# Patient Record
Sex: MALE | Race: WHITE | NOT HISPANIC OR LATINO
[De-identification: names, ages, dates, MRNs, and addresses within clinical notes are randomized per-mention and may not be internally consistent; named-entity substitution may affect disease eponyms.]

---

## 2017-09-19 PROBLEM — Z00.00 ENCOUNTER FOR PREVENTIVE HEALTH EXAMINATION: Status: ACTIVE | Noted: 2017-09-19

## 2017-10-16 ENCOUNTER — APPOINTMENT (OUTPATIENT)
Dept: ENDOCRINOLOGY | Facility: CLINIC | Age: 67
End: 2017-10-16

## 2017-10-16 ENCOUNTER — OTHER (OUTPATIENT)
Age: 67
End: 2017-10-16

## 2018-10-18 ENCOUNTER — APPOINTMENT (OUTPATIENT)
Dept: PULMONOLOGY | Facility: CLINIC | Age: 68
End: 2018-10-18
Payer: MEDICARE

## 2018-10-18 VITALS
HEART RATE: 69 BPM | OXYGEN SATURATION: 99 % | WEIGHT: 232 LBS | SYSTOLIC BLOOD PRESSURE: 126 MMHG | BODY MASS INDEX: 36.41 KG/M2 | HEIGHT: 67 IN | TEMPERATURE: 97.5 F | DIASTOLIC BLOOD PRESSURE: 64 MMHG

## 2018-10-18 DIAGNOSIS — I10 ESSENTIAL (PRIMARY) HYPERTENSION: ICD-10-CM

## 2018-10-18 DIAGNOSIS — Z86.39 PERSONAL HISTORY OF OTHER ENDOCRINE, NUTRITIONAL AND METABOLIC DISEASE: ICD-10-CM

## 2018-10-18 DIAGNOSIS — R91.8 OTHER NONSPECIFIC ABNORMAL FINDING OF LUNG FIELD: ICD-10-CM

## 2018-10-18 PROCEDURE — 94729 DIFFUSING CAPACITY: CPT

## 2018-10-18 PROCEDURE — 94727 GAS DIL/WSHOT DETER LNG VOL: CPT

## 2018-10-18 PROCEDURE — 99203 OFFICE O/P NEW LOW 30 MIN: CPT | Mod: 25

## 2018-10-18 PROCEDURE — 94060 EVALUATION OF WHEEZING: CPT

## 2018-10-18 RX ORDER — VARENICLINE TARTRATE 0.5 (11)-1
0.5 MG X 11 & KIT ORAL
Qty: 53 | Refills: 0 | Status: DISCONTINUED | COMMUNITY
Start: 2018-07-16 | End: 2018-10-18

## 2018-10-18 RX ORDER — NIFEDIPINE 30 MG/1
30 TABLET, FILM COATED, EXTENDED RELEASE ORAL
Qty: 90 | Refills: 0 | Status: ACTIVE | COMMUNITY
Start: 2018-05-30

## 2018-10-18 RX ORDER — INSULIN ASPART 100 [IU]/ML
100 INJECTION, SOLUTION INTRAVENOUS; SUBCUTANEOUS
Qty: 30 | Refills: 0 | Status: ACTIVE | COMMUNITY
Start: 2017-10-20

## 2018-10-18 RX ORDER — BLOOD-GLUCOSE METER
KIT MISCELLANEOUS
Qty: 1 | Refills: 0 | Status: ACTIVE | COMMUNITY
Start: 2018-05-08

## 2018-10-18 RX ORDER — AMOXICILLIN 500 MG/1
500 CAPSULE ORAL
Qty: 30 | Refills: 0 | Status: DISCONTINUED | COMMUNITY
Start: 2018-07-25 | End: 2018-10-18

## 2018-10-18 RX ORDER — VARENICLINE TARTRATE 1 MG/1
1 TABLET, FILM COATED ORAL
Qty: 56 | Refills: 0 | Status: DISCONTINUED | COMMUNITY
Start: 2018-07-16 | End: 2018-10-18

## 2018-10-18 RX ORDER — BLOOD SUGAR DIAGNOSTIC
STRIP MISCELLANEOUS
Qty: 300 | Refills: 0 | Status: ACTIVE | COMMUNITY
Start: 2018-05-08

## 2018-10-18 RX ORDER — MYCOPHENOLATE MOFETIL 500 MG/1
500 TABLET ORAL
Qty: 360 | Refills: 0 | Status: ACTIVE | COMMUNITY
Start: 2018-03-14

## 2018-10-18 RX ORDER — NIFEDIPINE 60 MG/1
60 TABLET, EXTENDED RELEASE ORAL
Qty: 90 | Refills: 0 | Status: ACTIVE | COMMUNITY
Start: 2018-05-30

## 2018-11-01 ENCOUNTER — APPOINTMENT (OUTPATIENT)
Dept: PULMONOLOGY | Facility: CLINIC | Age: 68
End: 2018-11-01
Payer: MEDICARE

## 2018-11-01 VITALS
HEART RATE: 70 BPM | OXYGEN SATURATION: 97 % | WEIGHT: 235 LBS | SYSTOLIC BLOOD PRESSURE: 126 MMHG | BODY MASS INDEX: 36.81 KG/M2 | DIASTOLIC BLOOD PRESSURE: 70 MMHG

## 2018-11-01 PROCEDURE — 99407 BEHAV CHNG SMOKING > 10 MIN: CPT

## 2018-11-01 PROCEDURE — 99213 OFFICE O/P EST LOW 20 MIN: CPT | Mod: 25

## 2018-11-01 RX ORDER — MONTELUKAST 10 MG/1
10 TABLET, FILM COATED ORAL
Qty: 90 | Refills: 0 | Status: DISCONTINUED | COMMUNITY
Start: 2018-10-16 | End: 2018-11-01

## 2019-02-05 ENCOUNTER — APPOINTMENT (OUTPATIENT)
Dept: PULMONOLOGY | Facility: CLINIC | Age: 69
End: 2019-02-05
Payer: MEDICARE

## 2019-02-05 VITALS
BODY MASS INDEX: 36.34 KG/M2 | WEIGHT: 232 LBS | OXYGEN SATURATION: 94 % | DIASTOLIC BLOOD PRESSURE: 70 MMHG | SYSTOLIC BLOOD PRESSURE: 148 MMHG | HEART RATE: 77 BPM

## 2019-02-05 PROCEDURE — 99213 OFFICE O/P EST LOW 20 MIN: CPT

## 2019-02-16 NOTE — HISTORY OF PRESENT ILLNESS
[FreeTextEntry1] : 69 yo male with hx of OAD and ILD on chest CT, presents for follow up. The patient complains of PRN productive cough without chest pain or hemoptysis. He uses breo PRN with rare albuterol use. He continues to smoke.

## 2019-02-16 NOTE — PHYSICAL EXAM
[General Appearance - Well Developed] : well developed [Normal Appearance] : normal appearance [Well Groomed] : well groomed [General Appearance - Well Nourished] : well nourished [No Deformities] : no deformities [General Appearance - In No Acute Distress] : no acute distress [Normal Conjunctiva] : the conjunctiva exhibited no abnormalities [Eyelids - No Xanthelasma] : the eyelids demonstrated no xanthelasmas [Normal Oropharynx] : normal oropharynx [Neck Appearance] : the appearance of the neck was normal [Neck Cervical Mass (___cm)] : no neck mass was observed [Jugular Venous Distention Increased] : there was no jugular-venous distention [Thyroid Diffuse Enlargement] : the thyroid was not enlarged [Thyroid Nodule] : there were no palpable thyroid nodules [Heart Rate And Rhythm] : heart rate and rhythm were normal [Heart Sounds] : normal S1 and S2 [Edema] : no peripheral edema present [Systolic grade ___/6] : A grade [unfilled]/6 systolic murmur was heard. [Respiration, Rhythm And Depth] : normal respiratory rhythm and effort [Exaggerated Use Of Accessory Muscles For Inspiration] : no accessory muscle use [Bibasilar Rales/Crackles] : bibasilar rales [Abdomen Soft] : soft [Abdomen Tenderness] : non-tender [Abdomen Mass (___ Cm)] : no abdominal mass palpated [Nail Clubbing] : no clubbing of the fingernails [Cyanosis, Localized] : no localized cyanosis [Petechial Hemorrhages (___cm)] : no petechial hemorrhages [Skin Color & Pigmentation] : normal skin color and pigmentation [] : no rash [No Venous Stasis] : no venous stasis [Skin Lesions] : no skin lesions [No Skin Ulcers] : no skin ulcer [No Xanthoma] : no  xanthoma was observed [No Focal Deficits] : no focal deficits [Oriented To Time, Place, And Person] : oriented to person, place, and time [Impaired Insight] : insight and judgment were intact [Affect] : the affect was normal

## 2019-02-16 NOTE — DISCUSSION/SUMMARY
[FreeTextEntry1] : 67 yo male with stable pulmonary exam. He is to continue use of breo and albuterol MDI as before for his OAD. PFT and chest CT will be repeated in the future to follow up his ILD. Further intervention and evaluation will depend on these results. He is to follow up with his PMD as before.

## 2019-02-16 NOTE — REVIEW OF SYSTEMS
[As Noted in HPI] : as noted in HPI [Cough] : cough [Sputum] : sputum  [Wheezing] : no wheezing [Hypertension] : ~T hypertension [Negative] : Sleep Disorder

## 2019-05-02 ENCOUNTER — APPOINTMENT (OUTPATIENT)
Dept: PULMONOLOGY | Facility: CLINIC | Age: 69
End: 2019-05-02
Payer: MEDICARE

## 2019-05-02 VITALS
SYSTOLIC BLOOD PRESSURE: 130 MMHG | OXYGEN SATURATION: 98 % | DIASTOLIC BLOOD PRESSURE: 62 MMHG | HEART RATE: 87 BPM | BODY MASS INDEX: 36.65 KG/M2 | WEIGHT: 234 LBS

## 2019-05-02 DIAGNOSIS — Z71.6 TOBACCO ABUSE COUNSELING: ICD-10-CM

## 2019-05-02 PROCEDURE — 94060 EVALUATION OF WHEEZING: CPT

## 2019-05-02 PROCEDURE — 99213 OFFICE O/P EST LOW 20 MIN: CPT | Mod: 25

## 2019-05-02 PROCEDURE — 36415 COLL VENOUS BLD VENIPUNCTURE: CPT

## 2019-05-02 PROCEDURE — 94727 GAS DIL/WSHOT DETER LNG VOL: CPT

## 2019-05-02 PROCEDURE — 94729 DIFFUSING CAPACITY: CPT

## 2019-05-03 LAB
CRP SERPL-MCNC: 1.47 MG/DL
ERYTHROCYTE [SEDIMENTATION RATE] IN BLOOD BY WESTERGREN METHOD: 37 MM/HR
RHEUMATOID FACT SER QL: 18 IU/ML

## 2019-05-05 LAB
ANA PAT FLD IF-IMP: ABNORMAL
ANA SER IF-ACNC: ABNORMAL

## 2019-05-06 LAB
ACE BLD-CCNC: 15 U/L
ALTERN TENCAPG(M6): 6.5 MCG/ML
ASPER FUMCAPG(M3): 13.9 MCG/ML
AUREOBASCAPG(M12): 5.8 MCG/ML
MICROPOLYCAPG(M22): 4.4 MCG/ML
PENIC CHRYCAPG(M1): 8.2 MCG/ML
PHOMA BETAE IGG: 7.6 MCG/ML
THERMOCAPG(M23): 4.3 MCG/ML
TRICHODERMA VIRIDE IGG: 5.9 MCG/ML

## 2019-05-11 PROBLEM — Z71.6 TOBACCO ABUSE COUNSELING: Status: ACTIVE | Noted: 2018-11-11

## 2019-05-11 NOTE — HISTORY OF PRESENT ILLNESS
[FreeTextEntry1] : 69 yo male with hx of mild OAD with ILD, presents for follow up of recent chest CT results. The patient complains of PRN productive cough without SOB, chest pain or hemoptysis. He continues to smoke, down to 5 cigarettes daily. He uses breo daily with PRN albuterol MDI. The patient had renal transplant in 2013 at [facility], being followed every 3 months.

## 2019-05-11 NOTE — DISCUSSION/SUMMARY
[FreeTextEntry1] : 67 yo male with restriction on PFT and interstitial changes on chest CT. I discussed the findings with the patient. Various rheum related labs were drawn, including hypersensitivity panel and ACE level. A chest CT will be repeated in 6 months. Smoking cessation was stressed. He is to use breo and albuterol MDI as before. PFT will be repeated in the future. He is to follow up with his PMD as before.

## 2019-05-11 NOTE — REVIEW OF SYSTEMS
[As Noted in HPI] : as noted in HPI [Cough] : cough [Sputum] : sputum  [Dyspnea] : no dyspnea [Wheezing] : no wheezing [Hypertension] : ~T hypertension [Negative] : Sleep Disorder

## 2019-05-11 NOTE — CONSULT LETTER
[Dear  ___] : Dear  [unfilled], [Please see my note below.] : Please see my note below. [Courtesy Letter:] : I had the pleasure of seeing your patient, [unfilled], in my office today. [Consult Closing:] : Thank you very much for allowing me to participate in the care of this patient.  If you have any questions, please do not hesitate to contact me. [Sincerely,] : Sincerely,

## 2019-05-14 ENCOUNTER — CLINICAL ADVICE (OUTPATIENT)
Age: 69
End: 2019-05-14

## 2019-06-04 ENCOUNTER — APPOINTMENT (OUTPATIENT)
Dept: PULMONOLOGY | Facility: CLINIC | Age: 69
End: 2019-06-04

## 2019-06-20 ENCOUNTER — APPOINTMENT (OUTPATIENT)
Dept: PULMONOLOGY | Facility: CLINIC | Age: 69
End: 2019-06-20
Payer: MEDICARE

## 2019-06-20 VITALS
HEIGHT: 67 IN | WEIGHT: 236 LBS | BODY MASS INDEX: 37.04 KG/M2 | HEART RATE: 77 BPM | OXYGEN SATURATION: 91 % | DIASTOLIC BLOOD PRESSURE: 66 MMHG | SYSTOLIC BLOOD PRESSURE: 132 MMHG

## 2019-06-20 PROCEDURE — 99213 OFFICE O/P EST LOW 20 MIN: CPT

## 2019-06-20 NOTE — REVIEW OF SYSTEMS
[As Noted in HPI] : as noted in HPI [Dyspnea] : dyspnea [Hypertension] : ~T hypertension [Negative] : Sleep Disorder [Cough] : no cough [Sputum] : not coughing up ~M sputum [Wheezing] : no wheezing

## 2019-06-20 NOTE — HISTORY OF PRESENT ILLNESS
[FreeTextEntry1] : 67 yo male with hx of ILD and OAD presents for follow up. The patient complains of PRN LOYA without cough, chest pain or hemoptysis. He uses breo 100 daily without albuterol. Since his last visit, he feels "better". He was seen by rheumatology as referred by me given abnormal BLAYNE, RF,CRP and sedimentation rate..

## 2019-06-20 NOTE — DISCUSSION/SUMMARY
[FreeTextEntry1] : 69 yo male with ILD with mild restriction on PFT. These abnormalities may be related to connective tissue disease. I will follow up results of rheumatologic evaluation (Dr Connelly). PFT and chest CT will be repeated in the future. He is to continue breo daily with PRN albuterol MDI.Complete smoking cessation was stressed. Follow up with his PMD as before, to include evaluation of significant AS murmur. He is to follow up with his transplant surgeon.

## 2019-09-26 ENCOUNTER — APPOINTMENT (OUTPATIENT)
Dept: PULMONOLOGY | Facility: CLINIC | Age: 69
End: 2019-09-26

## 2019-10-04 ENCOUNTER — APPOINTMENT (OUTPATIENT)
Dept: PULMONOLOGY | Facility: CLINIC | Age: 69
End: 2019-10-04
Payer: MEDICARE

## 2019-10-04 VITALS
OXYGEN SATURATION: 95 % | SYSTOLIC BLOOD PRESSURE: 120 MMHG | HEART RATE: 75 BPM | WEIGHT: 238 LBS | BODY MASS INDEX: 37.28 KG/M2 | DIASTOLIC BLOOD PRESSURE: 66 MMHG

## 2019-10-04 DIAGNOSIS — Z23 ENCOUNTER FOR IMMUNIZATION: ICD-10-CM

## 2019-10-04 PROCEDURE — G0008: CPT

## 2019-10-04 PROCEDURE — 90662 IIV NO PRSV INCREASED AG IM: CPT

## 2019-10-04 PROCEDURE — 99213 OFFICE O/P EST LOW 20 MIN: CPT | Mod: 25

## 2019-10-04 RX ORDER — ALBUTEROL SULFATE 90 UG/1
108 (90 BASE) AEROSOL, METERED RESPIRATORY (INHALATION)
Qty: 1 | Refills: 1 | Status: ACTIVE | COMMUNITY
Start: 2019-10-04 | End: 1900-01-01

## 2019-10-19 PROBLEM — Z23 INFLUENZA VACCINATION GIVEN: Status: ACTIVE | Noted: 2019-10-19

## 2019-10-19 NOTE — HISTORY OF PRESENT ILLNESS
[FreeTextEntry1] : 69 yo male with hx of ILD and OAD presents for follow up. The patient feels "good" with decrease in productive cough. He stopped smoking one month ago. He uses albuterol MDI PRN alone, having run out of breo last month. He was seen by rheum and is scheduled to follow up next week. He denies joint pain or rash.

## 2019-10-19 NOTE — DISCUSSION/SUMMARY
[FreeTextEntry1] : 69 yo male with stable OAD. He is to restart breo 100 and continue PRN albuterol MDI. Continued smoking cessation was stressed. PFT will be repeated in the future. Follow up with rheumatology recommended. The influenza vaccine was administered. He is to follow up with his PMD as before.

## 2019-10-19 NOTE — REVIEW OF SYSTEMS
[As Noted in HPI] : as noted in HPI [Cough] : cough [Sputum] : sputum  [Hemoptysis] : no hemoptysis [Dyspnea] : dyspnea [Wheezing] : no wheezing [Hypertension] : ~T hypertension [Negative] : Sleep Disorder

## 2020-01-24 ENCOUNTER — APPOINTMENT (OUTPATIENT)
Dept: PULMONOLOGY | Facility: CLINIC | Age: 70
End: 2020-01-24
Payer: MEDICARE

## 2020-01-24 VITALS
HEART RATE: 76 BPM | WEIGHT: 235 LBS | BODY MASS INDEX: 36.81 KG/M2 | OXYGEN SATURATION: 96 % | SYSTOLIC BLOOD PRESSURE: 150 MMHG | DIASTOLIC BLOOD PRESSURE: 74 MMHG

## 2020-01-24 PROCEDURE — 99213 OFFICE O/P EST LOW 20 MIN: CPT | Mod: 25

## 2020-01-24 PROCEDURE — 94060 EVALUATION OF WHEEZING: CPT

## 2020-01-24 PROCEDURE — 94729 DIFFUSING CAPACITY: CPT

## 2020-01-24 PROCEDURE — 94727 GAS DIL/WSHOT DETER LNG VOL: CPT

## 2020-01-30 NOTE — REVIEW OF SYSTEMS
[As Noted in HPI] : as noted in HPI [Sputum] : not coughing up ~M sputum [Cough] : no cough [Hemoptysis] : no hemoptysis [Wheezing] : no wheezing [Dyspnea] : no dyspnea [Hypertension] : ~T hypertension [Negative] : Sleep Disorder

## 2020-01-30 NOTE — DISCUSSION/SUMMARY
[FreeTextEntry1] : 68 yo male with improved pulmonary status and PFT. He is to continue breo and albuterol MDI as before. A repeat chest CT will be performed in three months to follow up the interstitial changes. Follow up rheumatology recommended (Dr Connelly). He is to follow up with his PMD as before.

## 2020-01-30 NOTE — PHYSICAL EXAM
[General Appearance - Well Developed] : well developed [Normal Appearance] : normal appearance [Well Groomed] : well groomed [General Appearance - Well Nourished] : well nourished [No Deformities] : no deformities [General Appearance - In No Acute Distress] : no acute distress [Normal Conjunctiva] : the conjunctiva exhibited no abnormalities [Normal Oropharynx] : normal oropharynx [Eyelids - No Xanthelasma] : the eyelids demonstrated no xanthelasmas [Neck Appearance] : the appearance of the neck was normal [Neck Cervical Mass (___cm)] : no neck mass was observed [Thyroid Diffuse Enlargement] : the thyroid was not enlarged [Jugular Venous Distention Increased] : there was no jugular-venous distention [Thyroid Nodule] : there were no palpable thyroid nodules [Heart Rate And Rhythm] : heart rate and rhythm were normal [Heart Sounds] : normal S1 and S2 [Edema] : no peripheral edema present [Systolic grade ___/6] : A grade [unfilled]/6 systolic murmur was heard. [Respiration, Rhythm And Depth] : normal respiratory rhythm and effort [Bibasilar Rales/Crackles] : bibasilar rales [Exaggerated Use Of Accessory Muscles For Inspiration] : no accessory muscle use [Abdomen Tenderness] : non-tender [Abdomen Soft] : soft [Abdomen Mass (___ Cm)] : no abdominal mass palpated [Nail Clubbing] : no clubbing of the fingernails [Cyanosis, Localized] : no localized cyanosis [Petechial Hemorrhages (___cm)] : no petechial hemorrhages [Skin Color & Pigmentation] : normal skin color and pigmentation [] : no rash [No Venous Stasis] : no venous stasis [Skin Lesions] : no skin lesions [No Skin Ulcers] : no skin ulcer [No Focal Deficits] : no focal deficits [No Xanthoma] : no  xanthoma was observed [Oriented To Time, Place, And Person] : oriented to person, place, and time [Affect] : the affect was normal [Impaired Insight] : insight and judgment were intact

## 2020-01-30 NOTE — CONSULT LETTER
[Please see my note below.] : Please see my note below. [Courtesy Letter:] : I had the pleasure of seeing your patient, [unfilled], in my office today. [Dear  ___] : Dear  [unfilled], [Consult Closing:] : Thank you very much for allowing me to participate in the care of this patient.  If you have any questions, please do not hesitate to contact me. [Sincerely,] : Sincerely,

## 2020-01-30 NOTE — HISTORY OF PRESENT ILLNESS
[FreeTextEntry1] : 70 yo male with mixed disease on PFT and dyspnea presents for follow up. The patient feels "good" on PRN breo 100, and ventolin MDI and nebulized albuterol. He denies fever, cough, chest pain or hemoptysis.

## 2020-05-22 ENCOUNTER — APPOINTMENT (OUTPATIENT)
Dept: PULMONOLOGY | Facility: CLINIC | Age: 70
End: 2020-05-22
Payer: MEDICARE

## 2020-05-22 VITALS
OXYGEN SATURATION: 94 % | BODY MASS INDEX: 35.71 KG/M2 | HEART RATE: 76 BPM | TEMPERATURE: 98.1 F | WEIGHT: 228 LBS | SYSTOLIC BLOOD PRESSURE: 150 MMHG | DIASTOLIC BLOOD PRESSURE: 80 MMHG | RESPIRATION RATE: 16 BRPM

## 2020-05-22 DIAGNOSIS — J98.4 OTHER DISORDERS OF LUNG: ICD-10-CM

## 2020-05-22 PROCEDURE — 99213 OFFICE O/P EST LOW 20 MIN: CPT

## 2020-05-23 PROBLEM — J98.4 RESTRICTIVE AIRWAY DISEASE: Status: ACTIVE | Noted: 2019-05-11

## 2020-05-23 NOTE — HISTORY OF PRESENT ILLNESS
[TextBox_4] : 70 yo male with hx of LOYA associated with mixed abnormality on PFT and abnormal chest CT presents for follow up. The patient claims to be feeling "great" , complaining of PRN productive cough without fever, chest pain or hemoptysis.He uses breo and albuterol PRN. He did not follow up with rheumatology as recommended.

## 2020-05-23 NOTE — DISCUSSION/SUMMARY
[FreeTextEntry1] : 70 yo male with mixed abnormality on PFT. She is to use breo daily with PRN albuterol MDI. Treatment adjustment will depend on symptomatic needs.PFT and chest CT will be performed in the future. Follow up with rheumatology as recommended in the past (Dr Connelly will be contacted).He is to follow up with his PMD as before.

## 2020-08-20 ENCOUNTER — APPOINTMENT (OUTPATIENT)
Dept: PULMONOLOGY | Facility: CLINIC | Age: 70
End: 2020-08-20
Payer: MEDICARE

## 2020-08-20 VITALS
TEMPERATURE: 98.1 F | HEART RATE: 79 BPM | SYSTOLIC BLOOD PRESSURE: 124 MMHG | WEIGHT: 248 LBS | OXYGEN SATURATION: 95 % | BODY MASS INDEX: 38.84 KG/M2 | DIASTOLIC BLOOD PRESSURE: 66 MMHG

## 2020-08-20 DIAGNOSIS — F17.200 NICOTINE DEPENDENCE, UNSPECIFIED, UNCOMPLICATED: ICD-10-CM

## 2020-08-20 PROCEDURE — 99213 OFFICE O/P EST LOW 20 MIN: CPT

## 2020-08-23 PROBLEM — F17.200 CURRENT SMOKER: Noted: 2018-10-18

## 2020-08-23 NOTE — HISTORY OF PRESENT ILLNESS
[Former] : former [>= 30 pack years] : >= 30 pack years [TextBox_4] : 70 yo male with mixed abnormality on PFT presents for follow up.The patient continues to complain of PRN LOYA with dry cough. He denies fever, chest pain or hemoptysis.He uses both breo and albuterol MDI PRN. He claims to have stopped smoking.A repeat chest CT was performed since last visit.The patient has a history of FRANCOIS  diagnosed 15 years ago, without treatment.He complains of daytime somnolence with fatigue. He snores and has witnessed apneas. [YearQuit] : 2020

## 2020-08-23 NOTE — DISCUSSION/SUMMARY
[FreeTextEntry1] : 68 yo male with ILD with LOYA. He is to use breo daily with PRN albuterol MDI for obstructive airway  component seen on PFT. Continued smoking cessation stressed. PFT will be repeated in the future. Rheumatology re evaluation stressed and will again be referred. I reviewed the recent chest CT images on line and discussed the findings with the patient. A repeat study will be performed in six months. Treatment for FRANCOIS was discussed. A split sleep study will be performed. Diet and weight loss also discussed. He is to follow up with his PMD as before.

## 2020-08-23 NOTE — REVIEW OF SYSTEMS
[Fatigue] : fatigue [Cough] : cough [Sputum] : no sputum [SOB on Exertion] : sob on exertion [Diabetes] : diabetes [Negative] : Psychiatric [TextBox_3] : Silvana

## 2020-09-13 ENCOUNTER — TRANSCRIPTION ENCOUNTER (OUTPATIENT)
Age: 70
End: 2020-09-13

## 2020-09-24 ENCOUNTER — APPOINTMENT (OUTPATIENT)
Dept: RHEUMATOLOGY | Facility: CLINIC | Age: 70
End: 2020-09-24
Payer: MEDICARE

## 2020-09-24 VITALS
WEIGHT: 245 LBS | TEMPERATURE: 97.9 F | SYSTOLIC BLOOD PRESSURE: 128 MMHG | OXYGEN SATURATION: 97 % | RESPIRATION RATE: 16 BRPM | BODY MASS INDEX: 38.45 KG/M2 | HEIGHT: 67 IN | HEART RATE: 80 BPM | DIASTOLIC BLOOD PRESSURE: 78 MMHG

## 2020-09-24 DIAGNOSIS — M16.10 UNILATERAL PRIMARY OSTEOARTHRITIS, UNSPECIFIED HIP: ICD-10-CM

## 2020-09-24 DIAGNOSIS — M25.50 PAIN IN UNSPECIFIED JOINT: ICD-10-CM

## 2020-09-24 DIAGNOSIS — M35.3 POLYMYALGIA RHEUMATICA: ICD-10-CM

## 2020-09-24 PROCEDURE — 99204 OFFICE O/P NEW MOD 45 MIN: CPT

## 2020-10-01 ENCOUNTER — APPOINTMENT (OUTPATIENT)
Dept: PULMONOLOGY | Facility: CLINIC | Age: 70
End: 2020-10-01
Payer: MEDICARE

## 2020-10-01 VITALS
WEIGHT: 238 LBS | DIASTOLIC BLOOD PRESSURE: 80 MMHG | HEART RATE: 76 BPM | BODY MASS INDEX: 37.28 KG/M2 | SYSTOLIC BLOOD PRESSURE: 122 MMHG | OXYGEN SATURATION: 98 % | TEMPERATURE: 97.2 F | RESPIRATION RATE: 17 BRPM

## 2020-10-01 DIAGNOSIS — J45.909 UNSPECIFIED ASTHMA, UNCOMPLICATED: ICD-10-CM

## 2020-10-01 DIAGNOSIS — R05 COUGH: ICD-10-CM

## 2020-10-01 PROCEDURE — 94729 DIFFUSING CAPACITY: CPT

## 2020-10-01 PROCEDURE — 99213 OFFICE O/P EST LOW 20 MIN: CPT | Mod: 25

## 2020-10-01 PROCEDURE — 94727 GAS DIL/WSHOT DETER LNG VOL: CPT

## 2020-10-01 PROCEDURE — 94060 EVALUATION OF WHEEZING: CPT

## 2020-10-02 LAB
CCP AB SER IA-ACNC: <8 UNITS
CRP SERPL-MCNC: 0.55 MG/DL
ERYTHROCYTE [SEDIMENTATION RATE] IN BLOOD BY WESTERGREN METHOD: 25 MM/HR
RF+CCP IGG SER-IMP: NEGATIVE
RHEUMATOID FACT SER QL: 16 IU/ML
URATE SERPL-MCNC: 8.2 MG/DL

## 2020-10-11 PROBLEM — J45.909 AIRWAY HYPERREACTIVITY: Status: ACTIVE | Noted: 2018-10-18

## 2020-10-11 PROBLEM — R05 COUGH: Status: ACTIVE | Noted: 2018-10-18

## 2020-10-11 NOTE — HISTORY OF PRESENT ILLNESS
[Former] : former [>= 30 pack years] : >= 30 pack years [TextBox_4] : 70 yo male with hx of ILD and mixed dz on PFT  and FRANCOIS ,presents for follow up. The patient continues to complain of OLYA with PRN productive cough without  chest pain or hemoptysis.  He quit smoking 3 months ago.He uses breo 100 daily with PRN albuterol MDI.He is to start on PAP treatment for FRANCOIS.Recently he is being evaluated by another rheumatologist for joint pain. He received the influenza vaccine recently and the pneumococcal vaccine four years ago. [YearQuit] : 2020 [Awakes Unrefreshed] : awakes unrefreshed [Fatigue] : fatigue [Hypersomnolence] : hypersomnolence [Snoring] : snoring

## 2020-10-11 NOTE — DISCUSSION/SUMMARY
[FreeTextEntry1] : 70 yo with normal PFT today despite chest CT abnormalities and smoking history. He is to continue breo and albuterol MDI as before. Follow up with rheumatology as planned. PFT and chest CT will be repeated in the future.CPAP will be ordered for FRANCOIS and compliance with use was stressed.. He is to follow up with his PMD as before.

## 2020-10-16 DIAGNOSIS — I73.9 PERIPHERAL VASCULAR DISEASE, UNSPECIFIED: ICD-10-CM

## 2020-10-20 ENCOUNTER — RX RENEWAL (OUTPATIENT)
Age: 70
End: 2020-10-20

## 2020-10-23 ENCOUNTER — APPOINTMENT (OUTPATIENT)
Dept: VASCULAR SURGERY | Facility: CLINIC | Age: 70
End: 2020-10-23
Payer: MEDICARE

## 2020-10-23 VITALS
SYSTOLIC BLOOD PRESSURE: 140 MMHG | BODY MASS INDEX: 37.67 KG/M2 | HEIGHT: 67 IN | TEMPERATURE: 98.2 F | WEIGHT: 240 LBS | DIASTOLIC BLOOD PRESSURE: 60 MMHG | HEART RATE: 69 BPM

## 2020-10-23 VITALS — HEART RATE: 67 BPM | SYSTOLIC BLOOD PRESSURE: 132 MMHG | DIASTOLIC BLOOD PRESSURE: 66 MMHG

## 2020-10-23 PROCEDURE — 99203 OFFICE O/P NEW LOW 30 MIN: CPT

## 2020-10-23 PROCEDURE — 99072 ADDL SUPL MATRL&STAF TM PHE: CPT

## 2020-10-23 RX ORDER — MYCOPHENOLATE MOFETIL 500 MG/1
TABLET, FILM COATED ORAL
Refills: 0 | Status: ACTIVE | COMMUNITY

## 2020-10-23 RX ORDER — ALFUZOSIN HYDROCHLORIDE 10 MG/1
10 TABLET, EXTENDED RELEASE ORAL
Refills: 0 | Status: ACTIVE | COMMUNITY

## 2020-10-23 RX ORDER — MONTELUKAST 10 MG/1
TABLET, FILM COATED ORAL
Refills: 0 | Status: ACTIVE | COMMUNITY

## 2020-11-02 ENCOUNTER — APPOINTMENT (OUTPATIENT)
Dept: VASCULAR SURGERY | Facility: CLINIC | Age: 70
End: 2020-11-02

## 2020-11-02 ENCOUNTER — APPOINTMENT (OUTPATIENT)
Dept: VASCULAR SURGERY | Facility: CLINIC | Age: 70
End: 2020-11-02
Payer: MEDICARE

## 2020-11-06 ENCOUNTER — APPOINTMENT (OUTPATIENT)
Dept: VASCULAR SURGERY | Facility: CLINIC | Age: 70
End: 2020-11-06
Payer: MEDICARE

## 2020-11-06 PROCEDURE — 99072 ADDL SUPL MATRL&STAF TM PHE: CPT

## 2020-11-06 PROCEDURE — 93970 EXTREMITY STUDY: CPT

## 2020-11-10 ENCOUNTER — APPOINTMENT (OUTPATIENT)
Dept: VASCULAR SURGERY | Facility: CLINIC | Age: 70
End: 2020-11-10
Payer: MEDICARE

## 2020-11-10 PROCEDURE — 99212 OFFICE O/P EST SF 10 MIN: CPT | Mod: 95

## 2020-11-12 ENCOUNTER — APPOINTMENT (OUTPATIENT)
Dept: RHEUMATOLOGY | Facility: CLINIC | Age: 70
End: 2020-11-12

## 2020-11-29 ENCOUNTER — RX RENEWAL (OUTPATIENT)
Age: 70
End: 2020-11-29

## 2021-01-28 ENCOUNTER — APPOINTMENT (OUTPATIENT)
Dept: PULMONOLOGY | Facility: CLINIC | Age: 71
End: 2021-01-28
Payer: MEDICARE

## 2021-01-28 VITALS
BODY MASS INDEX: 36.41 KG/M2 | DIASTOLIC BLOOD PRESSURE: 90 MMHG | HEART RATE: 79 BPM | RESPIRATION RATE: 17 BRPM | WEIGHT: 232 LBS | SYSTOLIC BLOOD PRESSURE: 170 MMHG | HEIGHT: 67 IN | OXYGEN SATURATION: 98 % | TEMPERATURE: 97 F

## 2021-01-28 PROCEDURE — 99072 ADDL SUPL MATRL&STAF TM PHE: CPT

## 2021-01-28 PROCEDURE — 99213 OFFICE O/P EST LOW 20 MIN: CPT

## 2021-02-02 NOTE — DISCUSSION/SUMMARY
[FreeTextEntry1] : 71 yo male with mixed abnormality on PFT with stable pulmonary exam on ICS/LABA and albuterol MDI. Treatment adjustment will depend on symptomatic needs. A repeat chest CT will be performed as recommended last visit. PFT will also be repeated in the future. He is to follow up with rheumatology and his PMD for follow up of valvular disease.

## 2021-02-02 NOTE — HISTORY OF PRESENT ILLNESS
[>= 30 pack years] : >= 30 pack years [TextBox_4] : 69 yo male with hx of FRANCOIS and mixed abnormality on PFT presents for follow up. The patient feels "better" with PRN LOYA. He denies cough, chest pain or hemoptysis. He uses breo 100 daily with PRN albuterol MDI. He is compliant with CPAP use and continues  to abstain from cigarette use.  [YearQuit] : 2020 [TextBox_29] : Denies snoring, daytime somnolence, apneic episodes, AM headaches

## 2021-02-02 NOTE — REVIEW OF SYSTEMS
[Fatigue] : no fatigue [Cough] : no cough [Sputum] : no sputum [SOB on Exertion] : sob on exertion [Diabetes] : diabetes [Negative] : Psychiatric [TextBox_3] : Silvana

## 2021-05-14 ENCOUNTER — APPOINTMENT (OUTPATIENT)
Dept: PULMONOLOGY | Facility: CLINIC | Age: 71
End: 2021-05-14
Payer: MEDICARE

## 2021-05-14 VITALS
OXYGEN SATURATION: 95 % | SYSTOLIC BLOOD PRESSURE: 134 MMHG | WEIGHT: 223 LBS | TEMPERATURE: 97.8 F | BODY MASS INDEX: 34.93 KG/M2 | DIASTOLIC BLOOD PRESSURE: 66 MMHG | HEART RATE: 78 BPM

## 2021-05-14 PROCEDURE — 99072 ADDL SUPL MATRL&STAF TM PHE: CPT

## 2021-05-14 PROCEDURE — 99213 OFFICE O/P EST LOW 20 MIN: CPT

## 2021-05-14 NOTE — DISCUSSION/SUMMARY
[FreeTextEntry1] : 69 yo male with mixed abnormality on PFT with baseline pulmonary exam. He is to continue albuterol MDI PRN with daily breo as before. PFT will be performed in the future. A repeat chest CT will be performed as recommended. Continued compliance with CPAP use stressed. Continued treatment post renal transplant as before. He is to follow up with rheum and his PMD as before.

## 2021-05-14 NOTE — HISTORY OF PRESENT ILLNESS
[Former] : former [>= 30 pack years] : >= 30 pack years [Current] : current [TextBox_4] : 71 yo male with mixed abnormality on PFT and FRANCOIS presents for follow up. The patient continues to complain of LOYA without cough, chest pain or hemoptysis. He uses breo daily with PRN albuterol MDI. He continues not to smoke cigarettes but does vape. He did not go for follow up chest CT as recommended. [YearQuit] : 2020 [TextBox_29] : Denies snoring, daytime somnolence, apneic episodes, AM headaches

## 2021-05-14 NOTE — REVIEW OF SYSTEMS
[SOB on Exertion] : sob on exertion [Diabetes] : diabetes [Negative] : Psychiatric [Fatigue] : no fatigue [Cough] : no cough [Sputum] : no sputum [TextBox_3] : Silvana

## 2021-08-19 ENCOUNTER — APPOINTMENT (OUTPATIENT)
Dept: PULMONOLOGY | Facility: CLINIC | Age: 71
End: 2021-08-19
Payer: MEDICARE

## 2021-08-19 VITALS
WEIGHT: 213 LBS | SYSTOLIC BLOOD PRESSURE: 148 MMHG | RESPIRATION RATE: 16 BRPM | TEMPERATURE: 97 F | DIASTOLIC BLOOD PRESSURE: 80 MMHG | OXYGEN SATURATION: 93 % | HEIGHT: 67 IN | HEART RATE: 83 BPM | BODY MASS INDEX: 33.43 KG/M2

## 2021-08-19 PROCEDURE — 99213 OFFICE O/P EST LOW 20 MIN: CPT

## 2021-09-06 NOTE — PHYSICAL EXAM
[No Acute Distress] : no acute distress [Normal Oropharynx] : normal oropharynx [Normal Appearance] : normal appearance [No Neck Mass] : no neck mass [Normal Rate/Rhythm] : normal rate/rhythm [Murmur ___ / 6] : murmur [unfilled] / 6 [Normal S1, S2] : normal s1, s2 [No Resp Distress] : no resp distress [Rales] : rales [No Abnormalities] : no abnormalities [Benign] : benign [Normal Gait] : normal gait [No Clubbing] : no clubbing [No Cyanosis] : no cyanosis [FROM] : FROM [No Edema] : no edema [Normal Color/ Pigmentation] : normal color/ pigmentation [No Focal Deficits] : no focal deficits [Oriented x3] : oriented x3 [Normal Affect] : normal affect

## 2021-09-06 NOTE — HISTORY OF PRESENT ILLNESS
[>= 30 pack years] : >= 30 pack years [Former] : former [Current] : current [TextBox_4] : 69 yo male with mixed abnormality on PFT, and FRANCOIS on CPAP, presents for follow up. The patient continues to complain of LOYA with mucus production without cough. He denies fever, chest pain or hemoptysis. He uses breo 100 daily alone. He continues to vape with PRN cigarette use. He is compliant with CPAP use and denies sleep related complaints. [YearQuit] : 2020 [TextBox_29] : Denies snoring, daytime somnolence, apneic episodes, AM headaches

## 2021-09-06 NOTE — DISCUSSION/SUMMARY
[FreeTextEntry1] : 71 yo male with mixed abnormality on PFT at pulmonary baseline clinically. I reviewed the images of his latest chest CT and discussed the findings with the patient. The need to stop all types of smoking was stressed. He is to use breo as before with PRN albuterol MDI. PFT and chest CT will be repeated in the future. Rheumatology follow up stressed. He is to follow up with his PMD for cardiac evaluation also.

## 2021-09-06 NOTE — REVIEW OF SYSTEMS
[Fatigue] : no fatigue [Cough] : no cough [Sputum] : sputum [SOB on Exertion] : sob on exertion [Diabetes] : diabetes [Negative] : Psychiatric [TextBox_3] : Silvana

## 2021-09-24 ENCOUNTER — APPOINTMENT (OUTPATIENT)
Dept: CV DIAGNOSITCS | Facility: HOSPITAL | Age: 71
End: 2021-09-24

## 2021-09-24 ENCOUNTER — OUTPATIENT (OUTPATIENT)
Dept: OUTPATIENT SERVICES | Facility: HOSPITAL | Age: 71
LOS: 1 days | End: 2021-09-24
Payer: COMMERCIAL

## 2021-09-24 DIAGNOSIS — I35.0 NONRHEUMATIC AORTIC (VALVE) STENOSIS: ICD-10-CM

## 2021-09-24 PROCEDURE — 93306 TTE W/DOPPLER COMPLETE: CPT | Mod: 26

## 2021-09-24 PROCEDURE — 93306 TTE W/DOPPLER COMPLETE: CPT

## 2021-10-11 PROBLEM — M54.50 CHRONIC LUMBAR PAIN: Status: ACTIVE | Noted: 2020-09-24

## 2021-10-11 PROBLEM — R53.83 FATIGUE: Status: ACTIVE | Noted: 2020-08-20

## 2021-10-12 ENCOUNTER — LABORATORY RESULT (OUTPATIENT)
Age: 71
End: 2021-10-12

## 2021-10-12 ENCOUNTER — NON-APPOINTMENT (OUTPATIENT)
Age: 71
End: 2021-10-12

## 2021-10-12 ENCOUNTER — APPOINTMENT (OUTPATIENT)
Dept: CARDIOTHORACIC SURGERY | Facility: CLINIC | Age: 71
End: 2021-10-12
Payer: MEDICARE

## 2021-10-12 VITALS
TEMPERATURE: 97.6 F | WEIGHT: 213 LBS | HEIGHT: 67 IN | SYSTOLIC BLOOD PRESSURE: 118 MMHG | DIASTOLIC BLOOD PRESSURE: 70 MMHG | BODY MASS INDEX: 33.43 KG/M2 | HEART RATE: 67 BPM | RESPIRATION RATE: 13 BRPM | OXYGEN SATURATION: 98 %

## 2021-10-12 DIAGNOSIS — G89.29 LOW BACK PAIN, UNSPECIFIED: ICD-10-CM

## 2021-10-12 DIAGNOSIS — I50.9 HEART FAILURE, UNSPECIFIED: ICD-10-CM

## 2021-10-12 DIAGNOSIS — Z82.49 FAMILY HISTORY OF ISCHEMIC HEART DISEASE AND OTHER DISEASES OF THE CIRCULATORY SYSTEM: ICD-10-CM

## 2021-10-12 DIAGNOSIS — F17.290 NICOTINE DEPENDENCE, OTHER TOBACCO PRODUCT, UNCOMPLICATED: ICD-10-CM

## 2021-10-12 DIAGNOSIS — M54.50 LOW BACK PAIN, UNSPECIFIED: ICD-10-CM

## 2021-10-12 DIAGNOSIS — F17.200 NICOTINE DEPENDENCE, UNSPECIFIED, UNCOMPLICATED: ICD-10-CM

## 2021-10-12 DIAGNOSIS — R53.83 OTHER FATIGUE: ICD-10-CM

## 2021-10-12 PROCEDURE — 99205 OFFICE O/P NEW HI 60 MIN: CPT

## 2021-10-12 PROCEDURE — 93000 ELECTROCARDIOGRAM COMPLETE: CPT

## 2021-10-12 PROCEDURE — 99204 OFFICE O/P NEW MOD 45 MIN: CPT

## 2021-10-12 RX ORDER — FEBUXOSTAT 40 MG/1
40 TABLET ORAL
Qty: 30 | Refills: 0 | Status: COMPLETED | COMMUNITY
Start: 2018-03-14 | End: 2021-10-12

## 2021-10-12 RX ORDER — TACROLIMUS 0.5 MG/1
0.5 CAPSULE ORAL
Qty: 90 | Refills: 0 | Status: COMPLETED | COMMUNITY
Start: 2018-03-14 | End: 2021-10-12

## 2021-10-12 RX ORDER — CHLORTHALIDONE 25 MG/1
25 TABLET ORAL
Qty: 90 | Refills: 0 | Status: COMPLETED | COMMUNITY
Start: 2018-05-30 | End: 2021-10-12

## 2021-10-12 RX ORDER — TORSEMIDE 10 MG/1
10 TABLET ORAL EVERY OTHER DAY
Refills: 0 | Status: ACTIVE | COMMUNITY
Start: 2021-10-12

## 2021-10-12 RX ORDER — FLUTICASONE PROPIONATE 50 UG/1
50 SPRAY, METERED NASAL
Qty: 48 | Refills: 0 | Status: COMPLETED | COMMUNITY
Start: 2018-05-05 | End: 2021-10-12

## 2021-10-12 RX ORDER — ALBUTEROL SULFATE 90 UG/1
108 (90 BASE) AEROSOL, METERED RESPIRATORY (INHALATION)
Qty: 1 | Refills: 1 | Status: COMPLETED | COMMUNITY
Start: 2019-06-20 | End: 2021-10-12

## 2021-10-12 RX ORDER — LANCETS 28 GAUGE
EACH MISCELLANEOUS
Qty: 300 | Refills: 0 | Status: COMPLETED | COMMUNITY
Start: 2018-05-08 | End: 2021-10-12

## 2021-10-12 RX ORDER — BLOOD-GLUCOSE CONTROL, NORMAL
EACH MISCELLANEOUS
Qty: 1 | Refills: 0 | Status: COMPLETED | COMMUNITY
Start: 2018-05-08 | End: 2021-10-12

## 2021-10-12 RX ORDER — GABAPENTIN 100 MG/1
100 CAPSULE ORAL
Qty: 30 | Refills: 0 | Status: COMPLETED | COMMUNITY
Start: 2020-09-24 | End: 2021-10-12

## 2021-10-12 RX ORDER — TACROLIMUS 1 MG/1
1 CAPSULE ORAL
Qty: 180 | Refills: 0 | Status: COMPLETED | COMMUNITY
Start: 2018-03-14 | End: 2021-10-12

## 2021-10-12 NOTE — CARDIOLOGY SUMMARY
[de-identified] : NSR 64\par LVH [de-identified] : 9/24/21\par EF 70%, NATHALY 1sqcm, mGr 47 mmHg,Aov 4.2 m/s, DVI 0.26

## 2021-10-12 NOTE — PHYSICAL EXAM
[General Appearance - Alert] : alert [General Appearance - In No Acute Distress] : in no acute distress [Sclera] : the sclera and conjunctiva were normal [PERRL With Normal Accommodation] : pupils were equal in size, round, and reactive to light [Extraocular Movements] : extraocular movements were intact [Jugular Venous Distention Increased] : there was no jugular-venous distention [] : no respiratory distress [Exaggerated Use Of Accessory Muscles For Inspiration] : no accessory muscle use [Examination Of The Chest] : the chest was normal in appearance [Bowel Sounds] : normal bowel sounds [Abdomen Soft] : soft [Cervical Lymph Nodes Enlarged Posterior Bilaterally] : posterior cervical [Cervical Lymph Nodes Enlarged Anterior Bilaterally] : anterior cervical [No CVA Tenderness] : no ~M costovertebral angle tenderness [No Focal Deficits] : no focal deficits [Oriented To Time, Place, And Person] : oriented to person, place, and time [FreeTextEntry1] : Non functioning LUE AV fistula

## 2021-10-12 NOTE — REVIEW OF SYSTEMS
[Dyspnea on exertion] : not dyspnea during exertion [Chest Discomfort] : no chest discomfort [Lower Ext Edema] : no extremity edema [Abdominal Pain] : no abdominal pain [Nausea] : no nausea [Change in Appetite] : no change in appetite [Dizziness] : no dizziness

## 2021-10-12 NOTE — HISTORY OF PRESENT ILLNESS
[FreeTextEntry1] : Mr. Baker is a 70 year old male referred by Dr. Garrido for evaluation of aortic stenosis. He has an extensive past medical history including a renal transplant, T2DM (on insulin pump), ILD, restrictive/obstructive pulmonary disease, FRANCOIS, HTN, and HLD. He is feeling well without dyspnea, angina, PND, orthopnea, dizziness, or LE edema. \par \par Repeat echocardiogram today demonstrates severe aortic stenosis with preserved LVEF, NATHALY 0.9 sqcm, mGr 47 mmHg, pGr 71 mmHg, AoV 4.2 m/s, and a DVI 0.23.\par \par He states "I feel great" and uses a stationary bike at home and walks regularly without issue. He reports "sometimes I have SOB when I do a lot of effort" such as with climbing a FOS. He has no angina or syncopal symptoms. He describes his pulmonary disease as stable and was told "last time I had an MRI, it was good". \par \par Renal at [facility]: Dr Vargas

## 2021-10-12 NOTE — END OF VISIT
[FreeTextEntry3] : I personally performed the services described in the documentation, reviewed the documentation recorded by the scribe in my presence and it accurately and completely records my words and actions.\par \par I, Ally Guadalupe NP, am scribing for Dr. Zhao Gill, the following sections HISTORY OF PRESENT ILLNESS, PAST MEDICAL/FAMILY/SOCIAL HISTORY; REVIEW OF SYSTEMS; VITAL SIGNS; PHYSICAL EXAM; DISPOSITION.\par

## 2021-10-12 NOTE — DISCUSSION/SUMMARY
[Severe Aortic Stenosis] : severe aortic stenosis [Multidetector Cardiac CT] : multidetector cardiac CT [Coronary Angiography] : coronary angiography [None] : There are no changes in medication management [Aortic Valve Replacement] : aortic valve replacement [Patient] : the patient [FreeTextEntry1] : Mr Baker has severe AS and recently progressive exertional dyspnea (NYHA II) that is likely multifactorial in etiology (AS and pulmonary disease). Dr Gill and I are in agreement that he is an appropriate candidate for MIMA. As such, he will be scheduled for an angiogram and cardiac structural CT. Prior to any contrast-based exams, and in context of his prior renal transplant, I recommended he be seen by a local Nephrologist, who would also be able to follow him postoperatively after his MIMA--he is amenable to doing so. I also recommended he notify his transplant Nephrologist at [facility] about our plans--I will call him as well. I discussed the potential risks and benefits of MIMA with him in detail and answered all of his questions--including the potential for acute kidney injury--and he does wish to proceed as outlined.

## 2021-10-12 NOTE — CONSULT LETTER
[Dear  ___] : Dear ~MATTHEW, [Courtesy Letter:] : I had the pleasure of seeing your patient, [unfilled], in my office today. [Please see my note below.] : Please see my note below. [Consult Closing:] : Thank you very much for allowing me to participate in the care of this patient.  If you have any questions, please do not hesitate to contact me. [Sincerely,] : Sincerely, [FreeTextEntry2] : Homero Garrido MD\par [address], \[place] [address] [FreeTextEntry3] : Zhao Gill MD\par \par Cardiovascular & Thoracic Surgery\par Professor\par [facility]\par \par

## 2021-10-12 NOTE — PHYSICAL EXAM
[Rt] : no varicose veins of the right leg [Lt] : no varicose veins of the left leg [Left Carotid Bruit] : no bruit heard over the left carotid [Right Carotid Bruit] : no bruit heard over the right carotid

## 2021-10-12 NOTE — ASSESSMENT
[FreeTextEntry1] : Mr. Baker is a 70 year old male with past medical history that includes renal transplant (2013 at [facility]), T2DM (on insulin pump), ILD, restrictive/obstructive pulmonary disease, FRANCOIS, HTN, and HLD.  Echocardiogram on 9/24/21 EF 70%, NATHALY 1 sqcm, mGr 47 mmHg,Aov 4.2 m/s, DVI 0.26   referred by Dr. Garrido for evaluation of aortic stenosis.  He reports he feels well today, he denies angina, palpitations, SOB, LOYA or syncope. He is ambulating with a cane. \par \par Echo was reviewed and deemed to show severe aortic valve stenosis.  Risks of the TAVR [procedure discussed with the patietn including the possibility of permanent pacemaker requirement.  Plan for CTA and cardiac cath then scheduling for an elective TAVR

## 2021-10-12 NOTE — REVIEW OF SYSTEMS
[Feeling Tired] : feeling tired [SOB on Exertion] : shortness of breath during exertion [Joint Swelling] : joint swelling [Negative] : Genitourinary [Chest Pain] : no chest pain [Palpitations] : no palpitations [Dizziness] : no dizziness [Suicidal] : not suicidal [FreeTextEntry9] : ambulates with cane

## 2021-10-12 NOTE — HISTORY OF PRESENT ILLNESS
[FreeTextEntry1] : Mr. Baker is a 70 year old male with past medical history that includes renal transplant (2013 at [facility], + LUE AV fistula), T2DM (on insulin pump), ILD, restrictive/obstructive pulmonary disease, FRANCOIS, HTN, and HLD.  Echocardiogram on 9/24/21 EF 70%, NATHALY 1 sqcm, mGr 47 mmHg,Aov 4.2 m/s, DVI 0.26   referred by Dr. Garrido for evaluation of aortic stenosis.  He reports he feels well today, he denies angina, palpitations, SOB, LOYA or syncope. He is ambulating with a cane. \par  \par \par

## 2021-10-22 ENCOUNTER — APPOINTMENT (OUTPATIENT)
Dept: NEPHROLOGY | Facility: CLINIC | Age: 71
End: 2021-10-22

## 2021-11-04 ENCOUNTER — RESULT REVIEW (OUTPATIENT)
Age: 71
End: 2021-11-04

## 2021-11-04 ENCOUNTER — OUTPATIENT (OUTPATIENT)
Dept: OUTPATIENT SERVICES | Facility: HOSPITAL | Age: 71
LOS: 1 days | End: 2021-11-04
Payer: COMMERCIAL

## 2021-11-04 ENCOUNTER — APPOINTMENT (OUTPATIENT)
Dept: CARDIOLOGY | Facility: CLINIC | Age: 71
End: 2021-11-04

## 2021-11-04 DIAGNOSIS — I50.9 HEART FAILURE, UNSPECIFIED: ICD-10-CM

## 2021-11-04 DIAGNOSIS — Z00.00 ENCOUNTER FOR GENERAL ADULT MEDICAL EXAMINATION WITHOUT ABNORMAL FINDINGS: ICD-10-CM

## 2021-11-04 PROCEDURE — 75572 CT HRT W/3D IMAGE: CPT | Mod: 26

## 2021-11-04 PROCEDURE — 75572 CT HRT W/3D IMAGE: CPT

## 2021-11-19 ENCOUNTER — LABORATORY RESULT (OUTPATIENT)
Age: 71
End: 2021-11-19

## 2021-11-19 ENCOUNTER — APPOINTMENT (OUTPATIENT)
Dept: DISASTER EMERGENCY | Facility: CLINIC | Age: 71
End: 2021-11-19

## 2021-11-22 ENCOUNTER — INPATIENT (INPATIENT)
Facility: HOSPITAL | Age: 71
LOS: 0 days | Discharge: HOME | DRG: 247 | End: 2021-11-23
Attending: INTERNAL MEDICINE | Admitting: INTERNAL MEDICINE
Payer: COMMERCIAL

## 2021-11-22 ENCOUNTER — TRANSCRIPTION ENCOUNTER (OUTPATIENT)
Age: 71
End: 2021-11-22

## 2021-11-22 VITALS
HEART RATE: 69 BPM | RESPIRATION RATE: 16 BRPM | OXYGEN SATURATION: 96 % | DIASTOLIC BLOOD PRESSURE: 65 MMHG | SYSTOLIC BLOOD PRESSURE: 140 MMHG | HEIGHT: 67 IN | WEIGHT: 209 LBS | TEMPERATURE: 98 F

## 2021-11-22 DIAGNOSIS — I35.9 NONRHEUMATIC AORTIC VALVE DISORDER, UNSPECIFIED: ICD-10-CM

## 2021-11-22 DIAGNOSIS — Z94.0 KIDNEY TRANSPLANT STATUS: Chronic | ICD-10-CM

## 2021-11-22 LAB
ANION GAP SERPL CALC-SCNC: 14 MMOL/L — SIGNIFICANT CHANGE UP (ref 5–17)
BUN SERPL-MCNC: 12 MG/DL — SIGNIFICANT CHANGE UP (ref 7–23)
CALCIUM SERPL-MCNC: 9.8 MG/DL — SIGNIFICANT CHANGE UP (ref 8.4–10.5)
CHLORIDE SERPL-SCNC: 106 MMOL/L — SIGNIFICANT CHANGE UP (ref 96–108)
CO2 SERPL-SCNC: 20 MMOL/L — LOW (ref 22–31)
CREAT SERPL-MCNC: 0.47 MG/DL — LOW (ref 0.5–1.3)
GLUCOSE BLDC GLUCOMTR-MCNC: 104 MG/DL — HIGH (ref 70–99)
GLUCOSE BLDC GLUCOMTR-MCNC: 78 MG/DL — SIGNIFICANT CHANGE UP (ref 70–99)
GLUCOSE BLDC GLUCOMTR-MCNC: 90 MG/DL — SIGNIFICANT CHANGE UP (ref 70–99)
GLUCOSE BLDC GLUCOMTR-MCNC: 93 MG/DL — SIGNIFICANT CHANGE UP (ref 70–99)
GLUCOSE BLDC GLUCOMTR-MCNC: 95 MG/DL — SIGNIFICANT CHANGE UP (ref 70–99)
GLUCOSE SERPL-MCNC: 89 MG/DL — SIGNIFICANT CHANGE UP (ref 70–99)
HCT VFR BLD CALC: 42.9 % — SIGNIFICANT CHANGE UP (ref 39–50)
HGB BLD-MCNC: 13.9 G/DL — SIGNIFICANT CHANGE UP (ref 13–17)
MAGNESIUM SERPL-MCNC: 1.7 MG/DL — SIGNIFICANT CHANGE UP (ref 1.6–2.6)
MCHC RBC-ENTMCNC: 30.8 PG — SIGNIFICANT CHANGE UP (ref 27–34)
MCHC RBC-ENTMCNC: 32.4 GM/DL — SIGNIFICANT CHANGE UP (ref 32–36)
MCV RBC AUTO: 94.9 FL — SIGNIFICANT CHANGE UP (ref 80–100)
NRBC # BLD: 0 /100 WBCS — SIGNIFICANT CHANGE UP (ref 0–0)
PLATELET # BLD AUTO: 232 K/UL — SIGNIFICANT CHANGE UP (ref 150–400)
POTASSIUM SERPL-MCNC: 4.9 MMOL/L — SIGNIFICANT CHANGE UP (ref 3.5–5.3)
POTASSIUM SERPL-SCNC: 4.9 MMOL/L — SIGNIFICANT CHANGE UP (ref 3.5–5.3)
RBC # BLD: 4.52 M/UL — SIGNIFICANT CHANGE UP (ref 4.2–5.8)
RBC # FLD: 14.4 % — SIGNIFICANT CHANGE UP (ref 10.3–14.5)
SODIUM SERPL-SCNC: 140 MMOL/L — SIGNIFICANT CHANGE UP (ref 135–145)
WBC # BLD: 9.26 K/UL — SIGNIFICANT CHANGE UP (ref 3.8–10.5)
WBC # FLD AUTO: 9.26 K/UL — SIGNIFICANT CHANGE UP (ref 3.8–10.5)

## 2021-11-22 PROCEDURE — 93010 ELECTROCARDIOGRAM REPORT: CPT | Mod: 76

## 2021-11-22 PROCEDURE — 92928 PRQ TCAT PLMT NTRAC ST 1 LES: CPT | Mod: LC

## 2021-11-22 PROCEDURE — 93454 CORONARY ARTERY ANGIO S&I: CPT | Mod: 26,59

## 2021-11-22 PROCEDURE — 99152 MOD SED SAME PHYS/QHP 5/>YRS: CPT

## 2021-11-22 RX ORDER — LABETALOL HCL 100 MG
200 TABLET ORAL THREE TIMES A DAY
Refills: 0 | Status: DISCONTINUED | OUTPATIENT
Start: 2021-11-22 | End: 2021-11-23

## 2021-11-22 RX ORDER — NIFEDIPINE 30 MG
60 TABLET, EXTENDED RELEASE 24 HR ORAL DAILY
Refills: 0 | Status: DISCONTINUED | OUTPATIENT
Start: 2021-11-22 | End: 2021-11-23

## 2021-11-22 RX ORDER — DEXTROSE 50 % IN WATER 50 %
15 SYRINGE (ML) INTRAVENOUS ONCE
Refills: 0 | Status: DISCONTINUED | OUTPATIENT
Start: 2021-11-22 | End: 2021-11-23

## 2021-11-22 RX ORDER — NIFEDIPINE 30 MG
30 TABLET, EXTENDED RELEASE 24 HR ORAL AT BEDTIME
Refills: 0 | Status: DISCONTINUED | OUTPATIENT
Start: 2021-11-22 | End: 2021-11-23

## 2021-11-22 RX ORDER — DEXTROSE 50 % IN WATER 50 %
12.5 SYRINGE (ML) INTRAVENOUS ONCE
Refills: 0 | Status: DISCONTINUED | OUTPATIENT
Start: 2021-11-22 | End: 2021-11-23

## 2021-11-22 RX ORDER — MYCOPHENOLATE MOFETIL 250 MG/1
1000 CAPSULE ORAL
Refills: 0 | Status: DISCONTINUED | OUTPATIENT
Start: 2021-11-22 | End: 2021-11-23

## 2021-11-22 RX ORDER — ASPIRIN/CALCIUM CARB/MAGNESIUM 324 MG
81 TABLET ORAL DAILY
Refills: 0 | Status: DISCONTINUED | OUTPATIENT
Start: 2021-11-22 | End: 2021-11-23

## 2021-11-22 RX ORDER — CLOPIDOGREL BISULFATE 75 MG/1
1 TABLET, FILM COATED ORAL
Qty: 90 | Refills: 0
Start: 2021-11-22 | End: 2022-02-19

## 2021-11-22 RX ORDER — DEXTROSE 50 % IN WATER 50 %
25 SYRINGE (ML) INTRAVENOUS ONCE
Refills: 0 | Status: DISCONTINUED | OUTPATIENT
Start: 2021-11-22 | End: 2021-11-23

## 2021-11-22 RX ORDER — CLOPIDOGREL BISULFATE 75 MG/1
75 TABLET, FILM COATED ORAL DAILY
Refills: 0 | Status: DISCONTINUED | OUTPATIENT
Start: 2021-11-23 | End: 2021-11-23

## 2021-11-22 RX ORDER — SODIUM CHLORIDE 9 MG/ML
1000 INJECTION, SOLUTION INTRAVENOUS
Refills: 0 | Status: DISCONTINUED | OUTPATIENT
Start: 2021-11-22 | End: 2021-11-23

## 2021-11-22 RX ORDER — GLUCAGON INJECTION, SOLUTION 0.5 MG/.1ML
1 INJECTION, SOLUTION SUBCUTANEOUS ONCE
Refills: 0 | Status: DISCONTINUED | OUTPATIENT
Start: 2021-11-22 | End: 2021-11-23

## 2021-11-22 RX ORDER — TACROLIMUS 5 MG/1
1 CAPSULE ORAL EVERY 12 HOURS
Refills: 0 | Status: DISCONTINUED | OUTPATIENT
Start: 2021-11-22 | End: 2021-11-23

## 2021-11-22 RX ORDER — FINASTERIDE 5 MG/1
5 TABLET, FILM COATED ORAL DAILY
Refills: 0 | Status: DISCONTINUED | OUTPATIENT
Start: 2021-11-22 | End: 2021-11-23

## 2021-11-22 RX ORDER — ATORVASTATIN CALCIUM 80 MG/1
20 TABLET, FILM COATED ORAL AT BEDTIME
Refills: 0 | Status: DISCONTINUED | OUTPATIENT
Start: 2021-11-22 | End: 2021-11-23

## 2021-11-22 RX ORDER — ALLOPURINOL 300 MG
100 TABLET ORAL DAILY
Refills: 0 | Status: DISCONTINUED | OUTPATIENT
Start: 2021-11-22 | End: 2021-11-23

## 2021-11-22 RX ADMIN — MYCOPHENOLATE MOFETIL 1000 MILLIGRAM(S): 250 CAPSULE ORAL at 18:43

## 2021-11-22 RX ADMIN — Medication 30 MILLIGRAM(S): at 21:02

## 2021-11-22 RX ADMIN — TACROLIMUS 1 MILLIGRAM(S): 5 CAPSULE ORAL at 18:43

## 2021-11-22 RX ADMIN — Medication 20 MILLIGRAM(S): at 18:44

## 2021-11-22 RX ADMIN — ATORVASTATIN CALCIUM 20 MILLIGRAM(S): 80 TABLET, FILM COATED ORAL at 21:01

## 2021-11-22 RX ADMIN — Medication 200 MILLIGRAM(S): at 21:02

## 2021-11-22 NOTE — CHART NOTE - NSCHARTNOTEFT_GEN_A_CORE
s/p [facility]- prelim report   mLAD 40%  CX 95%- CHARLES x1  OM1   pRCA  small vessel  known severe AS - being evaluated for TAVR     Tolerated procedure well- no CP or SOB  RRA access - no hematoma or bleeding +cap refill  + radial pulse intact    ASA/plavix on discharge   Continue TAVR work up

## 2021-11-22 NOTE — DISCHARGE NOTE PROVIDER - NSDCPNSUBOBJ_GEN_ALL_CORE
Patient is a 70y old  Male who presents with a chief complaint of TAVR evaluation (2021 23:22)          Allergies    No Known Allergies    Intolerances        Medications:  allopurinol 100 milliGRAM(s) Oral daily  aspirin enteric coated 81 milliGRAM(s) Oral daily  atorvastatin 20 milliGRAM(s) Oral at bedtime  clopidogrel Tablet 75 milliGRAM(s) Oral daily  dextrose 40% Gel 15 Gram(s) Oral once  dextrose 5%. 1000 milliLiter(s) IV Continuous <Continuous>  dextrose 5%. 1000 milliLiter(s) IV Continuous <Continuous>  dextrose 50% Injectable 25 Gram(s) IV Push once  dextrose 50% Injectable 12.5 Gram(s) IV Push once  dextrose 50% Injectable 25 Gram(s) IV Push once  enalapril 10 milliGRAM(s) Oral daily  finasteride 5 milliGRAM(s) Oral daily  glucagon  Injectable 1 milliGRAM(s) IntraMuscular once  labetalol 200 milliGRAM(s) Oral three times a day  mycophenolate mofetil 1000 milliGRAM(s) Oral two times a day  NIFEdipine XL 30 milliGRAM(s) Oral at bedtime  NIFEdipine XL 60 milliGRAM(s) Oral daily  tacrolimus 1 milliGRAM(s) Oral every 12 hours  torsemide 20 milliGRAM(s) Oral daily      Vitals:  T(C): 36.3 (21 @ 16:05), Max: 36.7 (21 @ 10:50)  HR: 74 (21 @ 21:05) (65 - 93)  BP: 136/76 (21 @ 21:05) (136/76 - 166/64)  BP(mean): 119 (21 @ 20:05) (86 - 119)  RR: 17 (21 @ 21:05) (16 - 17)  SpO2: 96% (21 @ 21:05) (94% - 100%)  Wt(kg): --  Daily Height in cm: 170.18 (2021 10:50)    Daily Weight in k.3 (2021 11:22)  I&O's Summary    2021 07:01  -  2021 02:43  --------------------------------------------------------  IN: 240 mL / OUT: 0 mL / NET: 240 mL          Physical Exam:  Appearance: Normal  Eyes: PERRL, EOMI  HENT: Normal oral muscosa, NC/AT  Cardiovascular: S1S2, RRR, No M/R/G, no JVD, No Lower extremity edema  Procedural Access Site: No hematoma, Non-tender to palpation, 2+ pulse, No bruit, No Ecchymosis  Respiratory: Clear to auscultation bilaterally  Gastrointestinal: Soft, Non tender, Normal Bowel Sounds  Musculoskeletal: No clubbing, No joint deformity   Neurologic: Non-focal  Lymphatic: No lymphadenopathy  Psychiatry: AAOx3, Mood & affect appropriate  Skin: No rashes, No ecchymoses, No cyanosis        141  |  104  |  19  ----------------------------<  119<H>  3.3<L>   |  23  |  0.60    Ca    9.7      2021 01:24  Mg     1.7                   Lipid panel   Hgb A1c                         13.9   9.26  )-----------( 232      ( 2021 11:04 )             42.9         ECG: SR 69 bpm    Cath: Monitor radial site for swelling, bleeding. Continue ASA, Plavix     HTN: Continue antihypertensive medications with hold parameters     HLD: continue statin, confirm lipid panel results     Imaging:    Interpretation of Telemetry:

## 2021-11-22 NOTE — DISCHARGE NOTE PROVIDER - NSDCFUSCHEDAPPT_GEN_ALL_CORE_FT
EDIN VILLA ; 11/30/2021 ; [facility] PST-Presurgtest  EDIN VILLA M ; 11/30/2021 ; [facility] Ul[facility] [address]  EDIN [facility]ILL[facility]2021 [facility]  EDIN VILLA ; 12/02/2021 ; [facility] PreAdmits  EDIN VILLA ; 12/09/2021 ; [facility] PulmMed [id number]

## 2021-11-22 NOTE — DISCHARGE NOTE PROVIDER - CARE PROVIDER_API CALL
Homero Garrido)  Cardiovascular Disease; Internal Medicine  [address] [place] [address]  Phone: [phone]  Fax: [phone]  Follow Up Time: 2 weeks

## 2021-11-22 NOTE — DISCHARGE NOTE PROVIDER - HOSPITAL COURSE
HPI:  70 years old male with PMHx of HTN, HLD, DMT2 (on Novolog insulin pump), s/p Rt kidney transplanted 10/2013 (on Prograft and Cellcept, managed by Dr Sam BOWDEN/[employer]), IDL restrictive/obstructive lung disease, FRANCOIS on CPAP, severe AS with preserved LVEF 70% (NATHALY 0.9sqcm, mGr 47mmHg, pGr 71mmHg, AoV 4.2m/s and DVI 0.23) now undergoing TAVR evaluation with Dr Garrett.  Patient reports dyspnea on exertion but denies any other symptoms and referred for LHC with Dr Alcaraz. Denies dizziness, diaphoresis, palpitations, nausea, vomiting, peripheral edema, recent weight gain, or syncope.  No implantable cardiac monitoring device.     Card Dr Garrido  CTS Dr Garrett   (22 Nov 2021 11:22)   HPI:  70 years old male with PMHx of HTN, HLD, DMT2 (on Novolog insulin pump), s/p Rt kidney transplanted 10/2013 (on Prograft and Cellcept, managed by Dr Sam BOWDEN/[employer]), IDL restrictive/obstructive lung disease, FRANCOIS on CPAP, severe AS with preserved LVEF 70% (NATHALY 0.9sqcm, mGr 47mmHg, pGr 71mmHg, AoV 4.2m/s and DVI 0.23) now undergoing TAVR evaluation with Dr Garrett.  Patient reports dyspnea on exertion but denies any other symptoms and referred for LHC with Dr Alcaraz. Denies dizziness, diaphoresis, palpitations, nausea, vomiting, peripheral edema, recent weight gain, or syncope.  No implantable cardiac monitoring device.     Card Dr Garrido  CTS Dr Garrett   (22 Nov 2021 11:22)  s/p  distal Lcx x 1 stent, OM1 is  and pRCA  & small vessel. DAPT for 3 months.

## 2021-11-22 NOTE — DISCHARGE NOTE PROVIDER - NSDCCPTREATMENT_GEN_ALL_CORE_FT
PRINCIPAL PROCEDURE  Procedure: Placement of coronary artery stent  Findings and Treatment: one distal left circ stent

## 2021-11-22 NOTE — H&P CARDIOLOGY - HISTORY OF PRESENT ILLNESS
70 years old male with PMHx of HTN, HLD, DMT2 (on Novolog insulin pump), s/p Rt kidney transplanted 102013 (on prograft and cellcept, managed by Dr Sma BOWDEN/[employer]), IDL restrictive/obstructive lung disease, FRANCOIS on CPAP, severe AS with preserved LVEF 70% (NATHALY 0.9sqcm, mGr 47mmHg, pGr 71mmHg, AoV 4.2m/s and DVI 0.23) now undergoing TAVR evaluation with Dr Garrett.  Patient reports dyspnea on exertion but denies any other symptoms and referred for LHC with Dr Alcaraz. Denies dizziness, diaphoresis, palpitations, nausea, vomiting, peripheral edema, recent weight gain, or syncope.  No implantable cardiac monitoring device.     Card Dr Garrido  CTS Dr Garrett

## 2021-11-22 NOTE — CHART NOTE - NSCHARTNOTEFT_GEN_A_CORE
Patient underwent a PCI procedure and is being admitted as they are at increased risk for major adverse cardiac and vascular events if discharged due to the following high risk characteristics:  Circ 95%, severe AS

## 2021-11-22 NOTE — CHART NOTE - NSCHARTNOTEFT_GEN_A_CORE
70 years old male with PMHx of HTN, HLD, DMT2 (on Novolog insulin pump), s/p Rt kidney transplanted 102013 (on prograft and cellcept, managed by Dr Sam BOWDEN/[employer]), IDL restrictive/obstructive lung disease, FRANCOIS on CPAP, severe AS with preserved LVEF 70% (NATHALY 0.9sqcm, mGr 47mmHg, pGr 71mmHg, AoV 4.2m/s and DVI 0.23) now undergoing TAVR evaluation with Dr Garrett, transferred for [facility].     Patient currently uses a Medtronic insulin pump. He currently has pump on and has been bolusing with the pump. His FS are well controlled. He is unaware of his pump settings. The patient will likely be discharged tomorrow. He placed the pump on today after his C.     If patient has any hypoglycemic episodes while on the pump, would recommend removing the pump, giving 8 units of Lantus and continue low dose correctional scale qAC and qHS.    Official consult to follow tomorrow AM if needed. If FS are still stable on current settings, patient can likely be discharged with follow up with his PCP Dr. Byrd. 70 years old male with PMHx of HTN, HLD, DMT2 (on Novolog insulin pump), s/p Rt kidney transplanted 102013 (on prograft and cellcept, managed by Dr Sam BOWDEN/[employer]), IDL restrictive/obstructive lung disease, FRANCOIS on CPAP, severe AS with preserved LVEF 70% (NATHALY 0.9sqcm, mGr 47mmHg, pGr 71mmHg, AoV 4.2m/s and DVI 0.23) now undergoing TAVR evaluation with Dr Garrett, transferred for [facility].     Patient currently uses a Medtronic insulin pump. He currently has the pump on and has been bolusing with the pump. His FS are well controlled. He is unaware of his pump settings. The patient will likely be discharged tomorrow. He placed the pump on today after his [facility].     If patient has any hypoglycemic episodes while on the pump, would recommend removing the pump, giving 8 units of Lantus and continuing low dose correctional scale qAC and qHS.    Official consult to follow tomorrow AM if needed. If FS are still stable on current settings, patient can likely be discharged with follow up with his PCP Dr. Byrd.    Discussed with primary team    Yenifer Gomez MD  Endocrine Fellow  Pager: [phone]/YOLANDA [phone]  Consults 9am-5pm: [phone]  After 5pm and weekends: [phone]

## 2021-11-22 NOTE — DISCHARGE NOTE PROVIDER - NSDCCPCAREPLAN_GEN_ALL_CORE_FT
PRINCIPAL DISCHARGE DIAGNOSIS  Diagnosis: CAD (coronary artery disease)  Assessment and Plan of Treatment: Do not stop your aspirin or Plavix unless instructed to do so by your cardiologist, they help keep your stented arteries open.   No heavy lifting or pushing/pulling with procedure arm for 2 weeks. No driving for 2 days. You may shower 24 hours following the procedure but avoid baths/swimming for 1 week. Check your wrist site for bleeding and/or swelling daily following procedure and call your doctor immediately if it occurs or if you experience increased pain at the site. Follow up with your cardiologist in 1-2 weeks. You may call [facility] if you have any questions/concerns regarding your procedure [phone].      SECONDARY DISCHARGE DIAGNOSES  Diagnosis: HTN (hypertension)  Assessment and Plan of Treatment: Continue with your blood pressure medications; eat a heart healthy diet with low salt diet; exercise regularly (consult with your physician or cardiologist first); maintain a heart healthy weight; if you smoke - quit (A resource to help you stop smoking is the [facility] Access Control – phone number [phone].); include healthy ways to manage stress. Continue to follow with your primary care physician or cardiologist.    Diagnosis: HLD (hyperlipidemia)  Assessment and Plan of Treatment: Continue with your cholesterol medications. Eat a heart healthy diet that is low in saturated fats and salt, and includes whole grains, fruits, vegetables and lean protein; exercise regularly (consult with your physician or cardiologist first); maintain a heart healthy weight; if you smoke - quit (A resource to help you stop smoking is the [facility] for freshbag Control – phone number [phone].). Continue to follow with your primary physician or cardiologist.    Diagnosis: DM type 2, goal HbA1c < 7%  Assessment and Plan of Treatment: Your Hemoglobin A1c level is   Continue to follow with your primary care MD or your endocrinologist.  Follow a heart healthy diabetic diet. If you check your fingerstick glucose at home, call your MD if it is greater than 250mg/dL on 2 occasions or less than 100mg/dL on 2 occasions. Know signs of low blood sugar, such as: dizziness, shakiness, sweating, confusion, hunger, nervousness-drink 4 ounces apple juice if occurs and call your doctor. Know early signs of high blood sugar, such as: frequent urination, increased thirst, blurry vision, fatigue, headache - call your doctor if this occurs. Follow with other practitioners to care for your diabetes, such as ophthalmologist and podiatrist.

## 2021-11-22 NOTE — DISCHARGE NOTE PROVIDER - NSDCMRMEDTOKEN_GEN_ALL_CORE_FT
alfuzosin 10 mg oral tablet, extended release: 1 tab(s) orally once a day  allopurinol 100 mg oral tablet: 1 tab(s) orally once a day at pm  Aspirin Enteric Coated 81 mg oral delayed release tablet: 1 tab(s) orally once a day  atorvastatin 20 mg oral tablet: 1 tab(s) orally once a day  CellCept 500 mg oral tablet: 2 tab(s) orally 2 times a day  clopidogrel 75 mg oral tablet: 1 tab(s) orally once a day for 90 days  enalapril 10 mg oral tablet: 1 tab(s) orally once a day  finasteride 5 mg oral tablet: 1 tab(s) orally once a day  labetalol 200 mg oral tablet: 1 tab(s) orally 3 times a day  Myrbetriq 50 mg oral tablet, extended release: 1 tab(s) orally once a day  Procardia XL 30 mg oral tablet, extended release: 1 tab(s) orally once a day (at bedtime)  Procardia XL 60 mg oral tablet, extended release: 1 tab(s) orally once a day in am  Prograf 1 mg oral capsule: 1 cap(s) orally every 12 hours  torsemide 20 mg oral tablet: 1 tab(s) orally once a day

## 2021-11-22 NOTE — H&P CARDIOLOGY - NSICDXPASTMEDICALHX_GEN_ALL_CORE_FT
PAST MEDICAL HISTORY:  Controlled Type I Diabetes Mellitus on Insulin pump    HTN (Hypertension), Benign     Hyperlipidemia     Hyperuricemia     Morbid Obesity     Smoking

## 2021-11-23 ENCOUNTER — TRANSCRIPTION ENCOUNTER (OUTPATIENT)
Age: 71
End: 2021-11-23

## 2021-11-23 VITALS
SYSTOLIC BLOOD PRESSURE: 119 MMHG | OXYGEN SATURATION: 95 % | TEMPERATURE: 98 F | HEART RATE: 68 BPM | DIASTOLIC BLOOD PRESSURE: 57 MMHG | RESPIRATION RATE: 17 BRPM

## 2021-11-23 LAB
A1C WITH ESTIMATED AVERAGE GLUCOSE RESULT: 4.9 % — SIGNIFICANT CHANGE UP (ref 4–5.6)
ANION GAP SERPL CALC-SCNC: 14 MMOL/L — SIGNIFICANT CHANGE UP (ref 5–17)
BUN SERPL-MCNC: 19 MG/DL — SIGNIFICANT CHANGE UP (ref 7–23)
CALCIUM SERPL-MCNC: 9.7 MG/DL — SIGNIFICANT CHANGE UP (ref 8.4–10.5)
CHLORIDE SERPL-SCNC: 104 MMOL/L — SIGNIFICANT CHANGE UP (ref 96–108)
CO2 SERPL-SCNC: 23 MMOL/L — SIGNIFICANT CHANGE UP (ref 22–31)
COVID-19 NUCLEOCAPSID GAM AB INTERP: NEGATIVE — SIGNIFICANT CHANGE UP
COVID-19 NUCLEOCAPSID TOTAL GAM ANTIBODY RESULT: 0.08 INDEX — SIGNIFICANT CHANGE UP
COVID-19 SPIKE DOMAIN AB INTERP: POSITIVE
COVID-19 SPIKE DOMAIN ANTIBODY RESULT: >250 U/ML — HIGH
CREAT SERPL-MCNC: 0.6 MG/DL — SIGNIFICANT CHANGE UP (ref 0.5–1.3)
ESTIMATED AVERAGE GLUCOSE: 94 MG/DL — SIGNIFICANT CHANGE UP (ref 68–114)
GLUCOSE BLDC GLUCOMTR-MCNC: 105 MG/DL — HIGH (ref 70–99)
GLUCOSE BLDC GLUCOMTR-MCNC: 91 MG/DL — SIGNIFICANT CHANGE UP (ref 70–99)
GLUCOSE SERPL-MCNC: 119 MG/DL — HIGH (ref 70–99)
POTASSIUM SERPL-MCNC: 3.3 MMOL/L — LOW (ref 3.5–5.3)
POTASSIUM SERPL-SCNC: 3.3 MMOL/L — LOW (ref 3.5–5.3)
SARS-COV-2 IGG+IGM SERPL QL IA: 0.08 INDEX — SIGNIFICANT CHANGE UP
SARS-COV-2 IGG+IGM SERPL QL IA: >250 U/ML — HIGH
SARS-COV-2 IGG+IGM SERPL QL IA: NEGATIVE — SIGNIFICANT CHANGE UP
SARS-COV-2 IGG+IGM SERPL QL IA: POSITIVE
SODIUM SERPL-SCNC: 141 MMOL/L — SIGNIFICANT CHANGE UP (ref 135–145)

## 2021-11-23 PROCEDURE — 36415 COLL VENOUS BLD VENIPUNCTURE: CPT

## 2021-11-23 PROCEDURE — 93454 CORONARY ARTERY ANGIO S&I: CPT | Mod: 59

## 2021-11-23 PROCEDURE — C1725: CPT

## 2021-11-23 PROCEDURE — C1887: CPT

## 2021-11-23 PROCEDURE — C9600: CPT | Mod: LC

## 2021-11-23 PROCEDURE — C1769: CPT

## 2021-11-23 PROCEDURE — 82962 GLUCOSE BLOOD TEST: CPT

## 2021-11-23 PROCEDURE — 85027 COMPLETE CBC AUTOMATED: CPT

## 2021-11-23 PROCEDURE — 83735 ASSAY OF MAGNESIUM: CPT

## 2021-11-23 PROCEDURE — C1894: CPT

## 2021-11-23 PROCEDURE — 99238 HOSP IP/OBS DSCHRG MGMT 30/<: CPT

## 2021-11-23 PROCEDURE — 86769 SARS-COV-2 COVID-19 ANTIBODY: CPT

## 2021-11-23 PROCEDURE — 80048 BASIC METABOLIC PNL TOTAL CA: CPT

## 2021-11-23 PROCEDURE — 93005 ELECTROCARDIOGRAM TRACING: CPT

## 2021-11-23 PROCEDURE — 99152 MOD SED SAME PHYS/QHP 5/>YRS: CPT

## 2021-11-23 PROCEDURE — C1874: CPT

## 2021-11-23 PROCEDURE — 83036 HEMOGLOBIN GLYCOSYLATED A1C: CPT

## 2021-11-23 RX ORDER — POTASSIUM BICARBONATE 978 MG/1
25 TABLET, EFFERVESCENT ORAL ONCE
Refills: 0 | Status: COMPLETED | OUTPATIENT
Start: 2021-11-23 | End: 2021-11-23

## 2021-11-23 RX ADMIN — Medication 60 MILLIGRAM(S): at 05:44

## 2021-11-23 RX ADMIN — Medication 10 MILLIGRAM(S): at 05:44

## 2021-11-23 RX ADMIN — CLOPIDOGREL BISULFATE 75 MILLIGRAM(S): 75 TABLET, FILM COATED ORAL at 05:44

## 2021-11-23 RX ADMIN — Medication 81 MILLIGRAM(S): at 05:44

## 2021-11-23 RX ADMIN — Medication 200 MILLIGRAM(S): at 05:44

## 2021-11-23 RX ADMIN — POTASSIUM BICARBONATE 25 MILLIEQUIVALENT(S): 978 TABLET, EFFERVESCENT ORAL at 05:46

## 2021-11-23 RX ADMIN — TACROLIMUS 1 MILLIGRAM(S): 5 CAPSULE ORAL at 05:49

## 2021-11-23 RX ADMIN — MYCOPHENOLATE MOFETIL 1000 MILLIGRAM(S): 250 CAPSULE ORAL at 05:44

## 2021-11-23 NOTE — DISCHARGE NOTE NURSING/CASE MANAGEMENT/SOCIAL WORK - PATIENT PORTAL LINK FT
You can access the FollowMyHealth Patient Portal offered by [facility] by registering at the following website: [email or web address]. By joining [facility]’s FollowMyHealth portal, you will also be able to view your health information using other applications (apps) compatible with our system.

## 2021-11-30 ENCOUNTER — OUTPATIENT (OUTPATIENT)
Dept: OUTPATIENT SERVICES | Facility: HOSPITAL | Age: 71
LOS: 1 days | End: 2021-11-30
Payer: COMMERCIAL

## 2021-11-30 ENCOUNTER — APPOINTMENT (OUTPATIENT)
Dept: ULTRASOUND IMAGING | Facility: HOSPITAL | Age: 71
End: 2021-11-30

## 2021-11-30 VITALS
SYSTOLIC BLOOD PRESSURE: 144 MMHG | RESPIRATION RATE: 18 BRPM | OXYGEN SATURATION: 99 % | WEIGHT: 197.98 LBS | HEIGHT: 67 IN | TEMPERATURE: 98 F | HEART RATE: 74 BPM | DIASTOLIC BLOOD PRESSURE: 77 MMHG

## 2021-11-30 DIAGNOSIS — I35.0 NONRHEUMATIC AORTIC (VALVE) STENOSIS: ICD-10-CM

## 2021-11-30 DIAGNOSIS — Z29.9 ENCOUNTER FOR PROPHYLACTIC MEASURES, UNSPECIFIED: ICD-10-CM

## 2021-11-30 DIAGNOSIS — Z94.0 KIDNEY TRANSPLANT STATUS: Chronic | ICD-10-CM

## 2021-11-30 DIAGNOSIS — E11.9 TYPE 2 DIABETES MELLITUS WITHOUT COMPLICATIONS: ICD-10-CM

## 2021-11-30 DIAGNOSIS — Z01.818 ENCOUNTER FOR OTHER PREPROCEDURAL EXAMINATION: ICD-10-CM

## 2021-11-30 DIAGNOSIS — I77.0 ARTERIOVENOUS FISTULA, ACQUIRED: Chronic | ICD-10-CM

## 2021-11-30 DIAGNOSIS — G47.33 OBSTRUCTIVE SLEEP APNEA (ADULT) (PEDIATRIC): ICD-10-CM

## 2021-11-30 LAB
A1C WITH ESTIMATED AVERAGE GLUCOSE RESULT: 5 % — SIGNIFICANT CHANGE UP (ref 4–5.6)
ANION GAP SERPL CALC-SCNC: 14 MMOL/L — SIGNIFICANT CHANGE UP (ref 5–17)
BLD GP AB SCN SERPL QL: NEGATIVE — SIGNIFICANT CHANGE UP
BUN SERPL-MCNC: 15 MG/DL — SIGNIFICANT CHANGE UP (ref 7–23)
CALCIUM SERPL-MCNC: 10 MG/DL — SIGNIFICANT CHANGE UP (ref 8.4–10.5)
CHLORIDE SERPL-SCNC: 105 MMOL/L — SIGNIFICANT CHANGE UP (ref 96–108)
CO2 SERPL-SCNC: 20 MMOL/L — LOW (ref 22–31)
CREAT SERPL-MCNC: 0.49 MG/DL — LOW (ref 0.5–1.3)
ESTIMATED AVERAGE GLUCOSE: 97 MG/DL — SIGNIFICANT CHANGE UP (ref 68–114)
GLUCOSE SERPL-MCNC: 78 MG/DL — SIGNIFICANT CHANGE UP (ref 70–99)
HCT VFR BLD CALC: 44.2 % — SIGNIFICANT CHANGE UP (ref 39–50)
HGB BLD-MCNC: 14.4 G/DL — SIGNIFICANT CHANGE UP (ref 13–17)
MCHC RBC-ENTMCNC: 31.2 PG — SIGNIFICANT CHANGE UP (ref 27–34)
MCHC RBC-ENTMCNC: 32.6 GM/DL — SIGNIFICANT CHANGE UP (ref 32–36)
MCV RBC AUTO: 95.9 FL — SIGNIFICANT CHANGE UP (ref 80–100)
MRSA PCR RESULT.: SIGNIFICANT CHANGE UP
NRBC # BLD: 0 /100 WBCS — SIGNIFICANT CHANGE UP (ref 0–0)
PLATELET # BLD AUTO: 219 K/UL — SIGNIFICANT CHANGE UP (ref 150–400)
POTASSIUM SERPL-MCNC: 4.1 MMOL/L — SIGNIFICANT CHANGE UP (ref 3.5–5.3)
POTASSIUM SERPL-SCNC: 4.1 MMOL/L — SIGNIFICANT CHANGE UP (ref 3.5–5.3)
RBC # BLD: 4.61 M/UL — SIGNIFICANT CHANGE UP (ref 4.2–5.8)
RBC # FLD: 14.1 % — SIGNIFICANT CHANGE UP (ref 10.3–14.5)
RH IG SCN BLD-IMP: POSITIVE — SIGNIFICANT CHANGE UP
S AUREUS DNA NOSE QL NAA+PROBE: SIGNIFICANT CHANGE UP
SARS-COV-2 RNA SPEC QL NAA+PROBE: SIGNIFICANT CHANGE UP
SODIUM SERPL-SCNC: 139 MMOL/L — SIGNIFICANT CHANGE UP (ref 135–145)
WBC # BLD: 9.16 K/UL — SIGNIFICANT CHANGE UP (ref 3.8–10.5)
WBC # FLD AUTO: 9.16 K/UL — SIGNIFICANT CHANGE UP (ref 3.8–10.5)

## 2021-11-30 NOTE — H&P PST ADULT - NSICDXPASTMEDICALHX_GEN_ALL_CORE_FT
PAST MEDICAL HISTORY:  CAD (coronary artery disease) 1 stent    DM (diabetes mellitus), type 2     H/O kidney transplant 2013    HTN (Hypertension), Benign     Hyperlipidemia     Hyperuricemia     Morbid Obesity     FRANCOIS on CPAP     Smoking

## 2021-11-30 NOTE — H&P PST ADULT - HISTORY OF PRESENT ILLNESS
70 years old male with PMHx of HTN, HLD, DMT2 (on Novolog insulin pump), ESRD was on dialysis s/p Rt kidney transplanted 2013 (on prograf and Cellcept managed by Dr Sam BOWDEN/[employer]), IDL restrictive/obstructive lung disease, FRANCOIS on CPAP, severe AS with preserved LVEF 70% (NATHALY 0.9sqcm, mGr 47mmHg, pGr 71mmHg, AoV 4.2m/s and DVI 0.23)   Patient reports dyspnea on exertion but denies any other symptoms . Pt was evaluated by Ct surgery s/p cardiac cath  1 Robert on 11/22/21 .Pt  Denies dizziness, diaphoresis, palpitations, nausea, vomiting, peripheral edema, recent weight gain, or syncope.      Covid test 11/30/21   70 years old male with PMHx of HTN, HLD, DMT2 (on Novolog insulin pump), ESRD was on dialysis s/p Rt kidney transplanted 2013 (on prograf and Cellcept managed by Dr Sam BOWDEN/[employer]), IDL restrictive/obstructive lung disease, FRANCOIS on CPAP, severe AS with preserved LVEF 70% (NATHALY 0.9sqcm, mGr 47mmHg, pGr 71mmHg, AoV 4.2m/s and DVI 0.23)   Patient reports dyspnea on exertion but denies any other symptoms . Pt was evaluated by Ct surgery s/p cardiac cath  1 Robert on 11/22/21 .Pt  Denies dizziness, diaphoresis, palpitations, nausea, vomiting, peripheral edema, recent weight gain, or syncope.  Presents to [facility] for scheduled TAVR on 12/2/21     Covid test 11/30/21  emailed endocrinology- María Patel to follow up

## 2021-11-30 NOTE — H&P PST ADULT - ASSESSMENT
CAPRINI SCORE [CLOT updated 18]    AGE RELATED RISK FACTORS                                                       MOBILITY RELATED FACTORS  [ ] Age 41-60 years                                            (1 Point)                    [ ] Bed rest                                                        (1 Point)  [x ] Age: 61-74 years                                           (2 Points)                  [ ] Plaster cast                                                   (2 Points)  [ ] Age= 75 years                                              (3 Points)                    [ ] Bed bound for more than 72 hours                 (2 Points)    DISEASE RELATED RISK FACTORS                                               GENDER SPECIFIC FACTORS  [ ] Edema in the lower extremities                       (1 Point)              [ ] Pregnancy                                                     (1 Point)  [ ] Varicose veins                                               (1 Point)                     [ ] Post-partum < 6 weeks                                   (1 Point)             [x ] BMI > 25 Kg/m2                                            (1 Point)                     [ ] Hormonal therapy  or oral contraception          (1 Point)                 [ ] Sepsis (in the previous month)                        (1 Point)               [ ] History of pregnancy complications                 (1 point)  [ ] Pneumonia or serious lung disease                                               [ ] Unexplained or recurrent                     (1 Point)           (in the previous month)                               (1 Point)  [ ] Abnormal pulmonary function test                     (1 Point)                 SURGERY RELATED RISK FACTORS  [ ] Acute myocardial infarction                              (1 Point)               [ ]  Section                                             (1 Point)  [ ] Congestive heart failure (in the previous month)  (1 Point)      [ ] Minor surgery                                                  (1 Point)   [ ] Inflammatory bowel disease                             (1 Point)               [ ] Arthroscopic surgery                                        (2 Points)  [ ] Central venous access                                      (2 Points)                [x ] General surgery lasting more than 45 minutes (2 points)  [ ] Present or previous malignancy                     (2 Points)                [ ] Elective arthroplasty                                         (5 points)    [ ] Stroke (in the previous month)                          (5 Points)                                                                                                                                                           HEMATOLOGY RELATED FACTORS                                                 TRAUMA RELATED RISK FACTORS  [ ] Prior episodes of VTE                                     (3 Points)                [ ] Fracture of the hip, pelvis, or leg                       (5 Points)  [ ] Positive family history for VTE                         (3 Points)             [ ] Acute spinal cord injury (in the previous month)  (5 Points)  [ ] Prothrombin 70714 A                                     (3 Points)               [ ] Paralysis  (less than 1 month)                             (5 Points)  [ ] Factor V Leiden                                             (3 Points)                  [ ] Multiple Trauma within 1 month                        (5 Points)  [ ] Lupus anticoagulants                                     (3 Points)                                                           [ ] Anticardiolipin antibodies                               (3 Points)                                                       [ ] High homocysteine in the blood                      (3 Points)                                             [ ] Other congenital or acquired thrombophilia      (3 Points)                                                [ ] Heparin induced thrombocytopenia                  (3 Points)                                     Total Score [  5       ]

## 2021-11-30 NOTE — H&P PST ADULT - PROBLEM SELECTOR PLAN 1
TAVR on 12/2/21  pre-op instructions discussed  labs sent, covid test done  pt will continue ASa and plavix due to recent stent

## 2021-12-01 PROCEDURE — 93880 EXTRACRANIAL BILAT STUDY: CPT | Mod: 26

## 2021-12-01 PROCEDURE — 71046 X-RAY EXAM CHEST 2 VIEWS: CPT | Mod: 26

## 2021-12-02 ENCOUNTER — APPOINTMENT (OUTPATIENT)
Dept: CARDIOTHORACIC SURGERY | Facility: HOSPITAL | Age: 71
End: 2021-12-02

## 2021-12-02 ENCOUNTER — INPATIENT (INPATIENT)
Facility: HOSPITAL | Age: 71
LOS: 1 days | Discharge: HOME HEALTH | DRG: 266 | End: 2021-12-04
Attending: THORACIC SURGERY (CARDIOTHORACIC VASCULAR SURGERY) | Admitting: THORACIC SURGERY (CARDIOTHORACIC VASCULAR SURGERY)
Payer: COMMERCIAL

## 2021-12-02 VITALS
TEMPERATURE: 98 F | HEART RATE: 70 BPM | DIASTOLIC BLOOD PRESSURE: 73 MMHG | RESPIRATION RATE: 18 BRPM | HEIGHT: 67 IN | WEIGHT: 199.3 LBS | OXYGEN SATURATION: 96 % | SYSTOLIC BLOOD PRESSURE: 158 MMHG

## 2021-12-02 DIAGNOSIS — I35.0 NONRHEUMATIC AORTIC (VALVE) STENOSIS: ICD-10-CM

## 2021-12-02 DIAGNOSIS — Z95.2 PRESENCE OF PROSTHETIC HEART VALVE: ICD-10-CM

## 2021-12-02 DIAGNOSIS — I77.0 ARTERIOVENOUS FISTULA, ACQUIRED: Chronic | ICD-10-CM

## 2021-12-02 DIAGNOSIS — Z94.0 KIDNEY TRANSPLANT STATUS: ICD-10-CM

## 2021-12-02 DIAGNOSIS — Z94.0 KIDNEY TRANSPLANT STATUS: Chronic | ICD-10-CM

## 2021-12-02 PROBLEM — G47.33 OBSTRUCTIVE SLEEP APNEA (ADULT) (PEDIATRIC): Chronic | Status: ACTIVE | Noted: 2021-11-30

## 2021-12-02 PROBLEM — I25.10 ATHEROSCLEROTIC HEART DISEASE OF NATIVE CORONARY ARTERY WITHOUT ANGINA PECTORIS: Chronic | Status: ACTIVE | Noted: 2021-11-30

## 2021-12-02 PROBLEM — E11.9 TYPE 2 DIABETES MELLITUS WITHOUT COMPLICATIONS: Chronic | Status: ACTIVE | Noted: 2021-11-30

## 2021-12-02 LAB
ALBUMIN SERPL ELPH-MCNC: 4.1 G/DL — SIGNIFICANT CHANGE UP (ref 3.3–5)
ALP SERPL-CCNC: 83 U/L — SIGNIFICANT CHANGE UP (ref 40–120)
ALT FLD-CCNC: 9 U/L — LOW (ref 10–45)
ANION GAP SERPL CALC-SCNC: 13 MMOL/L — SIGNIFICANT CHANGE UP (ref 5–17)
APTT BLD: 33.5 SEC — SIGNIFICANT CHANGE UP (ref 27.5–35.5)
AST SERPL-CCNC: 14 U/L — SIGNIFICANT CHANGE UP (ref 10–40)
BASOPHILS # BLD AUTO: 0.04 K/UL — SIGNIFICANT CHANGE UP (ref 0–0.2)
BASOPHILS NFR BLD AUTO: 0.6 % — SIGNIFICANT CHANGE UP (ref 0–2)
BILIRUB SERPL-MCNC: 0.9 MG/DL — SIGNIFICANT CHANGE UP (ref 0.2–1.2)
BUN SERPL-MCNC: 12 MG/DL — SIGNIFICANT CHANGE UP (ref 7–23)
CALCIUM SERPL-MCNC: 9.2 MG/DL — SIGNIFICANT CHANGE UP (ref 8.4–10.5)
CHLORIDE SERPL-SCNC: 107 MMOL/L — SIGNIFICANT CHANGE UP (ref 96–108)
CO2 SERPL-SCNC: 20 MMOL/L — LOW (ref 22–31)
CREAT SERPL-MCNC: 0.47 MG/DL — LOW (ref 0.5–1.3)
EOSINOPHIL # BLD AUTO: 0.22 K/UL — SIGNIFICANT CHANGE UP (ref 0–0.5)
EOSINOPHIL NFR BLD AUTO: 3.3 % — SIGNIFICANT CHANGE UP (ref 0–6)
GLUCOSE BLDC GLUCOMTR-MCNC: 111 MG/DL — HIGH (ref 70–99)
GLUCOSE BLDC GLUCOMTR-MCNC: 124 MG/DL — HIGH (ref 70–99)
GLUCOSE BLDC GLUCOMTR-MCNC: 98 MG/DL — SIGNIFICANT CHANGE UP (ref 70–99)
GLUCOSE SERPL-MCNC: 86 MG/DL — SIGNIFICANT CHANGE UP (ref 70–99)
HCT VFR BLD CALC: 41.6 % — SIGNIFICANT CHANGE UP (ref 39–50)
HGB BLD-MCNC: 13.4 G/DL — SIGNIFICANT CHANGE UP (ref 13–17)
IMM GRANULOCYTES NFR BLD AUTO: 0.5 % — SIGNIFICANT CHANGE UP (ref 0–1.5)
LYMPHOCYTES # BLD AUTO: 1.51 K/UL — SIGNIFICANT CHANGE UP (ref 1–3.3)
LYMPHOCYTES # BLD AUTO: 22.8 % — SIGNIFICANT CHANGE UP (ref 13–44)
MCHC RBC-ENTMCNC: 31.2 PG — SIGNIFICANT CHANGE UP (ref 27–34)
MCHC RBC-ENTMCNC: 32.2 GM/DL — SIGNIFICANT CHANGE UP (ref 32–36)
MCV RBC AUTO: 97 FL — SIGNIFICANT CHANGE UP (ref 80–100)
MONOCYTES # BLD AUTO: 0.71 K/UL — SIGNIFICANT CHANGE UP (ref 0–0.9)
MONOCYTES NFR BLD AUTO: 10.7 % — SIGNIFICANT CHANGE UP (ref 2–14)
NEUTROPHILS # BLD AUTO: 4.11 K/UL — SIGNIFICANT CHANGE UP (ref 1.8–7.4)
NEUTROPHILS NFR BLD AUTO: 62.1 % — SIGNIFICANT CHANGE UP (ref 43–77)
NRBC # BLD: 0 /100 WBCS — SIGNIFICANT CHANGE UP (ref 0–0)
PLATELET # BLD AUTO: 177 K/UL — SIGNIFICANT CHANGE UP (ref 150–400)
POTASSIUM SERPL-MCNC: 3.9 MMOL/L — SIGNIFICANT CHANGE UP (ref 3.5–5.3)
POTASSIUM SERPL-SCNC: 3.9 MMOL/L — SIGNIFICANT CHANGE UP (ref 3.5–5.3)
PROT SERPL-MCNC: 6.9 G/DL — SIGNIFICANT CHANGE UP (ref 6–8.3)
RBC # BLD: 4.29 M/UL — SIGNIFICANT CHANGE UP (ref 4.2–5.8)
RBC # FLD: 14.2 % — SIGNIFICANT CHANGE UP (ref 10.3–14.5)
SODIUM SERPL-SCNC: 140 MMOL/L — SIGNIFICANT CHANGE UP (ref 135–145)
WBC # BLD: 6.62 K/UL — SIGNIFICANT CHANGE UP (ref 3.8–10.5)
WBC # FLD AUTO: 6.62 K/UL — SIGNIFICANT CHANGE UP (ref 3.8–10.5)

## 2021-12-02 PROCEDURE — 33361 REPLACE AORTIC VALVE PERQ: CPT | Mod: 62,Q0

## 2021-12-02 PROCEDURE — 93306 TTE W/DOPPLER COMPLETE: CPT | Mod: 26

## 2021-12-02 PROCEDURE — 93308 TTE F-UP OR LMTD: CPT

## 2021-12-02 PROCEDURE — 93010 ELECTROCARDIOGRAM REPORT: CPT

## 2021-12-02 RX ORDER — TACROLIMUS 5 MG/1
1 CAPSULE ORAL
Refills: 0 | Status: DISCONTINUED | OUTPATIENT
Start: 2021-12-02 | End: 2021-12-03

## 2021-12-02 RX ORDER — TAMSULOSIN HYDROCHLORIDE 0.4 MG/1
0.8 CAPSULE ORAL AT BEDTIME
Refills: 0 | Status: DISCONTINUED | OUTPATIENT
Start: 2021-12-02 | End: 2021-12-04

## 2021-12-02 RX ORDER — LIDOCAINE HCL 20 MG/ML
0.2 VIAL (ML) INJECTION ONCE
Refills: 0 | Status: DISCONTINUED | OUTPATIENT
Start: 2021-12-02 | End: 2021-12-02

## 2021-12-02 RX ORDER — FINASTERIDE 5 MG/1
5 TABLET, FILM COATED ORAL DAILY
Refills: 0 | Status: DISCONTINUED | OUTPATIENT
Start: 2021-12-02 | End: 2021-12-04

## 2021-12-02 RX ORDER — ALLOPURINOL 300 MG
100 TABLET ORAL DAILY
Refills: 0 | Status: DISCONTINUED | OUTPATIENT
Start: 2021-12-02 | End: 2021-12-04

## 2021-12-02 RX ORDER — CLOPIDOGREL BISULFATE 75 MG/1
75 TABLET, FILM COATED ORAL DAILY
Refills: 0 | Status: DISCONTINUED | OUTPATIENT
Start: 2021-12-02 | End: 2021-12-04

## 2021-12-02 RX ORDER — ASPIRIN/CALCIUM CARB/MAGNESIUM 324 MG
81 TABLET ORAL DAILY
Refills: 0 | Status: DISCONTINUED | OUTPATIENT
Start: 2021-12-02 | End: 2021-12-04

## 2021-12-02 RX ORDER — INFLUENZA VIRUS VACCINE 15; 15; 15; 15 UG/.5ML; UG/.5ML; UG/.5ML; UG/.5ML
0.7 SUSPENSION INTRAMUSCULAR ONCE
Refills: 0 | Status: DISCONTINUED | OUTPATIENT
Start: 2021-12-02 | End: 2021-12-04

## 2021-12-02 RX ORDER — LABETALOL HCL 100 MG
200 TABLET ORAL EVERY 8 HOURS
Refills: 0 | Status: DISCONTINUED | OUTPATIENT
Start: 2021-12-02 | End: 2021-12-04

## 2021-12-02 RX ORDER — CHLORHEXIDINE GLUCONATE 213 G/1000ML
1 SOLUTION TOPICAL ONCE
Refills: 0 | Status: DISCONTINUED | OUTPATIENT
Start: 2021-12-02 | End: 2021-12-02

## 2021-12-02 RX ORDER — ATORVASTATIN CALCIUM 80 MG/1
20 TABLET, FILM COATED ORAL AT BEDTIME
Refills: 0 | Status: DISCONTINUED | OUTPATIENT
Start: 2021-12-02 | End: 2021-12-04

## 2021-12-02 RX ORDER — MIRABEGRON 50 MG/1
50 TABLET, EXTENDED RELEASE ORAL DAILY
Refills: 0 | Status: DISCONTINUED | OUTPATIENT
Start: 2021-12-02 | End: 2021-12-04

## 2021-12-02 RX ORDER — CEFUROXIME AXETIL 250 MG
1500 TABLET ORAL ONCE
Refills: 0 | Status: COMPLETED | OUTPATIENT
Start: 2021-12-02 | End: 2021-12-02

## 2021-12-02 RX ORDER — CEFUROXIME AXETIL 250 MG
1500 TABLET ORAL EVERY 8 HOURS
Refills: 0 | Status: COMPLETED | OUTPATIENT
Start: 2021-12-02 | End: 2021-12-03

## 2021-12-02 RX ORDER — LABETALOL HCL 100 MG
10 TABLET ORAL ONCE
Refills: 0 | Status: COMPLETED | OUTPATIENT
Start: 2021-12-02 | End: 2021-12-02

## 2021-12-02 RX ORDER — SODIUM CHLORIDE 9 MG/ML
3 INJECTION INTRAMUSCULAR; INTRAVENOUS; SUBCUTANEOUS EVERY 8 HOURS
Refills: 0 | Status: DISCONTINUED | OUTPATIENT
Start: 2021-12-02 | End: 2021-12-02

## 2021-12-02 RX ORDER — MYCOPHENOLATE MOFETIL 250 MG/1
1000 CAPSULE ORAL
Refills: 0 | Status: DISCONTINUED | OUTPATIENT
Start: 2021-12-02 | End: 2021-12-04

## 2021-12-02 RX ADMIN — TAMSULOSIN HYDROCHLORIDE 0.8 MILLIGRAM(S): 0.4 CAPSULE ORAL at 21:08

## 2021-12-02 RX ADMIN — ATORVASTATIN CALCIUM 20 MILLIGRAM(S): 80 TABLET, FILM COATED ORAL at 21:07

## 2021-12-02 RX ADMIN — MYCOPHENOLATE MOFETIL 1000 MILLIGRAM(S): 250 CAPSULE ORAL at 20:04

## 2021-12-02 RX ADMIN — Medication 200 MILLIGRAM(S): at 21:08

## 2021-12-02 RX ADMIN — Medication 10 MILLIGRAM(S): at 18:41

## 2021-12-02 RX ADMIN — CLOPIDOGREL BISULFATE 75 MILLIGRAM(S): 75 TABLET, FILM COATED ORAL at 22:09

## 2021-12-02 RX ADMIN — Medication 10 MILLIGRAM(S): at 19:00

## 2021-12-02 RX ADMIN — Medication 100 MILLIGRAM(S): at 22:08

## 2021-12-02 RX ADMIN — TACROLIMUS 1 MILLIGRAM(S): 5 CAPSULE ORAL at 20:04

## 2021-12-02 NOTE — PROGRESS NOTE ADULT - ASSESSMENT
This is a 69 y/o male with PMHx of HTN, HLD, DMT2 (on Novolog insulin pump), ESRD was on dialysis s/p Rt kidney transplanted 2013 (on prograf and Cellcept managed by Dr Sam BOWDEN/[employer]), IDL restrictive/obstructive lung disease, FRANCOIS on CPAP, severe AS with preserved LVEF 70% (NATHALY 0.9sqcm, mGr 47mmHg, pGr 71mmHg, AoV 4.2m/s and DVI 0.23)   Patient reports dyspnea on exertion but denies any other symptoms . Pt was evaluated by Ct surgery s/p cardiac cath  1 Robert on 11/22/21 .Pt  Denies dizziness, diaphoresis, palpitations, nausea, vomiting, peripheral edema, recent weight gain, or syncope.  Presents to [facility] for scheduled TAVR on 12/2/21      This is a 69 y/o male with PMHx of HTN, HLD, DMT2 (on Novolog insulin pump), ESRD was on dialysis s/p Rt kidney transplanted 2013 (on prograf and Cellcept managed by Dr Sam BOWDEN/[employer]), IDL restrictive/obstructive lung disease, FRANCOIS on CPAP, severe AS with preserved LVEF 70% (NATHALY 0.9sqcm, mGr 47mmHg, pGr 71mmHg, AoV 4.2m/s and DVI 0.23)   Patient reports dyspnea on exertion but denies any other symptoms . Pt was evaluated by Ct surgery s/p cardiac cath  1 Robert on 11/22/21 .Pt  Denies dizziness, diaphoresis, palpitations, nausea, vomiting, peripheral edema, recent weight gain, or syncope.  Presents to [facility] for scheduled TAVR on 12/2/21 12/2 TAVR #29 Dewey Avery  via LFA 14 Fr with 2 perclose and RFA with 6Fr and 8Fr sheath removed with manual pressure in lab.  VSS, pt transferred to floor, currently SR 60s, B/L groins stable. /73 s/p labetalol 10mg IV x1 and resumed back on home labetalol.

## 2021-12-02 NOTE — CHART NOTE - NSCHARTNOTEFT_GEN_A_CORE
HPI:  70 years old male with PMHx of HTN, HLD, DMT2 (on Novolog insulin pump), ESRD was on dialysis s/p Rt kidney transplanted 2013 (on prograf and Cellcept managed by Dr Sam BOWDEN/[employer]), IDL restrictive/obstructive lung disease, FRANCOIS on CPAP, severe AS with preserved LVEF 70% (NATHALY 0.9sqcm, mGr 47mmHg, pGr 71mmHg, AoV 4.2m/s and DVI 0.23)   Patient reports dyspnea on exertion but denies any other symptoms . Pt was evaluated by Ct surgery s/p cardiac cath  1 Robert on 11/22/21 .Pt  Denies dizziness, diaphoresis, palpitations, nausea, vomiting, peripheral edema, recent weight gain, or syncope.  Presents to [facility] for scheduled TAVR on 12/2/21     Covid test 11/30/21  emailed endocrinology- María Patel to follow up        (30 Nov 2021 10:09)    s/p 29mm nydia valve via LFA 14 Fr with 2 perclose and RFA with 6Fr and 8Fr sheath removed with manual pressure in lab. Site clean dry and intact with no s/s of hematoma or bleeding.     resume aspirin 81mg and plavix 75 mg tonight at 2230  Zinacef 1500mg x2 more doses, next dose at 2245  Resume cellcept and prograf tonight  renal- Dr. Michele Cole   Dispo- 2 [place] HPI:  70 years old male with PMHx of HTN, HLD, DMT2 (on Novolog insulin pump), ESRD was on dialysis s/p Rt kidney transplanted 2013 (on prograf and Cellcept managed by Dr Sam BOWDEN/[employer]), IDL restrictive/obstructive lung disease, FRANCOIS on CPAP, severe AS with preserved LVEF 70% (NATHALY 0.9sqcm, mGr 47mmHg, pGr 71mmHg, AoV 4.2m/s and DVI 0.23)   Patient reports dyspnea on exertion but denies any other symptoms . Pt was evaluated by Ct surgery s/p cardiac cath  1 Robert on 11/22/21 .Pt  Denies dizziness, diaphoresis, palpitations, nausea, vomiting, peripheral edema, recent weight gain, or syncope.  Presents to [facility] for scheduled TAVR on 12/2/21     Covid test 11/30/21  emailed endocrinology- María Patel to follow up        (30 Nov 2021 10:09)    s/p 29mm nydia valve via LFA 14 Fr with 2 perclose and RFA with 6Fr and 8Fr sheath removed with manual pressure in lab. Site clean dry and intact with no s/s of hematoma or bleeding.     resume aspirin 81mg and plavix 75 mg tonight at 2230  Zinacef 1500mg x2 more doses, next dose at 2245  Resume cellcept and prograf tonight  renal- Dr. Michele Cole   post ECG NSR @76 bpm Left Ventricular hypertrophy , left axis deviation   Dispo- 2 Galindo

## 2021-12-02 NOTE — PRE-ANESTHESIA EVALUATION ADULT - NSANTHPMHFT_GEN_ALL_CORE
Medical record reviewed in full and discussed with Gerry: CAD stent x 1 ASA Plavix Sev AS PG 71 MG 41 NATHALY .9 EF 70% DM on Pump ESRD sp Renal transplant Cr .49 FRANCOIS on CPAP w ILD+smoker

## 2021-12-02 NOTE — PATIENT PROFILE ADULT - FALL HARM RISK - UNIVERSAL INTERVENTIONS
Bed in lowest position, wheels locked, appropriate side rails in place/Call bell, personal items and telephone in reach/Instruct patient to call for assistance before getting out of bed or chair/Non-slip footwear when patient is out of bed/[place] to call system/Physically safe environment - no spills, clutter or unnecessary equipment/Purposeful Proactive Rounding/Room/bathroom lighting operational, light cord in reach

## 2021-12-02 NOTE — PROGRESS NOTE ADULT - SUBJECTIVE AND OBJECTIVE BOX
Subjective: Pt states "Hello" denies any CP or SOB.     Telemetry:  SR 68  Vital Signs Last 24 Hrs  T(C): 36.7 (12-02-21 @ 18:02), Max: 37.1 (12-02-21 @ 16:20)  T(F): 98.1 (12-02-21 @ 18:02), Max: 98.7 (12-02-21 @ 16:20)  HR: 69 (12-02-21 @ 18:55) (68 - 78)  BP: 175/76 (12-02-21 @ 18:55) (158/73 - 195/79)  RR: 18 (12-02-21 @ 18:55) (18 - 20)  SpO2: 94% (12-02-21 @ 18:02) (90% - 96%)                                     13.4   6.62  )-----------( 177      ( 02 Dec 2021 16:45 )             41.6     140  |  107  |  12  ----------------------------<  86  3.9   |  20<L>  |  0.47<L>    AST  14  /  ALT  9<L>  /  AlkPhos  83  12-02          POCT Blood Glucose.: 111 mg/dL (02 Dec 2021 11:58)          PHYSICAL EXAM  Neurology: A&Ox3, NAD  CV : RRR+S1S2  Lungs: Respirations non-labored, B/L BS  Abdomen: Soft, NT/ND, +BSx4Q  : Voiding without difficulty  Extremities: B/L LE edema, negative calf tenderness, +PP       MEDICATIONS  allopurinol 100 milliGRAM(s) Oral daily  aspirin enteric coated 81 milliGRAM(s) Oral daily  atorvastatin 20 milliGRAM(s) Oral at bedtime  cefuroxime  IVPB 1500 milliGRAM(s) IV Intermittent every 8 hours  clopidogrel Tablet 75 milliGRAM(s) Oral daily  enalapril 10 milliGRAM(s) Oral daily  finasteride 5 milliGRAM(s) Oral daily  influenza  Vaccine (HIGH DOSE) 0.7 milliLiter(s) IntraMuscular once  labetalol 200 milliGRAM(s) Oral every 8 hours  mirabegron ER 50 milliGRAM(s) Oral daily  mycophenolate mofetil 1000 milliGRAM(s) Oral two times a day  tacrolimus 1 milliGRAM(s) Oral <User Schedule>  tamsulosin 0.8 milliGRAM(s) Oral at bedtime      Discussed with Cardiothoracic Team at AM rounds. Subjective: Pt states "Hello" denies any CP or SOB.     Telemetry:  SR 68  Vital Signs Last 24 Hrs  T(C): 36.7 (12-02-21 @ 18:02), Max: 37.1 (12-02-21 @ 16:20)  T(F): 98.1 (12-02-21 @ 18:02), Max: 98.7 (12-02-21 @ 16:20)  HR: 69 (12-02-21 @ 18:55) (68 - 78)  BP: 175/76 (12-02-21 @ 18:55) (158/73 - 195/79)  RR: 18 (12-02-21 @ 18:55) (18 - 20)  SpO2: 94% (12-02-21 @ 18:02) (90% - 96%)                                     13.4   6.62  )-----------( 177      ( 02 Dec 2021 16:45 )             41.6     140  |  107  |  12  ----------------------------<  86  3.9   |  20<L>  |  0.47<L>    AST  14  /  ALT  9<L>  /  AlkPhos  83  12-02          POCT Blood Glucose.: 111 mg/dL (02 Dec 2021 11:58)          PHYSICAL EXAM  Neurology: A&Ox3, NAD  CV : RRR+S1S2  Lungs: Respirations non-labored, B/L BS CTA  Abdomen: Soft, NT/ND, +BSx4Q  : Voiding without difficulty  Extremities: B/L LE no edema, negative calf tenderness, +PP                     B/L groins soft with DSD CDI, no bleeding, no hematoma      MEDICATIONS  allopurinol 100 milliGRAM(s) Oral daily  aspirin enteric coated 81 milliGRAM(s) Oral daily  atorvastatin 20 milliGRAM(s) Oral at bedtime  cefuroxime  IVPB 1500 milliGRAM(s) IV Intermittent every 8 hours  clopidogrel Tablet 75 milliGRAM(s) Oral daily  enalapril 10 milliGRAM(s) Oral daily  finasteride 5 milliGRAM(s) Oral daily  influenza  Vaccine (HIGH DOSE) 0.7 milliLiter(s) IntraMuscular once  labetalol 200 milliGRAM(s) Oral every 8 hours  mirabegron ER 50 milliGRAM(s) Oral daily  mycophenolate mofetil 1000 milliGRAM(s) Oral two times a day  tacrolimus 1 milliGRAM(s) Oral <User Schedule>  tamsulosin 0.8 milliGRAM(s) Oral at bedtime      Discussed with Cardiothoracic Team at AM rounds.

## 2021-12-02 NOTE — PRE-ANESTHESIA EVALUATION ADULT - NSANTHRISKSRD_GEN_ALL_CORE
We could do Diclofenac gel, however, it may be difficult for her to reach area to apply Diclofenac.  She could consider appointment with Dr. Moreno for adjustment or massage.  We could also consider physical therapy.  Yaima Yusuf MD    ASA of 4, 4E, 5 or 5E

## 2021-12-02 NOTE — CONSULT NOTE ADULT - ASSESSMENT
70 years old male with PMHx of HTN, HLD, DMT2 (on Novolog insulin pump), ESRD was on dialysis s/p Rt kidney transplanted 2013 (on prograf and Cellcept managed by Dr Sam BOWDEN/[employer] [facility]), IDL restrictive/obstructive lung disease, FRANCOIS on CPAP, severe AS with preserved LVEF 70% (NATHALY 0.9sqcm, mGr 47mmHg, pGr 71mmHg, AoV 4.2m/s and DVI 0.23)   Patient reports dyspnea on exertion but denies any other symptoms . Pt was evaluated by Ct surgery s/p cardiac cath  1 Robert on 11/22/21 .Pt  underwent TAVR on 12/2/21.      1- renal transplant   2- HTN   3- DM   4- aortic stenosis s/p TAVR    cont prograf 1 mg bid   cellcept 1000 mg bid   vasotec 10 mg daily to cont   check prograf level in am

## 2021-12-03 ENCOUNTER — TRANSCRIPTION ENCOUNTER (OUTPATIENT)
Age: 71
End: 2021-12-03

## 2021-12-03 DIAGNOSIS — Z96.41 PRESENCE OF INSULIN PUMP (EXTERNAL) (INTERNAL): ICD-10-CM

## 2021-12-03 DIAGNOSIS — I10 ESSENTIAL (PRIMARY) HYPERTENSION: ICD-10-CM

## 2021-12-03 DIAGNOSIS — E78.5 HYPERLIPIDEMIA, UNSPECIFIED: ICD-10-CM

## 2021-12-03 LAB
ANION GAP SERPL CALC-SCNC: 11 MMOL/L — SIGNIFICANT CHANGE UP (ref 5–17)
BUN SERPL-MCNC: 14 MG/DL — SIGNIFICANT CHANGE UP (ref 7–23)
CALCIUM SERPL-MCNC: 9.3 MG/DL — SIGNIFICANT CHANGE UP (ref 8.4–10.5)
CHLORIDE SERPL-SCNC: 106 MMOL/L — SIGNIFICANT CHANGE UP (ref 96–108)
CO2 SERPL-SCNC: 23 MMOL/L — SIGNIFICANT CHANGE UP (ref 22–31)
CREAT SERPL-MCNC: 0.58 MG/DL — SIGNIFICANT CHANGE UP (ref 0.5–1.3)
GLUCOSE BLDC GLUCOMTR-MCNC: 105 MG/DL — HIGH (ref 70–99)
GLUCOSE BLDC GLUCOMTR-MCNC: 123 MG/DL — HIGH (ref 70–99)
GLUCOSE BLDC GLUCOMTR-MCNC: 123 MG/DL — HIGH (ref 70–99)
GLUCOSE BLDC GLUCOMTR-MCNC: 142 MG/DL — HIGH (ref 70–99)
GLUCOSE SERPL-MCNC: 114 MG/DL — HIGH (ref 70–99)
HCT VFR BLD CALC: 39.9 % — SIGNIFICANT CHANGE UP (ref 39–50)
HGB BLD-MCNC: 12.8 G/DL — LOW (ref 13–17)
MCHC RBC-ENTMCNC: 31.2 PG — SIGNIFICANT CHANGE UP (ref 27–34)
MCHC RBC-ENTMCNC: 32.1 GM/DL — SIGNIFICANT CHANGE UP (ref 32–36)
MCV RBC AUTO: 97.3 FL — SIGNIFICANT CHANGE UP (ref 80–100)
NRBC # BLD: 0 /100 WBCS — SIGNIFICANT CHANGE UP (ref 0–0)
PLATELET # BLD AUTO: 181 K/UL — SIGNIFICANT CHANGE UP (ref 150–400)
POTASSIUM SERPL-MCNC: 3.9 MMOL/L — SIGNIFICANT CHANGE UP (ref 3.5–5.3)
POTASSIUM SERPL-SCNC: 3.9 MMOL/L — SIGNIFICANT CHANGE UP (ref 3.5–5.3)
RBC # BLD: 4.1 M/UL — LOW (ref 4.2–5.8)
RBC # FLD: 14 % — SIGNIFICANT CHANGE UP (ref 10.3–14.5)
SODIUM SERPL-SCNC: 140 MMOL/L — SIGNIFICANT CHANGE UP (ref 135–145)
TACROLIMUS SERPL-MCNC: 3.4 NG/ML — SIGNIFICANT CHANGE UP
WBC # BLD: 8.58 K/UL — SIGNIFICANT CHANGE UP (ref 3.8–10.5)
WBC # FLD AUTO: 8.58 K/UL — SIGNIFICANT CHANGE UP (ref 3.8–10.5)

## 2021-12-03 PROCEDURE — 93306 TTE W/DOPPLER COMPLETE: CPT | Mod: 26

## 2021-12-03 PROCEDURE — 93880 EXTRACRANIAL BILAT STUDY: CPT

## 2021-12-03 PROCEDURE — U0003: CPT

## 2021-12-03 PROCEDURE — 93010 ELECTROCARDIOGRAM REPORT: CPT

## 2021-12-03 PROCEDURE — U0005: CPT

## 2021-12-03 PROCEDURE — 99231 SBSQ HOSP IP/OBS SF/LOW 25: CPT

## 2021-12-03 PROCEDURE — 87640 STAPH A DNA AMP PROBE: CPT

## 2021-12-03 PROCEDURE — 99232 SBSQ HOSP IP/OBS MODERATE 35: CPT

## 2021-12-03 PROCEDURE — 80048 BASIC METABOLIC PNL TOTAL CA: CPT

## 2021-12-03 PROCEDURE — 99223 1ST HOSP IP/OBS HIGH 75: CPT | Mod: GC

## 2021-12-03 PROCEDURE — 86923 COMPATIBILITY TEST ELECTRIC: CPT

## 2021-12-03 PROCEDURE — 86901 BLOOD TYPING SEROLOGIC RH(D): CPT

## 2021-12-03 PROCEDURE — 87641 MR-STAPH DNA AMP PROBE: CPT

## 2021-12-03 PROCEDURE — 83036 HEMOGLOBIN GLYCOSYLATED A1C: CPT

## 2021-12-03 PROCEDURE — G0463: CPT

## 2021-12-03 PROCEDURE — 86850 RBC ANTIBODY SCREEN: CPT

## 2021-12-03 PROCEDURE — 85027 COMPLETE CBC AUTOMATED: CPT

## 2021-12-03 PROCEDURE — 71046 X-RAY EXAM CHEST 2 VIEWS: CPT

## 2021-12-03 PROCEDURE — 86900 BLOOD TYPING SEROLOGIC ABO: CPT

## 2021-12-03 RX ORDER — POLYETHYLENE GLYCOL 3350 17 G/17G
17 POWDER, FOR SOLUTION ORAL DAILY
Refills: 0 | Status: DISCONTINUED | OUTPATIENT
Start: 2021-12-03 | End: 2021-12-04

## 2021-12-03 RX ORDER — NIFEDIPINE 30 MG
60 TABLET, EXTENDED RELEASE 24 HR ORAL DAILY
Refills: 0 | Status: DISCONTINUED | OUTPATIENT
Start: 2021-12-03 | End: 2021-12-04

## 2021-12-03 RX ORDER — GLUCAGON INJECTION, SOLUTION 0.5 MG/.1ML
1 INJECTION, SOLUTION SUBCUTANEOUS ONCE
Refills: 0 | Status: DISCONTINUED | OUTPATIENT
Start: 2021-12-03 | End: 2021-12-14

## 2021-12-03 RX ORDER — DEXTROSE 50 % IN WATER 50 %
12.5 SYRINGE (ML) INTRAVENOUS ONCE
Refills: 0 | Status: DISCONTINUED | OUTPATIENT
Start: 2021-12-03 | End: 2021-12-14

## 2021-12-03 RX ORDER — INSULIN ASPART 100 [IU]/ML
1 INJECTION, SOLUTION SUBCUTANEOUS
Refills: 0 | Status: DISCONTINUED | OUTPATIENT
Start: 2021-12-03 | End: 2021-12-14

## 2021-12-03 RX ORDER — DEXTROSE 50 % IN WATER 50 %
25 SYRINGE (ML) INTRAVENOUS ONCE
Refills: 0 | Status: DISCONTINUED | OUTPATIENT
Start: 2021-12-03 | End: 2021-12-14

## 2021-12-03 RX ORDER — DEXTROSE 50 % IN WATER 50 %
15 SYRINGE (ML) INTRAVENOUS ONCE
Refills: 0 | Status: DISCONTINUED | OUTPATIENT
Start: 2021-12-03 | End: 2021-12-14

## 2021-12-03 RX ORDER — NIFEDIPINE 30 MG
30 TABLET, EXTENDED RELEASE 24 HR ORAL AT BEDTIME
Refills: 0 | Status: DISCONTINUED | OUTPATIENT
Start: 2021-12-03 | End: 2021-12-04

## 2021-12-03 RX ORDER — TACROLIMUS 5 MG/1
1.5 CAPSULE ORAL
Refills: 0 | Status: DISCONTINUED | OUTPATIENT
Start: 2021-12-03 | End: 2021-12-04

## 2021-12-03 RX ADMIN — TACROLIMUS 1 MILLIGRAM(S): 5 CAPSULE ORAL at 20:09

## 2021-12-03 RX ADMIN — Medication 100 MILLIGRAM(S): at 05:49

## 2021-12-03 RX ADMIN — ATORVASTATIN CALCIUM 20 MILLIGRAM(S): 80 TABLET, FILM COATED ORAL at 21:36

## 2021-12-03 RX ADMIN — Medication 81 MILLIGRAM(S): at 11:51

## 2021-12-03 RX ADMIN — Medication 30 MILLIGRAM(S): at 21:36

## 2021-12-03 RX ADMIN — Medication 81 MILLIGRAM(S): at 08:39

## 2021-12-03 RX ADMIN — Medication 10 MILLIGRAM(S): at 05:23

## 2021-12-03 RX ADMIN — MYCOPHENOLATE MOFETIL 1000 MILLIGRAM(S): 250 CAPSULE ORAL at 20:09

## 2021-12-03 RX ADMIN — TACROLIMUS 1 MILLIGRAM(S): 5 CAPSULE ORAL at 08:39

## 2021-12-03 RX ADMIN — FINASTERIDE 5 MILLIGRAM(S): 5 TABLET, FILM COATED ORAL at 11:50

## 2021-12-03 RX ADMIN — MYCOPHENOLATE MOFETIL 1000 MILLIGRAM(S): 250 CAPSULE ORAL at 08:39

## 2021-12-03 RX ADMIN — Medication 200 MILLIGRAM(S): at 05:23

## 2021-12-03 RX ADMIN — MIRABEGRON 50 MILLIGRAM(S): 50 TABLET, EXTENDED RELEASE ORAL at 11:50

## 2021-12-03 RX ADMIN — TAMSULOSIN HYDROCHLORIDE 0.8 MILLIGRAM(S): 0.4 CAPSULE ORAL at 21:36

## 2021-12-03 RX ADMIN — Medication 200 MILLIGRAM(S): at 11:51

## 2021-12-03 RX ADMIN — CLOPIDOGREL BISULFATE 75 MILLIGRAM(S): 75 TABLET, FILM COATED ORAL at 11:50

## 2021-12-03 RX ADMIN — Medication 60 MILLIGRAM(S): at 11:43

## 2021-12-03 RX ADMIN — Medication 100 MILLIGRAM(S): at 11:50

## 2021-12-03 RX ADMIN — Medication 200 MILLIGRAM(S): at 21:37

## 2021-12-03 NOTE — PROGRESS NOTE ADULT - ASSESSMENT
70 years old male with PMHx of HTN, HLD, DMT2 (on Novolog insulin pump), ESRD was on dialysis s/p Rt kidney transplanted 2013 (on prograf and Cellcept managed by Dr Sam BOWDEN/[employer]), IDL restrictive/obstructive lung disease, FRANCOIS on CPAP, severe AS with preserved LVEF 70% (NATHALY 0.9sqcm, mGr 47mmHg, pGr 71mmHg, AoV 4.2m/s and DVI 0.23)   Patient reports dyspnea on exertion but denies any other symptoms . Pt was evaluated by Ct surgery s/p cardiac cath  1 Robert on 11/22/21 .Pt  underwent TAVR on 12/2/21.      1- renal transplant   2- HTN   3- DM   4- aortic stenosis s/p TAVR      cellcept 1000 mg bid   vasotec 10 mg daily to cont   procardia xl 90 mg daily with labetalol as above   prograf level remains low. last level 3.2 now 3.4  increase prograf to 1.5 mg bid   cr steady

## 2021-12-03 NOTE — PROGRESS NOTE ADULT - PROBLEM SELECTOR PLAN 1
Continue asa 81 and Plavix 75 daily  Zinacef 1500mg x2 more doses, next dose at 2245  Daily EKG  Check TTE in AM 12/3 POD 1  OOB to chair, ambulate  Resume home medications as tolerated  Disposition: Home Sat with EMANUEL
Structural team following   Continue asa 81 and Plavix 75 daily  Daily EKG  Check TTE today   OOB to chair, ambulate  Resume home medications as tolerated  Disposition: Home Sat with EMANUEL

## 2021-12-03 NOTE — CONSULT NOTE ADULT - ASSESSMENT
70 years old male with PMHx of HTN, HLD, DMT2 (on Novolog insulin pump), ESRD was on dialysis s/p Rt kidney transplanted 2013 (on prograf and Cellcept managed by Dr Sam BOWDEN/[employer]), IDL restrictive/obstructive lung disease, FRANCOIS on CPAP, severe AS with preserved LVEF 70% (NATHALY 0.9sqcm, mGr 47mmHg, pGr 71mmHg, AoV 4.2m/s and DVI 0.23), presenting for TAVR.     Endocrine consulted for assistance with insulin pump.     #Controlled T2DM with insulin pump in place  A1c 5%, very tightly controlled. Goal is closer to 7-8% given age and comorbidities  Inpatient FS at goal   Patient can replace pump this evening. Ordered for Novolog pump and pharmacy will provide Novolog for patient as he does not have it with him. He has pump supplies for placement  Overnight basal settings lowered between 12am-5am from 0.8 u/hr to 0.7 u/hr due to AM FS of 75-80 and tightly controlled A1c. Remainder of settings unchanged  **If pump is not placed tonight, please start Lantus 8 units qHS and low dose correctional scale qAC and qHS given well controlled FS.     New settings:   Pump settings  Basal - 18 units  12am - 0.7 u/hr  5am - 0.8 u/hr  IC   12am - 1:10  11pm - 1:15  ISF: 1:50  BG Target   Active insulin time: 4 hrs    For discharge, patient can go home with pump at the above settings and follow up with Dr. Jyoti Byrd as an outpatient    #HTN  BP is above goal  BP management per primary team     #HLD  Continue Atorvastatin per primary team    Discussed with Dr. Diaz and primary team    Yenifer Gomez MD  Endocrine Fellow  Pager: [phone]/YOLANDA [phone]  Consults 9am-5pm: [phone]  After 5pm and weekends: [phone]   70 years old male with PMHx of HTN, HLD, DMT2 (on Novolog insulin pump), ESRD was on dialysis s/p Rt kidney transplanted 2013 (on prograf and Cellcept managed by Dr Sam BOWDEN/[employer]), IDL restrictive/obstructive lung disease, FRANCOIS on CPAP, severe AS with preserved LVEF 70% (NATHALY 0.9sqcm, mGr 47mmHg, pGr 71mmHg, AoV 4.2m/s and DVI 0.23), presenting for TAVR.     Endocrine consulted for assistance with insulin pump.     #Controlled T2DM with insulin pump in place  A1c 5%, very tightly controlled. Goal is closer to 7-8% given age and comorbidities  Inpatient FS at goal   Patient can replace pump this evening. Ordered for Novolog pump and pharmacy will provide Novolog for patient as he does not have it with him. He has pump supplies for placement  Overnight basal settings lowered between 12am-5am from 0.8 u/hr to 0.7 u/hr due to AM FS of 75-80 and tightly controlled A1c. Remainder of settings unchanged  **If pump is not placed tonight, please start Lantus 8 units qHS and low dose correctional scale qAC and qHS given well controlled FS.     New settings:   Pump settings  Basal - 18 units  12am - 0.7 u/hr  5am - 0.8 u/hr  IC   12am - 1:10  11pm - 1:15  ISF: 1:50  BG Target   Active insulin time: 4 hrs    For discharge, patient can go home with pump at the above settings and Ozempic 0.5 mg subq weekly (home med), and follow up with Dr. Jyoti Byrd as an outpatient    #HTN  BP is above goal  BP management per primary team     #HLD  Continue Atorvastatin per primary team    Discussed with Dr. Diaz and primary team    Yenifer Gomez MD  Endocrine Fellow  Pager: [phone]/YOLANDA [phone]  Consults 9am-5pm: [phone]  After 5pm and weekends: [phone]

## 2021-12-03 NOTE — PROGRESS NOTE ADULT - SUBJECTIVE AND OBJECTIVE BOX
[facility]   [phone]  RENAL FOLLOW UP NOTE  --------------------------------------------------------------------------------  Chief Complaint:    24 hour events/subjective:    seen earlier   stated feels well no sob no dysuria     PAST HISTORY  --------------------------------------------------------------------------------  No significant changes to PMH, PSH, FHx, SHx, unless otherwise noted    ALLERGIES & MEDICATIONS  --------------------------------------------------------------------------------  Allergies    No Known Allergies    Intolerances      Standing Inpatient Medications  allopurinol 100 milliGRAM(s) Oral daily  aspirin enteric coated 81 milliGRAM(s) Oral daily  atorvastatin 20 milliGRAM(s) Oral at bedtime  clopidogrel Tablet 75 milliGRAM(s) Oral daily  dextrose 40% Gel 15 Gram(s) Oral once  dextrose 50% Injectable 25 Gram(s) IV Push once  dextrose 50% Injectable 12.5 Gram(s) IV Push once  dextrose 50% Injectable 25 Gram(s) IV Push once  enalapril 10 milliGRAM(s) Oral daily  finasteride 5 milliGRAM(s) Oral daily  glucagon  Injectable 1 milliGRAM(s) IntraMuscular once  influenza  Vaccine (HIGH DOSE) 0.7 milliLiter(s) IntraMuscular once  insulin aspart (NovoLOG) Pump 1 Each SubCutaneous Continuous Pump  labetalol 200 milliGRAM(s) Oral every 8 hours  mirabegron ER 50 milliGRAM(s) Oral daily  mycophenolate mofetil 1000 milliGRAM(s) Oral two times a day  NIFEdipine XL 30 milliGRAM(s) Oral at bedtime  NIFEdipine XL 60 milliGRAM(s) Oral daily  polyethylene glycol 3350 17 Gram(s) Oral daily  tacrolimus 1.5 milliGRAM(s) Oral <User Schedule>  tamsulosin 0.8 milliGRAM(s) Oral at bedtime    PRN Inpatient Medications      REVIEW OF SYSTEMS  --------------------------------------------------------------------------------    Gen: denies fevers/chills,  CVS: denies chest pain/palpitations  Resp: denies SOB/Cough  GI: Denies N/V/Abd pain  : Denies dysuria        VITALS/PHYSICAL EXAM  --------------------------------------------------------------------------------  T(C): 36.9 (12-03-21 @ 20:08), Max: 36.9 (12-03-21 @ 12:55)  HR: 71 (12-03-21 @ 20:08) (64 - 71)  BP: 155/55 (12-03-21 @ 20:08) (155/55 - 176/84)  RR: 18 (12-03-21 @ 20:08) (18 - 18)  SpO2: 97% (12-03-21 @ 20:08) (96% - 97%)  Wt(kg): --  Height (cm): 170.2 (12-02-21 @ 14:08)  Weight (kg): 90.4 (12-02-21 @ 14:08)  BMI (kg/m2): 31.2 (12-02-21 @ 14:08)  BSA (m2): 2.02 (12-02-21 @ 14:08)      12-02-21 @ 07:01  -  12-03-21 @ 07:00  --------------------------------------------------------  IN: 320 mL / OUT: 150 mL / NET: 170 mL    12-03-21 @ 07:01  -  12-03-21 @ 20:59  --------------------------------------------------------  IN: 720 mL / OUT: 0 mL / NET: 720 mL      Physical Exam:  	    Gen: Non toxic comfortable appearing   	no jvd  	Pulm: decrease bs  no rales or ronchi or wheezing  	CV: RRR, S1S2; no rub  	Abd: +BS, soft, nontender/nondistended  	: No suprapubic tenderness RLQ graft site non tender   	UE: Warm, no cyanosis  no clubbing,  no edema  	LE: Warm, no cyanosis  no clubbing, no edema  	Neuro: alert and oriented. speech coherent       LABS/STUDIES  --------------------------------------------------------------------------------              12.8   8.58  >-----------<  181      [12-03-21 @ 06:25]              39.9     140  |  106  |  14  ----------------------------<  114      [12-03-21 @ 06:24]  3.9   |  23  |  0.58        Ca     9.3     [12-03-21 @ 06:24]    TPro  6.9  /  Alb  4.1  /  TBili  0.9  /  DBili  x   /  AST  14  /  ALT  9   /  AlkPhos  83  [12-02-21 @ 16:45]      PTT: 33.5       [12-02-21 @ 16:45]      Creatinine Trend:  SCr 0.58 [12-03 @ 06:24]  SCr 0.47 [12-02 @ 16:45]  SCr 0.49 [11-30 @ 12:08]  SCr 0.60 [11-23 @ 01:24]  SCr 0.47 [11-22 @ 11:04]    Tacrolimus (), Serum: 3.4 ng/mL (12-03 @ 07:07)

## 2021-12-03 NOTE — PROGRESS NOTE ADULT - ASSESSMENT
71 y/o male with PMHx of HTN, HLD, DMT2 (on Novolog insulin pump), ESRD was on dialysis s/p Rt kidney transplanted 2013 (on prograf and Cellcept managed by Dr. Sam BOWDEN/[employer] [facility]), IDL restrictive/obstructive lung disease, FRANCOIS on CPAP, severe AS with preserved LVEF 70% (NATHALY 0.9sqcm, mGr 47mmHg, pGr 71mmHg, AoV 4.2m/s and DVI 0.23)   Patient reports dyspnea on exertion but denies any other symptoms . Pt was evaluated by Ct surgery s/p cardiac cath  1 Robert on 11/22/21 .Pt  Denies dizziness, diaphoresis, palpitations, nausea, vomiting, peripheral edema, recent weight gain, or syncope.  Presents to [facility] for scheduled TAVR on 12/2/21 12/2 s/p TAVR #29 Dewey Avery  via LFA 14 Fr with 2 perclose and RFA with 6Fr and 8Fr sheath removed with manual pressure in lab.  Post op Course:   VSS, pt transferred to floor, currently SR 60s, B/L groins stable. /73 s/p labetalol 10mg IV x1 and resumed back on home labetalol.  12/3 Patient Hypertensive with 's --> Home dose Procardia resumed 60 mg in AM and 30 mg PO HS.  Post op TTE ordered. Daily EKG.  Earl Laura called to resume patient's insulin pump. Plan to discharge home in AM with [facility].

## 2021-12-03 NOTE — DISCHARGE NOTE NURSING/CASE MANAGEMENT/SOCIAL WORK - NSDCPEFALRISK_GEN_ALL_CORE
For information on Fall & Injury Prevention, visit: [email or web address] OR  [email or web address] OR  https://www.cdc.gov/steadi/patient.html

## 2021-12-03 NOTE — CONSULT NOTE ADULT - SUBJECTIVE AND OBJECTIVE BOX
[facility]  [phone]  NEPHROLOGY      INITIAL CONSULT NOTE  --------------------------------------------------------------------------------  HPI:      70 years old male with PMHx of HTN, HLD, DMT2 (on Novolog insulin pump), ESRD was on dialysis s/p Rt kidney transplanted 2013 (on prograf and Cellcept managed by Dr Sam BOWDEN/[facility] [facility]), IDL restrictive/obstructive lung disease, FRANCOIS on CPAP, severe AS with preserved LVEF 70% (NATHALY 0.9sqcm, mGr 47mmHg, pGr 71mmHg, AoV 4.2m/s and DVI 0.23)   Patient reports dyspnea on exertion but denies any other symptoms . Pt was evaluated by Ct surgery s/p cardiac cath  1 Robert on 11/22/21 .Pt  underwent TAVR on 12/2/21. renal consult called       PAST HISTORY  --------------------------------------------------------------------------------  PAST MEDICAL & SURGICAL HISTORY:  Morbid Obesity    HTN (Hypertension), Benign    Hyperuricemia    Hyperlipidemia    Smoking    DM (diabetes mellitus), type 2    FRANCOIS on CPAP    H/O kidney transplant  2013    CAD (coronary artery disease)  1 stent    Kidney transplant recipient  Rt kidney- 2013    AV fistula      FAMILY HISTORY:    PAST SOCIAL HISTORY:    denies tobacco use       ALLERGIES & MEDICATIONS  --------------------------------------------------------------------------------  Allergies    No Known Allergies    Intolerances      Standing Inpatient Medications  allopurinol 100 milliGRAM(s) Oral daily  aspirin enteric coated 81 milliGRAM(s) Oral daily  atorvastatin 20 milliGRAM(s) Oral at bedtime  cefuroxime  IVPB 1500 milliGRAM(s) IV Intermittent every 8 hours  clopidogrel Tablet 75 milliGRAM(s) Oral daily  enalapril 10 milliGRAM(s) Oral daily  finasteride 5 milliGRAM(s) Oral daily  influenza  Vaccine (HIGH DOSE) 0.7 milliLiter(s) IntraMuscular once  labetalol 200 milliGRAM(s) Oral every 8 hours  mirabegron ER 50 milliGRAM(s) Oral daily  mycophenolate mofetil 1000 milliGRAM(s) Oral two times a day  tacrolimus 1 milliGRAM(s) Oral <User Schedule>  tamsulosin 0.8 milliGRAM(s) Oral at bedtime    PRN Inpatient Medications      REVIEW OF SYSTEMS  --------------------------------------------------------------------------------  Gen: No  fevers/chills   Skin: No itching   Head/Eyes/Ears/Mouth: No headache; Normal hearing;  No sinus pain/discomfort, sore throat  Respiratory: No dyspnea, cough, wheezing  CV: No chest pain, orthopnea  GI: No abdominal pain, diarrhea, nausea, vomiting  : No dysuria, decrease urination or hesitancy urinating  hematuria, nocturia  MSK: No joint pain/swelling; no back pain  Neuro: No dizziness/lightheadedness  also with no edema       VITALS/PHYSICAL EXAM  --------------------------------------------------------------------------------  T(C): 36.6 (12-02-21 @ 19:45), Max: 37.1 (12-02-21 @ 16:20)  HR: 71 (12-02-21 @ 19:45) (68 - 78)  BP: 182/80 (12-02-21 @ 19:45) (158/73 - 195/79)  RR: 18 (12-02-21 @ 19:45) (18 - 20)  SpO2: 94% (12-02-21 @ 19:45) (90% - 96%)  Wt(kg): --  Height (cm): 170.2 (12-02-21 @ 14:08)  Weight (kg): 90.4 (12-02-21 @ 14:08)  BMI (kg/m2): 31.2 (12-02-21 @ 14:08)  BSA (m2): 2.02 (12-02-21 @ 14:08)      Physical Exam:  	Gen: Non toxic comfortable appearing   	no jvd  	Pulm: decrease bs  no rales or ronchi or wheezing  	CV: RRR, S1S2; no rub  	Back: No CVA tenderness  	Abd: +BS, soft, nontender/nondistended  	: No suprapubic tenderness RLQ graft site non tender   	UE: Warm, no cyanosis  no clubbing,  no edema  	LE: Warm, no cyanosis  no clubbing, no edema  	Neuro: alert and oriented. speech coherent   	Psych: Normal affect and mood  	Skin: Warm, no decrease skin turgor   	  LABS/STUDIES  --------------------------------------------------------------------------------              13.4   6.62  >-----------<  177      [12-02-21 @ 16:45]              41.6     140  |  107  |  12  ----------------------------<  86      [12-02-21 @ 16:45]  3.9   |  20  |  0.47        Ca     9.2     [12-02-21 @ 16:45]    TPro  6.9  /  Alb  4.1  /  TBili  0.9  /  DBili  x   /  AST  14  /  ALT  9   /  AlkPhos  83  [12-02-21 @ 16:45]      PTT: 33.5       [12-02-21 @ 16:45]      Creatinine Trend:  SCr 0.47 [12-02 @ 16:45]  SCr 0.49 [11-30 @ 12:08]  SCr 0.60 [11-23 @ 01:24]  SCr 0.47 [11-22 @ 11:04]            
  HPI:  70 years old male with PMHx of HTN, HLD, DMT2 (on Novolog insulin pump), ESRD was on dialysis s/p Rt kidney transplanted 2013 (on prograf and Cellcept managed by Dr Sam BOWDEN/[facility]), IDL restrictive/obstructive lung disease, FRANCOIS on CPAP, severe AS with preserved LVEF 70% (NATHALY 0.9sqcm, mGr 47mmHg, pGr 71mmHg, AoV 4.2m/s and DVI 0.23), presenting for TAVR.     Endocrine consulted for assistance with insulin pump   He removed his pump before the procedure yesterday and has not yet replaced it. His FS are currently well controlled.   A1c 5%  Patient has had T2DM for >10 years. He follows with Endocrinologist Dr. Jyoti Byrd with [place]. Has been using a Medtronic 630G insulin pump for 5 years, with Novolog insulin. Settings noted below. He changes his site every three days. He boluses via carb counting and inputting glucose values into the pump. He also uses Ozempic 0.50 mg subq weekly. He uses a glucometer to take FS, and AM FS are usually 75-90 and post prandial FS are 120-140. He sees optho regularly, no retinopathy. No neuropathy. Has a hx of ESRD on dialysis, now s/p renal transplant. No CAD or CVA.    Pump settings  Basal - 19.2  12am - 0.8 u/hr  IC   12am - 1:10  11pm - 1:15  ISF: 1:50  BG Target   Active insulin time: 4 hrs      PAST MEDICAL & SURGICAL HISTORY:  Morbid Obesity  HTN (Hypertension), Benign  Hyperuricemia  Hyperlipidemia  Smoking  DM (diabetes mellitus), type 2  FRANCOIS on CPAP  H/O kidney transplant  2013  CAD (coronary artery disease)  1 stent  Kidney transplant recipient  Rt kidney- 2013  AV fistula      FAMILY HISTORY:  Hx of T2DM    Social History: Prior tobacco use    Outpatient Medications:  · 	clopidogrel 75 mg oral tablet: Last Dose Taken:  , 1 tab(s) orally once a day for 90 days  · 	alfuzosin 10 mg oral tablet, extended release: Last Dose Taken:  , 1 tab(s) orally once a day  · 	allopurinol 100 mg oral tablet: Last Dose Taken:  , 1 tab(s) orally once a day at pm  · 	Aspirin Enteric Coated 81 mg oral delayed release tablet: Last Dose Taken:  , 1 tab(s) orally once a day  · 	atorvastatin 20 mg oral tablet: Last Dose Taken:  , 1 tab(s) orally once a day  · 	CellCept 500 mg oral tablet: Last Dose Taken:  , 2 tab(s) orally 2 times a day  · 	enalapril 10 mg oral tablet: Last Dose Taken:  , 1 tab(s) orally once a day  · 	finasteride 5 mg oral tablet: Last Dose Taken:  , 1 tab(s) orally once a day  · 	labetalol 200 mg oral tablet: Last Dose Taken:  , 1 tab(s) orally 3 times a day  · 	Myrbetriq 50 mg oral tablet, extended release: Last Dose Taken:  , 1 tab(s) orally once a day  · 	Procardia XL 30 mg oral tablet, extended release: Last Dose Taken:  , 1 tab(s) orally once a day (at bedtime)  · 	Procardia XL 60 mg oral tablet, extended release: Last Dose Taken:  , 1 tab(s) orally once a day in am  · 	Prograf 1 mg oral capsule: Last Dose Taken:  , 1 cap(s) orally every 12 hours  MEDICATIONS  (STANDING):  allopurinol 100 milliGRAM(s) Oral daily  aspirin enteric coated 81 milliGRAM(s) Oral daily  atorvastatin 20 milliGRAM(s) Oral at bedtime  clopidogrel Tablet 75 milliGRAM(s) Oral daily  dextrose 40% Gel 15 Gram(s) Oral once  dextrose 50% Injectable 25 Gram(s) IV Push once  dextrose 50% Injectable 12.5 Gram(s) IV Push once  dextrose 50% Injectable 25 Gram(s) IV Push once  enalapril 10 milliGRAM(s) Oral daily  finasteride 5 milliGRAM(s) Oral daily  glucagon  Injectable 1 milliGRAM(s) IntraMuscular once  influenza  Vaccine (HIGH DOSE) 0.7 milliLiter(s) IntraMuscular once  insulin aspart (NovoLOG) Pump 1 Each SubCutaneous Continuous Pump  labetalol 200 milliGRAM(s) Oral every 8 hours  mirabegron ER 50 milliGRAM(s) Oral daily  mycophenolate mofetil 1000 milliGRAM(s) Oral two times a day  NIFEdipine XL 30 milliGRAM(s) Oral at bedtime  NIFEdipine XL 60 milliGRAM(s) Oral daily  tacrolimus 1 milliGRAM(s) Oral <User Schedule>  tamsulosin 0.8 milliGRAM(s) Oral at bedtime    MEDICATIONS  (PRN):      Allergies    No Known Allergies    Intolerances      Review of Systems:  Constitutional: No fever  Eyes: No blurry vision  Neuro: No tremors  HEENT: No pain  Cardiovascular: No chest pain, palpitations  Respiratory: No SOB, no cough  GI: No nausea, vomiting, abdominal pain  : No dysuria  Skin: no rash  Psych: no depression  Endocrine: no polyuria, polydipsia  Hem/lymph: no swelling  Osteoporosis: no fractures    PHYSICAL EXAM:  VITALS: T(C): 36.9 (12-03-21 @ 12:55)  T(F): 98.5 (12-03-21 @ 12:55), Max: 98.5 (12-03-21 @ 12:55)  HR: 64 (12-03-21 @ 12:55) (64 - 78)  BP: 176/84 (12-03-21 @ 12:55) (170/62 - 184/73)  RR:  (18 - 20)  SpO2:  (90% - 97%)  Wt(kg): --  GENERAL: NAD  THYROID: Normal size, no palpable nodules  RESPIRATORY: Clear to auscultation bilaterally  CARDIOVASCULAR: Regular rate and rhythm  GI: Soft, nontender, non distended,  PSYCH: Alert and oriented x 3, reactive mood    POCT Blood Glucose.: 123 mg/dL (12-03-21 @ 11:50)  POCT Blood Glucose.: 105 mg/dL (12-03-21 @ 07:31)  POCT Blood Glucose.: 124 mg/dL (12-02-21 @ 22:02)  POCT Blood Glucose.: 98 mg/dL (12-02-21 @ 19:32)  POCT Blood Glucose.: 111 mg/dL (12-02-21 @ 11:58)                            12.8   8.58  )-----------( 181      ( 03 Dec 2021 06:25 )             39.9       12-03    140  |  106  |  14  ----------------------------<  114<H>  3.9   |  23  |  0.58    EGFR if : 119  EGFR if non : 103    Ca    9.3      12-03    TPro  6.9  /  Alb  4.1  /  TBili  0.9  /  DBili  x   /  AST  14  /  ALT  9<L>  /  AlkPhos  83  12-02      Thyroid Function Tests:      A1C with Estimated Average Glucose Result: 5.0 % (11-30-21 @ 16:06)  A1C with Estimated Average Glucose Result: 4.9 % (11-23-21 @ 09:08)          Radiology:

## 2021-12-03 NOTE — PROGRESS NOTE ADULT - SUBJECTIVE AND OBJECTIVE BOX
*****Structural Heart Team*****    Subjective:    Patient resting comfortably in bed, offering no complaints. There were no events overnight.    PAST MEDICAL & SURGICAL HISTORY:  Morbid Obesity    HTN (Hypertension), Benign    Hyperuricemia    Hyperlipidemia    Smoking    DM (diabetes mellitus), type 2    FRANCOIS on CPAP    H/O kidney transplant  2013    CAD (coronary artery disease)  1 stent    Kidney transplant recipient  Rt kidney- 2013    AV fistula          T(C): 36.4 (12-03-21 @ 04:17), Max: 37.1 (12-02-21 @ 16:20)  HR: 66 (12-03-21 @ 08:35) (66 - 78)  BP: 173/67 (12-03-21 @ 08:35) (158/73 - 195/79)  RR: 18 (12-03-21 @ 08:35) (18 - 20)  SpO2: 97% (12-03-21 @ 08:35) (90% - 97%)  Wt(kg): --  12-02 @ 07:01  -  12-03 @ 07:00  --------------------------------------------------------  IN: 320 mL / OUT: 150 mL / NET: 170 mL    12-03 @ 07:01  -  12-03 @ 12:41  --------------------------------------------------------  IN: 360 mL / OUT: 0 mL / NET: 360 mL      MEDICATIONS  (STANDING):  allopurinol 100 milliGRAM(s) Oral daily  aspirin enteric coated 81 milliGRAM(s) Oral daily  atorvastatin 20 milliGRAM(s) Oral at bedtime  clopidogrel Tablet 75 milliGRAM(s) Oral daily  enalapril 10 milliGRAM(s) Oral daily  finasteride 5 milliGRAM(s) Oral daily  influenza  Vaccine (HIGH DOSE) 0.7 milliLiter(s) IntraMuscular once  labetalol 200 milliGRAM(s) Oral every 8 hours  mirabegron ER 50 milliGRAM(s) Oral daily  mycophenolate mofetil 1000 milliGRAM(s) Oral two times a day  NIFEdipine XL 30 milliGRAM(s) Oral at bedtime  NIFEdipine XL 60 milliGRAM(s) Oral daily  tacrolimus 1 milliGRAM(s) Oral <User Schedule>  tamsulosin 0.8 milliGRAM(s) Oral at bedtime    MEDICATIONS  (PRN):      Review of Symptoms:  Constitutional: Awake, Alert, Follows commands  Respiratory: Currently denies  Cardiac: Denies CP, Denies Palpitations  Gastrointestinal: Denies Pain, Denies N/V, tolerating po intake  Vascular: Negative  Extremities: No Edema, No joint pain or swelling  Neurological: Negative  Endocrine: No heat or cold intolerance, No excessive thirst  Heme/Onc: Negative    Exam:  General: A?ox3, JORDAN, NAD  HEENT: Supple, No JVD, Trachea midline, no masses  Pulmonary: CTAB, = Chest Excursion, no accessory muscle use  Cor: S1S2, RRR, No murmur noted  ECG: SR  Groin/Wound: B/L soft, Right side mild tenderness, no hematoma  Gastrointestinal: Soft, NT/ND, + Bowel Sounds  Neuro: = motor and sensory B/L, No focal deficits  Vascular: 1+ Pulses B/L, No Edema  Extremities: No swelling or pain  Skin: Warm/Dry/Normal color, Normal turgor, no rashes                          12.8   8.58  )-----------( 181      ( 03 Dec 2021 06:25 )             39.9   12-03    140  |  106  |  14  ----------------------------<  114<H>  3.9   |  23  |  0.58    Ca    9.3      03 Dec 2021 06:24    TPro  6.9  /  Alb  4.1  /  TBili  0.9  /  DBili  x   /  AST  14  /  ALT  9<L>  /  AlkPhos  83  12-02  PTT - ( 02 Dec 2021 16:45 )  PTT:33.5 sec    Imaging Reviewed:    Post Op TTE: Pending        Assesment/Plan:  70 y/o male, s/p TAVR for Severe Aortic Stenosis, Renal Transplant, Chronic Diastolic Heart Failure, HTN  1.) S/P TAVR: ASA 81 mg po daily, Plavix 75 mg po daily, Continue to Monitor Groins. Ambulate as tolerated. Post op TTE pending.  2.)Patient will be discharged on an MCOT for a period of 30days to monitor for rhythm changes post TAVR, which was discussed at length with the patient, and any questions were answered.  3.) Chronic Diastolic Heart Failure: Monitor Daily weight, no indication for diuresis. Continue labetalol  4.) Renal Transplant: Patient is restarted on his anti-rejection drugs. Dr. oCle following and to make adjustments as indicated  5.) HTN: Restarted on amlodipine and cardizem   5.) Discharge Plan: Patient is to follow up with Dr. Gill in 1 week post discharge. HE should then follow up with the [facility]  in 30 days, with a repeat Transthoracic Echo to be done at that visit.    MERLINE Coe  [id number]

## 2021-12-03 NOTE — DISCHARGE NOTE NURSING/CASE MANAGEMENT/SOCIAL WORK - PATIENT PORTAL LINK FT
You can access the FollowMyHealth Patient Portal offered by [facility] by registering at the following website: [email or web address]. By joining [facility]’s FollowMyHealth portal, you will also be able to view your health information using other applications (apps) compatible with our system.

## 2021-12-03 NOTE — PROGRESS NOTE ADULT - PROBLEM SELECTOR PLAN 2
Continue home tacrolimus 1mg BID and Cellcept 1g BID  renal- Dr. Michele Cole
Continue home tacrolimus 1mg BID and Cellcept 1g BID  renal- Dr. Michele Cole

## 2021-12-03 NOTE — PROGRESS NOTE ADULT - PROBLEM SELECTOR PLAN 4
Continue on Labetalol 200 mg PO TID   Continue on Enalapril 10 mg PO daily   Home dose Procardia resumed 60 mg in AM and 30 mg PO HS.

## 2021-12-03 NOTE — CONSULT NOTE ADULT - ATTENDING COMMENTS
Agree with assessment and plan as above by Dr. Gomez. Reviewed all pertinent labs, glucose values, and imaging studies. Modifications made as indicated above. A1c below goal given pt. is on insulin. Suspect hypoglycemia at home. Given tight control, empirically decrease basal overnight as mentioned above. Will adjust further as needed. Basal bolus doses indicated if unable to use pump or if it malfunctions.     Mil Diaz D.O  [phone]

## 2021-12-04 ENCOUNTER — TRANSCRIPTION ENCOUNTER (OUTPATIENT)
Age: 71
End: 2021-12-04

## 2021-12-04 VITALS
DIASTOLIC BLOOD PRESSURE: 61 MMHG | TEMPERATURE: 98 F | HEART RATE: 69 BPM | RESPIRATION RATE: 18 BRPM | SYSTOLIC BLOOD PRESSURE: 120 MMHG | OXYGEN SATURATION: 98 %

## 2021-12-04 LAB
ANION GAP SERPL CALC-SCNC: 11 MMOL/L — SIGNIFICANT CHANGE UP (ref 5–17)
BUN SERPL-MCNC: 12 MG/DL — SIGNIFICANT CHANGE UP (ref 7–23)
CALCIUM SERPL-MCNC: 10.2 MG/DL — SIGNIFICANT CHANGE UP (ref 8.4–10.5)
CHLORIDE SERPL-SCNC: 105 MMOL/L — SIGNIFICANT CHANGE UP (ref 96–108)
CO2 SERPL-SCNC: 24 MMOL/L — SIGNIFICANT CHANGE UP (ref 22–31)
CREAT SERPL-MCNC: 0.49 MG/DL — LOW (ref 0.5–1.3)
GLUCOSE BLDC GLUCOMTR-MCNC: 120 MG/DL — HIGH (ref 70–99)
GLUCOSE BLDC GLUCOMTR-MCNC: 129 MG/DL — HIGH (ref 70–99)
GLUCOSE SERPL-MCNC: 128 MG/DL — HIGH (ref 70–99)
HCT VFR BLD CALC: 40.7 % — SIGNIFICANT CHANGE UP (ref 39–50)
HGB BLD-MCNC: 13.2 G/DL — SIGNIFICANT CHANGE UP (ref 13–17)
MCHC RBC-ENTMCNC: 30.9 PG — SIGNIFICANT CHANGE UP (ref 27–34)
MCHC RBC-ENTMCNC: 32.4 GM/DL — SIGNIFICANT CHANGE UP (ref 32–36)
MCV RBC AUTO: 95.3 FL — SIGNIFICANT CHANGE UP (ref 80–100)
NRBC # BLD: 0 /100 WBCS — SIGNIFICANT CHANGE UP (ref 0–0)
PLATELET # BLD AUTO: 163 K/UL — SIGNIFICANT CHANGE UP (ref 150–400)
POTASSIUM SERPL-MCNC: 4.2 MMOL/L — SIGNIFICANT CHANGE UP (ref 3.5–5.3)
POTASSIUM SERPL-SCNC: 4.2 MMOL/L — SIGNIFICANT CHANGE UP (ref 3.5–5.3)
RBC # BLD: 4.27 M/UL — SIGNIFICANT CHANGE UP (ref 4.2–5.8)
RBC # FLD: 14 % — SIGNIFICANT CHANGE UP (ref 10.3–14.5)
SODIUM SERPL-SCNC: 140 MMOL/L — SIGNIFICANT CHANGE UP (ref 135–145)
TACROLIMUS SERPL-MCNC: 3.4 NG/ML — SIGNIFICANT CHANGE UP
WBC # BLD: 8.45 K/UL — SIGNIFICANT CHANGE UP (ref 3.8–10.5)
WBC # FLD AUTO: 8.45 K/UL — SIGNIFICANT CHANGE UP (ref 3.8–10.5)

## 2021-12-04 PROCEDURE — 93010 ELECTROCARDIOGRAM REPORT: CPT

## 2021-12-04 PROCEDURE — 99238 HOSP IP/OBS DSCHRG MGMT 30/<: CPT

## 2021-12-04 RX ORDER — TACROLIMUS 5 MG/1
1 CAPSULE ORAL
Qty: 0 | Refills: 0 | DISCHARGE

## 2021-12-04 RX ORDER — TACROLIMUS 5 MG/1
3 CAPSULE ORAL
Qty: 180 | Refills: 0
Start: 2021-12-04 | End: 2022-01-02

## 2021-12-04 RX ORDER — POLYETHYLENE GLYCOL 3350 17 G/17G
17 POWDER, FOR SOLUTION ORAL
Qty: 238 | Refills: 0
Start: 2021-12-04 | End: 2021-12-17

## 2021-12-04 RX ORDER — ISOPROPYL ALCOHOL, BENZOCAINE .7; .06 ML/ML; ML/ML
1 SWAB TOPICAL
Qty: 4 | Refills: 1
Start: 2021-12-04 | End: 2022-01-22

## 2021-12-04 RX ORDER — ATORVASTATIN CALCIUM 80 MG/1
1 TABLET, FILM COATED ORAL
Qty: 0 | Refills: 0 | DISCHARGE

## 2021-12-04 RX ORDER — SEMAGLUTIDE 0.68 MG/ML
0.5 INJECTION, SOLUTION SUBCUTANEOUS
Qty: 10 | Refills: 0
Start: 2021-12-04 | End: 2022-01-02

## 2021-12-04 RX ORDER — ATORVASTATIN CALCIUM 80 MG/1
1 TABLET, FILM COATED ORAL
Qty: 0 | Refills: 0 | DISCHARGE
Start: 2021-12-04

## 2021-12-04 RX ADMIN — MIRABEGRON 50 MILLIGRAM(S): 50 TABLET, EXTENDED RELEASE ORAL at 11:28

## 2021-12-04 RX ADMIN — Medication 10 MILLIGRAM(S): at 06:39

## 2021-12-04 RX ADMIN — Medication 81 MILLIGRAM(S): at 11:27

## 2021-12-04 RX ADMIN — MYCOPHENOLATE MOFETIL 1000 MILLIGRAM(S): 250 CAPSULE ORAL at 07:47

## 2021-12-04 RX ADMIN — CLOPIDOGREL BISULFATE 75 MILLIGRAM(S): 75 TABLET, FILM COATED ORAL at 11:27

## 2021-12-04 RX ADMIN — Medication 100 MILLIGRAM(S): at 11:28

## 2021-12-04 RX ADMIN — Medication 60 MILLIGRAM(S): at 06:39

## 2021-12-04 RX ADMIN — Medication 200 MILLIGRAM(S): at 06:39

## 2021-12-04 RX ADMIN — TACROLIMUS 1.5 MILLIGRAM(S): 5 CAPSULE ORAL at 07:47

## 2021-12-04 NOTE — DISCHARGE NOTE PROVIDER - CARE PROVIDERS DIRECT ADDRESSES
[email or web address]

## 2021-12-04 NOTE — DISCHARGE NOTE PROVIDER - NSDCCPCAREPLAN_GEN_ALL_CORE_FT
PRINCIPAL DISCHARGE DIAGNOSIS  Diagnosis: S/p TAVR (transcatheter aortic valve replacement), bioprosthetic  Assessment and Plan of Treatment:   1. Daily Shower   2. Weight yourself daily and notify any weight gain greater than 2-3 pounds in 24 hours.  3. Regular Diabetic / DASH diet - low fat, low cholesterol, no added salt.  4. Notify MD and Dentist the need of antibiotic prophylaxis before any dental procedure to prevent infection of your new valve.   5. Follow Post op TAVR Do's and Don'ts discharge instructions.   6. No heavy lifting or straining x 2 day.   7. Call / Notify MD any fever greater than 101.0  8. Increase Activity as tolerated.   9. Check your groin site for bleeding and/or swelling daily following procedure and call your doctor immediately if it occurs or if you experience increased pain at the site.  10. Resume home medication as prescribed and instructed in discharge paperwork.      SECONDARY DISCHARGE DIAGNOSES  Diagnosis: Diabetes mellitus  Assessment and Plan of Treatment:   Resume Insulin pump:   New settings:   Pump settings  Basal - 18 units  12am - 0.7 u/hr  5am - 0.8 u/hr  IC   12am - 1:10  11pm - 1:15  ISF: 1:50  BG Target   Active insulin time: 4 hrs  For discharge, patient can go home with pump at the above settings and Ozempic 0.5 mg subq weekly (home med), and follow up with Dr. Jyoti Byrd as an outpatient.

## 2021-12-04 NOTE — DISCHARGE NOTE PROVIDER - HOSPITAL COURSE
71 y/o male with PMHx of HTN, HLD, DMT2 (on Novolog insulin pump), ESRD was on dialysis s/p Rt kidney transplanted 2013 (on prograf and Cellcept managed by Dr. Sam BOWDEN/[facility] [facility]), IDL restrictive/obstructive lung disease, FRANCOIS on CPAP, severe AS with preserved LVEF 70% (NATHALY 0.9sqcm, mGr 47mmHg, pGr 71mmHg, AoV 4.2m/s and DVI 0.23)   Patient reports dyspnea on exertion but denies any other symptoms . Pt was evaluated by Ct surgery s/p cardiac cath  1 Robert on 11/22/21 .Pt  Denies dizziness, diaphoresis, palpitations, nausea, vomiting, peripheral edema, recent weight gain, or syncope.  Presents to [facility] for scheduled TAVR on 12/2/21 12/2 s/p TAVR #29 Dewey Avery  via LFA 14 Fr with 2 perclose and RFA with 6Fr and 8Fr sheath removed with manual pressure in lab.  Post op Course:   VSS, pt transferred to floor, currently SR 60s, B/L groins stable. /73 s/p labetalol 10mg IV x1 and resumed back on home labetalol.  12/3 Patient Hypertensive with 's --> Home dose Procardia resumed 60 mg in AM and 30 mg PO HS.  Post op TTE revealed: EF 75%; Minimal paravalvular regurgitation. Trace pericardial effusion. Daily EKG.  [facility] Endo called to resume patient's insulin pump. Plan to discharge home in AM with MCOT.  12/4 VVS; Continue with current medication regimen. Medically cleared for discharge home today per Dr. Gill.  MCOT placed by Tele Tech instructions provided using the teach back method.

## 2021-12-04 NOTE — DISCHARGE NOTE PROVIDER - NSDCFUSCHEDAPPT_GEN_ALL_CORE_FT
EDIN VILLA ; 12/07/2021 ; NPP CT Surg [address] EDIN Marx ; 12/09/2021 ; NPP PulmMed [address]

## 2021-12-04 NOTE — DISCHARGE NOTE PROVIDER - NSDCPNSUBOBJ_GEN_ALL_CORE
VITAL SIGNS    Subjective: Denies CP, palpitation, SOB, LOYA, HA, dizziness, N/V/D, fever or chills.  No acute event noted overnight.     Telemetry:  NSR 60-80     Vital Signs Last 24 Hrs  T(C): 36.6 (12-04-21 @ 05:04), Max: 36.9 (12-03-21 @ 12:55)  T(F): 97.8 (12-04-21 @ 05:04), Max: 98.5 (12-03-21 @ 12:55)  HR: 69 (12-04-21 @ 05:04) (64 - 71)  BP: 120/61 (12-04-21 @ 05:04) (120/61 - 176/84)  RR: 18 (12-04-21 @ 05:04) (18 - 18)  SpO2: 98% (12-04-21 @ 05:04) (96% - 98%)           12-03 @ 07:01  -  12-04 @ 07:00  --------------------------------------------------------  IN: 1120 mL / OUT: 0 mL / NET: 1120 mL    CAPILLARY BLOOD GLUCOSE    POCT Blood Glucose.: 120 mg/dL (04 Dec 2021 07:30)  POCT Blood Glucose.: 123 mg/dL (03 Dec 2021 21:27)  POCT Blood Glucose.: 142 mg/dL (03 Dec 2021 16:49)  POCT Blood Glucose.: 123 mg/dL (03 Dec 2021 11:50)        PHYSICAL EXAM    Neurology: alert and oriented x 3, nonfocal, no gross deficits    CV: (+) S1 and S2, No murmurs, rubs, gallops or clicks     Sternal Wound: MSI -->CDI , sternum stable    Lungs: CTA B/L     Abdomen: soft, nontender, nondistended, positive bowel sounds, (+) Flatus; (+) BM     :  Voiding               Extremities:  B/L LE (+) edema; negative calf tenderness; (+) 2 DP palpable        allopurinol 100 milliGRAM(s) Oral daily  aspirin enteric coated 81 milliGRAM(s) Oral daily  atorvastatin 20 milliGRAM(s) Oral at bedtime  clopidogrel Tablet 75 milliGRAM(s) Oral daily  dextrose 40% Gel 15 Gram(s) Oral once  dextrose 50% Injectable 25 Gram(s) IV Push once  dextrose 50% Injectable 12.5 Gram(s) IV Push once  dextrose 50% Injectable 25 Gram(s) IV Push once  enalapril 10 milliGRAM(s) Oral daily  finasteride 5 milliGRAM(s) Oral daily  glucagon  Injectable 1 milliGRAM(s) IntraMuscular once  influenza  Vaccine (HIGH DOSE) 0.7 milliLiter(s) IntraMuscular once  insulin aspart (NovoLOG) Pump 1 Each SubCutaneous Continuous Pump  labetalol 200 milliGRAM(s) Oral every 8 hours  mirabegron ER 50 milliGRAM(s) Oral daily  mycophenolate mofetil 1000 milliGRAM(s) Oral two times a day  NIFEdipine XL 30 milliGRAM(s) Oral at bedtime  NIFEdipine XL 60 milliGRAM(s) Oral daily  polyethylene glycol 3350 17 Gram(s) Oral daily  tacrolimus 1.5 milliGRAM(s) Oral <User Schedule>  tamsulosin 0.8 milliGRAM(s) Oral at bedtime    Physical Therapy Rec:   Home  [  ]   Home w/ PT  [ X ]  Rehab  [  ]    Discussed with Cardiothoracic Team at AM rounds.    Spent 35 min face to face encounter with patient and discharge note.

## 2021-12-04 NOTE — DISCHARGE NOTE PROVIDER - NSDCFUADDINST_GEN_ALL_CORE_FT
**** Notify MD and Dentist the need of antibiotic prophylaxis before any dental procedure to prevent infection of your new valve. *****

## 2021-12-04 NOTE — DISCHARGE NOTE PROVIDER - NSDCFUADDAPPT_GEN_ALL_CORE_FT
1. Follow up with Dr. Gill on Tuesday 12/7/21 at 9:00 am, please call the [facility] office to confirm your appointment at [phone].   2. Please schedule an appointment with your Cardiologist in 1-2 weeks, please call the office to schedule an appointment.   3. Please schedule an appointment with your PCP in 1 week, call the office to schedule an appointment.   4. Follow up with your Endocrinologist Dr. Jyoti Byrd as an outpatient in 1-2 weeks. Please call the office to schedule an appointment.

## 2021-12-04 NOTE — DISCHARGE NOTE PROVIDER - CARE PROVIDER_API CALL
Zhao Gill (MD)  Thoracic and Cardiac Surgery  [address] [place] [address]  Phone: [phone]  Fax: [phone]  Scheduled Appointment: 12/07/2021 09:00 AM    Homero Garrido)  Cardiovascular Disease; Internal Medicine  [address] [place] [address]  Phone: [phone]  Fax: [phone]  Established Patient  Follow Up Time: 2 weeks    EMILI FLOWERS  Internal Medicine  [address] [place] [address]  Phone: ()-  Fax: ()-  Established Patient  Follow Up Time: 1 week    Jesse Garrett)  Interventional Cardiology  [address] [place] [address]  Phone: [phone]  Fax: [phone]  Follow Up Time: Routine

## 2021-12-04 NOTE — DISCHARGE NOTE PROVIDER - PROVIDER TOKENS
PROVIDER:[TOKEN:[[id number]],SCHEDULEDAPPT:[12/07/2021],SCHEDULEDAPPTTIME:[09:00 AM]],PROVIDER:[TOKEN:[[id number]],FOLLOWUP:[2 weeks],ESTABLISHEDPATIENT:[T]],PROVIDER:[TOKEN:[[id number]],FOLLOWUP:[1 week],ESTABLISHEDPATIENT:[T]],PROVIDER:[TOKEN:[[id number]],FOLLOWUP:[Routine]]

## 2021-12-04 NOTE — DISCHARGE NOTE PROVIDER - NSDCMRMEDTOKEN_GEN_ALL_CORE_FT
alcohol swabs : Apply topically to affected area once a week   alfuzosin 10 mg oral tablet, extended release: 1 tab(s) orally once a day  allopurinol 100 mg oral tablet: 1 tab(s) orally once a day at pm  Aspirin Enteric Coated 81 mg oral delayed release tablet: 1 tab(s) orally once a day  atorvastatin 20 mg oral tablet: 1 tab(s) orally once a day (at bedtime)  CellCept 500 mg oral tablet: 2 tab(s) orally 2 times a day  clopidogrel 75 mg oral tablet: 1 tab(s) orally once a day for 90 days  enalapril 10 mg oral tablet: 1 tab(s) orally once a day  finasteride 5 mg oral tablet: 1 tab(s) orally once a day  insulin aspart: Pump settings  Basal - 18 units  12am - 0.7 u/hr  5am - 0.8 u/hr  IC   12am - 1:10  11pm - 1:15  ISF: 1:50  BG Target   Active insulin time: 4 hrs        Insulin Pen Needles, 4mm: 1 application subcutaneously once a week . ** Use with insulin pen **   labetalol 200 mg oral tablet: 1 tab(s) orally 3 times a day  Myrbetriq 50 mg oral tablet, extended release: 1 tab(s) orally once a day  Ozempic (1 mg dose) 4 mg/3 mL subcutaneous solution: 0.5 milligram(s) subcutaneously once a week   polyethylene glycol 3350 oral powder for reconstitution: 17 gram(s) orally once a day  Procardia XL 30 mg oral tablet, extended release: 1 tab(s) orally once a day (at bedtime)  Procardia XL 60 mg oral tablet, extended release: 1 tab(s) orally once a day in am  tacrolimus 0.5 mg oral capsule: 3 cap(s) orally 2 times a day

## 2021-12-05 ENCOUNTER — TRANSCRIPTION ENCOUNTER (OUTPATIENT)
Age: 71
End: 2021-12-05

## 2021-12-06 ENCOUNTER — NON-APPOINTMENT (OUTPATIENT)
Age: 71
End: 2021-12-06

## 2021-12-06 PROBLEM — Z95.3 S/P TAVR (TRANSCATHETER AORTIC VALVE REPLACEMENT), BIOPROSTHETIC: Status: ACTIVE | Noted: 2021-12-06

## 2021-12-06 RX ORDER — ALLOPURINOL 100 MG/1
100 TABLET ORAL DAILY
Refills: 0 | Status: ACTIVE | COMMUNITY
Start: 2021-10-12

## 2021-12-06 RX ORDER — SEMAGLUTIDE 1.34 MG/ML
2 INJECTION, SOLUTION SUBCUTANEOUS
Qty: 1 | Refills: 0 | Status: ACTIVE | COMMUNITY
Start: 2021-10-25

## 2021-12-06 RX ORDER — KRILL/OM-3/DHA/EPA/PHOSPHO/AST 1000-230MG
81 CAPSULE ORAL
Refills: 0 | Status: ACTIVE | COMMUNITY
Start: 2021-10-12

## 2021-12-06 RX ORDER — NIFEDIPINE 30 MG/1
30 TABLET, EXTENDED RELEASE ORAL
Qty: 90 | Refills: 0 | Status: ACTIVE | COMMUNITY
Start: 2020-11-12

## 2021-12-06 RX ORDER — AZELASTINE HYDROCHLORIDE 137 UG/1
0.1 SPRAY, METERED NASAL
Qty: 30 | Refills: 0 | Status: ACTIVE | COMMUNITY
Start: 2021-11-29

## 2021-12-06 RX ORDER — LABETALOL HYDROCHLORIDE 200 MG/1
200 TABLET, FILM COATED ORAL 3 TIMES DAILY
Qty: 90 | Refills: 0 | Status: ACTIVE | COMMUNITY
Start: 2018-03-14

## 2021-12-06 RX ORDER — TACROLIMUS 1 MG/1
1 CAPSULE, GELATIN COATED ORAL
Refills: 0 | Status: ACTIVE | COMMUNITY

## 2021-12-06 RX ORDER — MIRABEGRON 25 MG/1
25 TABLET, FILM COATED, EXTENDED RELEASE ORAL
Qty: 90 | Refills: 0 | Status: DISCONTINUED | COMMUNITY
Start: 2018-03-12 | End: 2021-12-06

## 2021-12-06 RX ORDER — MIRABEGRON 50 MG/1
50 TABLET, FILM COATED, EXTENDED RELEASE ORAL
Qty: 90 | Refills: 0 | Status: ACTIVE | COMMUNITY
Start: 2018-06-11

## 2021-12-06 RX ORDER — NIFEDIPINE 60 MG/1
60 TABLET, FILM COATED, EXTENDED RELEASE ORAL DAILY
Refills: 1 | Status: ACTIVE | COMMUNITY
Start: 2021-10-12

## 2021-12-06 RX ORDER — NIFEDIPINE 30 MG/1
30 TABLET, FILM COATED, EXTENDED RELEASE ORAL AT BEDTIME
Refills: 0 | Status: ACTIVE | COMMUNITY
Start: 2021-10-12

## 2021-12-06 RX ORDER — ALFUZOSIN HYDROCHLORIDE 10 MG/1
10 TABLET, EXTENDED RELEASE ORAL
Qty: 90 | Refills: 0 | Status: ACTIVE | COMMUNITY
Start: 2018-02-22

## 2021-12-06 RX ORDER — CLOPIDOGREL BISULFATE 75 MG/1
75 TABLET, FILM COATED ORAL
Qty: 90 | Refills: 0 | Status: ACTIVE | COMMUNITY
Start: 2021-11-22

## 2021-12-06 RX ORDER — FINASTERIDE 5 MG/1
5 TABLET, FILM COATED ORAL
Qty: 90 | Refills: 0 | Status: ACTIVE | COMMUNITY
Start: 2018-05-30

## 2021-12-06 RX ORDER — ENALAPRIL MALEATE 10 MG/1
10 TABLET ORAL
Qty: 180 | Refills: 0 | Status: ACTIVE | COMMUNITY
Start: 2018-05-30

## 2021-12-06 RX ORDER — ATORVASTATIN CALCIUM 20 MG/1
20 TABLET, FILM COATED ORAL
Qty: 90 | Refills: 0 | Status: ACTIVE | COMMUNITY
Start: 2018-03-14

## 2021-12-07 ENCOUNTER — NON-APPOINTMENT (OUTPATIENT)
Age: 71
End: 2021-12-07

## 2021-12-07 ENCOUNTER — APPOINTMENT (OUTPATIENT)
Dept: CARDIOTHORACIC SURGERY | Facility: CLINIC | Age: 71
End: 2021-12-07
Payer: MEDICARE

## 2021-12-07 VITALS
WEIGHT: 212 LBS | HEART RATE: 62 BPM | OXYGEN SATURATION: 98 % | TEMPERATURE: 97.8 F | BODY MASS INDEX: 33.27 KG/M2 | DIASTOLIC BLOOD PRESSURE: 60 MMHG | RESPIRATION RATE: 14 BRPM | SYSTOLIC BLOOD PRESSURE: 154 MMHG | HEIGHT: 67 IN

## 2021-12-07 DIAGNOSIS — Z95.3 PRESENCE OF XENOGENIC HEART VALVE: ICD-10-CM

## 2021-12-07 PROCEDURE — 99214 OFFICE O/P EST MOD 30 MIN: CPT

## 2021-12-09 ENCOUNTER — APPOINTMENT (OUTPATIENT)
Dept: PULMONOLOGY | Facility: CLINIC | Age: 71
End: 2021-12-09
Payer: MEDICARE

## 2021-12-09 VITALS
BODY MASS INDEX: 31.39 KG/M2 | OXYGEN SATURATION: 99 % | HEART RATE: 63 BPM | RESPIRATION RATE: 14 BRPM | SYSTOLIC BLOOD PRESSURE: 144 MMHG | WEIGHT: 200 LBS | HEIGHT: 67 IN | TEMPERATURE: 96.2 F | DIASTOLIC BLOOD PRESSURE: 64 MMHG

## 2021-12-09 VITALS
TEMPERATURE: 96.2 F | WEIGHT: 200 LBS | HEART RATE: 63 BPM | BODY MASS INDEX: 31.32 KG/M2 | DIASTOLIC BLOOD PRESSURE: 64 MMHG | SYSTOLIC BLOOD PRESSURE: 144 MMHG | OXYGEN SATURATION: 99 %

## 2021-12-09 DIAGNOSIS — I35.9 NONRHEUMATIC AORTIC VALVE DISORDER, UNSPECIFIED: ICD-10-CM

## 2021-12-09 PROCEDURE — 99213 OFFICE O/P EST LOW 20 MIN: CPT

## 2021-12-09 RX ORDER — ALBUTEROL SULFATE 2.5 MG/3ML
(2.5 MG/3ML) SOLUTION RESPIRATORY (INHALATION)
Qty: 2 | Refills: 3 | Status: ACTIVE | COMMUNITY
Start: 2018-10-18 | End: 1900-01-01

## 2021-12-09 RX ORDER — FLUTICASONE FUROATE AND VILANTEROL TRIFENATATE 100; 25 UG/1; UG/1
100-25 POWDER RESPIRATORY (INHALATION)
Qty: 1 | Refills: 5 | Status: ACTIVE | COMMUNITY
Start: 2018-11-01 | End: 1900-01-01

## 2021-12-09 NOTE — HISTORY OF PRESENT ILLNESS
[Former] : former [>= 30 pack years] : >= 30 pack years [TextBox_4] : 70 yo male with hx of FRANCOIS on CPAP and mixed abnormality on PFT presents for follow up.Since last visit,he patient is post TAVR one week ago. He claims to feel "much better" with decrease in LOYA. He denies cough or chest pain. He continues to use breo daily without albuterol. He is compliant with CPAP use and denies sleep related complaints.  [TextBox_11] : 2 [YearQuit] : 2020 [TextBox_29] : Denies snoring, daytime somnolence, apneic episodes, AM headaches

## 2021-12-09 NOTE — DISCUSSION/SUMMARY
[FreeTextEntry1] : 70 yo male with with mixed disease on PFT with improvement of LOYA post TAVR. Continued breo use recommended and albuterol PRN . Treatment adjustment will depend on symptomatic needs. Cardiology follow up as before (presently has loop recorder). Compliance with CPAP use stressed. I reviewed the images of the CT angio on line, and discussed the findings with the patient. The noted nodules appear to be similar in size when compared with prior chest CT in May 2021. A repeat study will be performed in six months for follow up. Continued diet and exercise discussed. He is to follow up with his PMD as before.

## 2021-12-10 ENCOUNTER — APPOINTMENT (OUTPATIENT)
Dept: CARE COORDINATION | Facility: HOME HEALTH | Age: 71
End: 2021-12-10

## 2021-12-17 ENCOUNTER — FORM ENCOUNTER (OUTPATIENT)
Age: 71
End: 2021-12-17

## 2021-12-22 PROCEDURE — L8699: CPT

## 2021-12-22 PROCEDURE — 93306 TTE W/DOPPLER COMPLETE: CPT

## 2021-12-22 PROCEDURE — 85730 THROMBOPLASTIN TIME PARTIAL: CPT

## 2021-12-22 PROCEDURE — 80053 COMPREHEN METABOLIC PANEL: CPT

## 2021-12-22 PROCEDURE — 82962 GLUCOSE BLOOD TEST: CPT

## 2021-12-22 PROCEDURE — 85027 COMPLETE CBC AUTOMATED: CPT

## 2021-12-22 PROCEDURE — 93005 ELECTROCARDIOGRAM TRACING: CPT

## 2021-12-22 PROCEDURE — C1760: CPT

## 2021-12-22 PROCEDURE — 76000 FLUOROSCOPY <1 HR PHYS/QHP: CPT

## 2021-12-22 PROCEDURE — C1887: CPT

## 2021-12-22 PROCEDURE — 80197 ASSAY OF TACROLIMUS: CPT

## 2021-12-22 PROCEDURE — C1894: CPT

## 2021-12-22 PROCEDURE — 36415 COLL VENOUS BLD VENIPUNCTURE: CPT

## 2021-12-22 PROCEDURE — C1889: CPT

## 2021-12-22 PROCEDURE — 80048 BASIC METABOLIC PNL TOTAL CA: CPT

## 2021-12-22 PROCEDURE — C1769: CPT

## 2021-12-22 PROCEDURE — 85025 COMPLETE CBC W/AUTO DIFF WBC: CPT

## 2022-01-03 ENCOUNTER — TRANSCRIPTION ENCOUNTER (OUTPATIENT)
Age: 72
End: 2022-01-03

## 2022-04-14 ENCOUNTER — APPOINTMENT (OUTPATIENT)
Dept: PULMONOLOGY | Facility: CLINIC | Age: 72
End: 2022-04-14
Payer: MEDICARE

## 2022-04-14 VITALS
BODY MASS INDEX: 31.32 KG/M2 | SYSTOLIC BLOOD PRESSURE: 170 MMHG | TEMPERATURE: 98 F | WEIGHT: 200 LBS | OXYGEN SATURATION: 96 % | DIASTOLIC BLOOD PRESSURE: 76 MMHG | HEART RATE: 66 BPM

## 2022-04-14 DIAGNOSIS — J84.9 INTERSTITIAL PULMONARY DISEASE, UNSPECIFIED: ICD-10-CM

## 2022-04-14 DIAGNOSIS — R06.00 DYSPNEA, UNSPECIFIED: ICD-10-CM

## 2022-04-14 PROCEDURE — 99214 OFFICE O/P EST MOD 30 MIN: CPT

## 2022-04-14 RX ORDER — MONTELUKAST 10 MG/1
10 TABLET, FILM COATED ORAL
Qty: 90 | Refills: 3 | Status: ACTIVE | COMMUNITY
Start: 2022-04-14 | End: 1900-01-01

## 2022-05-15 PROBLEM — J84.9 ILD (INTERSTITIAL LUNG DISEASE): Status: ACTIVE | Noted: 2020-08-23

## 2022-05-15 PROBLEM — R06.00 DOE (DYSPNEA ON EXERTION): Status: ACTIVE | Noted: 2020-08-23

## 2022-05-15 NOTE — REVIEW OF SYSTEMS
[Fatigue] : no fatigue [Cough] : no cough [Sputum] : no sputum [SOB on Exertion] : no sob on exertion [Diabetes] : diabetes [Negative] : Psychiatric [TextBox_3] : Silvana

## 2022-05-15 NOTE — HISTORY OF PRESENT ILLNESS
[Former] : former [>= 20 pack years] : >= 20 pack years [TextBox_4] : 72 yo male with hx of OAD and FRANCOIS presents for follow up. The patient feels "better" on daily breo and montelukast with PRN albuterol MDI. He denies cough or SOB. He is compliant with CPAP use, denying sleep related complaints. [YearQuit] : 2020 [TextBox_29] : Denies snoring, daytime somnolence, apneic episodes, AM headaches

## 2022-05-15 NOTE — DISCUSSION/SUMMARY
[FreeTextEntry1] : 70 yo male with stable OAD . He is to continue breo, montelukast and albuterol as before.Treatment adjustment will depend on symptomatic needs.A repeat chest CT will be performed in the future as discussed in the past. He is to follow up with his PMD as before.

## 2022-08-18 ENCOUNTER — APPOINTMENT (OUTPATIENT)
Dept: PULMONOLOGY | Facility: CLINIC | Age: 72
End: 2022-08-18

## 2022-08-18 VITALS
SYSTOLIC BLOOD PRESSURE: 130 MMHG | DIASTOLIC BLOOD PRESSURE: 64 MMHG | OXYGEN SATURATION: 97 % | HEART RATE: 74 BPM | TEMPERATURE: 98.2 F | WEIGHT: 202 LBS | BODY MASS INDEX: 31.64 KG/M2

## 2022-08-18 DIAGNOSIS — J44.9 CHRONIC OBSTRUCTIVE PULMONARY DISEASE, UNSPECIFIED: ICD-10-CM

## 2022-08-18 PROCEDURE — 99214 OFFICE O/P EST MOD 30 MIN: CPT

## 2022-08-22 ENCOUNTER — INPATIENT (INPATIENT)
Facility: HOSPITAL | Age: 72
LOS: 8 days | Discharge: HOME HEALTH | DRG: 871 | End: 2022-08-31
Attending: INTERNAL MEDICINE | Admitting: STUDENT IN AN ORGANIZED HEALTH CARE EDUCATION/TRAINING PROGRAM
Payer: COMMERCIAL

## 2022-08-22 VITALS
RESPIRATION RATE: 19 BRPM | HEIGHT: 67 IN | DIASTOLIC BLOOD PRESSURE: 75 MMHG | OXYGEN SATURATION: 97 % | TEMPERATURE: 100 F | HEART RATE: 89 BPM | SYSTOLIC BLOOD PRESSURE: 172 MMHG

## 2022-08-22 DIAGNOSIS — Z94.0 KIDNEY TRANSPLANT STATUS: Chronic | ICD-10-CM

## 2022-08-22 DIAGNOSIS — I77.0 ARTERIOVENOUS FISTULA, ACQUIRED: Chronic | ICD-10-CM

## 2022-08-22 PROCEDURE — 73502 X-RAY EXAM HIP UNI 2-3 VIEWS: CPT | Mod: 26,LT

## 2022-08-22 PROCEDURE — 93010 ELECTROCARDIOGRAM REPORT: CPT

## 2022-08-22 PROCEDURE — 71045 X-RAY EXAM CHEST 1 VIEW: CPT | Mod: 26

## 2022-08-22 PROCEDURE — 99285 EMERGENCY DEPT VISIT HI MDM: CPT

## 2022-08-22 RX ORDER — ACETAMINOPHEN 500 MG
650 TABLET ORAL ONCE
Refills: 0 | Status: DISCONTINUED | OUTPATIENT
Start: 2022-08-22 | End: 2022-08-22

## 2022-08-22 RX ORDER — CEFEPIME 1 G/1
2000 INJECTION, POWDER, FOR SOLUTION INTRAMUSCULAR; INTRAVENOUS ONCE
Refills: 0 | Status: COMPLETED | OUTPATIENT
Start: 2022-08-22 | End: 2022-08-22

## 2022-08-22 RX ORDER — ACETAMINOPHEN 500 MG
1000 TABLET ORAL ONCE
Refills: 0 | Status: DISCONTINUED | OUTPATIENT
Start: 2022-08-22 | End: 2022-08-31

## 2022-08-22 RX ORDER — SODIUM CHLORIDE 9 MG/ML
500 INJECTION INTRAMUSCULAR; INTRAVENOUS; SUBCUTANEOUS ONCE
Refills: 0 | Status: COMPLETED | OUTPATIENT
Start: 2022-08-22 | End: 2022-08-22

## 2022-08-22 RX ORDER — AZITHROMYCIN 500 MG/1
500 TABLET, FILM COATED ORAL ONCE
Refills: 0 | Status: COMPLETED | OUTPATIENT
Start: 2022-08-22 | End: 2022-08-23

## 2022-08-22 RX ORDER — VANCOMYCIN HCL 1 G
1000 VIAL (EA) INTRAVENOUS ONCE
Refills: 0 | Status: COMPLETED | OUTPATIENT
Start: 2022-08-22 | End: 2022-08-23

## 2022-08-22 RX ADMIN — CEFEPIME 100 MILLIGRAM(S): 1 INJECTION, POWDER, FOR SOLUTION INTRAMUSCULAR; INTRAVENOUS at 23:17

## 2022-08-22 RX ADMIN — SODIUM CHLORIDE 1000 MILLILITER(S): 9 INJECTION INTRAMUSCULAR; INTRAVENOUS; SUBCUTANEOUS at 23:17

## 2022-08-22 NOTE — ED ADULT NURSE NOTE - OBJECTIVE STATEMENT
pt is a 72yo male PMH DM, HLD, HTN, Kidney transplant, aortic valve replacement brought in by EMS for syncope. per ems, pt syncopized twice in the bathroom at home, pt appeared cool, pale diaphoretic. upon arrival, pt states his legs began to feel weak when he stood up from the toilet and they "gave out", causing him to fall. pt denies LOC and denies hitting his head. pt states he fell on his left hip, currently denying any pain to the left hip. pt A&Ox3 gross neuro intact, no difficulty speaking in complete sentences, s1s2 heart sounds heard, pulses x 4, monroy x4, abdomen soft nontender nondistended, skin intact. pt denies chest pain, sob, ha, n/v/d, abdominal pain, f/c, urinary symptoms, hematuria. pt placed on cardiac monitor, NS to the 90s. pt rectal temperature, 102.3F. pt describes taking aspirin daily.

## 2022-08-23 DIAGNOSIS — J18.9 PNEUMONIA, UNSPECIFIED ORGANISM: ICD-10-CM

## 2022-08-23 LAB
-  STREPTOCOCCUS SP. (NOT GRP A, B OR S PNEUMONIAE): SIGNIFICANT CHANGE UP
ALBUMIN SERPL ELPH-MCNC: 4.5 G/DL — SIGNIFICANT CHANGE UP (ref 3.3–5)
ALP SERPL-CCNC: 74 U/L — SIGNIFICANT CHANGE UP (ref 40–120)
ALT FLD-CCNC: 25 U/L — SIGNIFICANT CHANGE UP (ref 10–45)
ANION GAP SERPL CALC-SCNC: 11 MMOL/L — SIGNIFICANT CHANGE UP (ref 5–17)
ANION GAP SERPL CALC-SCNC: 12 MMOL/L — SIGNIFICANT CHANGE UP (ref 5–17)
APPEARANCE UR: CLEAR — SIGNIFICANT CHANGE UP
APTT BLD: 29.2 SEC — SIGNIFICANT CHANGE UP (ref 27.5–35.5)
AST SERPL-CCNC: 82 U/L — HIGH (ref 10–40)
BACTERIA # UR AUTO: NEGATIVE — SIGNIFICANT CHANGE UP
BASOPHILS # BLD AUTO: 0 K/UL — SIGNIFICANT CHANGE UP (ref 0–0.2)
BASOPHILS NFR BLD AUTO: 0 % — SIGNIFICANT CHANGE UP (ref 0–2)
BILIRUB SERPL-MCNC: 1 MG/DL — SIGNIFICANT CHANGE UP (ref 0.2–1.2)
BILIRUB UR-MCNC: NEGATIVE — SIGNIFICANT CHANGE UP
BUN SERPL-MCNC: 14 MG/DL — SIGNIFICANT CHANGE UP (ref 7–23)
BUN SERPL-MCNC: 15 MG/DL — SIGNIFICANT CHANGE UP (ref 7–23)
CALCIUM SERPL-MCNC: 9.1 MG/DL — SIGNIFICANT CHANGE UP (ref 8.4–10.5)
CALCIUM SERPL-MCNC: 9.9 MG/DL — SIGNIFICANT CHANGE UP (ref 8.4–10.5)
CHLORIDE SERPL-SCNC: 102 MMOL/L — SIGNIFICANT CHANGE UP (ref 96–108)
CHLORIDE SERPL-SCNC: 106 MMOL/L — SIGNIFICANT CHANGE UP (ref 96–108)
CO2 SERPL-SCNC: 20 MMOL/L — LOW (ref 22–31)
CO2 SERPL-SCNC: 23 MMOL/L — SIGNIFICANT CHANGE UP (ref 22–31)
COLOR SPEC: YELLOW — SIGNIFICANT CHANGE UP
CREAT SERPL-MCNC: 0.58 MG/DL — SIGNIFICANT CHANGE UP (ref 0.5–1.3)
CREAT SERPL-MCNC: 0.62 MG/DL — SIGNIFICANT CHANGE UP (ref 0.5–1.3)
DIFF PNL FLD: NEGATIVE — SIGNIFICANT CHANGE UP
EGFR: 102 ML/MIN/1.73M2 — SIGNIFICANT CHANGE UP
EGFR: 104 ML/MIN/1.73M2 — SIGNIFICANT CHANGE UP
EOSINOPHIL # BLD AUTO: 0 K/UL — SIGNIFICANT CHANGE UP (ref 0–0.5)
EOSINOPHIL NFR BLD AUTO: 0 % — SIGNIFICANT CHANGE UP (ref 0–6)
EPI CELLS # UR: 1 /HPF — SIGNIFICANT CHANGE UP
GLUCOSE BLDC GLUCOMTR-MCNC: 115 MG/DL — HIGH (ref 70–99)
GLUCOSE SERPL-MCNC: 100 MG/DL — HIGH (ref 70–99)
GLUCOSE SERPL-MCNC: 99 MG/DL — SIGNIFICANT CHANGE UP (ref 70–99)
GLUCOSE UR QL: NEGATIVE — SIGNIFICANT CHANGE UP
GRAM STN FLD: SIGNIFICANT CHANGE UP
GRAM STN FLD: SIGNIFICANT CHANGE UP
HCT VFR BLD CALC: 43.1 % — SIGNIFICANT CHANGE UP (ref 39–50)
HGB BLD-MCNC: 14.4 G/DL — SIGNIFICANT CHANGE UP (ref 13–17)
HYALINE CASTS # UR AUTO: 1 /LPF — SIGNIFICANT CHANGE UP (ref 0–2)
INR BLD: 1.35 RATIO — HIGH (ref 0.88–1.16)
KETONES UR-MCNC: NEGATIVE — SIGNIFICANT CHANGE UP
LEUKOCYTE ESTERASE UR-ACNC: NEGATIVE — SIGNIFICANT CHANGE UP
LYMPHOCYTES # BLD AUTO: 0.19 K/UL — LOW (ref 1–3.3)
LYMPHOCYTES # BLD AUTO: 1.8 % — LOW (ref 13–44)
MANUAL SMEAR VERIFICATION: SIGNIFICANT CHANGE UP
MCHC RBC-ENTMCNC: 31.4 PG — SIGNIFICANT CHANGE UP (ref 27–34)
MCHC RBC-ENTMCNC: 33.4 GM/DL — SIGNIFICANT CHANGE UP (ref 32–36)
MCV RBC AUTO: 93.9 FL — SIGNIFICANT CHANGE UP (ref 80–100)
METHOD TYPE: SIGNIFICANT CHANGE UP
MONOCYTES # BLD AUTO: 0.66 K/UL — SIGNIFICANT CHANGE UP (ref 0–0.9)
MONOCYTES NFR BLD AUTO: 6.2 % — SIGNIFICANT CHANGE UP (ref 2–14)
NEUTROPHILS # BLD AUTO: 9.83 K/UL — HIGH (ref 1.8–7.4)
NEUTROPHILS NFR BLD AUTO: 92 % — HIGH (ref 43–77)
NITRITE UR-MCNC: NEGATIVE — SIGNIFICANT CHANGE UP
PH UR: 6 — SIGNIFICANT CHANGE UP (ref 5–8)
PLAT MORPH BLD: NORMAL — SIGNIFICANT CHANGE UP
PLATELET # BLD AUTO: 164 K/UL — SIGNIFICANT CHANGE UP (ref 150–400)
POTASSIUM SERPL-MCNC: 3.5 MMOL/L — SIGNIFICANT CHANGE UP (ref 3.5–5.3)
POTASSIUM SERPL-MCNC: 6.5 MMOL/L — CRITICAL HIGH (ref 3.5–5.3)
POTASSIUM SERPL-SCNC: 3.5 MMOL/L — SIGNIFICANT CHANGE UP (ref 3.5–5.3)
POTASSIUM SERPL-SCNC: 6.5 MMOL/L — CRITICAL HIGH (ref 3.5–5.3)
PROT SERPL-MCNC: 7.9 G/DL — SIGNIFICANT CHANGE UP (ref 6–8.3)
PROT UR-MCNC: ABNORMAL
PROTHROM AB SERPL-ACNC: 15.6 SEC — HIGH (ref 10.5–13.4)
RAPID RVP RESULT: SIGNIFICANT CHANGE UP
RBC # BLD: 4.59 M/UL — SIGNIFICANT CHANGE UP (ref 4.2–5.8)
RBC # FLD: 13.4 % — SIGNIFICANT CHANGE UP (ref 10.3–14.5)
RBC BLD AUTO: NORMAL — SIGNIFICANT CHANGE UP
RBC CASTS # UR COMP ASSIST: 5 /HPF — HIGH (ref 0–4)
SARS-COV-2 RNA SPEC QL NAA+PROBE: SIGNIFICANT CHANGE UP
SODIUM SERPL-SCNC: 137 MMOL/L — SIGNIFICANT CHANGE UP (ref 135–145)
SODIUM SERPL-SCNC: 137 MMOL/L — SIGNIFICANT CHANGE UP (ref 135–145)
SP GR SPEC: 1.03 — HIGH (ref 1.01–1.02)
SPECIMEN SOURCE: SIGNIFICANT CHANGE UP
SPECIMEN SOURCE: SIGNIFICANT CHANGE UP
TACROLIMUS SERPL-MCNC: 12.6 NG/ML — SIGNIFICANT CHANGE UP
UROBILINOGEN FLD QL: NEGATIVE — SIGNIFICANT CHANGE UP
WBC # BLD: 10.69 K/UL — HIGH (ref 3.8–10.5)
WBC # FLD AUTO: 10.69 K/UL — HIGH (ref 3.8–10.5)
WBC UR QL: 1 /HPF — SIGNIFICANT CHANGE UP (ref 0–5)

## 2022-08-23 PROCEDURE — 99223 1ST HOSP IP/OBS HIGH 75: CPT

## 2022-08-23 PROCEDURE — 93306 TTE W/DOPPLER COMPLETE: CPT | Mod: 26

## 2022-08-23 PROCEDURE — 74176 CT ABD & PELVIS W/O CONTRAST: CPT | Mod: 26

## 2022-08-23 PROCEDURE — 71250 CT THORAX DX C-: CPT | Mod: 26

## 2022-08-23 RX ORDER — DEXTROSE 50 % IN WATER 50 %
25 SYRINGE (ML) INTRAVENOUS ONCE
Refills: 0 | Status: DISCONTINUED | OUTPATIENT
Start: 2022-08-23 | End: 2022-08-31

## 2022-08-23 RX ORDER — ALLOPURINOL 300 MG
100 TABLET ORAL DAILY
Refills: 0 | Status: DISCONTINUED | OUTPATIENT
Start: 2022-08-23 | End: 2022-08-31

## 2022-08-23 RX ORDER — NIFEDIPINE 30 MG
30 TABLET, EXTENDED RELEASE 24 HR ORAL AT BEDTIME
Refills: 0 | Status: DISCONTINUED | OUTPATIENT
Start: 2022-08-23 | End: 2022-08-31

## 2022-08-23 RX ORDER — CLOPIDOGREL BISULFATE 75 MG/1
75 TABLET, FILM COATED ORAL DAILY
Refills: 0 | Status: DISCONTINUED | OUTPATIENT
Start: 2022-08-23 | End: 2022-08-24

## 2022-08-23 RX ORDER — SODIUM CHLORIDE 9 MG/ML
1000 INJECTION, SOLUTION INTRAVENOUS
Refills: 0 | Status: DISCONTINUED | OUTPATIENT
Start: 2022-08-23 | End: 2022-08-31

## 2022-08-23 RX ORDER — MYCOPHENOLATE MOFETIL 250 MG/1
1000 CAPSULE ORAL
Refills: 0 | Status: DISCONTINUED | OUTPATIENT
Start: 2022-08-23 | End: 2022-08-24

## 2022-08-23 RX ORDER — CEFTRIAXONE 500 MG/1
INJECTION, POWDER, FOR SOLUTION INTRAMUSCULAR; INTRAVENOUS
Refills: 0 | Status: COMPLETED | OUTPATIENT
Start: 2022-08-23 | End: 2022-09-01

## 2022-08-23 RX ORDER — NIFEDIPINE 30 MG
60 TABLET, EXTENDED RELEASE 24 HR ORAL DAILY
Refills: 0 | Status: DISCONTINUED | OUTPATIENT
Start: 2022-08-23 | End: 2022-08-31

## 2022-08-23 RX ORDER — CEFTRIAXONE 500 MG/1
2000 INJECTION, POWDER, FOR SOLUTION INTRAMUSCULAR; INTRAVENOUS ONCE
Refills: 0 | Status: COMPLETED | OUTPATIENT
Start: 2022-08-23 | End: 2022-08-23

## 2022-08-23 RX ORDER — FUROSEMIDE 40 MG
20 TABLET ORAL DAILY
Refills: 0 | Status: DISCONTINUED | OUTPATIENT
Start: 2022-08-23 | End: 2022-08-24

## 2022-08-23 RX ORDER — ATORVASTATIN CALCIUM 80 MG/1
20 TABLET, FILM COATED ORAL AT BEDTIME
Refills: 0 | Status: DISCONTINUED | OUTPATIENT
Start: 2022-08-23 | End: 2022-08-31

## 2022-08-23 RX ORDER — LABETALOL HCL 100 MG
200 TABLET ORAL THREE TIMES A DAY
Refills: 0 | Status: DISCONTINUED | OUTPATIENT
Start: 2022-08-23 | End: 2022-08-31

## 2022-08-23 RX ORDER — TACROLIMUS 5 MG/1
1.5 CAPSULE ORAL
Refills: 0 | Status: DISCONTINUED | OUTPATIENT
Start: 2022-08-23 | End: 2022-08-24

## 2022-08-23 RX ORDER — CEFEPIME 1 G/1
2000 INJECTION, POWDER, FOR SOLUTION INTRAMUSCULAR; INTRAVENOUS EVERY 12 HOURS
Refills: 0 | Status: DISCONTINUED | OUTPATIENT
Start: 2022-08-23 | End: 2022-08-23

## 2022-08-23 RX ORDER — HEPARIN SODIUM 5000 [USP'U]/ML
5000 INJECTION INTRAVENOUS; SUBCUTANEOUS EVERY 12 HOURS
Refills: 0 | Status: DISCONTINUED | OUTPATIENT
Start: 2022-08-23 | End: 2022-08-31

## 2022-08-23 RX ORDER — DEXTROSE 50 % IN WATER 50 %
15 SYRINGE (ML) INTRAVENOUS ONCE
Refills: 0 | Status: DISCONTINUED | OUTPATIENT
Start: 2022-08-23 | End: 2022-08-31

## 2022-08-23 RX ORDER — DEXTROSE 50 % IN WATER 50 %
12.5 SYRINGE (ML) INTRAVENOUS ONCE
Refills: 0 | Status: DISCONTINUED | OUTPATIENT
Start: 2022-08-23 | End: 2022-08-31

## 2022-08-23 RX ORDER — FINASTERIDE 5 MG/1
5 TABLET, FILM COATED ORAL DAILY
Refills: 0 | Status: DISCONTINUED | OUTPATIENT
Start: 2022-08-23 | End: 2022-08-31

## 2022-08-23 RX ORDER — GLUCAGON INJECTION, SOLUTION 0.5 MG/.1ML
1 INJECTION, SOLUTION SUBCUTANEOUS ONCE
Refills: 0 | Status: DISCONTINUED | OUTPATIENT
Start: 2022-08-23 | End: 2022-08-31

## 2022-08-23 RX ORDER — ASPIRIN/CALCIUM CARB/MAGNESIUM 324 MG
81 TABLET ORAL DAILY
Refills: 0 | Status: DISCONTINUED | OUTPATIENT
Start: 2022-08-23 | End: 2022-08-31

## 2022-08-23 RX ORDER — CEFTRIAXONE 500 MG/1
2000 INJECTION, POWDER, FOR SOLUTION INTRAMUSCULAR; INTRAVENOUS EVERY 24 HOURS
Refills: 0 | Status: COMPLETED | OUTPATIENT
Start: 2022-08-24 | End: 2022-08-31

## 2022-08-23 RX ADMIN — ATORVASTATIN CALCIUM 20 MILLIGRAM(S): 80 TABLET, FILM COATED ORAL at 21:26

## 2022-08-23 RX ADMIN — Medication 10 MILLIGRAM(S): at 09:19

## 2022-08-23 RX ADMIN — Medication 100 MILLIGRAM(S): at 12:38

## 2022-08-23 RX ADMIN — Medication 30 MILLIGRAM(S): at 21:26

## 2022-08-23 RX ADMIN — Medication 200 MILLIGRAM(S): at 13:47

## 2022-08-23 RX ADMIN — Medication 60 MILLIGRAM(S): at 09:17

## 2022-08-23 RX ADMIN — Medication 250 MILLIGRAM(S): at 04:59

## 2022-08-23 RX ADMIN — Medication 81 MILLIGRAM(S): at 12:38

## 2022-08-23 RX ADMIN — MYCOPHENOLATE MOFETIL 1000 MILLIGRAM(S): 250 CAPSULE ORAL at 21:25

## 2022-08-23 RX ADMIN — AZITHROMYCIN 255 MILLIGRAM(S): 500 TABLET, FILM COATED ORAL at 00:46

## 2022-08-23 RX ADMIN — TACROLIMUS 1.5 MILLIGRAM(S): 5 CAPSULE ORAL at 09:17

## 2022-08-23 RX ADMIN — FINASTERIDE 5 MILLIGRAM(S): 5 TABLET, FILM COATED ORAL at 12:38

## 2022-08-23 RX ADMIN — MYCOPHENOLATE MOFETIL 1000 MILLIGRAM(S): 250 CAPSULE ORAL at 09:19

## 2022-08-23 RX ADMIN — TACROLIMUS 1.5 MILLIGRAM(S): 5 CAPSULE ORAL at 21:25

## 2022-08-23 RX ADMIN — HEPARIN SODIUM 5000 UNIT(S): 5000 INJECTION INTRAVENOUS; SUBCUTANEOUS at 18:30

## 2022-08-23 RX ADMIN — CEFTRIAXONE 100 MILLIGRAM(S): 500 INJECTION, POWDER, FOR SOLUTION INTRAMUSCULAR; INTRAVENOUS at 18:30

## 2022-08-23 RX ADMIN — Medication 200 MILLIGRAM(S): at 21:25

## 2022-08-23 RX ADMIN — Medication 20 MILLIGRAM(S): at 09:19

## 2022-08-23 NOTE — ED PROVIDER NOTE - ATTENDING CONTRIBUTION TO CARE
Attending note (Devin): 72 y/o M with h/o ESRD (s/p renal transplant 2013), IDDM, CAD (s/p stents), FRANCOIS (on cpap qHS), HLD, HTN, presenting with 2 episodes of felt lightheaded and nearly passed out twice while in the bathroom, to urinate, states legs gave out; felt generalized weakness; found to be febrile; portable CXR with likely multifocal pneumonia and febrile, treating as possible sepsis though not meeting other sepsis criteria (not tachycardic, no significant leukocytosis); starting empiric antibiotic coverage, screening labs reviewed, to be admitted for ongoing evaluation/management.

## 2022-08-23 NOTE — H&P ADULT - NSHPLABSRESULTS_GEN_ALL_CORE
LABS:                        14.4   10.69 )-----------( 164      ( 22 Aug 2022 23:41 )             43.1         137  |  106  |  14  ----------------------------<  100<H>  3.5   |  20<L>  |  0.62    Ca    9.1      23 Aug 2022 01:09    TPro  7.9  /  Alb  4.5  /  TBili  1.0  /  DBili  x   /  AST  82<H>  /  ALT  25  /  AlkPhos  74  08    PT/INR - ( 22 Aug 2022 23:41 )   PT: 15.6 sec;   INR: 1.35 ratio         PTT - ( 22 Aug 2022 23:41 )  PTT:29.2 sec      Urinalysis Basic - ( 23 Aug 2022 04:41 )    Color: Yellow / Appearance: Clear / S.031 / pH: x  Gluc: x / Ketone: Negative  / Bili: Negative / Urobili: Negative   Blood: x / Protein: 30 mg/dL / Nitrite: Negative   Leuk Esterase: Negative / RBC: 5 /hpf / WBC 1 /HPF   Sq Epi: x / Non Sq Epi: 1 /hpf / Bacteria: Negative           @ 23:40  4.6  49

## 2022-08-23 NOTE — CONSULT NOTE ADULT - SUBJECTIVE AND OBJECTIVE BOX
Patient is a 71y old  Male who presents with a chief complaint of near  syncope (23 Aug 2022 09:49)    HPI:  72 y/o M with h/o ESRD,  (s/p renal transplant  , DM, CAD s/p stents , FRANCOIS, HLD, HTN, presenting with 2 episodes of felt lightheaded and nearly passed out twice while in the bathroom, states legs gave out; felt generaliz weakness, denies passing out/LOC, denies head trauma. No cp/sob //palpitations. No fever.      REVIEW OF SYSTEMS  [  ] ROS unobtainable because:    [  ] All other systems negative except as noted below    Constitutional:  [ ] fever [ ] chills  [ ] weight loss  [ ]night sweat  [ ]poor appetite/PO intake [ ]fatigue   Skin:  [ ] rash [ ] phlebitis	  Eyes: [ ] icterus [ ] pain  [ ] discharge	  ENMT: [ ] sore throat  [ ] thrush [ ] ulcers [ ] exudates [ ]anosmia  Respiratory: [ ] dyspnea [ ] hemoptysis [ ] cough [ ] sputum	  Cardiovascular:  [ ] chest pain [ ] palpitations [ ] edema	  Gastrointestinal:  [ ] nausea [ ] vomiting [ ] diarrhea [ ] constipation [ ] pain	  Genitourinary:  [ ] dysuria [ ] frequency [ ] hematuria [ ] discharge [ ] flank pain  [ ] incontinence  Musculoskeletal:  [ ] myalgias [ ] arthralgias [ ] arthritis  [ ] back pain  Neurological:  [ ] headache [ ] weakness [ ] seizures  [ ] confusion/altered mental status    prior hospital charts reviewed [V]  primary team notes reviewed [V]  other consultant notes reviewed [V]    PAST MEDICAL & SURGICAL HISTORY:  Morbid Obesity    HTN (Hypertension), Benign    Hyperuricemia    Hyperlipidemia    Smoking    DM (diabetes mellitus), type 2    FRANCOIS on CPAP    H/O kidney transplant      CAD (coronary artery disease)  1 stent    Kidney transplant recipient  Rt kidney-     AV fistula    SOCIAL HISTORY:  - Denied smoking/vaping/alcohol/recreational drug use    FAMILY HISTORY:    Allergies  No Known Allergies    ANTIMICROBIALS:  cefepime   IVPB 2000 every 12 hours    ANTIMICROBIALS (past 90 days):  MEDICATIONS  (STANDING):  azithromycin  IVPB   255 mL/Hr IV Intermittent (22 @ 00:46)    cefepime   IVPB   100 mL/Hr IV Intermittent (22 @ 23:17)    vancomycin  IVPB.   250 mL/Hr IV Intermittent (22 @ 04:59)    OTHER MEDS:   MEDICATIONS  (STANDING):  acetaminophen   IVPB .. 1000 once  allopurinol 100 daily  aspirin enteric coated 81 daily  atorvastatin 20 at bedtime  clopidogrel Tablet 75 daily  dextrose 50% Injectable 25 once  dextrose 50% Injectable 12.5 once  dextrose 50% Injectable 25 once  dextrose Oral Gel 15 once PRN  enalapril 10 daily  finasteride 5 daily  furosemide   Injectable 20 daily  glucagon  Injectable 1 once  heparin   Injectable 5000 every 12 hours  labetalol 200 three times a day  mycophenolate mofetil 1000 <User Schedule>  NIFEdipine XL 30 at bedtime  NIFEdipine XL 60 daily  tacrolimus 1.5 <User Schedule>    VITALS:  Vital Signs Last 24 Hrs  T(F): 97.5 (22 @ 13:35), Max: 102.3 (22 @ 23:00)    Vital Signs Last 24 Hrs  HR: 70 (22 @ 13:35) (70 - 92)  BP: 147/64 (22 @ 13:35) (112/67 - 172/75)  RR: 17 (22 @ 13:35)  SpO2: 98% (22 @ 13:35) (94% - 98%)  Wt(kg): --    EXAM:  Physical Exam:  Constitutional:  well preserved, comfortable  Head/Eyes: no icterus, PERRL, EOMI  ENT:  supple; no thrush  LUNGS:  CTA  CVS:  normal S1, S2, no murmur  Abd:  soft, non-tender; non-distended  Ext:  no edema  Vascular:  IV site no erythema tenderness or discharge  MSK:  joints without swelling  Neuro: AAO X 3, non- focal    Labs:                        14.4   10.69 )-----------( 164      ( 22 Aug 2022 23:41 )             43.1         137  |  106  |  14  ----------------------------<  100<H>  3.5   |  20<L>  |  0.62    Ca    9.1      23 Aug 2022 01:09    TPro  7.9  /  Alb  4.5  /  TBili  1.0  /  DBili  x   /  AST  82<H>  /  ALT  25  /  AlkPhos  74      WBC Trend:  WBC Count: 10.69 (22 @ 23:41)    Auto Neutrophil #: 9.83 K/uL (22 @ 23:41)  Auto Neutrophil #: 4.11 K/uL (21 @ 16:45)    Creatine Trend:  Creatinine, Serum: 0.62 ()  Creatinine, Serum: 0.58 ()    Liver Biochemical Testing Trend:  Alanine Aminotransferase (ALT/SGPT): 25 ()  Alanine Aminotransferase (ALT/SGPT): 9 *L* ()  Aspartate Aminotransferase (AST/SGOT): 82 (22 @ 23:41)  Aspartate Aminotransferase (AST/SGOT): 14 (21 @ 16:45)  Bilirubin Total, Serum: 1.0 ()  Bilirubin Total, Serum: 0.9 ()    Trend LDH    Auto Eosinophil %: 0.0 % (22 @ 23:41)    Urinalysis Basic - ( 23 Aug 2022 04:41 )    Color: Yellow / Appearance: Clear / S.031 / pH: x  Gluc: x / Ketone: Negative  / Bili: Negative / Urobili: Negative   Blood: x / Protein: 30 mg/dL / Nitrite: Negative   Leuk Esterase: Negative / RBC: 5 /hpf / WBC 1 /HPF   Sq Epi: x / Non Sq Epi: 1 /hpf / Bacteria: Negative    MICROBIOLOGY:    MRSA PCR Result.: Joelle (21 @ 14:51)    Culture - Blood (collected 22 Aug 2022 22:50)  Source: .Blood Blood-Peripheral  Preliminary Report:    Growth in aerobic and anaerobic bottles: Gram Positive Cocci in Pairs and    Chains    Culture - Blood (collected 22 Aug 2022 22:35)  Source: .Blood Blood-Peripheral  Preliminary Report:    Growth in aerobic and anaerobic bottles: Gram Positive Cocci in Pairs and    Chains    ***Blood Panel PCR results on this specimen are available    approximately 3 hours after the Gram stain result.***    Gram stain, PCR, and/or culture results may not always    correspond due to difference in methodologies.    ************************************************************    This PCR assay was performed by multiplex PCR. This    Assay tests for 66 bacterial and resistance gene targets.    Please refer to the [facility] Labs test directory    at [email or web address] for details.  Organism: Blood Culture PCR  Organism: Blood Culture PCR    Sensitivities:      -  Streptococcus sp. (Not Grp A, B or S pneumoniae): Detec      Method Type: PCR    Rapid RVP Result: NotDetec ( @ 01:09)    COVID-19 Oscar Domain AB Interp: Positive (21 @ 18:04)  COVID-19 Nucleocapsid ZEINAB AB Interp: Negative (21 @ 18:04)    Blood Gas Venous - Lactate: 1.7 ( @ 23:40)    RADIOLOGY:  imaging below personally reviewed    < from: CT Chest No Cont (22 @ 08:34) >  IMPRESSION:  1.  Bilateral lower lobe predominant reticular and groundglass opacities   that are similar to 2021 comparison. These findings likely secondary   to patient's known interstitial lung disease.    --- End of Report ---      < end of copied text >   Patient is a 71y old  Male who presents with a chief complaint of near  syncope (23 Aug 2022 09:49)    HPI:  72 y/o M with h/o ESRD, s/p renal transplant  , DM, CAD s/p stents , FRANCOIS, HLD, HTN, presented to the ER with chief complaint of fever, generalized weakness and left knee pain after a fall yesterday. Patient is not sure if he hit his knee after the fall but states worsening of his left knee pain right after fall. He reports chronic left knee pain s/p ?steroid injection last month.     REVIEW OF SYSTEMS  [  ] ROS unobtainable because:    [X] All other systems negative except as noted below    Constitutional:  [ ] fever [ ] chills  [ ] weight loss  [ ]night sweat  [ ]poor appetite/PO intake [ ]fatigue   Skin:  [ ] rash [ ] phlebitis	  Eyes: [ ] icterus [ ] pain  [ ] discharge	  ENMT: [ ] sore throat  [ ] thrush [ ] ulcers [ ] exudates [ ]anosmia  Respiratory: [ ] dyspnea [ ] hemoptysis [ ] cough [ ] sputum	  Cardiovascular:  [ ] chest pain [ ] palpitations [ ] edema	  Gastrointestinal:  [ ] nausea [ ] vomiting [ ] diarrhea [ ] constipation [ ] pain	  Genitourinary:  [ ] dysuria [ ] frequency [ ] hematuria [ ] discharge [ ] flank pain  [ ] incontinence  Musculoskeletal:  [ ] myalgias [ ] arthralgias [ ] arthritis  [ ] back pain [x] left knee pain   Neurological:  [ ] headache [ ] weakness [ ] seizures  [ ] confusion/altered mental status    prior hospital charts reviewed [V]  primary team notes reviewed [V]  other consultant notes reviewed [V]    PAST MEDICAL & SURGICAL HISTORY:  Morbid Obesity    HTN (Hypertension), Benign    Hyperuricemia    Hyperlipidemia    Smoking    DM (diabetes mellitus), type 2    FRANCOIS on CPAP    H/O kidney transplant      CAD (coronary artery disease)  1 stent    Kidney transplant recipient  Rt kidney-     AV fistula    SOCIAL HISTORY:  - Denied smoking/vaping/alcohol/recreational drug use    FAMILY HISTORY:    Allergies  No Known Allergies    ANTIMICROBIALS:  cefepime   IVPB 2000 every 12 hours    ANTIMICROBIALS (past 90 days):  MEDICATIONS  (STANDING):  azithromycin  IVPB   255 mL/Hr IV Intermittent (22 @ 00:46)    cefepime   IVPB   100 mL/Hr IV Intermittent (22 @ 23:17)    vancomycin  IVPB.   250 mL/Hr IV Intermittent (22 @ 04:59)    OTHER MEDS:   MEDICATIONS  (STANDING):  acetaminophen   IVPB .. 1000 once  allopurinol 100 daily  aspirin enteric coated 81 daily  atorvastatin 20 at bedtime  clopidogrel Tablet 75 daily  dextrose 50% Injectable 25 once  dextrose 50% Injectable 12.5 once  dextrose 50% Injectable 25 once  dextrose Oral Gel 15 once PRN  enalapril 10 daily  finasteride 5 daily  furosemide   Injectable 20 daily  glucagon  Injectable 1 once  heparin   Injectable 5000 every 12 hours  labetalol 200 three times a day  mycophenolate mofetil 1000 <User Schedule>  NIFEdipine XL 30 at bedtime  NIFEdipine XL 60 daily  tacrolimus 1.5 <User Schedule>    VITALS:  Vital Signs Last 24 Hrs  T(F): 97.5 (22 @ 13:35), Max: 102.3 (22 @ 23:00)    Vital Signs Last 24 Hrs  HR: 70 (22 @ 13:35) (70 - 92)  BP: 147/64 (22 @ 13:35) (112/67 - 172/75)  RR: 17 (22 @ 13:35)  SpO2: 98% (22 @ 13:35) (94% - 98%)  Wt(kg): --    EXAM:  Physical Exam:  Constitutional:  well preserved, comfortable  Head/Eyes: no icterus, PERRL, EOMI  ENT:  supple; no thrush  LUNGS:  CTA  CVS:  normal S1, S2, no murmur  Abd:  soft, non-tender; non-distended  Ext:  no edema  Vascular:  IV site no erythema tenderness or discharge  MSK:  joints without swelling  Neuro: AAO X 3, non- focal    Labs:                        14.4   10.69 )-----------( 164      ( 22 Aug 2022 23:41 )             43.1         137  |  106  |  14  ----------------------------<  100<H>  3.5   |  20<L>  |  0.62    Ca    9.1      23 Aug 2022 01:09    TPro  7.9  /  Alb  4.5  /  TBili  1.0  /  DBili  x   /  AST  82<H>  /  ALT  25  /  AlkPhos  74      WBC Trend:  WBC Count: 10.69 (22 @ 23:41)    Auto Neutrophil #: 9.83 K/uL (22 @ 23:41)  Auto Neutrophil #: 4.11 K/uL (21 @ 16:45)    Creatine Trend:  Creatinine, Serum: 0.62 ()  Creatinine, Serum: 0.58 ()    Liver Biochemical Testing Trend:  Alanine Aminotransferase (ALT/SGPT): 25 ()  Alanine Aminotransferase (ALT/SGPT): 9 *L* ()  Aspartate Aminotransferase (AST/SGOT): 82 (22 @ 23:41)  Aspartate Aminotransferase (AST/SGOT): 14 (21 @ 16:45)  Bilirubin Total, Serum: 1.0 ()  Bilirubin Total, Serum: 0.9 ()    Trend LDH    Auto Eosinophil %: 0.0 % (22 @ 23:41)    Urinalysis Basic - ( 23 Aug 2022 04:41 )    Color: Yellow / Appearance: Clear / S.031 / pH: x  Gluc: x / Ketone: Negative  / Bili: Negative / Urobili: Negative   Blood: x / Protein: 30 mg/dL / Nitrite: Negative   Leuk Esterase: Negative / RBC: 5 /hpf / WBC 1 /HPF   Sq Epi: x / Non Sq Epi: 1 /hpf / Bacteria: Negative    MICROBIOLOGY:    MRSA PCR Result.: [facility] (21 @ 14:51)    Culture - Blood (collected 22 Aug 2022 22:50)  Source: .Blood Blood-Peripheral  Preliminary Report:    Growth in aerobic and anaerobic bottles: Gram Positive Cocci in Pairs and    Chains    Culture - Blood (collected 22 Aug 2022 22:35)  Source: .Blood Blood-Peripheral  Preliminary Report:    Growth in aerobic and anaerobic bottles: Gram Positive Cocci in Pairs and    Chains    ***Blood Panel PCR results on this specimen are available    approximately 3 hours after the Gram stain result.***    Gram stain, PCR, and/or culture results may not always    correspond due to difference in methodologies.    ************************************************************    This PCR assay was performed by multiplex PCR. This    Assay tests for 66 bacterial and resistance gene targets.    Please refer to the [facility] Labs test directory    at [email or web address] for details.  Organism: Blood Culture PCR  Organism: Blood Culture PCR    Sensitivities:      -  Streptococcus sp. (Not Grp A, B or S pneumoniae): Detec      Method Type: PCR    Rapid RVP Result: NotDetec ( @ 01:09)    COVID-19 Oscar Domain AB Interp: Positive (21 @ 18:04)  COVID-19 Nucleocapsid ZEINAB AB Interp: Negative (21 @ 18:04)    Blood Gas Venous - Lactate: 1.7 ( @ 23:40)    RADIOLOGY:  imaging below personally reviewed    < from: CT Chest No Cont (22 @ 08:34) >  IMPRESSION:  1.  Bilateral lower lobe predominant reticular and groundglass opacities   that are similar to 2021 comparison. These findings likely secondary   to patient's known interstitial lung disease.    --- End of Report ---      < end of copied text >   Patient is a 71y old  Male who presents with a chief complaint of near  syncope (23 Aug 2022 09:49)    HPI:  70 y/o M with h/o ESRD, s/p renal transplant  , DM, CAD s/p stents , FRANCOIS, HLD, HTN, presented to the ER with chief complaint of fever, generalized weakness and left knee pain after a fall yesterday. Patient is not sure if he hit his knee after the fall but states worsening of his left knee pain right after fall. He reports chronic left knee pain s/p ?steroid injection last month.   As per patient, she regularly goes to dentist for dental work last visit was 1 week ago. Denies any chest pain, SOV, cough , abdominal pain, N, V, Diarrhea or urinary symptoms.     REVIEW OF SYSTEMS  [  ] ROS unobtainable because:    [X] All other systems negative except as noted below    Constitutional:  [X] fever [x] chills  [ ] weight loss  [ ]night sweat  [ ]poor appetite/PO intake [ ]fatigue   Skin:  [ ] rash [ ] phlebitis	  Eyes: [ ] icterus [ ] pain  [ ] discharge	  ENMT: [ ] sore throat  [ ] thrush [ ] ulcers [ ] exudates [ ]anosmia  Respiratory: [ ] dyspnea [ ] hemoptysis [ ] cough [ ] sputum	  Cardiovascular:  [ ] chest pain [ ] palpitations [ ] edema	  Gastrointestinal:  [ ] nausea [ ] vomiting [ ] diarrhea [ ] constipation [ ] pain	  Genitourinary:  [ ] dysuria [ ] frequency [ ] hematuria [ ] discharge [ ] flank pain  [ ] incontinence  Musculoskeletal:  [ ] myalgias [ ] arthralgias [ ] arthritis  [ ] back pain [x] left knee pain   Neurological:  [ ] headache [ ] weakness [ ] seizures  [ ] confusion/altered mental status    prior hospital charts reviewed [V]  primary team notes reviewed [V]  other consultant notes reviewed [V]    PAST MEDICAL & SURGICAL HISTORY:  Morbid Obesity    HTN (Hypertension), Benign    Hyperuricemia    Hyperlipidemia    Smoking    DM (diabetes mellitus), type 2    FRANCOIS on CPAP    H/O kidney transplant      CAD (coronary artery disease)  1 stent    Kidney transplant recipient  Rt kidney-     AV fistula    SOCIAL HISTORY:  - Denied smoking/vaping/alcohol/recreational drug use    FAMILY HISTORY:    Allergies  No Known Allergies    ANTIMICROBIALS:  cefepime   IVPB 2000 every 12 hours    ANTIMICROBIALS (past 90 days):  MEDICATIONS  (STANDING):  azithromycin  IVPB   255 mL/Hr IV Intermittent (22 @ 00:46)    cefepime   IVPB   100 mL/Hr IV Intermittent (22 @ 23:17)    vancomycin  IVPB.   250 mL/Hr IV Intermittent (22 @ 04:59)    OTHER MEDS:   MEDICATIONS  (STANDING):  acetaminophen   IVPB .. 1000 once  allopurinol 100 daily  aspirin enteric coated 81 daily  atorvastatin 20 at bedtime  clopidogrel Tablet 75 daily  dextrose 50% Injectable 25 once  dextrose 50% Injectable 12.5 once  dextrose 50% Injectable 25 once  dextrose Oral Gel 15 once PRN  enalapril 10 daily  finasteride 5 daily  furosemide   Injectable 20 daily  glucagon  Injectable 1 once  heparin   Injectable 5000 every 12 hours  labetalol 200 three times a day  mycophenolate mofetil 1000 <User Schedule>  NIFEdipine XL 30 at bedtime  NIFEdipine XL 60 daily  tacrolimus 1.5 <User Schedule>    VITALS:  Vital Signs Last 24 Hrs  T(F): 97.5 (22 @ 13:35), Max: 102.3 (22 @ 23:00)    Vital Signs Last 24 Hrs  HR: 70 (22 @ 13:35) (70 - 92)  BP: 147/64 (22 @ 13:35) (112/67 - 172/75)  RR: 17 (22 @ 13:35)  SpO2: 98% (22 @ 13:35) (94% - 98%)  Wt(kg): --    EXAM:  Physical Exam:  Constitutional:  well preserved, comfortable  Head/Eyes: no icterus, PERRL, EOMI  ENT:  supple; no thrush  LUNGS:  CTA  CVS:  normal S1, S2, no murmur  Abd:  soft, non-tender; non-distended  Ext:  no edema  Vascular:  IV site no erythema tenderness or discharge, lUE AV fistula   MSK:  joints without swelling, left knee mild tenderness, no erythema, warmth or swelling  Neuro: AAO X 3, non- focal    Labs:                        14.4   10.69 )-----------( 164      ( 22 Aug 2022 23:41 )             43.1         137  |  106  |  14  ----------------------------<  100<H>  3.5   |  20<L>  |  0.62    Ca    9.1      23 Aug 2022 01:09    TPro  7.9  /  Alb  4.5  /  TBili  1.0  /  DBili  x   /  AST  82<H>  /  ALT  25  /  AlkPhos  74      WBC Trend:  WBC Count: 10.69 (22 @ 23:41)    Auto Neutrophil #: 9.83 K/uL (22 @ 23:41)  Auto Neutrophil #: 4.11 K/uL (21 @ 16:45)    Creatine Trend:  Creatinine, Serum: 0.62 ()  Creatinine, Serum: 0.58 ()    Liver Biochemical Testing Trend:  Alanine Aminotransferase (ALT/SGPT): 25 ()  Alanine Aminotransferase (ALT/SGPT): 9 *L* ()  Aspartate Aminotransferase (AST/SGOT): 82 (22 @ 23:41)  Aspartate Aminotransferase (AST/SGOT): 14 (21 @ 16:45)  Bilirubin Total, Serum: 1.0 ()  Bilirubin Total, Serum: 0.9 ()    Trend LDH    Auto Eosinophil %: 0.0 % (22 @ 23:41)    Urinalysis Basic - ( 23 Aug 2022 04:41 )    Color: Yellow / Appearance: Clear / S.031 / pH: x  Gluc: x / Ketone: Negative  / Bili: Negative / Urobili: Negative   Blood: x / Protein: 30 mg/dL / Nitrite: Negative   Leuk Esterase: Negative / RBC: 5 /hpf / WBC 1 /HPF   Sq Epi: x / Non Sq Epi: 1 /hpf / Bacteria: Negative    MICROBIOLOGY:    MRSA PCR Result.: Joelle (21 @ 14:51)    Culture - Blood (collected 22 Aug 2022 22:50)  Source: .Blood Blood-Peripheral  Preliminary Report:    Growth in aerobic and anaerobic bottles: Gram Positive Cocci in Pairs and    Chains    Culture - Blood (collected 22 Aug 2022 22:35)  Source: .Blood Blood-Peripheral  Preliminary Report:    Growth in aerobic and anaerobic bottles: Gram Positive Cocci in Pairs and    Chains    ***Blood Panel PCR results on this specimen are available    approximately 3 hours after the Gram stain result.***    Gram stain, PCR, and/or culture results may not always    correspond due to difference in methodologies.    ************************************************************    This PCR assay was performed by multiplex PCR. This    Assay tests for 66 bacterial and resistance gene targets.    Please refer to the [facility] Labs test directory    at [email or web address] for details.  Organism: Blood Culture PCR  Organism: Blood Culture PCR    Sensitivities:      -  Streptococcus sp. (Not Grp A, B or S pneumoniae): Detec      Method Type: PCR    Rapid RVP Result: NotDetec ( @ 01:09)    COVID-19 Oscar Domain AB Interp: Positive (21 @ 18:04)  COVID-19 Nucleocapsid ZEINAB AB Interp: Negative (21 @ 18:04)    Blood Gas Venous - Lactate: 1.7 ( @ 23:40)    RADIOLOGY:  imaging below personally reviewed    < from: CT Chest No Cont (22 @ 08:34) >  IMPRESSION:  1.  Bilateral lower lobe predominant reticular and groundglass opacities   that are similar to 2021 comparison. These findings likely secondary   to patient's known interstitial lung disease.    --- End of Report ---      < end of copied text >   Patient is a 71y old  Male who presents with a chief complaint of near  syncope (23 Aug 2022 09:49)    HPI:    70 y/o M with h/o ESRD, s/p renal transplant  , DM, CAD s/p stents , FRANCOIS, HLD, HTN, presented to the ER with chief complaint of fever, generalized weakness and left knee pain after a fall yesterday. Patient is not sure if he hit his knee after the fall but states worsening of his left knee pain right after fall. He reports chronic left knee pain s/p ?steroid injection last month.   As per patient, she regularly goes to dentist for dental work last visit was 1 week ago. Denies any chest pain, SOV, cough , abdominal pain, N, V, Diarrhea or urinary symptoms.     REVIEW OF SYSTEMS  [  ] ROS unobtainable because:    [X] All other systems negative except as noted below    Constitutional:  [X] fever [x] chills  [ ] weight loss  [ ]night sweat  [ ]poor appetite/PO intake [ ]fatigue   Skin:  [ ] rash [ ] phlebitis	  Eyes: [ ] icterus [ ] pain  [ ] discharge	  ENMT: [ ] sore throat  [ ] thrush [ ] ulcers [ ] exudates [ ]anosmia  Respiratory: [ ] dyspnea [ ] hemoptysis [ ] cough [ ] sputum	  Cardiovascular:  [ ] chest pain [ ] palpitations [ ] edema	  Gastrointestinal:  [ ] nausea [ ] vomiting [ ] diarrhea [ ] constipation [ ] pain	  Genitourinary:  [ ] dysuria [ ] frequency [ ] hematuria [ ] discharge [ ] flank pain  [ ] incontinence  Musculoskeletal:  [ ] myalgias [ ] arthralgias [ ] arthritis  [ ] back pain [x] left knee pain   Neurological:  [ ] headache [ ] weakness [ ] seizures  [ ] confusion/altered mental status    prior hospital charts reviewed [V]  primary team notes reviewed [V]  other consultant notes reviewed [V]    PAST MEDICAL & SURGICAL HISTORY:  Morbid Obesity    HTN (Hypertension), Benign    Hyperuricemia    Hyperlipidemia    Smoking    DM (diabetes mellitus), type 2    FRANCOIS on CPAP    H/O kidney transplant      CAD (coronary artery disease)  1 stent    Kidney transplant recipient  Rt kidney-     AV fistula    SOCIAL HISTORY:  - Denied smoking/vaping/alcohol/recreational drug use    FAMILY HISTORY:    Allergies  No Known Allergies    ANTIMICROBIALS:  cefepime   IVPB 2000 every 12 hours    ANTIMICROBIALS (past 90 days):  MEDICATIONS  (STANDING):  azithromycin  IVPB   255 mL/Hr IV Intermittent (22 @ 00:46)    cefepime   IVPB   100 mL/Hr IV Intermittent (22 @ 23:17)    vancomycin  IVPB.   250 mL/Hr IV Intermittent (22 @ 04:59)    OTHER MEDS:   MEDICATIONS  (STANDING):  acetaminophen   IVPB .. 1000 once  allopurinol 100 daily  aspirin enteric coated 81 daily  atorvastatin 20 at bedtime  clopidogrel Tablet 75 daily  dextrose 50% Injectable 25 once  dextrose 50% Injectable 12.5 once  dextrose 50% Injectable 25 once  dextrose Oral Gel 15 once PRN  enalapril 10 daily  finasteride 5 daily  furosemide   Injectable 20 daily  glucagon  Injectable 1 once  heparin   Injectable 5000 every 12 hours  labetalol 200 three times a day  mycophenolate mofetil 1000 <User Schedule>  NIFEdipine XL 30 at bedtime  NIFEdipine XL 60 daily  tacrolimus 1.5 <User Schedule>    VITALS:  Vital Signs Last 24 Hrs  T(F): 97.5 (22 @ 13:35), Max: 102.3 (22 @ 23:00)    Vital Signs Last 24 Hrs  HR: 70 (22 @ 13:35) (70 - 92)  BP: 147/64 (22 @ 13:35) (112/67 - 172/75)  RR: 17 (22 @ 13:35)  SpO2: 98% (22 @ 13:35) (94% - 98%)  Wt(kg): --    EXAM:  Physical Exam:  Constitutional:  well preserved, comfortable  Head/Eyes: no icterus, PERRL, EOMI  ENT:  supple; no thrush  LUNGS:  CTA  CVS:  normal S1, S2, no murmur  Abd:  soft, non-tender; non-distended  Ext:  no edema  Vascular:  IV site no erythema tenderness or discharge, lUE AV fistula   MSK:  joints without swelling, left knee mild tenderness, no erythema, warmth or swelling  Neuro: AAO X 3, non- focal    Labs:                        14.4   10.69 )-----------( 164      ( 22 Aug 2022 23:41 )             43.1         137  |  106  |  14  ----------------------------<  100<H>  3.5   |  20<L>  |  0.62    Ca    9.1      23 Aug 2022 01:09    TPro  7.9  /  Alb  4.5  /  TBili  1.0  /  DBili  x   /  AST  82<H>  /  ALT  25  /  AlkPhos  74      WBC Trend:  WBC Count: 10.69 (22 @ 23:41)    Auto Neutrophil #: 9.83 K/uL (22 @ 23:41)  Auto Neutrophil #: 4.11 K/uL (21 @ 16:45)    Creatine Trend:  Creatinine, Serum: 0.62 ()  Creatinine, Serum: 0.58 ()    Liver Biochemical Testing Trend:  Alanine Aminotransferase (ALT/SGPT): 25 ()  Alanine Aminotransferase (ALT/SGPT): 9 *L* ()  Aspartate Aminotransferase (AST/SGOT): 82 (22 @ 23:41)  Aspartate Aminotransferase (AST/SGOT): 14 (21 @ 16:45)  Bilirubin Total, Serum: 1.0 ()  Bilirubin Total, Serum: 0.9 ()    Trend LDH    Auto Eosinophil %: 0.0 % (22 @ 23:41)    Urinalysis Basic - ( 23 Aug 2022 04:41 )    Color: Yellow / Appearance: Clear / S.031 / pH: x  Gluc: x / Ketone: Negative  / Bili: Negative / Urobili: Negative   Blood: x / Protein: 30 mg/dL / Nitrite: Negative   Leuk Esterase: Negative / RBC: 5 /hpf / WBC 1 /HPF   Sq Epi: x / Non Sq Epi: 1 /hpf / Bacteria: Negative    MICROBIOLOGY:    MRSA PCR Result.: Joelle (21 @ 14:51)    Culture - Blood (collected 22 Aug 2022 22:50)  Source: .Blood Blood-Peripheral  Preliminary Report:    Growth in aerobic and anaerobic bottles: Gram Positive Cocci in Pairs and    Chains    Culture - Blood (collected 22 Aug 2022 22:35)  Source: .Blood Blood-Peripheral  Preliminary Report:    Growth in aerobic and anaerobic bottles: Gram Positive Cocci in Pairs and    Chains    ***Blood Panel PCR results on this specimen are available    approximately 3 hours after the Gram stain result.***    Gram stain, PCR, and/or culture results may not always    correspond due to difference in methodologies.    ************************************************************    This PCR assay was performed by multiplex PCR. This    Assay tests for 66 bacterial and resistance gene targets.    Please refer to the [facility] test directory    at [email or web address] for details.  Organism: Blood Culture PCR  Organism: Blood Culture PCR    Sensitivities:      -  Streptococcus sp. (Not Grp A, B or S pneumoniae): Detec      Method Type: PCR    Rapid RVP Result: NotDetec ( @ 01:09)    COVID-19 Oscar Domain AB Interp: Positive (21 @ 18:04)  COVID-19 Nucleocapsid ZEINAB AB Interp: Negative (21 @ 18:04)    Blood Gas Venous - Lactate: 1.7 ( @ 23:40)    RADIOLOGY:  imaging below personally reviewed    < from: CT Chest No Cont (22 @ 08:34) >  IMPRESSION:  1.  Bilateral lower lobe predominant reticular and groundglass opacities   that are similar to 2021 comparison. These findings likely secondary   to patient's known interstitial lung disease.    --- End of Report ---      < end of copied text >

## 2022-08-23 NOTE — H&P ADULT - HISTORY OF PRESENT ILLNESS
70 y/o M       with h/o ESRD,  (s/p renal transplant 2013 ,  DM,       CAD   s/p stents , FRANCOIS (on cpap qHS), HLD, HTN,         presenting with 2 episodes of felt lightheaded and nearly passed out twice while in the bathroom,   states legs gave out; felt generalized weakness;    denies passing out/LOC, denies head trauma.    No   cp/sob //palpitations. No fever.

## 2022-08-23 NOTE — CHART NOTE - NSCHARTNOTEFT_GEN_A_CORE
Notified by RN that pt. with positive blood cultures   Reordered cefepime after discussion with attending   repeat blood cultures sent   ID consult called     House endocrine was consulted as pt. has an insulin pump   Will await further reccs.   Kristen Thayer NP

## 2022-08-23 NOTE — ED ADULT NURSE REASSESSMENT NOTE - NS ED NURSE REASSESS COMMENT FT1
Report received from Andre RN. Introduced self to pt and family member. VS updated- see flowsheet. Pt has no complaints of pain at this time. Stretcher in lowest position and locked, appropriate side rails in place, room cleared of clutter and safety hazards, call bell in reach- pt oriented to use, blankets given for comfort

## 2022-08-23 NOTE — CONSULT NOTE ADULT - ASSESSMENT
72 y/o M with h/o ESRD (s/p renal transplant 2013), IDDM, CAD (s/p stents), FRANCOIS (on cpap qHS), HLD, HTN, presenting with 2 episodes of felt lightheaded and nearly passed out twice while in the bathroom, to urinate, states legs gave out; felt generalized weakness; denies passing out/LOC, denies head trauma.  No CP/SOB/palpitations.   pt with hx of ashd, s/p stents, AS , s/p TAVR in 2021 with near syncope.  chest x ray noted ?chf vs pneumonia  ESRD on HD may need HD today for fluid cver load  syncope r/ o conduction disease  tele  check orthostatic  repeat echo  ID eval with s/p renal transplant  renal eval  cardiac enzymes  dvt prophylaxis

## 2022-08-23 NOTE — ED PROVIDER NOTE - NS ED ATTENDING STATEMENT MOD
Quality 110: Preventive Care And Screening: Influenza Immunization: Influenza Immunization not Administered for Documented Reasons. Detail Level: Detailed Quality 226: Preventive Care And Screening: Tobacco Use: Screening And Cessation Intervention: Patient screened for tobacco use and is an ex/non-smoker Quality 130: Documentation Of Current Medications In The Medical Record: Current Medications Documented Quality 431: Preventive Care And Screening: Unhealthy Alcohol Use - Screening: Patient not identified as an unhealthy alcohol user when screened for unhealthy alcohol use using a systematic screening method Quality 47: Advance Care Plan: Advance care planning not documented, reason not otherwise specified. Attending with

## 2022-08-23 NOTE — CONSULT NOTE ADULT - ASSESSMENT
71 year old M with h/o ESRD s/p renal transplant 2013 , DM, CAD s/p stents , FRANCOIS, HLD, HTN, presenting with 2 episodes of felt lightheaded and nearly passed out twice while in the bathroom, states legs gave out; felt generalized weakness.    Febrile to Tmax 102.3  Leukocytosis of 10.69  UA neg  Covid PCR neg   Rapid RVP neg   CT chest: bilateral lower lobe reticular and groundglass opacities similar to previous imaging  Blood Cx on 8/22 X 2: Strep non A, B or S pneumonia  Received 1 dose of vancomycin cefepime and Azithromycin    #fever, leukocytosis   #Strep bacteremia     Recommendations:   On Cefepime (8/22-->)  Send MRSA PCR   Get TTE   F/up final blood Cx X2     PT TO BE SEEN. PRELIM NOTE  PENDING RECS. PLEASE WAIT FOR FINAL RECS AFTER DISCUSSION WITH ATTENDING#           72 y/o M with h/o ESRD, s/p renal transplant 2013 , DM, CAD s/p stents , FRANCOIS, HLD, HTN, presented to the ER with chief complaint of fever, generalized weakness and left knee pain after a fall yesterday. Patient is not sure if he hit his knee after the fall but states worsening of his left knee pain right after fall. He reports chronic left knee pain s/p ?steroid injection last month.     Febrile to Tmax 102.3  Leukocytosis of 10.69  UA neg  Covid PCR neg   Rapid RVP neg   CT chest: bilateral lower lobe reticular and groundglass opacities similar to previous imaging  Blood Cx on 8/22 X 2: Strep non A, B or S pneumonia  Received 1 dose of vancomycin cefepime and Azithromycin    #fever, leukocytosis   #Strep bacteremia     Recommendations:   On Cefepime (8/22-->)  Send MRSA PCR   Get TTE   F/up final blood Cx X2     PT TO BE SEEN. PRELIM NOTE  PENDING RECS. PLEASE WAIT FOR FINAL RECS AFTER DISCUSSION WITH ATTENDING#           70 y/o M with h/o ESRD, s/p renal transplant 2013 , DM, CAD s/p stents , FRANCOIS, HLD, HTN, presented to the ER with chief complaint of fever, generalized weakness and left knee pain after a fall yesterday. Patient is not sure if he hit his knee after the fall but states worsening of his left knee pain right after fall. He reports chronic left knee pain s/p ?steroid injection last month.     Febrile to Tmax 102.3  Leukocytosis of 10.69  UA neg  Covid PCR neg   Rapid RVP neg   CT chest: bilateral lower lobe reticular and groundglass opacities similar to previous imaging  Blood Cx on 8/22 X 2: Strep non A, B or S pneumonia  Received 1 dose of vancomycin cefepime and Azithromycin    #fever, leukocytosis   #Strep bacteremia     Recommendations:   Discontinue Cefepime   Start Ceftriaxone 2 g IV q 12hrs   Get Vanco Random level in am   Send MRSA PCR   Get TTE   F/up final blood Cx X2         PT TO BE SEEN. PRELIM NOTE  PENDING RECS. PLEASE WAIT FOR FINAL RECS AFTER DISCUSSION WITH ATTENDING#           72 y/o M with h/o ESRD, s/p renal transplant 2013 , DM, CAD s/p stents , FRANCOIS, HLD, HTN, presented to the ER with chief complaint of fever, generalized weakness and left knee pain after a fall yesterday. Patient is not sure if he hit his knee after the fall but states worsening of his left knee pain right after fall. He reports chronic left knee pain s/p ?steroid injection last month.     Febrile to Tmax 102.3  Leukocytosis of 10.69  UA neg  Covid PCR neg   Rapid RVP neg   CT chest: bilateral lower lobe reticular and groundglass opacities similar to previous imaging  Blood Cx on 8/22 X 2: Strep non A, B or S pneumonia  Received 1 dose of vancomycin cefepime and Azithromycin    #Strep bacteremia 2/2 to gingival manipulation (reportedly patient had visited his dentist last week for dental work)  #fever, leukocytosis       Recommendations:   Discontinue Cefepime   Start Ceftriaxone 2 g IV q 12hrs   Get Vanco Random level in am   Send MRSA PCR   Get TTE   F/up final blood Cx X2     Seen and Discussed with Dr Yoni Panchal MD, PGY4  ID fellow  [employer] Teams Preferred  After 5pm/weekends call [phone]                 72 y/o M with h/o ESRD, s/p renal transplant 2013 , DM, CAD s/p stents , FRANCOIS, HLD, HTN, presented to the ER with chief complaint of fever, generalized weakness and left knee pain after a fall yesterday. Patient is not sure if he hit his knee after the fall but states worsening of his left knee pain right after fall. He reports chronic left knee pain s/p ?steroid injection last month.     Febrile to Tmax 102.3  Leukocytosis of 10.69  UA neg  Covid PCR neg   Rapid RVP neg   CT chest: bilateral lower lobe reticular and groundglass opacities similar to previous imaging  Blood Cx on 8/22 X 2: Strep non A, B or S pneumonia  Received 1 dose of vancomycin cefepime and Azithromycin    #Strep bacteremia 2/2 to ?gingival manipulation (reportedly patient had visited his dentist last week for dental work) R/O intraabdominal source   #fever, leukocytosis       Recommendations:   Discontinue Cefepime   Start Ceftriaxone 2 g IV q 12hrs   Get TTE  Get CT AP to R/O intraabdominal source of infection   Get Vanco Random level in am   Send MRSA PCR   F/up final blood Cx X2     Seen and Discussed with Dr Yoni Panchal MD, PGY4  ID fellow  [employer] Teams Preferred  After 5pm/weekends call [phone]                 70 y/o M with h/o ESRD, s/p renal transplant 2013 , DM, CAD s/p stents , FRANCOIS, HLD, HTN, presented to the ER with chief complaint of fever, generalized weakness and left knee pain after a fall yesterday. Patient is not sure if he hit his knee after the fall but states worsening of his left knee pain right after fall. He reports chronic left knee pain s/p ?steroid injection last month.     Febrile to Tmax 102.3  Leukocytosis of 10.69  UA neg  Covid PCR neg   Rapid RVP neg   CT chest: bilateral lower lobe reticular and groundglass opacities similar to previous imaging  Blood Cx on 8/22 X 2: Strep non A, B or S pneumonia  Received 1 dose of vancomycin cefepime and Azithromycin    #Strep bacteremia 2/2 to ?gingival manipulation (reportedly patient had visited his dentist last week for dental work) R/O intraabdominal source   #fever, leukocytosis       Recommendations:   Discontinue Cefepime   Start Ceftriaxone 2 g IV q 24hrs   Get TTE  Get CT AP to R/O intraabdominal source of infection   Will repeat 2x blood cultures in AM  Get Vanco Random level in am   Send MRSA PCR   F/up final blood Cx X2     Seen and Discussed with Dr Yoni Panchal MD, PGY4  ID fellow  [employer] Teams Preferred  After 5pm/weekends call [phone]

## 2022-08-23 NOTE — CHART NOTE - NSCHARTNOTEFT_GEN_A_CORE
THis is a 72 yo M w/ a PMH of DM2 on insulin pump, kidney transplant who presents with syncope and found to have bacteremia. Patient currently alert and oriented able to manage the pump and give insulin. He does not have a CGM and checks FSG.     Patient seen by endocrine back in 12/21, concern patient was getting too much insulin at home. Unclear if any settings have been changed since his last admission. Will need to re-evaluate tomorrow in person    Recommendations:  Continue with insulin pump tonight  FSG before meals and bedtime  Please fill out the insulin pump attestation form and have it signed  If patient cant manage his pump for any reason (AMS or runs out of supplies, for example) please give lantus 8 units and low correctional scale with meals and bedtime  No need for any correctional mealtime or bedtime insulin while he is on the pump, since the pump will calculate the correction based on his FSG  carb consistent diet    Full consult in AM    Mikel Medley MD  Endocrinology Fellow THis is a 70 yo M w/ a PMH of DM2 on insulin pump, kidney transplant who presents with syncope and found to have bacteremia. Patient currently alert and oriented able to manage the pump and give insulin. He does not have a CGM and checks FSG. Has supplies here to change pump. Due to change pump tomorrow    Patient seen by endocrine back in 12/21, concern patient was getting too much insulin at home. Unclear if any settings have been changed since his last admission. Will need to re-evaluate tomorrow in person    Recommendations:  Continue with insulin pump tonight  FSG before meals and bedtime  Please fill out the insulin pump attestation form and have it signed  If patient cant manage his pump for any reason (AMS or runs out of supplies, for example) please give lantus 8 units and low correctional scale with meals and bedtime  No need for any correctional mealtime or bedtime insulin while he is on the pump, since the pump will calculate the correction based on his FSG  carb consistent diet  change pump tomorrow, and then every 3 days    Full consult in AM    Mikel Medley MD  Endocrinology Fellow

## 2022-08-23 NOTE — ED PROVIDER NOTE - PHYSICAL EXAMINATION
On Physical Exam:  General: pale, diaphoretic, awake/alert, speaking in full sentences, cooperative with exam; febrile  HEENT: PERRL, MMM  Neck: no neck tenderness, no nuchal rigidity  Cardiac: normal s1, s2; RRR; no MGR  Lungs: diffuse coarse breath sounds  Abdomen: soft nontender/nondistended; RLQ transplant scar, nontender  : no bladder tenderness or distension  Skin: intact, no rash  Extremities: no peripheral edema, no gross deformities  Neuro: no gross neurologic deficits

## 2022-08-23 NOTE — ED PROVIDER NOTE - CLINICAL SUMMARY MEDICAL DECISION MAKING FREE TEXT BOX
Attending note (Devin): febrile; portable CXR with likely multifocal pneumonia and febrile, treating as possible sepsis though not meeting other sepsis criteria (not tachycardic, no significant leukocytosis); starting empiric antibiotic coverage, screening labs reviewed, to be admitted for ongoing evaluation/management.

## 2022-08-23 NOTE — ED PROVIDER NOTE - NS ED ROS FT
Review of Systems:  -General: no fever +chills  -ENT: no congestion, no difficulty swallowing  -Pulmonary: no cough, no shortness of breath  -Cardiac: no chest pain, no palpitations  -Gastrointestinal: no abdominal pain, no nausea, no vomiting, and no diarrhea.  -Genitourinary: no blood or pain with urination  -Musculoskeletal: no back or neck pain  -Skin: no rashes  -Endocrine: +h/o diabetes   -Neurologic: generalized weakness    All else negative unless otherwise specified elsewhere in this note.

## 2022-08-23 NOTE — ED PROVIDER NOTE - OBJECTIVE STATEMENT
Attending note (Devin): 72 y/o M with h/o ESRD (s/p renal transplant 2013), IDDM, CAD (s/p stents), FRANCOIS (on cpap qHS), HLD, HTN, presenting with 2 episodes of felt lightheaded and nearly passed out twice while in the bathroom, to urinate, states legs gave out; felt generalized weakness; denies passing out/LOC, denies head trauma.  No CP/SOB/palpitations. No fever.

## 2022-08-23 NOTE — H&P ADULT - ASSESSMENT
72 y/o M       with h/o ESRD,  (s/p renal transplant 2013 ,  DM,       CAD   s/p stents , FRANCOIS (on cpap qHS), HLD, HTN,         presenting with 2 episodes of felt lightheaded and nearly passed out twice while in the bathroom,   states legs gave out; felt generalized weakness;    denies passing out/LOC, denies head trauma.    No   cp/sob //palpitations.  fever.     admitted  with  dizziness/ near syncope   denies  cp/sob   cxr. with  pulm edema   tele   card  eval'  echo   HTN/HLD   on  procardia,  labetolol   CAD, on asa/ plavix/ ,lipitor   s/p  TAVR   DM, insulin per  bahijeet pickering   CKD   s/p  renal  transplant,, in cellcept/ tacrolimus   on  dvt  ppx      rad   72 y/o M       with h/o ESRD, , s/p renal transplant 2013 ,    DM,  CAD   s/p stents , FRANCOIS  on cpap qHS), HLD, HTN,         presenting with 2 episodes of felt lightheaded and nearly passed out twice while in the bathroom,   states legs gave out; felt generalized weakness;    denies passing out/LOC, denies head trauma.    No   cp/sob //palpitations.  fever.     admitted  with  dizziness/ near syncope   denies  cp/sob   cxr. with  pulm edema   tele   card  eval'  echo   HTN/HLD   on  procardia,  labetolol   CAD, 11/2021  on asa/ plavix/ ,lipitor   s/p  TAVR, 12/2021    DM, insulin per  abhijeet pickering   CKD    s/p Rt kidney transplant  2013,   Dr Vargas  [facility]     IDL restrictive/obstructive lung disease,     FRANCOIS / cpap   on  dvt ppx   cT  chest  ordered               s/p  renal  transplant,, in cellcept/ tacrolimus   on  dvt  ppx      rad

## 2022-08-23 NOTE — ED PROVIDER NOTE - PROGRESS NOTE DETAILS
Attending Elida:  s/o  70 y/o m hx of esrdd, transplant 2013, here for syncope x 2, cough, x ray shows multifocal pna, given vanc/cef/azithro, awaiting cmp for admit . Funmilayo Bose- carolyne Leach for admission Funmilayo Bose- attempted to call Dr. Stallings, could not connect. paged Dr. stallings for admission. Funmilayo Bose- spoke to hospitalist for admission

## 2022-08-23 NOTE — CONSULT NOTE ADULT - SUBJECTIVE AND OBJECTIVE BOX
CHIEF COMPLAINT:Patient is a 71y old  Male who presents with a chief complaint of near  syncope (23 Aug 2022 07:05)      HPI:  72 y/o M with h/o ESRD (s/p renal transplant 2013), IDDM, CAD (s/p stents), FRANCOIS (on cpap qHS), HLD, HTN, presenting with 2 episodes of felt lightheaded and nearly passed out twice while in the bathroom, to urinate, states legs gave out; felt generalized weakness; denies passing out/LOC, denies head trauma.  No CP/SOB/palpitations. No feve      PAST MEDICAL & SURGICAL HISTORY:  Morbid Obesity      HTN (Hypertension), Benign      Hyperuricemia      Hyperlipidemia      Smoking      DM (diabetes mellitus), type 2      FRANCOIS on CPAP      H/O kidney transplant  2013      CAD (coronary artery disease)  1 stent      Kidney transplant recipient  Rt kidney- 2013      AV fistula          MEDICATIONS  (STANDING):  acetaminophen   IVPB .. 1000 milliGRAM(s) IV Intermittent once  allopurinol 100 milliGRAM(s) Oral daily  aspirin enteric coated 81 milliGRAM(s) Oral daily  atorvastatin 20 milliGRAM(s) Oral at bedtime  clopidogrel Tablet 75 milliGRAM(s) Oral daily  dextrose 5%. 1000 milliLiter(s) (50 mL/Hr) IV Continuous <Continuous>  dextrose 5%. 1000 milliLiter(s) (100 mL/Hr) IV Continuous <Continuous>  dextrose 50% Injectable 25 Gram(s) IV Push once  dextrose 50% Injectable 12.5 Gram(s) IV Push once  dextrose 50% Injectable 25 Gram(s) IV Push once  enalapril 10 milliGRAM(s) Oral daily  finasteride 5 milliGRAM(s) Oral daily  furosemide   Injectable 20 milliGRAM(s) IV Push daily  glucagon  Injectable 1 milliGRAM(s) IntraMuscular once  heparin   Injectable 5000 Unit(s) SubCutaneous every 12 hours  labetalol 200 milliGRAM(s) Oral three times a day  mycophenolate mofetil 1000 milliGRAM(s) Oral <User Schedule>  NIFEdipine XL 30 milliGRAM(s) Oral at bedtime  NIFEdipine XL 60 milliGRAM(s) Oral daily  tacrolimus 1.5 milliGRAM(s) Oral <User Schedule>    MEDICATIONS  (PRN):  dextrose Oral Gel 15 Gram(s) Oral once PRN Blood Glucose LESS THAN 70 milliGRAM(s)/deciliter      FAMILY HISTORY:      SOCIAL HISTORY:    [ ] Non-smoker  [ ] Smoker  [ ] Alcohol    Allergies    No Known Allergies    Intolerances    	    REVIEW OF SYSTEMS:  CONSTITUTIONAL: No fever, weight loss, or fatigue  EYES: No eye pain, visual disturbances, or discharge  ENT:  No difficulty hearing, tinnitus, vertigo; No sinus or throat pain  NECK: No pain or stiffness  RESPIRATORY: No cough, wheezing, chills or hemoptysis; No Shortness of Breath  CARDIOVASCULAR: No chest pain, palpitations, passing out, dizziness, or leg swelling  GASTROINTESTINAL: No abdominal or epigastric pain. No nausea, vomiting, or hematemesis; No diarrhea or constipation. No melena or hematochezia.  GENITOURINARY: No dysuria, frequency, hematuria, or incontinence  NEUROLOGICAL: No headaches, memory loss, loss of strength, numbness, or tremors  SKIN: No itching, burning, rashes, or lesions   LYMPH Nodes: No enlarged glands  ENDOCRINE: No heat or cold intolerance; No hair loss  MUSCULOSKELETAL: No joint pain or swelling; No muscle, back, or extremity pain  PSYCHIATRIC: No depression, anxiety, mood swings, or difficulty sleeping  HEME/LYMPH: No easy bruising, or bleeding gums  ALLERGY AND IMMUNOLOGIC: No hives or eczema	    [ ] All others negative	  [ ] Unable to obtain    PHYSICAL EXAM:  T(C): 36.9 (08-23-22 @ 07:00), Max: 39.1 (08-22-22 @ 23:00)  HR: 80 (08-23-22 @ 07:00) (80 - 92)  BP: 123/87 (08-23-22 @ 07:00) (112/67 - 172/75)  RR: 20 (08-23-22 @ 07:00) (19 - 23)  SpO2: 97% (08-23-22 @ 07:00) (94% - 98%)  Wt(kg): --  I&O's Summary      Appearance: Normal	  HEENT:   Normal oral mucosa, PERRL, EOMI	  Lymphatic: No lymphadenopathy  Cardiovascular: Normal S1 S2, No JVD, No murmurs, No edema  Respiratory: Lungs clear to auscultation	  Psychiatry: A & O x 3, Mood & affect appropriate  Gastrointestinal:  Soft, Non-tender, + BS	  Skin: No rashes, No ecchymoses, No cyanosis	  Neurologic: Non-focal  Extremities: Normal range of motion, No clubbing, cyanosis or edema  Vascular: Peripheral pulses palpable 2+ bilaterally    TELEMETRY: 	    ECG:  	  RADIOLOGY:  OTHER: 	  	  LABS:	 	    CARDIAC MARKERS:                              14.4   10.69 )-----------( 164      ( 22 Aug 2022 23:41 )             43.1     08-23    137  |  106  |  14  ----------------------------<  100<H>  3.5   |  20<L>  |  0.62    Ca    9.1      23 Aug 2022 01:09    TPro  7.9  /  Alb  4.5  /  TBili  1.0  /  DBili  x   /  AST  82<H>  /  ALT  25  /  AlkPhos  74  08-22    proBNP:   Lipid Profile:   HgA1c:   TSH:   PT/INR - ( 22 Aug 2022 23:41 )   PT: 15.6 sec;   INR: 1.35 ratio         PTT - ( 22 Aug 2022 23:41 )  PTT:29.2 sec    PREVIOUS DIAGNOSTIC TESTING:    < from: 12 Lead ECG (12.04.21 @ 11:40) >  Diagnosis Line NORMAL SINUS RHYTHM  POSSIBLE LEFT ATRIAL ENLARGEMENT  LEFT VENTRICULAR HYPERTROPHY WITH REPOLARIZATION ABNORMALITY  ABNORMAL ECG  WHEN COMPARED WITH ECG OF 03-DEC-2021 03:42,  NO SIGNIFICANT CHANGE WAS FOUND    < from: Transthoracic Echocardiogram (12.03.21 @ 15:48) >  Mitral Valve: Mitral annular and leaflet calcification.  Minimal mitral regurgitation.  Aortic Valve/Aorta: Transcatheter aortic valve with normal  gradient:  ---LVOTd 2.2 cm, VTI 29.1 cm.  ---Aortic valve mean gradient 13.8 mmHg, VTI 51.5 cm. DI  =0.57.  (note that the mean gradient is overestimated because the  high VTI in the LVOT is not accounted for in the  calculation).  ---Aortic valve area by continutiy 2.1 cm2.  Minimal paravalvular regurgiation.  Normal aortic root size.  Left Atrium: Moderately dilated left atrium.  Left Ventricle: Normal left ventricular internal  dimensions. Mild-moderate concentric left ventricular  hypertrophy.  Normal left ventricular systolic function. No segmental  wall motion abnormalities.  Right Heart: Normal right atrium. Normal right ventricular  size and function.  Normal tricuspid valve. Minimal tricuspid regurgitation.  Normal pulmonic valve.  Pericardium/Pleura: Trace pericardial effusion.  Hemodynamic: Estimated right atrial pressure is mildly  elevated.  No evidence of pulmonary hypertension.  No PFO seen with color Doppler.    < from: Xray Chest 1 View- PORTABLE-Urgent (08.22.22 @ 22:22) >  Right costophrenic angle is collimated out of field of view, limiting   evaluation. Bilateral patchy opacities and prominent interstitial   markings. No large pleural effusion. No pneumothorax. Cardiac silhouette   size cannot be accurately assessed on this projection. No acute osseous   findings.    IMPRESSION:  Pulmonary edema versus multifocal infection.

## 2022-08-23 NOTE — H&P ADULT - NSHPPHYSICALEXAM_GEN_ALL_CORE
PHYSICAL EXAMINATION:  Vital Signs Last 24 Hrs  T(C): 37.6 (23 Aug 2022 05:53), Max: 39.1 (22 Aug 2022 23:00)  T(F): 99.6 (23 Aug 2022 05:53), Max: 102.3 (22 Aug 2022 23:00)  HR: 81 (23 Aug 2022 05:53) (81 - 92)  BP: 146/69 (23 Aug 2022 05:53) (112/67 - 172/75)  BP(mean): 82 (23 Aug 2022 00:11) (82 - 82)  RR: 20 (23 Aug 2022 05:53) (19 - 23)  SpO2: 97% (23 Aug 2022 05:53) (94% - 98%)    Parameters below as of 23 Aug 2022 05:53  Patient On (Oxygen Delivery Method): room air      CAPILLARY BLOOD GLUCOSE            GENERAL: NAD, well-groomed,  HEAD:  atraumatic, normocephalic  EYES: sclera anicteric  ENMT: mucous membranes moist  NECK: supple, No JVD  CHEST/LUNG: clear to auscultation bilaterally;    no      rales   ,   no rhonchi,   HEART: normal S1, S2  ABDOMEN: BS+, soft, ND, NT   EXTREMITIES:    no    edema    b/l LEs  NEURO: awake, ,     moves all extremities  SKIN: no     rash

## 2022-08-24 DIAGNOSIS — E11.9 TYPE 2 DIABETES MELLITUS WITHOUT COMPLICATIONS: ICD-10-CM

## 2022-08-24 DIAGNOSIS — I10 ESSENTIAL (PRIMARY) HYPERTENSION: ICD-10-CM

## 2022-08-24 DIAGNOSIS — Z94.0 KIDNEY TRANSPLANT STATUS: ICD-10-CM

## 2022-08-24 DIAGNOSIS — E78.5 HYPERLIPIDEMIA, UNSPECIFIED: ICD-10-CM

## 2022-08-24 DIAGNOSIS — D84.9 IMMUNODEFICIENCY, UNSPECIFIED: ICD-10-CM

## 2022-08-24 LAB
A1C WITH ESTIMATED AVERAGE GLUCOSE RESULT: 5.4 % — SIGNIFICANT CHANGE UP (ref 4–5.6)
ANION GAP SERPL CALC-SCNC: 12 MMOL/L — SIGNIFICANT CHANGE UP (ref 5–17)
BUN SERPL-MCNC: 18 MG/DL — SIGNIFICANT CHANGE UP (ref 7–23)
CALCIUM SERPL-MCNC: 9.4 MG/DL — SIGNIFICANT CHANGE UP (ref 8.4–10.5)
CHLORIDE SERPL-SCNC: 107 MMOL/L — SIGNIFICANT CHANGE UP (ref 96–108)
CO2 SERPL-SCNC: 22 MMOL/L — SIGNIFICANT CHANGE UP (ref 22–31)
CREAT SERPL-MCNC: 0.57 MG/DL — SIGNIFICANT CHANGE UP (ref 0.5–1.3)
CULTURE RESULTS: SIGNIFICANT CHANGE UP
CULTURE RESULTS: SIGNIFICANT CHANGE UP
EGFR: 105 ML/MIN/1.73M2 — SIGNIFICANT CHANGE UP
ESTIMATED AVERAGE GLUCOSE: 108 MG/DL — SIGNIFICANT CHANGE UP (ref 68–114)
GLUCOSE BLDC GLUCOMTR-MCNC: 122 MG/DL — HIGH (ref 70–99)
GLUCOSE BLDC GLUCOMTR-MCNC: 126 MG/DL — HIGH (ref 70–99)
GLUCOSE BLDC GLUCOMTR-MCNC: 127 MG/DL — HIGH (ref 70–99)
GLUCOSE BLDC GLUCOMTR-MCNC: 141 MG/DL — HIGH (ref 70–99)
GLUCOSE SERPL-MCNC: 107 MG/DL — HIGH (ref 70–99)
HCT VFR BLD CALC: 42.2 % — SIGNIFICANT CHANGE UP (ref 39–50)
HCV AB S/CO SERPL IA: 0.21 S/CO — SIGNIFICANT CHANGE UP (ref 0–0.99)
HCV AB SERPL-IMP: SIGNIFICANT CHANGE UP
HGB BLD-MCNC: 14.1 G/DL — SIGNIFICANT CHANGE UP (ref 13–17)
MCHC RBC-ENTMCNC: 31.7 PG — SIGNIFICANT CHANGE UP (ref 27–34)
MCHC RBC-ENTMCNC: 33.4 GM/DL — SIGNIFICANT CHANGE UP (ref 32–36)
MCV RBC AUTO: 94.8 FL — SIGNIFICANT CHANGE UP (ref 80–100)
MRSA PCR RESULT.: SIGNIFICANT CHANGE UP
NRBC # BLD: 0 /100 WBCS — SIGNIFICANT CHANGE UP (ref 0–0)
PLATELET # BLD AUTO: 135 K/UL — LOW (ref 150–400)
POTASSIUM SERPL-MCNC: 4.1 MMOL/L — SIGNIFICANT CHANGE UP (ref 3.5–5.3)
POTASSIUM SERPL-SCNC: 4.1 MMOL/L — SIGNIFICANT CHANGE UP (ref 3.5–5.3)
RBC # BLD: 4.45 M/UL — SIGNIFICANT CHANGE UP (ref 4.2–5.8)
RBC # FLD: 13.3 % — SIGNIFICANT CHANGE UP (ref 10.3–14.5)
S AUREUS DNA NOSE QL NAA+PROBE: SIGNIFICANT CHANGE UP
SODIUM SERPL-SCNC: 141 MMOL/L — SIGNIFICANT CHANGE UP (ref 135–145)
SPECIMEN SOURCE: SIGNIFICANT CHANGE UP
SPECIMEN SOURCE: SIGNIFICANT CHANGE UP
TACROLIMUS SERPL-MCNC: 6.7 NG/ML — SIGNIFICANT CHANGE UP
VANCOMYCIN FLD-MCNC: <4 UG/ML — SIGNIFICANT CHANGE UP
WBC # BLD: 5.5 K/UL — SIGNIFICANT CHANGE UP (ref 3.8–10.5)
WBC # FLD AUTO: 5.5 K/UL — SIGNIFICANT CHANGE UP (ref 3.8–10.5)

## 2022-08-24 PROCEDURE — 99222 1ST HOSP IP/OBS MODERATE 55: CPT | Mod: GC

## 2022-08-24 PROCEDURE — 99233 SBSQ HOSP IP/OBS HIGH 50: CPT

## 2022-08-24 PROCEDURE — 99222 1ST HOSP IP/OBS MODERATE 55: CPT

## 2022-08-24 RX ORDER — TACROLIMUS 5 MG/1
1.5 CAPSULE ORAL
Refills: 0 | Status: DISCONTINUED | OUTPATIENT
Start: 2022-08-24 | End: 2022-08-31

## 2022-08-24 RX ORDER — TACROLIMUS 5 MG/1
1 CAPSULE ORAL
Refills: 0 | Status: DISCONTINUED | OUTPATIENT
Start: 2022-08-24 | End: 2022-08-31

## 2022-08-24 RX ORDER — MYCOPHENOLATE MOFETIL 250 MG/1
500 CAPSULE ORAL
Refills: 0 | Status: DISCONTINUED | OUTPATIENT
Start: 2022-08-24 | End: 2022-08-24

## 2022-08-24 RX ORDER — MYCOPHENOLATE MOFETIL 250 MG/1
1000 CAPSULE ORAL
Refills: 0 | Status: DISCONTINUED | OUTPATIENT
Start: 2022-08-24 | End: 2022-08-31

## 2022-08-24 RX ORDER — CHLORHEXIDINE GLUCONATE 213 G/1000ML
1 SOLUTION TOPICAL DAILY
Refills: 0 | Status: DISCONTINUED | OUTPATIENT
Start: 2022-08-24 | End: 2022-08-31

## 2022-08-24 RX ORDER — INSULIN ASPART 100 [IU]/ML
1 INJECTION, SOLUTION SUBCUTANEOUS
Refills: 0 | Status: DISCONTINUED | OUTPATIENT
Start: 2022-08-24 | End: 2022-08-31

## 2022-08-24 RX ORDER — VANCOMYCIN HCL 1 G
1250 VIAL (EA) INTRAVENOUS ONCE
Refills: 0 | Status: COMPLETED | OUTPATIENT
Start: 2022-08-24 | End: 2022-08-24

## 2022-08-24 RX ADMIN — Medication 166.67 MILLIGRAM(S): at 18:36

## 2022-08-24 RX ADMIN — Medication 30 MILLIGRAM(S): at 21:43

## 2022-08-24 RX ADMIN — MYCOPHENOLATE MOFETIL 1000 MILLIGRAM(S): 250 CAPSULE ORAL at 07:54

## 2022-08-24 RX ADMIN — Medication 10 MILLIGRAM(S): at 05:20

## 2022-08-24 RX ADMIN — TACROLIMUS 1.5 MILLIGRAM(S): 5 CAPSULE ORAL at 07:53

## 2022-08-24 RX ADMIN — ATORVASTATIN CALCIUM 20 MILLIGRAM(S): 80 TABLET, FILM COATED ORAL at 21:41

## 2022-08-24 RX ADMIN — Medication 60 MILLIGRAM(S): at 05:20

## 2022-08-24 RX ADMIN — Medication 200 MILLIGRAM(S): at 05:20

## 2022-08-24 RX ADMIN — Medication 200 MILLIGRAM(S): at 13:18

## 2022-08-24 RX ADMIN — Medication 20 MILLIGRAM(S): at 05:20

## 2022-08-24 RX ADMIN — Medication 100 MILLIGRAM(S): at 11:48

## 2022-08-24 RX ADMIN — CLOPIDOGREL BISULFATE 75 MILLIGRAM(S): 75 TABLET, FILM COATED ORAL at 11:49

## 2022-08-24 RX ADMIN — HEPARIN SODIUM 5000 UNIT(S): 5000 INJECTION INTRAVENOUS; SUBCUTANEOUS at 17:57

## 2022-08-24 RX ADMIN — CHLORHEXIDINE GLUCONATE 1 APPLICATION(S): 213 SOLUTION TOPICAL at 11:48

## 2022-08-24 RX ADMIN — CEFTRIAXONE 100 MILLIGRAM(S): 500 INJECTION, POWDER, FOR SOLUTION INTRAMUSCULAR; INTRAVENOUS at 17:56

## 2022-08-24 RX ADMIN — Medication 200 MILLIGRAM(S): at 21:42

## 2022-08-24 RX ADMIN — FINASTERIDE 5 MILLIGRAM(S): 5 TABLET, FILM COATED ORAL at 11:48

## 2022-08-24 RX ADMIN — Medication 81 MILLIGRAM(S): at 11:49

## 2022-08-24 RX ADMIN — HEPARIN SODIUM 5000 UNIT(S): 5000 INJECTION INTRAVENOUS; SUBCUTANEOUS at 05:21

## 2022-08-24 RX ADMIN — TACROLIMUS 1.5 MILLIGRAM(S): 5 CAPSULE ORAL at 20:22

## 2022-08-24 RX ADMIN — MYCOPHENOLATE MOFETIL 1000 MILLIGRAM(S): 250 CAPSULE ORAL at 17:57

## 2022-08-24 NOTE — CONSULT NOTE ADULT - TIME BILLING
Kidney transplant recipient with functioning allograft, Normal creatinine  Noted to have bacteremia  Clinical, lab, imaging and any available microbiology data reviewed  reviewed immunosuppression and antimicrobial regimen as well as management regimen for comorbidities  Suggestions:  Follow repeat blood culture   Continue out patient immunosuppression  Will follow  I was present during and reviewed clinical and lab data as well as assessment and plan as documented by the house staff as noted. Please contact if any additional questions with any change in clinical condition or on availability of any additional information or reports.

## 2022-08-24 NOTE — PATIENT PROFILE ADULT - PRO INTERPRETER NEED 2
Billing Type: Third-Party Bill English Expected Date Of Service: 11/01/2019 Bill For Surgical Tray: no

## 2022-08-24 NOTE — PATIENT PROFILE ADULT - NSPROPTRIGHTCAREGIVER_GEN_A_NUR
[de-identified] : General: patient is well developed, well nourished, in no acute \par distress, alert and oriented x 3. \par \par Mood and affect: normal\par \par Respiratory: no respiratory distress noted\par \par Skin: no scars over spine, skin intact, no erythema, increased warmth\par \par Alignment:The spine is well compensated in the coronal and sagittal plane.  There is no asymmetry on the leblanc forward bend test\par \par Gait: The patient is able to toe walk and heel walk without difficulty. The patient is able to tandem gait without difficulty.\par \par Palpation: no tenderness to palpation spine or paraspinal region\par \par Range of motion: Lumbar spine ROM is full\par \par Neurologic Exam:\par Motor: Manual Muscle testing in the lower extremities is 5 out of 5 in all muscle groups. There is no evidence of muscular atrophy in the lower extremities. Sensory: Sensation to light touch is grossly intact in the lower extremities\par \par Reflexes: DTR are present and symmetric throughout, negative stevens bilaterally, negative inverted radial reflex bilaterally, no clonus, plantar responses are flexor\par \par Hip Exam: No pain with internal or external rotation of hips bilaterally\par \par Special tests: Straight leg raise test negative.  Cross straight leg test negative.  HERBIE test negative\par \par Vascular: Examination of the peripheral vascular system demonstrates no evidence of congestion or edema. no evidence of lymphedema bilateral lower extremities, pulses are present and symmetric in both lower extremities.\par \par  [de-identified] : XR 4/26/21: mild L1 compression deformity; mild L4/L5 and L5/S1 disc degeneration, thoracolumbar dextroscoliosis, no spondylolisthesis declines

## 2022-08-24 NOTE — CONSULT NOTE ADULT - ASSESSMENT
71-year-old Male with significant history LRRT in 2013, DM, CAD s/p stents, FRANCOIS (on CPAP), HLD, HTN who was admitted at [facility] on 8/23/22 for streptococcus bacteremia.

## 2022-08-24 NOTE — PATIENT PROFILE ADULT - FALL HARM RISK - UNIVERSAL INTERVENTIONS
Bed in lowest position, wheels locked, appropriate side rails in place/Call bell, personal items and telephone in reach/Instruct patient to call for assistance before getting out of bed or chair/Non-slip footwear when patient is out of bed/[place] to call system/Physically safe environment - no spills, clutter or unnecessary equipment/Purposeful Proactive Rounding/Room/bathroom lighting operational, light cord in reach

## 2022-08-24 NOTE — PHARMACOTHERAPY INTERVENTION NOTE - COMMENTS
Performed medication reconciliation and home medication list updated in outpatient medication review. Medications verified with patient and wife at bedside.     Home Medications:  alfuzosin 10 mg oral tablet, extended release: 1 tab(s) orally once a day   allopurinol 100 mg oral tablet: 1 tab(s) orally once a day at pm   Aspirin Enteric Coated 81 mg oral delayed release tablet: 1 tab(s) orally once a day   atorvastatin 20 mg oral tablet: 1 tab(s) orally once a day (at bedtime)   CellCept 500 mg oral tablet: 2 tab(s) orally 2 times a day  enalapril 10 mg oral tablet: 1 tab(s) orally 2 times a day  finasteride 5 mg oral tablet: 1 tab(s) orally once a day  insulin aspart: Pump  labetalol 200 mg oral tablet: 1 tab(s) orally 3 times a day   Myrbetriq 50 mg oral tablet, extended release: 1 tab(s) orally once a day   potassium chloride 20 mEq oral tablet, extended release: 1 tab(s) orally once a day  Procardia XL 60 mg oral tablet in morning and XL 30mg in evening  tacrolimus (Prograf) 1mg in morning and 1.5mg in evening  Ozempic 1mg once weekly on Mondays    Please refer to specifics in home medication list (outpatient medication review).    Recommendations:  1) Wife brought in Novolog insulin. Patient is T2DM, on insulin pump and Ozempic. Endo to come and see patient. Novolog stored in fridge in medication room and available for when patient is ready to replace/ refill the pump. Per wife, not ready to change pump yet because pt still has half of his insulin left but will change it tonight.   2) Per pt and wife, pt taking aspirin and plavix after mitral surgery with Dr. Gill and placed on DAPT for 3 months. Pt stopped Plavix end of Feb 2022. Recommend to d/c inpatient order for Plavix.  3) Pt is on tacrolimus 1mg in AM and 1.5mg in PM. Per wife, pt has been on this dose for a while. Currently, ordered for 1.5mg @ 8AM and 8PM. Recommend resuming home dose of tacrolimus and getting level tomorrow morning 30mins prior to 8AM dose to assess.     Breanne Grider, PharmD, BCPS  Clinical Pharmacy Specialist  [phone] or Teams  Performed medication reconciliation and home medication list updated in outpatient medication review. Medications verified with patient and wife at bedside.     Home Medications:  alfuzosin 10 mg oral tablet, extended release: 1 tab(s) orally once a day   allopurinol 100 mg oral tablet: 1 tab(s) orally once a day at pm   Aspirin Enteric Coated 81 mg oral delayed release tablet: 1 tab(s) orally once a day   atorvastatin 20 mg oral tablet: 1 tab(s) orally once a day (at bedtime)   CellCept 500 mg oral tablet: 2 tab(s) orally 2 times a day  enalapril 10 mg oral tablet: 1 tab(s) orally 2 times a day  finasteride 5 mg oral tablet: 1 tab(s) orally once a day  insulin aspart: Pump  labetalol 200 mg oral tablet: 1 tab(s) orally 3 times a day   Myrbetriq 50 mg oral tablet, extended release: 1 tab(s) orally once a day   potassium chloride 20 mEq oral tablet, extended release: 1 tab(s) orally once a day  Procardia XL 60 mg oral tablet in morning and XL 30mg in evening  tacrolimus (Prograf) 1mg in morning and 1.5mg in evening  Ozempic 0.5mg once weekly on Mondays  torsemide 10mg every other day    Please refer to specifics in home medication list (outpatient medication review).    Recommendations:  1) Wife brought in Novolog insulin. Patient is T2DM, on insulin pump and Ozempic. Endo to come and see patient. Novolog stored in fridge in medication room and available for when patient is ready to replace/ refill the pump. Per wife, not ready to change pump yet because pt still has half of his insulin left but will change it tonight.   2) Per pt and wife, pt taking aspirin and plavix after mitral surgery with Dr. Gill and placed on DAPT for 3 months. Pt stopped Plavix end of Feb 2022. Recommend to d/c inpatient order for Plavix.  3) Pt is on tacrolimus 1mg in AM and 1.5mg in PM. Per wife, pt has been on this dose for a while. Currently, ordered for 1.5mg @ 8AM and 8PM. Recommend resuming home dose of tacrolimus and getting level tomorrow morning 30mins prior to 8AM dose to assess.     Breanne Grider, PharmD, [facility]  Clinical Pharmacy Specialist  [phone] or Teams

## 2022-08-24 NOTE — CONSULT NOTE ADULT - SUBJECTIVE AND OBJECTIVE BOX
HPI:  72 y/o M       with h/o ESRD,  (s/p renal transplant 2013 ,  DM,       CAD   s/p stents , FRANCOIS (on cpap qHS), HLD, HTN,         presenting with 2 episodes of felt lightheaded and nearly passed out twice while in the bathroom,   states legs gave out; felt generalized weakness;    denies passing out/LOC, denies head trauma.    No   cp/sob //palpitations. No fever. 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 Has been on an insulin pump for 6 years. Denies retinopathy, nephropathy, neuropathy.    Patient checks FSG with glucometer. Doesnt have A CGM    [employer] pump w/ novolog insulin settings  Glucose target   TDD is 16.5 units   12AM-5AM 0.6 units  5AM-12AM 0.7 units    ICR:  12AM-11PM 1:15  11PM-12PM 1:20    ISF 1:50 all day    Endocrinologist is Dr. Jyoti Byrd    PAST MEDICAL & SURGICAL HISTORY:  Morbid Obesity      HTN (Hypertension), Benign      Hyperuricemia      Hyperlipidemia      Smoking      DM (diabetes mellitus), type 2      FRANCOIS on CPAP      H/O kidney transplant  2013      CAD (coronary artery disease)  1 stent      Kidney transplant recipient  Rt kidney- 2013      AV fistula    FAMILY HISTORY: no history of DM2      Social History: denies all toxic habits    Home Medications:  alfuzosin 10 mg oral tablet, extended release: 1 tab(s) orally once a day (24 Aug 2022 11:34)  allopurinol 100 mg oral tablet: 1 tab(s) orally once a day at pm (24 Aug 2022 11:34)  Aspirin Enteric Coated 81 mg oral delayed release tablet: 1 tab(s) orally once a day (24 Aug 2022 11:34)  atorvastatin 20 mg oral tablet: 1 tab(s) orally once a day (at bedtime) (24 Aug 2022 11:34)  CellCept 500 mg oral tablet: 2 tab(s) orally 2 times a day (24 Aug 2022 11:34)  enalapril 10 mg oral tablet: 1 tab(s) orally 2 times a day (24 Aug 2022 11:34)  finasteride 5 mg oral tablet: 1 tab(s) orally once a day (24 Aug 2022 11:34)  insulin aspart: Pump settings above       (24 Aug 2022 11:34)  labetalol 200 mg oral tablet: 1 tab(s) orally 3 times a day (24 Aug 2022 11:34)  Myrbetriq 50 mg oral tablet, extended release: 1 tab(s) orally once a day (24 Aug 2022 11:34)  NovoLOG 100 units/mL injectable solution: 10 unit(s) injectable 3 times a day (after meals) (24 Aug 2022 11:34)  potassium chloride 20 mEq oral tablet, extended release: 1 tab(s) orally once a day (24 Aug 2022 11:34)  Procardia XL 30 mg oral tablet, extended release: 1 tab(s) orally once a day (at bedtime) (24 Aug 2022 11:34)  Procardia XL 60 mg oral tablet, extended release: 1 tab(s) orally once a day in am (24 Aug 2022 11:34)  tacrolimus 1 mg oral tablet, extended release: Take orally twice a day 1 tab(s) (in the morning).     **total day dosage of 2.5 mg  (24 Aug 2022 11:34)  tacrolimus 1 mg oral tablet, extended release: 1.5 tab(s) orally once a day (in the evening)    ***total daily dose of 2.5 mg (24 Aug 2022 11:34)  torsemide 10 mg oral tablet: 1 tab(s) orally every other day (24 Aug 2022 11:34)      MEDICATIONS  (STANDING):  acetaminophen   IVPB .. 1000 milliGRAM(s) IV Intermittent once  allopurinol 100 milliGRAM(s) Oral daily  aspirin enteric coated 81 milliGRAM(s) Oral daily  atorvastatin 20 milliGRAM(s) Oral at bedtime  cefTRIAXone   IVPB      cefTRIAXone   IVPB 2000 milliGRAM(s) IV Intermittent every 24 hours  chlorhexidine 2% Cloths 1 Application(s) Topical daily  dextrose 5%. 1000 milliLiter(s) (50 mL/Hr) IV Continuous <Continuous>  dextrose 5%. 1000 milliLiter(s) (100 mL/Hr) IV Continuous <Continuous>  dextrose 50% Injectable 25 Gram(s) IV Push once  dextrose 50% Injectable 12.5 Gram(s) IV Push once  dextrose 50% Injectable 25 Gram(s) IV Push once  enalapril 10 milliGRAM(s) Oral daily  finasteride 5 milliGRAM(s) Oral daily  glucagon  Injectable 1 milliGRAM(s) IntraMuscular once  heparin   Injectable 5000 Unit(s) SubCutaneous every 12 hours  insulin aspart (NovoLOG) Pump 1 Each SubCutaneous Continuous Pump  labetalol 200 milliGRAM(s) Oral three times a day  mycophenolate mofetil 500 milliGRAM(s) Oral <User Schedule>  NIFEdipine XL 30 milliGRAM(s) Oral at bedtime  NIFEdipine XL 60 milliGRAM(s) Oral daily  tacrolimus 1.5 milliGRAM(s) Oral <User Schedule>  tacrolimus 1 milliGRAM(s) Oral <User Schedule>    MEDICATIONS  (PRN):  dextrose Oral Gel 15 Gram(s) Oral once PRN Blood Glucose LESS THAN 70 milliGRAM(s)/deciliter      Allergies    No Known Allergies    Intolerances      Review of Systems:  Constitutional: No fever  Eyes: No blurry vision  Neuro: No tremors  HEENT: No pain  Cardiovascular: No chest pain, palpitations  Respiratory: No SOB, no cough  GI: No nausea, vomiting, abdominal pain  : No dysuria  Skin: no rash  Psych: no depression  Endocrine: no polyuria, polydipsia  Hem/lymph: no swelling  Osteoporosis: no fractures    PHYSICAL EXAM:  VITALS: T(C): 36.8 (08-24-22 @ 11:07)  T(F): 98.2 (08-24-22 @ 11:07), Max: 99.2 (08-24-22 @ 04:51)  HR: 67 (08-24-22 @ 13:15) (67 - 80)  BP: 130/63 (08-24-22 @ 13:15) (121/67 - 156/53)  RR:  (18 - 18)  SpO2:  (94% - 98%)  Wt(kg): --  GENERAL: NAD  EYES: No proptosis, no lid lag, anicteric  THYROID: Normal size, no palpable nodules  RESPIRATORY: Clear to auscultation bilaterally  CARDIOVASCULAR: Regular rate and rhythm  GI: Soft, nontender, non distended  EXT: b/l feet without wounds; 2+ pulses  PSYCH: Alert and oriented x 3, reactive mood    POCT Blood Glucose.: 141 mg/dL (08-24-22 @ 12:00)  POCT Blood Glucose.: 127 mg/dL (08-24-22 @ 08:01)  POCT Blood Glucose.: 115 mg/dL (08-23-22 @ 09:23)                            14.1   5.50  )-----------( 135      ( 24 Aug 2022 07:05 )             42.2       08-24    141  |  107  |  18  ----------------------------<  107<H>  4.1   |  22  |  0.57    eGFR: 105    Ca    9.4      08-24    TPro  7.9  /  Alb  4.5  /  TBili  1.0  /  DBili  x   /  AST  82<H>  /  ALT  25  /  AlkPhos  74  08-22      Thyroid Function Tests:      A1C with Estimated Average Glucose Result: 5.4 % (08-24-22 @ 07:05)  A1C with Estimated Average Glucose Result: 5.0 % (11-30-21 @ 16:06)  A1C with Estimated Average Glucose Result: 4.9 % (11-23-21 @ 09:08)          Radiology:

## 2022-08-24 NOTE — PROGRESS NOTE ADULT - SUBJECTIVE AND OBJECTIVE BOX
Follow Up:      Interval History:    REVIEW OF SYSTEMS  [  ] ROS unobtainable because:    [  ] All other systems negative except as noted below    Constitutional:  [ ] fever [ ] chills  [ ] weight loss  [ ] weakness  Skin:  [ ] rash [ ] phlebitis	  Eyes: [ ] icterus [ ] pain  [ ] discharge	  ENMT: [ ] sore throat  [ ] thrush [ ] ulcers [ ] exudates  Respiratory: [ ] dyspnea [ ] hemoptysis [ ] cough [ ] sputum	  Cardiovascular:  [ ] chest pain [ ] palpitations [ ] edema	  Gastrointestinal:  [ ] nausea [ ] vomiting [ ] diarrhea [ ] constipation [ ] pain	  Genitourinary:  [ ] dysuria [ ] frequency [ ] hematuria [ ] discharge [ ] flank pain  [ ] incontinence  Musculoskeletal:  [ ] myalgias [ ] arthralgias [ ] arthritis  [ ] back pain  Neurological:  [ ] headache [ ] seizures  [ ] confusion/altered mental status    Allergies  No Known Allergies        ANTIMICROBIALS:  cefTRIAXone   IVPB    cefTRIAXone   IVPB 2000 every 24 hours      OTHER MEDS:  MEDICATIONS  (STANDING):  acetaminophen   IVPB .. 1000 once  allopurinol 100 daily  aspirin enteric coated 81 daily  atorvastatin 20 at bedtime  dextrose 50% Injectable 25 once  dextrose 50% Injectable 12.5 once  dextrose 50% Injectable 25 once  dextrose Oral Gel 15 once PRN  enalapril 10 daily  finasteride 5 daily  glucagon  Injectable 1 once  heparin   Injectable 5000 every 12 hours  insulin aspart (NovoLOG) Pump 1 Continuous Pump  labetalol 200 three times a day  mycophenolate mofetil 1000 two times a day  NIFEdipine XL 30 at bedtime  NIFEdipine XL 60 daily  tacrolimus 1.5 <User Schedule>  tacrolimus 1 <User Schedule>      Vital Signs Last 24 Hrs  T(C): 36.8 (24 Aug 2022 11:07), Max: 37.3 (24 Aug 2022 04:51)  T(F): 98.2 (24 Aug 2022 11:07), Max: 99.2 (24 Aug 2022 04:51)  HR: 67 (24 Aug 2022 13:15) (67 - 80)  BP: 130/63 (24 Aug 2022 13:15) (121/67 - 156/53)  BP(mean): --  RR: 18 (24 Aug 2022 11:07) (18 - 18)  SpO2: 95% (24 Aug 2022 11:07) (94% - 98%)    Parameters below as of 24 Aug 2022 11:07  Patient On (Oxygen Delivery Method): room air        PHYSICAL EXAMINATION:  General: Alert and Awake, NAD  HEENT: PERRL, EOMI  Neck: Supple  Cardiac: RRR, No M/R/G  Resp: CTAB, No Wh/Rh/Ra  Abdomen: NBS, NT/ND, No HSM, No rigidity or guarding  MSK: No LE edema. No Calf tenderness  : No thurston  Skin: No rashes or lesions. Skin is warm and dry to the touch.   Neuro: Alert and Awake. CN 2-12 Grossly intact. Moves all four extremities spontaneously.  Psych: Calm, Pleasant, Cooperative                          14.1   5.50  )-----------( 135      ( 24 Aug 2022 07:05 )             42.2           141  |  107  |  18  ----------------------------<  107<H>  4.1   |  22  |  0.57    Ca    9.4      24 Aug 2022 07:07    TPro  7.9  /  Alb  4.5  /  TBili  1.0  /  DBili  x   /  AST  82<H>  /  ALT  25  /  AlkPhos  74        Urinalysis Basic - ( 23 Aug 2022 04:41 )    Color: Yellow / Appearance: Clear / S.031 / pH: x  Gluc: x / Ketone: Negative  / Bili: Negative / Urobili: Negative   Blood: x / Protein: 30 mg/dL / Nitrite: Negative   Leuk Esterase: Negative / RBC: 5 /hpf / WBC 1 /HPF   Sq Epi: x / Non Sq Epi: 1 /hpf / Bacteria: Negative        MICROBIOLOGY:  Vancomycin Level, Random: <4.0 ug/mL (22 @ 07:05)  v  Clean Catch Clean Catch (Midstream)  22   <10,000 CFU/mL Normal Urogenital Heather  --  --      .Blood Blood-Peripheral  22   Growth in aerobic and anaerobic bottles: Streptococcus gallolyticus  See previous culture [id number]  --    Growth in aerobic and anaerobic bottles: Gram Positive Cocci in Pairs and  Chains      .Blood Blood-Peripheral  22   Growth in aerobic and anaerobic bottles: Streptococcus gallolyticus  Susceptibility to follow.  ***Blood Panel PCR results on this specimen are available  approximately 3 hours after the Gram stain result.***  Gram stain, PCR, and/or culture results may not always  correspond due to difference in methodologies.  ************************************************************  This PCR assay was performed by multiplex PCR. This  Assay tests for 66 bacterial and resistance gene targets.  Please refer to the [facility] test directory  at [email or web address] for details.  --  Blood Culture PCR          Rapid RVP Result: NotDetec ( @ 01:09)        RADIOLOGY:    <The imaging below has been reviewed and visualized by me independently. Findings as detailed in report below> Follow Up:  Bacteremia    Interval History: afebrile overnight. still noting left knee pain but preserved ROM    REVIEW OF SYSTEMS  [  ] ROS unobtainable because:    [ x ] All other systems negative except as noted below    Constitutional:  [ ] fever [ ] chills  [ ] weight loss  [ ] weakness  Skin:  [ ] rash [ ] phlebitis	  Eyes: [ ] icterus [ ] pain  [ ] discharge	  ENMT: [ ] sore throat  [ ] thrush [ ] ulcers [ ] exudates  Respiratory: [ ] dyspnea [ ] hemoptysis [ ] cough [ ] sputum	  Cardiovascular:  [ ] chest pain [ ] palpitations [ ] edema	  Gastrointestinal:  [ ] nausea [ ] vomiting [ ] diarrhea [ ] constipation [ ] pain	  Genitourinary:  [ ] dysuria [ ] frequency [ ] hematuria [ ] discharge [ ] flank pain  [ ] incontinence  Musculoskeletal:  [ ] myalgias [ ] arthralgias [ ] arthritis  [ ] back pain +left knee pain  Neurological:  [ ] headache [ ] seizures  [ ] confusion/altered mental status    Allergies  No Known Allergies        ANTIMICROBIALS:  cefTRIAXone   IVPB    cefTRIAXone   IVPB 2000 every 24 hours      OTHER MEDS:  MEDICATIONS  (STANDING):  acetaminophen   IVPB .. 1000 once  allopurinol 100 daily  aspirin enteric coated 81 daily  atorvastatin 20 at bedtime  dextrose 50% Injectable 25 once  dextrose 50% Injectable 12.5 once  dextrose 50% Injectable 25 once  dextrose Oral Gel 15 once PRN  enalapril 10 daily  finasteride 5 daily  glucagon  Injectable 1 once  heparin   Injectable 5000 every 12 hours  insulin aspart (NovoLOG) Pump 1 Continuous Pump  labetalol 200 three times a day  mycophenolate mofetil 1000 two times a day  NIFEdipine XL 30 at bedtime  NIFEdipine XL 60 daily  tacrolimus 1.5 <User Schedule>  tacrolimus 1 <User Schedule>      Vital Signs Last 24 Hrs  T(C): 36.8 (24 Aug 2022 11:07), Max: 37.3 (24 Aug 2022 04:51)  T(F): 98.2 (24 Aug 2022 11:07), Max: 99.2 (24 Aug 2022 04:51)  HR: 67 (24 Aug 2022 13:15) (67 - 80)  BP: 130/63 (24 Aug 2022 13:15) (121/67 - 156/53)  BP(mean): --  RR: 18 (24 Aug 2022 11:07) (18 - 18)  SpO2: 95% (24 Aug 2022 11:07) (94% - 98%)    Parameters below as of 24 Aug 2022 11:07  Patient On (Oxygen Delivery Method): room air    PHYSICAL EXAMINATION:  General: Alert and Awake, NAD  HEENT: PERRL, EOMI  Cardiac: RRR, No M/R/G  Resp: CTAB, No Wh/Rh/Ra  Abdomen: NBS, NT/ND, No HSM, No rigidity or guarding  MSK: L Knee with some mild tenderness to palpation, small effusion, minimal warmth to touch and ROM preserved No Calf tenderness  : No thurston  Skin: No rashes or lesions. Skin is warm and dry to the touch.   Neuro: Alert and Awake. CN 2-12 Grossly intact. Moves all four extremities spontaneously.  Psych: Calm, Pleasant, Cooperative                          14.1   5.50  )-----------( 135      ( 24 Aug 2022 07:05 )             42.2           141  |  107  |  18  ----------------------------<  107<H>  4.1   |  22  |  0.57    Ca    9.4      24 Aug 2022 07:07    TPro  7.9  /  Alb  4.5  /  TBili  1.0  /  DBili  x   /  AST  82<H>  /  ALT  25  /  AlkPhos  74        Urinalysis Basic - ( 23 Aug 2022 04:41 )    Color: Yellow / Appearance: Clear / S.031 / pH: x  Gluc: x / Ketone: Negative  / Bili: Negative / Urobili: Negative   Blood: x / Protein: 30 mg/dL / Nitrite: Negative   Leuk Esterase: Negative / RBC: 5 /hpf / WBC 1 /HPF   Sq Epi: x / Non Sq Epi: 1 /hpf / Bacteria: Negative        MICROBIOLOGY:  Vancomycin Level, Random: <4.0 ug/mL (22 @ 07:05)  v  Clean Catch Clean Catch (Midstream)  22   <10,000 CFU/mL Normal Urogenital Heather  --  --      .Blood Blood-Peripheral  22   Growth in aerobic and anaerobic bottles: Streptococcus gallolyticus  See previous culture [id number]  --    Growth in aerobic and anaerobic bottles: Gram Positive Cocci in Pairs and  Chains      .Blood Blood-Peripheral  08-22-22   Growth in aerobic and anaerobic bottles: Streptococcus gallolyticus  Susceptibility to follow.  ***Blood Panel PCR results on this specimen are available  approximately 3 hours after the Gram stain result.***  Gram stain, PCR, and/or culture results may not always  correspond due to difference in methodologies.  ************************************************************  This PCR assay was performed by multiplex PCR. This  Assay tests for 66 bacterial and resistance gene targets.  Please refer to the [facility] test directory  at [email or web address] for details.  --  Blood Culture PCR          Rapid RVP Result: NotDetec ( @ 01:09)        RADIOLOGY:    <The imaging below has been reviewed and visualized by me independently. Findings as detailed in report below>    < from: CT Abdomen and Pelvis No Cont (22 @ 20:10) >  IMPRESSION:  No source of infection identified in this noncontrast study.    Indeterminant 3.1 x 2.5 cm mass within the right native kidney, recommend   US or MRI for further characterization.    < end of copied text >

## 2022-08-24 NOTE — CONSULT NOTE ADULT - ASSESSMENT
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 Patient currently mentating well and has supplies and new insulin vial. Patient can maintain using the insulin pump while inpatient for now. If patient develops AMS or pump malfunctions, will need to switch to basal-bolus insulin. Overall, HbA1c is quite low for patient on insulin. While no reported hypoglycemia at home, patient does not have CGM to capture this. Likely has hypoglycemia unawareness at baseline.    #DM2 on insulin pump  A1c: 5.4%   Home meds: ozempic 0.5mg daily. Insulin pump with novolog  GFR/Cr: 0.67  - Blood glujcose target while in the hospital 100-180  -pump will need to be removed if patient becomes altered or cant manage pump independently  -pump attestation form already filled out  -can continue with insulin pump for now on the following settings:   Medtronic pump w/ novolog insulin settings  Glucose target   TDD is 16.5 units   12AM-5AM 0.6 units  5AM-12AM 0.7 units    ICR:  12AM-11PM 1:15  11PM-12PM 1:20    ISF 1:50 all day    -FSG with meals and at bedtime  -carb consistent diet  -outpatient nephropathy, retinopathy screening, and podiatry   DISCHARGE:   -resume insulin pump  -follow up with outpatient Endocrinologist Dr. Ruelas to discuss further pump setting changes. Can likely loosen glucose target to 110-120    # Dyslipidemia: c/w atorvastatin 20mg daily. LDL goal <70    # Hypertension: BP goal < 130/80, will defer to primary team   -c/w enalapril 10mg daily    Case discussed with Dr. Ras Medley MD  Endocrine Fellow  Can be reached via teams. 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 Patient currently mentating well and has supplies and new insulin vial. Patient can maintain using the insulin pump while inpatient for now. If patient develops AMS or pump malfunctions, will need to switch to basal-bolus insulin. Overall, HbA1c is quite low for patient on insulin. While no reported hypoglycemia at home, patient does not have CGM to capture this. Likely has hypoglycemia unawareness at baseline.    #DM2 on insulin pump  A1c: 5.4%   Home meds: ozempic 0.5mg daily. Insulin pump with novolog  GFR/Cr: 0.67  - Blood glujcose target while in the hospital 100-180    -pump attestation form already filled out  -can continue with insulin pump for now on the following settings:   Medtronic pump w/ novolog insulin settings  Glucose target   TDD is 16.5 units   12AM-5AM 0.6 units  5AM-12AM 0.7 units    ICR:  12AM-11PM 1:15  11PM-12PM 1:20    ISF 1:50 all day  -pump will need to be removed if patient becomes altered or cant manage pump independently  -if pump fails, give lantus 10 units and admelog 3 units with meals and a low correction scale with meals and bedtime  -FSG with meals and at bedtime  -carb consistent diet  -outpatient nephropathy, retinopathy screening, and podiatry   DISCHARGE:   -resume insulin pump  -follow up with outpatient Endocrinologist Dr. Ruelas to discuss further pump setting changes. Can likely loosen glucose target to 110-120    # Dyslipidemia: c/w atorvastatin 20mg daily. LDL goal <70    # Hypertension: BP goal < 130/80, will defer to primary team   -c/w enalapril 10mg daily    Case discussed with Dr. Ras Medley MD  Endocrine Fellow  Can be reached via teams. For follow up questions, discharge recommendations, or new consults, please call answering service at [phone] (weekdays); [phone] (nights/weekends)

## 2022-08-24 NOTE — CONSULT NOTE ADULT - PROBLEM SELECTOR RECOMMENDATION 2
Pt. currently is on prograf 1 mg in AM and 1.5 mg PO in evening and MMF 1 gm PO BID. Continue home IS meds. Check serum tacrolimus trough level 30 mins prior to AM dose. Goal level (4-7).     If you have any questions, please feel free to contact me  Alek Loredo  Nephrology Fellow  [phone]/ [employer] Teams(Preferred)  (After 5pm or on weekends please page the on-call fellow).

## 2022-08-24 NOTE — PATIENT PROFILE ADULT - PHONE #
· Bypass surgery performed in 2007 and 2016 with nutritional deficiency at [facility]  -patient will benefit from being transferred to [facility] for continuation of treatment [phone]

## 2022-08-24 NOTE — PATIENT PROFILE ADULT - BRAND OF COVID-19 VACCINATION
Please see ultrasound report under imaging tab for details on ultrasound performed today.    Mallory Rabago  Maternal-Fetal Medicine Specialist  Academic Office [phone]  Pager [phone]     [employer] dose 1, 2, and 3

## 2022-08-24 NOTE — PROGRESS NOTE ADULT - ASSESSMENT
70 y/o M with h/o ESRD, s/p renal transplant 2013 , DM, CAD s/p stents , FRANCOIS, HLD, HTN, presented to the ER with chief complaint of fever, generalized weakness and left knee pain after a fall yesterday.     Renal transplant recipient presenting with fevers  Noted steroid injection into left knee and trauma following fall but exam not consistent with septic arthritis   Blood cultures (8/22) with 4/4 Streptococcus gallolyticus    CT Chest (8/23) with chronic findings of interstitial lung disease  CT A/P (8/23) with no acute findings but with Indeterminant 3.1 x 2.5 cm mass within the right native kidney  TTE (8/23) without evidence of vegetations    Streptococcus gallolyticus (previously bovis) bacteremia often occurs in the context of underlying colonic lesion    #Streptococcus gallolyticus Bacteremia, Fever, Leukocytosis, Renal Transplant Recipient  --Recommend SHABNAM  --Given continue complaints of left knee pain would check left knee MRI (but low physical examination concern for septic arthritis)  --Will require colonoscopy (outpatient vs. inpatient) to exclude underlying colonic lesion  --Will give dose of Vancomycin 1.25g IV x1 pending Streptococcus gallolyticus susceptibilities  --Continue Ceftriaxone 2g IV Q24H  --Continue to follow CBC with diff  --Continue to follow temperature curve  --Follow up on preliminary blood cultures    I will continue to follow. Please feel free to contact me with any further questions.    Elvin Vallejo M.D.  [facility] Division of Infectious Disease  8AM-5PM Monday - Friday: Available on [employer] Teams  After Hours and Holidays (or if no response on [employer] Teams): Please contact the Infectious Diseases Office at [phone]    The above assessment and plan were discussed with medicine NP     72 y/o M with h/o ESRD, s/p renal transplant 2013 , DM, CAD s/p stents , FRANCOIS, HLD, HTN, presented to the ER with chief complaint of fever, generalized weakness and left knee pain after a fall yesterday.     Renal transplant recipient presenting with fevers  Noted steroid injection into left knee and trauma following fall but exam not consistent with septic arthritis   Blood cultures (8/22) with 4/4 Streptococcus gallolyticus    CT Chest (8/23) with chronic findings of interstitial lung disease  CT A/P (8/23) with no acute findings but with Indeterminant 3.1 x 2.5 cm mass within the right native kidney  TTE (8/23) without evidence of vegetations    Streptococcus gallolyticus (previously bovis) bacteremia often occurs in the context of underlying colonic lesion    #Streptococcus gallolyticus Bacteremia, Fever, Leukocytosis, Renal Transplant Recipient  --Recommend SHABNAM  --Given continued complaints of left knee pain would check left knee MRI (but low physical examination concern for septic arthritis)  --Will require colonoscopy (outpatient vs. inpatient) to exclude underlying colonic lesion  --Will give dose of Vancomycin 1.25g IV x1 pending Streptococcus gallolyticus susceptibilities  --Continue Ceftriaxone 2g IV Q24H  --Continue to follow CBC with diff  --Continue to follow temperature curve  --Follow up on preliminary blood cultures    I will continue to follow. Please feel free to contact me with any further questions.    Elvin Vallejo M.D.  [facility] Division of Infectious Disease  8AM-5PM Monday - Friday: Available on [employer] Teams  After Hours and Holidays (or if no response on [employer] Teams): Please contact the Infectious Diseases Office at [phone]    The above assessment and plan were discussed with medicine NP

## 2022-08-24 NOTE — PROGRESS NOTE ADULT - ASSESSMENT
70 y/o M       with h/o ESRD, , s/p renal transplant 2013 ,    DM,  CAD   s/p stents , FRANCOIS  on cpap qHS), HLD, HTN,         presenting with 2 episodes of felt lightheaded and nearly passed out twice while in the bathroom,   states legs gave out; felt generalized weakness;    denies passing out/LOC, denies head trauma.    No   cp/sob //palpitations.  fever.       * admitted  with  dizziness/ near syncope,  denies  cp/sob   cxr. with  pulm edema    *  HTN/HLD   on  procardia,  labetolol     *  CAD, 11/2021  on asa/ plavix/ ,lipitor     *  s/p  TAVR, 12/2021      *  DM,   has  insulin   pump, per   glenn e  sunny      * CKD    s/p Rt kidney transplant  2013,   Dr Vargas  [facility]    on  tcro/  mycophenolate     *  IDL restrictive/obstructive lung disease,     FRANCOIS / cpap   on  dvt ppx    CT  C/A/P,  ILD. no  abd  pathology/ prelim  report   Strep  bacteremia on iv rocephin/  f/p by  ID/  labs pending               s/p  renal  transplant,, in cellcept/ tacrolimus   on  dvt  ppx      rad

## 2022-08-24 NOTE — CONSULT NOTE ADULT - SUBJECTIVE AND OBJECTIVE BOX
[facility] DIVISION OF KIDNEY DISEASES AND HYPERTENSION -- INITIAL CONSULT NOTE  --------------------------------------------------------------------------------  HPI:  Patient is a 71-year-old Male with significant history LRRT in 2013, DM, CAD s/p stents, FRANCOIS (on CPAP), HLD, HTN who was admitted at [facility] on 8/23/22 for fever, generalized weakness and left knee pain after a fall 1 day prior to admission. Of note, Pt. says he recently had dental procedures ~ 3wks ago. Blood Cultures were done which grew Streptococcus galolyticus. Transplant ID on board. Currently receiving IV vancomycin and ceftriaxone. Transplant nephrology consulted for kidney transplant recipient management.     Transplant kidney history: Pt. says he was ESRD on HD (~ 5 months) and underwent LRRT (from son) in 2013 at [facility]. Pt. follows with transplant nephrologist Dr. Montero (at [facility]) and Dr. Michele Townsend (nephrologist). Pt. currently is on prograf 1 mg in AM and 1.5 mg PO in evening and MMF 1 gm Po BID.    Pt. seen and examined at bedside. Family (wife) present at bedside. Pt. reports feeling better. Denies history of any transplant kidney Biopsy. Denies SOB, CP, HA, N/V, abdominal pain , urinary symptoms or dizziness.     PAST HISTORY  --------------------------------------------------------------------------------  PAST MEDICAL & SURGICAL HISTORY:  Morbid Obesity      HTN (Hypertension), Benign      Hyperuricemia      Hyperlipidemia      Smoking      DM (diabetes mellitus), type 2      FRANCOIS on CPAP      H/O kidney transplant  2013      CAD (coronary artery disease)  1 stent      Kidney transplant recipient  Rt kidney- 2013      AV fistula        FAMILY HISTORY:    PAST SOCIAL HISTORY:    ALLERGIES & MEDICATIONS  --------------------------------------------------------------------------------  Allergies    No Known Allergies    Intolerances    Standing Inpatient Medications  acetaminophen   IVPB .. 1000 milliGRAM(s) IV Intermittent once  allopurinol 100 milliGRAM(s) Oral daily  aspirin enteric coated 81 milliGRAM(s) Oral daily  atorvastatin 20 milliGRAM(s) Oral at bedtime  cefTRIAXone   IVPB      cefTRIAXone   IVPB 2000 milliGRAM(s) IV Intermittent every 24 hours  chlorhexidine 2% Cloths 1 Application(s) Topical daily  dextrose 5%. 1000 milliLiter(s) IV Continuous <Continuous>  dextrose 5%. 1000 milliLiter(s) IV Continuous <Continuous>  dextrose 50% Injectable 25 Gram(s) IV Push once  dextrose 50% Injectable 12.5 Gram(s) IV Push once  dextrose 50% Injectable 25 Gram(s) IV Push once  enalapril 10 milliGRAM(s) Oral daily  finasteride 5 milliGRAM(s) Oral daily  glucagon  Injectable 1 milliGRAM(s) IntraMuscular once  heparin   Injectable 5000 Unit(s) SubCutaneous every 12 hours  insulin aspart (NovoLOG) Pump 1 Each SubCutaneous Continuous Pump  labetalol 200 milliGRAM(s) Oral three times a day  mycophenolate mofetil 500 milliGRAM(s) Oral <User Schedule>  NIFEdipine XL 30 milliGRAM(s) Oral at bedtime  NIFEdipine XL 60 milliGRAM(s) Oral daily  tacrolimus 1.5 milliGRAM(s) Oral <User Schedule>  tacrolimus 1 milliGRAM(s) Oral <User Schedule>    PRN Inpatient Medications  dextrose Oral Gel 15 Gram(s) Oral once PRN    REVIEW OF SYSTEMS  --------------------------------------------------------------------------------  Gen: No fever/chills   Skin: No rashes  Head/Eyes/Ears/Mouth: No headache, sore throat  Respiratory: No dyspnea  CV: No chest pain, PND, orthopnea  GI: No abdominal pain, diarrhea  : No increased frequency, dysuria, hematuria, nocturia  MSK: Noedema  Neuro: No dizziness/lightheadedness    All other systems were reviewed and are negative, except as noted.    VITALS/PHYSICAL EXAM  --------------------------------------------------------------------------------  T(C): 36.8 (08-24-22 @ 11:07), Max: 37.3 (08-24-22 @ 04:51)  HR: 67 (08-24-22 @ 13:15) (67 - 80)  BP: 130/63 (08-24-22 @ 13:15) (121/67 - 156/53)  RR: 18 (08-24-22 @ 11:07) (18 - 18)  SpO2: 95% (08-24-22 @ 11:07) (94% - 98%)  Wt(kg): --  Height (cm): 170.2 (08-22-22 @ 20:59)  Weight (kg): 91.6 (08-24-22 @ 13:15)  BMI (kg/m2): 31.6 (08-24-22 @ 13:15)  BSA (m2): 2.03 (08-24-22 @ 13:15)    08-24-22 @ 07:01  -  08-24-22 @ 15:51  --------------------------------------------------------  IN: 960 mL / OUT: 0 mL / NET: 960 mL    Physical Exam:  	Gen: NAD, well-appearing  	HEENT: MMM  	Pulm: CTA B/L, no crackles   	CV: RRR, S1S2+  	Abd: +BS, soft, nontender/nondistended              Transplant: non tender,   	: No suprapubic tenderness  	MSK: no edema   	Psych: Normal affect and mood  	Skin: Warm  	Vascular access: IV peripheral canula    LABS/STUDIES  --------------------------------------------------------------------------------              14.1   5.50  >-----------<  135      [08-24-22 @ 07:05]              42.2     141  |  107  |  18  ----------------------------<  107      [08-24-22 @ 07:07]  4.1   |  22  |  0.57        Ca     9.4     [08-24-22 @ 07:07]    TPro  7.9  /  Alb  4.5  /  TBili  1.0  /  DBili  x   /  AST  82  /  ALT  25  /  AlkPhos  74  [08-22-22 @ 23:41]    PT/INR: PT 15.6 , INR 1.35       [08-22-22 @ 23:41]  PTT: 29.2       [08-22-22 @ 23:41]    Creatinine Trend:  SCr 0.57 [08-24 @ 07:07]  SCr 0.62 [08-23 @ 01:09]  SCr 0.58 [08-22 @ 23:41]    Urinalysis - [08-23-22 @ 04:41]      Color Yellow / Appearance Clear / SG 1.031 / pH 6.0      Gluc Negative / Ketone Negative  / Bili Negative / Urobili Negative       Blood Negative / Protein 30 mg/dL / Leuk Est Negative / Nitrite Negative      RBC 5 / WBC 1 / Hyaline 1 / Gran  / Sq Epi  / Non Sq Epi 1 / Bacteria Negative    TacrolimusTacrolimus (), Serum: 6.7 ng/mL (08-24 @ 07:56)  Tacrolimus (), Serum: 12.6 ng/mL (08-22 @ 23:41)    Cyclosporine  Sirolimus  Mycophenolate  BK PCR  CMV PCR  Parvo PCR  EBV PCR [facility] DIVISION OF KIDNEY DISEASES AND HYPERTENSION -- INITIAL CONSULT NOTE  --------------------------------------------------------------------------------  HPI:  Patient is a 71-year-old Male with significant history LRRT in 2013, DM, CAD s/p stents, FRANCOIS (on CPAP), HLD, HTN who was admitted at [facility] on 8/23/22 for fever, generalized weakness and left knee pain after a fall 1 day prior to admission. Of note, Pt. says he recently had dental procedures ~ 3wks ago. Blood Cultures were done which grew Streptococcus galolyticus. Transplant ID on board. Currently receiving IV vancomycin and ceftriaxone. Transplant nephrology consulted for kidney transplant recipient management.     Transplant kidney history: Pt. says he was ESRD on HD (~ 5 months) and underwent LRRT (from son) in 2013 at [facility]. Pt. follows with transplant nephrologist Dr. Montero (at [facility]) and Dr. Michele Townsend (nephrologist). Pt. currently is on prograf 1 mg in AM and 1.5 mg PO in evening and MMF 1 gm Po BID.    Pt. seen and examined at bedside. Family (wife) present at bedside. Pt. reports feeling better. Denies history of any transplant kidney Biopsy. Denies SOB, CP, HA, N/V, abdominal pain , urinary symptoms or dizziness.     PAST HISTORY  --------------------------------------------------------------------------------  PAST MEDICAL & SURGICAL HISTORY:  Morbid Obesity      HTN (Hypertension), Benign      Hyperuricemia      Hyperlipidemia      Smoking      DM (diabetes mellitus), type 2      FRANCOIS on CPAP      H/O kidney transplant  2013      CAD (coronary artery disease)  1 stent      Kidney transplant recipient  Rt kidney- 2013      AV fistula        FAMILY HISTORY:    PAST SOCIAL HISTORY:    ALLERGIES & MEDICATIONS  --------------------------------------------------------------------------------  Allergies    No Known Allergies    Intolerances    Standing Inpatient Medications  acetaminophen   IVPB .. 1000 milliGRAM(s) IV Intermittent once  allopurinol 100 milliGRAM(s) Oral daily  aspirin enteric coated 81 milliGRAM(s) Oral daily  atorvastatin 20 milliGRAM(s) Oral at bedtime  cefTRIAXone   IVPB      cefTRIAXone   IVPB 2000 milliGRAM(s) IV Intermittent every 24 hours  chlorhexidine 2% Cloths 1 Application(s) Topical daily  dextrose 5%. 1000 milliLiter(s) IV Continuous <Continuous>  dextrose 5%. 1000 milliLiter(s) IV Continuous <Continuous>  dextrose 50% Injectable 25 Gram(s) IV Push once  dextrose 50% Injectable 12.5 Gram(s) IV Push once  dextrose 50% Injectable 25 Gram(s) IV Push once  enalapril 10 milliGRAM(s) Oral daily  finasteride 5 milliGRAM(s) Oral daily  glucagon  Injectable 1 milliGRAM(s) IntraMuscular once  heparin   Injectable 5000 Unit(s) SubCutaneous every 12 hours  insulin aspart (NovoLOG) Pump 1 Each SubCutaneous Continuous Pump  labetalol 200 milliGRAM(s) Oral three times a day  mycophenolate mofetil 500 milliGRAM(s) Oral <User Schedule>  NIFEdipine XL 30 milliGRAM(s) Oral at bedtime  NIFEdipine XL 60 milliGRAM(s) Oral daily  tacrolimus 1.5 milliGRAM(s) Oral <User Schedule>  tacrolimus 1 milliGRAM(s) Oral <User Schedule>    PRN Inpatient Medications  dextrose Oral Gel 15 Gram(s) Oral once PRN    REVIEW OF SYSTEMS  --------------------------------------------------------------------------------  Gen: No fever/chills   Skin: No rashes  Head/Eyes/Ears/Mouth: No headache, sore throat  Respiratory: No dyspnea  CV: No chest pain, PND, orthopnea  GI: No abdominal pain, diarrhea  : No increased frequency, dysuria, hematuria, nocturia  MSK: Noedema  Neuro: No dizziness/lightheadedness    All other systems were reviewed and are negative, except as noted.    VITALS/PHYSICAL EXAM  --------------------------------------------------------------------------------  T(C): 36.8 (08-24-22 @ 11:07), Max: 37.3 (08-24-22 @ 04:51)  HR: 67 (08-24-22 @ 13:15) (67 - 80)  BP: 130/63 (08-24-22 @ 13:15) (121/67 - 156/53)  RR: 18 (08-24-22 @ 11:07) (18 - 18)  SpO2: 95% (08-24-22 @ 11:07) (94% - 98%)  Wt(kg): --  Height (cm): 170.2 (08-22-22 @ 20:59)  Weight (kg): 91.6 (08-24-22 @ 13:15)  BMI (kg/m2): 31.6 (08-24-22 @ 13:15)  BSA (m2): 2.03 (08-24-22 @ 13:15)    08-24-22 @ 07:01  -  08-24-22 @ 15:51  --------------------------------------------------------  IN: 960 mL / OUT: 0 mL / NET: 960 mL    Physical Exam:  	Gen: NAD, well-appearing  	HEENT: MMM  	Pulm: CTA B/L, no crackles   	CV: RRR, S1S2+  	Abd: +BS, soft, nontender/nondistended              Transplant: non tender,   	: No suprapubic tenderness  	MSK: no edema   	Psych: Normal affect and mood  	Skin: Warm  	Vascular access: IV peripheral canula    LABS/STUDIES  --------------------------------------------------------------------------------              14.1   5.50  >-----------<  135      [08-24-22 @ 07:05]              42.2     141  |  107  |  18  ----------------------------<  107      [08-24-22 @ 07:07]  4.1   |  22  |  0.57        Ca     9.4     [08-24-22 @ 07:07]    TPro  7.9  /  Alb  4.5  /  TBili  1.0  /  DBili  x   /  AST  82  /  ALT  25  /  AlkPhos  74  [08-22-22 @ 23:41]    PT/INR: PT 15.6 , INR 1.35       [08-22-22 @ 23:41]  PTT: 29.2       [08-22-22 @ 23:41]    Creatinine Trend:  SCr 0.57 [08-24 @ 07:07]  SCr 0.62 [08-23 @ 01:09]  SCr 0.58 [08-22 @ 23:41]    Urinalysis - [08-23-22 @ 04:41]      Color Yellow / Appearance Clear / SG 1.031 / pH 6.0      Gluc Negative / Ketone Negative  / Bili Negative / Urobili Negative       Blood Negative / Protein 30 mg/dL / Leuk Est Negative / Nitrite Negative      RBC 5 / WBC 1 / Hyaline 1 / Gran  / Sq Epi  / Non Sq Epi 1 / Bacteria Negative    TacrolimusTacrolimus (), Serum: 6.7 ng/mL (08-24 @ 07:56)  Tacrolimus (), Serum: 12.6 ng/mL (08-22 @ 23:41)

## 2022-08-24 NOTE — PROGRESS NOTE ADULT - SUBJECTIVE AND OBJECTIVE BOX
CARDIOLOGY     PROGRESS  NOTE   ________________________________________________    CHIEF COMPLAINT:Patient is a 71y old  Male who presents with a chief complaint of near  syncope (24 Aug 2022 06:22)  doing well.  	  REVIEW OF SYSTEMS:  CONSTITUTIONAL: No fever, weight loss, or fatigue  EYES: No eye pain, visual disturbances, or discharge  ENT:  No difficulty hearing, tinnitus, vertigo; No sinus or throat pain  NECK: No pain or stiffness  RESPIRATORY: No cough, wheezing, chills or hemoptysis; No Shortness of Breath  CARDIOVASCULAR: No chest pain, palpitations, passing out, dizziness, or leg swelling  GASTROINTESTINAL: No abdominal or epigastric pain. No nausea, vomiting, or hematemesis; No diarrhea or constipation. No melena or hematochezia.  GENITOURINARY: No dysuria, frequency, hematuria, or incontinence  NEUROLOGICAL: No headaches, memory loss, loss of strength, numbness, or tremors  SKIN: No itching, burning, rashes, or lesions   LYMPH Nodes: No enlarged glands  ENDOCRINE: No heat or cold intolerance; No hair loss  MUSCULOSKELETAL: No joint pain or swelling; No muscle, back, or extremity pain  PSYCHIATRIC: No depression, anxiety, mood swings, or difficulty sleeping  HEME/LYMPH: No easy bruising, or bleeding gums  ALLERGY AND IMMUNOLOGIC: No hives or eczema	    [ ] All others negative	  [ ] Unable to obtain    PHYSICAL EXAM:  T(C): 37.3 (08-24-22 @ 04:51), Max: 37.3 (08-24-22 @ 04:51)  HR: 72 (08-24-22 @ 04:51) (70 - 80)  BP: 156/53 (08-24-22 @ 04:51) (127/60 - 156/53)  RR: 18 (08-24-22 @ 04:51) (17 - 20)  SpO2: 98% (08-24-22 @ 04:51) (94% - 98%)  Wt(kg): --  I&O's Summary      Appearance: Normal	  HEENT:   Normal oral mucosa, PERRL, EOMI	  Lymphatic: No lymphadenopathy  Cardiovascular: Normal S1 S2, No JVD, +murmurs, No edema  Respiratory: rhonchi  Psychiatry: A & O x 3, Mood & affect appropriate  Gastrointestinal:  Soft, Non-tender, + BS	  Skin: No rashes, No ecchymoses, No cyanosis	  Neurologic: Non-focal  Extremities: Normal range of motion, No clubbing, cyanosis or edema  Vascular: Peripheral pulses palpable 2+ bilaterally    MEDICATIONS  (STANDING):  acetaminophen   IVPB .. 1000 milliGRAM(s) IV Intermittent once  allopurinol 100 milliGRAM(s) Oral daily  aspirin enteric coated 81 milliGRAM(s) Oral daily  atorvastatin 20 milliGRAM(s) Oral at bedtime  cefTRIAXone   IVPB      cefTRIAXone   IVPB 2000 milliGRAM(s) IV Intermittent every 24 hours  chlorhexidine 2% Cloths 1 Application(s) Topical daily  clopidogrel Tablet 75 milliGRAM(s) Oral daily  dextrose 5%. 1000 milliLiter(s) (50 mL/Hr) IV Continuous <Continuous>  dextrose 5%. 1000 milliLiter(s) (100 mL/Hr) IV Continuous <Continuous>  dextrose 50% Injectable 25 Gram(s) IV Push once  dextrose 50% Injectable 12.5 Gram(s) IV Push once  dextrose 50% Injectable 25 Gram(s) IV Push once  enalapril 10 milliGRAM(s) Oral daily  finasteride 5 milliGRAM(s) Oral daily  furosemide   Injectable 20 milliGRAM(s) IV Push daily  glucagon  Injectable 1 milliGRAM(s) IntraMuscular once  heparin   Injectable 5000 Unit(s) SubCutaneous every 12 hours  labetalol 200 milliGRAM(s) Oral three times a day  mycophenolate mofetil 1000 milliGRAM(s) Oral <User Schedule>  NIFEdipine XL 30 milliGRAM(s) Oral at bedtime  NIFEdipine XL 60 milliGRAM(s) Oral daily  tacrolimus 1.5 milliGRAM(s) Oral <User Schedule>      TELEMETRY: 	    ECG:  	  RADIOLOGY:  OTHER: 	  	  LABS:	 	    CARDIAC MARKERS:                                14.4   10.69 )-----------( 164      ( 22 Aug 2022 23:41 )             43.1     08-23    137  |  106  |  14  ----------------------------<  100<H>  3.5   |  20<L>  |  0.62    Ca    9.1      23 Aug 2022 01:09    TPro  7.9  /  Alb  4.5  /  TBili  1.0  /  DBili  x   /  AST  82<H>  /  ALT  25  /  AlkPhos  74  08-22    proBNP:   Lipid Profile:   HgA1c:   TSH:   PT/INR - ( 22 Aug 2022 23:41 )   PT: 15.6 sec;   INR: 1.35 ratio         PTT - ( 22 Aug 2022 23:41 )  PTT:29.2 sec  < from: Transthoracic Echocardiogram (08.23.22 @ 09:57) >  1. Mitral annular calcification, otherwise normal mitral  valve.  2. Transcatheter aortic valve replacement. Aortic valve not  well visualized. Peak transaortic valve gradient equals 23  mm Hg, mean transaortic valve gradient equals 11 mm Hg,  which is probably normal in the presence of a transcatheter  aortic valve replacement.  3. Severely dilated left atrium. LA volume index = 63  cc/m2.  4. Moderate concentric left ventricular hypertrophy.  5. Endocardium not well visualized; grossly normal left  ventricular systolic function.  6. Normal right ventricular size and function.  7. Normal tricuspid valve. Minimal tricuspid regurgitation.  < from: CT Chest No Cont (08.23.22 @ 08:34) >  1.  Bilateral lower lobe predominant reticular and groundglass opacities   that are similar to 11/4/2021 comparison. These findings likely secondary   to patient's known interstitial lung disease.        Assessment and plan  ---------------------------  70 y/o M with h/o ESRD (s/p renal transplant 2013), IDDM, CAD (s/p stents), FRANCOIS (on cpap qHS), HLD, HTN, presenting with 2 episodes of felt lightheaded and nearly passed out twice while in the bathroom, to urinate, states legs gave out; felt generalized weakness; denies passing out/LOC, denies head trauma.  No CP/SOB/palpitations.   pt with hx of ashd, s/p stents, AS , s/p TAVR in 2021 with near syncope.  chest x ray noted ?chf vs pneumonia  syncope r/ o conduction disease  tele  check orthostatic  repeat echo  ID eval with s/p renal transplant  renal eval  cardiac enzymes  dvt prophylaxis  ct chest noted, no evidence of chf, ILD  continue transplant meds  awaiting blood tests/check orthostatics

## 2022-08-24 NOTE — CONSULT NOTE ADULT - PROBLEM SELECTOR RECOMMENDATION 9
Pt. with LRRT (from son) in 2013 at [facility]. Pt. follows with transplant nephrologist Dr. Montero (at [facility]) and Dr. Michele Townsend (nephrologist). Allograft function at baseline on admission. Transplant ID notes reviewed. Monitor labs and urine output. Avoid any potential nephrotoxins. Dose medications as per eGFR.

## 2022-08-24 NOTE — PROGRESS NOTE ADULT - SUBJECTIVE AND OBJECTIVE BOX
afebrile    REVIEW OF SYSTEMS:  GEN: no fever,    no chills  RESP: no SOB,   no cough  CVS: no chest pain,   no palpitations  GI: no abdominal pain,   no nausea,   no vomiting,   no constipation,   no diarrhea  : no dysuria,   no frequency  NEURO: no headache,   no dizziness  PSYCH: no depression,   not anxious  Derm : no rash    MEDICATIONS  (STANDING):  acetaminophen   IVPB .. 1000 milliGRAM(s) IV Intermittent once  allopurinol 100 milliGRAM(s) Oral daily  aspirin enteric coated 81 milliGRAM(s) Oral daily  atorvastatin 20 milliGRAM(s) Oral at bedtime  cefTRIAXone   IVPB      cefTRIAXone   IVPB 2000 milliGRAM(s) IV Intermittent every 24 hours  chlorhexidine 2% Cloths 1 Application(s) Topical daily  clopidogrel Tablet 75 milliGRAM(s) Oral daily  dextrose 5%. 1000 milliLiter(s) (50 mL/Hr) IV Continuous <Continuous>  dextrose 5%. 1000 milliLiter(s) (100 mL/Hr) IV Continuous <Continuous>  dextrose 50% Injectable 25 Gram(s) IV Push once  dextrose 50% Injectable 12.5 Gram(s) IV Push once  dextrose 50% Injectable 25 Gram(s) IV Push once  enalapril 10 milliGRAM(s) Oral daily  finasteride 5 milliGRAM(s) Oral daily  furosemide   Injectable 20 milliGRAM(s) IV Push daily  glucagon  Injectable 1 milliGRAM(s) IntraMuscular once  heparin   Injectable 5000 Unit(s) SubCutaneous every 12 hours  labetalol 200 milliGRAM(s) Oral three times a day  mycophenolate mofetil 1000 milliGRAM(s) Oral <User Schedule>  NIFEdipine XL 30 milliGRAM(s) Oral at bedtime  NIFEdipine XL 60 milliGRAM(s) Oral daily  tacrolimus 1.5 milliGRAM(s) Oral <User Schedule>    MEDICATIONS  (PRN):  dextrose Oral Gel 15 Gram(s) Oral once PRN Blood Glucose LESS THAN 70 milliGRAM(s)/deciliter      Vital Signs Last 24 Hrs  T(C): 37.3 (24 Aug 2022 04:51), Max: 37.3 (24 Aug 2022 04:51)  T(F): 99.2 (24 Aug 2022 04:51), Max: 99.2 (24 Aug 2022 04:51)  HR: 72 (24 Aug 2022 04:51) (70 - 80)  BP: 156/53 (24 Aug 2022 04:51) (123/87 - 156/53)  BP(mean): --  RR: 18 (24 Aug 2022 04:51) (17 - 20)  SpO2: 98% (24 Aug 2022 04:51) (94% - 98%)    Parameters below as of 24 Aug 2022 04:51  Patient On (Oxygen Delivery Method): room air      CAPILLARY BLOOD GLUCOSE      POCT Blood Glucose.: 115 mg/dL (23 Aug 2022 09:23)    I&O's Summary      PHYSICAL EXAM:  HEAD:  Atraumatic, Normocephalic  NECK: Supple, No   JVD  CHEST/LUNG:   no     rales,     no,    rhonchi  HEART: Regular rate and rhythm;         murmur  ABDOMEN: Soft, Nontender, ;   EXTREMITIES:   no     edema  NEUROLOGY:  alert    LABS:                        14.4   10.69 )-----------( 164      ( 22 Aug 2022 23:41 )             43.1     08    137  |  106  |  14  ----------------------------<  100<H>  3.5   |  20<L>  |  0.62    Ca    9.1      23 Aug 2022 01:09    TPro  7.9  /  Alb  4.5  /  TBili  1.0  /  DBili  x   /  AST  82<H>  /  ALT  25  /  AlkPhos  74  08-22    PT/INR - ( 22 Aug 2022 23:41 )   PT: 15.6 sec;   INR: 1.35 ratio         PTT - ( 22 Aug 2022 23:41 )  PTT:29.2 sec      Urinalysis Basic - ( 23 Aug 2022 04:41 )    Color: Yellow / Appearance: Clear / S.031 / pH: x  Gluc: x / Ketone: Negative  / Bili: Negative / Urobili: Negative   Blood: x / Protein: 30 mg/dL / Nitrite: Negative   Leuk Esterase: Negative / RBC: 5 /hpf / WBC 1 /HPF   Sq Epi: x / Non Sq Epi: 1 /hpf / Bacteria: Negative           @ 23:40  4.6  49              Consultant(s) Notes Reviewed:      Care Discussed with Consultants/Other Providers:

## 2022-08-25 LAB
-  CEFTRIAXONE: SIGNIFICANT CHANGE UP
-  CLINDAMYCIN: SIGNIFICANT CHANGE UP
-  ERYTHROMYCIN: SIGNIFICANT CHANGE UP
-  LEVOFLOXACIN: SIGNIFICANT CHANGE UP
-  PENICILLIN: SIGNIFICANT CHANGE UP
-  VANCOMYCIN: SIGNIFICANT CHANGE UP
ANION GAP SERPL CALC-SCNC: 14 MMOL/L — SIGNIFICANT CHANGE UP (ref 5–17)
BUN SERPL-MCNC: 13 MG/DL — SIGNIFICANT CHANGE UP (ref 7–23)
CALCIUM SERPL-MCNC: 9.3 MG/DL — SIGNIFICANT CHANGE UP (ref 8.4–10.5)
CHLORIDE SERPL-SCNC: 105 MMOL/L — SIGNIFICANT CHANGE UP (ref 96–108)
CO2 SERPL-SCNC: 21 MMOL/L — LOW (ref 22–31)
CREAT SERPL-MCNC: 0.49 MG/DL — LOW (ref 0.5–1.3)
CULTURE RESULTS: SIGNIFICANT CHANGE UP
EGFR: 110 ML/MIN/1.73M2 — SIGNIFICANT CHANGE UP
GLUCOSE BLDC GLUCOMTR-MCNC: 113 MG/DL — HIGH (ref 70–99)
GLUCOSE BLDC GLUCOMTR-MCNC: 122 MG/DL — HIGH (ref 70–99)
GLUCOSE BLDC GLUCOMTR-MCNC: 161 MG/DL — HIGH (ref 70–99)
GLUCOSE BLDC GLUCOMTR-MCNC: 161 MG/DL — HIGH (ref 70–99)
GLUCOSE SERPL-MCNC: 119 MG/DL — HIGH (ref 70–99)
HCT VFR BLD CALC: 41.9 % — SIGNIFICANT CHANGE UP (ref 39–50)
HGB BLD-MCNC: 13.9 G/DL — SIGNIFICANT CHANGE UP (ref 13–17)
MCHC RBC-ENTMCNC: 31.6 PG — SIGNIFICANT CHANGE UP (ref 27–34)
MCHC RBC-ENTMCNC: 33.2 GM/DL — SIGNIFICANT CHANGE UP (ref 32–36)
MCV RBC AUTO: 95.2 FL — SIGNIFICANT CHANGE UP (ref 80–100)
METHOD TYPE: SIGNIFICANT CHANGE UP
NRBC # BLD: 0 /100 WBCS — SIGNIFICANT CHANGE UP (ref 0–0)
ORGANISM # SPEC MICROSCOPIC CNT: SIGNIFICANT CHANGE UP
PLATELET # BLD AUTO: 156 K/UL — SIGNIFICANT CHANGE UP (ref 150–400)
POTASSIUM SERPL-MCNC: 3.3 MMOL/L — LOW (ref 3.5–5.3)
POTASSIUM SERPL-SCNC: 3.3 MMOL/L — LOW (ref 3.5–5.3)
RBC # BLD: 4.4 M/UL — SIGNIFICANT CHANGE UP (ref 4.2–5.8)
RBC # FLD: 13.3 % — SIGNIFICANT CHANGE UP (ref 10.3–14.5)
SODIUM SERPL-SCNC: 140 MMOL/L — SIGNIFICANT CHANGE UP (ref 135–145)
SPECIMEN SOURCE: SIGNIFICANT CHANGE UP
TACROLIMUS SERPL-MCNC: 6.3 NG/ML — SIGNIFICANT CHANGE UP
VANCOMYCIN FLD-MCNC: 4.9 UG/ML — SIGNIFICANT CHANGE UP
WBC # BLD: 6.84 K/UL — SIGNIFICANT CHANGE UP (ref 3.8–10.5)
WBC # FLD AUTO: 6.84 K/UL — SIGNIFICANT CHANGE UP (ref 3.8–10.5)

## 2022-08-25 PROCEDURE — 73721 MRI JNT OF LWR EXTRE W/O DYE: CPT | Mod: 26,LT

## 2022-08-25 PROCEDURE — 99232 SBSQ HOSP IP/OBS MODERATE 35: CPT

## 2022-08-25 RX ORDER — POTASSIUM CHLORIDE 20 MEQ
40 PACKET (EA) ORAL ONCE
Refills: 0 | Status: COMPLETED | OUTPATIENT
Start: 2022-08-25 | End: 2022-08-25

## 2022-08-25 RX ADMIN — FINASTERIDE 5 MILLIGRAM(S): 5 TABLET, FILM COATED ORAL at 11:35

## 2022-08-25 RX ADMIN — CEFTRIAXONE 100 MILLIGRAM(S): 500 INJECTION, POWDER, FOR SOLUTION INTRAMUSCULAR; INTRAVENOUS at 17:56

## 2022-08-25 RX ADMIN — CHLORHEXIDINE GLUCONATE 1 APPLICATION(S): 213 SOLUTION TOPICAL at 11:34

## 2022-08-25 RX ADMIN — MYCOPHENOLATE MOFETIL 1000 MILLIGRAM(S): 250 CAPSULE ORAL at 06:31

## 2022-08-25 RX ADMIN — Medication 200 MILLIGRAM(S): at 06:31

## 2022-08-25 RX ADMIN — TACROLIMUS 1.5 MILLIGRAM(S): 5 CAPSULE ORAL at 19:58

## 2022-08-25 RX ADMIN — HEPARIN SODIUM 5000 UNIT(S): 5000 INJECTION INTRAVENOUS; SUBCUTANEOUS at 06:37

## 2022-08-25 RX ADMIN — TACROLIMUS 1 MILLIGRAM(S): 5 CAPSULE ORAL at 08:34

## 2022-08-25 RX ADMIN — Medication 30 MILLIGRAM(S): at 22:05

## 2022-08-25 RX ADMIN — Medication 60 MILLIGRAM(S): at 06:32

## 2022-08-25 RX ADMIN — Medication 40 MILLIEQUIVALENT(S): at 09:25

## 2022-08-25 RX ADMIN — Medication 100 MILLIGRAM(S): at 11:35

## 2022-08-25 RX ADMIN — MYCOPHENOLATE MOFETIL 1000 MILLIGRAM(S): 250 CAPSULE ORAL at 17:53

## 2022-08-25 RX ADMIN — Medication 10 MILLIGRAM(S): at 06:32

## 2022-08-25 RX ADMIN — HEPARIN SODIUM 5000 UNIT(S): 5000 INJECTION INTRAVENOUS; SUBCUTANEOUS at 17:54

## 2022-08-25 RX ADMIN — ATORVASTATIN CALCIUM 20 MILLIGRAM(S): 80 TABLET, FILM COATED ORAL at 22:05

## 2022-08-25 RX ADMIN — Medication 81 MILLIGRAM(S): at 11:35

## 2022-08-25 RX ADMIN — Medication 200 MILLIGRAM(S): at 22:04

## 2022-08-25 RX ADMIN — Medication 200 MILLIGRAM(S): at 13:32

## 2022-08-25 NOTE — PROGRESS NOTE ADULT - ASSESSMENT
70 y/o M       with h/o ESRD, , s/p L renal transplant 2013 ,    DM,  CAD   s/p stents , FRANCOIS  on cpap qHS), HLD, HTN,         presenting with 2 episodes of felt lightheaded and nearly passed out twice while in the bathroom,   states legs gave out; felt generalized weakness;    denies passing out/LOC, denies head trauma.    No   cp/sob //palpitations.  fever.       * admitted  with  dizziness/ near syncope,  denies  cp/sob   cxr. with  pulm edema    *  HTN/HLD   on  procardia,  labetolol     *  CAD, 11/2021  on asa/ plavix/ ,lipitor     *  s/p  TAVR, 12/2021      *  DM,   has  insulin   pump, per   glenn e  sunny      * CKD    s/p  L  kidney transplant  2013,   Dr Vargas  [facility]    on  tcro/  mycophenolate     *  IDL restrictive/obstructive lung disease,     FRANCOIS / cpap   on  dvt ppx    *CT  C/,  ILD     *  Strep  bacteremia on iv rocephin/  f/p by  ID   abd  u/s.  r  renal mass/ mri abdomen  no contrast    awiat  transplant  f/p               s/p  renal  transplant,, in cellcept/ tacrolimus   on  dvt  ppx      rad   70 y/o M       with h/o ESRD, , s/p  R renal transplant 2013 ,    DM,  CAD   s/p stents , FRANCOIS  on cpap qHS), HLD, HTN,         presenting with 2 episodes of felt lightheaded and nearly passed out twice while in the bathroom,   states legs gave out; felt generalized weakness;    denies passing out/LOC, denies head trauma.    No   cp/sob //palpitations.  fever.       * admitted  with  dizziness/ near syncope,  denies  cp/sob   cxr. with  pulm edema    *  HTN/HLD   on  procardia,  labetolol     *  CAD, 11/2021  on asa/ plavix/ ,lipitor     *  s/p  TAVR, 12/2021      *  DM,   has  insulin   pump, per   glenn e  sunny      * CKD    s/p   R kidney transplant  2013,   Dr Vargas  [facility]    on  tcro/  mycophenolate     *  IDL restrictive/obstructive lung disease,     FRANCOIS / cpap   on  dvt ppx    *CT  C/,  ILD     *  Strep  bacteremia on iv rocephin/  f/p by  ID   imaging.  R   renal mass,  in native  kidney   tranaplanted  kidney noted  in rlq,     awiat  transplant  f/p   s/p  renal  transplant,, in cellcept/ tacrolimus   on  dvt  ppx   rpt  bcx,  pending  per  iD,  needs  meir and mri   knee/  s/p  fall on otside      70 y/o M       with h/o ESRD, , s/p  R renal transplant 2013 ,    DM,  CAD   s/p stents , FRANCOIS  on cpap qHS), HLD, HTN,         presenting with 2 episodes of felt lightheaded and nearly passed out twice while in the bathroom,   states legs gave out; felt generalized weakness;    denies passing out/LOC, denies head trauma.    No   cp/sob //palpitations.  fever.       * admitted  with  dizziness/ near syncope,  denies  cp/sob   cxr. with  pulm edema    *  HTN/HLD   on  procardia,  labetolol     *  CAD, 11/2021  on asa/ plavix/ ,lipitor     *  s/p  TAVR, 12/2021      *  DM,   has  insulin   pump, per   glenn e  endo      * CKD    s/p   R kidney transplant  2013,   Dr Vargas  [facility]      *  IDL restrictive/obstructive lung disease,       FRANCOIS / cpap   on  dvt ppx     *CT chest, ,  ILD     *  Strep  bacteremia on iv rocephin/  f/p by  ID    * imaging.  R   renal mass,  in native  kidney   transplanted   kidney noted  in rlq,     awiat  transplant  f/p/ pe r d r lev.  the transplant surgeon   will review  imaging    *  s/p  renal  transplant,,  on  cellcept/ tacrolimus   on  dvt  ppx   rpt  bcx,  pending    * per  iD,  needs  SHABNAM and mri   knee/ miild  patellar  effusion .  s/p  fall on outside

## 2022-08-25 NOTE — CHART NOTE - NSCHARTNOTEFT_GEN_A_CORE
Contacted Endocrinology on-Call regarding Pt's NPO status for SHABNAM 8/26/2022 while remaining on insulin pump  Dr. Medley [phone]  Paged back and discussed with pt at bedside to maintain present insulin infusion rates, instructed pt to self shut off insulin pump at 6am 8/26/2022  Pt verbalized understanding, teach-back and in agreement with plan of care  Family at bedside witnessed conversation   Endorsed to RN  Will endorse to AM team

## 2022-08-25 NOTE — PROGRESS NOTE ADULT - SUBJECTIVE AND OBJECTIVE BOX
Follow Up:      Interval History:    REVIEW OF SYSTEMS  [  ] ROS unobtainable because:    [  ] All other systems negative except as noted below    Constitutional:  [ ] fever [ ] chills  [ ] weight loss  [ ] weakness  Skin:  [ ] rash [ ] phlebitis	  Eyes: [ ] icterus [ ] pain  [ ] discharge	  ENMT: [ ] sore throat  [ ] thrush [ ] ulcers [ ] exudates  Respiratory: [ ] dyspnea [ ] hemoptysis [ ] cough [ ] sputum	  Cardiovascular:  [ ] chest pain [ ] palpitations [ ] edema	  Gastrointestinal:  [ ] nausea [ ] vomiting [ ] diarrhea [ ] constipation [ ] pain	  Genitourinary:  [ ] dysuria [ ] frequency [ ] hematuria [ ] discharge [ ] flank pain  [ ] incontinence  Musculoskeletal:  [ ] myalgias [ ] arthralgias [ ] arthritis  [ ] back pain  Neurological:  [ ] headache [ ] seizures  [ ] confusion/altered mental status    Allergies  No Known Allergies        ANTIMICROBIALS:  cefTRIAXone   IVPB    cefTRIAXone   IVPB 2000 every 24 hours      OTHER MEDS:  MEDICATIONS  (STANDING):  acetaminophen   IVPB .. 1000 once  allopurinol 100 daily  aspirin enteric coated 81 daily  atorvastatin 20 at bedtime  dextrose 50% Injectable 25 once  dextrose 50% Injectable 12.5 once  dextrose 50% Injectable 25 once  dextrose Oral Gel 15 once PRN  enalapril 10 daily  finasteride 5 daily  glucagon  Injectable 1 once  heparin   Injectable 5000 every 12 hours  insulin aspart (NovoLOG) Pump 1 Continuous Pump  labetalol 200 three times a day  mycophenolate mofetil 1000 two times a day  NIFEdipine XL 30 at bedtime  NIFEdipine XL 60 daily  tacrolimus 1.5 <User Schedule>  tacrolimus 1 <User Schedule>      Vital Signs Last 24 Hrs  T(C): 36.8 (25 Aug 2022 11:29), Max: 36.8 (25 Aug 2022 11:29)  T(F): 98.3 (25 Aug 2022 11:29), Max: 98.3 (25 Aug 2022 11:29)  HR: 71 (25 Aug 2022 13:30) (61 - 71)  BP: 152/67 (25 Aug 2022 13:30) (138/61 - 158/74)  BP(mean): --  RR: 18 (25 Aug 2022 11:29) (18 - 18)  SpO2: 96% (25 Aug 2022 11:29) (92% - 100%)    Parameters below as of 25 Aug 2022 11:29  Patient On (Oxygen Delivery Method): room air        PHYSICAL EXAMINATION:  General: Alert and Awake, NAD  HEENT: PERRL, EOMI  Neck: Supple  Cardiac: RRR, No M/R/G  Resp: CTAB, No Wh/Rh/Ra  Abdomen: NBS, NT/ND, No HSM, No rigidity or guarding  MSK: No LE edema. No Calf tenderness  : No thurston  Skin: No rashes or lesions. Skin is warm and dry to the touch.   Neuro: Alert and Awake. CN 2-12 Grossly intact. Moves all four extremities spontaneously.  Psych: Calm, Pleasant, Cooperative                          13.9   6.84  )-----------( 156      ( 25 Aug 2022 07:14 )             41.9       08-25    140  |  105  |  13  ----------------------------<  119<H>  3.3<L>   |  21<L>  |  0.49<L>    Ca    9.3      25 Aug 2022 06:51            MICROBIOLOGY:  Vancomycin Level, Random: 4.9 ug/mL (08-25-22 @ 09:21)  v  .Blood Blood-Peripheral  08-24-22   No growth to date.  --  --      .Blood Blood-Peripheral  08-24-22   No growth to date.  --  --      Clean Catch Clean Catch (Midstream)  08-23-22   <10,000 CFU/mL Normal Urogenital Heather  --  --      .Blood Blood-Peripheral  08-22-22   Growth in aerobic and anaerobic bottles: Streptococcus gallolyticus  See previous culture [id number]  --    Growth in aerobic and anaerobic bottles: Gram Positive Cocci in Pairs and  Chains      .Blood Blood-Peripheral  08-22-22   Growth in aerobic and anaerobic bottles: Streptococcus gallolyticus  ***Blood Panel PCR results on this specimen are available  approximately 3 hours after the Gram stain result.***  Gram stain, PCR, and/or culture results may not always  correspond due to difference in methodologies.  ************************************************************  This PCR assay was performed by multiplex PCR. This  Assay tests for 66 bacterial and resistance gene targets.  Please refer to the [facility] test directory  at [email or web address] for details.  --  Blood Culture PCR  Streptococcus gallolyticus          Rapid RVP Result: Joelle (08-23 @ 01:09)        RADIOLOGY:    <The imaging below has been reviewed and visualized by me independently. Findings as detailed in report below> Follow Up:  bacteremia    Interval History: afebrile. no acute events.     REVIEW OF SYSTEMS  [  ] ROS unobtainable because:    [ x ] All other systems negative except as noted below    Constitutional:  [ ] fever [ ] chills  [ ] weight loss  [ ] weakness  Skin:  [ ] rash [ ] phlebitis	  Eyes: [ ] icterus [ ] pain  [ ] discharge	  ENMT: [ ] sore throat  [ ] thrush [ ] ulcers [ ] exudates  Respiratory: [ ] dyspnea [ ] hemoptysis [ ] cough [ ] sputum	  Cardiovascular:  [ ] chest pain [ ] palpitations [ ] edema	  Gastrointestinal:  [ ] nausea [ ] vomiting [ ] diarrhea [ ] constipation [ ] pain	  Genitourinary:  [ ] dysuria [ ] frequency [ ] hematuria [ ] discharge [ ] flank pain  [ ] incontinence  Musculoskeletal:  [ ] myalgias [ ] arthralgias [ ] arthritis  [ ] back pain +left knee pain  Neurological:  [ ] headache [ ] seizures  [ ] confusion/altered mental status      Allergies  No Known Allergies        ANTIMICROBIALS:  cefTRIAXone   IVPB    cefTRIAXone   IVPB 2000 every 24 hours      OTHER MEDS:  MEDICATIONS  (STANDING):  acetaminophen   IVPB .. 1000 once  allopurinol 100 daily  aspirin enteric coated 81 daily  atorvastatin 20 at bedtime  dextrose 50% Injectable 25 once  dextrose 50% Injectable 12.5 once  dextrose 50% Injectable 25 once  dextrose Oral Gel 15 once PRN  enalapril 10 daily  finasteride 5 daily  glucagon  Injectable 1 once  heparin   Injectable 5000 every 12 hours  insulin aspart (NovoLOG) Pump 1 Continuous Pump  labetalol 200 three times a day  mycophenolate mofetil 1000 two times a day  NIFEdipine XL 30 at bedtime  NIFEdipine XL 60 daily  tacrolimus 1.5 <User Schedule>  tacrolimus 1 <User Schedule>      Vital Signs Last 24 Hrs  T(C): 36.8 (25 Aug 2022 11:29), Max: 36.8 (25 Aug 2022 11:29)  T(F): 98.3 (25 Aug 2022 11:29), Max: 98.3 (25 Aug 2022 11:29)  HR: 71 (25 Aug 2022 13:30) (61 - 71)  BP: 152/67 (25 Aug 2022 13:30) (138/61 - 158/74)  BP(mean): --  RR: 18 (25 Aug 2022 11:29) (18 - 18)  SpO2: 96% (25 Aug 2022 11:29) (92% - 100%)    Parameters below as of 25 Aug 2022 11:29  Patient On (Oxygen Delivery Method): room air    PHYSICAL EXAMINATION:  General: Alert and Awake, NAD  HEENT: PERRL, EOMI  Cardiac: RRR, No M/R/G  Resp: CTAB, No Wh/Rh/Ra  Abdomen: NBS, NT/ND, No HSM, No rigidity or guarding  MSK: L Knee with some mild tenderness to palpation, small effusion, minimal warmth to touch and ROM preserved No Calf tenderness  : No thurston  Skin: No rashes or lesions. Skin is warm and dry to the touch.   Neuro: Alert and Awake. CN 2-12 Grossly intact. Moves all four extremities spontaneously.  Psych: Calm, Pleasant, Cooperative                            13.9   6.84  )-----------( 156      ( 25 Aug 2022 07:14 )             41.9       08-25    140  |  105  |  13  ----------------------------<  119<H>  3.3<L>   |  21<L>  |  0.49<L>    Ca    9.3      25 Aug 2022 06:51            MICROBIOLOGY:  Vancomycin Level, Random: 4.9 ug/mL (08-25-22 @ 09:21)  v  .Blood Blood-Peripheral  08-24-22   No growth to date.  --  --      .Blood Blood-Peripheral  08-24-22   No growth to date.  --  --      Clean Catch Clean Catch (Midstream)  08-23-22   <10,000 CFU/mL Normal Urogenital Heather  --  --      .Blood Blood-Peripheral  08-22-22   Growth in aerobic and anaerobic bottles: Streptococcus gallolyticus  See previous culture [id number]  --    Growth in aerobic and anaerobic bottles: Gram Positive Cocci in Pairs and  Chains      .Blood Blood-Peripheral  08-22-22   Growth in aerobic and anaerobic bottles: Streptococcus gallolyticus  ***Blood Panel PCR results on this specimen are available  approximately 3 hours after the Gram stain result.***  Gram stain, PCR, and/or culture results may not always  correspond due to difference in methodologies.  ************************************************************  This PCR assay was performed by multiplex PCR. This  Assay tests for 66 bacterial and resistance gene targets.  Please refer to the [facility] Labs test directory  at [email or web address] for details.  --  Blood Culture PCR  Streptococcus gallolyticus          Rapid RVP Result: NotDetec (08-23 @ 01:09)        RADIOLOGY:    <The imaging below has been reviewed and visualized by me independently. Findings as detailed in report below>    < from: MR Knee No Cont, Left (08.25.22 @ 16:09) >  IMPRESSION:  1. Osteoarthritis is present with moderate joint effusion.  2. Complex near complete oblique radial tearing involves the medial   meniscus posterior horn.  3. Consider arthrocentesis if there is continued concern for infection.    < end of copied text >

## 2022-08-25 NOTE — CHART NOTE - NSCHARTNOTEFT_GEN_A_CORE
Informed by primary team that patient is NPO past midnight for SHABNAM tomorrow and requesting recommendations for management of his insulin pump.     At baseline, patient has tightly controlled DM2 on his insulin pump. He does not know how to set a temporary basal rate.    Discussed with patient and provider about continuing insulin pump as is overnight, and to turn off at 6AM. Procedure planned for ~8AM. Can resume pump after procedure.    Mikel Medley MD

## 2022-08-25 NOTE — PROGRESS NOTE ADULT - ASSESSMENT
70 y/o M with h/o ESRD, s/p renal transplant 2013 , DM, CAD s/p stents , FRANCOIS, HLD, HTN, presented to the ER with chief complaint of fever, generalized weakness and left knee pain after a fall yesterday.     Renal transplant recipient presenting with fevers  Noted steroid injection into left knee and trauma following fall but exam not consistent with septic arthritis   Blood cultures (8/22) with 4/4 Streptococcus gallolyticus    CT Chest (8/23) with chronic findings of interstitial lung disease  CT A/P (8/23) with no acute findings but with Indeterminant 3.1 x 2.5 cm mass within the right native kidney  TTE (8/23) without evidence of vegetations    MRI L Knee (8/25) Osteoarthritis with moderate joint effusion. No additional evidence suggestive of infection    Streptococcus gallolyticus (previously bovis) bacteremia often occurs in the context of underlying colonic lesion    #Streptococcus gallolyticus Bacteremia, Fever, Leukocytosis, Renal Transplant Recipient  --Recommend SHABNAM  --Would pursue ultrasound to further characterize right native kidney lesion further (low suspicion for infectious etiology)  --Will require colonoscopy (outpatient vs. inpatient) to exclude underlying colonic lesion  --Continue Ceftriaxone 2g IV Q24H  --Continue to follow CBC with diff  --Continue to follow temperature curve  --Follow up on preliminary blood cultures    I will continue to follow. Please feel free to contact me with any further questions.    Elvin Vallejo M.D.  [facility] Division of Infectious Disease  8AM-5PM Monday - Friday: Available on [employer] Teams  After Hours and Holidays (or if no response on [employer] Teams): Please contact the Infectious Diseases Office at [phone]    The above assessment and plan were discussed with medicine NP

## 2022-08-25 NOTE — PROGRESS NOTE ADULT - SUBJECTIVE AND OBJECTIVE BOX
CARDIOLOGY     PROGRESS  NOTE   ________________________________________________    CHIEF COMPLAINT:Patient is a 71y old  Male who presents with a chief complaint of near  syncope (25 Aug 2022 05:37)  no complain.  	  REVIEW OF SYSTEMS:  CONSTITUTIONAL: No fever, weight loss, or fatigue  EYES: No eye pain, visual disturbances, or discharge  ENT:  No difficulty hearing, tinnitus, vertigo; No sinus or throat pain  NECK: No pain or stiffness  RESPIRATORY: No cough, wheezing, chills or hemoptysis; No Shortness of Breath  CARDIOVASCULAR: No chest pain, palpitations, passing out, dizziness, or leg swelling  GASTROINTESTINAL: No abdominal or epigastric pain. No nausea, vomiting, or hematemesis; No diarrhea or constipation. No melena or hematochezia.  GENITOURINARY: No dysuria, frequency, hematuria, or incontinence  NEUROLOGICAL: No headaches, memory loss, loss of strength, numbness, or tremors  SKIN: No itching, burning, rashes, or lesions   LYMPH Nodes: No enlarged glands  ENDOCRINE: No heat or cold intolerance; No hair loss  MUSCULOSKELETAL: No joint pain or swelling; No muscle, back, or extremity pain  PSYCHIATRIC: No depression, anxiety, mood swings, or difficulty sleeping  HEME/LYMPH: No easy bruising, or bleeding gums  ALLERGY AND IMMUNOLOGIC: No hives or eczema	    [ ] All others negative	  [ ] Unable to obtain    PHYSICAL EXAM:  T(C): 36.7 (08-25-22 @ 05:00), Max: 36.8 (08-24-22 @ 11:07)  HR: 66 (08-25-22 @ 05:58) (61 - 67)  BP: 158/74 (08-25-22 @ 05:00) (121/67 - 158/74)  RR: 18 (08-25-22 @ 05:00) (18 - 18)  SpO2: 92% (08-25-22 @ 05:58) (92% - 100%)  Wt(kg): --  I&O's Summary    24 Aug 2022 07:01  -  25 Aug 2022 07:00  --------------------------------------------------------  IN: 960 mL / OUT: 0 mL / NET: 960 mL        Appearance: Normal	  HEENT:   Normal oral mucosa, PERRL, EOMI	  Lymphatic: No lymphadenopathy  Cardiovascular: Normal S1 S2, No JVD, + murmurs, No edema  Respiratory: Lungs clear to auscultation	  Psychiatry: A & O x 3, Mood & affect appropriate  Gastrointestinal:  Soft, Non-tender, + BS	  Skin: No rashes, No ecchymoses, No cyanosis	  Neurologic: Non-focal  Extremities: Normal range of motion, No clubbing, cyanosis or edema  Vascular: Peripheral pulses palpable 2+ bilaterally    MEDICATIONS  (STANDING):  acetaminophen   IVPB .. 1000 milliGRAM(s) IV Intermittent once  allopurinol 100 milliGRAM(s) Oral daily  aspirin enteric coated 81 milliGRAM(s) Oral daily  atorvastatin 20 milliGRAM(s) Oral at bedtime  cefTRIAXone   IVPB      cefTRIAXone   IVPB 2000 milliGRAM(s) IV Intermittent every 24 hours  chlorhexidine 2% Cloths 1 Application(s) Topical daily  dextrose 5%. 1000 milliLiter(s) (50 mL/Hr) IV Continuous <Continuous>  dextrose 5%. 1000 milliLiter(s) (100 mL/Hr) IV Continuous <Continuous>  dextrose 50% Injectable 25 Gram(s) IV Push once  dextrose 50% Injectable 12.5 Gram(s) IV Push once  dextrose 50% Injectable 25 Gram(s) IV Push once  enalapril 10 milliGRAM(s) Oral daily  finasteride 5 milliGRAM(s) Oral daily  glucagon  Injectable 1 milliGRAM(s) IntraMuscular once  heparin   Injectable 5000 Unit(s) SubCutaneous every 12 hours  insulin aspart (NovoLOG) Pump 1 Each SubCutaneous Continuous Pump  labetalol 200 milliGRAM(s) Oral three times a day  mycophenolate mofetil 1000 milliGRAM(s) Oral two times a day  NIFEdipine XL 30 milliGRAM(s) Oral at bedtime  NIFEdipine XL 60 milliGRAM(s) Oral daily  tacrolimus 1.5 milliGRAM(s) Oral <User Schedule>  tacrolimus 1 milliGRAM(s) Oral <User Schedule>      TELEMETRY: 	    ECG:  	  RADIOLOGY:  OTHER: 	  	  LABS:	 	    CARDIAC MARKERS:                                14.1   5.50  )-----------( 135      ( 24 Aug 2022 07:05 )             42.2     08-24    141  |  107  |  18  ----------------------------<  107<H>  4.1   |  22  |  0.57    Ca    9.4      24 Aug 2022 07:07      proBNP:   Lipid Profile:   HgA1c:   TSH:   Culture - Blood (08.22.22 @ 22:50)    Gram Stain:   Growth in aerobic and anaerobic bottles: Gram Positive Cocci in Pairs and  Chains    Specimen Source: .Blood Blood-Peripheral    Culture Results:   Growth in aerobic and anaerobic bottles: Streptococcus gallolyticus  See previous culture [id number]    < from: Transthoracic Echocardiogram (08.23.22 @ 09:57) >  1. Mitral annular calcification, otherwise normal mitral  valve.  2. Transcatheter aortic valve replacement. Aortic valve not  well visualized. Peak transaortic valve gradient equals 23  mm Hg, mean transaortic valve gradient equals 11 mm Hg,  which is probably normal in the presence of a transcatheter  aortic valve replacement.  3. Severely dilated left atrium. LA volume index = 63  cc/m2.  4. Moderate concentric left ventricular hypertrophy.  5. Endocardium not well visualized; grossly normal left  ventricular systolic function.  6. Normal right ventricular size and function.  7. Normal tricuspid valve. Minimal tricuspid regurgitation.        Assessment and plan  ---------------------------  70 y/o M with h/o ESRD (s/p renal transplant 2013), IDDM, CAD (s/p stents), FRANCOIS (on cpap qHS), HLD, HTN, presenting with 2 episodes of felt lightheaded and nearly passed out twice while in the bathroom, to urinate, states legs gave out; felt generalized weakness; denies passing out/LOC, denies head trauma.  No CP/SOB/palpitations.   pt with hx of ashd, s/p stents, AS , s/p TAVR in 2021 with near syncope.  chest x ray noted ?chf vs pneumonia  syncope r/ o conduction disease  tele  check orthostatic  repeat echo  ID eval with s/p renal transplant  renal eval  cardiac enzymes  dvt prophylaxis  ct chest noted, no evidence of chf, ILD  continue transplant meds  awaiting blood tests/check orthostatics  + BC, ID eval in view oif hx of transplant on immunosuppressive meds

## 2022-08-25 NOTE — PROGRESS NOTE ADULT - SUBJECTIVE AND OBJECTIVE BOX
[facility]    REVIEW OF SYSTEMS:  GEN: no fever,    no chills  RESP: no SOB,   no cough  CVS: no chest pain,   no palpitations  GI: no abdominal pain,   no nausea,   no vomiting,   no constipation,   no diarrhea  : no dysuria,   no frequency  NEURO: no headache,   no dizziness  PSYCH: no depression,   not anxious  Derm : no rash    MEDICATIONS  (STANDING):  acetaminophen   IVPB .. 1000 milliGRAM(s) IV Intermittent once  allopurinol 100 milliGRAM(s) Oral daily  aspirin enteric coated 81 milliGRAM(s) Oral daily  atorvastatin 20 milliGRAM(s) Oral at bedtime  cefTRIAXone   IVPB      cefTRIAXone   IVPB 2000 milliGRAM(s) IV Intermittent every 24 hours  chlorhexidine 2% Cloths 1 Application(s) Topical daily  dextrose 5%. 1000 milliLiter(s) (50 mL/Hr) IV Continuous <Continuous>  dextrose 5%. 1000 milliLiter(s) (100 mL/Hr) IV Continuous <Continuous>  dextrose 50% Injectable 25 Gram(s) IV Push once  dextrose 50% Injectable 12.5 Gram(s) IV Push once  dextrose 50% Injectable 25 Gram(s) IV Push once  enalapril 10 milliGRAM(s) Oral daily  finasteride 5 milliGRAM(s) Oral daily  glucagon  Injectable 1 milliGRAM(s) IntraMuscular once  heparin   Injectable 5000 Unit(s) SubCutaneous every 12 hours  insulin aspart (NovoLOG) Pump 1 Each SubCutaneous Continuous Pump  labetalol 200 milliGRAM(s) Oral three times a day  mycophenolate mofetil 1000 milliGRAM(s) Oral two times a day  NIFEdipine XL 30 milliGRAM(s) Oral at bedtime  NIFEdipine XL 60 milliGRAM(s) Oral daily  tacrolimus 1.5 milliGRAM(s) Oral <User Schedule>  tacrolimus 1 milliGRAM(s) Oral <User Schedule>    MEDICATIONS  (PRN):  dextrose Oral Gel 15 Gram(s) Oral once PRN Blood Glucose LESS THAN 70 milliGRAM(s)/deciliter      Vital Signs Last 24 Hrs  T(C): 36.7 (25 Aug 2022 05:00), Max: 36.8 (24 Aug 2022 11:07)  T(F): 98.1 (25 Aug 2022 05:00), Max: 98.2 (24 Aug 2022 11:07)  HR: 65 (25 Aug 2022 05:00) (61 - 67)  BP: 158/74 (25 Aug 2022 05:00) (121/67 - 158/74)  BP(mean): --  RR: 18 (25 Aug 2022 05:00) (18 - 18)  SpO2: 96% (25 Aug 2022 05:00) (95% - 100%)    Parameters below as of 25 Aug 2022 05:00  Patient On (Oxygen Delivery Method): room air      CAPILLARY BLOOD GLUCOSE      POCT Blood Glucose.: 126 mg/dL (24 Aug 2022 21:45)  POCT Blood Glucose.: 122 mg/dL (24 Aug 2022 16:26)  POCT Blood Glucose.: 141 mg/dL (24 Aug 2022 12:00)  POCT Blood Glucose.: 127 mg/dL (24 Aug 2022 08:01)    I&O's Summary    24 Aug 2022 07:01  -  25 Aug 2022 05:38  --------------------------------------------------------  IN: 960 mL / OUT: 0 mL / NET: 960 mL        PHYSICAL EXAM:  HEAD:  Atraumatic, Normocephalic  NECK: Supple, No   JVD  CHEST/LUNG:   no     rales,     no,    rhonchi  HEART: Regular rate and rhythm;         murmur  ABDOMEN: Soft, Nontender, ;   EXTREMITIES:   no     edema  NEUROLOGY:  alert    LABS:                        14.1   5.50  )-----------( 135      ( 24 Aug 2022 07:05 )             42.2     08-24    141  |  107  |  18  ----------------------------<  107<H>  4.1   |  22  |  0.57    Ca    9.4      24 Aug 2022 07:07                              Consultant(s) Notes Reviewed:      Care Discussed with Consultants/Other Providers:

## 2022-08-25 NOTE — CHART NOTE - NSCHARTNOTEFT_GEN_A_CORE
MRI Results  1. Osteoarthritis is present with moderate joint effusion.  2. Complex near complete oblique radial tearing involves the medial   meniscus posterior horn.  3. Consider arthrocentesis if there is continued concern for infection.    Ortho consulted by writer; [phone]; imaging reviewed with MD.  Will see pt  Will endorse to AM team

## 2022-08-25 NOTE — CONSULT NOTE ADULT - SUBJECTIVE AND OBJECTIVE BOX
71y Male community ambulator presents c/o Left knee pain for the last 3 months that has been worse since his last fall at home which brought him to the hospital. Patient was admitted to the hospital for syncopal workup. Patient was complaining of left knee pain for which he has seen an orthopedist outside the hospital for in the past and received an injection which gave him short mild relief. Denies numbness/tingling. Denies fever/chills. Denies pain/injury elsewhere.     HEALTH ISSUES - PROBLEM Dx:  Diabetes mellitus type 2, uncomplicated, on long term insulin pump    Hypertension    Hyperlipidemia    Living-donor kidney transplant recipient    Kidney transplant recipient    Immunosuppression          PAST MEDICAL & SURGICAL HISTORY:  Morbid Obesity      HTN (Hypertension), Benign      Hyperuricemia      Hyperlipidemia      Smoking      DM (diabetes mellitus), type 2      FRANCOIS on CPAP      H/O kidney transplant  2013      CAD (coronary artery disease)  1 stent      Kidney transplant recipient  Rt kidney- 2013      AV fistula        MEDICATIONS  (STANDING):  acetaminophen   IVPB .. 1000 milliGRAM(s) IV Intermittent once  allopurinol 100 milliGRAM(s) Oral daily  aspirin enteric coated 81 milliGRAM(s) Oral daily  atorvastatin 20 milliGRAM(s) Oral at bedtime  cefTRIAXone   IVPB      cefTRIAXone   IVPB 2000 milliGRAM(s) IV Intermittent every 24 hours  chlorhexidine 2% Cloths 1 Application(s) Topical daily  dextrose 5%. 1000 milliLiter(s) IV Continuous <Continuous>  dextrose 5%. 1000 milliLiter(s) IV Continuous <Continuous>  dextrose 50% Injectable 25 Gram(s) IV Push once  dextrose 50% Injectable 12.5 Gram(s) IV Push once  dextrose 50% Injectable 25 Gram(s) IV Push once  enalapril 10 milliGRAM(s) Oral daily  finasteride 5 milliGRAM(s) Oral daily  glucagon  Injectable 1 milliGRAM(s) IntraMuscular once  heparin   Injectable 5000 Unit(s) SubCutaneous every 12 hours  insulin aspart (NovoLOG) Pump 1 Each SubCutaneous Continuous Pump  labetalol 200 milliGRAM(s) Oral three times a day  mycophenolate mofetil 1000 milliGRAM(s) Oral two times a day  NIFEdipine XL 30 milliGRAM(s) Oral at bedtime  NIFEdipine XL 60 milliGRAM(s) Oral daily  tacrolimus 1.5 milliGRAM(s) Oral <User Schedule>  tacrolimus 1 milliGRAM(s) Oral <User Schedule>    Allergies    No Known Allergies    Intolerances                            13.9   6.84  )-----------( 156      ( 25 Aug 2022 07:14 )             41.9     25 Aug 2022 06:51    140    |  105    |  13     ----------------------------<  119    3.3     |  21     |  0.49     Ca    9.3        25 Aug 2022 06:51        Vital Signs Last 24 Hrs  T(C): 36.5 (08-25-22 @ 21:24), Max: 36.8 (08-25-22 @ 11:29)  T(F): 97.7 (08-25-22 @ 21:24), Max: 98.3 (08-25-22 @ 11:29)  HR: 65 (08-25-22 @ 21:24) (65 - 71)  BP: 147/70 (08-25-22 @ 21:24) (138/61 - 158/74)  BP(mean): --  RR: 18 (08-25-22 @ 21:24) (18 - 18)  SpO2: 95% (08-25-22 @ 21:24) (92% - 100%)    Imaging: MRI L knee demonstrates a medial meniscus tear   XR pending    Physical Exam  Gen: NAD  Left LE: skin intact, No erythema. Nothing to suggest sepsis. AROM: 0-120. Swelling/Osteoarthritic Deformity noted. Negative Effusion. Able to SLR, Neg log roll, Negative Heel strike, no ttp elsewhere, +EHL/FHL/TA/GS function, DP/PT pulses palpable, compartments soft. Calf soft, nontender. Ligamentous exam benign: Varus/Valgus/Lockman Negative.      A/P: 71y Male with Left Knee Pain/Osteoarthritis/Medial meniscus tear  Analgesia  Antiinflammatories as tolerated (patient is renal transplant patient)  WBAT, rolling walker, PT  ICE/Cryotherapy  DVT PE ppx per primary team   FU with Orthopedist as outpt  Orthopedically stable for DC  Will discuss with attending and advise if plan changes

## 2022-08-26 LAB
ANION GAP SERPL CALC-SCNC: 11 MMOL/L — SIGNIFICANT CHANGE UP (ref 5–17)
BUN SERPL-MCNC: 16 MG/DL — SIGNIFICANT CHANGE UP (ref 7–23)
CALCIUM SERPL-MCNC: 9.6 MG/DL — SIGNIFICANT CHANGE UP (ref 8.4–10.5)
CHLORIDE SERPL-SCNC: 108 MMOL/L — SIGNIFICANT CHANGE UP (ref 96–108)
CO2 SERPL-SCNC: 22 MMOL/L — SIGNIFICANT CHANGE UP (ref 22–31)
CREAT SERPL-MCNC: 0.5 MG/DL — SIGNIFICANT CHANGE UP (ref 0.5–1.3)
EGFR: 109 ML/MIN/1.73M2 — SIGNIFICANT CHANGE UP
GLUCOSE BLDC GLUCOMTR-MCNC: 103 MG/DL — HIGH (ref 70–99)
GLUCOSE BLDC GLUCOMTR-MCNC: 136 MG/DL — HIGH (ref 70–99)
GLUCOSE BLDC GLUCOMTR-MCNC: 137 MG/DL — HIGH (ref 70–99)
GLUCOSE BLDC GLUCOMTR-MCNC: 138 MG/DL — HIGH (ref 70–99)
GLUCOSE SERPL-MCNC: 100 MG/DL — HIGH (ref 70–99)
POTASSIUM SERPL-MCNC: 3.7 MMOL/L — SIGNIFICANT CHANGE UP (ref 3.5–5.3)
POTASSIUM SERPL-SCNC: 3.7 MMOL/L — SIGNIFICANT CHANGE UP (ref 3.5–5.3)
SODIUM SERPL-SCNC: 141 MMOL/L — SIGNIFICANT CHANGE UP (ref 135–145)
TACROLIMUS SERPL-MCNC: 5.7 NG/ML — SIGNIFICANT CHANGE UP

## 2022-08-26 PROCEDURE — 93312 ECHO TRANSESOPHAGEAL: CPT | Mod: 26

## 2022-08-26 PROCEDURE — 99222 1ST HOSP IP/OBS MODERATE 55: CPT | Mod: GC

## 2022-08-26 PROCEDURE — 93325 DOPPLER ECHO COLOR FLOW MAPG: CPT | Mod: 26

## 2022-08-26 PROCEDURE — 93320 DOPPLER ECHO COMPLETE: CPT | Mod: 26

## 2022-08-26 PROCEDURE — 76775 US EXAM ABDO BACK WALL LIM: CPT | Mod: 26

## 2022-08-26 PROCEDURE — 99232 SBSQ HOSP IP/OBS MODERATE 35: CPT

## 2022-08-26 RX ORDER — SOD SULF/SODIUM/NAHCO3/KCL/PEG
4000 SOLUTION, RECONSTITUTED, ORAL ORAL ONCE
Refills: 0 | Status: COMPLETED | OUTPATIENT
Start: 2022-08-28 | End: 2022-08-28

## 2022-08-26 RX ORDER — SOD SULF/SODIUM/NAHCO3/KCL/PEG
1000 SOLUTION, RECONSTITUTED, ORAL ORAL ONCE
Refills: 0 | Status: COMPLETED | OUTPATIENT
Start: 2022-08-27 | End: 2022-08-27

## 2022-08-26 RX ADMIN — MYCOPHENOLATE MOFETIL 1000 MILLIGRAM(S): 250 CAPSULE ORAL at 05:11

## 2022-08-26 RX ADMIN — Medication 60 MILLIGRAM(S): at 05:12

## 2022-08-26 RX ADMIN — CEFTRIAXONE 100 MILLIGRAM(S): 500 INJECTION, POWDER, FOR SOLUTION INTRAMUSCULAR; INTRAVENOUS at 17:43

## 2022-08-26 RX ADMIN — Medication 100 MILLIGRAM(S): at 11:22

## 2022-08-26 RX ADMIN — Medication 200 MILLIGRAM(S): at 21:06

## 2022-08-26 RX ADMIN — TACROLIMUS 1 MILLIGRAM(S): 5 CAPSULE ORAL at 07:49

## 2022-08-26 RX ADMIN — ATORVASTATIN CALCIUM 20 MILLIGRAM(S): 80 TABLET, FILM COATED ORAL at 21:06

## 2022-08-26 RX ADMIN — HEPARIN SODIUM 5000 UNIT(S): 5000 INJECTION INTRAVENOUS; SUBCUTANEOUS at 05:10

## 2022-08-26 RX ADMIN — Medication 10 MILLIGRAM(S): at 05:11

## 2022-08-26 RX ADMIN — Medication 200 MILLIGRAM(S): at 05:11

## 2022-08-26 RX ADMIN — TACROLIMUS 1.5 MILLIGRAM(S): 5 CAPSULE ORAL at 19:54

## 2022-08-26 RX ADMIN — HEPARIN SODIUM 5000 UNIT(S): 5000 INJECTION INTRAVENOUS; SUBCUTANEOUS at 17:42

## 2022-08-26 RX ADMIN — Medication 30 MILLIGRAM(S): at 21:06

## 2022-08-26 RX ADMIN — Medication 200 MILLIGRAM(S): at 13:34

## 2022-08-26 RX ADMIN — CHLORHEXIDINE GLUCONATE 1 APPLICATION(S): 213 SOLUTION TOPICAL at 11:21

## 2022-08-26 RX ADMIN — Medication 81 MILLIGRAM(S): at 11:22

## 2022-08-26 RX ADMIN — FINASTERIDE 5 MILLIGRAM(S): 5 TABLET, FILM COATED ORAL at 11:22

## 2022-08-26 RX ADMIN — MYCOPHENOLATE MOFETIL 1000 MILLIGRAM(S): 250 CAPSULE ORAL at 17:41

## 2022-08-26 NOTE — CONSULT NOTE ADULT - ASSESSMENT
70 y/o M with h/o ESRD s/p renal transplant 2013 LRRT from son , DM, CAD s/p stents , FRANCOIS (on cpap qHS), HLD, HTN, active tobacco user who presented after feeling dizziness, lightheadedness and flushed feeling. dx with strep galolyticus. had recent dental procedure. has been on abx now. had echo on 8/26. in addition had ct scan revealing renal lesion.       1- renal transplant recipient  2- active tobacco user  3- DM   4- HTN   5- cad hx   6- renal lesion right native kidney   7- strep bacteremia      cont prograf 1 mg am and 1.5 mg pm. his tacro level in range. cellcept 1 g bid   renal sono to evaluate right native renal lesion   rocephin 1 g iv daily   labetalol 300 mg tid and procardia xl total 90 mg daily   cont enalapril as above   echo to evaluate for any vegetations   strict I/O  tobacco cessation d/w pt. he is not willing to attempt nicotine patch.   d/w pt wife at bedside  d/w Dr. Machado

## 2022-08-26 NOTE — PROGRESS NOTE ADULT - SUBJECTIVE AND OBJECTIVE BOX
CARDIOLOGY     PROGRESS  NOTE   ________________________________________________    CHIEF COMPLAINT:Patient is a 71y old  Male who presents with a chief complaint of near  syncope (26 Aug 2022 06:29)  no complain.  	  REVIEW OF SYSTEMS:  CONSTITUTIONAL: No fever, weight loss, or fatigue  EYES: No eye pain, visual disturbances, or discharge  ENT:  No difficulty hearing, tinnitus, vertigo; No sinus or throat pain  NECK: No pain or stiffness  RESPIRATORY: No cough, wheezing, chills or hemoptysis; No Shortness of Breath  CARDIOVASCULAR: No chest pain, palpitations, passing out, dizziness, or leg swelling  GASTROINTESTINAL: No abdominal or epigastric pain. No nausea, vomiting, or hematemesis; No diarrhea or constipation. No melena or hematochezia.  GENITOURINARY: No dysuria, frequency, hematuria, or incontinence  NEUROLOGICAL: No headaches, memory loss, loss of strength, numbness, or tremors  SKIN: No itching, burning, rashes, or lesions   LYMPH Nodes: No enlarged glands  ENDOCRINE: No heat or cold intolerance; No hair loss  MUSCULOSKELETAL: No joint pain or swelling; No muscle, back, or extremity pain  PSYCHIATRIC: No depression, anxiety, mood swings, or difficulty sleeping  HEME/LYMPH: No easy bruising, or bleeding gums  ALLERGY AND IMMUNOLOGIC: No hives or eczema	    [ ] All others negative	  [ ] Unable to obtain    PHYSICAL EXAM:  T(C): 36.6 (08-26-22 @ 04:36), Max: 36.8 (08-25-22 @ 11:29)  HR: 66 (08-26-22 @ 06:42) (65 - 71)  BP: 176/77 (08-26-22 @ 04:36) (138/61 - 176/77)  RR: 18 (08-26-22 @ 04:36) (18 - 18)  SpO2: 98% (08-26-22 @ 06:42) (93% - 100%)  Wt(kg): --  I&O's Summary    25 Aug 2022 07:01  -  26 Aug 2022 07:00  --------------------------------------------------------  IN: 650 mL / OUT: 0 mL / NET: 650 mL        Appearance: Normal	  HEENT:   Normal oral mucosa, PERRL, EOMI	  Lymphatic: No lymphadenopathy  Cardiovascular: Normal S1 S2, No JVD, + murmurs, No edema  Respiratory: rhonchi  Psychiatry: A & O x 3, Mood & affect appropriate  Gastrointestinal:  Soft, Non-tender, + BS	  Skin: No rashes, No ecchymoses, No cyanosis	  Neurologic: Non-focal  Extremities: Normal range of motion, No clubbing, cyanosis or edema  Vascular: Peripheral pulses palpable 2+ bilaterally    MEDICATIONS  (STANDING):  acetaminophen   IVPB .. 1000 milliGRAM(s) IV Intermittent once  allopurinol 100 milliGRAM(s) Oral daily  aspirin enteric coated 81 milliGRAM(s) Oral daily  atorvastatin 20 milliGRAM(s) Oral at bedtime  cefTRIAXone   IVPB      cefTRIAXone   IVPB 2000 milliGRAM(s) IV Intermittent every 24 hours  chlorhexidine 2% Cloths 1 Application(s) Topical daily  dextrose 5%. 1000 milliLiter(s) (50 mL/Hr) IV Continuous <Continuous>  dextrose 5%. 1000 milliLiter(s) (100 mL/Hr) IV Continuous <Continuous>  dextrose 50% Injectable 25 Gram(s) IV Push once  dextrose 50% Injectable 12.5 Gram(s) IV Push once  dextrose 50% Injectable 25 Gram(s) IV Push once  enalapril 10 milliGRAM(s) Oral daily  finasteride 5 milliGRAM(s) Oral daily  glucagon  Injectable 1 milliGRAM(s) IntraMuscular once  heparin   Injectable 5000 Unit(s) SubCutaneous every 12 hours  insulin aspart (NovoLOG) Pump 1 Each SubCutaneous Continuous Pump  labetalol 200 milliGRAM(s) Oral three times a day  mycophenolate mofetil 1000 milliGRAM(s) Oral two times a day  NIFEdipine XL 30 milliGRAM(s) Oral at bedtime  NIFEdipine XL 60 milliGRAM(s) Oral daily  tacrolimus 1.5 milliGRAM(s) Oral <User Schedule>  tacrolimus 1 milliGRAM(s) Oral <User Schedule>      TELEMETRY: 	    ECG:  	  RADIOLOGY:  OTHER: 	  	  LABS:	 	    CARDIAC MARKERS:                                13.9   6.84  )-----------( 156      ( 25 Aug 2022 07:14 )             41.9     08-26    141  |  108  |  16  ----------------------------<  100<H>  3.7   |  22  |  0.50    Ca    9.6      26 Aug 2022 06:04      proBNP:   Lipid Profile:   HgA1c:   TSH:         Assessment and plan  ---------------------------  72 y/o M with h/o ESRD (s/p renal transplant 2013), IDDM, CAD (s/p stents), FRANCOIS (on cpap qHS), HLD, HTN, presenting with 2 episodes of felt lightheaded and nearly passed out twice while in the bathroom, to urinate, states legs gave out; felt generalized weakness; denies passing out/LOC, denies head trauma.  No CP/SOB/palpitations.   pt with hx of ashd, s/p stents, AS , s/p TAVR in 2021 with near syncope.  chest x ray noted ?chf vs pneumonia  syncope r/ o conduction disease  tele  check orthostatic  repeat echo noted  ID eval with s/p renal transplant  renal eval  cardiac enzymes  dvt prophylaxis  ct chest noted, no evidence of chf, ILD  continue transplant meds  awaiting blood tests/check orthostatics  + BC, ID eval in view oif hx of transplant on immunosuppressive meds, continue abx  awaiting SHABNAM  ?colonoscopy  will adjust bp meds

## 2022-08-26 NOTE — CONSULT NOTE ADULT - ATTENDING COMMENTS
Agree with above. Patient with strep gallolyticus bacteremia, which can be associated with colonic neoplasia. Will plan for colonoscopy on Monday after 2 day prep, given chronic constipation. Risks/benefits of colonoscopy including risks of bleeding, infection, perforation explained. Pt is agreeable to procedure. D/w wife and son at bedside also.
Patient is a 71-year-old man with history of kidney transplant secondary to polycystic kidney disease, CAD status post cardiac stents, obstructive sleep apnea, hypertension, hyperlipidemia, type 2 diabetes on insulin pump, presented with syncope and found to have bacteremia.  Patient is on a [employer] pump with NovoLog.  Doing well with his current settings.  He is due for site change today.  He has all his supplies with him.  Does not use a CGM.  Continue with insulin pump with his current setting.  Endocrine team will be following and titrating insulin pump as needed.  Regarding hyperlipidemia, continue with atorvastatin 20 mg once daily.  Regarding hypertension, patient is currently on enalapril 10 mg once daily, nifedipine 30 mg at nighttime, 60 mg in the morning, and labetalol 200 mg 3 times daily.  Continue to monitor BP on his current regimen and management as per primary team.
Renal transplant recipient presenting with fevers  Blood cultures with streptococcus based on BCID PCR  Noted steroid injection into left knee and trauma following fall but exam not consistent with septic arthritis   Would cover with Ceftriaxone and Vancomycin by level pending streptococcus susceptibilities   Doubt pneumonia based on CT imaging   Would check CT A/P to exclude abdominal source   Check TTE   Repeat 2x BCx with AM labs    I will continue to follow. Please feel free to contact me with any further questions.    Elvin Vallejo M.D.  [facility] Division of Infectious Disease  8AM-5PM Monday - Friday: Available on [employer] Teams  After Hours and Holidays (or if no response on [employer] Teams): Please contact the Infectious Diseases Office at [phone]    The above assessment and plan were discussed with medicine NP

## 2022-08-26 NOTE — PROGRESS NOTE ADULT - SUBJECTIVE AND OBJECTIVE BOX
Follow Up:      Interval History:    REVIEW OF SYSTEMS  [  ] ROS unobtainable because:    [  ] All other systems negative except as noted below    Constitutional:  [ ] fever [ ] chills  [ ] weight loss  [ ] weakness  Skin:  [ ] rash [ ] phlebitis	  Eyes: [ ] icterus [ ] pain  [ ] discharge	  ENMT: [ ] sore throat  [ ] thrush [ ] ulcers [ ] exudates  Respiratory: [ ] dyspnea [ ] hemoptysis [ ] cough [ ] sputum	  Cardiovascular:  [ ] chest pain [ ] palpitations [ ] edema	  Gastrointestinal:  [ ] nausea [ ] vomiting [ ] diarrhea [ ] constipation [ ] pain	  Genitourinary:  [ ] dysuria [ ] frequency [ ] hematuria [ ] discharge [ ] flank pain  [ ] incontinence  Musculoskeletal:  [ ] myalgias [ ] arthralgias [ ] arthritis  [ ] back pain  Neurological:  [ ] headache [ ] seizures  [ ] confusion/altered mental status    Allergies  No Known Allergies        ANTIMICROBIALS:  cefTRIAXone   IVPB    cefTRIAXone   IVPB 2000 every 24 hours      OTHER MEDS:  MEDICATIONS  (STANDING):  acetaminophen   IVPB .. 1000 once  allopurinol 100 daily  aspirin enteric coated 81 daily  atorvastatin 20 at bedtime  dextrose 50% Injectable 25 once  dextrose 50% Injectable 12.5 once  dextrose 50% Injectable 25 once  dextrose Oral Gel 15 once PRN  enalapril 10 daily  finasteride 5 daily  glucagon  Injectable 1 once  heparin   Injectable 5000 every 12 hours  insulin aspart (NovoLOG) Pump 1 Continuous Pump  labetalol 200 three times a day  mycophenolate mofetil 1000 two times a day  NIFEdipine XL 30 at bedtime  NIFEdipine XL 60 daily  tacrolimus 1.5 <User Schedule>  tacrolimus 1 <User Schedule>      Vital Signs Last 24 Hrs  T(C): 36.6 (26 Aug 2022 10:59), Max: 36.6 (26 Aug 2022 04:36)  T(F): 97.9 (26 Aug 2022 10:59), Max: 97.9 (26 Aug 2022 09:15)  HR: 66 (26 Aug 2022 13:30) (60 - 98)  BP: 115/69 (26 Aug 2022 13:30) (115/69 - 176/77)  BP(mean): --  RR: 19 (26 Aug 2022 10:59) (16 - 20)  SpO2: 96% (26 Aug 2022 10:59) (94% - 100%)    Parameters below as of 26 Aug 2022 10:59  Patient On (Oxygen Delivery Method): room air        PHYSICAL EXAMINATION:  General: Alert and Awake, NAD  HEENT: PERRL, EOMI  Neck: Supple  Cardiac: RRR, No M/R/G  Resp: CTAB, No Wh/Rh/Ra  Abdomen: NBS, NT/ND, No HSM, No rigidity or guarding  MSK: No LE edema. No Calf tenderness  : No thurston  Skin: No rashes or lesions. Skin is warm and dry to the touch.   Neuro: Alert and Awake. CN 2-12 Grossly intact. Moves all four extremities spontaneously.  Psych: Calm, Pleasant, Cooperative                          13.9   6.84  )-----------( 156      ( 25 Aug 2022 07:14 )             41.9       08-26    141  |  108  |  16  ----------------------------<  100<H>  3.7   |  22  |  0.50    Ca    9.6      26 Aug 2022 06:04            MICROBIOLOGY:  v  .Blood Blood-Peripheral  08-24-22   No growth to date.  --  --      .Blood Blood-Peripheral  08-24-22   No growth to date.  --  --      Clean Catch Clean Catch (Midstream)  08-23-22   <10,000 CFU/mL Normal Urogenital Heather  --  --      .Blood Blood-Peripheral  08-22-22   Growth in aerobic and anaerobic bottles: Streptococcus gallolyticus  See previous culture [id number]  --    Growth in aerobic and anaerobic bottles: Gram Positive Cocci in Pairs and  Chains      .Blood Blood-Peripheral  08-22-22   Growth in aerobic and anaerobic bottles: Streptococcus gallolyticus  ***Blood Panel PCR results on this specimen are available  approximately 3 hours after the Gram stain result.***  Gram stain, PCR, and/or culture results may not always  correspond due to difference in methodologies.  ************************************************************  This PCR assay was performed by multiplex PCR. This  Assay tests for 66 bacterial and resistance gene targets.  Please refer to the [facility] test directory  at [email or web address] for details.  --  Blood Culture PCR  Streptococcus gallolyticus          Rapid RVP Result: Joelle (08-23 @ 01:09)        RADIOLOGY:    <The imaging below has been reviewed and visualized by me independently. Findings as detailed in report below> Follow Up:  bacteremia    Interval History: afebrile. no acute events.     REVIEW OF SYSTEMS  [  ] ROS unobtainable because:    [ x ] All other systems negative except as noted below    Constitutional:  [ ] fever [ ] chills  [ ] weight loss  [ ] weakness  Skin:  [ ] rash [ ] phlebitis	  Eyes: [ ] icterus [ ] pain  [ ] discharge	  ENMT: [ ] sore throat  [ ] thrush [ ] ulcers [ ] exudates  Respiratory: [ ] dyspnea [ ] hemoptysis [ ] cough [ ] sputum	  Cardiovascular:  [ ] chest pain [ ] palpitations [ ] edema	  Gastrointestinal:  [ ] nausea [ ] vomiting [ ] diarrhea [ ] constipation [ ] pain	  Genitourinary:  [ ] dysuria [ ] frequency [ ] hematuria [ ] discharge [ ] flank pain  [ ] incontinence  Musculoskeletal:  [ ] myalgias [ ] arthralgias [ ] arthritis  [ ] back pain +left knee pain  Neurological:  [ ] headache [ ] seizures  [ ] confusion/altered mental status    Allergies  No Known Allergies        ANTIMICROBIALS:  cefTRIAXone   IVPB    cefTRIAXone   IVPB 2000 every 24 hours      OTHER MEDS:  MEDICATIONS  (STANDING):  acetaminophen   IVPB .. 1000 once  allopurinol 100 daily  aspirin enteric coated 81 daily  atorvastatin 20 at bedtime  dextrose 50% Injectable 25 once  dextrose 50% Injectable 12.5 once  dextrose 50% Injectable 25 once  dextrose Oral Gel 15 once PRN  enalapril 10 daily  finasteride 5 daily  glucagon  Injectable 1 once  heparin   Injectable 5000 every 12 hours  insulin aspart (NovoLOG) Pump 1 Continuous Pump  labetalol 200 three times a day  mycophenolate mofetil 1000 two times a day  NIFEdipine XL 30 at bedtime  NIFEdipine XL 60 daily  tacrolimus 1.5 <User Schedule>  tacrolimus 1 <User Schedule>      Vital Signs Last 24 Hrs  T(C): 36.6 (26 Aug 2022 10:59), Max: 36.6 (26 Aug 2022 04:36)  T(F): 97.9 (26 Aug 2022 10:59), Max: 97.9 (26 Aug 2022 09:15)  HR: 66 (26 Aug 2022 13:30) (60 - 98)  BP: 115/69 (26 Aug 2022 13:30) (115/69 - 176/77)  BP(mean): --  RR: 19 (26 Aug 2022 10:59) (16 - 20)  SpO2: 96% (26 Aug 2022 10:59) (94% - 100%)    Parameters below as of 26 Aug 2022 10:59  Patient On (Oxygen Delivery Method): room air    PHYSICAL EXAMINATION:  General: Alert and Awake, NAD  HEENT: PERRL, EOMI  Cardiac: RRR, No M/R/G  Resp: CTAB, No Wh/Rh/Ra  Abdomen: NBS, NT/ND, No HSM, No rigidity or guarding  MSK: L Knee with some mild tenderness to palpation, small effusion, minimal warmth to touch and ROM preserved No Calf tenderness  : No thurston  Skin: No rashes or lesions. Skin is warm and dry to the touch.   Neuro: Alert and Awake. CN 2-12 Grossly intact. Moves all four extremities spontaneously.  Psych: Calm, Pleasant, Cooperative                          13.9   6.84  )-----------( 156      ( 25 Aug 2022 07:14 )             41.9       08-26    141  |  108  |  16  ----------------------------<  100<H>  3.7   |  22  |  0.50    Ca    9.6      26 Aug 2022 06:04            MICROBIOLOGY:  v  .Blood Blood-Peripheral  08-24-22   No growth to date.  --  --      .Blood Blood-Peripheral  08-24-22   No growth to date.  --  --      Clean Catch Clean Catch (Midstream)  08-23-22   <10,000 CFU/mL Normal Urogenital Heather  --  --      .Blood Blood-Peripheral  08-22-22   Growth in aerobic and anaerobic bottles: Streptococcus gallolyticus  See previous culture [id number]  --    Growth in aerobic and anaerobic bottles: Gram Positive Cocci in Pairs and  Chains      .Blood Blood-Peripheral  08-22-22   Growth in aerobic and anaerobic bottles: Streptococcus gallolyticus  ***Blood Panel PCR results on this specimen are available  approximately 3 hours after the Gram stain result.***  Gram stain, PCR, and/or culture results may not always  correspond due to difference in methodologies.  ************************************************************  This PCR assay was performed by multiplex PCR. This  Assay tests for 66 bacterial and resistance gene targets.  Please refer to the [facility] test directory  at [email or web address] for details.  --  Blood Culture PCR  Streptococcus gallolyticus          Rapid RVP Result: Annietec (08-23 @ 01:09)        RADIOLOGY:    <The imaging below has been reviewed and visualized by me independently. Findings as detailed in report below>    < from: US Renal (08.26.22 @ 16:17) >  IMPRESSION:  Multiple cysts in the bilateral native kidneys, which are stable   appearing and correlate to previous ultrasound imaging examination on   1/3/2011. A 2.6 cm right renal cyst likely represents the abnormality   noted on prior CT scan.    < end of copied text >

## 2022-08-26 NOTE — CONSULT NOTE ADULT - PROVIDER SPECIALTY LIST ADULT
Orthopedics
Cardiology
Transplant ID
Gastroenterology
Nephrology
Transplant Nephrology
Endocrinology

## 2022-08-26 NOTE — PROGRESS NOTE ADULT - ASSESSMENT
70 y/o M       with h/o ESRD, , s/p  R renal transplant 2013 ,    DM,  CAD   s/p stents , FRANCOIS  on cpap qHS), HLD, HTN,         presenting with 2 episodes of felt lightheaded and nearly passed out twice while in the bathroom,   states legs gave out; felt generalized weakness;    denies passing out/LOC, denies head trauma.    No   cp/sob //palpitations.  fever./  s/p  fall  at  home       * admitted  with  dizziness/ near syncope,  denies  cp/sob   cxr. with  pulm edema    *  HTN/HLD         on  procardia,  labetolol     *   CAD, 11/2021  on asa/ plavix/ ,lipitor     *   s/p  TAVR, 12/2021      *   DM,         has  insulin   pump, per   house endo      *    CKD      s/p   R kidney transplant  2013,   Dr Vargas  [facility]      *  IDL restrictive/obstructive lung disease,  and   FRANCOIS / cpap        CT chest, ,  ILD     *  Strep   galolyticus,   bacteremia on iv rocephin/  f/p by  ID     * imaging.  R   renal mass,  in native  kidney   transplanted   kidney noted  in rlq,    *  s/p  renal  transplant,,  on  cellcept/ tacrolimus   on  dvt  ppx   rpt  bcx,   neagtive    await   SHABNAM  and urology  eval   MRI,   knee/ miild  patellar  effusion.  meniscus  tear,  per  ortho,  no  intervention      72 y/o M       with h/o ESRD, , s/p  R renal transplant 2013 ,    DM,  CAD   s/p stents , FRANCOIS  on cpap qHS), HLD, HTN,         presenting with 2 episodes of felt lightheaded and nearly passed out twice while in the bathroom,   states legs gave out; felt generalized weakness;    denies passing out/LOC, denies head trauma.    No   cp/sob //palpitations.  fever./  s/p  fall  at  home       * admitted  with  dizziness/ near syncope,  denies  cp/sob   cxr. with  pulm edema    *  HTN/HLD         on  procardia,  labetolol     *   CAD, 11/2021  on asa/ plavix/ ,lipitor     *   s/p  TAVR, 12/2021      *   DM,         has  insulin   pump, per   house endo      *    CKD      s/p   R kidney transplant  2013,   Dr Vargas  [facility]      *  IDL restrictive/obstructive lung disease,  and   FRANCOIS / cpap        CT chest, ,  ILD     *  Strep   galolyticus,   bacteremia on iv rocephin/  f/p by  ID     * imaging.  R   renal mass,  in native  kidney   transplanted   kidney noted  in rlq,    *  s/p  renal  transplant,,  on  cellcept/ tacrolimus   on  dvt  ppx   rpt  bcx,   neagtive    await   SHABNAM  and urology  eval   MRI,   knee/ miild  patellar  effusion.  meniscus  tear,  per  ortho,  no  intervention    wife    at  bedside/  house gi  for c/scopy, per  ID

## 2022-08-26 NOTE — CONSULT NOTE ADULT - SUBJECTIVE AND OBJECTIVE BOX
[facility]  [phone]  NEPHROLOGY      INITIAL CONSULT NOTE  --------------------------------------------------------------------------------  HPI:      70 y/o M with h/o ESRD s/p renal transplant 2013 LRRT from son , DM, CAD s/p stents , FRANCOIS (on cpap qHS), HLD, HTN, active tobacco user who presented after feeling dizziness, lightheadedness and flushed feeling. dx with strep galolyticus. had recent dental procedure. has been on abx now. had echo on 8/26. in addition had ct scan revealing renal lesion.         PAST HISTORY  --------------------------------------------------------------------------------  PAST MEDICAL & SURGICAL HISTORY:  Morbid Obesity      HTN (Hypertension), Benign      Hyperuricemia      Hyperlipidemia      Smoking      DM (diabetes mellitus), type 2      FRANCOIS on CPAP      H/O kidney transplant  2013      CAD (coronary artery disease)  1 stent      Kidney transplant recipient  Rt kidney- 2013      AV fistula        FAMILY HISTORY:    PAST SOCIAL HISTORY:    ALLERGIES & MEDICATIONS  --------------------------------------------------------------------------------  Allergies    No Known Allergies    Intolerances      Standing Inpatient Medications  acetaminophen   IVPB .. 1000 milliGRAM(s) IV Intermittent once  allopurinol 100 milliGRAM(s) Oral daily  aspirin enteric coated 81 milliGRAM(s) Oral daily  atorvastatin 20 milliGRAM(s) Oral at bedtime  cefTRIAXone   IVPB      cefTRIAXone   IVPB 2000 milliGRAM(s) IV Intermittent every 24 hours  chlorhexidine 2% Cloths 1 Application(s) Topical daily  dextrose 5%. 1000 milliLiter(s) IV Continuous <Continuous>  dextrose 5%. 1000 milliLiter(s) IV Continuous <Continuous>  dextrose 50% Injectable 25 Gram(s) IV Push once  dextrose 50% Injectable 12.5 Gram(s) IV Push once  dextrose 50% Injectable 25 Gram(s) IV Push once  enalapril 10 milliGRAM(s) Oral daily  finasteride 5 milliGRAM(s) Oral daily  glucagon  Injectable 1 milliGRAM(s) IntraMuscular once  heparin   Injectable 5000 Unit(s) SubCutaneous every 12 hours  insulin aspart (NovoLOG) Pump 1 Each SubCutaneous Continuous Pump  labetalol 200 milliGRAM(s) Oral three times a day  mycophenolate mofetil 1000 milliGRAM(s) Oral two times a day  NIFEdipine XL 30 milliGRAM(s) Oral at bedtime  NIFEdipine XL 60 milliGRAM(s) Oral daily  tacrolimus 1.5 milliGRAM(s) Oral <User Schedule>  tacrolimus 1 milliGRAM(s) Oral <User Schedule>    PRN Inpatient Medications  dextrose Oral Gel 15 Gram(s) Oral once PRN      REVIEW OF SYSTEMS  --------------------------------------------------------------------------------  Gen: No  fevers/chills   Skin: No rashes  Head/Eyes/Ears/Mouth: No headache; Normal hearing;  No sinus pain/discomfort, sore throat  Respiratory: No dyspnea, cough, wheezing, hemoptysis  CV: No chest pain, orthopnea  GI: No abdominal pain, diarrhea, nausea, vomiting, melena, hematochezia  : No dysuria, decrease urination or hesitancy urinating  hematuria, nocturia  MSK: No joint pain/swelling; no back pain  Neuro: No dizziness/lightheadedness  also with no edema       VITALS/PHYSICAL EXAM  --------------------------------------------------------------------------------  T(C): 36.6 (08-26-22 @ 10:59), Max: 36.6 (08-26-22 @ 04:36)  HR: 65 (08-26-22 @ 10:59) (60 - 98)  BP: 149/66 (08-26-22 @ 10:59) (126/60 - 176/77)  RR: 19 (08-26-22 @ 10:59) (16 - 20)  SpO2: 96% (08-26-22 @ 10:59) (94% - 100%)  Wt(kg): --  Height (cm): 170.2 (08-26-22 @ 08:36)  Weight (kg): 91.6 (08-26-22 @ 08:36)  BMI (kg/m2): 31.6 (08-26-22 @ 08:36)  BSA (m2): 2.03 (08-26-22 @ 08:36)      08-25-22 @ 07:01  -  08-26-22 @ 07:00  --------------------------------------------------------  IN: 650 mL / OUT: 0 mL / NET: 650 mL      Physical Exam:  	Gen: Non toxic comfortable appearing   	no jvd   	Pulm: decrease bs  no rales or ronchi or wheezing  	CV: RRR, S1S2; no rub  	Back: No CVA tenderness  	Abd: +BS, soft, nontender/nondistended + RLQ graft site non tender   	: No suprapubic tenderness  	UE: Warm, no cyanosis  no clubbing,  no edema  	LE: Warm, no cyanosis  no clubbing, no edema  	Neuro: alert and oriented. speech coherent   	Psych: Normal affect and mood  	Skin: Warm, no decrease skin turgor   	Vascular access: KAYLIN bhagat     LABS/STUDIES  --------------------------------------------------------------------------------              13.9   6.84  >-----------<  156      [08-25-22 @ 07:14]              41.9     141  |  108  |  16  ----------------------------<  100      [08-26-22 @ 06:04]  3.7   |  22  |  0.50        Ca     9.6     [08-26-22 @ 06:04]    tacro 5.6         Creatinine Trend:  SCr 0.50 [08-26 @ 06:04]  SCr 0.49 [08-25 @ 06:51]  SCr 0.57 [08-24 @ 07:07]  SCr 0.62 [08-23 @ 01:09]  SCr 0.58 [08-22 @ 23:41]    Urinalysis - [08-23-22 @ 04:41]      Color Yellow / Appearance Clear / SG 1.031 / pH 6.0      Gluc Negative / Ketone Negative  / Bili Negative / Urobili Negative       Blood Negative / Protein 30 mg/dL / Leuk Est Negative / Nitrite Negative      RBC 5 / WBC 1 / Hyaline 1 / Gran  / Sq Epi  / Non Sq Epi 1 / Bacteria Negative        HCV 0.21, Nonreact      [08-24-22 @ 07:07]        < from: CT Abdomen and Pelvis No Cont (08.23.22 @ 20:10) >    ACC: [id number] EXAM:  CT ABDOMEN AND PELVIS                          PROCEDURE DATE:  08/23/2022          INTERPRETATION:  CLINICAL INFORMATION: Fever, leukocytosis, streptococcal   bacteremia, evaluate for source.    COMPARISON: None.    CONTRAST/COMPLICATIONS:  IV Contrast: NONE  Oral Contrast: NONE  Complications: None reported at time of study completion    PROCEDURE:  CT of the Abdomen and Pelvis was performed.  Sagittal and coronal reformats were performed.    FINDINGS:  Limited evaluationof intra-abdominal organs due to lack of IV contrast.    LOWER CHEST: Cardiomegaly. Aortic valve replacement. Coronary artery   calcifications. Bilateral lower lobe predominant reticular and   groundglass opacities, right greater than left, better evaluated on   same-day CT chest.    LIVER: Within normal limits.  BILE DUCTS: Normal caliber.  GALLBLADDER: Within normal limits.  SPLEEN: Within normal limits.  PANCREAS: Within normal limits.  ADRENALS: Nonspecific bilateral adrenal gland thickening.  KIDNEYS/URETERS: Bilateral atrophic and cystic native kidneys. There is   an indeterminate 3.1 x 2.5 cm mass within the right native kidney.   Additional bilateral subcentimeter hypodensities, too small to   characterize. Transplanted kidney in right lower quadrant, within normal   limits.    BLADDER: Within normal limits.  REPRODUCTIVE ORGANS: Prostate is normal.    BOWEL: No bowel obstruction. Appendix is normal.  PERITONEUM: No ascites.  VESSELS: Atherosclerotic calcifications.  RETROPERITONEUM/LYMPH NODES: No lymphadenopathy.  ABDOMINAL WALL: Tiny fat-containing umbilical hernia.  BONES: Lumbar spine orthopedic hardware. Degenerative changes.    IMPRESSION:  No source of infection identified in this noncontrast study.    Indeterminant 3.1 x 2.5 cm mass within the right native kidney, recommend   US or MRI for further characterization.    --- End of Report ---           MARI CIFUENTES MD; Resident Radiologist  This document has been electronically signed.  AVEL TATUM MD; Attending Radiologist  This document has been electronically signed. Aug 24 2022 11:18AM    < end of copied text >  < from: CT Chest No Cont (08.23.22 @ 08:34) >    ACC: [id number] EXAM:  CT CHEST                          PROCEDURE DATE:  08/23/2022          INTERPRETATION:  CLINICAL INFORMATION: Shortness of breath and dizziness.   Patient with CHF.    COMPARISON: CT chest 11/4/2021, CXR 8/22/2022    CONTRAST/COMPLICATIONS:  IV Contrast: None.  Oral Contrast: None.  Complications: None reported.    PROCEDURE:  CT of the Chest was performed.  Sagittal and coronal reformats were performed.    FINDINGS:    Lymph nodes: Right paratracheal lymph node measuring 1.6 cm, unchanged.   No new lymphadenopathy.    Heart/vasculature: Cardiomegaly.  No pericardial effusion. TAVR. Coronary   artery calcifications.    Airways/lungs/pleura: Central airways are clear. Bilateral lower lobe   predominant reticular and groundglass opacities, similar to 11/4/2021.   Anterior right upper lobe subpleural cysts. No pleural effusion or   pneumothorax.    Upper abdomen: Partially imaged right renal indeterminate lesion. Left   renal cyst. Bilateral renal hypodensities too small to characterize.   Bilateral adrenal adenomas.    Bones/soft tissues: Osseous degenerative changes. No acute fracture or   spondylolisthesis. Soft tissues are within normal limits.      IMPRESSION:  1.  Bilateral lower lobe predominant reticular and groundglass opacities   that are similar to 11/4/2021 comparison. These findings likely secondary   to patient's known interstitial lung disease.    --- End of Report ---           COLLEEN DIMAS MD; Resident Radiologist  This document has been electronically signed.  JACKELIN MONTALVO MD; Attending Radiologist  This document has been electronically signed. Aug 23 2022 12:42PM    < end of copied text >

## 2022-08-26 NOTE — CONSULT NOTE ADULT - ASSESSMENT
71-year-old Male with significant history LRRT in 2013, DM, CAD s/p stents, FRANCOIS (on CPAP), HLD, HTN who was admitted at [facility] on 8/23/22 for fever, generalized weakness and left knee pain after a fall 1 day prior to admission - found to have strep gallolyticus bacteremia.    Impression:  #Strep gallolyticus bacteremia - r/o GI malignancy. Hx of multiple polyps, overdue for colonoscopy. No unintentional weight loss.    Recommendation:    71-year-old Male with significant history LRRT in 2013, DM, CAD s/p stents, FRANCOIS (on CPAP), HLD, HTN who was admitted at [facility] on 8/23/22 for fever, generalized weakness and left knee pain after a fall 1 day prior to admission - found to have strep gallolyticus bacteremia.    Impression:  #Strep gallolyticus bacteremia - r/o GI malignancy. Hx of multiple polyps, overdue for colonoscopy. No unintentional weight loss.  #s/p renal transplant      Recommendation:  - plan for colonoscopy Monday  - baseline constipation - will need 2 day prep  - 1L moviprep saturday during the day  - 4L golytely Sunday evening  - full liquid diet Saturday  - clear liquid diet Sunday  - NPO after midnight SUnday  - please send repeat covid Sunday    Note not finalized until signed by attending.    Ayesha Colby PGY-6  Gastroenterology/Hepatology Fellow  Pager #[phone] (YOLANDA) or [phone] (NS)  Available on [employer] Teams.  Please contact on-call GI fellow via answering service ([phone]) after 5pm and before 8am, and on weekends.

## 2022-08-26 NOTE — PRE-ANESTHESIA EVALUATION ADULT - NSANTHPMHFT_GEN_ALL_CORE
chat and consultant notes reviewed. hx cad sp stents 1 yr ago - stable clinical status since, ambulates without  cp. tavr at same time. recnt tte noted. pw bacteremnia, ro endocarditis. dm on insulin pump, fs noted. alethea uses cpap. sp okt

## 2022-08-26 NOTE — PROGRESS NOTE ADULT - SUBJECTIVE AND OBJECTIVE BOX
afberile    REVIEW OF SYSTEMS:  GEN: no fever,    no chills  RESP: no SOB,   no cough  CVS: no chest pain,   no palpitations  GI: no abdominal pain,   no nausea,   no vomiting,   no constipation,   no diarrhea  : no dysuria,   no frequency  NEURO: no headache,   no dizziness  PSYCH: no depression,   not anxious  Derm : no rash    MEDICATIONS  (STANDING):  acetaminophen   IVPB .. 1000 milliGRAM(s) IV Intermittent once  allopurinol 100 milliGRAM(s) Oral daily  aspirin enteric coated 81 milliGRAM(s) Oral daily  atorvastatin 20 milliGRAM(s) Oral at bedtime  cefTRIAXone   IVPB      cefTRIAXone   IVPB 2000 milliGRAM(s) IV Intermittent every 24 hours  chlorhexidine 2% Cloths 1 Application(s) Topical daily  dextrose 5%. 1000 milliLiter(s) (50 mL/Hr) IV Continuous <Continuous>  dextrose 5%. 1000 milliLiter(s) (100 mL/Hr) IV Continuous <Continuous>  dextrose 50% Injectable 25 Gram(s) IV Push once  dextrose 50% Injectable 12.5 Gram(s) IV Push once  dextrose 50% Injectable 25 Gram(s) IV Push once  enalapril 10 milliGRAM(s) Oral daily  finasteride 5 milliGRAM(s) Oral daily  glucagon  Injectable 1 milliGRAM(s) IntraMuscular once  heparin   Injectable 5000 Unit(s) SubCutaneous every 12 hours  insulin aspart (NovoLOG) Pump 1 Each SubCutaneous Continuous Pump  labetalol 200 milliGRAM(s) Oral three times a day  mycophenolate mofetil 1000 milliGRAM(s) Oral two times a day  NIFEdipine XL 30 milliGRAM(s) Oral at bedtime  NIFEdipine XL 60 milliGRAM(s) Oral daily  tacrolimus 1.5 milliGRAM(s) Oral <User Schedule>  tacrolimus 1 milliGRAM(s) Oral <User Schedule>    MEDICATIONS  (PRN):  dextrose Oral Gel 15 Gram(s) Oral once PRN Blood Glucose LESS THAN 70 milliGRAM(s)/deciliter      Vital Signs Last 24 Hrs  T(C): 36.6 (26 Aug 2022 04:36), Max: 36.8 (25 Aug 2022 11:29)  T(F): 97.8 (26 Aug 2022 04:36), Max: 98.3 (25 Aug 2022 11:29)  HR: 67 (26 Aug 2022 04:36) (65 - 71)  BP: 176/77 (26 Aug 2022 04:36) (138/61 - 176/77)  BP(mean): --  RR: 18 (26 Aug 2022 04:36) (18 - 18)  SpO2: 100% (26 Aug 2022 04:36) (93% - 100%)    Parameters below as of 26 Aug 2022 04:36  Patient On (Oxygen Delivery Method): room air      CAPILLARY BLOOD GLUCOSE      POCT Blood Glucose.: 113 mg/dL (25 Aug 2022 21:43)  POCT Blood Glucose.: 122 mg/dL (25 Aug 2022 16:29)  POCT Blood Glucose.: 161 mg/dL (25 Aug 2022 12:05)  POCT Blood Glucose.: 161 mg/dL (25 Aug 2022 08:06)    I&O's Summary    24 Aug 2022 07:01  -  25 Aug 2022 07:00  --------------------------------------------------------  IN: 960 mL / OUT: 0 mL / NET: 960 mL    25 Aug 2022 07:01  -  26 Aug 2022 06:30  --------------------------------------------------------  IN: 650 mL / OUT: 0 mL / NET: 650 mL        PHYSICAL EXAM:  HEAD:  Atraumatic, Normocephalic  NECK: Supple, No   JVD  CHEST/LUNG:   no     rales,     no,    rhonchi  HEART: Regular rate and rhythm;         murmur  ABDOMEN: Soft, Nontender, ;   EXTREMITIES:    no    edema  NEUROLOGY:  alert    LABS:                        13.9   6.84  )-----------( 156      ( 25 Aug 2022 07:14 )             41.9     08-25    140  |  105  |  13  ----------------------------<  119<H>  3.3<L>   |  21<L>  |  0.49<L>    Ca    9.3      25 Aug 2022 06:51                              Consultant(s) Notes Reviewed:      Care Discussed with Consultants/Other Providers:

## 2022-08-26 NOTE — PROGRESS NOTE ADULT - ASSESSMENT
72 y/o M with h/o ESRD, s/p renal transplant 2013 , DM, CAD s/p stents , FRANCOIS, HLD, HTN, presented to the ER with chief complaint of fever, generalized weakness and left knee pain after a fall yesterday.     Renal transplant recipient presenting with fevers  Noted steroid injection into left knee and trauma following fall but exam not consistent with septic arthritis   Blood cultures (8/22) with 4/4 Streptococcus gallolyticus    CT Chest (8/23) with chronic findings of interstitial lung disease  CT A/P (8/23) with no acute findings but with Indeterminant 3.1 x 2.5 cm mass within the right native kidney  TTE (8/23) without evidence of vegetations    MRI L Knee (8/25) Osteoarthritis with moderate joint effusion. No additional evidence suggestive of infection    Streptococcus gallolyticus (previously bovis) bacteremia often occurs in the context of underlying colonic lesion    #Streptococcus gallolyticus Bacteremia, Fever, Leukocytosis, Renal Transplant Recipient  --Continue Ceftriaxone 2g IV Q24H (anticipate 4 week total course; 8/24 -->)  --Continue to follow CBC with diff  --Continue to follow temperature curve  --Planned for colonoscopy on Monday with GI  --Follow up on preliminary blood cultures  --Follow up on SHABNAM    I will be away over this upcoming weekend. Please contact the Infectious Diseases Office with any further questions or concerns.     Elvin Vallejo M.D.  [facility] Division of Infectious Disease  8AM-5PM Monday - Friday: Available on [employer] Teams  After Hours and Holidays (or if no response on [employer] Teams): Please contact the Infectious Diseases Office at [phone]     The above assessment and plan were discussed with Dr Michele Cole

## 2022-08-26 NOTE — CONSULT NOTE ADULT - SUBJECTIVE AND OBJECTIVE BOX
Chief Complaint:  Patient is a 71y old  Male who presents with a chief complaint of near  syncope (26 Aug 2022 12:36)      HPI: 71-year-old Male with significant history LRRT in 2013, DM, CAD s/p stents, FRANCOIS (on CPAP), HLD, HTN who was admitted at [facility] on 8/23/22 for fever, generalized weakness and left knee pain after a fall 1 day prior to admission. Of note, Pt. says he recently had dental procedures ~ 3wks ago. Blood Cultures were done which grew Streptococcus gallolyticus.      Allergies:  No Known Allergies      Home Medications:    Hospital Medications:  acetaminophen   IVPB .. 1000 milliGRAM(s) IV Intermittent once  allopurinol 100 milliGRAM(s) Oral daily  aspirin enteric coated 81 milliGRAM(s) Oral daily  atorvastatin 20 milliGRAM(s) Oral at bedtime  cefTRIAXone   IVPB      cefTRIAXone   IVPB 2000 milliGRAM(s) IV Intermittent every 24 hours  chlorhexidine 2% Cloths 1 Application(s) Topical daily  dextrose 5%. 1000 milliLiter(s) IV Continuous <Continuous>  dextrose 5%. 1000 milliLiter(s) IV Continuous <Continuous>  dextrose 50% Injectable 25 Gram(s) IV Push once  dextrose 50% Injectable 12.5 Gram(s) IV Push once  dextrose 50% Injectable 25 Gram(s) IV Push once  dextrose Oral Gel 15 Gram(s) Oral once PRN  enalapril 10 milliGRAM(s) Oral daily  finasteride 5 milliGRAM(s) Oral daily  glucagon  Injectable 1 milliGRAM(s) IntraMuscular once  heparin   Injectable 5000 Unit(s) SubCutaneous every 12 hours  insulin aspart (NovoLOG) Pump 1 Each SubCutaneous Continuous Pump  labetalol 200 milliGRAM(s) Oral three times a day  mycophenolate mofetil 1000 milliGRAM(s) Oral two times a day  NIFEdipine XL 30 milliGRAM(s) Oral at bedtime  NIFEdipine XL 60 milliGRAM(s) Oral daily  tacrolimus 1.5 milliGRAM(s) Oral <User Schedule>  tacrolimus 1 milliGRAM(s) Oral <User Schedule>      PMHX/PSHX:  Morbid Obesity    HTN (Hypertension), Benign    Hyperuricemia    Hyperlipidemia    Controlled Type I Diabetes Mellitus    Smoking    DM (diabetes mellitus), type 2    FRANCOIS on CPAP    H/O kidney transplant    CAD (coronary artery disease)    Kidney transplant recipient    AV fistula        Family history:      Social History:     ROS:     General:  No weight loss, fevers, chills, night sweats, fatigue  Eyes:  No vision changes, no yellowing of eyes   ENT:  No throat pain, runny nose  CV:  No chest pain, palpitations  Resp:  No SOB, cough, wheezing  GI:  See HPI  :  No burning with urination, no hematuria   Muscle:  No muscle pain, weakness  Neuro:  No numbness/tingling, memory problems  Psych:  No fatigue, insomnia, mood problems  Heme:  No easy bruisability  Skin:  No rash, itching       PHYSICAL EXAM:     GENERAL:  Appears stated age, well-groomed, well-nourished, no distress  HEENT:  NC/AT,  conjunctivae clear and pink,  no JVD  CHEST:  Full & symmetric excursion, no increased effort, breath sounds clear  HEART:  Regular rhythm, S1, S2, no murmur/rub/S3/S4, no abdominal bruit, no edema  ABDOMEN:  Soft, non-tender, non-distended, normoactive bowel sounds,  no masses ,  EXTREMITIES:  no cyanosis,clubbing or edema  SKIN:  No rash/erythema/ecchymoses/petechiae/wounds/abscess/warm/dry  NEURO:  Alert, oriented    Vital Signs:  Vital Signs Last 24 Hrs  T(C): 36.6 (26 Aug 2022 10:59), Max: 36.6 (26 Aug 2022 04:36)  T(F): 97.9 (26 Aug 2022 10:59), Max: 97.9 (26 Aug 2022 09:15)  HR: 66 (26 Aug 2022 13:30) (60 - 98)  BP: 115/69 (26 Aug 2022 13:30) (115/69 - 176/77)  BP(mean): --  RR: 19 (26 Aug 2022 10:59) (16 - 20)  SpO2: 96% (26 Aug 2022 10:59) (94% - 100%)    Parameters below as of 26 Aug 2022 10:59  Patient On (Oxygen Delivery Method): room air      Daily Height in cm: 170.18 (26 Aug 2022 08:36)    Daily     LABS:                        13.9   6.84  )-----------( 156      ( 25 Aug 2022 07:14 )             41.9     08-26    141  |  108  |  16  ----------------------------<  100<H>  3.7   |  22  |  0.50    Ca    9.6      26 Aug 2022 06:04                Imaging:             Chief Complaint:  Patient is a 71y old  Male who presents with a chief complaint of near  syncope (26 Aug 2022 12:36)      HPI: 71-year-old Male with significant history LRRT in 2013, DM, CAD s/p stents, FRANCOIS (on CPAP), HLD, HTN who was admitted at [facility] on 8/23/22 for fever, generalized weakness and left knee pain after a fall 1 day prior to admission. Of note, Pt. says he recently had dental procedures ~ 3wks ago. Blood Cultures were done which grew Streptococcus gallolyticus. No abd pain, n/v, diarrhea, melena, hematochezia. Last colonoscopy 6 years ago with multiple polyps removed. Told to repeat in 3 years. No FH of malignancy.      Allergies:  No Known Allergies      Home Medications:    Hospital Medications:  acetaminophen   IVPB .. 1000 milliGRAM(s) IV Intermittent once  allopurinol 100 milliGRAM(s) Oral daily  aspirin enteric coated 81 milliGRAM(s) Oral daily  atorvastatin 20 milliGRAM(s) Oral at bedtime  cefTRIAXone   IVPB      cefTRIAXone   IVPB 2000 milliGRAM(s) IV Intermittent every 24 hours  chlorhexidine 2% Cloths 1 Application(s) Topical daily  dextrose 5%. 1000 milliLiter(s) IV Continuous <Continuous>  dextrose 5%. 1000 milliLiter(s) IV Continuous <Continuous>  dextrose 50% Injectable 25 Gram(s) IV Push once  dextrose 50% Injectable 12.5 Gram(s) IV Push once  dextrose 50% Injectable 25 Gram(s) IV Push once  dextrose Oral Gel 15 Gram(s) Oral once PRN  enalapril 10 milliGRAM(s) Oral daily  finasteride 5 milliGRAM(s) Oral daily  glucagon  Injectable 1 milliGRAM(s) IntraMuscular once  heparin   Injectable 5000 Unit(s) SubCutaneous every 12 hours  insulin aspart (NovoLOG) Pump 1 Each SubCutaneous Continuous Pump  labetalol 200 milliGRAM(s) Oral three times a day  mycophenolate mofetil 1000 milliGRAM(s) Oral two times a day  NIFEdipine XL 30 milliGRAM(s) Oral at bedtime  NIFEdipine XL 60 milliGRAM(s) Oral daily  tacrolimus 1.5 milliGRAM(s) Oral <User Schedule>  tacrolimus 1 milliGRAM(s) Oral <User Schedule>      PMHX/PSHX:  Morbid Obesity    HTN (Hypertension), Benign    Hyperuricemia    Hyperlipidemia    Controlled Type I Diabetes Mellitus    Smoking    DM (diabetes mellitus), type 2    FRANCOIS on CPAP    H/O kidney transplant    CAD (coronary artery disease)    Kidney transplant recipient    AV fistula        Family history:      Social History:     ROS:     General:  No weight loss, fevers, chills, night sweats, fatigue  Eyes:  No vision changes, no yellowing of eyes   ENT:  No throat pain, runny nose  CV:  No chest pain, palpitations  Resp:  No SOB, cough, wheezing  GI:  See HPI  :  No burning with urination, no hematuria   Muscle:  No muscle pain, weakness  Neuro:  No numbness/tingling, memory problems  Psych:  No fatigue, insomnia, mood problems  Heme:  No easy bruisability  Skin:  No rash, itching       PHYSICAL EXAM:     GENERAL:  Appears stated age, well-groomed, well-nourished, no distress  HEENT:  NC/AT,  conjunctivae clear and pink,  no JVD  CHEST:  Full & symmetric excursion, no increased effort, breath sounds clear  HEART:  Regular rhythm, S1, S2, no murmur/rub/S3/S4, no abdominal bruit, no edema  ABDOMEN:  Soft, non-tender, non-distended, normoactive bowel sounds,  no masses ,  EXTREMITIES:  no cyanosis,clubbing or edema  SKIN:  No rash/erythema/ecchymoses/petechiae/wounds/abscess/warm/dry  NEURO:  Alert, oriented    Vital Signs:  Vital Signs Last 24 Hrs  T(C): 36.6 (26 Aug 2022 10:59), Max: 36.6 (26 Aug 2022 04:36)  T(F): 97.9 (26 Aug 2022 10:59), Max: 97.9 (26 Aug 2022 09:15)  HR: 66 (26 Aug 2022 13:30) (60 - 98)  BP: 115/69 (26 Aug 2022 13:30) (115/69 - 176/77)  BP(mean): --  RR: 19 (26 Aug 2022 10:59) (16 - 20)  SpO2: 96% (26 Aug 2022 10:59) (94% - 100%)    Parameters below as of 26 Aug 2022 10:59  Patient On (Oxygen Delivery Method): room air      Daily Height in cm: 170.18 (26 Aug 2022 08:36)    Daily     LABS:                        13.9   6.84  )-----------( 156      ( 25 Aug 2022 07:14 )             41.9     08-26    141  |  108  |  16  ----------------------------<  100<H>  3.7   |  22  |  0.50    Ca    9.6      26 Aug 2022 06:04                Imaging:             Chief Complaint:  Patient is a 71y old  Male who presents with a chief complaint of near  syncope (26 Aug 2022 12:36)      HPI: 71-year-old Male with significant history LRRT in 2013, DM, CAD s/p stents, FRANCOIS (on CPAP), HLD, HTN who was admitted at [facility] on 8/23/22 for fever, generalized weakness and left knee pain after a fall 1 day prior to admission. Of note, Pt. says he recently had dental procedures ~ 3wks ago. Blood Cultures were done which grew Streptococcus gallolyticus. No abd pain, n/v, diarrhea, melena, hematochezia. Last colonoscopy 6 years ago with multiple polyps removed. Told to repeat in 3 years. No FH of malignancy.       Allergies:  No Known Allergies      Home Medications:    Hospital Medications:  acetaminophen   IVPB .. 1000 milliGRAM(s) IV Intermittent once  allopurinol 100 milliGRAM(s) Oral daily  aspirin enteric coated 81 milliGRAM(s) Oral daily  atorvastatin 20 milliGRAM(s) Oral at bedtime  cefTRIAXone   IVPB      cefTRIAXone   IVPB 2000 milliGRAM(s) IV Intermittent every 24 hours  chlorhexidine 2% Cloths 1 Application(s) Topical daily  dextrose 5%. 1000 milliLiter(s) IV Continuous <Continuous>  dextrose 5%. 1000 milliLiter(s) IV Continuous <Continuous>  dextrose 50% Injectable 25 Gram(s) IV Push once  dextrose 50% Injectable 12.5 Gram(s) IV Push once  dextrose 50% Injectable 25 Gram(s) IV Push once  dextrose Oral Gel 15 Gram(s) Oral once PRN  enalapril 10 milliGRAM(s) Oral daily  finasteride 5 milliGRAM(s) Oral daily  glucagon  Injectable 1 milliGRAM(s) IntraMuscular once  heparin   Injectable 5000 Unit(s) SubCutaneous every 12 hours  insulin aspart (NovoLOG) Pump 1 Each SubCutaneous Continuous Pump  labetalol 200 milliGRAM(s) Oral three times a day  mycophenolate mofetil 1000 milliGRAM(s) Oral two times a day  NIFEdipine XL 30 milliGRAM(s) Oral at bedtime  NIFEdipine XL 60 milliGRAM(s) Oral daily  tacrolimus 1.5 milliGRAM(s) Oral <User Schedule>  tacrolimus 1 milliGRAM(s) Oral <User Schedule>      PMHX/PSHX:  Morbid Obesity    HTN (Hypertension), Benign    Hyperuricemia    Hyperlipidemia    Controlled Type I Diabetes Mellitus    Smoking    DM (diabetes mellitus), type 2    FRANCOIS on CPAP    H/O kidney transplant    CAD (coronary artery disease)    Kidney transplant recipient    AV fistula        Family history:  No colon cancer    Social History: No illicit drugs or ETOH    ROS:     General:  No weight loss, fevers, chills, night sweats, fatigue  Eyes:  No vision changes, no yellowing of eyes   ENT:  No throat pain, runny nose  CV:  No chest pain, palpitations  Resp:  No SOB, cough, wheezing  GI:  See HPI  :  No burning with urination, no hematuria   Muscle:  No muscle pain, weakness  Neuro:  No numbness/tingling, memory problems  Psych:  No fatigue, insomnia, mood problems  Heme:  No easy bruisability  Skin:  No rash, itching       PHYSICAL EXAM:     GENERAL:  Appears stated age, well-groomed, well-nourished, no distress  HEENT:  NC/AT,  conjunctivae clear and pink,  no JVD  CHEST:  Full & symmetric excursion, no increased effort, breath sounds clear  HEART:  Regular rhythm, S1, S2   ABDOMEN:  Soft, non-tender, non-distended, normoactive bowel sounds,  no masses ,  EXTREMITIES:  no cyanosis,clubbing or edema  SKIN:  No rash/erythema/ecchymoses/petechiae/wounds/abscess/warm/dry  NEURO:  Alert, oriented x 3  PSYCH: normal affect    Vital Signs:  Vital Signs Last 24 Hrs  T(C): 36.6 (26 Aug 2022 10:59), Max: 36.6 (26 Aug 2022 04:36)  T(F): 97.9 (26 Aug 2022 10:59), Max: 97.9 (26 Aug 2022 09:15)  HR: 66 (26 Aug 2022 13:30) (60 - 98)  BP: 115/69 (26 Aug 2022 13:30) (115/69 - 176/77)  BP(mean): --  RR: 19 (26 Aug 2022 10:59) (16 - 20)  SpO2: 96% (26 Aug 2022 10:59) (94% - 100%)    Parameters below as of 26 Aug 2022 10:59  Patient On (Oxygen Delivery Method): room air      Daily Height in cm: 170.18 (26 Aug 2022 08:36)    Daily     LABS:                        13.9   6.84  )-----------( 156      ( 25 Aug 2022 07:14 )             41.9     08-26    141  |  108  |  16  ----------------------------<  100<H>  3.7   |  22  |  0.50    Ca    9.6      26 Aug 2022 06:04                Imaging:      < from: CT Abdomen and Pelvis No Cont (08.23.22 @ 20:10) >  FINDINGS:  Limited evaluationof intra-abdominal organs due to lack of IV contrast.    LOWER CHEST: Cardiomegaly. Aortic valve replacement. Coronary artery   calcifications. Bilateral lower lobe predominant reticular and   groundglass opacities, right greater than left, better evaluated on   same-day CT chest.    LIVER: Within normal limits.  BILE DUCTS: Normal caliber.  GALLBLADDER: Within normal limits.  SPLEEN: Within normal limits.  PANCREAS: Within normal limits.  ADRENALS: Nonspecific bilateral adrenal gland thickening.  KIDNEYS/URETERS: Bilateral atrophic and cystic native kidneys. There is   an indeterminate 3.1 x 2.5 cm mass within the right native kidney.   Additional bilateral subcentimeter hypodensities, too small to   characterize. Transplanted kidney in right lower quadrant, within normal   limits.    BLADDER: Within normal limits.  REPRODUCTIVE ORGANS: Prostate is normal.    BOWEL: No bowel obstruction. Appendix is normal.  PERITONEUM: No ascites.  VESSELS: Atherosclerotic calcifications.  RETROPERITONEUM/LYMPH NODES: No lymphadenopathy.  ABDOMINAL WALL: Tiny fat-containing umbilical hernia.  BONES: Lumbar spine orthopedic hardware. Degenerative changes.    IMPRESSION:  No source of infection identified in this noncontrast study.    Indeterminant 3.1 x 2.5 cm mass within the right native kidney, recommend   US or MRI for further characterization.    < end of copied text >

## 2022-08-26 NOTE — CONSULT NOTE ADULT - CONSULT REQUESTED DATE/TIME
23-Aug-2022
26-Aug-2022 12:36
26-Aug-2022 15:01
24-Aug-2022 15:50
25-Aug-2022 22:41
23-Aug-2022 16:50
24-Aug-2022 15:25

## 2022-08-27 LAB
ALBUMIN SERPL ELPH-MCNC: 4.2 G/DL — SIGNIFICANT CHANGE UP (ref 3.3–5)
ALP SERPL-CCNC: 94 U/L — SIGNIFICANT CHANGE UP (ref 40–120)
ALT FLD-CCNC: 21 U/L — SIGNIFICANT CHANGE UP (ref 10–45)
ANION GAP SERPL CALC-SCNC: 13 MMOL/L — SIGNIFICANT CHANGE UP (ref 5–17)
AST SERPL-CCNC: 27 U/L — SIGNIFICANT CHANGE UP (ref 10–40)
BILIRUB SERPL-MCNC: 0.5 MG/DL — SIGNIFICANT CHANGE UP (ref 0.2–1.2)
BUN SERPL-MCNC: 18 MG/DL — SIGNIFICANT CHANGE UP (ref 7–23)
CALCIUM SERPL-MCNC: 9.7 MG/DL — SIGNIFICANT CHANGE UP (ref 8.4–10.5)
CHLORIDE SERPL-SCNC: 105 MMOL/L — SIGNIFICANT CHANGE UP (ref 96–108)
CO2 SERPL-SCNC: 22 MMOL/L — SIGNIFICANT CHANGE UP (ref 22–31)
CREAT SERPL-MCNC: 0.6 MG/DL — SIGNIFICANT CHANGE UP (ref 0.5–1.3)
EGFR: 103 ML/MIN/1.73M2 — SIGNIFICANT CHANGE UP
GLUCOSE BLDC GLUCOMTR-MCNC: 110 MG/DL — HIGH (ref 70–99)
GLUCOSE BLDC GLUCOMTR-MCNC: 135 MG/DL — HIGH (ref 70–99)
GLUCOSE BLDC GLUCOMTR-MCNC: 89 MG/DL — SIGNIFICANT CHANGE UP (ref 70–99)
GLUCOSE SERPL-MCNC: 87 MG/DL — SIGNIFICANT CHANGE UP (ref 70–99)
HCT VFR BLD CALC: 43 % — SIGNIFICANT CHANGE UP (ref 39–50)
HGB BLD-MCNC: 14.4 G/DL — SIGNIFICANT CHANGE UP (ref 13–17)
MCHC RBC-ENTMCNC: 31.5 PG — SIGNIFICANT CHANGE UP (ref 27–34)
MCHC RBC-ENTMCNC: 33.5 GM/DL — SIGNIFICANT CHANGE UP (ref 32–36)
MCV RBC AUTO: 94.1 FL — SIGNIFICANT CHANGE UP (ref 80–100)
NRBC # BLD: 0 /100 WBCS — SIGNIFICANT CHANGE UP (ref 0–0)
PLATELET # BLD AUTO: 188 K/UL — SIGNIFICANT CHANGE UP (ref 150–400)
POTASSIUM SERPL-MCNC: 3.7 MMOL/L — SIGNIFICANT CHANGE UP (ref 3.5–5.3)
POTASSIUM SERPL-SCNC: 3.7 MMOL/L — SIGNIFICANT CHANGE UP (ref 3.5–5.3)
PROT SERPL-MCNC: 7.3 G/DL — SIGNIFICANT CHANGE UP (ref 6–8.3)
RBC # BLD: 4.57 M/UL — SIGNIFICANT CHANGE UP (ref 4.2–5.8)
RBC # FLD: 13.1 % — SIGNIFICANT CHANGE UP (ref 10.3–14.5)
SODIUM SERPL-SCNC: 140 MMOL/L — SIGNIFICANT CHANGE UP (ref 135–145)
TACROLIMUS SERPL-MCNC: 5.1 NG/ML — SIGNIFICANT CHANGE UP
WBC # BLD: 6.7 K/UL — SIGNIFICANT CHANGE UP (ref 3.8–10.5)
WBC # FLD AUTO: 6.7 K/UL — SIGNIFICANT CHANGE UP (ref 3.8–10.5)

## 2022-08-27 RX ADMIN — MYCOPHENOLATE MOFETIL 1000 MILLIGRAM(S): 250 CAPSULE ORAL at 18:41

## 2022-08-27 RX ADMIN — CEFTRIAXONE 100 MILLIGRAM(S): 500 INJECTION, POWDER, FOR SOLUTION INTRAMUSCULAR; INTRAVENOUS at 18:44

## 2022-08-27 RX ADMIN — HEPARIN SODIUM 5000 UNIT(S): 5000 INJECTION INTRAVENOUS; SUBCUTANEOUS at 18:43

## 2022-08-27 RX ADMIN — TACROLIMUS 1.5 MILLIGRAM(S): 5 CAPSULE ORAL at 21:12

## 2022-08-27 RX ADMIN — Medication 1000 MILLILITER(S): at 10:27

## 2022-08-27 RX ADMIN — HEPARIN SODIUM 5000 UNIT(S): 5000 INJECTION INTRAVENOUS; SUBCUTANEOUS at 05:36

## 2022-08-27 RX ADMIN — TACROLIMUS 1 MILLIGRAM(S): 5 CAPSULE ORAL at 08:10

## 2022-08-27 RX ADMIN — MYCOPHENOLATE MOFETIL 1000 MILLIGRAM(S): 250 CAPSULE ORAL at 05:36

## 2022-08-27 RX ADMIN — CHLORHEXIDINE GLUCONATE 1 APPLICATION(S): 213 SOLUTION TOPICAL at 12:24

## 2022-08-27 RX ADMIN — Medication 200 MILLIGRAM(S): at 14:43

## 2022-08-27 RX ADMIN — Medication 81 MILLIGRAM(S): at 11:46

## 2022-08-27 RX ADMIN — FINASTERIDE 5 MILLIGRAM(S): 5 TABLET, FILM COATED ORAL at 11:47

## 2022-08-27 RX ADMIN — ATORVASTATIN CALCIUM 20 MILLIGRAM(S): 80 TABLET, FILM COATED ORAL at 21:12

## 2022-08-27 RX ADMIN — Medication 60 MILLIGRAM(S): at 05:35

## 2022-08-27 RX ADMIN — Medication 10 MILLIGRAM(S): at 05:36

## 2022-08-27 RX ADMIN — Medication 200 MILLIGRAM(S): at 05:35

## 2022-08-27 RX ADMIN — Medication 200 MILLIGRAM(S): at 21:12

## 2022-08-27 RX ADMIN — Medication 30 MILLIGRAM(S): at 21:12

## 2022-08-27 RX ADMIN — Medication 100 MILLIGRAM(S): at 11:46

## 2022-08-27 NOTE — PROGRESS NOTE ADULT - SUBJECTIVE AND OBJECTIVE BOX
[facility]   [phone]  RENAL FOLLOW UP NOTE  --------------------------------------------------------------------------------  Chief Complaint:    24 hour events/subjective:    seen earlier   pt wife is at bedside  denies chills   states feels well     PAST HISTORY  --------------------------------------------------------------------------------  No significant changes to PMH, PSH, FHx, SHx, unless otherwise noted    ALLERGIES & MEDICATIONS  --------------------------------------------------------------------------------  Allergies    No Known Allergies    Intolerances      Standing Inpatient Medications  acetaminophen   IVPB .. 1000 milliGRAM(s) IV Intermittent once  allopurinol 100 milliGRAM(s) Oral daily  aspirin enteric coated 81 milliGRAM(s) Oral daily  atorvastatin 20 milliGRAM(s) Oral at bedtime  cefTRIAXone   IVPB      cefTRIAXone   IVPB 2000 milliGRAM(s) IV Intermittent every 24 hours  chlorhexidine 2% Cloths 1 Application(s) Topical daily  dextrose 5%. 1000 milliLiter(s) IV Continuous <Continuous>  dextrose 5%. 1000 milliLiter(s) IV Continuous <Continuous>  dextrose 50% Injectable 25 Gram(s) IV Push once  dextrose 50% Injectable 12.5 Gram(s) IV Push once  dextrose 50% Injectable 25 Gram(s) IV Push once  enalapril 10 milliGRAM(s) Oral daily  finasteride 5 milliGRAM(s) Oral daily  glucagon  Injectable 1 milliGRAM(s) IntraMuscular once  heparin   Injectable 5000 Unit(s) SubCutaneous every 12 hours  insulin aspart (NovoLOG) Pump 1 Each SubCutaneous Continuous Pump  labetalol 200 milliGRAM(s) Oral three times a day  mycophenolate mofetil 1000 milliGRAM(s) Oral two times a day  NIFEdipine XL 30 milliGRAM(s) Oral at bedtime  NIFEdipine XL 60 milliGRAM(s) Oral daily  tacrolimus 1.5 milliGRAM(s) Oral <User Schedule>  tacrolimus 1 milliGRAM(s) Oral <User Schedule>    PRN Inpatient Medications  dextrose Oral Gel 15 Gram(s) Oral once PRN      REVIEW OF SYSTEMS  --------------------------------------------------------------------------------    Gen: denies  fevers/chills,  CVS: denies chest pain/palpitations  Resp: denies SOB/Cough  GI: Denies N/V/Abd pain  : Denies dysuria/oliguria/hematuria      VITALS/PHYSICAL EXAM  --------------------------------------------------------------------------------  T(C): 37.1 (08-27-22 @ 11:29), Max: 37.1 (08-27-22 @ 05:00)  HR: 61 (08-27-22 @ 14:41) (61 - 69)  BP: 158/73 (08-27-22 @ 14:41) (125/67 - 176/77)  RR: 18 (08-27-22 @ 11:29) (18 - 19)  SpO2: 96% (08-27-22 @ 11:29) (96% - 99%)  Wt(kg): --  Height (cm): 170.2 (08-26-22 @ 08:36)  Weight (kg): 91.6 (08-26-22 @ 08:36)  BMI (kg/m2): 31.6 (08-26-22 @ 08:36)  BSA (m2): 2.03 (08-26-22 @ 08:36)      08-26-22 @ 07:01  -  08-27-22 @ 07:00  --------------------------------------------------------  IN: 240 mL / OUT: 0 mL / NET: 240 mL    08-27-22 @ 07:01  -  08-27-22 @ 20:14  --------------------------------------------------------  IN: 960 mL / OUT: 0 mL / NET: 960 mL      Physical Exam:  	    Gen: Non toxic comfortable appearing   	no jvd   	Pulm: decrease bs  no rales or ronchi or wheezing  	CV: RRR, S1S2; no rub  	Abd: +BS, soft, nontender/nondistended + RLQ graft site non tender   	: No suprapubic tenderness  	UE: Warm, no cyanosis  no clubbing,  no edema  	LE: Warm, no cyanosis  no clubbing, no edema  	Neuro: alert and oriented. speech coherent       LABS/STUDIES  --------------------------------------------------------------------------------              14.4   6.70  >-----------<  188      [08-27-22 @ 06:11]              43.0     140  |  105  |  18  ----------------------------<  87      [08-27-22 @ 06:23]  3.7   |  22  |  0.60        Ca     9.7     [08-27-22 @ 06:23]    TPro  7.3  /  Alb  4.2  /  TBili  0.5  /  DBili  x   /  AST  27  /  ALT  21  /  AlkPhos  94  [08-27-22 @ 06:23]          Creatinine Trend:  SCr 0.60 [08-27 @ 06:23]  SCr 0.50 [08-26 @ 06:04]  SCr 0.49 [08-25 @ 06:51]  SCr 0.57 [08-24 @ 07:07]  SCr 0.62 [08-23 @ 01:09]    Tacrolimus (), Serum: 5.1 ng/mL (08-27 @ 06:11)  Tacrolimus (), Serum: 5.7 ng/mL (08-26 @ 06:04)  Tacrolimus (), Serum: 6.3 ng/mL (08-25 @ 07:14)  Tacrolimus (), Serum: 6.7 ng/mL (08-24 @ 07:56)            Urinalysis - [08-23-22 @ 04:41]      Color Yellow / Appearance Clear / SG 1.031 / pH 6.0      Gluc Negative / Ketone Negative  / Bili Negative / Urobili Negative       Blood Negative / Protein 30 mg/dL / Leuk Est Negative / Nitrite Negative      RBC 5 / WBC 1 / Hyaline 1 / Gran  / Sq Epi  / Non Sq Epi 1 / Bacteria Negative        < from: US Renal (08.26.22 @ 16:17) >  ACC: [id number] EXAM:  US KIDNEY(S)                          PROCEDURE DATE:  08/26/2022          INTERPRETATION:  CLINICAL INFORMATION: 71-year-old male with prior renal   transplant found to have an indeterminate mass on CT of the right native   kidney.    COMPARISON: CT abdomen pelvis 8/23/2022, renal ultrasound 1/3/2011.    TECHNIQUE: Sonography of the kidneys and bladder.    FINDINGS:  Right kidney: 10.0 cm. The native right kidney is atrophic. A   well-circumscribed anechoic renal cyst measuring 2.6 x 2.5 cm is seen in   the mid region of the kidney. This appears to correlate with mid region   cyst on prior ultrasound imaging obtained on 1/3/2011 and stable in   appearance. This may represent the lesion identified on prior CT scan.   There are additional subcentimeter cysts noted which also correlate with   previous ultrasound imaging findings.    Left kidney: 9.8 cm. Atrophic, multiple anechoic cysts, the largest at   the upper pole measuring 2.8 x 2.0 x 2.4 cm    Urinary bladder: Within normal limits.    No evidence of transplant hydronephrosis or perinephric collection.    IMPRESSION:  Multiple cysts in the bilateral native kidneys, which are stable   appearing and correlate to previous ultrasound imaging examination on   1/3/2011. A 2.6 cm right renal cyst likely represents the abnormality   noted on prior CT scan.    --- End of Report ---           ANTONIA DEJESUS MD; Resident Radiologist  This document has been electronically signed.  JASON CABRALES MD; Attending Radiologist  This document has been electronically signed. Aug 26    < end of copied text >

## 2022-08-27 NOTE — PROGRESS NOTE ADULT - SUBJECTIVE AND OBJECTIVE BOX
CARDIOLOGY     PROGRESS  NOTE   ________________________________________________    CHIEF COMPLAINT:Patient is a 71y old  Male who presents with a chief complaint of near  syncope (27 Aug 2022 07:26)  no complain.  	  REVIEW OF SYSTEMS:  CONSTITUTIONAL: No fever, weight loss, or fatigue  EYES: No eye pain, visual disturbances, or discharge  ENT:  No difficulty hearing, tinnitus, vertigo; No sinus or throat pain  NECK: No pain or stiffness  RESPIRATORY: No cough, wheezing, chills or hemoptysis; No Shortness of Breath  CARDIOVASCULAR: No chest pain, palpitations, passing out, dizziness, or leg swelling  GASTROINTESTINAL: No abdominal or epigastric pain. No nausea, vomiting, or hematemesis; No diarrhea or constipation. No melena or hematochezia.  GENITOURINARY: No dysuria, frequency, hematuria, or incontinence  NEUROLOGICAL: No headaches, memory loss, loss of strength, numbness, or tremors  SKIN: No itching, burning, rashes, or lesions   LYMPH Nodes: No enlarged glands  ENDOCRINE: No heat or cold intolerance; No hair loss  MUSCULOSKELETAL: No joint pain or swelling; No muscle, back, or extremity pain  PSYCHIATRIC: No depression, anxiety, mood swings, or difficulty sleeping  HEME/LYMPH: No easy bruising, or bleeding gums  ALLERGY AND IMMUNOLOGIC: No hives or eczema	    [ ] All others negative	  [ ] Unable to obtain    PHYSICAL EXAM:  T(C): 37.1 (08-27-22 @ 05:00), Max: 37.1 (08-27-22 @ 05:00)  HR: 69 (08-27-22 @ 06:05) (64 - 88)  BP: 176/77 (08-27-22 @ 05:00) (115/69 - 176/77)  RR: 18 (08-27-22 @ 05:00) (16 - 19)  SpO2: 99% (08-27-22 @ 06:05) (96% - 99%)  Wt(kg): --  I&O's Summary    26 Aug 2022 07:01  -  27 Aug 2022 07:00  --------------------------------------------------------  IN: 240 mL / OUT: 0 mL / NET: 240 mL    27 Aug 2022 07:01  -  27 Aug 2022 10:18  --------------------------------------------------------  IN: 360 mL / OUT: 0 mL / NET: 360 mL        Appearance: Normal	  HEENT:   Normal oral mucosa, PERRL, EOMI	  Lymphatic: No lymphadenopathy  Cardiovascular: Normal S1 S2, No JVD, + murmurs, No edema  Respiratory: rhonchi  Psychiatry: A & O x 3, Mood & affect appropriate  Gastrointestinal:  Soft, Non-tender, + BS	  Skin: No rashes, No ecchymoses, No cyanosis	  Neurologic: Non-focal  Extremities: Normal range of motion, No clubbing, cyanosis or edema  Vascular: Peripheral pulses palpable 2+ bilaterally    MEDICATIONS  (STANDING):  acetaminophen   IVPB .. 1000 milliGRAM(s) IV Intermittent once  allopurinol 100 milliGRAM(s) Oral daily  aspirin enteric coated 81 milliGRAM(s) Oral daily  atorvastatin 20 milliGRAM(s) Oral at bedtime  cefTRIAXone   IVPB      cefTRIAXone   IVPB 2000 milliGRAM(s) IV Intermittent every 24 hours  chlorhexidine 2% Cloths 1 Application(s) Topical daily  dextrose 5%. 1000 milliLiter(s) (50 mL/Hr) IV Continuous <Continuous>  dextrose 5%. 1000 milliLiter(s) (100 mL/Hr) IV Continuous <Continuous>  dextrose 50% Injectable 25 Gram(s) IV Push once  dextrose 50% Injectable 12.5 Gram(s) IV Push once  dextrose 50% Injectable 25 Gram(s) IV Push once  enalapril 10 milliGRAM(s) Oral daily  finasteride 5 milliGRAM(s) Oral daily  glucagon  Injectable 1 milliGRAM(s) IntraMuscular once  heparin   Injectable 5000 Unit(s) SubCutaneous every 12 hours  insulin aspart (NovoLOG) Pump 1 Each SubCutaneous Continuous Pump  labetalol 200 milliGRAM(s) Oral three times a day  mycophenolate mofetil 1000 milliGRAM(s) Oral two times a day  NIFEdipine XL 30 milliGRAM(s) Oral at bedtime  NIFEdipine XL 60 milliGRAM(s) Oral daily  polyethylene glycol/electrolyte Solution 1000 milliLiter(s) Oral once  tacrolimus 1.5 milliGRAM(s) Oral <User Schedule>  tacrolimus 1 milliGRAM(s) Oral <User Schedule>      TELEMETRY: 	    ECG:  	  RADIOLOGY:  OTHER: 	  	  LABS:	 	    CARDIAC MARKERS:                                14.4   6.70  )-----------( 188      ( 27 Aug 2022 06:11 )             43.0     08-27    140  |  105  |  18  ----------------------------<  87  3.7   |  22  |  0.60    Ca    9.7      27 Aug 2022 06:23    TPro  7.3  /  Alb  4.2  /  TBili  0.5  /  DBili  x   /  AST  27  /  ALT  21  /  AlkPhos  94  08-27    proBNP:   Lipid Profile:   HgA1c:   TSH:     < from: Transthoracic Echocardiogram (08.23.22 @ 09:57) >  1. Mitral annular calcification, otherwise normal mitral  valve.  2. Transcatheter aortic valve replacement. Aortic valve not  well visualized. Peak transaortic valve gradient equals 23  mm Hg, mean transaortic valve gradient equals 11 mm Hg,  which is probably normal in the presence of a transcatheter  aortic valve replacement.  3. Severely dilated left atrium. LA volume index = 63  cc/m2.  4. Moderate concentric left ventricular hypertrophy.  5. Endocardium not well visualized; grossly normal left  ventricular systolic function.  6. Normal right ventricular size and function.  7. Normal tricuspid valve. Minimal tricuspid regurgitation.        Assessment and plan  ---------------------------  72 y/o M with h/o ESRD (s/p renal transplant 2013), IDDM, CAD (s/p stents), FRANCOIS (on cpap qHS), HLD, HTN, presenting with 2 episodes of felt lightheaded and nearly passed out twice while in the bathroom, to urinate, states legs gave out; felt generalized weakness; denies passing out/LOC, denies head trauma.  No CP/SOB/palpitations.   pt with hx of ashd, s/p stents, AS , s/p TAVR in 2021 with near syncope.  chest x ray noted ?chf vs pneumonia  syncope r/ o conduction disease  tele  check orthostatic  repeat echo noted  ID eval with s/p renal transplant  renal eval  cardiac enzymes  dvt prophylaxis  ct chest noted, no evidence of chf, ILD  continue transplant meds  awaiting blood tests/check orthostatics  + BC, ID eval in view oif hx of transplant on immunosuppressive meds, continue abx  awaiting SHABNAM  ?colonoscopy  will adjust bp meds  ID eval

## 2022-08-27 NOTE — PROGRESS NOTE ADULT - ASSESSMENT
72 y/o M with h/o ESRD s/p renal transplant 2013 LRRT from son , DM, CAD s/p stents , FRANCOIS (on cpap qHS), HLD, HTN, active tobacco user who presented after feeling dizziness, lightheadedness and flushed feeling. dx with strep galolyticus. had recent dental procedure. has been on abx now. had echo on 8/26. in addition had ct scan revealing renal lesion.       1- renal transplant recipient  2- active tobacco user  3- DM   4- HTN   5- cad hx   6- renal lesion right native kidney   7- strep bacteremia      cont prograf 1 mg am and 1.5 mg pm. his tacro level in range. cellcept 1 g bid   renal sono  right native renal lesion  a cyst   rocephin 1 g iv daily   labetalol 200 mg tid and procardia xl total 90 mg daily   cont enalapril 10 mg daily    strict I/O  d/w pt wife at bedside

## 2022-08-27 NOTE — PROGRESS NOTE ADULT - ASSESSMENT
72 y/o M       with h/o ESRD, , s/p  R renal transplant 2013 ,    DM,  CAD   s/p stents , FRANCOIS  on cpap qHS), HLD, HTN,         presenting with 2 episodes of felt lightheaded and nearly passed out twice while in the bathroom,   states legs gave out; felt generalized weakness;    denies passing out/LOC, denies head trauma.    No   cp/sob //palpitations.  fever./  s/p  fall  at  home       * admitted  with  dizziness/ near syncope,  denies  cp/sob       cxr. with  pulm edema.  acute  diasolic  chf    *  HTN/HLD         on  procardia,  labetolol     *   CAD, 11/2021  on asa/ plavix/ ,lipitor     *   s/p  TAVR, 12/2021      *   DM,         has  insulin   pump, per   house endo      *    CKD         s/p   R kidney transplant  2013,   Dr Vargas  [facility]      *   IDL restrictive/obstructive lung disease,  and   FRANCOIS / cpap        CT chest, ,  ILD     *  Strep   galolyticus,   bacteremia on iv rocephin/  f/p by  ID     * imaging.  R   renal mass,  in native  kidney       transplanted   kidney noted  in rlq,    *  s/p  renal  transplant,,  on  cellcept/ tacrolimus     rpt  bcx,   neagtive / SHABNAM prelim,  is  normal    Urology  eval     *   MRI,   knee/ miild  patellar  effusion.  meniscus  tear,  per  ortho,  no  intervention     house gi  for c/scop on monday

## 2022-08-28 LAB
GLUCOSE BLDC GLUCOMTR-MCNC: 101 MG/DL — HIGH (ref 70–99)
GLUCOSE BLDC GLUCOMTR-MCNC: 110 MG/DL — HIGH (ref 70–99)
GLUCOSE BLDC GLUCOMTR-MCNC: 115 MG/DL — HIGH (ref 70–99)
GLUCOSE BLDC GLUCOMTR-MCNC: 83 MG/DL — SIGNIFICANT CHANGE UP (ref 70–99)
GLUCOSE BLDC GLUCOMTR-MCNC: 96 MG/DL — SIGNIFICANT CHANGE UP (ref 70–99)
TACROLIMUS SERPL-MCNC: 5.6 NG/ML — SIGNIFICANT CHANGE UP

## 2022-08-28 RX ADMIN — ATORVASTATIN CALCIUM 20 MILLIGRAM(S): 80 TABLET, FILM COATED ORAL at 21:31

## 2022-08-28 RX ADMIN — Medication 200 MILLIGRAM(S): at 13:42

## 2022-08-28 RX ADMIN — Medication 10 MILLIGRAM(S): at 05:31

## 2022-08-28 RX ADMIN — Medication 200 MILLIGRAM(S): at 21:31

## 2022-08-28 RX ADMIN — TACROLIMUS 1 MILLIGRAM(S): 5 CAPSULE ORAL at 09:24

## 2022-08-28 RX ADMIN — CHLORHEXIDINE GLUCONATE 1 APPLICATION(S): 213 SOLUTION TOPICAL at 11:42

## 2022-08-28 RX ADMIN — Medication 200 MILLIGRAM(S): at 05:31

## 2022-08-28 RX ADMIN — TACROLIMUS 1.5 MILLIGRAM(S): 5 CAPSULE ORAL at 21:31

## 2022-08-28 RX ADMIN — HEPARIN SODIUM 5000 UNIT(S): 5000 INJECTION INTRAVENOUS; SUBCUTANEOUS at 18:12

## 2022-08-28 RX ADMIN — CEFTRIAXONE 100 MILLIGRAM(S): 500 INJECTION, POWDER, FOR SOLUTION INTRAMUSCULAR; INTRAVENOUS at 18:15

## 2022-08-28 RX ADMIN — FINASTERIDE 5 MILLIGRAM(S): 5 TABLET, FILM COATED ORAL at 11:41

## 2022-08-28 RX ADMIN — Medication 81 MILLIGRAM(S): at 11:41

## 2022-08-28 RX ADMIN — Medication 60 MILLIGRAM(S): at 05:31

## 2022-08-28 RX ADMIN — Medication 4000 MILLILITER(S): at 18:15

## 2022-08-28 RX ADMIN — Medication 100 MILLIGRAM(S): at 11:41

## 2022-08-28 RX ADMIN — MYCOPHENOLATE MOFETIL 1000 MILLIGRAM(S): 250 CAPSULE ORAL at 18:12

## 2022-08-28 RX ADMIN — Medication 30 MILLIGRAM(S): at 21:31

## 2022-08-28 RX ADMIN — HEPARIN SODIUM 5000 UNIT(S): 5000 INJECTION INTRAVENOUS; SUBCUTANEOUS at 06:13

## 2022-08-28 RX ADMIN — MYCOPHENOLATE MOFETIL 1000 MILLIGRAM(S): 250 CAPSULE ORAL at 05:31

## 2022-08-28 NOTE — PROGRESS NOTE ADULT - ASSESSMENT
72 y/o M       with h/o ESRD, , s/p  R renal transplant 2013 ,    DM,  CAD   s/p stents , FRANCOIS  on cpap qHS), HLD, HTN,         presenting with 2 episodes of felt lightheaded and nearly passed out twice while in the bathroom,   states legs gave out; felt generalized weakness;    denies passing out/LOC, denies head trauma.    No   cp/sob //palpitations.  fever./  s/p  fall  at  home       * admitted  with  dizziness/ near syncope,  denies  cp/sob       cxr. with  pulm edema.  acute  diasolic  chf    *  HTN/HLD         on  procardia,  labetolol     *   CAD, 11/2021  on asa/ plavix/ ,lipitor     *   s/p  TAVR, 12/2021      *   DM,         has  insulin   pump, per   house endo      *    CKD         s/p   R kidney transplant  2013,   Dr Hartono Weill [place]      *   IDL restrictive/obstructive lung disease,  and   FRANCOIS / cpap        CT chest, ,  ILD     *  Strep   galolyticus,   bacteremia on iv rocephin/  f/p by  ID     *   imaging.  R   renal mass,  in native  kidney  and   transplanted   kidney noted  in rlq,    *  s/p  renal  transplant,,  on  cellcept/ tacrolimus        rpt  bcx,   neagtive / SHABNAM prelim,  is  normal     Urology  eval     *   MRI,   knee/ miild  patellar  effusion.  meniscus  tear,  per  ortho,  no  intervention     house gi  for c/scopy  on monday   remains  on iv rocephin

## 2022-08-28 NOTE — PROGRESS NOTE ADULT - SUBJECTIVE AND OBJECTIVE BOX
CARDIOLOGY     PROGRESS  NOTE   ________________________________________________    CHIEF COMPLAINT:Patient is a 71y old  Male who presents with a chief complaint of near  syncope (28 Aug 2022 06:35)  no complain.  	  REVIEW OF SYSTEMS:  CONSTITUTIONAL: No fever, weight loss, or fatigue  EYES: No eye pain, visual disturbances, or discharge  ENT:  No difficulty hearing, tinnitus, vertigo; No sinus or throat pain  NECK: No pain or stiffness  RESPIRATORY: No cough, wheezing, chills or hemoptysis; No Shortness of Breath  CARDIOVASCULAR: No chest pain, palpitations, passing out, dizziness, or leg swelling  GASTROINTESTINAL: No abdominal or epigastric pain. No nausea, vomiting, or hematemesis; No diarrhea or constipation. No melena or hematochezia.  GENITOURINARY: No dysuria, frequency, hematuria, or incontinence  NEUROLOGICAL: No headaches, memory loss, loss of strength, numbness, or tremors  SKIN: No itching, burning, rashes, or lesions   LYMPH Nodes: No enlarged glands  ENDOCRINE: No heat or cold intolerance; No hair loss  MUSCULOSKELETAL: No joint pain or swelling; No muscle, back, or extremity pain  PSYCHIATRIC: No depression, anxiety, mood swings, or difficulty sleeping  HEME/LYMPH: No easy bruising, or bleeding gums  ALLERGY AND IMMUNOLOGIC: No hives or eczema	    [ ] All others negative	  [ ] Unable to obtain    PHYSICAL EXAM:  T(C): 36.8 (08-28-22 @ 04:38), Max: 37.1 (08-27-22 @ 11:29)  HR: 64 (08-28-22 @ 04:59) (61 - 64)  BP: 144/83 (08-28-22 @ 04:38) (125/67 - 166/72)  RR: 18 (08-28-22 @ 04:38) (18 - 18)  SpO2: 97% (08-28-22 @ 04:59) (96% - 99%)  Wt(kg): --  I&O's Summary    27 Aug 2022 07:01  -  28 Aug 2022 07:00  --------------------------------------------------------  IN: 1200 mL / OUT: 240 mL / NET: 960 mL        Appearance: Normal	  HEENT:   Normal oral mucosa, PERRL, EOMI	  Lymphatic: No lymphadenopathy  Cardiovascular: Normal S1 S2, No JVD, +murmurs, No edema  Respiratory: Lungs clear to auscultation	  Psychiatry: A & O x 3, Mood & affect appropriate  Gastrointestinal:  Soft, Non-tender, + BS	  Skin: No rashes, No ecchymoses, No cyanosis	  Neurologic: Non-focal  Extremities: Normal range of motion, No clubbing, cyanosis or edema  Vascular: Peripheral pulses palpable 2+ bilaterally    MEDICATIONS  (STANDING):  acetaminophen   IVPB .. 1000 milliGRAM(s) IV Intermittent once  allopurinol 100 milliGRAM(s) Oral daily  aspirin enteric coated 81 milliGRAM(s) Oral daily  atorvastatin 20 milliGRAM(s) Oral at bedtime  cefTRIAXone   IVPB      cefTRIAXone   IVPB 2000 milliGRAM(s) IV Intermittent every 24 hours  chlorhexidine 2% Cloths 1 Application(s) Topical daily  dextrose 5%. 1000 milliLiter(s) (50 mL/Hr) IV Continuous <Continuous>  dextrose 5%. 1000 milliLiter(s) (100 mL/Hr) IV Continuous <Continuous>  dextrose 50% Injectable 25 Gram(s) IV Push once  dextrose 50% Injectable 12.5 Gram(s) IV Push once  dextrose 50% Injectable 25 Gram(s) IV Push once  enalapril 10 milliGRAM(s) Oral daily  finasteride 5 milliGRAM(s) Oral daily  glucagon  Injectable 1 milliGRAM(s) IntraMuscular once  heparin   Injectable 5000 Unit(s) SubCutaneous every 12 hours  insulin aspart (NovoLOG) Pump 1 Each SubCutaneous Continuous Pump  labetalol 200 milliGRAM(s) Oral three times a day  mycophenolate mofetil 1000 milliGRAM(s) Oral two times a day  NIFEdipine XL 30 milliGRAM(s) Oral at bedtime  NIFEdipine XL 60 milliGRAM(s) Oral daily  polyethylene glycol/electrolyte Solution. 4000 milliLiter(s) Oral once  tacrolimus 1.5 milliGRAM(s) Oral <User Schedule>  tacrolimus 1 milliGRAM(s) Oral <User Schedule>      TELEMETRY: 	    ECG:  	  RADIOLOGY:  OTHER: 	  	  LABS:	 	    CARDIAC MARKERS:                                14.4   6.70  )-----------( 188      ( 27 Aug 2022 06:11 )             43.0     08-27    140  |  105  |  18  ----------------------------<  87  3.7   |  22  |  0.60    Ca    9.7      27 Aug 2022 06:23    TPro  7.3  /  Alb  4.2  /  TBili  0.5  /  DBili  x   /  AST  27  /  ALT  21  /  AlkPhos  94  08-27    proBNP:   Lipid Profile:   HgA1c:   TSH:     #Streptococcus gallolyticus Bacteremia, Fever, Leukocytosis, Renal Transplant Recipient  --Continue Ceftriaxone 2g IV Q24H (anticipate 4 week total course; 8/24 -->)  --Continue to follow CBC with diff  --Continue to follow temperature curve  --Planned for colonoscopy on Monday with GI  --Follow up on preliminary blood cultures  --Follow up on SHABNAM    Assessment and plan  ---------------------------  70 y/o M with h/o ESRD (s/p renal transplant 2013), IDDM, CAD (s/p stents), FRANCOIS (on cpap qHS), HLD, HTN, presenting with 2 episodes of felt lightheaded and nearly passed out twice while in the bathroom, to urinate, states legs gave out; felt generalized weakness; denies passing out/LOC, denies head trauma.  No CP/SOB/palpitations.   pt with hx of ashd, s/p stents, AS , s/p TAVR in 2021 with near syncope.  chest x ray noted ?chf vs pneumonia  syncope r/ o conduction disease  tele  check orthostatic  repeat echo noted  ID eval with s/p renal transplant  renal eval  cardiac enzymes  dvt prophylaxis  ct chest noted, no evidence of chf, ILD  continue transplant meds  awaiting blood tests/check orthostatics  + BC, ID eval in view oif hx of transplant on immunosuppressive meds, continue abx  awaiting official SHABNAM report  colonoscopy in am

## 2022-08-28 NOTE — PROGRESS NOTE ADULT - SUBJECTIVE AND OBJECTIVE BOX
[facility]    REVIEW OF SYSTEMS:  GEN: no fever,    no chills  RESP: no SOB,   no cough  CVS: no chest pain,   no palpitations  GI: no abdominal pain,   no nausea,   no vomiting,   no constipation,   no diarrhea  : no dysuria,   no frequency  NEURO: no headache,   no dizziness  PSYCH: no depression,   not anxious  Derm : no rash    MEDICATIONS  (STANDING):  acetaminophen   IVPB .. 1000 milliGRAM(s) IV Intermittent once  allopurinol 100 milliGRAM(s) Oral daily  aspirin enteric coated 81 milliGRAM(s) Oral daily  atorvastatin 20 milliGRAM(s) Oral at bedtime  cefTRIAXone   IVPB      cefTRIAXone   IVPB 2000 milliGRAM(s) IV Intermittent every 24 hours  chlorhexidine 2% Cloths 1 Application(s) Topical daily  dextrose 5%. 1000 milliLiter(s) (50 mL/Hr) IV Continuous <Continuous>  dextrose 5%. 1000 milliLiter(s) (100 mL/Hr) IV Continuous <Continuous>  dextrose 50% Injectable 25 Gram(s) IV Push once  dextrose 50% Injectable 12.5 Gram(s) IV Push once  dextrose 50% Injectable 25 Gram(s) IV Push once  enalapril 10 milliGRAM(s) Oral daily  finasteride 5 milliGRAM(s) Oral daily  glucagon  Injectable 1 milliGRAM(s) IntraMuscular once  heparin   Injectable 5000 Unit(s) SubCutaneous every 12 hours  insulin aspart (NovoLOG) Pump 1 Each SubCutaneous Continuous Pump  labetalol 200 milliGRAM(s) Oral three times a day  mycophenolate mofetil 1000 milliGRAM(s) Oral two times a day  NIFEdipine XL 30 milliGRAM(s) Oral at bedtime  NIFEdipine XL 60 milliGRAM(s) Oral daily  polyethylene glycol/electrolyte Solution. 4000 milliLiter(s) Oral once  tacrolimus 1.5 milliGRAM(s) Oral <User Schedule>  tacrolimus 1 milliGRAM(s) Oral <User Schedule>    MEDICATIONS  (PRN):  dextrose Oral Gel 15 Gram(s) Oral once PRN Blood Glucose LESS THAN 70 milliGRAM(s)/deciliter      Vital Signs Last 24 Hrs  T(C): 36.8 (28 Aug 2022 04:38), Max: 37.1 (27 Aug 2022 11:29)  T(F): 98.2 (28 Aug 2022 04:38), Max: 98.8 (27 Aug 2022 20:50)  HR: 64 (28 Aug 2022 04:59) (61 - 64)  BP: 144/83 (28 Aug 2022 04:38) (125/67 - 166/72)  BP(mean): --  RR: 18 (28 Aug 2022 04:38) (18 - 18)  SpO2: 97% (28 Aug 2022 04:59) (96% - 99%)    Parameters below as of 28 Aug 2022 04:38  Patient On (Oxygen Delivery Method): room air      CAPILLARY BLOOD GLUCOSE      POCT Blood Glucose.: 89 mg/dL (27 Aug 2022 21:09)  POCT Blood Glucose.: 110 mg/dL (27 Aug 2022 16:14)  POCT Blood Glucose.: 135 mg/dL (27 Aug 2022 08:02)    I&O's Summary    26 Aug 2022 07:01  -  27 Aug 2022 07:00  --------------------------------------------------------  IN: 240 mL / OUT: 0 mL / NET: 240 mL    27 Aug 2022 07:01  -  28 Aug 2022 06:35  --------------------------------------------------------  IN: 1200 mL / OUT: 240 mL / NET: 960 mL        PHYSICAL EXAM:  HEAD:  Atraumatic, Normocephalic  NECK: Supple, No   JVD  CHEST/LUNG:   no     rales,     no,    rhonchi  HEART: Regular rate and rhythm;         murmur  ABDOMEN: Soft, Nontender, ;   EXTREMITIES:   no     edema  NEUROLOGY:  alert    LABS:                        14.4   6.70  )-----------( 188      ( 27 Aug 2022 06:11 )             43.0     08-27    140  |  105  |  18  ----------------------------<  87  3.7   |  22  |  0.60    Ca    9.7      27 Aug 2022 06:23    TPro  7.3  /  Alb  4.2  /  TBili  0.5  /  DBili  x   /  AST  27  /  ALT  21  /  AlkPhos  94  08-27                            Consultant(s) Notes Reviewed:      Care Discussed with Consultants/Other Providers:

## 2022-08-28 NOTE — PROGRESS NOTE ADULT - ASSESSMENT
72 y/o M with h/o ESRD s/p renal transplant 2013 LRRT from son , DM, CAD s/p stents , FRANCOIS (on cpap qHS), HLD, HTN, active tobacco user who presented after feeling dizziness, lightheadedness and flushed feeling. dx with strep galolyticus. had recent dental procedure. has been on abx now. had echo on 8/26. in addition had ct scan revealing renal lesion.       1- renal transplant recipient  2- active tobacco user  3- DM   4- HTN   5- cad hx   6- renal lesion right native kidney   7- strep bacteremia      cont prograf 1 mg am and 1.5 mg pm. his tacro level in range. cellcept 1 g bid   renal sono  right native renal lesion  a cyst   rocephin 1 g iv daily   labetalol 200 mg tid and procardia xl total 90 mg daily   cont enalapril 10 mg daily    had not want to undergo tobacco cessation

## 2022-08-28 NOTE — PROGRESS NOTE ADULT - SUBJECTIVE AND OBJECTIVE BOX
[facility]   [phone]  RENAL FOLLOW UP NOTE  --------------------------------------------------------------------------------  Chief Complaint:    24 hour events/subjective:    seen earlier   states has no chills and fevers well     PAST HISTORY  --------------------------------------------------------------------------------  No significant changes to PMH, PSH, FHx, SHx, unless otherwise noted    ALLERGIES & MEDICATIONS  --------------------------------------------------------------------------------  Allergies    No Known Allergies    Intolerances      Standing Inpatient Medications  acetaminophen   IVPB .. 1000 milliGRAM(s) IV Intermittent once  allopurinol 100 milliGRAM(s) Oral daily  aspirin enteric coated 81 milliGRAM(s) Oral daily  atorvastatin 20 milliGRAM(s) Oral at bedtime  cefTRIAXone   IVPB      cefTRIAXone   IVPB 2000 milliGRAM(s) IV Intermittent every 24 hours  chlorhexidine 2% Cloths 1 Application(s) Topical daily  dextrose 5%. 1000 milliLiter(s) IV Continuous <Continuous>  dextrose 5%. 1000 milliLiter(s) IV Continuous <Continuous>  dextrose 50% Injectable 25 Gram(s) IV Push once  dextrose 50% Injectable 12.5 Gram(s) IV Push once  dextrose 50% Injectable 25 Gram(s) IV Push once  enalapril 10 milliGRAM(s) Oral daily  finasteride 5 milliGRAM(s) Oral daily  glucagon  Injectable 1 milliGRAM(s) IntraMuscular once  heparin   Injectable 5000 Unit(s) SubCutaneous every 12 hours  insulin aspart (NovoLOG) Pump 1 Each SubCutaneous Continuous Pump  labetalol 200 milliGRAM(s) Oral three times a day  mycophenolate mofetil 1000 milliGRAM(s) Oral two times a day  NIFEdipine XL 30 milliGRAM(s) Oral at bedtime  NIFEdipine XL 60 milliGRAM(s) Oral daily  tacrolimus 1.5 milliGRAM(s) Oral <User Schedule>  tacrolimus 1 milliGRAM(s) Oral <User Schedule>    PRN Inpatient Medications  dextrose Oral Gel 15 Gram(s) Oral once PRN      REVIEW OF SYSTEMS  --------------------------------------------------------------------------------    Gen: denies  fevers/chills,  CVS: denies chest pain/palpitations  Resp: denies SOB/Cough  GI: Denies N/V/Abd pain  : Denies dysuria/oliguria/hematuria      VITALS/PHYSICAL EXAM  --------------------------------------------------------------------------------  T(C): 36.5 (08-28-22 @ 11:51), Max: 37.1 (08-27-22 @ 20:50)  HR: 60 (08-28-22 @ 15:40) (60 - 64)  BP: 139/74 (08-28-22 @ 11:51) (139/74 - 166/72)  RR: 20 (08-28-22 @ 15:40) (16 - 20)  SpO2: 96% (08-28-22 @ 15:40) (95% - 99%)  Wt(kg): --        08-27-22 @ 07:01  -  08-28-22 @ 07:00  --------------------------------------------------------  IN: 1200 mL / OUT: 240 mL / NET: 960 mL    08-28-22 @ 07:01  -  08-28-22 @ 20:00  --------------------------------------------------------  IN: 840 mL / OUT: 0 mL / NET: 840 mL      Physical Exam:  	    Gen: Non toxic comfortable appearing   	no jvd   	Pulm: decrease bs  no rales or ronchi or wheezing  	CV: RRR, S1S2; no rub  	Abd: +BS, soft, nontender/nondistended + RLQ graft site non tender   	: No suprapubic tenderness  	UE: Warm, no cyanosis  no clubbing,  no edema  	LE: Warm, no cyanosis  no clubbing, no edema  	Neuro: alert and oriented. speech coherent       LABS/STUDIES  --------------------------------------------------------------------------------              14.4   6.70  >-----------<  188      [08-27-22 @ 06:11]              43.0     140  |  105  |  18  ----------------------------<  87      [08-27-22 @ 06:23]  3.7   |  22  |  0.60        Ca     9.7     [08-27-22 @ 06:23]    TPro  7.3  /  Alb  4.2  /  TBili  0.5  /  DBili  x   /  AST  27  /  ALT  21  /  AlkPhos  94  [08-27-22 @ 06:23]          Creatinine Trend:  SCr 0.60 [08-27 @ 06:23]  SCr 0.50 [08-26 @ 06:04]  SCr 0.49 [08-25 @ 06:51]  SCr 0.57 [08-24 @ 07:07]  SCr 0.62 [08-23 @ 01:09]    Tacrolimus (), Serum: 5.6 ng/mL (08-28 @ 05:11)  Tacrolimus (), Serum: 5.1 ng/mL (08-27 @ 06:11)  Tacrolimus (), Serum: 5.7 ng/mL (08-26 @ 06:04)  Tacrolimus (), Serum: 6.3 ng/mL (08-25 @ 07:14)            Urinalysis - [08-23-22 @ 04:41]      Color Yellow / Appearance Clear / SG 1.031 / pH 6.0      Gluc Negative / Ketone Negative  / Bili Negative / Urobili Negative       Blood Negative / Protein 30 mg/dL / Leuk Est Negative / Nitrite Negative      RBC 5 / WBC 1 / Hyaline 1 / Gran  / Sq Epi  / Non Sq Epi 1 / Bacteria Negative

## 2022-08-29 ENCOUNTER — RESULT REVIEW (OUTPATIENT)
Age: 72
End: 2022-08-29

## 2022-08-29 ENCOUNTER — TRANSCRIPTION ENCOUNTER (OUTPATIENT)
Age: 72
End: 2022-08-29

## 2022-08-29 LAB
ANION GAP SERPL CALC-SCNC: 16 MMOL/L — SIGNIFICANT CHANGE UP (ref 5–17)
BUN SERPL-MCNC: 10 MG/DL — SIGNIFICANT CHANGE UP (ref 7–23)
CALCIUM SERPL-MCNC: 9.7 MG/DL — SIGNIFICANT CHANGE UP (ref 8.4–10.5)
CHLORIDE SERPL-SCNC: 105 MMOL/L — SIGNIFICANT CHANGE UP (ref 96–108)
CO2 SERPL-SCNC: 20 MMOL/L — LOW (ref 22–31)
CREAT SERPL-MCNC: 0.48 MG/DL — LOW (ref 0.5–1.3)
CULTURE RESULTS: SIGNIFICANT CHANGE UP
CULTURE RESULTS: SIGNIFICANT CHANGE UP
EGFR: 110 ML/MIN/1.73M2 — SIGNIFICANT CHANGE UP
GLUCOSE BLDC GLUCOMTR-MCNC: 108 MG/DL — HIGH (ref 70–99)
GLUCOSE BLDC GLUCOMTR-MCNC: 120 MG/DL — HIGH (ref 70–99)
GLUCOSE BLDC GLUCOMTR-MCNC: 83 MG/DL — SIGNIFICANT CHANGE UP (ref 70–99)
GLUCOSE BLDC GLUCOMTR-MCNC: 83 MG/DL — SIGNIFICANT CHANGE UP (ref 70–99)
GLUCOSE BLDC GLUCOMTR-MCNC: 92 MG/DL — SIGNIFICANT CHANGE UP (ref 70–99)
GLUCOSE SERPL-MCNC: 78 MG/DL — SIGNIFICANT CHANGE UP (ref 70–99)
HCT VFR BLD CALC: 42.8 % — SIGNIFICANT CHANGE UP (ref 39–50)
HGB BLD-MCNC: 14.4 G/DL — SIGNIFICANT CHANGE UP (ref 13–17)
INR BLD: 1.14 RATIO — SIGNIFICANT CHANGE UP (ref 0.88–1.16)
MCHC RBC-ENTMCNC: 31.8 PG — SIGNIFICANT CHANGE UP (ref 27–34)
MCHC RBC-ENTMCNC: 33.6 GM/DL — SIGNIFICANT CHANGE UP (ref 32–36)
MCV RBC AUTO: 94.5 FL — SIGNIFICANT CHANGE UP (ref 80–100)
NRBC # BLD: 0 /100 WBCS — SIGNIFICANT CHANGE UP (ref 0–0)
PLATELET # BLD AUTO: 200 K/UL — SIGNIFICANT CHANGE UP (ref 150–400)
POTASSIUM SERPL-MCNC: 4 MMOL/L — SIGNIFICANT CHANGE UP (ref 3.5–5.3)
POTASSIUM SERPL-SCNC: 4 MMOL/L — SIGNIFICANT CHANGE UP (ref 3.5–5.3)
PROTHROM AB SERPL-ACNC: 13.1 SEC — SIGNIFICANT CHANGE UP (ref 10.5–13.4)
RBC # BLD: 4.53 M/UL — SIGNIFICANT CHANGE UP (ref 4.2–5.8)
RBC # FLD: 13.1 % — SIGNIFICANT CHANGE UP (ref 10.3–14.5)
SARS-COV-2 RNA SPEC QL NAA+PROBE: SIGNIFICANT CHANGE UP
SARS-COV-2 RNA SPEC QL NAA+PROBE: SIGNIFICANT CHANGE UP
SODIUM SERPL-SCNC: 141 MMOL/L — SIGNIFICANT CHANGE UP (ref 135–145)
SPECIMEN SOURCE: SIGNIFICANT CHANGE UP
SPECIMEN SOURCE: SIGNIFICANT CHANGE UP
TACROLIMUS SERPL-MCNC: 5.4 NG/ML — SIGNIFICANT CHANGE UP
WBC # BLD: 8.43 K/UL — SIGNIFICANT CHANGE UP (ref 3.8–10.5)
WBC # FLD AUTO: 8.43 K/UL — SIGNIFICANT CHANGE UP (ref 3.8–10.5)

## 2022-08-29 PROCEDURE — 88305 TISSUE EXAM BY PATHOLOGIST: CPT | Mod: 26

## 2022-08-29 PROCEDURE — 99232 SBSQ HOSP IP/OBS MODERATE 35: CPT

## 2022-08-29 PROCEDURE — 99232 SBSQ HOSP IP/OBS MODERATE 35: CPT | Mod: GC

## 2022-08-29 PROCEDURE — 45385 COLONOSCOPY W/LESION REMOVAL: CPT | Mod: GC

## 2022-08-29 PROCEDURE — 99223 1ST HOSP IP/OBS HIGH 75: CPT

## 2022-08-29 PROCEDURE — 99221 1ST HOSP IP/OBS SF/LOW 40: CPT

## 2022-08-29 DEVICE — CLIP RESOLUTION 360 235CM: Type: IMPLANTABLE DEVICE | Status: FUNCTIONAL

## 2022-08-29 DEVICE — NET RETRV ROT ROTH 2.5MMX230CM: Type: IMPLANTABLE DEVICE | Status: FUNCTIONAL

## 2022-08-29 RX ORDER — SODIUM CHLORIDE 9 MG/ML
500 INJECTION INTRAMUSCULAR; INTRAVENOUS; SUBCUTANEOUS
Refills: 0 | Status: DISCONTINUED | OUTPATIENT
Start: 2022-08-29 | End: 2022-08-31

## 2022-08-29 RX ADMIN — Medication 200 MILLIGRAM(S): at 13:00

## 2022-08-29 RX ADMIN — TACROLIMUS 1 MILLIGRAM(S): 5 CAPSULE ORAL at 07:37

## 2022-08-29 RX ADMIN — ATORVASTATIN CALCIUM 20 MILLIGRAM(S): 80 TABLET, FILM COATED ORAL at 21:24

## 2022-08-29 RX ADMIN — CEFTRIAXONE 100 MILLIGRAM(S): 500 INJECTION, POWDER, FOR SOLUTION INTRAMUSCULAR; INTRAVENOUS at 17:29

## 2022-08-29 RX ADMIN — MYCOPHENOLATE MOFETIL 1000 MILLIGRAM(S): 250 CAPSULE ORAL at 05:28

## 2022-08-29 RX ADMIN — MYCOPHENOLATE MOFETIL 1000 MILLIGRAM(S): 250 CAPSULE ORAL at 17:22

## 2022-08-29 RX ADMIN — Medication 30 MILLIGRAM(S): at 21:24

## 2022-08-29 RX ADMIN — HEPARIN SODIUM 5000 UNIT(S): 5000 INJECTION INTRAVENOUS; SUBCUTANEOUS at 17:28

## 2022-08-29 RX ADMIN — Medication 60 MILLIGRAM(S): at 05:28

## 2022-08-29 RX ADMIN — Medication 200 MILLIGRAM(S): at 05:28

## 2022-08-29 RX ADMIN — Medication 200 MILLIGRAM(S): at 21:24

## 2022-08-29 RX ADMIN — Medication 10 MILLIGRAM(S): at 05:29

## 2022-08-29 RX ADMIN — Medication 81 MILLIGRAM(S): at 12:58

## 2022-08-29 RX ADMIN — TACROLIMUS 1.5 MILLIGRAM(S): 5 CAPSULE ORAL at 20:10

## 2022-08-29 RX ADMIN — Medication 100 MILLIGRAM(S): at 12:58

## 2022-08-29 RX ADMIN — FINASTERIDE 5 MILLIGRAM(S): 5 TABLET, FILM COATED ORAL at 12:59

## 2022-08-29 NOTE — DISCHARGE NOTE PROVIDER - CARE PROVIDER_API CALL
Homero Garrido)  Cardiovascular Disease; Internal Medicine  [address] [place] [address]  Phone: [phone]  Fax: [phone]  Follow Up Time:    Homero Garrido)  Cardiovascular Disease; Internal Medicine  [address] [place] [address]  Phone: [phone]  Fax: [phone]  Follow Up Time:     EMILI FLOWERS  Internal Medicine  [address] [place] [address]  Phone: ()-  Fax: ()-  Follow Up Time:    Homero Garrido (MD)  Cardiovascular Disease; Internal Medicine  [address] [place] [address]  Phone: [phone]  Fax: [phone]  Follow Up Time:     EMILI FLOWERS  Internal Medicine  [address] [place] [address]  Phone: ()-  Fax: ()-  Follow Up Time:     Elvin Vallejo)  Infectious Disease; Internal Medicine  [address] [place] [address]  Phone: [phone]  Fax: [phone]  Follow Up Time: 2 weeks

## 2022-08-29 NOTE — PROGRESS NOTE ADULT - ASSESSMENT
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 Endocrinology consulted for diabetes and pump management.     1.  DM  - T2DM on insulin pump  - Most recent Hemoglobin A1C 5.4 well controlled   - Current FS ranges from   - Current inpatient regimen: inslin pump  - Current diet:CHO  - Please monitor blood glucose values TID AC & QHS while eating regular meals and Q6H while NPO  - Blood glucose goals pre-meal less than 140 mg/dL and random blood glucose less than 180 mg/dL  - Recommendations:  - Continue with insulin pump therapy   -Patient signed hospital insulin pump contract   -Insulin and pump orders are in place  - Please call Endocrinology if new AMS, NPO status or planned procedures      Discharge planning:   - Patient will follow up with his endocrinologist Jyoti Byrd  - Continue with home pump therapy at current setting as finger stick well controlled   - Can also continue with home ozempic 0.5 mg weekly     2. HTN  - BP goal 130/80  - Manage per primary team     3. HLD  - Continue with home atorvastatin 20 mg daily   - Manage as outpatient       Thank you for the consult. Will continue to monitor. Contact via pager or [employer] Teams during business hours. On evenings and weekends, please call [phone] or page endocrine fellow on call. Patient can follow up at discharge with [facility] by calling [phone] to make an appointment.     Greater than 50% of the encounter was spent counseling and/or coordination of care.  36 minutes spent on total encounter; The necessity of the time spent during the encounter on this date of service was due to development of plan of care/coordination of care/glycemic control through review of labs, blood glucose values and vital signs with insulin pump.     Tanisha Sousa MD  Department of Endocrinology, Diabetes and metabolism   Pager [phone]

## 2022-08-29 NOTE — DISCHARGE NOTE PROVIDER - PROVIDER TOKENS
PROVIDER:[TOKEN:[[id number]]] PROVIDER:[TOKEN:[[id number]]],PROVIDER:[TOKEN:[[id number]]] PROVIDER:[TOKEN:[[id number]]],PROVIDER:[TOKEN:[[id number]]],PROVIDER:[TOKEN:[[id number]],FOLLOWUP:[2 weeks]]

## 2022-08-29 NOTE — DISCHARGE NOTE PROVIDER - CARE PROVIDERS DIRECT ADDRESSES
,DirectAddress_Unknown ,DirectAddress_Unknown,[email or web address] ,[email or web address]

## 2022-08-29 NOTE — PRE PROCEDURE NOTE - PRE PROCEDURE EVALUATION
Attending Physician:     Dr. Ambrose                       Procedure:   Colonoscopy    Indication for Procedure:   Concern for Bacteremia/Malignancy  ________________________________________________________  PAST MEDICAL & SURGICAL HISTORY:    Morbid Obesity  HTN (Hypertension), Benign  Hyperuricemia  Hyperlipidemia  Smoking  DM (diabetes mellitus), type 2  FRANCOIS on CPAP    CAD (coronary artery disease) X 1 stent  Kidney transplant recipient-Rt kidney- 2013  AV fistula    ALLERGIES:  No Known Allergies    HOME MEDICATIONS:  alfuzosin 10 mg oral tablet, extended release: 1 tab(s) orally once a day  allopurinol 100 mg oral tablet: 1 tab(s) orally once a day at pm  Aspirin Enteric Coated 81 mg oral delayed release tablet: 1 tab(s) orally once a day  atorvastatin 20 mg oral tablet: 1 tab(s) orally once a day (at bedtime)  CellCept 500 mg oral tablet: 2 tab(s) orally 2 times a day  enalapril 10 mg oral tablet: 1 tab(s) orally 2 times a day  finasteride 5 mg oral tablet: 1 tab(s) orally once a day  insulin aspart: Pump settings  Basal - 18 units  12am - 0.7 u/hr  5am - 0.8 u/hr  IC   12am - 1:10  11pm - 1:15  ISF: 1:50  BG Target   Active insulin time: 4 hrs    labetalol 200 mg oral tablet: 1 tab(s) orally 3 times a day  Myrbetriq 50 mg oral tablet, extended release: 1 tab(s) orally once a day  NovoLOG 100 units/mL injectable solution: 10 unit(s) injectable 3 times a day (after meals)  potassium chloride 20 mEq oral tablet, extended release: 1 tab(s) orally once a day  Procardia XL 30 mg oral tablet, extended release: 1 tab(s) orally once a day (at bedtime)  Procardia XL 60 mg oral tablet, extended release: 1 tab(s) orally once a day in am  tacrolimus 1 mg oral tablet, extended release: Take orally twice a day 1 tab(s) (in the morning).   tacrolimus 1 mg oral tablet, extended release: 1.5 tab(s) orally once a day (in the evening)  torsemide 10 mg oral tablet: 1 tab(s) orally every other day    AICD/PPM: [ ] yes   [X ] no    PERTINENT LAB DATA:                        14.4   8.43  )-----------( 200      ( 29 Aug 2022 06:06 )             42.8   PT/INR - ( 29 Aug 2022 06:06 )   PT: 13.1 sec;   INR: 1.14 ratio      PHYSICAL EXAMINATION:    Height (cm): 170.2  Weight (kg): 91.6  BMI (kg/m2): 31.6  BSA (m2): 2.03T(C): 36.2  HR: 59  BP: 163/70  RR: 21  SpO2: 98%    Constitutional: NAD    Neck:  No JVD  Respiratory: CTAB/L  Cardiovascular: S1 and S2  Gastrointestinal: BS+, soft, NT/ND  Extremities: No peripheral edema  Neurological: A/O x 4      COMMENTS:    The patient is a suitable candidate for the planned procedure unless box checked [ ]  No, explain:

## 2022-08-29 NOTE — PROGRESS NOTE ADULT - PROBLEM SELECTOR PLAN 2
Pt. currently is on prograf 1 mg in AM and 1.5 mg PO in evening and MMF 1 gm PO BID. Continue home IS meds. Check serum tacrolimus trough level 30 mins prior to AM dose. Goal level (4-7).     If you have any questions, please feel free to contact me  Alek Loredo  Nephrology Fellow  [phone]/ [employer] Teams(Preferred)  (After 5pm or on weekends please page the on-call fellow).

## 2022-08-29 NOTE — PROGRESS NOTE ADULT - ASSESSMENT
72 y/o M       with h/o ESRD, , s/p  R renal transplant 2013 ,    DM,  CAD   s/p stents , FRANCOIS  on cpap qHS), HLD, HTN,         presenting with 2 episodes of felt lightheaded and nearly passed out twice while in the bathroom,   states legs gave out; felt generalized weakness;    denies passing out/LOC, denies head trauma.    No   cp/sob //palpitations.  fever./  s/p  fall  at  home       * admitted  with  dizziness/ near syncope,  denies  cp/sob       cxr. with  pulm edema.  acute  diasolic  chf    *  HTN/HLD         on  procardia,  labetolol     *   CAD, 11/2021  on asa/ plavix/ ,lipitor     *   s/p  TAVR, 12/2021      *   DM,         has  insulin   pump, per   house endo      *    CKD         s/p   R kidney transplant  2013,   Dr Hartono Weill [place]      *   IDL restrictive/obstructive lung disease,  and   FRANCOIS / cpap        CT chest, ,  ILD     *  Strep   galolyticus,   bacteremia on iv rocephin/  f/p by  ID     *   imaging.  R   renal mass,  in native  kidney  and   transplanted   kidney noted  in rlq,    *  s/p  renal  transplant,,  on  cellcept/ tacrolimus        rpt  bcx,   neagtive / SHABNAM prelim,  is  normal     Urology  eval     *   MRI,   knee/ miild  patellar  effusion.  meniscus  tear,  per  ortho,  no  intervention     house gi  for c/scopy  on  8/39/     remains  on iv rocephin/  awiat  duration from ID      70 y/o M       with h/o ESRD, , s/p  R renal transplant 2013 ,    DM,  CAD   s/p stents , FRANCOIS  on cpap qHS), HLD, HTN,         presenting with 2 episodes of felt lightheaded and nearly passed out twice while in the bathroom,   states legs gave out; felt generalized weakness;    denies passing out/LOC, denies head trauma.    No   cp/sob //palpitations.  fever./  s/p  fall  at  home       * admitted  with  dizziness/ near syncope,  denies  cp/sob       cxr. with  pulm edema.  acute  diasolic  chf    *  HTN/HLD         on  procardia,  labetolol     *   CAD, 11/2021  on asa/ plavix/ ,lipitor     *   s/p  TAVR, 12/2021      *   DM,         has  insulin   pump, per   house endo      *    CKD         s/p   R kidney [facility]  2013,   [facility]      *   IDL restrictive/obstructive lung disease,  and   FRANCOIS / cpap        CT chest, ,  ILD     *  Strep   galolyticus,   bacteremia on iv rocephin/  f/p by  ID     *   imaging.  R   renal mass,  in native  kidney  and   transplanted   kidney noted  in rlq,    *  s/p  renal  transplant,,  on  cellcept/ tacrolimus        rpt  bcx,   neagtive / SHABNAM prelim,  is  normal     Urology  eval     *   MRI,   knee/ miild  patellar  effusion.  meniscus  tear,  per  ortho,  no  intervention     house gi  for c/scopy  on  8/39/     remains  on iv rocephin/   needs  4  weeks/  med  line per  ID      72 y/o M       with h/o ESRD, , s/p  R renal transplant 2013 ,    DM,  CAD   s/p stents , FRANCOIS  on cpap qHS), HLD, HTN,         presenting with 2 episodes of felt lightheaded and nearly passed out twice while in the bathroom,   states legs gave out; felt generalized weakness;    denies passing out/LOC, denies head trauma.    No   cp/sob //palpitations.  fever./  s/p  fall  at  home       * admitted  with  dizziness/ near syncope,  denies  cp/sob       cxr. with  pulm edema.  acute  diasolic  chf    *  HTN/HLD         on  procardia,  labetolol     *   CAD, 11/2021  on asa/ plavix/ ,lipitor     *   s/p  TAVR, 12/2021      *   DM,         has  insulin   pump, per   house endo      *    CKD         s/p   R kidney [facility]  [facility]      *   IDL restrictive/obstructive lung disease,  and   FRANCOIS / cpap        CT chest, ,  ILD     *  Strep   galolyticus,   bacteremia on iv rocephin/  f/p by  ID     *   imaging.  R   renal mass,  in native  kidney  and   transplanted   kidney noted  in rlq,    *  s/p  renal  transplant,,  on  cellcept/ tacrolimus        rpt  bcx,   neagtive / SHABNAM prelim,  is  normal     Urology  eval     *   MRI,   knee/ miild  patellar  effusion.  meniscus  tear,  per  ortho,  no  intervention     house gi  for c/scopy  on  8/39/     remains  on iv rocephin/   needs  4  weeks/  med  line per  ID/  till 9/21/22

## 2022-08-29 NOTE — DISCHARGE NOTE PROVIDER - NSDCCPCAREPLAN_GEN_ALL_CORE_FT
PRINCIPAL DISCHARGE DIAGNOSIS  Diagnosis: Multifocal pneumonia  Assessment and Plan of Treatment: You are to continue your IV antibiotics until Sept 20 2022/   You need weekly lab work (CBC with differential and CMP).   Please fax the results to infectious disease, Dr. Humphreys at [phone]. You are to follow up with ID in 2 weeks for a follow up appointment.  CT chest showed Bilateral lower lobe predominant reticular and groundglass opacities that are similar to 11/4/2021 comparison. These findings likely secondary to patient's known interstitial lung disease.  You had a steroid injection into left knee and trauma following fall but exam not consistent with septic arthritis   Blood cultures (8/22) with 4/4 Streptococcus gallolyticus  CT Chest (8/23) with chronic findings of interstitial lung disease  CT A/P (8/23) with no acute findings but with Indeterminant 3.1 x 2.5 cm mass within the right native kidney  TTE (8/23) without evidence of vegetations  SHABNAM (8/26) without evidence of vegetations.   MRI L Knee (8/25) Osteoarthritis with moderate joint effusion. No additional evidence suggestive of infection  Streptococcus gallolyticus (previously bovis) bacteremia often occurs in the context of underlying colonic lesion  Colonoscopy (8/29) One 15 mm, non-bleeding polyp in the ascending colon (s/p removal) and a few small, non-bleeding polyps in the sigmoid colon and in the ascending colon.  You will need a repeat colonoscopy within 6 months for removal of other polyps. It is important that you follow up with primary care to schedule this.      SECONDARY DISCHARGE DIAGNOSES  Diagnosis: Hypertension  Assessment and Plan of Treatment: Continue blood pressure medication regimen as directed. Monitor for any visual changes, headaches or dizziness.  Monitor blood pressure regularly.  Follow up with your PCP for further management for high blood pressure. Continue to eat Low sodium and fat diet    Diagnosis: Living-donor kidney transplant recipient  Assessment and Plan of Treatment: Continue your tacrolimus as scheduled and follow up with primary care.    Diagnosis: Diabetes mellitus type 2, uncomplicated, on long term insulin pump  Assessment and Plan of Treatment: Monitor finger sticks pre-meal and bedtime, low salt, fat and carbohydrate diet, minimize glucose intake.  Exercise daily for at least 30 minutes and weight loss.  Follow up with primary care physician and endocrinologist for routine Hemoglobin A1C checks and management.  Follow up with your ophthalmologist for routine yearly vision exams.  Continue to use your insulin pump as instructed.        PRINCIPAL DISCHARGE DIAGNOSIS  Diagnosis: Bacteremia  Assessment and Plan of Treatment: *Found to have Strep galolyticus - Blood cultures (8/22) with 4/4 Streptococcus gallolyticus  Treatment with IV Rocephin   CT Abdomen adn Pelvis - no acute finding   Echo (8/23) without evidence of infection in the valves  Transesophageal Echo (8/26) without evidence of infection in the valves   MRI knee done due to c/o pain to r/o source of bacteremia  mild  patellar  effusion.  meniscus  tear,  per  ortho,  no  intervention - Osteoarthritis with moderate joint effusion. No additional evidence suggestive of infection  Repeat Blood culture negative.  GI consulted due to Strep galolyticus. which can be associated with colonic neoplasia) Had colonoscopy  on  8/29/22  s/p  polyp  removal. One 15 mm, non-bleeding polyp in the ascending colon removed and a few small, non-bleeding polyps in the sigmoid colon and in the ascending colon.  Will need repeat colonoscopy in 6 months for removal of other polyps.  Pt immunocompromised due to renal  transplant on  cellcept/ tacrolimus  IV Ceftriaxone via midline 4 weeks from cleared culture date until 9/21/22.   You need weekly lab work (CBC with differential and CMP).   Please fax the results to infectious disease, Dr. Humphreys at [phone]. You are to follow up with ID in 2 weeks for a follow up appointment.   It is important that you follow up with primary care to schedule this.        SECONDARY DISCHARGE DIAGNOSES  Diagnosis: Diabetes mellitus type 2, uncomplicated, on long term insulin pump  Assessment and Plan of Treatment: Endocrine consulted    Most recent Hemoglobin A1C 5.4 well controlled.  Follow up with primary care physician and endocrinologist for routine Hemoglobin A1C checks and management.  Make sure you get your HgA1c checked every three months.  - Continue with insulin pump therapy at current setting   - Follow up with his endocrinologist Jyoti Byrd  - Continue with home pump therapy at current setting as finger stick well controlled    - Can also continue with home ozempic 0.5 mg weekly   Monitor finger sticks pre-meal and bedtime.   Exercise daily for at least 30 minutes and weight loss.      If you take insulin, check your blood glucose before meals and at bedtime.  It's important not to skip any meals.  Keep a log of your blood glucose results and always take it with you to your doctor appointments.  Keep a list of your current medications including injectables and over the counter medications and bring this medication list with you to all your doctor appointments.  If you have not seen your ophthalmologist this year call for appointment.  Check your feet daily for redness, sores, or openings. Do not self treat. If no improvement in two days call your primary care physician for an appointment.  Low blood sugar (hypoglycemia) is a blood sugar below 70mg/dl. Check your blood sugar if you feel signs/symptoms of hypoglycemia. If your blood sugar is below 70 take 15 grams of carbohydrates (ex 4 oz of apple juice, 3-4 glucose tablets, or 4-6 oz of regular soda) wait 15 minutes and repeat blood sugar to make sure it comes up above 70.  If your blood sugar is above 70 and you are due for a meal, have a meal.  If you are not due for a meal have a snack.  This snack helps keeps your blood sugar at a safe range.      Diagnosis: Hypertension  Assessment and Plan of Treatment: Continue blood pressure medication regimen as directed. Monitor for any visual changes, headaches or dizziness.  Monitor blood pressure regularly.  Follow up with your PCP for further management for high blood pressure. Continue to eat Low sodium and fat diet    Diagnosis: Kidney transplant recipient  Assessment and Plan of Treatment: s/p   R kidney transplant  2013,   Dr Vargas  [facility]  Continue Tacro and Cellcept    Diagnosis: Renal cyst  Assessment and Plan of Treatment: *  CT of Abd and  Pelvis showed.  R   renal mass - 3.1 x 2.5 cm mass  in native  kidney  and   transplanted   kidney noted  in RLQ  Urology consulted for the renal mass - correlates to renal cyst on US which is stable from 2011.  No  intervention at this time.    Can follow up as needed at [facility] [phone].    Diagnosis: Bacteremia  Assessment and Plan of Treatment: *Found to have Strep galolyticus - Blood cultures (8/22) with 4/4 Streptococcus gallolyticus  Treatment with IV Rocephin   CT Abdomen adn Pelvis - no acute finding   Echo (8/23) without evidence of infection in the valves  Transesophageal Echo (8/26) without evidence of infection in the valves   MRI knee done due to c/o pain to r/o source of bacteremia  mild  patellar  effusion.  meniscus  tear,  per  ortho,  no  intervention - Osteoarthritis with moderate joint effusion. No additional evidence suggestive of infection  Repeat Blood culture negative.  GI consulted due to Strep galolyticus. which can be associated with colonic neoplasia) Had colonoscopy  on  8/29/22  s/p  polyp  removal. One 15 mm, non-bleeding polyp in the ascending colon removed and a few small, non-bleeding polyps in the sigmoid colon and in the ascending colon.  Will need repeat colonoscopy in 6 months for removal of other polyps.  Pt immunocompromised due to renal  transplant on  cellcept/ tacrolimus  IV Ceftriaxone via midline 4 weeks from cleared culture date until 9/21/22.   You need weekly lab work (CBC with differential and CMP).   Please fax the results to infectious disease, Dr. Humphreys at [phone]. You are to follow up with ID in 2 weeks for a follow up appointment.   It is important that you follow up with primary care to schedule this.      Diagnosis: Colon polyps  Assessment and Plan of Treatment: GI consulted due to Strep galolyticus. which can be associated with colonic neoplasia)   Had colonoscopy  on  8/29/22  s/p  polyp  removal. One 15 mm, non-bleeding polyp in the ascending colon removed and a few small, non-bleeding polyps in the sigmoid colon and in the ascending colon.  Will need repeat colonoscopy in 6 months for removal of other polyps.

## 2022-08-29 NOTE — PROGRESS NOTE ADULT - SUBJECTIVE AND OBJECTIVE BOX
CARDIOLOGY     PROGRESS  NOTE   ________________________________________________    CHIEF COMPLAINT:Patient is a 71y old  Male who presents with a chief complaint of near  syncope (29 Aug 2022 05:25)  no complain.  	  REVIEW OF SYSTEMS:  CONSTITUTIONAL: No fever, weight loss, or fatigue  EYES: No eye pain, visual disturbances, or discharge  ENT:  No difficulty hearing, tinnitus, vertigo; No sinus or throat pain  NECK: No pain or stiffness  RESPIRATORY: No cough, wheezing, chills or hemoptysis; No Shortness of Breath  CARDIOVASCULAR: No chest pain, palpitations, passing out, dizziness, or leg swelling  GASTROINTESTINAL: No abdominal or epigastric pain. No nausea, vomiting, or hematemesis; No diarrhea or constipation. No melena or hematochezia.  GENITOURINARY: No dysuria, frequency, hematuria, or incontinence  NEUROLOGICAL: No headaches, memory loss, loss of strength, numbness, or tremors  SKIN: No itching, burning, rashes, or lesions   LYMPH Nodes: No enlarged glands  ENDOCRINE: No heat or cold intolerance; No hair loss  MUSCULOSKELETAL: No joint pain or swelling; No muscle, back, or extremity pain  PSYCHIATRIC: No depression, anxiety, mood swings, or difficulty sleeping  HEME/LYMPH: No easy bruising, or bleeding gums  ALLERGY AND IMMUNOLOGIC: No hives or eczema	    [ ] All others negative	  [ ] Unable to obtain    PHYSICAL EXAM:  T(C): 36.2 (08-29-22 @ 08:28), Max: 36.7 (08-29-22 @ 04:44)  HR: 59 (08-29-22 @ 08:28) (58 - 68)  BP: 163/70 (08-29-22 @ 08:28) (139/74 - 163/70)  RR: 21 (08-29-22 @ 08:28) (16 - 21)  SpO2: 98% (08-29-22 @ 08:28) (95% - 99%)  Wt(kg): --  I&O's Summary    28 Aug 2022 07:01  -  29 Aug 2022 07:00  --------------------------------------------------------  IN: 1320 mL / OUT: 0 mL / NET: 1320 mL        Appearance: Normal	  HEENT:   Normal oral mucosa, PERRL, EOMI	  Lymphatic: No lymphadenopathy  Cardiovascular: Normal S1 S2, No JVD, + murmurs, No edema  Respiratory:rhonchi  Psychiatry: A & O x 3, Mood & affect appropriate  Gastrointestinal:  Soft, Non-tender, + BS	  Skin: No rashes, No ecchymoses, No cyanosis	  Neurologic: Non-focal  Extremities: Normal range of motion, No clubbing, cyanosis or edema  Vascular: Peripheral pulses palpable 2+ bilaterally    MEDICATIONS  (STANDING):  acetaminophen   IVPB .. 1000 milliGRAM(s) IV Intermittent once  allopurinol 100 milliGRAM(s) Oral daily  aspirin enteric coated 81 milliGRAM(s) Oral daily  atorvastatin 20 milliGRAM(s) Oral at bedtime  cefTRIAXone   IVPB      cefTRIAXone   IVPB 2000 milliGRAM(s) IV Intermittent every 24 hours  chlorhexidine 2% Cloths 1 Application(s) Topical daily  dextrose 5%. 1000 milliLiter(s) (50 mL/Hr) IV Continuous <Continuous>  dextrose 5%. 1000 milliLiter(s) (100 mL/Hr) IV Continuous <Continuous>  dextrose 50% Injectable 25 Gram(s) IV Push once  dextrose 50% Injectable 12.5 Gram(s) IV Push once  dextrose 50% Injectable 25 Gram(s) IV Push once  enalapril 10 milliGRAM(s) Oral daily  finasteride 5 milliGRAM(s) Oral daily  glucagon  Injectable 1 milliGRAM(s) IntraMuscular once  heparin   Injectable 5000 Unit(s) SubCutaneous every 12 hours  insulin aspart (NovoLOG) Pump 1 Each SubCutaneous Continuous Pump  labetalol 200 milliGRAM(s) Oral three times a day  mycophenolate mofetil 1000 milliGRAM(s) Oral two times a day  NIFEdipine XL 30 milliGRAM(s) Oral at bedtime  NIFEdipine XL 60 milliGRAM(s) Oral daily  sodium chloride 0.9%. 500 milliLiter(s) (30 mL/Hr) IV Continuous <Continuous>  tacrolimus 1.5 milliGRAM(s) Oral <User Schedule>  tacrolimus 1 milliGRAM(s) Oral <User Schedule>      TELEMETRY: 	    ECG:  	  RADIOLOGY:  OTHER: 	  	  LABS:	 	    CARDIAC MARKERS:                                14.4   8.43  )-----------( 200      ( 29 Aug 2022 06:06 )             42.8           proBNP:   Lipid Profile:   HgA1c:   TSH:   PT/INR - ( 29 Aug 2022 06:06 )   PT: 13.1 sec;   INR: 1.14 ratio          Assessment and plan  ---------------------------  72 y/o M with h/o ESRD (s/p renal transplant 2013), IDDM, CAD (s/p stents), FRANCOIS (on cpap qHS), HLD, HTN, presenting with 2 episodes of felt lightheaded and nearly passed out twice while in the bathroom, to urinate, states legs gave out; felt generalized weakness; denies passing out/LOC, denies head trauma.  No CP/SOB/palpitations.   pt with hx of ashd, s/p stents, AS , s/p TAVR in 2021 with near syncope.  chest x ray noted ?chf vs pneumonia  syncope r/ o conduction disease  tele  check orthostatic  repeat echo noted  ID eval with s/p renal transplant  renal eval  cardiac enzymes  dvt prophylaxis  ct chest noted, no evidence of chf, ILD  continue transplant meds  awaiting blood tests/check orthostatics  + BC, ID eval in view oif hx of transplant on immunosuppressive meds, continue abx  awaiting official SHABNAM report  colonoscopy today  will increase enalapril dose  if increase bp

## 2022-08-29 NOTE — DISCHARGE NOTE PROVIDER - NSDCFUSCHEDAPPT_GEN_ALL_CORE_FT
[facility]  [facility] [address]  Scheduled Appointment: 11/17/2022

## 2022-08-29 NOTE — PROGRESS NOTE ADULT - PROBLEM SELECTOR PLAN 1
Pt. with LRRT (from son) in 2013 at [facility]. Pt. follows with transplant nephrologist Dr. Montero (at [facility]) and Dr. Michele Townsend (nephrologist). Allograft function at baseline on admission. Transplant ID notes from 8/26/22 reviewed. Monitor labs and urine output. Avoid any potential nephrotoxins. Dose medications as per eGFR.

## 2022-08-29 NOTE — PROGRESS NOTE ADULT - ASSESSMENT
70 y/o M with h/o ESRD, s/p renal transplant 2013 , DM, CAD s/p stents , FRANCOIS, HLD, HTN, presented to the ER with chief complaint of fever, generalized weakness and left knee pain after a fall yesterday.     Renal transplant recipient presenting with fevers  Noted steroid injection into left knee and trauma following fall but exam not consistent with septic arthritis   Blood cultures (8/22) with 4/4 Streptococcus gallolyticus    CT Chest (8/23) with chronic findings of interstitial lung disease  CT A/P (8/23) with no acute findings but with Indeterminant 3.1 x 2.5 cm mass within the right native kidney  TTE (8/23) without evidence of vegetations  SHABNAM (8/26) without evidence of vegetations.     MRI L Knee (8/25) Osteoarthritis with moderate joint effusion. No additional evidence suggestive of infection    Streptococcus gallolyticus (previously bovis) bacteremia often occurs in the context of underlying colonic lesion    Colonoscopy (8/29) One 15 mm, non-bleeding polyp in the ascending colon (s/p removal) and a few small, non-bleeding polyps in the sigmoid colon and in the ascending colon.    #Streptococcus gallolyticus Bacteremia, Fever, Leukocytosis, Renal Transplant Recipient  --Continue Ceftriaxone 2g IV Q24H (recommend 4 week total course; 8/24 -->9/20/22)  --Recommend CBC with Diff and CMP qWeekly while on antimicrobials. Please have results faxed to [phone]  --Patient should follow up with me in the Infectious Diseases Office at [address] [place] [address] in 2 weeks time.  Office contact number is [phone]    I will sign off at this time. Please feel free to contact me with any further questions or concerns.    Elvin Vallejo M.D.  [facility] Division of Infectious Disease  8AM-5PM Monday - Friday: Available on [employer] Teams  After Hours and Holidays (or if no response on [employer] Teams): Please contact the Infectious Diseases Office at [phone]     The above assessment and plan were discussed with medicine resident

## 2022-08-29 NOTE — PROGRESS NOTE ADULT - SUBJECTIVE AND OBJECTIVE BOX
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 Endocrinology consulted for diabetes and pump management.     Home diabetes medications:   - Medtronic pump w/ novolog insulin settings  Glucose target   TDD is 16.5 units   12AM-5AM 0.6 units  5AM-12AM 0.7 units    ICR:  12AM-11PM 1:15  11PM-12PM 1:20    ISF 1:50 all day    Endocrinologist is Dr. Jyoti Byrd    Inpatient diabetes medications:     Most recent HbA1C      INTERVAL HPI/OVERNIGHT EVENTS:    Currently denies polydipsia, polyuria , visual changes, numbness in feet. Tolerating diet well, no complaints.       Review of systems:   CONSTITUTIONAL:  Feels well, good appetite  CARDIOVASCULAR:  Negative for chest pain or palpitations  RESPIRATORY:  Negative for cough, or SOB   GASTROINTESTINAL:  Negative for nausea, vomiting, or abdominal pain  GENITOURINARY:  Negative frequency, urgency or dysuria     CAPILLARY BLOOD GLUCOSE      POCT Blood Glucose.: 92 mg/dL (29 Aug 2022 13:43)  POCT Blood Glucose.: 83 mg/dL (29 Aug 2022 08:33)  POCT Blood Glucose.: 83 mg/dL (29 Aug 2022 07:58)  POCT Blood Glucose.: 83 mg/dL (28 Aug 2022 21:37)  POCT Blood Glucose.: 96 mg/dL (28 Aug 2022 16:42)       MEDICATIONS  (STANDING):  acetaminophen   IVPB .. 1000 milliGRAM(s) IV Intermittent once  allopurinol 100 milliGRAM(s) Oral daily  aspirin enteric coated 81 milliGRAM(s) Oral daily  atorvastatin 20 milliGRAM(s) Oral at bedtime  cefTRIAXone   IVPB      cefTRIAXone   IVPB 2000 milliGRAM(s) IV Intermittent every 24 hours  chlorhexidine 2% Cloths 1 Application(s) Topical daily  dextrose 5%. 1000 milliLiter(s) (50 mL/Hr) IV Continuous <Continuous>  dextrose 5%. 1000 milliLiter(s) (100 mL/Hr) IV Continuous <Continuous>  dextrose 50% Injectable 25 Gram(s) IV Push once  dextrose 50% Injectable 12.5 Gram(s) IV Push once  dextrose 50% Injectable 25 Gram(s) IV Push once  enalapril 10 milliGRAM(s) Oral daily  finasteride 5 milliGRAM(s) Oral daily  glucagon  Injectable 1 milliGRAM(s) IntraMuscular once  heparin   Injectable 5000 Unit(s) SubCutaneous every 12 hours  insulin aspart (NovoLOG) Pump 1 Each SubCutaneous Continuous Pump  labetalol 200 milliGRAM(s) Oral three times a day  mycophenolate mofetil 1000 milliGRAM(s) Oral two times a day  NIFEdipine XL 30 milliGRAM(s) Oral at bedtime  NIFEdipine XL 60 milliGRAM(s) Oral daily  sodium chloride 0.9%. 500 milliLiter(s) (30 mL/Hr) IV Continuous <Continuous>  tacrolimus 1.5 milliGRAM(s) Oral <User Schedule>  tacrolimus 1 milliGRAM(s) Oral <User Schedule>    MEDICATIONS  (PRN):  dextrose Oral Gel 15 Gram(s) Oral once PRN Blood Glucose LESS THAN 70 milliGRAM(s)/deciliter      PHYSICAL EXAM  Vital Signs Last 24 Hrs  T(C): 36.2 (29 Aug 2022 10:18), Max: 36.7 (29 Aug 2022 04:44)  T(F): 97.2 (29 Aug 2022 08:28), Max: 98 (29 Aug 2022 04:44)  HR: 65 (29 Aug 2022 13:00) (58 - 68)  BP: 138/52 (29 Aug 2022 13:00) (110/45 - 174/81)  BP(mean): --  RR: 19 (29 Aug 2022 12:00) (16 - 21)  SpO2: 96% (29 Aug 2022 12:00) (93% - 99%)    Parameters below as of 29 Aug 2022 12:00  Patient On (Oxygen Delivery Method): room air        GENERAL: Male laying in bed in NAD  Abdomen: Soft, nontender, non distended  Extremities: Warm, no edema in all 4 exts   NEURO: A&O X3    LABS:                        14.4   8.43  )-----------( 200      ( 29 Aug 2022 06:06 )             42.8     08-29    141  |  105  |  10  ----------------------------<  78  4.0   |  20<L>  |  0.48<L>    Ca    9.7      29 Aug 2022 06:06      PT/INR - ( 29 Aug 2022 06:06 )   PT: 13.1 sec;   INR: 1.14 ratio

## 2022-08-29 NOTE — PROGRESS NOTE ADULT - SUBJECTIVE AND OBJECTIVE BOX
[facility] DIVISION OF KIDNEY DISEASES AND HYPERTENSION -- FOLLOW UP NOTE  --------------------------------------------------------------------------------  HPI:  Patient is a 71-year-old Male with significant history LRRT in 2013, DM, CAD s/p stents, FRANCOIS (on CPAP), HLD, HTN who was admitted at [facility] on 8/23/22 for fever, generalized weakness and left knee pain after a fall 1 day prior to admission. Of note, Pt. says he recently had dental procedures ~ 3wks ago. Blood Cultures were done which grew Streptococcus gallolyticus. Transplant ID on board. Currently receiving IV vancomycin and ceftriaxone. Currently getting work up for infective endocarditis. Transplant nephrology following for kidney transplant recipient management.     Transplant kidney history: Pt. says he was ESRD on HD (~ 5 months) and underwent LRRT (from son) in 2013 at [facility]. Pt. follows with transplant nephrologist Dr. Montero (at [facility]) and Dr. Michele Townsend (nephrologist). Pt. currently is on prograf 1 mg in AM and 1.5 mg PO in evening and MMF 1 gm Po BID.    Pt. seen and examined at bedside. Family (wife) present at bedside. Pt. reports feeling better. Pt. underwent colonoscopy today. Tolerated the procedure well. Denies SOB, CP, HA, or dizziness.     PAST HISTORY  --------------------------------------------------------------------------------  No significant changes to PMH, PSH, FHx, SHx, unless otherwise noted    ALLERGIES & MEDICATIONS  --------------------------------------------------------------------------------  Allergies    No Known Allergies    Intolerances    Standing Inpatient Medications  acetaminophen   IVPB .. 1000 milliGRAM(s) IV Intermittent once  allopurinol 100 milliGRAM(s) Oral daily  aspirin enteric coated 81 milliGRAM(s) Oral daily  atorvastatin 20 milliGRAM(s) Oral at bedtime  cefTRIAXone   IVPB      cefTRIAXone   IVPB 2000 milliGRAM(s) IV Intermittent every 24 hours  chlorhexidine 2% Cloths 1 Application(s) Topical daily  dextrose 5%. 1000 milliLiter(s) IV Continuous <Continuous>  dextrose 5%. 1000 milliLiter(s) IV Continuous <Continuous>  dextrose 50% Injectable 25 Gram(s) IV Push once  dextrose 50% Injectable 12.5 Gram(s) IV Push once  dextrose 50% Injectable 25 Gram(s) IV Push once  enalapril 10 milliGRAM(s) Oral daily  finasteride 5 milliGRAM(s) Oral daily  glucagon  Injectable 1 milliGRAM(s) IntraMuscular once  heparin   Injectable 5000 Unit(s) SubCutaneous every 12 hours  insulin aspart (NovoLOG) Pump 1 Each SubCutaneous Continuous Pump  labetalol 200 milliGRAM(s) Oral three times a day  mycophenolate mofetil 1000 milliGRAM(s) Oral two times a day  NIFEdipine XL 30 milliGRAM(s) Oral at bedtime  NIFEdipine XL 60 milliGRAM(s) Oral daily  sodium chloride 0.9%. 500 milliLiter(s) IV Continuous <Continuous>  tacrolimus 1.5 milliGRAM(s) Oral <User Schedule>  tacrolimus 1 milliGRAM(s) Oral <User Schedule>    PRN Inpatient Medications  dextrose Oral Gel 15 Gram(s) Oral once PRN    REVIEW OF SYSTEMS  --------------------------------------------------------------------------------  Gen: No fevers/chills  Respiratory: No dyspnea,   CV: No chest pain,  GI: No abdominal pain  Transplant: No pain  : No increased frequency, dysuria, hematuria   MSK: No edema  Neuro: No dizziness/lightheadedness    All other systems were reviewed and are negative, except as noted.    VITALS/PHYSICAL EXAM  --------------------------------------------------------------------------------  T(C): 36.2 (08-29-22 @ 10:18), Max: 36.7 (08-29-22 @ 04:44)  HR: 65 (08-29-22 @ 15:22) (58 - 68)  BP: 138/52 (08-29-22 @ 13:00) (110/45 - 174/81)  RR: 19 (08-29-22 @ 12:00) (16 - 21)  SpO2: 99% (08-29-22 @ 15:22) (93% - 99%)  Wt(kg): --  Height (cm): 170.2 (08-29-22 @ 10:18)  Weight (kg): 91.6 (08-29-22 @ 10:18)  BMI (kg/m2): 31.6 (08-29-22 @ 10:18)  BSA (m2): 2.03 (08-29-22 @ 10:18)    08-28-22 @ 07:01  -  08-29-22 @ 07:00  --------------------------------------------------------  IN: 1320 mL / OUT: 0 mL / NET: 1320 mL    08-29-22 @ 07:01  -  08-29-22 @ 15:40  --------------------------------------------------------  IN: 0 mL / OUT: 0 mL / NET: 0 mL    Physical Exam:  	Gen: NAD, able to speak in full sentences   	HEENT: PERRL, MMM   	Pulm: CTA B/L, no crackles   	CV: RRR, S1S2+  	Abd: +BS, soft              Transplant: No tenderness, swelling  	: No suprapubic tenderness  	MSK: no edema   	Psych: Normal affect and mood  	Skin: Warm    LABS/STUDIES  --------------------------------------------------------------------------------              14.4   8.43  >-----------<  200      [08-29-22 @ 06:06]              42.8     141  |  105  |  10  ----------------------------<  78      [08-29-22 @ 06:06]  4.0   |  20  |  0.48        Ca     9.7     [08-29-22 @ 06:06]    PT/INR: PT 13.1 , INR 1.14       [08-29-22 @ 06:06]    Creatinine Trend:  SCr 0.48 [08-29 @ 06:06]  SCr 0.60 [08-27 @ 06:23]  SCr 0.50 [08-26 @ 06:04]  SCr 0.49 [08-25 @ 06:51]  SCr 0.57 [08-24 @ 07:07]    Tacrolimus (), Serum: 5.4 ng/mL (08-29 @ 06:06)  Tacrolimus (), Serum: 5.6 ng/mL (08-28 @ 05:11)  Tacrolimus (), Serum: 5.1 ng/mL (08-27 @ 06:11)  Tacrolimus (), Serum: 5.7 ng/mL (08-26 @ 06:04)    Urinalysis - [08-23-22 @ 04:41]      Color Yellow / Appearance Clear / SG 1.031 / pH 6.0      Gluc Negative / Ketone Negative  / Bili Negative / Urobili Negative       Blood Negative / Protein 30 mg/dL / Leuk Est Negative / Nitrite Negative      RBC 5 / WBC 1 / Hyaline 1 / Gran  / Sq Epi  / Non Sq Epi 1 / Bacteria Negative    HCV 0.21, Nonreact      [08-24-22 @ 07:07]

## 2022-08-29 NOTE — DISCHARGE NOTE PROVIDER - NSDCFUADDAPPT_GEN_ALL_CORE_FT
APPTS ARE READY TO BE MADE: [ ] YES    Best Family or Patient Contact (if needed):    Additional Information about above appointments (if needed):    1: Primary care within 1-2 weeks of discharge  2: GI within 6 months to have a repeat colonoscopy  3: Endocrinology with Jyoti Byrd within 1-2 weeks   4:  Infectious Diseases Office at [address] [place] [address] in 2 weeks time.  Office contact number is [phone]    Other comments or requests:    APPTS ARE READY TO BE MADE: [x] YES    Best Family or Patient Contact (if needed):    Additional Information about above appointments (if needed):    1: Primary care within 1-2 weeks of discharge  2: GI within 6 months to have a repeat colonoscopy  3: Endocrinology with Jyoti Byrd within 1-2 weeks   4: Dr. Smith - Infectious Diseases Office at [address] [place] [address] in 2 weeks time.  Office contact number is [phone]    Other comments or requests:

## 2022-08-29 NOTE — PROGRESS NOTE ADULT - SUBJECTIVE AND OBJECTIVE BOX
Follow Up:      Interval History:    REVIEW OF SYSTEMS  [  ] ROS unobtainable because:    [  ] All other systems negative except as noted below    Constitutional:  [ ] fever [ ] chills  [ ] weight loss  [ ] weakness  Skin:  [ ] rash [ ] phlebitis	  Eyes: [ ] icterus [ ] pain  [ ] discharge	  ENMT: [ ] sore throat  [ ] thrush [ ] ulcers [ ] exudates  Respiratory: [ ] dyspnea [ ] hemoptysis [ ] cough [ ] sputum	  Cardiovascular:  [ ] chest pain [ ] palpitations [ ] edema	  Gastrointestinal:  [ ] nausea [ ] vomiting [ ] diarrhea [ ] constipation [ ] pain	  Genitourinary:  [ ] dysuria [ ] frequency [ ] hematuria [ ] discharge [ ] flank pain  [ ] incontinence  Musculoskeletal:  [ ] myalgias [ ] arthralgias [ ] arthritis  [ ] back pain  Neurological:  [ ] headache [ ] seizures  [ ] confusion/altered mental status    Allergies  No Known Allergies        ANTIMICROBIALS:  cefTRIAXone   IVPB    cefTRIAXone   IVPB 2000 every 24 hours      OTHER MEDS:  MEDICATIONS  (STANDING):  acetaminophen   IVPB .. 1000 once  allopurinol 100 daily  aspirin enteric coated 81 daily  atorvastatin 20 at bedtime  dextrose 50% Injectable 25 once  dextrose 50% Injectable 12.5 once  dextrose 50% Injectable 25 once  dextrose Oral Gel 15 once PRN  enalapril 10 daily  finasteride 5 daily  glucagon  Injectable 1 once  heparin   Injectable 5000 every 12 hours  insulin aspart (NovoLOG) Pump 1 Continuous Pump  labetalol 200 three times a day  mycophenolate mofetil 1000 two times a day  NIFEdipine XL 30 at bedtime  NIFEdipine XL 60 daily  tacrolimus 1.5 <User Schedule>  tacrolimus 1 <User Schedule>      Vital Signs Last 24 Hrs  T(C): 36.2 (29 Aug 2022 10:18), Max: 36.7 (29 Aug 2022 04:44)  T(F): 97.2 (29 Aug 2022 08:28), Max: 98 (29 Aug 2022 04:44)  HR: 65 (29 Aug 2022 15:22) (58 - 68)  BP: 138/52 (29 Aug 2022 13:00) (110/45 - 174/81)  BP(mean): --  RR: 19 (29 Aug 2022 12:00) (16 - 21)  SpO2: 99% (29 Aug 2022 15:22) (93% - 99%)    Parameters below as of 29 Aug 2022 12:00  Patient On (Oxygen Delivery Method): room air        PHYSICAL EXAMINATION:  General: Alert and Awake, NAD  HEENT: PERRL, EOMI  Neck: Supple  Cardiac: RRR, No M/R/G  Resp: CTAB, No Wh/Rh/Ra  Abdomen: NBS, NT/ND, No HSM, No rigidity or guarding  MSK: No LE edema. No Calf tenderness  : No thurston  Skin: No rashes or lesions. Skin is warm and dry to the touch.   Neuro: Alert and Awake. CN 2-12 Grossly intact. Moves all four extremities spontaneously.  Psych: Calm, Pleasant, Cooperative                          14.4   8.43  )-----------( 200      ( 29 Aug 2022 06:06 )             42.8       08-29    141  |  105  |  10  ----------------------------<  78  4.0   |  20<L>  |  0.48<L>    Ca    9.7      29 Aug 2022 06:06            MICROBIOLOGY:  v  .Blood Blood-Peripheral  08-24-22   No Growth Final  --  --      .Blood Blood-Peripheral  08-24-22   No Growth Final  --  --      Clean Catch Clean Catch (Midstream)  08-23-22   <10,000 CFU/mL Normal Urogenital Heather  --  --      .Blood Blood-Peripheral  08-22-22   Growth in aerobic and anaerobic bottles: Streptococcus gallolyticus  See previous culture [id number]  --    Growth in aerobic and anaerobic bottles: Gram Positive Cocci in Pairs and  Chains      .Blood Blood-Peripheral  08-22-22   Growth in aerobic and anaerobic bottles: Streptococcus gallolyticus  ***Blood Panel PCR results on this specimen are available  approximately 3 hours after the Gram stain result.***  Gram stain, PCR, and/or culture results may not always  correspond due to difference in methodologies.  ************************************************************  This PCR assay was performed by multiplex PCR. This  Assay tests for 66 bacterial and resistance gene targets.  Please refer to the [facility] 1bib test directory  at [email or web address] for details.  --  Blood Culture PCR  Streptococcus gallolyticus          Rapid RVP Result: Joelle (08-23 @ 01:09)        RADIOLOGY:    <The imaging below has been reviewed and visualized by me independently. Findings as detailed in report below> Follow Up:  bacteremia    Interval History: s/p colonoscopy today. afebrile.     REVIEW OF SYSTEMS  [  ] ROS unobtainable because:    [ x ] All other systems negative except as noted below    Constitutional:  [ ] fever [ ] chills  [ ] weight loss  [ ] weakness  Skin:  [ ] rash [ ] phlebitis	  Eyes: [ ] icterus [ ] pain  [ ] discharge	  ENMT: [ ] sore throat  [ ] thrush [ ] ulcers [ ] exudates  Respiratory: [ ] dyspnea [ ] hemoptysis [ ] cough [ ] sputum	  Cardiovascular:  [ ] chest pain [ ] palpitations [ ] edema	  Gastrointestinal:  [ ] nausea [ ] vomiting [ ] diarrhea [ ] constipation [ ] pain	  Genitourinary:  [ ] dysuria [ ] frequency [ ] hematuria [ ] discharge [ ] flank pain  [ ] incontinence  Musculoskeletal:  [ ] myalgias [ ] arthralgias [ ] arthritis  [ ] back pain +left knee pain  Neurological:  [ ] headache [ ] seizures  [ ] confusion/altered mental status    Allergies  No Known Allergies        ANTIMICROBIALS:  cefTRIAXone   IVPB    cefTRIAXone   IVPB 2000 every 24 hours      OTHER MEDS:  MEDICATIONS  (STANDING):  acetaminophen   IVPB .. 1000 once  allopurinol 100 daily  aspirin enteric coated 81 daily  atorvastatin 20 at bedtime  dextrose 50% Injectable 25 once  dextrose 50% Injectable 12.5 once  dextrose 50% Injectable 25 once  dextrose Oral Gel 15 once PRN  enalapril 10 daily  finasteride 5 daily  glucagon  Injectable 1 once  heparin   Injectable 5000 every 12 hours  insulin aspart (NovoLOG) Pump 1 Continuous Pump  labetalol 200 three times a day  mycophenolate mofetil 1000 two times a day  NIFEdipine XL 30 at bedtime  NIFEdipine XL 60 daily  tacrolimus 1.5 <User Schedule>  tacrolimus 1 <User Schedule>      Vital Signs Last 24 Hrs  T(C): 36.2 (29 Aug 2022 10:18), Max: 36.7 (29 Aug 2022 04:44)  T(F): 97.2 (29 Aug 2022 08:28), Max: 98 (29 Aug 2022 04:44)  HR: 65 (29 Aug 2022 15:22) (58 - 68)  BP: 138/52 (29 Aug 2022 13:00) (110/45 - 174/81)  BP(mean): --  RR: 19 (29 Aug 2022 12:00) (16 - 21)  SpO2: 99% (29 Aug 2022 15:22) (93% - 99%)    Parameters below as of 29 Aug 2022 12:00  Patient On (Oxygen Delivery Method): room air    PHYSICAL EXAMINATION:  General: Alert and Awake, NAD  HEENT: PERRL, EOMI  Cardiac: RRR, No M/R/G  Resp: CTAB, No Wh/Rh/Ra  Abdomen: NBS, NT/ND, No HSM, No rigidity or guarding  : No thurston  Skin: No rashes or lesions. Skin is warm and dry to the touch.   Neuro: Alert and Awake. CN 2-12 Grossly intact. Moves all four extremities spontaneously.  Psych: Calm, Pleasant, Cooperative                            14.4   8.43  )-----------( 200      ( 29 Aug 2022 06:06 )             42.8       08-29    141  |  105  |  10  ----------------------------<  78  4.0   |  20<L>  |  0.48<L>    Ca    9.7      29 Aug 2022 06:06            MICROBIOLOGY:  v  .Blood Blood-Peripheral  08-24-22   No Growth Final  --  --      .Blood Blood-Peripheral  08-24-22   No Growth Final  --  --      Clean Catch Clean Catch (Midstream)  08-23-22   <10,000 CFU/mL Normal Urogenital Heather  --  --      .Blood Blood-Peripheral  08-22-22   Growth in aerobic and anaerobic bottles: Streptococcus gallolyticus  See previous culture [id number]  --    Growth in aerobic and anaerobic bottles: Gram Positive Cocci in Pairs and  Chains      .Blood Blood-Peripheral  08-22-22   Growth in aerobic and anaerobic bottles: Streptococcus gallolyticus  ***Blood Panel PCR results on this specimen are available  approximately 3 hours after the Gram stain result.***  Gram stain, PCR, and/or culture results may not always  correspond due to difference in methodologies.  ************************************************************  This PCR assay was performed by multiplex PCR. This  Assay tests for 66 bacterial and resistance gene targets.  Please refer to the [email or web address] test directory  at [email or web address] for details.  --  Blood Culture PCR  Streptococcus gallolyticus          Rapid RVP Result: NotDetec (08-23 @ 01:09)        RADIOLOGY:    <The imaging below has been reviewed and visualized by me independently. Findings as detailed in report below>    < from: US Renal (08.26.22 @ 16:17) >  IMPRESSION:  Multiple cysts in the bilateral native kidneys, which are stable   appearing and correlate to previous ultrasound imaging examination on   1/3/2011. A 2.6 cm right renal cyst likely represents the abnormality   noted on prior CT scan.    < end of copied text >

## 2022-08-29 NOTE — PROGRESS NOTE ADULT - SUBJECTIVE AND OBJECTIVE BOX
afberile    REVIEW OF SYSTEMS:  GEN: no fever,    no chills  RESP: no SOB,   no cough  CVS: no chest pain,   no palpitations  GI: no abdominal pain,   no nausea,   no vomiting,   no constipation,   no diarrhea  : no dysuria,   no frequency  NEURO: no headache,   no dizziness  PSYCH: no depression,   not anxious  Derm : no rash    MEDICATIONS  (STANDING):  acetaminophen   IVPB .. 1000 milliGRAM(s) IV Intermittent once  allopurinol 100 milliGRAM(s) Oral daily  aspirin enteric coated 81 milliGRAM(s) Oral daily  atorvastatin 20 milliGRAM(s) Oral at bedtime  cefTRIAXone   IVPB      cefTRIAXone   IVPB 2000 milliGRAM(s) IV Intermittent every 24 hours  chlorhexidine 2% Cloths 1 Application(s) Topical daily  dextrose 5%. 1000 milliLiter(s) (100 mL/Hr) IV Continuous <Continuous>  dextrose 5%. 1000 milliLiter(s) (50 mL/Hr) IV Continuous <Continuous>  dextrose 50% Injectable 25 Gram(s) IV Push once  dextrose 50% Injectable 12.5 Gram(s) IV Push once  dextrose 50% Injectable 25 Gram(s) IV Push once  enalapril 10 milliGRAM(s) Oral daily  finasteride 5 milliGRAM(s) Oral daily  glucagon  Injectable 1 milliGRAM(s) IntraMuscular once  heparin   Injectable 5000 Unit(s) SubCutaneous every 12 hours  insulin aspart (NovoLOG) Pump 1 Each SubCutaneous Continuous Pump  labetalol 200 milliGRAM(s) Oral three times a day  mycophenolate mofetil 1000 milliGRAM(s) Oral two times a day  NIFEdipine XL 30 milliGRAM(s) Oral at bedtime  NIFEdipine XL 60 milliGRAM(s) Oral daily  tacrolimus 1.5 milliGRAM(s) Oral <User Schedule>  tacrolimus 1 milliGRAM(s) Oral <User Schedule>    MEDICATIONS  (PRN):  dextrose Oral Gel 15 Gram(s) Oral once PRN Blood Glucose LESS THAN 70 milliGRAM(s)/deciliter      Vital Signs Last 24 Hrs  T(C): 36.7 (29 Aug 2022 04:44), Max: 36.7 (29 Aug 2022 04:44)  T(F): 98 (29 Aug 2022 04:44), Max: 98 (29 Aug 2022 04:44)  HR: 68 (29 Aug 2022 04:44) (58 - 68)  BP: 162/70 (29 Aug 2022 04:44) (139/74 - 162/70)  BP(mean): --  RR: 18 (29 Aug 2022 04:44) (16 - 20)  SpO2: 98% (29 Aug 2022 04:44) (95% - 99%)    Parameters below as of 29 Aug 2022 04:44  Patient On (Oxygen Delivery Method): room air      CAPILLARY BLOOD GLUCOSE      POCT Blood Glucose.: 83 mg/dL (28 Aug 2022 21:37)  POCT Blood Glucose.: 96 mg/dL (28 Aug 2022 16:42)  POCT Blood Glucose.: 101 mg/dL (28 Aug 2022 13:48)  POCT Blood Glucose.: 110 mg/dL (28 Aug 2022 12:06)  POCT Blood Glucose.: 115 mg/dL (28 Aug 2022 08:17)    I&O's Summary    27 Aug 2022 07:01  -  28 Aug 2022 07:00  --------------------------------------------------------  IN: 1200 mL / OUT: 240 mL / NET: 960 mL    28 Aug 2022 07:01  -  29 Aug 2022 05:25  --------------------------------------------------------  IN: 1080 mL / OUT: 0 mL / NET: 1080 mL        PHYSICAL EXAM:  HEAD:  Atraumatic, Normocephalic  NECK: Supple, No   JVD  CHEST/LUNG:   no     rales,     no,    rhonchi  HEART: Regular rate and rhythm;         murmur  ABDOMEN: Soft, Nontender, ;   EXTREMITIES:    no    edema  NEUROLOGY:  alert    LABS:                        14.4   6.70  )-----------( 188      ( 27 Aug 2022 06:11 )             43.0     08-27    140  |  105  |  18  ----------------------------<  87  3.7   |  22  |  0.60    Ca    9.7      27 Aug 2022 06:23    TPro  7.3  /  Alb  4.2  /  TBili  0.5  /  DBili  x   /  AST  27  /  ALT  21  /  AlkPhos  94  08-27                            Consultant(s) Notes Reviewed:      Care Discussed with Consultants/Other Providers:

## 2022-08-29 NOTE — DISCHARGE NOTE PROVIDER - NSDCCPTREATMENT_GEN_ALL_CORE_FT
PRINCIPAL PROCEDURE  Procedure: Colonoscopy  Findings and Treatment: Findings:       Hemorrhoids were found on perianal exam.       A 15 mm, non-bleeding polyp was found in the ascending colon. The polyp was sessile. The        polyp was removed with a hot snare. Resection and retrieval were complete. For location        marking and to close defect, one hemostatic clip was successfully placed (MR conditional).        There was no bleeding at the end of the procedure.       A few sessile, non-bleeding polyps were found in the sigmoid colon and ascending colon. The        polyps were small in size. Polypectomy was not attempted.       A few small and medium diverticula were found in the sigmoid colon and ascending colon.                                                                                                        Impression:          - Hemorrhoids found on perianal exam.                       - One 15 mm, non-bleeding polyp in the ascending colon, removed with a hot                        snare. Resected and retrieved. Clip (MR conditional) was placed.                       - A few small, non-bleeding polyps in the sigmoid colon and in the ascending                        colon. Resection not attempted.                       - Diverticulosis in the sigmoid colon and in the ascending colon.  Recommendation:      - Return patient to hospital diop for ongoing care.                       - Await pathology results.                       - Repeat colonoscopy in 6 months for removal of other polyps, suboptimal                        prep. Will need 2-day prep.      SECONDARY PROCEDURE  Procedure: Transesophageal echocardiogram  Findings and Treatment: Observations:  Mitral Valve: Normal mitral valve. Mild mitral  regurgitation.  Aortic Valve/Aorta: Transcatheter aortic valve replacement.  Peak transaortic valve gradient equals 34 mm Hg, mean  transaortic valve gradient equals 15 mm Hg, which is  probably normal in the presence of a transcatheter aortic  valve replacement. No aortic valve regurgitation seen.  Normal aortic root, aortic arch and descending thoracic  aorta.  Left Atrium: No left atrial or left atrial appendage  thrombus.  Left Ventricle: Normal left ventricular systolic function.  No segmental wall motion abnormalities. Concentric left  ventricular hypertrophy.  Right Heart: Normal right atrium. Normal right ventricular  size and function. Normal tricuspid valve. Mild tricuspid  regurgitation. Normal pulmonic valve.  Pericardium/Pleura: Normal pericardium with no pericardial  effusion.  Hemodynamic: Estimated right atrial pressure is 8 mm Hg.  Estimated right ventricular systolic pressure equals 33 mm  Hg, assuming right atrial pressure equals 8 mm Hg,  consistent with normal pulmonary pressures. Agitated saline  injection demonstrates evidence of a patent foramen ovale.  ------------------------------------------------------------------------  Conclusions:  1. Transcatheter aortic valve replacement.  2. Concentric left ventricular hypertrophy.  3. Normal left ventricular systolic function. No segmental  wall motion abnormalities.  4. Normal right ventricular size and function.  No echocardiographic evidence of endocarditis.    Procedure: Ultrasound of kidney and bladder  Findings and Treatment: Right kidney: 10.0 cm. The native right kidney is atrophic. A   well-circumscribed anechoic renal cyst measuring 2.6 x 2.5 cm is seen in   the mid region of the kidney. This appears to correlate with mid region   cyst on prior ultrasound imaging obtained on 1/3/2011 and stable in   appearance. This may represent the lesion identified on prior CT scan.   There are additional subcentimeter cysts noted which also correlate with   previous ultrasound imaging findings.  Left kidney: 9.8 cm. Atrophic, multiple anechoic cysts, the largest at   the upper pole measuring 2.8 x 2.0 x 2.4 cm  Urinary bladder: Within normal limits.  No evidence of transplant hydronephrosis or perinephric collection.  IMPRESSION:  Multiple cysts in the bilateral native kidneys, which are stable   appearing and correlate to previous ultrasound imaging examination on   1/3/2011. A 2.6 cm right renal cyst likely represents the abnormality   noted on prior CT scan.

## 2022-08-29 NOTE — PROGRESS NOTE ADULT - ASSESSMENT
71-year-old Male with significant history LRRT in 2013, DM, CAD s/p stents, FRANCOIS (on CPAP), HLD, HTN who was admitted at [facility] on 8/23/22 for streptococcus bacteremia.

## 2022-08-29 NOTE — DISCHARGE NOTE PROVIDER - HOSPITAL COURSE
70 y/o M       with h/o ESRD, , s/p  R renal transplant 2013 ,    DM,  CAD   s/p stents , FRANCOIS  on cpap qHS), HLD, HTN,         presenting with 2 episodes of felt lightheaded and nearly passed out twice while in the bathroom,   states legs gave out; felt generalized weakness;    denies passing out/LOC, denies head trauma.    No   cp/sob //palpitations.  fever./  s/p  fall  at  home       * admitted  with  dizziness/ near syncope,  denies  cp/sob       cxr. with  pulm edema.  acute  diasolic  chf    *  HTN/HLD         on  procardia,  labetolol     *   CAD, 11/2021  on asa/ plavix/ ,lipitor     *   s/p  TAVR, 12/2021      *   DM,         has  insulin   pump, per   house endo      *    CKD         s/p   R kidney transplant  2013,   Dr Vargas  [facility] [place]      *   IDL restrictive/obstructive lung disease,  and   FRANCOIS / cpap        CT chest, ,  ILD     *  Strep   galolyticus,   bacteremia on iv rocephin/  f/p by  ID     *   imaging.  R   renal mass,  in native  kidney  and   transplanted   kidney noted  in rlq,    *  s/p  renal  transplant,,  on  cellcept/ tacrolimus        rpt  bcx,   neagtive / SHABNAM prelim,  is  normal     Urology  eval     *   MRI,   knee/ miild  patellar  effusion.  meniscus  tear,  per  ortho,  no  intervention     house gi  for c/scopy  on  8/39/     remains  on iv rocephin/   needs  4  weeks/  med  line per  ID/  till 9/21/22    f/p  d r [place]  dr britney paiz        70 y/o M       with h/o ESRD, , s/p  R renal transplant 2013 ,    DM,  CAD   s/p stents , FRANCOIS  on cpap qHS), HLD, HTN,         presenting with 2 episodes of felt lightheaded and nearly passed out twice while in the bathroom,   states legs gave out; felt generalized weakness;    denies passing out/LOC, denies head trauma.    No   cp/sob //palpitations.  fever./  s/p  fall  at  home       * admitted  with  dizziness/ near syncope,  denies  cp/sob       cxr. with  pulm edema.  acute  diasolic  chf    *  HTN/HLD         on  procardia,  labetolol     *   CAD, 11/2021  on asa/ plavix/ ,lipitor     *   s/p  TAVR, 12/2021      *   DM,         has  insulin   pump, per   house endo      *    CKD         s/p   R kidney transplant  2013,   Dr Vargas  [facility]      *   IDL restrictive/obstructive lung disease,  and   FRANCOIS / cpap        CT chest, ,  ILD     *  Strep   galolyticus,   bacteremia on iv rocephin/  f/p by  ID     *   imaging.  R   renal mass,  in native  kidney  and   transplanted   kidney noted  in rlq,    *  s/p  renal  transplant,,  on  cellcept/ tacrolimus        rpt  bcx,   neagtive / SHABNAM prelim,  is  normal     Urology  eval     *   MRI,   knee/ miild  patellar  effusion.  meniscus  tear,  per  ortho,  no  intervention     house gi  for c/scopy  on  8/39/  s/p  polyp  removed     remains  on iv rocephin/   needs  4  weeks/  med  line per  ID/  till 9/21/22   tsrat   d/c  planning/  and  may   d/c  with hpme care/ ov ab            70 y/o M       with h/o ESRD, , s/p  R renal transplant 2013 - on Cellcept and Prograf    DM,  CAD   s/p stents , FRANCOIS  on cpap qHS), HLD, HTN,         presenting with 2 episodes of felt lightheaded and nearly passed out twice while in the bathroom,   states legs gave out; felt generalized weakness;    denies passing out/LOC, denies head trauma.    No   cp/sob //palpitations.  fever./  s/p  fall  at  home       * admitted  with  dizziness/ near syncope,  denies  cp/sob       cxr. with  pulm edema.  acute  diasolic  chf    *  HTN/HLD         on  procardia,  labetolol     *   CAD, 11/2021  on asa/ plavix/ ,lipitor     *   s/p  TAVR, 12/2021      *    CKD         s/p   R kidney transplant  2013,   Dr Vargas  [facility]      *   IDL restrictive/obstructive lung disease,  and   FRANCOIS / cpap        CT Chest - Bilateral lower lobe predominant reticular and groundglass opacities likely secondary to patient's known interstitial lung disease.     *Found to have Strep galolyticus, which can be associated with colonic neoplasia.  Treatment with IV Rocephin   CT A/P - no acute finding   Echo - no vegetation  SHABNAM - normal  MRI knee done due to c/o pain to r/o source of bacteremia  mild  patellar  effusion.  meniscus  tear,  per  ortho,  no  intervention  Repeat Blood culture negative.  GI consulted due to Strep galolyticus  - Had colonoscopy  on  8/29/22  s/p  polyp  removal.  Will need repeat colonoscopy in 6 months for removal of other polyps.  Pt immunocompromised due to renal  transplant on  cellcept/ tacrolimus  IV Ceftriaxone via midline 4 weeks from cleared culture date until 9/21/22. Will need weekly labs: CBC w diff, CMP to Dr Humphreys [phone]       *  CT of Abd and  Pelvis showed.  R   renal mass - 3.1 x 2.5 cm mass  in native  kidney  and   transplanted   kidney noted  in RLQ  Urology consulted for the renal mass - correlates to renal cyst on US which is stable from 2011.  No  intervention at this time.    Can follow up as needed at [facility] [phone].     Endocrine consulted for T2DM on insulin pump   Most recent Hemoglobin A1C 5.4 well controlled   - Continue with insulin pump therapy at current setting   - Follow up with his endocrinologist Jyoti Byrd  - Continue with home pump therapy at current setting as finger stick well controlled    - Can also continue with home ozempic 0.5 mg weekly     Medically cleared by Dr. Esteves to discharge pt with home care/ IV ab

## 2022-08-30 LAB
GLUCOSE BLDC GLUCOMTR-MCNC: 111 MG/DL — HIGH (ref 70–99)
GLUCOSE BLDC GLUCOMTR-MCNC: 115 MG/DL — HIGH (ref 70–99)
GLUCOSE BLDC GLUCOMTR-MCNC: 143 MG/DL — HIGH (ref 70–99)
GLUCOSE BLDC GLUCOMTR-MCNC: 92 MG/DL — SIGNIFICANT CHANGE UP (ref 70–99)
TACROLIMUS SERPL-MCNC: 5.3 NG/ML — SIGNIFICANT CHANGE UP

## 2022-08-30 PROCEDURE — 99232 SBSQ HOSP IP/OBS MODERATE 35: CPT

## 2022-08-30 RX ORDER — CEFTRIAXONE 500 MG/1
2 INJECTION, POWDER, FOR SOLUTION INTRAMUSCULAR; INTRAVENOUS
Qty: 60 | Refills: 0
Start: 2022-08-30 | End: 2022-09-28

## 2022-08-30 RX ORDER — PNEUMOCOCCAL 23-VAL P-SAC VAC 25MCG/0.5
0.5 VIAL (ML) INJECTION ONCE
Refills: 0 | Status: COMPLETED | OUTPATIENT
Start: 2022-08-30 | End: 2022-08-31

## 2022-08-30 RX ORDER — NIFEDIPINE 30 MG
1 TABLET, EXTENDED RELEASE 24 HR ORAL
Qty: 0 | Refills: 0 | DISCHARGE
Start: 2022-08-30

## 2022-08-30 RX ORDER — NIFEDIPINE 30 MG
1 TABLET, EXTENDED RELEASE 24 HR ORAL
Qty: 0 | Refills: 0 | DISCHARGE

## 2022-08-30 RX ORDER — POTASSIUM CHLORIDE 20 MEQ
1 PACKET (EA) ORAL
Qty: 0 | Refills: 0 | DISCHARGE

## 2022-08-30 RX ORDER — MYCOPHENOLATE MOFETIL 250 MG/1
2 CAPSULE ORAL
Qty: 0 | Refills: 0 | DISCHARGE

## 2022-08-30 RX ORDER — CEFTRIAXONE 500 MG/1
2 INJECTION, POWDER, FOR SOLUTION INTRAMUSCULAR; INTRAVENOUS
Qty: 44 | Refills: 0
Start: 2022-08-30 | End: 2022-09-20

## 2022-08-30 RX ORDER — INSULIN ASPART 100 [IU]/ML
0 INJECTION, SOLUTION SUBCUTANEOUS
Qty: 0 | Refills: 0 | DISCHARGE

## 2022-08-30 RX ADMIN — Medication 200 MILLIGRAM(S): at 13:16

## 2022-08-30 RX ADMIN — Medication 10 MILLIGRAM(S): at 05:48

## 2022-08-30 RX ADMIN — Medication 200 MILLIGRAM(S): at 05:48

## 2022-08-30 RX ADMIN — HEPARIN SODIUM 5000 UNIT(S): 5000 INJECTION INTRAVENOUS; SUBCUTANEOUS at 17:19

## 2022-08-30 RX ADMIN — MYCOPHENOLATE MOFETIL 1000 MILLIGRAM(S): 250 CAPSULE ORAL at 05:48

## 2022-08-30 RX ADMIN — TACROLIMUS 1.5 MILLIGRAM(S): 5 CAPSULE ORAL at 20:24

## 2022-08-30 RX ADMIN — FINASTERIDE 5 MILLIGRAM(S): 5 TABLET, FILM COATED ORAL at 11:14

## 2022-08-30 RX ADMIN — Medication 30 MILLIGRAM(S): at 21:31

## 2022-08-30 RX ADMIN — Medication 81 MILLIGRAM(S): at 11:14

## 2022-08-30 RX ADMIN — CHLORHEXIDINE GLUCONATE 1 APPLICATION(S): 213 SOLUTION TOPICAL at 11:15

## 2022-08-30 RX ADMIN — CEFTRIAXONE 100 MILLIGRAM(S): 500 INJECTION, POWDER, FOR SOLUTION INTRAMUSCULAR; INTRAVENOUS at 17:23

## 2022-08-30 RX ADMIN — Medication 200 MILLIGRAM(S): at 21:31

## 2022-08-30 RX ADMIN — Medication 100 MILLIGRAM(S): at 11:14

## 2022-08-30 RX ADMIN — MYCOPHENOLATE MOFETIL 1000 MILLIGRAM(S): 250 CAPSULE ORAL at 17:22

## 2022-08-30 RX ADMIN — HEPARIN SODIUM 5000 UNIT(S): 5000 INJECTION INTRAVENOUS; SUBCUTANEOUS at 05:48

## 2022-08-30 RX ADMIN — TACROLIMUS 1 MILLIGRAM(S): 5 CAPSULE ORAL at 08:07

## 2022-08-30 RX ADMIN — Medication 60 MILLIGRAM(S): at 05:48

## 2022-08-30 RX ADMIN — ATORVASTATIN CALCIUM 20 MILLIGRAM(S): 80 TABLET, FILM COATED ORAL at 21:31

## 2022-08-30 NOTE — PROGRESS NOTE ADULT - ASSESSMENT
72 y/o M       with h/o ESRD, , s/p  R renal transplant 2013 ,    DM,  CAD   s/p stents , FRANCOIS  on cpap qHS), HLD, HTN,         presenting with 2 episodes of felt lightheaded and nearly passed out twice while in the bathroom,   states legs gave out; felt generalized weakness;    denies passing out/LOC, denies head trauma.    No   cp/sob //palpitations.  fever./  s/p  fall  at  home       * admitted  with  dizziness/ near syncope,  denies  cp/sob       cxr. with  pulm edema.  acute  diasolic  chf    *  HTN/HLD         on  procardia,  labetolol     *   CAD, 11/2021  on asa/ plavix/ ,lipitor     *   s/p  TAVR, 12/2021      *   DM,         has  insulin   pump, per   house endo      *    CKD         s/p   R kidney transplant  2013,   Dr Vargas  [facility]      *   IDL restrictive/obstructive lung disease,  and   FRANCOIS / cpap        CT chest, ,  ILD     *  Strep   galolyticus,   bacteremia on iv rocephin/  f/p by  ID     *   imaging.  R   renal mass,  in native  kidney  and   transplanted   kidney noted  in rlq,    *  s/p  renal  transplant,,  on  cellcept/ tacrolimus        rpt  bcx,   neagtive / SHABNAM prelim,  is  normal     Urology  eval     *   MRI,   knee/ miild  patellar  effusion.  meniscus  tear,  per  ortho,  no  intervention     house gi  for c/scopy  on  8/39/  s/p  polyp  removed     remains  on iv rocephin/   needs  4  weeks/  med  line per  ID/  till 9/21/22   tsrat   d/c  planning/  and  may   d/c  with hpme care/ ov ab

## 2022-08-30 NOTE — PROGRESS NOTE ADULT - SUBJECTIVE AND OBJECTIVE BOX
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 Endocrinology consulted for diabetes and pump management.     Home diabetes medications:   - Medtronic pump w/ novolog insulin settings  Glucose target   TDD is 16.5 units   12AM-5AM 0.6 units  5AM-12AM 0.7 units    ICR:  12AM-11PM 1:15  11PM-12PM 1:20    ISF 1:50 all day    Endocrinologist is Dr. Jyoti Byrd    Inpatient diabetes medications:   - Insulin pump       INTERVAL HPI/OVERNIGHT EVENTS:    Currently denies polydipsia, polyuria , visual changes, numbness in feet. Tolerating diet well, no complaints today. Pending PICC placement.       Review of systems:   CONSTITUTIONAL:  Feels well, good appetite  CARDIOVASCULAR:  Negative for chest pain or palpitations  RESPIRATORY:  Negative for cough, or SOB   GASTROINTESTINAL:  Negative for nausea, vomiting, or abdominal pain  GENITOURINARY:  Negative frequency, urgency or dysuria     CAPILLARY BLOOD GLUCOSE    CAPILLARY BLOOD GLUCOSE      POCT Blood Glucose.: 92 mg/dL (30 Aug 2022 08:02)  POCT Blood Glucose.: 120 mg/dL (29 Aug 2022 21:02)  POCT Blood Glucose.: 108 mg/dL (29 Aug 2022 16:56)  POCT Blood Glucose.: 92 mg/dL (29 Aug 2022 13:43)  POCT Blood Glucose.: 92 mg/dL (29 Aug 2022 13:43)  POCT Blood Glucose.: 83 mg/dL (29 Aug 2022 08:33)  POCT Blood Glucose.: 83 mg/dL (29 Aug 2022 07:58)  POCT Blood Glucose.: 83 mg/dL (28 Aug 2022 21:37)  POCT Blood Glucose.: 96 mg/dL (28 Aug 2022 16:42)       MEDICATIONS  (STANDING):  acetaminophen   IVPB .. 1000 milliGRAM(s) IV Intermittent once  allopurinol 100 milliGRAM(s) Oral daily  aspirin enteric coated 81 milliGRAM(s) Oral daily  atorvastatin 20 milliGRAM(s) Oral at bedtime  cefTRIAXone   IVPB      cefTRIAXone   IVPB 2000 milliGRAM(s) IV Intermittent every 24 hours  chlorhexidine 2% Cloths 1 Application(s) Topical daily  dextrose 5%. 1000 milliLiter(s) (50 mL/Hr) IV Continuous <Continuous>  dextrose 5%. 1000 milliLiter(s) (100 mL/Hr) IV Continuous <Continuous>  dextrose 50% Injectable 25 Gram(s) IV Push once  dextrose 50% Injectable 12.5 Gram(s) IV Push once  dextrose 50% Injectable 25 Gram(s) IV Push once  enalapril 10 milliGRAM(s) Oral daily  finasteride 5 milliGRAM(s) Oral daily  glucagon  Injectable 1 milliGRAM(s) IntraMuscular once  heparin   Injectable 5000 Unit(s) SubCutaneous every 12 hours  insulin aspart (NovoLOG) Pump 1 Each SubCutaneous Continuous Pump  labetalol 200 milliGRAM(s) Oral three times a day  mycophenolate mofetil 1000 milliGRAM(s) Oral two times a day  NIFEdipine XL 30 milliGRAM(s) Oral at bedtime  NIFEdipine XL 60 milliGRAM(s) Oral daily  sodium chloride 0.9%. 500 milliLiter(s) (30 mL/Hr) IV Continuous <Continuous>  tacrolimus 1.5 milliGRAM(s) Oral <User Schedule>  tacrolimus 1 milliGRAM(s) Oral <User Schedule>    MEDICATIONS  (PRN):  dextrose Oral Gel 15 Gram(s) Oral once PRN Blood Glucose LESS THAN 70 milliGRAM(s)/deciliter      PHYSICAL EXAM  Vital Signs Last 24 Hrs  T(C): 36.8 (30 Aug 2022 04:45), Max: 37.2 (29 Aug 2022 20:44)  T(F): 98.2 (30 Aug 2022 04:45), Max: 98.9 (29 Aug 2022 20:44)  HR: 72 (30 Aug 2022 05:29) (64 - 72)  BP: 147/84 (30 Aug 2022 04:45) (110/45 - 174/81)  BP(mean): --  RR: 18 (30 Aug 2022 04:45) (18 - 20)  SpO2: 95% (30 Aug 2022 05:29) (93% - 99%)    Parameters below as of 30 Aug 2022 04:45  Patient On (Oxygen Delivery Method): room air        GENERAL: Male laying in bed in NAD  Abdomen: Soft, nontender, non distended  Extremities: Warm, no edema in all 4 exts   NEURO: A&O X3    LABS:                        14.4   8.43  )-----------( 200      ( 29 Aug 2022 06:06 )             42.8     08-29    141  |  105  |  10  ----------------------------<  78  4.0   |  20<L>  |  0.48<L>    Ca    9.7      29 Aug 2022 06:06      PT/INR - ( 29 Aug 2022 06:06 )   PT: 13.1 sec;   INR: 1.14 ratio                .HPI:       Patient is a 71 M  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 Endocrinology consulted for diabetes and pump management.     Home diabetes medications:   - Medtronic pump w/ novolog insulin settings  Glucose target   TDD is 16.5 units   12AM-5AM 0.6 units  5AM-12AM 0.7 units    ICR:  12AM-11PM 1:15  11PM-12PM 1:20    ISF 1:50 all day    Endocrinologist is Dr. Jyoti Byrd    Inpatient diabetes medications:   - Insulin pump       INTERVAL HPI/OVERNIGHT EVENTS:    Currently denies polydipsia, polyuria , visual changes, numbness in feet. Tolerating diet well, no complaints today. PICC line placed yesterday afternoon, now awaitng home antibiotics set up before discharg.e       Review of systems:   CONSTITUTIONAL:  Feels well, good appetite  CARDIOVASCULAR:  Negative for chest pain or palpitations  RESPIRATORY:  Negative for cough, or SOB   GASTROINTESTINAL:  Negative for nausea, vomiting, or abdominal pain  GENITOURINARY:  Negative frequency, urgency or dysuria     CAPILLARY BLOOD GLUCOSE    CAPILLARY BLOOD GLUCOSE      POCT Blood Glucose.: 92 mg/dL (30 Aug 2022 08:02)  POCT Blood Glucose.: 120 mg/dL (29 Aug 2022 21:02)  POCT Blood Glucose.: 108 mg/dL (29 Aug 2022 16:56)  POCT Blood Glucose.: 92 mg/dL (29 Aug 2022 13:43)  POCT Blood Glucose.: 92 mg/dL (29 Aug 2022 13:43)  POCT Blood Glucose.: 83 mg/dL (29 Aug 2022 08:33)  POCT Blood Glucose.: 83 mg/dL (29 Aug 2022 07:58)  POCT Blood Glucose.: 83 mg/dL (28 Aug 2022 21:37)  POCT Blood Glucose.: 96 mg/dL (28 Aug 2022 16:42)       MEDICATIONS  (STANDING):  acetaminophen   IVPB .. 1000 milliGRAM(s) IV Intermittent once  allopurinol 100 milliGRAM(s) Oral daily  aspirin enteric coated 81 milliGRAM(s) Oral daily  atorvastatin 20 milliGRAM(s) Oral at bedtime  cefTRIAXone   IVPB      cefTRIAXone   IVPB 2000 milliGRAM(s) IV Intermittent every 24 hours  chlorhexidine 2% Cloths 1 Application(s) Topical daily  dextrose 5%. 1000 milliLiter(s) (50 mL/Hr) IV Continuous <Continuous>  dextrose 5%. 1000 milliLiter(s) (100 mL/Hr) IV Continuous <Continuous>  dextrose 50% Injectable 25 Gram(s) IV Push once  dextrose 50% Injectable 12.5 Gram(s) IV Push once  dextrose 50% Injectable 25 Gram(s) IV Push once  enalapril 10 milliGRAM(s) Oral daily  finasteride 5 milliGRAM(s) Oral daily  glucagon  Injectable 1 milliGRAM(s) IntraMuscular once  heparin   Injectable 5000 Unit(s) SubCutaneous every 12 hours  insulin aspart (NovoLOG) Pump 1 Each SubCutaneous Continuous Pump  labetalol 200 milliGRAM(s) Oral three times a day  mycophenolate mofetil 1000 milliGRAM(s) Oral two times a day  NIFEdipine XL 30 milliGRAM(s) Oral at bedtime  NIFEdipine XL 60 milliGRAM(s) Oral daily  sodium chloride 0.9%. 500 milliLiter(s) (30 mL/Hr) IV Continuous <Continuous>  tacrolimus 1.5 milliGRAM(s) Oral <User Schedule>  tacrolimus 1 milliGRAM(s) Oral <User Schedule>    MEDICATIONS  (PRN):  dextrose Oral Gel 15 Gram(s) Oral once PRN Blood Glucose LESS THAN 70 milliGRAM(s)/deciliter      PHYSICAL EXAM  Vital Signs Last 24 Hrs  T(C): 36.8 (30 Aug 2022 04:45), Max: 37.2 (29 Aug 2022 20:44)  T(F): 98.2 (30 Aug 2022 04:45), Max: 98.9 (29 Aug 2022 20:44)  HR: 72 (30 Aug 2022 05:29) (64 - 72)  BP: 147/84 (30 Aug 2022 04:45) (110/45 - 174/81)  BP(mean): --  RR: 18 (30 Aug 2022 04:45) (18 - 20)  SpO2: 95% (30 Aug 2022 05:29) (93% - 99%)    Parameters below as of 30 Aug 2022 04:45  Patient On (Oxygen Delivery Method): room air        GENERAL: Male laying in bed in NAD  Abdomen: Soft, nontender, non distended  Extremities: Warm, no edema in all 4 exts   NEURO: A&O X3    LABS:                        14.4   8.43  )-----------( 200      ( 29 Aug 2022 06:06 )             42.8     08-29    141  |  105  |  10  ----------------------------<  78  4.0   |  20<L>  |  0.48<L>    Ca    9.7      29 Aug 2022 06:06      PT/INR - ( 29 Aug 2022 06:06 )   PT: 13.1 sec;   INR: 1.14 ratio

## 2022-08-30 NOTE — PROGRESS NOTE ADULT - SUBJECTIVE AND OBJECTIVE BOX
CARDIOLOGY     PROGRESS  NOTE   ________________________________________________    CHIEF COMPLAINT:Patient is a 71y old  Male who presents with a chief complaint of near  syncope (30 Aug 2022 06:28)  no complain.  	  REVIEW OF SYSTEMS:  CONSTITUTIONAL: No fever, weight loss, or fatigue  EYES: No eye pain, visual disturbances, or discharge  ENT:  No difficulty hearing, tinnitus, vertigo; No sinus or throat pain  NECK: No pain or stiffness  RESPIRATORY: No cough, wheezing, chills or hemoptysis; No Shortness of Breath  CARDIOVASCULAR: No chest pain, palpitations, passing out, dizziness, or leg swelling  GASTROINTESTINAL: No abdominal or epigastric pain. No nausea, vomiting, or hematemesis; No diarrhea or constipation. No melena or hematochezia.  GENITOURINARY: No dysuria, frequency, hematuria, or incontinence  NEUROLOGICAL: No headaches, memory loss, loss of strength, numbness, or tremors  SKIN: No itching, burning, rashes, or lesions   LYMPH Nodes: No enlarged glands  ENDOCRINE: No heat or cold intolerance; No hair loss  MUSCULOSKELETAL: No joint pain or swelling; No muscle, back, or extremity pain  PSYCHIATRIC: No depression, anxiety, mood swings, or difficulty sleeping  HEME/LYMPH: No easy bruising, or bleeding gums  ALLERGY AND IMMUNOLOGIC: No hives or eczema	    [ ] All others negative	  [ ] Unable to obtain    PHYSICAL EXAM:  T(C): 36.8 (08-30-22 @ 04:45), Max: 37.2 (08-29-22 @ 20:44)  HR: 72 (08-30-22 @ 05:29) (59 - 72)  BP: 147/84 (08-30-22 @ 04:45) (110/45 - 174/81)  RR: 18 (08-30-22 @ 04:45) (18 - 21)  SpO2: 95% (08-30-22 @ 05:29) (93% - 99%)  Wt(kg): --  I&O's Summary    29 Aug 2022 07:01  -  30 Aug 2022 07:00  --------------------------------------------------------  IN: 0 mL / OUT: 0 mL / NET: 0 mL        Appearance: Normal	  HEENT:   Normal oral mucosa, PERRL, EOMI	  Lymphatic: No lymphadenopathy  Cardiovascular: Normal S1 S2, No JVD, + murmurs, No edema  Respiratory:rhonchi  Psychiatry: A & O x 3, Mood & affect appropriate  Gastrointestinal:  Soft, Non-tender, + BS	  Skin: No rashes, No ecchymoses, No cyanosis	  Neurologic: Non-focal  Extremities: Normal range of motion, No clubbing, cyanosis or edema  Vascular: Peripheral pulses palpable 2+ bilaterally    MEDICATIONS  (STANDING):  acetaminophen   IVPB .. 1000 milliGRAM(s) IV Intermittent once  allopurinol 100 milliGRAM(s) Oral daily  aspirin enteric coated 81 milliGRAM(s) Oral daily  atorvastatin 20 milliGRAM(s) Oral at bedtime  cefTRIAXone   IVPB      cefTRIAXone   IVPB 2000 milliGRAM(s) IV Intermittent every 24 hours  chlorhexidine 2% Cloths 1 Application(s) Topical daily  dextrose 5%. 1000 milliLiter(s) (50 mL/Hr) IV Continuous <Continuous>  dextrose 5%. 1000 milliLiter(s) (100 mL/Hr) IV Continuous <Continuous>  dextrose 50% Injectable 25 Gram(s) IV Push once  dextrose 50% Injectable 12.5 Gram(s) IV Push once  dextrose 50% Injectable 25 Gram(s) IV Push once  enalapril 10 milliGRAM(s) Oral daily  finasteride 5 milliGRAM(s) Oral daily  glucagon  Injectable 1 milliGRAM(s) IntraMuscular once  heparin   Injectable 5000 Unit(s) SubCutaneous every 12 hours  insulin aspart (NovoLOG) Pump 1 Each SubCutaneous Continuous Pump  labetalol 200 milliGRAM(s) Oral three times a day  mycophenolate mofetil 1000 milliGRAM(s) Oral two times a day  NIFEdipine XL 30 milliGRAM(s) Oral at bedtime  NIFEdipine XL 60 milliGRAM(s) Oral daily  sodium chloride 0.9%. 500 milliLiter(s) (30 mL/Hr) IV Continuous <Continuous>  tacrolimus 1.5 milliGRAM(s) Oral <User Schedule>  tacrolimus 1 milliGRAM(s) Oral <User Schedule>      TELEMETRY: 	    ECG:  	  RADIOLOGY:  OTHER: 	  	  LABS:	 	    CARDIAC MARKERS:                                14.4   8.43  )-----------( 200      ( 29 Aug 2022 06:06 )             42.8     08-29    141  |  105  |  10  ----------------------------<  78  4.0   |  20<L>  |  0.48<L>    Ca    9.7      29 Aug 2022 06:06      proBNP:   Lipid Profile:   HgA1c:   TSH:   PT/INR - ( 29 Aug 2022 06:06 )   PT: 13.1 sec;   INR: 1.14 ratio       < from: Colonoscopy (08.29.22 @ 09:43) >  Impression:          - Hemorrhoids found on perianal exam.                       - One 15 mm, non-bleeding polyp in the ascending colon, removed with a hot                        snare. Resected and retrieved. Clip (MR conditional) was placed.                       - A few small, non-bleeding polyps in the sigmoid colon and in the ascending                 colon. Resection not attempted.                       - Diverticulosis in the sigmoid colon and in the ascending colon.  Recommendation:      - Return patient to hospital diop for ongoing care.                       - Await pathology results.                       - Repeat colonoscopy in 6 months for removal of other polyps, suboptimal                        prep. Will need 2-day prep.  < from: Transesophageal Echocardiogram w/o TTE (08.26.22 @ 17:07) >  1. Transcatheter aortic valve replacement.  2. Concentric left ventricular hypertrophy.  3. Normal left ventricular systolic function. No segmental  wall motion abnormalities.  4. Normal right ventricular size and function.  No echocardiographic evidence of endocarditis.      Assessment and plan  ---------------------------  72 y/o M with h/o ESRD (s/p renal transplant 2013), IDDM, CAD (s/p stents), FRANCOIS (on cpap qHS), HLD, HTN, presenting with 2 episodes of felt lightheaded and nearly passed out twice while in the bathroom, to urinate, states legs gave out; felt generalized weakness; denies passing out/LOC, denies head trauma.  No CP/SOB/palpitations.   pt with hx of ashd, s/p stents, AS , s/p TAVR in 2021 with near syncope.  chest x ray noted ?chf vs pneumonia  syncope r/ o conduction disease  tele  check orthostatic  repeat echo noted  ID eval with s/p renal transplant  renal eval  cardiac enzymes  dvt prophylaxis  ct chest noted, no evidence of chf, ILD  continue transplant meds  awaiting blood tests/check orthostatics  + BC, ID eval in view oif hx of transplant on immunosuppressive meds, continue abx  awaiting official SHABNAM report, negative  colonoscopy noted  will increase enalapril dose  if increase bp

## 2022-08-30 NOTE — PROGRESS NOTE ADULT - ASSESSMENT
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 Endocrinology consulted for diabetes and pump management.     1.  DM  - T2DM on insulin pump  - Most recent Hemoglobin A1C 5.4 well controlled   - Current FS ranges from   at goal   - Current inpatient regimen: inslin pump  - Current diet:CHO  - Please monitor blood glucose values TID AC & QHS while eating regular meals and Q6H while NPO  - Blood glucose goals pre-meal less than 140 mg/dL and random blood glucose less than 180 mg/dL  - Recommendations:  - Continue with insulin pump therapy   -Patient signed hospital insulin pump contract   -Insulin and pump orders are in place  - Please call Endocrinology if new AMS, NPO status or planned procedures    Discharge planning:   - Patient will follow up with his endocrinologist Jyoti Byrd  - Continue with home pump therapy at current setting as finger stick well controlled   - Can also continue with home ozempic 0.5 mg weekly     2. HTN  - BP goal 130/80  - Manage per primary team     3. HLD  - Continue with home atorvastatin 20 mg daily   - Manage as outpatient       Thank you for the consult. Will continue to monitor. Contact via pager or [employer] Teams during business hours. On evenings and weekends, please call [phone] or page endocrine fellow on call. Patient can follow up at discharge with [facility] by calling [phone] to make an appointment.     Greater than 50% of the encounter was spent counseling and/or coordination of care.  36 minutes spent on total encounter; The necessity of the time spent during the encounter on this date of service was due to development of plan of care/coordination of care/glycemic control through review of labs, blood glucose values and vital signs with insulin pump.     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 Endocrinology consulted for diabetes and pump management.     1.  DM  - T2DM on insulin pump  - Most recent Hemoglobin A1C 5.4 well controlled   - Current FS ranges from   at goal   - Current inpatient regimen: inslin pump  - Current diet:CHO  - Please monitor blood glucose values TID AC & QHS while eating regular meals and Q6H while NPO  - Blood glucose goals pre-meal less than 140 mg/dL and random blood glucose less than 180 mg/dL  - Recommendations:  - Continue with insulin pump therapy   -Patient signed hospital insulin pump contract   -Insulin and pump orders are in place  - Please call Endocrinology if new AMS, NPO status or planned procedures    Discharge planning:   - Patient will follow up with his endocrinologist Jyoti Byrd  - Continue with home pump therapy at current setting as finger stick well controlled   - Can also continue with home ozempic 0.5 mg weekly     2. HTN  - BP goal 130/80  - Manage per primary team     3. HLD  - Continue with home atorvastatin 20 mg daily   - Manage as outpatient       Thank you for the consult. Will continue to monitor. Contact via pager or [employer] Teams during business hours. On evenings and weekends, please call [phone] or page endocrine fellow on call. Patient can follow up with his endocrinologist after discharge.     Tanisha Sousa MD  Department of Endocrinology, Diabetes and metabolism   Pager [phone]

## 2022-08-30 NOTE — PROGRESS NOTE ADULT - SUBJECTIVE AND OBJECTIVE BOX
[facility]   [phone]  RENAL FOLLOW UP NOTE  --------------------------------------------------------------------------------  Chief Complaint:    24 hour events/subjective:    seen earlier  pt wife at bedside  pt denies chills     PAST HISTORY  --------------------------------------------------------------------------------  No significant changes to PMH, PSH, FHx, SHx, unless otherwise noted    ALLERGIES & MEDICATIONS  --------------------------------------------------------------------------------  Allergies    No Known Allergies    Intolerances      Standing Inpatient Medications  acetaminophen   IVPB .. 1000 milliGRAM(s) IV Intermittent once  allopurinol 100 milliGRAM(s) Oral daily  aspirin enteric coated 81 milliGRAM(s) Oral daily  atorvastatin 20 milliGRAM(s) Oral at bedtime  cefTRIAXone   IVPB      cefTRIAXone   IVPB 2000 milliGRAM(s) IV Intermittent every 24 hours  chlorhexidine 2% Cloths 1 Application(s) Topical daily  dextrose 5%. 1000 milliLiter(s) IV Continuous <Continuous>  dextrose 5%. 1000 milliLiter(s) IV Continuous <Continuous>  dextrose 50% Injectable 25 Gram(s) IV Push once  dextrose 50% Injectable 12.5 Gram(s) IV Push once  dextrose 50% Injectable 25 Gram(s) IV Push once  enalapril 10 milliGRAM(s) Oral daily  finasteride 5 milliGRAM(s) Oral daily  glucagon  Injectable 1 milliGRAM(s) IntraMuscular once  heparin   Injectable 5000 Unit(s) SubCutaneous every 12 hours  insulin aspart (NovoLOG) Pump 1 Each SubCutaneous Continuous Pump  labetalol 200 milliGRAM(s) Oral three times a day  mycophenolate mofetil 1000 milliGRAM(s) Oral two times a day  NIFEdipine XL 30 milliGRAM(s) Oral at bedtime  NIFEdipine XL 60 milliGRAM(s) Oral daily  pneumococcal  23 Vaccine (PNEUMOVAX) 0.5 milliLiter(s) IntraMuscular once  sodium chloride 0.9%. 500 milliLiter(s) IV Continuous <Continuous>  tacrolimus 1.5 milliGRAM(s) Oral <User Schedule>  tacrolimus 1 milliGRAM(s) Oral <User Schedule>    PRN Inpatient Medications  dextrose Oral Gel 15 Gram(s) Oral once PRN      REVIEW OF SYSTEMS  --------------------------------------------------------------------------------    Gen: denies  fevers/chills,  CVS: denies chest pain/palpitations  Resp: denies SOB/Cough  GI: Denies N/V/Abd pain  : Denies dysuria/oliguria/hematuria        VITALS/PHYSICAL EXAM  --------------------------------------------------------------------------------  T(C): 37.1 (08-30-22 @ 20:32), Max: 37.1 (08-30-22 @ 20:32)  HR: 63 (08-30-22 @ 20:32) (60 - 73)  BP: 119/81 (08-30-22 @ 20:32) (119/81 - 162/87)  RR: 18 (08-30-22 @ 20:32) (18 - 18)  SpO2: 96% (08-30-22 @ 20:32) (95% - 100%)  Wt(kg): --  Height (cm): 170.2 (08-29-22 @ 10:18)  Weight (kg): 91.6 (08-29-22 @ 10:18)  BMI (kg/m2): 31.6 (08-29-22 @ 10:18)  BSA (m2): 2.03 (08-29-22 @ 10:18)      08-29-22 @ 07:01  -  08-30-22 @ 07:00  --------------------------------------------------------  IN: 0 mL / OUT: 0 mL / NET: 0 mL    08-30-22 @ 07:01  -  08-30-22 @ 21:20  --------------------------------------------------------  IN: 720 mL / OUT: 0 mL / NET: 720 mL      Physical Exam:  	      Gen: Non toxic comfortable appearing   	no jvd   	Pulm: decrease bs  no rales or ronchi or wheezing  	CV: RRR, S1S2; no rub  	Abd: +BS, soft, nontender/nondistended + RLQ graft site non tender   	: No suprapubic tenderness  	UE: Warm, no cyanosis  no clubbing,  no edema  	LE: Warm, no cyanosis  no clubbing, no edema  	Neuro: alert and oriented. speech coherent       LABS/STUDIES  --------------------------------------------------------------------------------              14.4   8.43  >-----------<  200      [08-29-22 @ 06:06]              42.8     141  |  105  |  10  ----------------------------<  78      [08-29-22 @ 06:06]  4.0   |  20  |  0.48        Ca     9.7     [08-29-22 @ 06:06]      PT/INR: PT 13.1 , INR 1.14       [08-29-22 @ 06:06]      Creatinine Trend:  SCr 0.48 [08-29 @ 06:06]  SCr 0.60 [08-27 @ 06:23]  SCr 0.50 [08-26 @ 06:04]  SCr 0.49 [08-25 @ 06:51]  SCr 0.57 [08-24 @ 07:07]    Tacrolimus (), Serum: 5.3 ng/mL (08-30 @ 06:46)  Tacrolimus (), Serum: 5.4 ng/mL (08-29 @ 06:06)  Tacrolimus (), Serum: 5.6 ng/mL (08-28 @ 05:11)  Tacrolimus (), Serum: 5.1 ng/mL (08-27 @ 06:11)            Urinalysis - [08-23-22 @ 04:41]      Color Yellow / Appearance Clear / SG 1.031 / pH 6.0      Gluc Negative / Ketone Negative  / Bili Negative / Urobili Negative       Blood Negative / Protein 30 mg/dL / Leuk Est Negative / Nitrite Negative      RBC 5 / WBC 1 / Hyaline 1 / Gran  / Sq Epi  / Non Sq Epi 1 / Bacteria Negative

## 2022-08-30 NOTE — PROGRESS NOTE ADULT - SUBJECTIVE AND OBJECTIVE BOX
afberile    REVIEW OF SYSTEMS:  GEN: no fever,    no chills  RESP: no SOB,   no cough  CVS: no chest pain,   no palpitations  GI: no abdominal pain,   no nausea,   no vomiting,   no constipation,   no diarrhea  : no dysuria,   no frequency  NEURO: no headache,   no dizziness  PSYCH: no depression,   not anxious  Derm : no rash    MEDICATIONS  (STANDING):  acetaminophen   IVPB .. 1000 milliGRAM(s) IV Intermittent once  allopurinol 100 milliGRAM(s) Oral daily  aspirin enteric coated 81 milliGRAM(s) Oral daily  atorvastatin 20 milliGRAM(s) Oral at bedtime  cefTRIAXone   IVPB      cefTRIAXone   IVPB 2000 milliGRAM(s) IV Intermittent every 24 hours  chlorhexidine 2% Cloths 1 Application(s) Topical daily  dextrose 5%. 1000 milliLiter(s) (50 mL/Hr) IV Continuous <Continuous>  dextrose 5%. 1000 milliLiter(s) (100 mL/Hr) IV Continuous <Continuous>  dextrose 50% Injectable 25 Gram(s) IV Push once  dextrose 50% Injectable 12.5 Gram(s) IV Push once  dextrose 50% Injectable 25 Gram(s) IV Push once  enalapril 10 milliGRAM(s) Oral daily  finasteride 5 milliGRAM(s) Oral daily  glucagon  Injectable 1 milliGRAM(s) IntraMuscular once  heparin   Injectable 5000 Unit(s) SubCutaneous every 12 hours  insulin aspart (NovoLOG) Pump 1 Each SubCutaneous Continuous Pump  labetalol 200 milliGRAM(s) Oral three times a day  mycophenolate mofetil 1000 milliGRAM(s) Oral two times a day  NIFEdipine XL 30 milliGRAM(s) Oral at bedtime  NIFEdipine XL 60 milliGRAM(s) Oral daily  sodium chloride 0.9%. 500 milliLiter(s) (30 mL/Hr) IV Continuous <Continuous>  tacrolimus 1.5 milliGRAM(s) Oral <User Schedule>  tacrolimus 1 milliGRAM(s) Oral <User Schedule>    MEDICATIONS  (PRN):  dextrose Oral Gel 15 Gram(s) Oral once PRN Blood Glucose LESS THAN 70 milliGRAM(s)/deciliter      Vital Signs Last 24 Hrs  T(C): 36.8 (30 Aug 2022 04:45), Max: 37.2 (29 Aug 2022 20:44)  T(F): 98.2 (30 Aug 2022 04:45), Max: 98.9 (29 Aug 2022 20:44)  HR: 72 (30 Aug 2022 05:29) (59 - 72)  BP: 147/84 (30 Aug 2022 04:45) (110/45 - 174/81)  BP(mean): --  RR: 18 (30 Aug 2022 04:45) (16 - 21)  SpO2: 95% (30 Aug 2022 05:29) (93% - 99%)    Parameters below as of 30 Aug 2022 04:45  Patient On (Oxygen Delivery Method): room air      CAPILLARY BLOOD GLUCOSE      POCT Blood Glucose.: 120 mg/dL (29 Aug 2022 21:02)  POCT Blood Glucose.: 108 mg/dL (29 Aug 2022 16:56)  POCT Blood Glucose.: 92 mg/dL (29 Aug 2022 13:43)  POCT Blood Glucose.: 83 mg/dL (29 Aug 2022 08:33)  POCT Blood Glucose.: 83 mg/dL (29 Aug 2022 07:58)    I&O's Summary    28 Aug 2022 07:01  -  29 Aug 2022 07:00  --------------------------------------------------------  IN: 1320 mL / OUT: 0 mL / NET: 1320 mL    29 Aug 2022 07:01  -  30 Aug 2022 06:29  --------------------------------------------------------  IN: 0 mL / OUT: 0 mL / NET: 0 mL        PHYSICAL EXAM:  HEAD:  Atraumatic, Normocephalic  NECK: Supple, No   JVD  CHEST/LUNG:   no     rales,     no,    rhonchi  HEART: Regular rate and rhythm;         murmur  ABDOMEN: Soft, Nontender, ;   EXTREMITIES:  no      edema  NEUROLOGY:  alert    LABS:                        14.4   8.43  )-----------( 200      ( 29 Aug 2022 06:06 )             42.8     08-29    141  |  105  |  10  ----------------------------<  78  4.0   |  20<L>  |  0.48<L>    Ca    9.7      29 Aug 2022 06:06      PT/INR - ( 29 Aug 2022 06:06 )   PT: 13.1 sec;   INR: 1.14 ratio                                 Consultant(s) Notes Reviewed:      Care Discussed with Consultants/Other Providers:

## 2022-08-30 NOTE — PROGRESS NOTE ADULT - ASSESSMENT
72 y/o M with h/o ESRD s/p renal transplant 2013 LRRT from son , DM, CAD s/p stents , FRANCOIS (on cpap qHS), HLD, HTN, active tobacco user who presented after feeling dizziness, lightheadedness and flushed feeling. dx with strep galolyticus. had recent dental procedure. has been on abx now. had echo on 8/26. in addition had ct scan revealing renal lesion.       1- renal transplant recipient  2- active tobacco user  3- DM   4- HTN   5- cad hx   6- renal lesion right native kidney   7- strep bacteremia      cont prograf 1 mg am and 1.5 mg pm. his tacro level in range. cellcept 1 g bid   renal sono  right native renal lesion  a cyst   rocephin 1 g iv daily  s/p PICC line   labetalol 200 mg tid and procardia xl total 90 mg daily   cont enalapril 10 mg daily    dc planning

## 2022-08-31 ENCOUNTER — TRANSCRIPTION ENCOUNTER (OUTPATIENT)
Age: 72
End: 2022-08-31

## 2022-08-31 VITALS — HEART RATE: 66 BPM | SYSTOLIC BLOOD PRESSURE: 154 MMHG | DIASTOLIC BLOOD PRESSURE: 81 MMHG

## 2022-08-31 LAB
GLUCOSE BLDC GLUCOMTR-MCNC: 102 MG/DL — HIGH (ref 70–99)
GLUCOSE BLDC GLUCOMTR-MCNC: 112 MG/DL — HIGH (ref 70–99)
GLUCOSE BLDC GLUCOMTR-MCNC: 112 MG/DL — HIGH (ref 70–99)
SURGICAL PATHOLOGY STUDY: SIGNIFICANT CHANGE UP
TACROLIMUS SERPL-MCNC: 6 NG/ML — SIGNIFICANT CHANGE UP

## 2022-08-31 PROCEDURE — 82435 ASSAY OF BLOOD CHLORIDE: CPT

## 2022-08-31 PROCEDURE — 88305 TISSUE EXAM BY PATHOLOGIST: CPT

## 2022-08-31 PROCEDURE — 80197 ASSAY OF TACROLIMUS: CPT

## 2022-08-31 PROCEDURE — 82962 GLUCOSE BLOOD TEST: CPT

## 2022-08-31 PROCEDURE — 85014 HEMATOCRIT: CPT

## 2022-08-31 PROCEDURE — 82803 BLOOD GASES ANY COMBINATION: CPT

## 2022-08-31 PROCEDURE — 87641 MR-STAPH DNA AMP PROBE: CPT

## 2022-08-31 PROCEDURE — 83036 HEMOGLOBIN GLYCOSYLATED A1C: CPT

## 2022-08-31 PROCEDURE — 99285 EMERGENCY DEPT VISIT HI MDM: CPT

## 2022-08-31 PROCEDURE — 86803 HEPATITIS C AB TEST: CPT

## 2022-08-31 PROCEDURE — 99232 SBSQ HOSP IP/OBS MODERATE 35: CPT

## 2022-08-31 PROCEDURE — 83605 ASSAY OF LACTIC ACID: CPT

## 2022-08-31 PROCEDURE — 73502 X-RAY EXAM HIP UNI 2-3 VIEWS: CPT

## 2022-08-31 PROCEDURE — 73721 MRI JNT OF LWR EXTRE W/O DYE: CPT

## 2022-08-31 PROCEDURE — 85025 COMPLETE CBC W/AUTO DIFF WBC: CPT

## 2022-08-31 PROCEDURE — 71045 X-RAY EXAM CHEST 1 VIEW: CPT

## 2022-08-31 PROCEDURE — 85027 COMPLETE CBC AUTOMATED: CPT

## 2022-08-31 PROCEDURE — 36415 COLL VENOUS BLD VENIPUNCTURE: CPT

## 2022-08-31 PROCEDURE — 80053 COMPREHEN METABOLIC PANEL: CPT

## 2022-08-31 PROCEDURE — 85610 PROTHROMBIN TIME: CPT

## 2022-08-31 PROCEDURE — 84295 ASSAY OF SERUM SODIUM: CPT

## 2022-08-31 PROCEDURE — 84132 ASSAY OF SERUM POTASSIUM: CPT

## 2022-08-31 PROCEDURE — 82947 ASSAY GLUCOSE BLOOD QUANT: CPT

## 2022-08-31 PROCEDURE — 71250 CT THORAX DX C-: CPT

## 2022-08-31 PROCEDURE — U0005: CPT

## 2022-08-31 PROCEDURE — 82330 ASSAY OF CALCIUM: CPT

## 2022-08-31 PROCEDURE — 0225U NFCT DS DNA&RNA 21 SARSCOV2: CPT

## 2022-08-31 PROCEDURE — 81001 URINALYSIS AUTO W/SCOPE: CPT

## 2022-08-31 PROCEDURE — 87186 SC STD MICRODIL/AGAR DIL: CPT

## 2022-08-31 PROCEDURE — 80202 ASSAY OF VANCOMYCIN: CPT

## 2022-08-31 PROCEDURE — C1751: CPT

## 2022-08-31 PROCEDURE — 87077 CULTURE AEROBIC IDENTIFY: CPT

## 2022-08-31 PROCEDURE — 94660 CPAP INITIATION&MGMT: CPT

## 2022-08-31 PROCEDURE — 96374 THER/PROPH/DIAG INJ IV PUSH: CPT

## 2022-08-31 PROCEDURE — 87040 BLOOD CULTURE FOR BACTERIA: CPT

## 2022-08-31 PROCEDURE — U0003: CPT

## 2022-08-31 PROCEDURE — 87086 URINE CULTURE/COLONY COUNT: CPT

## 2022-08-31 PROCEDURE — 96375 TX/PRO/DX INJ NEW DRUG ADDON: CPT

## 2022-08-31 PROCEDURE — 85730 THROMBOPLASTIN TIME PARTIAL: CPT

## 2022-08-31 PROCEDURE — 93312 ECHO TRANSESOPHAGEAL: CPT

## 2022-08-31 PROCEDURE — 74176 CT ABD & PELVIS W/O CONTRAST: CPT

## 2022-08-31 PROCEDURE — 93320 DOPPLER ECHO COMPLETE: CPT

## 2022-08-31 PROCEDURE — 80048 BASIC METABOLIC PNL TOTAL CA: CPT

## 2022-08-31 PROCEDURE — 87640 STAPH A DNA AMP PROBE: CPT

## 2022-08-31 PROCEDURE — 87150 DNA/RNA AMPLIFIED PROBE: CPT

## 2022-08-31 PROCEDURE — C1889: CPT

## 2022-08-31 PROCEDURE — 82565 ASSAY OF CREATININE: CPT

## 2022-08-31 PROCEDURE — 76775 US EXAM ABDO BACK WALL LIM: CPT

## 2022-08-31 PROCEDURE — 80180 DRUG SCRN QUAN MYCOPHENOLATE: CPT

## 2022-08-31 PROCEDURE — 90732 PPSV23 VACC 2 YRS+ SUBQ/IM: CPT

## 2022-08-31 PROCEDURE — 85018 HEMOGLOBIN: CPT

## 2022-08-31 PROCEDURE — 93325 DOPPLER ECHO COLOR FLOW MAPG: CPT

## 2022-08-31 PROCEDURE — 93306 TTE W/DOPPLER COMPLETE: CPT

## 2022-08-31 PROCEDURE — 36569 INSJ PICC 5 YR+ W/O IMAGING: CPT

## 2022-08-31 RX ADMIN — CHLORHEXIDINE GLUCONATE 1 APPLICATION(S): 213 SOLUTION TOPICAL at 11:32

## 2022-08-31 RX ADMIN — Medication 10 MILLIGRAM(S): at 05:37

## 2022-08-31 RX ADMIN — Medication 81 MILLIGRAM(S): at 12:13

## 2022-08-31 RX ADMIN — HEPARIN SODIUM 5000 UNIT(S): 5000 INJECTION INTRAVENOUS; SUBCUTANEOUS at 17:20

## 2022-08-31 RX ADMIN — Medication 0.5 MILLILITER(S): at 16:38

## 2022-08-31 RX ADMIN — MYCOPHENOLATE MOFETIL 1000 MILLIGRAM(S): 250 CAPSULE ORAL at 05:45

## 2022-08-31 RX ADMIN — TACROLIMUS 1 MILLIGRAM(S): 5 CAPSULE ORAL at 08:32

## 2022-08-31 RX ADMIN — CEFTRIAXONE 100 MILLIGRAM(S): 500 INJECTION, POWDER, FOR SOLUTION INTRAMUSCULAR; INTRAVENOUS at 17:12

## 2022-08-31 RX ADMIN — Medication 200 MILLIGRAM(S): at 14:51

## 2022-08-31 RX ADMIN — Medication 200 MILLIGRAM(S): at 05:45

## 2022-08-31 RX ADMIN — Medication 100 MILLIGRAM(S): at 12:13

## 2022-08-31 RX ADMIN — MYCOPHENOLATE MOFETIL 1000 MILLIGRAM(S): 250 CAPSULE ORAL at 17:17

## 2022-08-31 RX ADMIN — Medication 60 MILLIGRAM(S): at 05:45

## 2022-08-31 RX ADMIN — FINASTERIDE 5 MILLIGRAM(S): 5 TABLET, FILM COATED ORAL at 12:13

## 2022-08-31 RX ADMIN — HEPARIN SODIUM 5000 UNIT(S): 5000 INJECTION INTRAVENOUS; SUBCUTANEOUS at 05:37

## 2022-08-31 NOTE — PROGRESS NOTE ADULT - ASSESSMENT
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 Endocrinology consulted for diabetes and pump management.     1.  DM  - T2DM on insulin pump  - Most recent Hemoglobin A1C 5.4 well controlled   - Current FS ranges from  at goal  - Current inpatient regimen: inslin pump  - Current diet:CHO  - Please monitor blood glucose values TID AC & QHS while eating regular meals and Q6H while NPO  - Blood glucose goals pre-meal less than 140 mg/dL and random blood glucose less than 180 mg/dL  - Recommendations:  - Continue with insulin pump therapy at current setting   - Patient changed infusion site on 8/30, next change will 9/2  -Patient signed hospital insulin pump contract   -Insulin and pump orders are in place  - Please call Endocrinology if new AMS, NPO status or planned procedures    Discharge planning:   - Patient will follow up with his endocrinologist Jyoti Byrd  - Continue with home pump therapy at current setting as finger stick well controlled   - Has adequate insulin pump supplies at home   - Can also continue with home ozempic 0.5 mg weekly     2. HTN  - BP goal 130/80  - Manage per primary team     3. HLD  - Continue with home atorvastatin 20 mg daily   - Manage as outpatient       Thank you for the consult. Will continue to monitor. Contact via pager or [employer] Teams during business hours. On evenings and weekends, please call [phone] or page endocrine fellow on call. Patient can follow up with his endocrinologist after discharge.     Tanisha Sousa MD  Department of Endocrinology, Diabetes and metabolism   Pager [phone]

## 2022-08-31 NOTE — CHART NOTE - NSCHARTNOTEFT_GEN_A_CORE
Called regarding CT and US findings. The ? lesion on CT scan corelates to renal cyst on US which is stable from 2011.  No  intervention at this time.    Can follow up as needed at [facility] [phone].

## 2022-08-31 NOTE — DISCHARGE NOTE NURSING/CASE MANAGEMENT/SOCIAL WORK - NSDCFUADDAPPT_GEN_ALL_CORE_FT
APPTS ARE READY TO BE MADE: [ ] YES    Best Family or Patient Contact (if needed):    Additional Information about above appointments (if needed):    1: Primary care within 1-2 weeks of discharge  2: GI within 6 months to have a repeat colonoscopy  3: Endocrinology with Jyoti Byrd within 1-2 weeks   4:  Infectious Diseases Office at [address] [place] [address] in 2 weeks time.  Office contact number is [phone]    Other comments or requests:

## 2022-08-31 NOTE — PROGRESS NOTE ADULT - SUBJECTIVE AND OBJECTIVE BOX
CARDIOLOGY     PROGRESS  NOTE   ________________________________________________    CHIEF COMPLAINT:Patient is a 71y old  Male who presents with a chief complaint of near  syncope (31 Aug 2022 06:31)  no complain.  	  REVIEW OF SYSTEMS:  CONSTITUTIONAL: No fever, weight loss, or fatigue  EYES: No eye pain, visual disturbances, or discharge  ENT:  No difficulty hearing, tinnitus, vertigo; No sinus or throat pain  NECK: No pain or stiffness  RESPIRATORY: No cough, wheezing, chills or hemoptysis; No Shortness of Breath  CARDIOVASCULAR: No chest pain, palpitations, passing out, dizziness, or leg swelling  GASTROINTESTINAL: No abdominal or epigastric pain. No nausea, vomiting, or hematemesis; No diarrhea or constipation. No melena or hematochezia.  GENITOURINARY: No dysuria, frequency, hematuria, or incontinence  NEUROLOGICAL: No headaches, memory loss, loss of strength, numbness, or tremors  SKIN: No itching, burning, rashes, or lesions   LYMPH Nodes: No enlarged glands  ENDOCRINE: No heat or cold intolerance; No hair loss  MUSCULOSKELETAL: No joint pain or swelling; No muscle, back, or extremity pain  PSYCHIATRIC: No depression, anxiety, mood swings, or difficulty sleeping  HEME/LYMPH: No easy bruising, or bleeding gums  ALLERGY AND IMMUNOLOGIC: No hives or eczema	    [ ] All others negative	  [ ] Unable to obtain    PHYSICAL EXAM:  T(C): 36.7 (08-31-22 @ 05:22), Max: 37.1 (08-30-22 @ 20:32)  HR: 70 (08-31-22 @ 05:22) (60 - 73)  BP: 134/68 (08-31-22 @ 05:22) (119/81 - 162/87)  RR: 18 (08-31-22 @ 05:22) (18 - 18)  SpO2: 99% (08-31-22 @ 05:22) (95% - 100%)  Wt(kg): --  I&O's Summary    30 Aug 2022 07:01  -  31 Aug 2022 07:00  --------------------------------------------------------  IN: 720 mL / OUT: 0 mL / NET: 720 mL        Appearance: Normal	  HEENT:   Normal oral mucosa, PERRL, EOMI	  Lymphatic: No lymphadenopathy  Cardiovascular: Normal S1 S2, No JVD, + murmurs, No edema  Respiratory: rhonchi  Psychiatry: A & O x 3, Mood & affect appropriate  Gastrointestinal:  Soft, Non-tender, + BS	  Skin: No rashes, No ecchymoses, No cyanosis	  Neurologic: Non-focal  Extremities: Normal range of motion, No clubbing, cyanosis or edema  Vascular: Peripheral pulses palpable 2+ bilaterally    MEDICATIONS  (STANDING):  acetaminophen   IVPB .. 1000 milliGRAM(s) IV Intermittent once  allopurinol 100 milliGRAM(s) Oral daily  aspirin enteric coated 81 milliGRAM(s) Oral daily  atorvastatin 20 milliGRAM(s) Oral at bedtime  cefTRIAXone   IVPB      cefTRIAXone   IVPB 2000 milliGRAM(s) IV Intermittent every 24 hours  chlorhexidine 2% Cloths 1 Application(s) Topical daily  dextrose 5%. 1000 milliLiter(s) (100 mL/Hr) IV Continuous <Continuous>  dextrose 5%. 1000 milliLiter(s) (50 mL/Hr) IV Continuous <Continuous>  dextrose 50% Injectable 25 Gram(s) IV Push once  dextrose 50% Injectable 12.5 Gram(s) IV Push once  dextrose 50% Injectable 25 Gram(s) IV Push once  enalapril 10 milliGRAM(s) Oral daily  finasteride 5 milliGRAM(s) Oral daily  glucagon  Injectable 1 milliGRAM(s) IntraMuscular once  heparin   Injectable 5000 Unit(s) SubCutaneous every 12 hours  insulin aspart (NovoLOG) Pump 1 Each SubCutaneous Continuous Pump  labetalol 200 milliGRAM(s) Oral three times a day  mycophenolate mofetil 1000 milliGRAM(s) Oral two times a day  NIFEdipine XL 30 milliGRAM(s) Oral at bedtime  NIFEdipine XL 60 milliGRAM(s) Oral daily  pneumococcal  23 Vaccine (PNEUMOVAX) 0.5 milliLiter(s) IntraMuscular once  sodium chloride 0.9%. 500 milliLiter(s) (30 mL/Hr) IV Continuous <Continuous>  tacrolimus 1.5 milliGRAM(s) Oral <User Schedule>  tacrolimus 1 milliGRAM(s) Oral <User Schedule>      TELEMETRY: 	    ECG:  	  RADIOLOGY:  OTHER: 	  	  LABS:	 	    CARDIAC MARKERS:        Culture - Blood in AM (08.24.22 @ 05:22)    Specimen Source: .Blood Blood-Peripheral    Culture Results:   No Growth Final    Culture - Blood in AM (08.24.22 @ 05:19)    Specimen Source: .Blood Blood-Peripheral    Culture Results:   No Growth Final    proBNP:   Lipid Profile:   HgA1c:   TSH:         Assessment and plan  ---------------------------  72 y/o M with h/o ESRD (s/p renal transplant 2013), IDDM, CAD (s/p stents), FRANCOIS (on cpap qHS), HLD, HTN, presenting with 2 episodes of felt lightheaded and nearly passed out twice while in the bathroom, to urinate, states legs gave out; felt generalized weakness; denies passing out/LOC, denies head trauma.  No CP/SOB/palpitations.   pt with hx of ashd, s/p stents, AS , s/p TAVR in 2021 with near syncope.  chest x ray noted ?chf vs pneumonia  syncope r/ o conduction disease  tele  check orthostatic  repeat echo noted  ID eval with s/p renal transplant  renal eval  cardiac enzymes  dvt prophylaxis  ct chest noted, no evidence of chf, ILD  continue transplant meds  awaiting blood tests/check orthostatics  + BC, ID eval in view of hx of transplant on immunosuppressive meds, continue abx  awaiting official [facility] report, negative  colonoscopy noted  will increase enalapril dose  if increase bp  will discuss with DR Garrido

## 2022-08-31 NOTE — PROGRESS NOTE ADULT - SUBJECTIVE AND OBJECTIVE BOX
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 Endocrinology consulted for diabetes and pump management.     Home diabetes medications:   - Medtronic pump w/ novolog insulin settings  Glucose target   TDD is 16.5 units   12AM-5AM 0.6 units  5AM-12AM 0.7 units    ICR:  12AM-11PM 1:15  11PM-12PM 1:20    ISF 1:50 all day    Endocrinologist is Dr. Jyoti Byrd    Inpatient diabetes medications:   - Insulin pump       INTERVAL HPI/OVERNIGHT EVENTS:  Glucose at goal .   Currently denies polydipsia, polyuria , visual changes, numbness in feet. Tolerating diet well, no complaints today. PICC line in place.       Review of systems:   CONSTITUTIONAL:  Feels well, good appetite  CARDIOVASCULAR:  Negative for chest pain or palpitations  RESPIRATORY:  Negative for cough, or SOB   GASTROINTESTINAL:  Negative for nausea, vomiting, or abdominal pain  GENITOURINARY:  Negative frequency, urgency or dysuria     CAPILLARY BLOOD GLUCOSE  CAPILLARY BLOOD GLUCOSE      POCT Blood Glucose.: 112 mg/dL (31 Aug 2022 07:57)  POCT Blood Glucose.: 143 mg/dL (30 Aug 2022 21:19)  POCT Blood Glucose.: 111 mg/dL (30 Aug 2022 16:32)  POCT Blood Glucose.: 115 mg/dL (30 Aug 2022 11:39)  POCT Blood Glucose.: 92 mg/dL (30 Aug 2022 08:02)  POCT Blood Glucose.: 120 mg/dL (29 Aug 2022 21:02)  POCT Blood Glucose.: 108 mg/dL (29 Aug 2022 16:56)  POCT Blood Glucose.: 92 mg/dL (29 Aug 2022 13:43)  POCT Blood Glucose.: 92 mg/dL (29 Aug 2022 13:43)  POCT Blood Glucose.: 83 mg/dL (29 Aug 2022 08:33)  POCT Blood Glucose.: 83 mg/dL (29 Aug 2022 07:58)  POCT Blood Glucose.: 83 mg/dL (28 Aug 2022 21:37)  POCT Blood Glucose.: 96 mg/dL (28 Aug 2022 16:42)       MEDICATIONS  (STANDING):  acetaminophen   IVPB .. 1000 milliGRAM(s) IV Intermittent once  allopurinol 100 milliGRAM(s) Oral daily  aspirin enteric coated 81 milliGRAM(s) Oral daily  atorvastatin 20 milliGRAM(s) Oral at bedtime  cefTRIAXone   IVPB      cefTRIAXone   IVPB 2000 milliGRAM(s) IV Intermittent every 24 hours  chlorhexidine 2% Cloths 1 Application(s) Topical daily  dextrose 5%. 1000 milliLiter(s) (100 mL/Hr) IV Continuous <Continuous>  dextrose 5%. 1000 milliLiter(s) (50 mL/Hr) IV Continuous <Continuous>  dextrose 50% Injectable 25 Gram(s) IV Push once  dextrose 50% Injectable 12.5 Gram(s) IV Push once  dextrose 50% Injectable 25 Gram(s) IV Push once  enalapril 10 milliGRAM(s) Oral daily  finasteride 5 milliGRAM(s) Oral daily  glucagon  Injectable 1 milliGRAM(s) IntraMuscular once  heparin   Injectable 5000 Unit(s) SubCutaneous every 12 hours  insulin aspart (NovoLOG) Pump 1 Each SubCutaneous Continuous Pump  labetalol 200 milliGRAM(s) Oral three times a day  mycophenolate mofetil 1000 milliGRAM(s) Oral two times a day  NIFEdipine XL 30 milliGRAM(s) Oral at bedtime  NIFEdipine XL 60 milliGRAM(s) Oral daily  pneumococcal  23 Vaccine (PNEUMOVAX) 0.5 milliLiter(s) IntraMuscular once  tacrolimus 1.5 milliGRAM(s) Oral <User Schedule>  tacrolimus 1 milliGRAM(s) Oral <User Schedule>    MEDICATIONS  (PRN):  dextrose Oral Gel 15 Gram(s) Oral once PRN Blood Glucose LESS THAN 70 milliGRAM(s)/deciliter      Physical exam   Vital Signs Last 24 Hrs  T(C): 36.7 (31 Aug 2022 05:22), Max: 37.1 (30 Aug 2022 20:32)  T(F): 98 (31 Aug 2022 05:22), Max: 98.7 (30 Aug 2022 20:32)  HR: 70 (31 Aug 2022 05:22) (60 - 73)  BP: 134/68 (31 Aug 2022 05:22) (119/81 - 162/87)  BP(mean): --  RR: 18 (31 Aug 2022 05:22) (18 - 18)  SpO2: 99% (31 Aug 2022 05:22) (95% - 100%)    Parameters below as of 31 Aug 2022 05:22  Patient On (Oxygen Delivery Method): room air        GENERAL: Male laying in bed in NAD  Abdomen: Soft, nontender, non distended  Extremities: Warm, no edema in all 4 exts   NEURO: A&O X3    LABS:                        14.4   8.43  )-----------( 200      ( 29 Aug 2022 06:06 )             42.8     08-29    141  |  105  |  10  ----------------------------<  78  4.0   |  20<L>  |  0.48<L>    Ca    9.7      29 Aug 2022 06:06      PT/INR - ( 29 Aug 2022 06:06 )   PT: 13.1 sec;   INR: 1.14 ratio                .HPI:       Patient is a 71 M  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 Endocrinology consulted for diabetes and pump management.     Home diabetes medications:   - Medtronic pump w/ novolog insulin settings  Glucose target   TDD is 16.5 units   12AM-5AM 0.6 units  5AM-12AM 0.7 units    ICR:  12AM-11PM 1:15  11PM-12PM 1:20    ISF 1:50 all day    Endocrinologist is Dr. Jyoti Byrd    Inpatient diabetes medications:   - Insulin pump       INTERVAL HPI/OVERNIGHT EVENTS:  Glucose at goal .   Currently denies polydipsia, polyuria , visual changes, numbness in feet. Tolerating diet well, no complaints today. PICC line in place.       Review of systems:   CONSTITUTIONAL:  Feels well, good appetite  CARDIOVASCULAR:  Negative for chest pain or palpitations  RESPIRATORY:  Negative for cough, or SOB   GASTROINTESTINAL:  Negative for nausea, vomiting, or abdominal pain  GENITOURINARY:  Negative frequency, urgency or dysuria     CAPILLARY BLOOD GLUCOSE  CAPILLARY BLOOD GLUCOSE      POCT Blood Glucose.: 112 mg/dL (31 Aug 2022 07:57)  POCT Blood Glucose.: 143 mg/dL (30 Aug 2022 21:19)  POCT Blood Glucose.: 111 mg/dL (30 Aug 2022 16:32)  POCT Blood Glucose.: 115 mg/dL (30 Aug 2022 11:39)  POCT Blood Glucose.: 92 mg/dL (30 Aug 2022 08:02)  POCT Blood Glucose.: 120 mg/dL (29 Aug 2022 21:02)  POCT Blood Glucose.: 108 mg/dL (29 Aug 2022 16:56)  POCT Blood Glucose.: 92 mg/dL (29 Aug 2022 13:43)  POCT Blood Glucose.: 92 mg/dL (29 Aug 2022 13:43)  POCT Blood Glucose.: 83 mg/dL (29 Aug 2022 08:33)  POCT Blood Glucose.: 83 mg/dL (29 Aug 2022 07:58)  POCT Blood Glucose.: 83 mg/dL (28 Aug 2022 21:37)  POCT Blood Glucose.: 96 mg/dL (28 Aug 2022 16:42)       MEDICATIONS  (STANDING):  acetaminophen   IVPB .. 1000 milliGRAM(s) IV Intermittent once  allopurinol 100 milliGRAM(s) Oral daily  aspirin enteric coated 81 milliGRAM(s) Oral daily  atorvastatin 20 milliGRAM(s) Oral at bedtime  cefTRIAXone   IVPB      cefTRIAXone   IVPB 2000 milliGRAM(s) IV Intermittent every 24 hours  chlorhexidine 2% Cloths 1 Application(s) Topical daily  dextrose 5%. 1000 milliLiter(s) (100 mL/Hr) IV Continuous <Continuous>  dextrose 5%. 1000 milliLiter(s) (50 mL/Hr) IV Continuous <Continuous>  dextrose 50% Injectable 25 Gram(s) IV Push once  dextrose 50% Injectable 12.5 Gram(s) IV Push once  dextrose 50% Injectable 25 Gram(s) IV Push once  enalapril 10 milliGRAM(s) Oral daily  finasteride 5 milliGRAM(s) Oral daily  glucagon  Injectable 1 milliGRAM(s) IntraMuscular once  heparin   Injectable 5000 Unit(s) SubCutaneous every 12 hours  insulin aspart (NovoLOG) Pump 1 Each SubCutaneous Continuous Pump  labetalol 200 milliGRAM(s) Oral three times a day  mycophenolate mofetil 1000 milliGRAM(s) Oral two times a day  NIFEdipine XL 30 milliGRAM(s) Oral at bedtime  NIFEdipine XL 60 milliGRAM(s) Oral daily  pneumococcal  23 Vaccine (PNEUMOVAX) 0.5 milliLiter(s) IntraMuscular once  tacrolimus 1.5 milliGRAM(s) Oral <User Schedule>  tacrolimus 1 milliGRAM(s) Oral <User Schedule>    MEDICATIONS  (PRN):  dextrose Oral Gel 15 Gram(s) Oral once PRN Blood Glucose LESS THAN 70 milliGRAM(s)/deciliter      Physical exam   Vital Signs Last 24 Hrs  T(C): 36.7 (31 Aug 2022 05:22), Max: 37.1 (30 Aug 2022 20:32)  T(F): 98 (31 Aug 2022 05:22), Max: 98.7 (30 Aug 2022 20:32)  HR: 70 (31 Aug 2022 05:22) (60 - 73)  BP: 134/68 (31 Aug 2022 05:22) (119/81 - 162/87)  BP(mean): --  RR: 18 (31 Aug 2022 05:22) (18 - 18)  SpO2: 99% (31 Aug 2022 05:22) (95% - 100%)    Parameters below as of 31 Aug 2022 05:22  Patient On (Oxygen Delivery Method): room air          GENERAL: Male laying in bed in NAD  Abdomen: Soft, nontender, non distended  Extremities: Warm, no edema in all 4 exts   NEURO: A&O X3    LABS:                        14.4   8.43  )-----------( 200      ( 29 Aug 2022 06:06 )             42.8     08-29    141  |  105  |  10  ----------------------------<  78  4.0   |  20<L>  |  0.48<L>    Ca    9.7      29 Aug 2022 06:06      PT/INR - ( 29 Aug 2022 06:06 )   PT: 13.1 sec;   INR: 1.14 ratio

## 2022-08-31 NOTE — PROGRESS NOTE ADULT - REASON FOR ADMISSION
near  syncope

## 2022-08-31 NOTE — PROGRESS NOTE ADULT - SUBJECTIVE AND OBJECTIVE BOX
afberile    REVIEW OF SYSTEMS:  GEN: no fever,    no chills  RESP: no SOB,   no cough  CVS: no chest pain,   no palpitations  GI: no abdominal pain,   no nausea,   no vomiting,   no constipation,   no diarrhea  : no dysuria,   no frequency  NEURO: no headache,   no dizziness  PSYCH: no depression,   not anxious  Derm : no rash    MEDICATIONS  (STANDING):  acetaminophen   IVPB .. 1000 milliGRAM(s) IV Intermittent once  allopurinol 100 milliGRAM(s) Oral daily  aspirin enteric coated 81 milliGRAM(s) Oral daily  atorvastatin 20 milliGRAM(s) Oral at bedtime  cefTRIAXone   IVPB      cefTRIAXone   IVPB 2000 milliGRAM(s) IV Intermittent every 24 hours  chlorhexidine 2% Cloths 1 Application(s) Topical daily  dextrose 5%. 1000 milliLiter(s) (100 mL/Hr) IV Continuous <Continuous>  dextrose 5%. 1000 milliLiter(s) (50 mL/Hr) IV Continuous <Continuous>  dextrose 50% Injectable 25 Gram(s) IV Push once  dextrose 50% Injectable 12.5 Gram(s) IV Push once  dextrose 50% Injectable 25 Gram(s) IV Push once  enalapril 10 milliGRAM(s) Oral daily  finasteride 5 milliGRAM(s) Oral daily  glucagon  Injectable 1 milliGRAM(s) IntraMuscular once  heparin   Injectable 5000 Unit(s) SubCutaneous every 12 hours  insulin aspart (NovoLOG) Pump 1 Each SubCutaneous Continuous Pump  labetalol 200 milliGRAM(s) Oral three times a day  mycophenolate mofetil 1000 milliGRAM(s) Oral two times a day  NIFEdipine XL 30 milliGRAM(s) Oral at bedtime  NIFEdipine XL 60 milliGRAM(s) Oral daily  pneumococcal  23 Vaccine (PNEUMOVAX) 0.5 milliLiter(s) IntraMuscular once  sodium chloride 0.9%. 500 milliLiter(s) (30 mL/Hr) IV Continuous <Continuous>  tacrolimus 1 milliGRAM(s) Oral <User Schedule>  tacrolimus 1.5 milliGRAM(s) Oral <User Schedule>    MEDICATIONS  (PRN):  dextrose Oral Gel 15 Gram(s) Oral once PRN Blood Glucose LESS THAN 70 milliGRAM(s)/deciliter      Vital Signs Last 24 Hrs  T(C): 36.7 (31 Aug 2022 05:22), Max: 37.1 (30 Aug 2022 20:32)  T(F): 98 (31 Aug 2022 05:22), Max: 98.7 (30 Aug 2022 20:32)  HR: 70 (31 Aug 2022 05:22) (60 - 73)  BP: 134/68 (31 Aug 2022 05:22) (119/81 - 162/87)  BP(mean): --  RR: 18 (31 Aug 2022 05:22) (18 - 18)  SpO2: 99% (31 Aug 2022 05:22) (95% - 100%)    Parameters below as of 31 Aug 2022 05:22  Patient On (Oxygen Delivery Method): room air      CAPILLARY BLOOD GLUCOSE      POCT Blood Glucose.: 143 mg/dL (30 Aug 2022 21:19)  POCT Blood Glucose.: 111 mg/dL (30 Aug 2022 16:32)  POCT Blood Glucose.: 115 mg/dL (30 Aug 2022 11:39)  POCT Blood Glucose.: 92 mg/dL (30 Aug 2022 08:02)    I&O's Summary    29 Aug 2022 07:01  -  30 Aug 2022 07:00  --------------------------------------------------------  IN: 0 mL / OUT: 0 mL / NET: 0 mL    30 Aug 2022 07:01  -  31 Aug 2022 06:31  --------------------------------------------------------  IN: 720 mL / OUT: 0 mL / NET: 720 mL        PHYSICAL EXAM:  HEAD:  Atraumatic, Normocephalic  NECK: Supple, No   JVD  CHEST/LUNG:   no     rales,     no,    rhonchi  HEART: Regular rate and rhythm;         murmur  ABDOMEN: Soft, Nontender, ;   EXTREMITIES:     no   edema  NEUROLOGY:  alert    LABS:                                  Consultant(s) Notes Reviewed:      Care Discussed with Consultants/Other Providers:

## 2022-08-31 NOTE — PROGRESS NOTE ADULT - PROBLEM SELECTOR PROBLEM 1
Diabetes mellitus type 2, uncomplicated, on long term insulin pump
Kidney transplant recipient

## 2022-08-31 NOTE — DISCHARGE NOTE NURSING/CASE MANAGEMENT/SOCIAL WORK - PATIENT PORTAL LINK FT
You can access the FollowMyHealth Patient Portal offered by [facility] by registering at the following website: [email or web address]. By joining [facility]’s FollowMyHealth portal, you will also be able to view your health information using other applications (apps) compatible with our system.

## 2022-08-31 NOTE — DISCHARGE NOTE NURSING/CASE MANAGEMENT/SOCIAL WORK - NSDCVIVACCINE_GEN_ALL_CORE_FT
No Vaccines Administered. pneumococcal polysaccharide PPV23; 31-Aug-2022 16:38; Angela Silver (BELINDA); [employer] [id number] (Exp. Date: 07-Aug-2023); IntraMuscular; Dorsogluteal Right.; 0.5 milliLiter(s); VIS (VIS Published: 06-Oct-2009, VIS Presented: 31-Aug-2022);

## 2022-08-31 NOTE — PROGRESS NOTE ADULT - ASSESSMENT
70 y/o M       with h/o ESRD, , s/p  R renal transplant 2013 ,    DM,  CAD   s/p stents , FRANCOIS  on cpap qHS), HLD, HTN,         presenting with 2 episodes of felt lightheaded and nearly passed out twice while in the bathroom,   states legs gave out; felt generalized weakness;    denies passing out/LOC, denies head trauma.    No   cp/sob //palpitations.  fever./  s/p  fall  at  home       * admitted  with  dizziness/ near syncope,  denies  cp/sob       cxr. with  pulm edema.  acute  diasolic  chf    *  HTN/HLD         on  procardia,  labetolol     *   CAD, 11/2021  on asa/ plavix/ ,lipitor     *   s/p  TAVR, 12/2021      *   DM,         has  insulin   pump, per   house endo      *    CKD         s/p   R kidney [facility]  2013,   [facility]      *   IDL restrictive/obstructive lung disease,  and   FRANCOIS / cpap        CT chest, ,  ILD     *  Strep   galolyticus,   bacteremia on iv rocephin/  f/p by  ID     *   imaging.  R   renal mass,  in native  kidney  and   transplanted   kidney noted  in rlq,    *  s/p  renal  transplant,,  on  cellcept/ tacrolimus        rpt  bcx,   neagtive / SHABNAM prelim,  is  normal    *   MRI,   knee/ miild  patellar  effusion.  meniscus  tear,  per  ortho,  no  intervention     house gi  for c/scopy  on  8/39/  s/p  polyp  removed       on iv rocephin/   needs  4  weeks/  med  line per  ID/  till 9/21/22       d/c  with hpme care/,  for  iv   ab/  per  care cortn.  may  d/c  today

## 2022-08-31 NOTE — PROGRESS NOTE ADULT - PROVIDER SPECIALTY LIST ADULT
Cardiology
Cardiology
Internal Medicine
Internal Medicine
Nephrology
Transplant ID
Transplant ID
Cardiology
Internal Medicine
Nephrology
Transplant ID
Transplant ID
Cardiology
Cardiology
Internal Medicine
Nephrology
Internal Medicine
Transplant Nephrology
Endocrinology

## 2022-08-31 NOTE — DISCHARGE NOTE NURSING/CASE MANAGEMENT/SOCIAL WORK - NSDCPEFALRISK_GEN_ALL_CORE
For information on Fall & Injury Prevention, visit: [email or web address] OR  [email or web address] OR  https://www.cdc.gov/steadi/patient.html

## 2022-09-05 PROBLEM — J44.9 OAD (OBSTRUCTIVE AIRWAY DISEASE): Status: ACTIVE | Noted: 2022-09-05

## 2022-09-05 NOTE — DISCUSSION/SUMMARY
[FreeTextEntry1] : 72 yo male with hx of FRANCOIS on CPAP, with which he is compliant but complaining of discomfort. Referral for Inspire will be considered.I described the procedure to the patient, and he is aware that he ultimately may not be an appropriate candidate for the procedure after evaluation.He is to continue ICS/LABA as before. A chest xray will be performed. He is to follow up with his PMD as before.

## 2022-09-05 NOTE — HISTORY OF PRESENT ILLNESS
[Former] : former [>= 20 pack years] : >= 20 pack years [TextBox_4] : 70 yo male with hx of FRANCOIS and OAD presents for follow up. The patient is compliant with CPAP use but complains of discomfort. He is interested in Inspire evaluation. He denies sleep related complaints.He denies cough or SOB.He uses breo daily with rare albuterol MDI use. [YearQuit] : 2020 [TextBox_29] : Denies snoring, daytime somnolence, apneic episodes, AM headaches

## 2022-09-06 ENCOUNTER — NON-APPOINTMENT (OUTPATIENT)
Age: 72
End: 2022-09-06

## 2022-09-09 LAB
MYCOPHENOLATE SERPL-MCNC: 4 UG/ML — HIGH (ref 1–3.5)
MYCOPHENOLIC ACID GLUCURONIDE: 41 UG/ML — SIGNIFICANT CHANGE UP (ref 15–125)

## 2022-09-26 ENCOUNTER — APPOINTMENT (OUTPATIENT)
Dept: INFECTIOUS DISEASE | Facility: CLINIC | Age: 72
End: 2022-09-26

## 2022-09-26 VITALS
DIASTOLIC BLOOD PRESSURE: 69 MMHG | WEIGHT: 205 LBS | TEMPERATURE: 97.8 F | HEART RATE: 69 BPM | HEIGHT: 67 IN | BODY MASS INDEX: 32.18 KG/M2 | SYSTOLIC BLOOD PRESSURE: 131 MMHG | OXYGEN SATURATION: 98 %

## 2022-09-26 PROCEDURE — 99213 OFFICE O/P EST LOW 20 MIN: CPT

## 2022-10-02 NOTE — ASSESSMENT
[FreeTextEntry1] : 71 year old male PMH ESRD, s/p renal transplant 2013, DM, CAD s/p stents , FRANCOIS, HLD, HTN, presented to the ID office for follow up. \par \par Admission to [facility] 8/22/22 - 8/31/22 for Streptococcus gallolyticus bacteremia. \par Blood cultures (8/22) with 4/4 Streptococcus gallolyticus. \par \par CT Chest (8/23) with chronic findings of interstitial lung disease. \par CT A/P (8/23) with no acute findings but with Indeterminant 3.1 x 2.5 cm mass within the right native kidney. \par MRI L Knee (8/25) Osteoarthritis with moderate joint effusion. No additional evidence suggestive of infection.\par \par TTE (8/23) without evidence of vegetations. \par SHABNAM (8/26) without evidence of vegetations. \par \par Colonoscopy (8/29) One 15 mm, non-bleeding polyp in the ascending colon (s/p removal) and a few small, non-bleeding polyps in the sigmoid colon and in the ascending colon. \par \par Discharged on 4-week course (from cleared blood cultures) of empiric Ceftriaxone 2g IV Q24H (8/24 -->9/20/22).\par \par --Monitor off of antibiotics\par --Will check surveillance blood cultures next week and routine labwork (ordered)\par \par Followup: 1 month

## 2022-10-02 NOTE — PHYSICAL EXAM
[General Appearance - Alert] : alert [General Appearance - In No Acute Distress] : in no acute distress [Sclera] : the sclera and conjunctiva were normal [Outer Ear] : the ears and nose were normal in appearance [Oropharynx] : the oropharynx was normal with no thrush [Neck Appearance] : the appearance of the neck was normal [Auscultation Breath Sounds / Voice Sounds] : lungs were clear to auscultation bilaterally [Heart Rate And Rhythm] : heart rate was normal and rhythm regular [Heart Sounds] : normal S1 and S2 [Heart Sounds Gallop] : no gallops [Murmurs] : no murmurs [Heart Sounds Pericardial Friction Rub] : no pericardial rub [Edema] : there was no peripheral edema [Bowel Sounds] : normal bowel sounds [Abdomen Soft] : soft [Abdomen Tenderness] : non-tender [] : no hepato-splenomegaly [Abdomen Mass (___ Cm)] : no abdominal mass palpated [No Palpable Adenopathy] : no palpable adenopathy [Musculoskeletal - Swelling] : no joint swelling [Nail Clubbing] : no clubbing  or cyanosis of the fingernails [Motor Tone] : muscle strength and tone were normal [Skin Lesions] : no skin lesions [Affect] : the affect was normal

## 2022-10-02 NOTE — HISTORY OF PRESENT ILLNESS
[FreeTextEntry1] : 71 year old male PMH ESRD, s/p renal transplant 2013, DM, CAD s/p stents , FRANCOIS, HLD, HTN, presented to the ID office for follow up. Of note, recent admission to [facility] 8/22/22 - 8/31/22 for Streptococcus gallolyticus bacteremia. Blood cultures (8/22) with 4/4 Streptococcus gallolyticus. CT Chest (8/23) with chronic findings of interstitial lung disease. CT A/P (8/23) with no acute findings but with Indeterminant 3.1 x 2.5 cm mass within the right native kidney. TTE (8/23) without evidence of vegetations. SHABNAM (8/26) without evidence of vegetations. MRI L Knee (8/25) Osteoarthritis with moderate joint effusion. No additional evidence suggestive of infection. Colonoscopy (8/29) One 15 mm, non-bleeding polyp in the ascending colon (s/p removal) and a few small, non-bleeding polyps in the sigmoid colon and in the ascending colon. Discharged on 4-week course (from cleared blood cultures) of empiric Ceftriaxone 2g IV Q24H (8/24 -->9/20/22).\par \par Patient has since completed his IV antibiotics and his line has been removed. Patient denies chills or fevers. No acute complaints. \par

## 2022-10-13 LAB
ALBUMIN SERPL ELPH-MCNC: 4.8 G/DL
ALP BLD-CCNC: 83 U/L
ALT SERPL-CCNC: 9 U/L
ANION GAP SERPL CALC-SCNC: 13 MMOL/L
AST SERPL-CCNC: 15 U/L
BACTERIA BLD CULT: NORMAL
BACTERIA BLD CULT: NORMAL
BASOPHILS # BLD AUTO: 0.06 K/UL
BASOPHILS NFR BLD AUTO: 0.8 %
BILIRUB SERPL-MCNC: 0.8 MG/DL
BUN SERPL-MCNC: 10 MG/DL
CALCIUM SERPL-MCNC: 9.7 MG/DL
CHLORIDE SERPL-SCNC: 104 MMOL/L
CO2 SERPL-SCNC: 23 MMOL/L
CREAT SERPL-MCNC: 0.48 MG/DL
EGFR: 110 ML/MIN/1.73M2
EOSINOPHIL # BLD AUTO: 0.15 K/UL
EOSINOPHIL NFR BLD AUTO: 2.1 %
GLUCOSE SERPL-MCNC: 108 MG/DL
HCT VFR BLD CALC: 43.5 %
HGB BLD-MCNC: 14.5 G/DL
IMM GRANULOCYTES NFR BLD AUTO: 0.4 %
LYMPHOCYTES # BLD AUTO: 1.49 K/UL
LYMPHOCYTES NFR BLD AUTO: 20.9 %
MAN DIFF?: NORMAL
MCHC RBC-ENTMCNC: 31.9 PG
MCHC RBC-ENTMCNC: 33.3 GM/DL
MCV RBC AUTO: 95.8 FL
MONOCYTES # BLD AUTO: 0.68 K/UL
MONOCYTES NFR BLD AUTO: 9.6 %
NEUTROPHILS # BLD AUTO: 4.71 K/UL
NEUTROPHILS NFR BLD AUTO: 66.2 %
PLATELET # BLD AUTO: 198 K/UL
POTASSIUM SERPL-SCNC: 4.1 MMOL/L
PROT SERPL-MCNC: 7.6 G/DL
RBC # BLD: 4.54 M/UL
RBC # FLD: 14 %
SODIUM SERPL-SCNC: 140 MMOL/L
WBC # FLD AUTO: 7.12 K/UL

## 2022-10-19 ENCOUNTER — APPOINTMENT (OUTPATIENT)
Dept: INFECTIOUS DISEASE | Facility: CLINIC | Age: 72
End: 2022-10-19

## 2022-11-30 ENCOUNTER — APPOINTMENT (OUTPATIENT)
Dept: INFECTIOUS DISEASE | Facility: CLINIC | Age: 72
End: 2022-11-30

## 2022-11-30 VITALS
HEART RATE: 65 BPM | HEIGHT: 67 IN | TEMPERATURE: 97.5 F | OXYGEN SATURATION: 98 % | BODY MASS INDEX: 32.18 KG/M2 | SYSTOLIC BLOOD PRESSURE: 156 MMHG | DIASTOLIC BLOOD PRESSURE: 76 MMHG | WEIGHT: 205 LBS

## 2022-11-30 DIAGNOSIS — R78.81 BACTEREMIA: ICD-10-CM

## 2022-11-30 PROCEDURE — 99213 OFFICE O/P EST LOW 20 MIN: CPT

## 2022-12-03 NOTE — ASSESSMENT
[FreeTextEntry1] : 72 year old male PMH ESRD, s/p renal transplant 2013, DM, CAD s/p stents , FRANCOIS, HLD, HTN, presented to the ID office for follow up. \par \par Admission to [facility] 8/22/22 - 8/31/22 for Streptococcus gallolyticus bacteremia. \par Blood cultures (8/22) with 4/4 Streptococcus gallolyticus. \par \par CT Chest (8/23) with chronic findings of interstitial lung disease. \par CT A/P (8/23) with no acute findings but with Indeterminant 3.1 x 2.5 cm mass within the right native kidney. \par MRI L Knee (8/25) Osteoarthritis with moderate joint effusion. No additional evidence suggestive of infection.\par \par TTE (8/23) without evidence of vegetations. \par SHABNAM (8/26) without evidence of vegetations. \par \par Colonoscopy (8/29) One 15 mm, non-bleeding polyp in the ascending colon (s/p removal) and a few small, non-bleeding polyps in the sigmoid colon and in the ascending colon. \par \par Discharged on 4-week course (from cleared blood cultures) of empiric Ceftriaxone 2g IV Q24H (8/24 -->9/20/22).\par \par --Monitor off of antibiotics\par --Will check CBC, CMP and surveillance blood cultures\par \par Followup: PRN

## 2022-12-03 NOTE — HISTORY OF PRESENT ILLNESS
[FreeTextEntry1] : 72 year old male PMH ESRD, s/p renal transplant 2013, DM, CAD s/p stents , FRANCOIS, HLD, HTN, presented to the ID office for follow up. Of note, recent admission to [facility] 8/22/22 - 8/31/22 for Streptococcus gallolyticus bacteremia. Blood cultures (8/22) with 4/4 Streptococcus gallolyticus. CT Chest (8/23) with chronic findings of interstitial lung disease. CT A/P (8/23) with no acute findings but with Indeterminant 3.1 x 2.5 cm mass within the right native kidney. TTE (8/23) without evidence of vegetations. SHABNAM (8/26) without evidence of vegetations. MRI L Knee (8/25) Osteoarthritis with moderate joint effusion. No additional evidence suggestive of infection. Colonoscopy (8/29) One 15 mm, non-bleeding polyp in the ascending colon (s/p removal) and a few small, non-bleeding polyps in the sigmoid colon and in the ascending colon. Discharged on 4-week course (from cleared blood cultures) of empiric Ceftriaxone 2g IV Q24H (8/24 -->9/20/22).\par \par Notes respiratory illness ~2 weeks ago. Was prescribed Augmentin and corticosteroid course. Notes resolution of cough and dyspnea. Denies chills or fevers. \par

## 2022-12-08 ENCOUNTER — APPOINTMENT (OUTPATIENT)
Dept: PULMONOLOGY | Facility: CLINIC | Age: 72
End: 2022-12-08

## 2022-12-08 VITALS
HEART RATE: 79 BPM | TEMPERATURE: 96.9 F | SYSTOLIC BLOOD PRESSURE: 150 MMHG | BODY MASS INDEX: 31.64 KG/M2 | OXYGEN SATURATION: 97 % | DIASTOLIC BLOOD PRESSURE: 66 MMHG | WEIGHT: 202 LBS

## 2022-12-08 DIAGNOSIS — J98.4 EMPHYSEMA, UNSPECIFIED: ICD-10-CM

## 2022-12-08 DIAGNOSIS — J43.9 EMPHYSEMA, UNSPECIFIED: ICD-10-CM

## 2022-12-08 DIAGNOSIS — R93.89 ABNORMAL FINDINGS ON DIAGNOSTIC IMAGING OF OTHER SPECIFIED BODY STRUCTURES: ICD-10-CM

## 2022-12-08 DIAGNOSIS — G47.33 OBSTRUCTIVE SLEEP APNEA (ADULT) (PEDIATRIC): ICD-10-CM

## 2022-12-08 PROCEDURE — 94060 EVALUATION OF WHEEZING: CPT

## 2022-12-08 PROCEDURE — 94729 DIFFUSING CAPACITY: CPT

## 2022-12-08 PROCEDURE — 94727 GAS DIL/WSHOT DETER LNG VOL: CPT

## 2022-12-08 PROCEDURE — 99214 OFFICE O/P EST MOD 30 MIN: CPT | Mod: 25

## 2022-12-11 PROBLEM — G47.33 OSA (OBSTRUCTIVE SLEEP APNEA): Status: ACTIVE | Noted: 2020-08-23

## 2022-12-11 PROBLEM — J43.9 MIXED RESTRICTIVE AND OBSTRUCTIVE LUNG DISEASE: Status: ACTIVE | Noted: 2021-05-14

## 2022-12-11 PROBLEM — R93.89 ABNORMAL CT OF THE CHEST: Status: ACTIVE | Noted: 2018-11-11

## 2022-12-11 NOTE — HISTORY OF PRESENT ILLNESS
[Former] : former [>= 20 pack years] : >= 20 pack years [Current] : current [TextBox_4] : 73 yo male with hx of FRANCOIS and OAD presents for follow up. The patient is compliant with CPAP use but continues to complain of mask discomfort. He is interested in Inspire. He vapes PRN. He denies cough or SOB. Last month he was treated with oral steroids and antibiotics for productive cough. He now uses montelukast daily with PRN breo and albuterol. He complete one month of antibiotics for bacteremia. [YearQuit] : 2020 [TextBox_29] : Denies snoring, daytime somnolence, apneic episodes, AM headaches

## 2022-12-11 NOTE — DISCUSSION/SUMMARY
[FreeTextEntry1] : 71 yo male with hx of FRANCOIS and OAD at baseline.The PFT results were reviewed with the patient. He is to continue breo and montelukast daily with PRN albuterol MDI. Cessation of vaping was again discussed. Continued compliance with CPAP use was stressed. He again requested referral for Inspire.He will be referred but was told that he may not be a candidate after evaluation. Follow up with his PMD as before was recommended.

## 2022-12-11 NOTE — REVIEW OF SYSTEMS
[Diabetes] : diabetes [Negative] : Psychiatric [Fatigue] : no fatigue [Cough] : no cough [Sputum] : no sputum [SOB on Exertion] : no sob on exertion [TextBox_3] : Silvana

## 2022-12-19 NOTE — PATIENT PROFILE ADULT - FUNCTIONAL ASSESSMENT - DAILY ACTIVITY 3.
OFFICE VISIT  NOTE  [facility] CARDIOLOGY [place] INT GEN      Name: Naveen Lugo    Date: 2022  MRN:  [id number]  :  1955      REFERRING/PRIMARY PROVIDER:  Cielo Dodd PA-C    Chief Complaint   Patient presents with   • Coronary artery disease of native artery of native heart wi       HPI: Naveen Lugo is a 67 y.o. male who presents today for follow-up for CAD.  History of metastatic prostate cancer with metastases to the bones, new lesion in the lumbar spine started radiation treatment, history of chemotherapy as well.  History of PCI of the LAD and RCA 2018 by Dr. Winston at [facility], with known history of a  of the OM, LAD stress test 2018 showed ischemia in the inferior lateral zone consistent with the .  Doing well recently, no chest pain, Ranexa helped tremendously.  Doing well on aspirin as well.  LFTs normal 2022, prostate cancer stable, now on oral therapy followed by Dr. Rashid.  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  Tolerating atorvastatin without side effects.    Past Medical History:   Diagnosis Date   • Bone cancer (HCC)    • History of radiation therapy 2021    L4 through sacrum, left acetabulum, right pelvis   • Nephrolithiasis    • Opioid use disorder, mild, on maintenance therapy (HCC)    • Prostate cancer (HCC)        Past Surgical History:   Procedure Laterality Date   • CHOLECYSTECTOMY     • CORONARY STENT PLACEMENT   &    • HERNIA REPAIR     • THORACIC DISCECTOMY         Social History     Socioeconomic History   • Marital status:    Tobacco Use   • Smoking status: Every Day     Packs/day: 1.00     Years: 50.00     Pack years: 50.00     Types: Cigarettes   • Smokeless tobacco: Never   Vaping Use   • Vaping Use: Never used   Substance and Sexual Activity   • Alcohol use: Not Currently   • Drug use: Never   • Sexual activity: Defer       Family History    Problem Relation Age of Onset   • Ovarian cancer Sister    • Diabetes Brother    • Heart disease Brother    • Prostate cancer Brother         ROS:   Constitutional no fever,  no weight loss   Skin no rash, no subcutaneous nodules   Otolaryngeal no difficulty swallowing   Cardiovascular See HPI   Pulmonary no cough, no sputum production   Gastrointestinal no constipation, no diarrhea   Genitourinary no dysuria, no hematuria   Hematologic no easy bruisability, no abnormal bleeding   Musculoskeletal no muscle pain   Neurologic no dizziness, no falls         Allergies   Allergen Reactions   • Cymbalta [Duloxetine Hcl] Dizziness   • Gabapentin Nausea Only   • Lyrica [Pregabalin] Nausea And Vomiting and Dizziness         Current Outpatient Medications:   •  abiraterone acetate (ZYTIGA) 500 MG chemo tablet, Take 2 tablets by mouth Daily., Disp: 60 tablet, Rfl: 5  •  aspirin (aspirin) 81 MG EC tablet, Take 1 tablet by mouth Daily., Disp: , Rfl:   •  atorvastatin (LIPITOR) 20 MG tablet, Take 1 tablet by mouth Every Night., Disp: 90 tablet, Rfl: 3  •  calcium carbonate (OS-RONNY) 600 MG tablet, Take 1,200 mg by mouth Daily. Patient to take 1200 mg daily for hypocalcemia., Disp: , Rfl:   •  cloNIDine (CATAPRES) 0.1 MG tablet, Take 1 tablet by mouth 2 (Two) Times a Day As Needed (night sweats) for up to 10 days., Disp: 20 tablet, Rfl: 0  •  dexamethasone (DECADRON) 4 MG tablet, Take 2 tablets oral twice a day for 3 consecutive days beginning the day before chemotherapy and continue for 6 doses., Disp: 12 tablet, Rfl: 5  •  famotidine (PEPCID) 20 MG tablet, TAKE ONE TABLET BY MOUTH EVERY DAY AS NEEDED FOR INDIGESTION OR HEARTBURN, Disp: 30 tablet, Rfl: 3  •  HYDROmorphone (DILAUDID) 8 MG tablet, Take 1 tablet by mouth Every 4 (Four) Hours As Needed for Moderate Pain for up to 30 days., Disp: 180 tablet, Rfl: 0  •  naloxone (NARCAN) 4 MG/0.1ML nasal spray, 1 spray into the nostril(s) as directed by provider As Needed  "(unresponsiveness)., Disp: , Rfl:   •  nitroglycerin (NITROSTAT) 0.4 MG SL tablet, 1 under the tongue as needed for angina, may repeat q5mins for up three doses, Disp: 25 tablet, Rfl: 11  •  omeprazole (priLOSEC) 40 MG capsule, Take 40 mg by mouth Daily., Disp: , Rfl:   •  ondansetron (Zofran) 4 MG tablet, Take 1 tablet by mouth Every 8 (Eight) Hours As Needed for Nausea or Vomiting., Disp: 12 tablet, Rfl: 0  •  oxyCODONE ER (Xtampza ER) 9 MG capsule extended-release 12 hour , Take 1 capsule by mouth Every 12 (Twelve) Hours for 30 days., Disp: 60 each, Rfl: 0  •  potassium phosphate, monobasic, (K-Phos) 500 MG tablet, Take 1 tablet by mouth 4 times a day., Disp: 120 tablet, Rfl: 11  •  predniSONE (DELTASONE) 5 MG tablet, Take 1 tablet by mouth Daily., Disp: 30 tablet, Rfl: 11  •  promethazine-dextromethorphan (PROMETHAZINE-DM) 6.25-15 MG/5ML solution, Take 5 mL by mouth 4 (Four) Times a Day As Needed for Cough., Disp: 150 mL, Rfl: 0  •  ranolazine (Ranexa) 500 MG 12 hr tablet, Take 1 tablet by mouth 2 (Two) Times a Day., Disp: 180 tablet, Rfl: 3  •  relugolix (ORGOVYX) 120 MG tablet tablet, Take 1 tablet by mouth Daily., Disp: 30 tablet, Rfl: 5  •  Sennosides (Senna) 8.6 MG capsule, Take 8.6 mg by mouth 3 (Three) Times a Day., Disp: 84 each, Rfl: 2  •  Trelegy Ellipta 200-62.5-25 MCG/INH inhaler, Inhale 1 puff Daily., Disp: , Rfl:   No current facility-administered medications for this visit.    Facility-Administered Medications Ordered in Other Visits:   •  heparin injection 500 Units, 500 Units, Intravenous, PRN, Ishmael Rashid MD, 500 Units at 06/02/21 0900    Vitals:    12/19/22 1340   BP: 132/82   BP Location: Left arm   Patient Position: Sitting   Cuff Size: Adult   Pulse: 87   SpO2: 98%   Weight: 87.8 kg (193 lb 9.6 oz)   Height: 175.3 cm (69\")     Body mass index is 28.59 kg/m².    PHYSICAL EXAM:    General Appearance:   · well developed  · well nourished  HENT:   · oropharynx moist  · lips not " cyanotic  Neck:  · thyroid not enlarged  · supple  Respiratory:  · no respiratory distress  · normal breath sounds  · no rales  Cardiovascular:  · no jugular venous distention  · regular rhythm  · apical impulse normal  · S1 normal, S2 normal  · no S3, no S4   · no murmur  · no rub, no thrill  · carotid pulses normal; no bruit  · pedal pulses normal  · lower extremity edema: none    Gastrointestinal:   · bowel sounds normal  · non-tender  · no hepatomegaly, no splenomegaly  Musculoskeletal:  · no clubbing of fingers.   · normocephalic, head atraumatic  Skin:   · warm, dry  Psychiatric:  · judgement and insight appropriate  · normal mood and affect    RESULTS:   Procedures    Results for orders placed in visit on 08/20/21    Adult Transthoracic Echo Complete W/ Cont if Necessary Per Protocol    Interpretation Summary  · Left ventricular ejection fraction appears to be 61 - 65%. Left ventricular systolic function is normal.  · Left ventricular wall thickness is consistent with mild concentric hypertrophy.  · Estimated right ventricular systolic pressure from tricuspid regurgitation is normal (<35 mmHg).  · Left ventricular diastolic function was normal.      Labs:  Lab Results   Component Value Date    AST 13 10/13/2022    ALT 10 10/13/2022     No results found for: HGBA1C  No components found for: CREATINININE  eGFR Non  Am   Date Value Ref Range Status   01/11/2022 94 >59 mL/min/1.73 Final   12/14/2021 92 >59 mL/min/1.73 Final   11/16/2021 94 >59 mL/min/1.73 Final     eGFR Non  Amer   Date Value Ref Range Status   02/14/2022 98 >60 mL/min/1.73 Final   04/20/2021 113 >60 mL/min/1.73 Final   12/29/2020 103 >60 mL/min/1.73 Final       Most recent PCP note, imaging tests, and labs reviewed.    ASSESSMENT:  Problem List Items Addressed This Visit        Cardiac and Vasculature    Coronary artery disease of native artery of native heart with stable angina pectoris (HCC) - Primary    Overview     8/20/21  Stress test: Myocardial perfusion imaging indicates a medium-sized infarct located in the lateral wall and inferior wall with mild-to-moderate nolberto-infarct ischemia. Findings consistent with previously known  of the OM. No new areas of ischemia noted from previous stress test in 2019.  8/20/21 Echo: EF 61-65%, structurally normal  2/2018: PCI of the LAD 3.0 x 16 mm Synergy mid LAD, 3.0 x 20 mm Synergy MITZI to the distal RCA by Dr. Navarrete at [facility] left circumflex mild luminal irregularities.  Known  of OM.         Relevant Orders    Lipid Panel    Hyperlipidemia LDL goal <70    Essential hypertension       PLAN:    1.  Stable angina:  History of CAD with stents of the LAD and the RCA, nuclear stress test 4/2018 showed inferolateral ischemia, likely due to OM .    Lexiscan nuclear stress test 8/20/21 consistent with previously known  of OM, no new areas of ischemia.   Echo 8/2021 EF 61-65%, structurally normal  Continue aspirin 81 mg daily  Continue sublingual nitroglycerin as needed  Continue Ranexa for stable angina    The patient was advised to call 911 if chest pain worsens or persist despite nitroglycerin or rest.    Started atorvastatin at last visit, has not had fasting lipid panel yet but LFTs are normal  Fasting lipid panel ordered    2.  Tobacco abuse:  Discussed importance of complete tobacco cessation   Assistance offered, he declined.  He smokes 1 pack/day    3.  CAD:  Continue aspirin 81 mg daily  Continue atorvastatin 20 mg daily  Smoking cessation  Sublingual nitroglycerin      Return to clinic in 12 months, or sooner as needed.    Thank you for the opportunity to share in the care of your patient; please do not hesitate to call me with any questions.     Joni Kahn MD, FACC  Office: [phone] [address] [place] [address]    12/19/22     4 = No assist / stand by assistance

## 2023-01-04 ENCOUNTER — LABORATORY RESULT (OUTPATIENT)
Age: 73
End: 2023-01-04

## 2023-01-10 LAB
ALBUMIN SERPL ELPH-MCNC: 4.5 G/DL
ALP BLD-CCNC: 78 U/L
ALT SERPL-CCNC: 9 U/L
ANION GAP SERPL CALC-SCNC: 12 MMOL/L
APPEARANCE: CLEAR
AST SERPL-CCNC: 15 U/L
BACTERIA BLD CULT: NORMAL
BACTERIA BLD CULT: NORMAL
BASOPHILS # BLD AUTO: 0.06 K/UL
BASOPHILS NFR BLD AUTO: 0.8 %
BILIRUB SERPL-MCNC: 1 MG/DL
BILIRUBIN URINE: NEGATIVE
BLOOD URINE: NEGATIVE
BUN SERPL-MCNC: 13 MG/DL
CALCIUM SERPL-MCNC: 9.5 MG/DL
CHLORIDE SERPL-SCNC: 106 MMOL/L
CMV DNA SPEC QL NAA+PROBE: NOT DETECTED IU/ML
CMVPCR LOG: NOT DETECTED LOG10IU/ML
CO2 SERPL-SCNC: 22 MMOL/L
COLOR: YELLOW
CREAT SERPL-MCNC: 0.52 MG/DL
EGFR: 107 ML/MIN/1.73M2
EOSINOPHIL # BLD AUTO: 0.14 K/UL
EOSINOPHIL NFR BLD AUTO: 1.9 %
GLUCOSE QUALITATIVE U: NEGATIVE
GLUCOSE SERPL-MCNC: 127 MG/DL
HCT VFR BLD CALC: 43.6 %
HGB BLD-MCNC: 14.7 G/DL
IMM GRANULOCYTES NFR BLD AUTO: 0.4 %
KETONES URINE: NEGATIVE
LEUKOCYTE ESTERASE URINE: NEGATIVE
LYMPHOCYTES # BLD AUTO: 1.58 K/UL
LYMPHOCYTES NFR BLD AUTO: 21.3 %
MAN DIFF?: NORMAL
MCHC RBC-ENTMCNC: 32 PG
MCHC RBC-ENTMCNC: 33.7 GM/DL
MCV RBC AUTO: 94.8 FL
MONOCYTES # BLD AUTO: 0.66 K/UL
MONOCYTES NFR BLD AUTO: 8.9 %
NEUTROPHILS # BLD AUTO: 4.96 K/UL
NEUTROPHILS NFR BLD AUTO: 66.7 %
NITRITE URINE: NEGATIVE
PH URINE: 6.5
PLATELET # BLD AUTO: 188 K/UL
POTASSIUM SERPL-SCNC: 4.4 MMOL/L
PROT SERPL-MCNC: 7.2 G/DL
PROTEIN URINE: ABNORMAL
RBC # BLD: 4.6 M/UL
RBC # FLD: 13.7 %
SODIUM SERPL-SCNC: 140 MMOL/L
SPECIFIC GRAVITY URINE: 1.02
TACROLIMUS SERPL-MCNC: 4.8 NG/ML
UROBILINOGEN URINE: NORMAL
WBC # FLD AUTO: 7.43 K/UL

## 2023-03-02 ENCOUNTER — APPOINTMENT (OUTPATIENT)
Dept: GASTROENTEROLOGY | Facility: CLINIC | Age: 73
End: 2023-03-02
Payer: MEDICARE

## 2023-03-02 VITALS
HEART RATE: 74 BPM | SYSTOLIC BLOOD PRESSURE: 165 MMHG | WEIGHT: 202 LBS | TEMPERATURE: 96.9 F | OXYGEN SATURATION: 97 % | BODY MASS INDEX: 31.71 KG/M2 | HEIGHT: 67 IN | DIASTOLIC BLOOD PRESSURE: 70 MMHG

## 2023-03-02 DIAGNOSIS — Z86.010 PERSONAL HISTORY OF COLONIC POLYPS: ICD-10-CM

## 2023-03-02 PROCEDURE — 99214 OFFICE O/P EST MOD 30 MIN: CPT

## 2023-03-02 RX ORDER — POLYETHYLENE GLYCOL 3350 AND ELECTROLYTES WITH LEMON FLAVOR 236; 22.74; 6.74; 5.86; 2.97 G/4L; G/4L; G/4L; G/4L; G/4L
236 POWDER, FOR SOLUTION ORAL
Qty: 1 | Refills: 0 | Status: ACTIVE | COMMUNITY
Start: 2023-03-02 | End: 1900-01-01

## 2023-03-02 NOTE — ASSESSMENT
[FreeTextEntry1] : 72-year-old male\par History of colon polyps due for follow-up examination, suboptimal preparation on last exam\par Looks extremely well, feels well, significant medical history as noted\par \par Plan\par Presurgical testing\par Instructions provided regarding holding Ozempic prior to colonoscopy\par Instructions provided regarding continuing aspirin prior to colonoscopy\par Indications risk benefits and alternatives to repeat colonoscopy reviewed.  Patient agreeable to exam.

## 2023-03-02 NOTE — HISTORY OF PRESENT ILLNESS
[FreeTextEntry1] : 72-year-old male\par Hospitalization over the summer with strep bacteremia\par Suspected colonic source, rule out colon cancer\par Colonoscopy at that time revealing large polyp, suboptimal prep, several smaller polyps left\par Here for follow-up evaluation, scheduled repeat colonoscopy\par Patient reports that his bowel habit is improved considerably since his hospitalization\par Denies any constipation pain or bleeding\par Denies any reflux or difficulty swallowing\par \par Overall patient feeling extremely well\par \par History of diabetes, insulin pump, uses Ozempic\par History of valve replacement, coronary disease, stents, on aspirin no longer on clopidogrel\par History of kidney transplant on post transplant immunosuppression\par \par Social history: Retired, 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

## 2023-04-17 ENCOUNTER — OUTPATIENT (OUTPATIENT)
Dept: OUTPATIENT SERVICES | Facility: HOSPITAL | Age: 73
LOS: 1 days | End: 2023-04-17
Payer: COMMERCIAL

## 2023-04-17 VITALS
OXYGEN SATURATION: 97 % | HEART RATE: 67 BPM | WEIGHT: 210.1 LBS | RESPIRATION RATE: 16 BRPM | HEIGHT: 67 IN | SYSTOLIC BLOOD PRESSURE: 108 MMHG | DIASTOLIC BLOOD PRESSURE: 64 MMHG | TEMPERATURE: 98 F

## 2023-04-17 DIAGNOSIS — Z98.890 OTHER SPECIFIED POSTPROCEDURAL STATES: Chronic | ICD-10-CM

## 2023-04-17 DIAGNOSIS — Z86.010 PERSONAL HISTORY OF COLONIC POLYPS: ICD-10-CM

## 2023-04-17 DIAGNOSIS — G47.33 OBSTRUCTIVE SLEEP APNEA (ADULT) (PEDIATRIC): ICD-10-CM

## 2023-04-17 DIAGNOSIS — Z01.818 ENCOUNTER FOR OTHER PREPROCEDURAL EXAMINATION: ICD-10-CM

## 2023-04-17 DIAGNOSIS — Z94.0 KIDNEY TRANSPLANT STATUS: Chronic | ICD-10-CM

## 2023-04-17 DIAGNOSIS — I77.0 ARTERIOVENOUS FISTULA, ACQUIRED: Chronic | ICD-10-CM

## 2023-04-17 DIAGNOSIS — Z96.41 PRESENCE OF INSULIN PUMP (EXTERNAL) (INTERNAL): Chronic | ICD-10-CM

## 2023-04-17 DIAGNOSIS — Z95.2 PRESENCE OF PROSTHETIC HEART VALVE: Chronic | ICD-10-CM

## 2023-04-17 DIAGNOSIS — E11.9 TYPE 2 DIABETES MELLITUS WITHOUT COMPLICATIONS: ICD-10-CM

## 2023-04-17 LAB
A1C WITH ESTIMATED AVERAGE GLUCOSE RESULT: 5.5 % — SIGNIFICANT CHANGE UP (ref 4–5.6)
ANION GAP SERPL CALC-SCNC: 16 MMOL/L — SIGNIFICANT CHANGE UP (ref 5–17)
BUN SERPL-MCNC: 12 MG/DL — SIGNIFICANT CHANGE UP (ref 7–23)
CALCIUM SERPL-MCNC: 9.7 MG/DL — SIGNIFICANT CHANGE UP (ref 8.4–10.5)
CHLORIDE SERPL-SCNC: 106 MMOL/L — SIGNIFICANT CHANGE UP (ref 96–108)
CO2 SERPL-SCNC: 21 MMOL/L — LOW (ref 22–31)
CREAT SERPL-MCNC: 0.53 MG/DL — SIGNIFICANT CHANGE UP (ref 0.5–1.3)
EGFR: 106 ML/MIN/1.73M2 — SIGNIFICANT CHANGE UP
ESTIMATED AVERAGE GLUCOSE: 111 MG/DL — SIGNIFICANT CHANGE UP (ref 68–114)
GLUCOSE SERPL-MCNC: 115 MG/DL — HIGH (ref 70–99)
HCT VFR BLD CALC: 46.6 % — SIGNIFICANT CHANGE UP (ref 39–50)
HGB BLD-MCNC: 15.4 G/DL — SIGNIFICANT CHANGE UP (ref 13–17)
MCHC RBC-ENTMCNC: 31.2 PG — SIGNIFICANT CHANGE UP (ref 27–34)
MCHC RBC-ENTMCNC: 33 GM/DL — SIGNIFICANT CHANGE UP (ref 32–36)
MCV RBC AUTO: 94.5 FL — SIGNIFICANT CHANGE UP (ref 80–100)
NRBC # BLD: 0 /100 WBCS — SIGNIFICANT CHANGE UP (ref 0–0)
PLATELET # BLD AUTO: 176 K/UL — SIGNIFICANT CHANGE UP (ref 150–400)
POTASSIUM SERPL-MCNC: 3.9 MMOL/L — SIGNIFICANT CHANGE UP (ref 3.5–5.3)
POTASSIUM SERPL-SCNC: 3.9 MMOL/L — SIGNIFICANT CHANGE UP (ref 3.5–5.3)
RBC # BLD: 4.93 M/UL — SIGNIFICANT CHANGE UP (ref 4.2–5.8)
RBC # FLD: 13.3 % — SIGNIFICANT CHANGE UP (ref 10.3–14.5)
SODIUM SERPL-SCNC: 143 MMOL/L — SIGNIFICANT CHANGE UP (ref 135–145)
WBC # BLD: 8.26 K/UL — SIGNIFICANT CHANGE UP (ref 3.8–10.5)
WBC # FLD AUTO: 8.26 K/UL — SIGNIFICANT CHANGE UP (ref 3.8–10.5)

## 2023-04-17 PROCEDURE — 83036 HEMOGLOBIN GLYCOSYLATED A1C: CPT

## 2023-04-17 PROCEDURE — G0463: CPT

## 2023-04-17 PROCEDURE — 36415 COLL VENOUS BLD VENIPUNCTURE: CPT

## 2023-04-17 PROCEDURE — 85027 COMPLETE CBC AUTOMATED: CPT

## 2023-04-17 PROCEDURE — 80048 BASIC METABOLIC PNL TOTAL CA: CPT

## 2023-04-17 NOTE — H&P PST ADULT - PROBLEM SELECTOR PLAN 1
Scheduled for Colonoscopy on 04/24/2023  labs: cbc, bmp, a1c    PST instructions provided. Patient verbalized understanding.     c/w Aspirin 81mg

## 2023-04-17 NOTE — H&P PST ADULT - NSICDXPASTMEDICALHX_GEN_ALL_CORE_FT
PAST MEDICAL HISTORY:  CAD (coronary artery disease) 1 stent    DM (diabetes mellitus), type 2     H/O kidney transplant 2013    HTN (Hypertension), Benign     Hyperlipidemia     FRANCOIS on CPAP     Smoking

## 2023-04-17 NOTE — H&P PST ADULT - HISTORY OF PRESENT ILLNESS
73 y/o M with PMHx of Renal Disease (Previously on HD), s/p R kidney transplant (2013), CAD s/p 1 stent, TAVR (2021), DMT2 on insulin pump (novolog insulin), presents for pre-procedure evaluation. Patient had colonoscopy performed while he was hospitalized in this facility 08/2022 which revealed polyps. Patient is scheduled for follow up Colonoscopy with Dr. Ambrose on 04/24/2023.     73 y/o M with PMHx of Renal Disease (Previously on HD), s/p R kidney transplant (2013), CAD s/p 1 stent, TAVR (2021), DMT2 on insulin pump (novolog insulin), presents for pre-procedure evaluation. Patient had colonoscopy performed while he was hospitalized in this facility 08/2022 which revealed a large polyp and multiple smaller ones per chart review. Patient is scheduled for follow up Colonoscopy with Dr. Ambrose on 04/24/2023.

## 2023-04-17 NOTE — H&P PST ADULT - NSICDXPASTSURGICALHX_GEN_ALL_CORE_FT
PAST SURGICAL HISTORY:  AV fistula     History of transcatheter aortic valve replacement (TAVR)     Insulin pump status     Kidney transplant recipient Rt kidney- 2013    Personal history of spine surgery

## 2023-04-17 NOTE — H&P PST ADULT - PROBLEM SELECTOR PLAN 2
Patient advised, need setting for insulin pump, contact his endocrinologist.  Attestation sheet completed, in chart. Patient reports has been using new insulin pump. Patient advised, need setting for insulin pump, instructed to contact his endocrinologist for settings. Attestation sheet completed, in chart.

## 2023-04-17 NOTE — H&P PST ADULT - ASSESSMENT
DASI score: 4.64 METS  DASI activity: Can walk with his cane at his own pace, Denies any chest pain or dyspnea   Loose teeth or denture: top and bottom dentures, no loose teeth     Mallampati: Class 3

## 2023-04-24 ENCOUNTER — OUTPATIENT (OUTPATIENT)
Dept: OUTPATIENT SERVICES | Facility: HOSPITAL | Age: 73
LOS: 1 days | End: 2023-04-24
Payer: COMMERCIAL

## 2023-04-24 ENCOUNTER — TRANSCRIPTION ENCOUNTER (OUTPATIENT)
Age: 73
End: 2023-04-24

## 2023-04-24 ENCOUNTER — APPOINTMENT (OUTPATIENT)
Dept: GASTROENTEROLOGY | Facility: HOSPITAL | Age: 73
End: 2023-04-24

## 2023-04-24 VITALS
OXYGEN SATURATION: 97 % | HEIGHT: 63 IN | WEIGHT: 205.03 LBS | TEMPERATURE: 98 F | RESPIRATION RATE: 18 BRPM | SYSTOLIC BLOOD PRESSURE: 175 MMHG | DIASTOLIC BLOOD PRESSURE: 81 MMHG | HEART RATE: 72 BPM

## 2023-04-24 VITALS
OXYGEN SATURATION: 97 % | RESPIRATION RATE: 20 BRPM | HEART RATE: 80 BPM | DIASTOLIC BLOOD PRESSURE: 71 MMHG | SYSTOLIC BLOOD PRESSURE: 144 MMHG

## 2023-04-24 DIAGNOSIS — Z94.0 KIDNEY TRANSPLANT STATUS: Chronic | ICD-10-CM

## 2023-04-24 DIAGNOSIS — Z96.41 PRESENCE OF INSULIN PUMP (EXTERNAL) (INTERNAL): Chronic | ICD-10-CM

## 2023-04-24 DIAGNOSIS — Z98.890 OTHER SPECIFIED POSTPROCEDURAL STATES: Chronic | ICD-10-CM

## 2023-04-24 DIAGNOSIS — I77.0 ARTERIOVENOUS FISTULA, ACQUIRED: Chronic | ICD-10-CM

## 2023-04-24 DIAGNOSIS — Z95.2 PRESENCE OF PROSTHETIC HEART VALVE: Chronic | ICD-10-CM

## 2023-04-24 DIAGNOSIS — Z86.010 PERSONAL HISTORY OF COLONIC POLYPS: ICD-10-CM

## 2023-04-24 LAB — GLUCOSE BLDC GLUCOMTR-MCNC: 98 MG/DL — SIGNIFICANT CHANGE UP (ref 70–99)

## 2023-04-24 PROCEDURE — 45378 DIAGNOSTIC COLONOSCOPY: CPT

## 2023-04-24 PROCEDURE — 82962 GLUCOSE BLOOD TEST: CPT

## 2023-04-24 PROCEDURE — G0105: CPT

## 2023-04-24 DEVICE — NET RETRV ROT ROTH 2.5MMX230CM: Type: IMPLANTABLE DEVICE | Status: FUNCTIONAL

## 2023-04-24 RX ORDER — INSULIN ASPART 100 [IU]/ML
0 INJECTION, SOLUTION SUBCUTANEOUS
Refills: 0 | DISCHARGE

## 2023-04-24 RX ORDER — INSULIN ASPART 100 [IU]/ML
10 INJECTION, SOLUTION SUBCUTANEOUS
Qty: 0 | Refills: 0 | DISCHARGE

## 2023-04-24 RX ORDER — INSULIN ASPART 100 [IU]/ML
1 INJECTION, SOLUTION SUBCUTANEOUS
Qty: 0 | Refills: 0 | DISCHARGE

## 2023-04-24 RX ORDER — SODIUM CHLORIDE 9 MG/ML
500 INJECTION INTRAMUSCULAR; INTRAVENOUS; SUBCUTANEOUS
Refills: 0 | Status: COMPLETED | OUTPATIENT
Start: 2023-04-24 | End: 2023-04-24

## 2023-04-24 RX ADMIN — SODIUM CHLORIDE 30 MILLILITER(S): 9 INJECTION INTRAMUSCULAR; INTRAVENOUS; SUBCUTANEOUS at 12:01

## 2023-04-24 NOTE — ASU DISCHARGE PLAN (ADULT/PEDIATRIC) - NS MD DC FALL RISK RISK
For information on Fall & Injury Prevention, visit: [email or web address] OR  [email or web address] OR  https://www.cdc.gov/steadi/patient.html

## 2023-04-24 NOTE — ASU PATIENT PROFILE, ADULT - FALL HARM RISK - UNIVERSAL INTERVENTIONS
Bed in lowest position, wheels locked, appropriate side rails in place/Call bell, personal items and telephone in reach/Instruct patient to call for assistance before getting out of bed or chair/Non-slip footwear when patient is out of bed/[place] to call system/Physically safe environment - no spills, clutter or unnecessary equipment/Purposeful Proactive Rounding/Room/bathroom lighting operational, light cord in reach

## 2023-04-24 NOTE — PRE PROCEDURE NOTE - PRE PROCEDURE EVALUATION
Attending Physician:        Ashu Ambrose MD                    Procedure:    Indication for Procedure: history of polyps  ________________________________________________________  PAST MEDICAL & SURGICAL HISTORY:  HTN (Hypertension), Benign      Hyperlipidemia      Smoking      DM (diabetes mellitus), type 2      FRANCOIS on CPAP      H/O kidney transplant  2013      CAD (coronary artery disease)  1 stent      Kidney transplant recipient  Rt kidney- 2013      AV fistula      History of transcatheter aortic valve replacement (TAVR)      Personal history of spine surgery      Insulin pump status        ALLERGIES:  No Known Allergies    HOME MEDICATIONS:  alfuzosin 10 mg oral tablet, extended release: 1 tab(s) orally once a day  allopurinol 100 mg oral tablet: 1 tab(s) orally once a day at pm  Aspirin Enteric Coated 81 mg oral delayed release tablet: 1 tab(s) orally once a day  atorvastatin 20 mg oral tablet: 1 tab(s) orally once a day (at bedtime)  enalapril 10 mg oral tablet: 1 tab(s) orally once a day  finasteride 5 mg oral tablet: 1 tab(s) orally once a day  insulin aspart: Pump settings  Basal - 18 units  12am - 0.7 u/hr  5am - 0.8 u/hr  IC   12am - 1:10  11pm - 1:15  ISF: 1:50  BG Target   Active insulin time: 4 hrs        labetalol 200 mg oral tablet: 1 tab(s) orally 3 times a day  Myrbetriq 50 mg oral tablet, extended release: 1 tab(s) orally once a day  NIFEdipine 30 mg oral tablet, extended release: 1 tab(s) orally once a day (at bedtime)  NIFEdipine 60 mg oral tablet, extended release: 1 tab(s) orally once a day  NovoLOG 100 units/mL injectable solution: injectable Used with insulin pump  NovoLOG 100 units/mL injectable solution: 10 unit(s) injectable 3 times a day (after meals)  tacrolimus 1 mg oral tablet, extended release: Take orally twice a day 1 tab(s) (in the morning).     **total day dosage of 2.5 mg   tacrolimus 1 mg oral tablet, extended release: 1.5 tab(s) orally once a day (in the evening)    ***total daily dose of 2.5 mg  torsemide 10 mg oral tablet: 1 tab(s) orally every other day    AICD/PPM: [ ] yes   [x ] no    PERTINENT LAB DATA:                      PHYSICAL EXAMINATION:    T(C): --  HR: --  BP: --  RR: --  SpO2: --    Constitutional: NAD  HEENT: PERRLA, EOMI,    Neck:  No JVD  Respiratory: CTAB/L  Cardiovascular: S1 and S2  Gastrointestinal: BS+, soft, NT/ND  Extremities: No peripheral edema  Neurological: A/O x 3, no focal deficits  Psychiatric: Normal mood, normal affect  Skin: No rashes    ASA Class: I [ ]  II [x ]  III [ ]  IV [ ]    COMMENTS:    The patient is a suitable candidate for the planned procedure unless box checked [ ]  No, explain:

## 2023-05-02 RX ORDER — POLYETHYLENE GLYCOL 3350 AND ELECTROLYTES WITH LEMON FLAVOR 236; 22.74; 6.74; 5.86; 2.97 G/4L; G/4L; G/4L; G/4L; G/4L
236 POWDER, FOR SOLUTION ORAL
Qty: 1 | Refills: 0 | Status: ACTIVE | COMMUNITY
Start: 2023-05-02 | End: 1900-01-01

## 2023-05-22 ENCOUNTER — OUTPATIENT (OUTPATIENT)
Dept: OUTPATIENT SERVICES | Facility: HOSPITAL | Age: 73
LOS: 1 days | End: 2023-05-22
Payer: COMMERCIAL

## 2023-05-22 VITALS
OXYGEN SATURATION: 97 % | TEMPERATURE: 98 F | SYSTOLIC BLOOD PRESSURE: 156 MMHG | RESPIRATION RATE: 16 BRPM | WEIGHT: 210.1 LBS | DIASTOLIC BLOOD PRESSURE: 80 MMHG | HEART RATE: 68 BPM | HEIGHT: 67 IN

## 2023-05-22 DIAGNOSIS — Z98.890 OTHER SPECIFIED POSTPROCEDURAL STATES: Chronic | ICD-10-CM

## 2023-05-22 DIAGNOSIS — Z96.41 PRESENCE OF INSULIN PUMP (EXTERNAL) (INTERNAL): Chronic | ICD-10-CM

## 2023-05-22 DIAGNOSIS — Z94.0 KIDNEY TRANSPLANT STATUS: Chronic | ICD-10-CM

## 2023-05-22 DIAGNOSIS — K63.5 POLYP OF COLON: ICD-10-CM

## 2023-05-22 DIAGNOSIS — G47.33 OBSTRUCTIVE SLEEP APNEA (ADULT) (PEDIATRIC): ICD-10-CM

## 2023-05-22 DIAGNOSIS — E11.9 TYPE 2 DIABETES MELLITUS WITHOUT COMPLICATIONS: ICD-10-CM

## 2023-05-22 DIAGNOSIS — I77.0 ARTERIOVENOUS FISTULA, ACQUIRED: Chronic | ICD-10-CM

## 2023-05-22 DIAGNOSIS — Z01.818 ENCOUNTER FOR OTHER PREPROCEDURAL EXAMINATION: ICD-10-CM

## 2023-05-22 DIAGNOSIS — Z95.2 PRESENCE OF PROSTHETIC HEART VALVE: Chronic | ICD-10-CM

## 2023-05-22 DIAGNOSIS — Z86.010 PERSONAL HISTORY OF COLONIC POLYPS: ICD-10-CM

## 2023-05-22 LAB
ANION GAP SERPL CALC-SCNC: 13 MMOL/L — SIGNIFICANT CHANGE UP (ref 5–17)
BUN SERPL-MCNC: 13 MG/DL — SIGNIFICANT CHANGE UP (ref 7–23)
CALCIUM SERPL-MCNC: 9.7 MG/DL — SIGNIFICANT CHANGE UP (ref 8.4–10.5)
CHLORIDE SERPL-SCNC: 104 MMOL/L — SIGNIFICANT CHANGE UP (ref 96–108)
CO2 SERPL-SCNC: 24 MMOL/L — SIGNIFICANT CHANGE UP (ref 22–31)
CREAT SERPL-MCNC: 0.57 MG/DL — SIGNIFICANT CHANGE UP (ref 0.5–1.3)
EGFR: 104 ML/MIN/1.73M2 — SIGNIFICANT CHANGE UP
GLUCOSE SERPL-MCNC: 94 MG/DL — SIGNIFICANT CHANGE UP (ref 70–99)
HCT VFR BLD CALC: 42.2 % — SIGNIFICANT CHANGE UP (ref 39–50)
HGB BLD-MCNC: 14.4 G/DL — SIGNIFICANT CHANGE UP (ref 13–17)
MCHC RBC-ENTMCNC: 31.3 PG — SIGNIFICANT CHANGE UP (ref 27–34)
MCHC RBC-ENTMCNC: 34.1 GM/DL — SIGNIFICANT CHANGE UP (ref 32–36)
MCV RBC AUTO: 91.7 FL — SIGNIFICANT CHANGE UP (ref 80–100)
NRBC # BLD: 0 /100 WBCS — SIGNIFICANT CHANGE UP (ref 0–0)
PLATELET # BLD AUTO: 214 K/UL — SIGNIFICANT CHANGE UP (ref 150–400)
POTASSIUM SERPL-MCNC: 3.8 MMOL/L — SIGNIFICANT CHANGE UP (ref 3.5–5.3)
POTASSIUM SERPL-SCNC: 3.8 MMOL/L — SIGNIFICANT CHANGE UP (ref 3.5–5.3)
RBC # BLD: 4.6 M/UL — SIGNIFICANT CHANGE UP (ref 4.2–5.8)
RBC # FLD: 13.6 % — SIGNIFICANT CHANGE UP (ref 10.3–14.5)
SODIUM SERPL-SCNC: 141 MMOL/L — SIGNIFICANT CHANGE UP (ref 135–145)
WBC # BLD: 9.25 K/UL — SIGNIFICANT CHANGE UP (ref 3.8–10.5)
WBC # FLD AUTO: 9.25 K/UL — SIGNIFICANT CHANGE UP (ref 3.8–10.5)

## 2023-05-22 PROCEDURE — 85027 COMPLETE CBC AUTOMATED: CPT

## 2023-05-22 PROCEDURE — 80048 BASIC METABOLIC PNL TOTAL CA: CPT

## 2023-05-22 PROCEDURE — 83036 HEMOGLOBIN GLYCOSYLATED A1C: CPT

## 2023-05-22 PROCEDURE — G0463: CPT

## 2023-05-22 RX ORDER — TACROLIMUS 5 MG/1
1 CAPSULE ORAL
Qty: 0 | Refills: 0 | DISCHARGE

## 2023-05-22 RX ORDER — TACROLIMUS 5 MG/1
1.5 CAPSULE ORAL
Qty: 0 | Refills: 0 | DISCHARGE

## 2023-05-22 RX ORDER — MIRABEGRON 50 MG/1
1 TABLET, EXTENDED RELEASE ORAL
Qty: 0 | Refills: 0 | DISCHARGE

## 2023-05-22 NOTE — H&P PST ADULT - PATIENT OPTIMIZED PENDING
lab results, last card. note, endocrinology note lab results pending, request last card. note, request last endocrinology note/preop plan for Medtronic glucose pump

## 2023-05-22 NOTE — H&P PST ADULT - HISTORY OF PRESENT ILLNESS
71 y/o M with PMHx of Renal Disease (Previously on HD), s/p R kidney transplant (2013), CAD s/p 1 stent, TAVR (2021), DMT2 on insulin pump (novolog insulin), presents for pre-procedure evaluation. Patient had colonoscopy performed while he was hospitalized in this facility 08/2022 which revealed a large polyp and multiple smaller ones per chart review. Patient is scheduled for follow up Colonoscopy with Dr. Ambrose on 04/24/2023.     71 y/o M with PMHx of Renal Disease (Previously on HD), s/p R kidney transplant (2013), CAD s/p 1 stent, TAVR (2021), DMT2 on insulin pump (novolog insulin), presents for pre-procedure evaluation. Patient had colonoscopy performed while he was hospitalized in this facility 08/2022 which revealed a large polyp and multiple smaller ones per documentation.  He denies abdominal pain, nausea, vomiting, diarrhea, constipation and any change in bowel habits.  Patient is scheduled for follow up Colonoscopy with Dr. Ambrose on 04/24/2023.    He denies any recent infections, fever, chills, chest pain, shortness of breath, cough, wheezing, sore throat and change in taste/smell.   71 y/o M with PMHx of CKD (Previously on HD), s/p R kidney transplant (2013), CAD s/p 1 stent (2021), TAVR (2021), DMT2 on Medtronic insulin pump (novolog insulin) and Medtronic glucose monitor, presents for pre-procedure evaluation. Patient had colonoscopy performed while he was hospitalized in this facility 08/2022 which revealed a large polyp and multiple smaller ones per documentation.  He had a colonoscopy with Dr. Ambrose on 4/24/23, but was poorly prepped, and procedure needs to be repeated.  He denies abdominal pain, nausea, vomiting, diarrhea, constipation and any change in bowel habits.  Patient is scheduled for follow up Colonoscopy with Dr. Ambrose on 6/5/23.    He denies any recent infections, fever, chills, chest pain, shortness of breath, cough, wheezing, sore throat and change in taste/smell.

## 2023-05-22 NOTE — H&P PST ADULT - ASSESSMENT
DASI score: 5.07  DASI activity: ambulates with cane 1-2 blocks, 1 flight (slowly), light/mod housework, ADL's  Loose teeth or denture: + dentures, denies loose teeth  Mallampati III

## 2023-05-22 NOTE — H&P PST ADULT - PROBLEM SELECTOR PLAN 3
Endo booking notified re: Medtronic insulin pump and medtronic glucose monitor.  Preop plan for insulin pump as per Endocrinologist.   Please check fingerstick on admission on the day of the procedure. Endo booking notified re: Medtronic insulin pump and Medtronic glucose monitor.  Preop plan for insulin pump as per Endocrinologist in chart from 4/20/23 (was previously faxed to [facility]).  Insulin pump paperwork completed by pt and is PST chart.   Please check fingerstick on admission on the day of the procedure. Endo booking notified re: Medtronic insulin pump and Medtronic glucose monitor.  Preop plan for insulin pump as per Endocrinologist- note in chart from 4/20/23 (was previously faxed to [facility]).  Insulin pump paperwork completed by pt and is PST chart.   Please check fingerstick on admission on the day of the procedure.

## 2023-05-22 NOTE — H&P PST ADULT - REFUSED REASON
Tobacco cessation discussed, but pt refused, stating that he enjoys smoking and is not interested in quitting.

## 2023-05-22 NOTE — H&P PST ADULT - OTHER CARE PROVIDERS
Michele Cole (nephrologist) [phone]; Endocrinologist- Jyoti Cardona [phone], [facility] Ashlyn dubois

## 2023-05-22 NOTE — H&P PST ADULT - NSICDXPASTSURGICALHX_GEN_ALL_CORE_FT
PAST SURGICAL HISTORY:  AV fistula     History of transcatheter aortic valve replacement (TAVR)     Insulin pump status     Kidney transplant recipient Rt kidney- 2013    Personal history of spine surgery      PAST SURGICAL HISTORY:  AV fistula     H/O colonoscopy     History of transcatheter aortic valve replacement (TAVR)     Insulin pump status     Kidney transplant recipient Rt kidney- 2013    Personal history of spine surgery

## 2023-05-22 NOTE — H&P PST ADULT - PROBLEM SELECTOR PLAN 1
Pt. is scheduled for colonoscopy on 6/5/23.  Preop instructions reviewed, pt verbalized understanding.  Bowel prep instructions as per Dr. Ambrose.  Preop labs drawn today (CBC, BMP, A1C).  Please obtain last cardiology office evaluation note for PST chart (pt. reports he saw Dr. Garrido last week).  Pt. instructed to continue ASA as directed, unless otherwise directed by his cardiologist. Pt. is scheduled for colonoscopy on 6/5/23.  Preop instructions reviewed, pt verbalized understanding.  Bowel prep instructions as per Dr. Ambrose.  Preop labs drawn today (CBC, BMP, A1C).  Please obtain last cardiology office evaluation note for PST chart (pt. reports he saw Dr. Garrido last week).  Pt. instructed to continue ASA as directed, for coronary stent protection. Pt. to confirm plan with cardiologist.

## 2023-05-23 LAB
A1C WITH ESTIMATED AVERAGE GLUCOSE RESULT: 5.5 % — SIGNIFICANT CHANGE UP (ref 4–5.6)
ESTIMATED AVERAGE GLUCOSE: 111 MG/DL — SIGNIFICANT CHANGE UP (ref 68–114)

## 2023-06-05 ENCOUNTER — OUTPATIENT (OUTPATIENT)
Dept: OUTPATIENT SERVICES | Facility: HOSPITAL | Age: 73
LOS: 1 days | End: 2023-06-05
Payer: COMMERCIAL

## 2023-06-05 ENCOUNTER — APPOINTMENT (OUTPATIENT)
Dept: GASTROENTEROLOGY | Facility: HOSPITAL | Age: 73
End: 2023-06-05

## 2023-06-05 ENCOUNTER — RESULT REVIEW (OUTPATIENT)
Age: 73
End: 2023-06-05

## 2023-06-05 ENCOUNTER — TRANSCRIPTION ENCOUNTER (OUTPATIENT)
Age: 73
End: 2023-06-05

## 2023-06-05 VITALS
OXYGEN SATURATION: 98 % | HEART RATE: 75 BPM | DIASTOLIC BLOOD PRESSURE: 68 MMHG | RESPIRATION RATE: 18 BRPM | SYSTOLIC BLOOD PRESSURE: 150 MMHG

## 2023-06-05 VITALS
TEMPERATURE: 98 F | RESPIRATION RATE: 18 BRPM | WEIGHT: 212.08 LBS | HEIGHT: 67 IN | DIASTOLIC BLOOD PRESSURE: 89 MMHG | SYSTOLIC BLOOD PRESSURE: 139 MMHG | OXYGEN SATURATION: 99 % | HEART RATE: 80 BPM

## 2023-06-05 DIAGNOSIS — Z96.41 PRESENCE OF INSULIN PUMP (EXTERNAL) (INTERNAL): Chronic | ICD-10-CM

## 2023-06-05 DIAGNOSIS — Z98.890 OTHER SPECIFIED POSTPROCEDURAL STATES: Chronic | ICD-10-CM

## 2023-06-05 DIAGNOSIS — I77.0 ARTERIOVENOUS FISTULA, ACQUIRED: Chronic | ICD-10-CM

## 2023-06-05 DIAGNOSIS — Z94.0 KIDNEY TRANSPLANT STATUS: Chronic | ICD-10-CM

## 2023-06-05 DIAGNOSIS — Z95.2 PRESENCE OF PROSTHETIC HEART VALVE: Chronic | ICD-10-CM

## 2023-06-05 DIAGNOSIS — Z86.010 PERSONAL HISTORY OF COLONIC POLYPS: ICD-10-CM

## 2023-06-05 LAB — GLUCOSE BLDC GLUCOMTR-MCNC: 92 MG/DL — SIGNIFICANT CHANGE UP (ref 70–99)

## 2023-06-05 PROCEDURE — 45380 COLONOSCOPY AND BIOPSY: CPT | Mod: XS,PT

## 2023-06-05 PROCEDURE — 45385 COLONOSCOPY W/LESION REMOVAL: CPT | Mod: PT

## 2023-06-05 PROCEDURE — 88305 TISSUE EXAM BY PATHOLOGIST: CPT

## 2023-06-05 PROCEDURE — 45385 COLONOSCOPY W/LESION REMOVAL: CPT

## 2023-06-05 PROCEDURE — 88305 TISSUE EXAM BY PATHOLOGIST: CPT | Mod: 26

## 2023-06-05 PROCEDURE — 82962 GLUCOSE BLOOD TEST: CPT

## 2023-06-05 DEVICE — NET RETRV ROT ROTH 2.5MMX230CM: Type: IMPLANTABLE DEVICE | Status: FUNCTIONAL

## 2023-06-05 RX ORDER — MYCOPHENOLATE MOFETIL 250 MG/1
1 CAPSULE ORAL
Qty: 60 | Refills: 0
Start: 2023-06-05 | End: 2023-07-04

## 2023-06-05 NOTE — ASU PATIENT PROFILE, ADULT - NSICDXPASTSURGICALHX_GEN_ALL_CORE_FT
PAST SURGICAL HISTORY:  AV fistula     H/O colonoscopy     History of transcatheter aortic valve replacement (TAVR)     Insulin pump status     Kidney transplant recipient Rt kidney- 2013    Personal history of spine surgery

## 2023-06-05 NOTE — ASU DISCHARGE PLAN (ADULT/PEDIATRIC) - NS MD DC FALL RISK RISK
For information on Fall & Injury Prevention, visit: [email or web address] OR  [email or web address] OR  https://www.cdc.gov/steadi/patient.html

## 2023-06-05 NOTE — ASU PATIENT PROFILE, ADULT - FALL HARM RISK - UNIVERSAL INTERVENTIONS
Bed in lowest position, wheels locked, appropriate side rails in place/Call bell, personal items and telephone in reach/Instruct patient to call for assistance before getting out of bed or chair/Non-slip footwear when patient is out of bed/[place] to call system/Physically safe environment - no spills, clutter or unnecessary equipment/Purposeful Proactive Rounding/Room/bathroom lighting operational, light cord in reach

## 2023-06-05 NOTE — PRE PROCEDURE NOTE - PRE PROCEDURE EVALUATION
Attending Physician:        Ashu Ambrose MD                    Procedure:    Indication for Procedure: history of colon polyps  ________________________________________________________  PAST MEDICAL & SURGICAL HISTORY:  HTN (Hypertension), Benign      Hyperlipidemia      Smoking      DM (diabetes mellitus), type 2      FRANCOIS on CPAP      H/O kidney transplant  2013      CAD (coronary artery disease)  1 stent      Kidney transplant recipient  Rt kidney- 2013      AV fistula      History of transcatheter aortic valve replacement (TAVR)      Personal history of spine surgery      Insulin pump status      H/O colonoscopy        ALLERGIES:  No Known Allergies    HOME MEDICATIONS:  alfuzosin 10 mg oral tablet, extended release: 1 tab(s) orally once a day  allopurinol 100 mg oral tablet: 1 tab(s) orally once a day at pm  Aspirin Enteric Coated 81 mg oral delayed release tablet: 1 tab(s) orally once a day  atorvastatin 20 mg oral tablet: 1 tab(s) orally once a day (at bedtime)  enalapril 10 mg oral tablet: 1 tab(s) orally once a day  finasteride 5 mg oral tablet: 1 tab(s) orally once a day  labetalol 200 mg oral tablet: 1 tab(s) orally 3 times a day  Myrbetriq 50 mg oral tablet, extended release: 1 tab(s) orally once a day  NIFEdipine 30 mg oral tablet, extended release: 1 tab(s) orally once a day (at bedtime)  NIFEdipine 60 mg oral tablet, extended release: 1 tab(s) orally once a day  tacrolimus 1 mg oral tablet, extended release: Take orally twice a day 1 tab(s) (in the morning).     **total day dosage of 2.5 mg   tacrolimus 1 mg oral tablet, extended release: 1.5 tab(s) orally once a day (in the evening)    ***total daily dose of 2.5 mg  torsemide 10 mg oral tablet: 1 tab(s) orally every other day  torsemide 10 mg oral tablet: 1 orally every other day as needed for swelling    AICD/PPM: [ ] yes   [x ] no    PERTINENT LAB DATA:                      PHYSICAL EXAMINATION:    T(C): --  HR: --  BP: --  RR: --  SpO2: --    Constitutional: NAD  HEENT: PERRLA, EOMI,    Neck:  No JVD  Respiratory: CTAB/L  Cardiovascular: S1 and S2  Gastrointestinal: BS+, soft, NT/ND  Extremities: No peripheral edema  Neurological: A/O x 3, no focal deficits  Psychiatric: Normal mood, normal affect  Skin: No rashes    ASA Class: I [ ]  II [x ]  III [ ]  IV [ ]    COMMENTS:    The patient is a suitable candidate for the planned procedure unless box checked [ ]  No, explain:

## 2023-06-08 LAB — SURGICAL PATHOLOGY STUDY: SIGNIFICANT CHANGE UP

## 2023-06-13 ENCOUNTER — NON-APPOINTMENT (OUTPATIENT)
Age: 73
End: 2023-06-13

## 2023-06-22 ENCOUNTER — APPOINTMENT (OUTPATIENT)
Dept: PULMONOLOGY | Facility: CLINIC | Age: 73
End: 2023-06-22
Payer: MEDICARE

## 2023-06-22 VITALS
OXYGEN SATURATION: 97 % | TEMPERATURE: 97.1 F | WEIGHT: 207 LBS | SYSTOLIC BLOOD PRESSURE: 120 MMHG | HEART RATE: 73 BPM | DIASTOLIC BLOOD PRESSURE: 62 MMHG | BODY MASS INDEX: 32.42 KG/M2

## 2023-06-22 PROCEDURE — 99214 OFFICE O/P EST MOD 30 MIN: CPT

## 2023-06-22 RX ORDER — ALBUTEROL SULFATE 90 UG/1
108 (90 BASE) AEROSOL, METERED RESPIRATORY (INHALATION)
Qty: 1 | Refills: 1 | Status: ACTIVE | COMMUNITY
Start: 2023-06-22 | End: 1900-01-01

## 2023-06-22 RX ORDER — MONTELUKAST 10 MG/1
10 TABLET, FILM COATED ORAL
Qty: 90 | Refills: 1 | Status: ACTIVE | COMMUNITY
Start: 2023-06-22 | End: 1900-01-01

## 2023-06-23 NOTE — HISTORY OF PRESENT ILLNESS
[Former] : former [>= 20 pack years] : >= 20 pack years [Current] : current [TextBox_4] : 72-year-old male with history of FRANCOIS and OAD presents for follow-up.  The patient is compliant with CPAP use but complains of significant discomfort.  He is requesting evaluation for inspire.  He denies sleep-related complaints.  His breathing is okay on daily montelukast and occasional Breo and Ventolin use.  He continues to vape on occasion. [YearQuit] : 2020

## 2023-06-30 ENCOUNTER — HOSPITAL ENCOUNTER (EMERGENCY)
Facility: HOSPITAL | Age: 73
Discharge: HOME | End: 2023-06-30
Attending: EMERGENCY MEDICINE
Payer: MEDICARE

## 2023-06-30 ENCOUNTER — APPOINTMENT (EMERGENCY)
Dept: CT IMAGING | Facility: HOSPITAL | Age: 73
End: 2023-06-30
Payer: MEDICARE

## 2023-06-30 VITALS
TEMPERATURE: 97.3 F | DIASTOLIC BLOOD PRESSURE: 88 MMHG | RESPIRATION RATE: 22 BRPM | HEART RATE: 73 BPM | OXYGEN SATURATION: 97 % | SYSTOLIC BLOOD PRESSURE: 148 MMHG

## 2023-06-30 DIAGNOSIS — S09.90XA INJURY OF HEAD, INITIAL ENCOUNTER: Primary | ICD-10-CM

## 2023-06-30 DIAGNOSIS — H10.9 CONJUNCTIVITIS: ICD-10-CM

## 2023-06-30 LAB
ATRIAL RATE: 73 BPM
ATRIAL RATE: 74 BPM
GLUCOSE SERPL-MCNC: 163 MG/DL (ref 65–140)
P AXIS: 65 DEGREES
P AXIS: 66 DEGREES
PR INTERVAL: 154 MS
PR INTERVAL: 154 MS
QRS AXIS: -32 DEGREES
QRS AXIS: -34 DEGREES
QRSD INTERVAL: 92 MS
QRSD INTERVAL: 92 MS
QT INTERVAL: 386 MS
QT INTERVAL: 392 MS
QTC INTERVAL: 425 MS
QTC INTERVAL: 435 MS
T WAVE AXIS: 87 DEGREES
T WAVE AXIS: 88 DEGREES
VENTRICULAR RATE: 73 BPM
VENTRICULAR RATE: 74 BPM

## 2023-06-30 PROCEDURE — 70450 CT HEAD/BRAIN W/O DYE: CPT

## 2023-06-30 PROCEDURE — 99284 EMERGENCY DEPT VISIT MOD MDM: CPT

## 2023-06-30 PROCEDURE — 93005 ELECTROCARDIOGRAM TRACING: CPT

## 2023-06-30 PROCEDURE — 72125 CT NECK SPINE W/O DYE: CPT

## 2023-06-30 PROCEDURE — 99284 EMERGENCY DEPT VISIT MOD MDM: CPT | Performed by: EMERGENCY MEDICINE

## 2023-06-30 PROCEDURE — 82948 REAGENT STRIP/BLOOD GLUCOSE: CPT

## 2023-06-30 PROCEDURE — 93010 ELECTROCARDIOGRAM REPORT: CPT | Performed by: INTERNAL MEDICINE

## 2023-06-30 PROCEDURE — G1004 CDSM NDSC: HCPCS

## 2023-06-30 RX ORDER — ERYTHROMYCIN 5 MG/G
0.5 OINTMENT OPHTHALMIC ONCE
Status: COMPLETED | OUTPATIENT
Start: 2023-06-30 | End: 2023-06-30

## 2023-06-30 RX ORDER — ERYTHROMYCIN 5 MG/G
OINTMENT OPHTHALMIC
Qty: 3.5 G | Refills: 0 | Status: SHIPPED | OUTPATIENT
Start: 2023-06-30 | End: 2023-06-30 | Stop reason: SDUPTHER

## 2023-06-30 RX ORDER — ERYTHROMYCIN 5 MG/G
OINTMENT OPHTHALMIC
Qty: 3.5 G | Refills: 0 | Status: SHIPPED | OUTPATIENT
Start: 2023-06-30 | End: 2023-07-07

## 2023-06-30 RX ADMIN — ERYTHROMYCIN 0.5 INCH: 5 OINTMENT OPHTHALMIC at 01:40

## 2023-06-30 NOTE — ED NOTES
ER Provider at bedside to update and see pt, D/C summary expressed and provided via ER Provider       Vimal Umaña RN  06/30/23 2000

## 2023-06-30 NOTE — ED PROVIDER NOTES
History  Chief Complaint   Patient presents with   • Head Injury     Pt reports tripping and falling 1 hour ago and hit head on sidewalk  Denies loc  -n,v  GCS 15       History provided by:  Patient and relative  Head Injury w/unknown LOC  Location:  Frontal (right)  Time since incident:  1 hour  Mechanism of injury: fall    Fall:     Fall occurred:  Tripped    Height of fall:  From standing    Impact surface:  [place]    Point of impact:  Knees    Entrapped after fall: no    Pain details:     Quality:  Aching    Timing:  Constant  Chronicity:  New  Relieved by:  Nothing  Worsened by:  Nothing  Ineffective treatments:  None tried  Associated symptoms: blurred vision (But not from the fall, pt was having some R eye redness and discharge and thought it was allergies and earlier went to pharmacy for allergy eye drop but hasn't helped  Not related to the fall)    Associated symptoms: no difficulty breathing, no disorientation, no double vision, no focal weakness, no headaches, no hearing loss, no memory loss, no nausea, no neck pain, no tinnitus and no vomiting    Risk factors: aspirin and being elderly    Risk factors comment:  H/o renal transplant      None       Past Medical History:   Diagnosis Date   • Diabetes mellitus ([facility] [address] )    • Hypertension        History reviewed  No pertinent surgical history  History reviewed  No pertinent family history  I have reviewed and agree with the history as documented  E-Cigarette/Vaping     E-Cigarette/Vaping Substances     Social History     Tobacco Use   • Smoking status: Every Day     Packs/day: 0 50     Types: Cigarettes   • Smokeless tobacco: Never   Substance Use Topics   • Alcohol use: Never   • Drug use: Never       Review of Systems   HENT: Negative for hearing loss and tinnitus      Eyes: Positive for blurred vision (But not from the fall, pt was having some R eye redness and discharge and thought it was allergies and earlier went to pharmacy for allergy eye drop but hasn't helped  Not related to the fall)  Negative for double vision  Gastrointestinal: Negative for nausea and vomiting  Musculoskeletal: Negative for neck pain  Neurological: Negative for focal weakness and headaches  Psychiatric/Behavioral: Negative for memory loss  All other systems reviewed and are negative  Physical Exam  Physical Exam  Vitals and nursing note reviewed  Constitutional:       General: He is not in acute distress  Appearance: He is well-developed  He is not diaphoretic  HENT:      Head: Normocephalic and atraumatic  No laceration  Nose: Nose normal       Mouth/Throat:      Mouth: Mucous membranes are moist    Eyes:      General: Lids are normal  Vision grossly intact  No visual field deficit  Right eye: Discharge present  Extraocular Movements: Extraocular movements intact  Conjunctiva/sclera:      Right eye: Right conjunctiva is injected  Exudate present  No chemosis or hemorrhage  Pupils: Pupils are equal, round, and reactive to light  Cardiovascular:      Rate and Rhythm: Normal rate and regular rhythm  Heart sounds: Normal heart sounds  Pulmonary:      Effort: Pulmonary effort is normal  No respiratory distress  Breath sounds: Normal breath sounds  Abdominal:      General: There is no distension  Palpations: Abdomen is soft  Tenderness: There is no abdominal tenderness  Musculoskeletal:         General: Normal range of motion  Cervical back: Normal range of motion and neck supple  No spinous process tenderness or muscular tenderness  Skin:     General: Skin is warm and dry  Neurological:      General: No focal deficit present  Mental Status: He is alert and oriented to person, place, and time  Mental status is at baseline  Cranial Nerves: No cranial nerve deficit  Motor: No weakness     Psychiatric:         Mood and Affect: Mood normal          Vital Signs  ED Triage Vitals [06/30/23 0011]   Temperature Pulse Respirations Blood Pressure SpO2   (!) 97 3 °F (36 3 °C) 78 18 (!) 202/83 98 %      Temp Source Heart Rate Source Patient Position - Orthostatic VS BP Location FiO2 (%)   Tympanic Monitor Sitting Right arm --      Pain Score       --           Vitals:    06/30/23 0011 06/30/23 0015 06/30/23 0030 06/30/23 0100   BP: (!) 202/83 (!) 185/83 (!) 199/90    Pulse: 78 74 72 75   Patient Position - Orthostatic VS: Sitting Lying Lying          Visual Acuity  Visual Acuity    Flowsheet Row Most Recent Value   L Pupil Size (mm) 3   R Pupil Size (mm) 3          ED Medications  Medications   erythromycin (ILOTYCIN) 0 5 % ophthalmic ointment 0 5 inch (has no administration in time range)       Diagnostic Studies  Results Reviewed     Procedure Component Value Units Date/Time    Fingerstick Glucose (POCT) [[id number]]  (Abnormal) Collected: 06/30/23 0025    Lab Status: Final result Updated: 06/30/23 0026     POC Glucose 163 mg/dl                  CT head without contrast    (Results Pending)   CT cervical spine without contrast    (Results Pending)              Procedures  ECG 12 Lead Documentation Only    Date/Time: 6/30/2023 1:11 AM    Performed by: Silviano Holguin MD  Authorized by: Silviano Holguin MD    Indications / Diagnosis:  Fall  ECG reviewed by me, the ED Provider: yes    Patient location:  ED  Rate:     ECG rate:  73    ECG rate assessment: normal    QRS:     QRS axis:  Left  Other findings:     Other findings: LVH               ED Course                                             Medical Decision Making  65yo male is coming in with complaint of a a trip and fall while he was visiting at [facility]  He was using a wheelchair as a walker and accidentally tripped while losing his footing and fell forward  It was witnessed by his family the fall was somewhat braced and he struck his head on the concrete causing a right frontal abrasion    It was a mechanical fall and was not syncopal and he did not lose consciousness  He was able to get up and joke about it afterwards  He did not complain of headache but family brought him here to get checked out because he is on aspirin  Additionally he had for the past 1 to 2 days while visiting here may be from chlorine exposure or the air was having right eye irritation and redness and itching and now has been having eye drainage, then was using eye allergy drops but has not helped yet  Does not have actual blurred vision  Only uses glasses  Conjunctivitis: acute illness or injury  Injury of head, initial encounter: acute illness or injury  Amount and/or Complexity of Data Reviewed  Labs: ordered  Radiology: ordered  ECG/medicine tests: ordered and independent interpretation performed  Risk  Prescription drug management  Disposition  Final diagnoses:   Injury of head, initial encounter   Conjunctivitis     Time reflects when diagnosis was documented in both MDM as applicable and the Disposition within this note     Time User Action Codes Description Comment    6/30/2023  1:21 AM Julee AGUIRRE Add [S09 90XA] Injury of head, initial encounter     6/30/2023  1:22 AM Josue, [address] [H10 9] Conjunctivitis       ED Disposition     None      Follow-up Information    None         Patient's Medications    No medications on file       No discharge procedures on file      PDMP Review     None          ED Provider  Electronically Signed by           Zena Long MD  06/30/23 9083

## 2023-08-21 ENCOUNTER — EMERGENCY (EMERGENCY)
Facility: HOSPITAL | Age: 73
LOS: 1 days | Discharge: HOME | End: 2023-08-21
Attending: STUDENT IN AN ORGANIZED HEALTH CARE EDUCATION/TRAINING PROGRAM
Payer: COMMERCIAL

## 2023-08-21 VITALS
OXYGEN SATURATION: 96 % | RESPIRATION RATE: 18 BRPM | SYSTOLIC BLOOD PRESSURE: 160 MMHG | DIASTOLIC BLOOD PRESSURE: 76 MMHG | TEMPERATURE: 98 F | HEART RATE: 74 BPM

## 2023-08-21 VITALS
DIASTOLIC BLOOD PRESSURE: 70 MMHG | SYSTOLIC BLOOD PRESSURE: 129 MMHG | RESPIRATION RATE: 16 BRPM | HEIGHT: 68 IN | WEIGHT: 179.9 LBS | HEART RATE: 55 BPM | OXYGEN SATURATION: 95 % | TEMPERATURE: 98 F

## 2023-08-21 DIAGNOSIS — Z98.890 OTHER SPECIFIED POSTPROCEDURAL STATES: Chronic | ICD-10-CM

## 2023-08-21 DIAGNOSIS — Z96.41 PRESENCE OF INSULIN PUMP (EXTERNAL) (INTERNAL): Chronic | ICD-10-CM

## 2023-08-21 DIAGNOSIS — Z94.0 KIDNEY TRANSPLANT STATUS: Chronic | ICD-10-CM

## 2023-08-21 DIAGNOSIS — I77.0 ARTERIOVENOUS FISTULA, ACQUIRED: Chronic | ICD-10-CM

## 2023-08-21 DIAGNOSIS — Z95.2 PRESENCE OF PROSTHETIC HEART VALVE: Chronic | ICD-10-CM

## 2023-08-21 LAB
ALBUMIN SERPL ELPH-MCNC: 4.4 G/DL — SIGNIFICANT CHANGE UP (ref 3.3–5)
ALP SERPL-CCNC: 78 U/L — SIGNIFICANT CHANGE UP (ref 40–120)
ALT FLD-CCNC: 11 U/L — SIGNIFICANT CHANGE UP (ref 10–45)
ANION GAP SERPL CALC-SCNC: 17 MMOL/L — SIGNIFICANT CHANGE UP (ref 5–17)
APPEARANCE UR: CLEAR — SIGNIFICANT CHANGE UP
AST SERPL-CCNC: 32 U/L — SIGNIFICANT CHANGE UP (ref 10–40)
BACTERIA # UR AUTO: NEGATIVE — SIGNIFICANT CHANGE UP
BASE EXCESS BLDV CALC-SCNC: 1.1 MMOL/L — SIGNIFICANT CHANGE UP (ref -2–3)
BASOPHILS # BLD AUTO: 0.05 K/UL — SIGNIFICANT CHANGE UP (ref 0–0.2)
BASOPHILS NFR BLD AUTO: 0.4 % — SIGNIFICANT CHANGE UP (ref 0–2)
BILIRUB SERPL-MCNC: 0.6 MG/DL — SIGNIFICANT CHANGE UP (ref 0.2–1.2)
BILIRUB UR-MCNC: ABNORMAL
BUN SERPL-MCNC: 15 MG/DL — SIGNIFICANT CHANGE UP (ref 7–23)
CA-I SERPL-SCNC: 1.27 MMOL/L — SIGNIFICANT CHANGE UP (ref 1.15–1.33)
CALCIUM SERPL-MCNC: 10 MG/DL — SIGNIFICANT CHANGE UP (ref 8.4–10.5)
CHLORIDE BLDV-SCNC: 105 MMOL/L — SIGNIFICANT CHANGE UP (ref 96–108)
CHLORIDE SERPL-SCNC: 105 MMOL/L — SIGNIFICANT CHANGE UP (ref 96–108)
CO2 BLDV-SCNC: 29 MMOL/L — HIGH (ref 22–26)
CO2 SERPL-SCNC: 19 MMOL/L — LOW (ref 22–31)
COLOR SPEC: ABNORMAL
CREAT SERPL-MCNC: 0.6 MG/DL — SIGNIFICANT CHANGE UP (ref 0.5–1.3)
DIFF PNL FLD: NEGATIVE — SIGNIFICANT CHANGE UP
EGFR: 103 ML/MIN/1.73M2 — SIGNIFICANT CHANGE UP
EOSINOPHIL # BLD AUTO: 0.11 K/UL — SIGNIFICANT CHANGE UP (ref 0–0.5)
EOSINOPHIL NFR BLD AUTO: 0.9 % — SIGNIFICANT CHANGE UP (ref 0–6)
EPI CELLS # UR: 1 — SIGNIFICANT CHANGE UP
GAS PNL BLDV: 138 MMOL/L — SIGNIFICANT CHANGE UP (ref 136–145)
GAS PNL BLDV: SIGNIFICANT CHANGE UP
GAS PNL BLDV: SIGNIFICANT CHANGE UP
GLUCOSE BLDV-MCNC: 135 MG/DL — HIGH (ref 70–99)
GLUCOSE SERPL-MCNC: 132 MG/DL — HIGH (ref 70–99)
GLUCOSE UR QL: NEGATIVE — SIGNIFICANT CHANGE UP
HCO3 BLDV-SCNC: 28 MMOL/L — SIGNIFICANT CHANGE UP (ref 22–29)
HCT VFR BLD CALC: 46.5 % — SIGNIFICANT CHANGE UP (ref 39–50)
HCT VFR BLDA CALC: 45 % — SIGNIFICANT CHANGE UP (ref 39–51)
HGB BLD CALC-MCNC: 15.1 G/DL — SIGNIFICANT CHANGE UP (ref 12.6–17.4)
HGB BLD-MCNC: 15.4 G/DL — SIGNIFICANT CHANGE UP (ref 13–17)
HYALINE CASTS # UR AUTO: 0 /LPF — SIGNIFICANT CHANGE UP (ref 0–7)
IMM GRANULOCYTES NFR BLD AUTO: 0.6 % — SIGNIFICANT CHANGE UP (ref 0–0.9)
KETONES UR-MCNC: SIGNIFICANT CHANGE UP
LACTATE BLDV-MCNC: 3.2 MMOL/L — HIGH (ref 0.5–2)
LEUKOCYTE ESTERASE UR-ACNC: NEGATIVE — SIGNIFICANT CHANGE UP
LYMPHOCYTES # BLD AUTO: 0.97 K/UL — LOW (ref 1–3.3)
LYMPHOCYTES # BLD AUTO: 7.6 % — LOW (ref 13–44)
MCHC RBC-ENTMCNC: 31.2 PG — SIGNIFICANT CHANGE UP (ref 27–34)
MCHC RBC-ENTMCNC: 33.1 GM/DL — SIGNIFICANT CHANGE UP (ref 32–36)
MCV RBC AUTO: 94.3 FL — SIGNIFICANT CHANGE UP (ref 80–100)
MONOCYTES # BLD AUTO: 0.8 K/UL — SIGNIFICANT CHANGE UP (ref 0–0.9)
MONOCYTES NFR BLD AUTO: 6.2 % — SIGNIFICANT CHANGE UP (ref 2–14)
NEUTROPHILS # BLD AUTO: 10.83 K/UL — HIGH (ref 1.8–7.4)
NEUTROPHILS NFR BLD AUTO: 84.3 % — HIGH (ref 43–77)
NITRITE UR-MCNC: NEGATIVE — SIGNIFICANT CHANGE UP
NRBC # BLD: 0 /100 WBCS — SIGNIFICANT CHANGE UP (ref 0–0)
PCO2 BLDV: 50 MMHG — SIGNIFICANT CHANGE UP (ref 42–55)
PH BLDV: 7.35 — SIGNIFICANT CHANGE UP (ref 7.32–7.43)
PH UR: 5.5 — SIGNIFICANT CHANGE UP (ref 5–8)
PLATELET # BLD AUTO: 210 K/UL — SIGNIFICANT CHANGE UP (ref 150–400)
PO2 BLDV: 25 MMHG — SIGNIFICANT CHANGE UP (ref 25–45)
POTASSIUM BLDV-SCNC: 4.6 MMOL/L — SIGNIFICANT CHANGE UP (ref 3.5–5.1)
POTASSIUM SERPL-MCNC: 5.3 MMOL/L — SIGNIFICANT CHANGE UP (ref 3.5–5.3)
POTASSIUM SERPL-SCNC: 5.3 MMOL/L — SIGNIFICANT CHANGE UP (ref 3.5–5.3)
PROT SERPL-MCNC: 8.2 G/DL — SIGNIFICANT CHANGE UP (ref 6–8.3)
PROT UR-MCNC: ABNORMAL
RAPID RVP RESULT: SIGNIFICANT CHANGE UP
RBC # BLD: 4.93 M/UL — SIGNIFICANT CHANGE UP (ref 4.2–5.8)
RBC # FLD: 13.8 % — SIGNIFICANT CHANGE UP (ref 10.3–14.5)
RBC CASTS # UR COMP ASSIST: 1 /HPF — SIGNIFICANT CHANGE UP (ref 0–4)
SAO2 % BLDV: 48.1 % — LOW (ref 67–88)
SARS-COV-2 RNA SPEC QL NAA+PROBE: SIGNIFICANT CHANGE UP
SODIUM SERPL-SCNC: 141 MMOL/L — SIGNIFICANT CHANGE UP (ref 135–145)
SP GR SPEC: >=1.03
URATE CRY FLD QL MICRO: ABNORMAL
UROBILINOGEN FLD QL: SIGNIFICANT CHANGE UP
WBC # BLD: 12.84 K/UL — HIGH (ref 3.8–10.5)
WBC # FLD AUTO: 12.84 K/UL — HIGH (ref 3.8–10.5)
WBC UR QL: 1 /HPF — SIGNIFICANT CHANGE UP (ref 0–5)

## 2023-08-21 PROCEDURE — 84295 ASSAY OF SERUM SODIUM: CPT

## 2023-08-21 PROCEDURE — 36415 COLL VENOUS BLD VENIPUNCTURE: CPT

## 2023-08-21 PROCEDURE — 99284 EMERGENCY DEPT VISIT MOD MDM: CPT

## 2023-08-21 PROCEDURE — 84132 ASSAY OF SERUM POTASSIUM: CPT

## 2023-08-21 PROCEDURE — 82330 ASSAY OF CALCIUM: CPT

## 2023-08-21 PROCEDURE — 87086 URINE CULTURE/COLONY COUNT: CPT

## 2023-08-21 PROCEDURE — 0225U NFCT DS DNA&RNA 21 SARSCOV2: CPT

## 2023-08-21 PROCEDURE — 82435 ASSAY OF BLOOD CHLORIDE: CPT

## 2023-08-21 PROCEDURE — 82947 ASSAY GLUCOSE BLOOD QUANT: CPT

## 2023-08-21 PROCEDURE — 81001 URINALYSIS AUTO W/SCOPE: CPT

## 2023-08-21 PROCEDURE — 99284 EMERGENCY DEPT VISIT MOD MDM: CPT | Mod: 25

## 2023-08-21 PROCEDURE — 71045 X-RAY EXAM CHEST 1 VIEW: CPT

## 2023-08-21 PROCEDURE — 80053 COMPREHEN METABOLIC PANEL: CPT

## 2023-08-21 PROCEDURE — 83880 ASSAY OF NATRIURETIC PEPTIDE: CPT

## 2023-08-21 PROCEDURE — 82803 BLOOD GASES ANY COMBINATION: CPT

## 2023-08-21 PROCEDURE — 84145 PROCALCITONIN (PCT): CPT

## 2023-08-21 PROCEDURE — 85018 HEMOGLOBIN: CPT

## 2023-08-21 PROCEDURE — 71045 X-RAY EXAM CHEST 1 VIEW: CPT | Mod: 26

## 2023-08-21 PROCEDURE — 83605 ASSAY OF LACTIC ACID: CPT

## 2023-08-21 PROCEDURE — 82962 GLUCOSE BLOOD TEST: CPT

## 2023-08-21 PROCEDURE — 85025 COMPLETE CBC W/AUTO DIFF WBC: CPT

## 2023-08-21 PROCEDURE — 85014 HEMATOCRIT: CPT

## 2023-08-21 NOTE — ED ADULT NURSE NOTE - DISTAL EXTREMITY COLOR
She is UTD on colonoscopy, last done 7/2019 and this was normal.  Ten year repeat recommended.  Pap smear is no longer indicated by history; s/p hysterectomy.  She is UTD on mammogram, last done 6/2021 and this was normal; repeat ordered.   She is up-to-date on COVID (x2, due for shot #3 - [facility] Moderna booster).  She is UTD on Tdap (8/2021).  Flu shot is not currently indicated.  She is due for routine labs; ordered.   color consistent with ethnicity/race

## 2023-08-21 NOTE — ED ADULT NURSE NOTE - OBJECTIVE STATEMENT
73yo M aaox4 h/o DM2 (insulin pump) , kidney transplant ( 10 years ago) , gout, aorta valve replacement, x2 cardiac stents , on 81 mg ASA daily, presents to ED from home via EMS, as per pt's wife at the bedside helping with history, this morning pt sudden started feeling dizziness, confuse, severe diaphoretic and FS shows 30s, given water with sugar, piece a cake w/o any increased sugar level, called 911; as per EMS : when arrived FS: 40s given oral glucose then FS: 58 and 67,  upon arrived at ED FS: 138, at this time pt awake , alert, Pt denies CP, SOB, HA, vision changes, n/v/d, fevers chills, abdominal pain, weakness, dizziness, URI symptoms, Safety and comfort measures initiated- bed placed in lowest position and side rails raised. Pt oriented to call bell system. 71yo M aaox4 h/o DM2 (insulin pump) , kidney transplant ( 10 years ago) , gout, aorta valve replacement, x2 cardiac stents , on 81 mg ASA daily, presents to ED from home via EMS, as per pt's wife at the bedside helping with history, this morning pt sudden started feeling dizziness, confuse, severe diaphoretic and FS shows 30s, given water with sugar, piece a cake w/o any increased sugar level, called 911; as per EMS : when arrived FS: 40s given oral glucose then FS: 58 and 67,  upon arrived at ED FS: 138, at this time pt awake , alert, Pt denies CP, SOB, HA, vision changes, n/v/d, fevers chills, abdominal pain, weakness, dizziness, URI symptoms, Safety and comfort measures initiated- bed placed in lowest position and side rails raised. Pt oriented to call bell system. Pt have dialysis fistula on the L upper arm : bruit/thrill +, no s/sx infection, no swelling, no redness

## 2023-08-21 NOTE — ED PROVIDER NOTE - CLINICAL SUMMARY MEDICAL DECISION MAKING FREE TEXT BOX
72 year old male with a PMHx of DM (on insulin and ozempic), CAD (with stent placement), Kidney transplant (2013), TAVR, HTN, HLD, FRANCOIS and multiple back surgeries presents to the ED today for hypoglycemia. Initial symptoms of lightheadedness and sweating has now resolved. Will obtain cbc to check for leukocytosis, cmp to check for electrolyte imbalances. UA and CXR ordered to r/o presence of infection.

## 2023-08-21 NOTE — ED ADULT NURSE REASSESSMENT NOTE - NS ED NURSE REASSESS COMMENT FT1
Pt verbalizes understanding to f/u with PCP and return to ED for any worsening symptoms. Patient d/c home w/ written and verbal instructions. Pt verbalized understanding. IV d/c - No redness or swelling. MD Markell DANIELS made aware os BP: 160/76 "OK" with dc home

## 2023-08-21 NOTE — ED PROVIDER NOTE - PATIENT PORTAL LINK FT
You can access the FollowMyHealth Patient Portal offered by [facility] by registering at the following website: [email or web address]. By joining [employer]’s FollowMyHealth portal, you will also be able to view your health information using other applications (apps) compatible with our system.

## 2023-08-21 NOTE — ED PROVIDER NOTE - DISCHARGE DATE
Patient here for b/p check today. She started taking triamterene-hctz 37.5 mg/25 mg daily on 1/20/17. Denies concern with side effects. Brought in readings from home:  1/20/17 - 185/73 PM 1/21/17 - 174/73 AM; 189/75 PM 1/22/17 - 191/82 AM; 191/72 PM 1/23/17 - 157/70 AM; 184/77 PM 1/24/17 -152/74 AM; 188/74 PM 1/25/17 - 160/72 AM; 155/67 PM 1/26/17 - 167/77 AM; 157/74 PM  Manual b/p measured today was 148/70. Please advise. Thank you.
21-Aug-2023

## 2023-08-21 NOTE — ED PROVIDER NOTE - PHYSICAL EXAMINATION
General: Alert and oriented, NAD, appears fatigued  Skin: no obvious lesions or discoloration  HEENT: normocephalic, atraumatic   Cardiac: S1/S2 auscultated, regular rate and rhythm  Respiratory: Regular rate and rhythm  Abdomen: soft, nondistended, nontender  Neuro: no focal sensory or motor deficits, neurovascularly intact

## 2023-08-21 NOTE — ED PROVIDER NOTE - PROGRESS NOTE DETAILS
Patient reassessed, continues to appear well. Labs unremarkable, no leukocytosis or electrolyte abnormalities. No infection on UA. Rpt FSG WNL, no repeat hypoglycemic episodes. Recommended non-emergent PMD follow-up. Patient expressed agreement with and understanding of the results and plan. -Spring Tenorio MD (Attending)

## 2023-08-21 NOTE — ED PROVIDER NOTE - OBJECTIVE STATEMENT
72 year old male with a PMHx of DM (on insulin and ozempic), CAD (with stent placement), Kidney transplant (2013), TAVR, HTN, HLD, FRANCOIS and multiple back surgeries presents to the ED today for hypoglycemia. Patient has an insulin pump and currently takes ozempic injection once a week. Patient states that he felt lightheaded begin excessively sweating around 12:20p today. Wife at bedside, states she took patient's blood sugar and it was 32. She gave him a cup of juice and a slice of cake and it raised to 36 then she called EMS. In EMS patient's blood glucose was initially 40 and he was given oral glucose. Repeat blood sugar in EMS was 58 and 67 respectively. Upon arrival to ED patient's blood glucose was 138 initially and 156 once repeated. He denies having a similar episode in the past. He admits to generalized fatigue and increased thirst. He denies chest pain, SOB, fever, chills, N/V/D, abd pain, visual changes, numbness/tingling, cold/heat intolerance, urinary symptoms or change in bowel habits.

## 2023-08-21 NOTE — ED PROVIDER NOTE - NSFOLLOWUPINSTRUCTIONS_ED_ALL_ED_FT
You were seen in the emergency department today due to an episode of low blood sugar.  We examined you for different causes of the hypoglycemia such as infection and found no acute causes.  We trended your glucose levels over the course of your stay and found that you had no drop in blood sugar at this time.  Please follow-up with your primary care provider within the next 2 to 3 days to evaluate for any causes of the hypoglycemia.  You also found to have some findings on your chest x-ray indicative of chronic changes.  Please let your primary care physician know about these changes in order to provide further work-up.  Please return to the emergency department if you experience another episode of hypoglycemia) symptom such as syncope, chest pain, shortness of breath, or altered levels of consciousness.

## 2023-08-21 NOTE — ED PROVIDER NOTE - ATTENDING CONTRIBUTION TO CARE
I was the supervising attending. I have independently seen face-to-face and examined the patient. I have reviewed the history and physical and discussed the MDM with the resident, fellow, FIORELLA and/or student. I agree with the assessment and plan as presented unless otherwise documented as follows:    72M, multiple medical conditions including DM (on insulin and Ozempic), CAD/stents, kidney transplant (2013), TAVR, HTN, HLD, FRANCOIS, presenting with multiple episodes of hypoglycemia. Reports normal PO intake, no recent illness. Self-administers short- and long-acting insulin through pump, also on weekly Ozempic, no recent changes to medication regimen, no sulfonylureas. Appears well, AOx3, not in acute distress. FSG x2 here WNL. Will screen with labs/UA for infection, electrolyte abnormality, repeat FSG. -Spring Tenorio MD (Attending)

## 2023-08-22 LAB
CULTURE RESULTS: SIGNIFICANT CHANGE UP
SPECIMEN SOURCE: SIGNIFICANT CHANGE UP

## 2023-09-28 ENCOUNTER — APPOINTMENT (OUTPATIENT)
Dept: PULMONOLOGY | Facility: CLINIC | Age: 73
End: 2023-09-28
Payer: MEDICARE

## 2023-09-28 VITALS
DIASTOLIC BLOOD PRESSURE: 68 MMHG | OXYGEN SATURATION: 96 % | HEART RATE: 77 BPM | TEMPERATURE: 96.9 F | SYSTOLIC BLOOD PRESSURE: 148 MMHG | WEIGHT: 205 LBS | BODY MASS INDEX: 32.11 KG/M2

## 2023-09-28 PROCEDURE — 99214 OFFICE O/P EST MOD 30 MIN: CPT

## 2023-09-28 RX ORDER — ALBUTEROL SULFATE 90 UG/1
108 (90 BASE) AEROSOL, METERED RESPIRATORY (INHALATION)
Qty: 1 | Refills: 1 | Status: ACTIVE | COMMUNITY
Start: 2023-09-28 | End: 1900-01-01

## 2023-10-10 ENCOUNTER — INPATIENT (INPATIENT)
Facility: HOSPITAL | Age: 73
LOS: 2 days | Discharge: HOME | DRG: 190 | End: 2023-10-13
Attending: INTERNAL MEDICINE | Admitting: INTERNAL MEDICINE
Payer: COMMERCIAL

## 2023-10-10 VITALS
HEART RATE: 71 BPM | HEIGHT: 68 IN | SYSTOLIC BLOOD PRESSURE: 129 MMHG | OXYGEN SATURATION: 95 % | WEIGHT: 205.91 LBS | DIASTOLIC BLOOD PRESSURE: 66 MMHG | TEMPERATURE: 98 F | RESPIRATION RATE: 22 BRPM

## 2023-10-10 DIAGNOSIS — Z29.9 ENCOUNTER FOR PROPHYLACTIC MEASURES, UNSPECIFIED: ICD-10-CM

## 2023-10-10 DIAGNOSIS — Z94.0 KIDNEY TRANSPLANT STATUS: Chronic | ICD-10-CM

## 2023-10-10 DIAGNOSIS — J12.9 VIRAL PNEUMONIA, UNSPECIFIED: ICD-10-CM

## 2023-10-10 DIAGNOSIS — I25.10 ATHEROSCLEROTIC HEART DISEASE OF NATIVE CORONARY ARTERY WITHOUT ANGINA PECTORIS: ICD-10-CM

## 2023-10-10 DIAGNOSIS — Z96.41 PRESENCE OF INSULIN PUMP (EXTERNAL) (INTERNAL): Chronic | ICD-10-CM

## 2023-10-10 DIAGNOSIS — I10 ESSENTIAL (PRIMARY) HYPERTENSION: ICD-10-CM

## 2023-10-10 DIAGNOSIS — R91.8 OTHER NONSPECIFIC ABNORMAL FINDING OF LUNG FIELD: ICD-10-CM

## 2023-10-10 DIAGNOSIS — I77.0 ARTERIOVENOUS FISTULA, ACQUIRED: Chronic | ICD-10-CM

## 2023-10-10 DIAGNOSIS — R09.89 OTHER SPECIFIED SYMPTOMS AND SIGNS INVOLVING THE CIRCULATORY AND RESPIRATORY SYSTEMS: ICD-10-CM

## 2023-10-10 DIAGNOSIS — J44.1 CHRONIC OBSTRUCTIVE PULMONARY DISEASE WITH (ACUTE) EXACERBATION: ICD-10-CM

## 2023-10-10 DIAGNOSIS — E11.9 TYPE 2 DIABETES MELLITUS WITHOUT COMPLICATIONS: ICD-10-CM

## 2023-10-10 DIAGNOSIS — Z95.2 PRESENCE OF PROSTHETIC HEART VALVE: Chronic | ICD-10-CM

## 2023-10-10 DIAGNOSIS — Z98.890 OTHER SPECIFIED POSTPROCEDURAL STATES: Chronic | ICD-10-CM

## 2023-10-10 DIAGNOSIS — Z95.2 PRESENCE OF PROSTHETIC HEART VALVE: ICD-10-CM

## 2023-10-10 DIAGNOSIS — Z94.0 KIDNEY TRANSPLANT STATUS: ICD-10-CM

## 2023-10-10 LAB
ALBUMIN SERPL ELPH-MCNC: 3.6 G/DL — SIGNIFICANT CHANGE UP (ref 3.3–5)
ALP SERPL-CCNC: 98 U/L — SIGNIFICANT CHANGE UP (ref 40–120)
ALT FLD-CCNC: 13 U/L — SIGNIFICANT CHANGE UP (ref 10–45)
ANION GAP SERPL CALC-SCNC: 13 MMOL/L — SIGNIFICANT CHANGE UP (ref 5–17)
AST SERPL-CCNC: 18 U/L — SIGNIFICANT CHANGE UP (ref 10–40)
BASE EXCESS BLDV CALC-SCNC: 0.7 MMOL/L — SIGNIFICANT CHANGE UP (ref -2–3)
BASOPHILS # BLD AUTO: 0.04 K/UL — SIGNIFICANT CHANGE UP (ref 0–0.2)
BASOPHILS NFR BLD AUTO: 0.3 % — SIGNIFICANT CHANGE UP (ref 0–2)
BILIRUB SERPL-MCNC: 1.2 MG/DL — SIGNIFICANT CHANGE UP (ref 0.2–1.2)
BUN SERPL-MCNC: 15 MG/DL — SIGNIFICANT CHANGE UP (ref 7–23)
CA-I SERPL-SCNC: 1.25 MMOL/L — SIGNIFICANT CHANGE UP (ref 1.15–1.33)
CALCIUM SERPL-MCNC: 9.6 MG/DL — SIGNIFICANT CHANGE UP (ref 8.4–10.5)
CHLORIDE BLDV-SCNC: 106 MMOL/L — SIGNIFICANT CHANGE UP (ref 96–108)
CHLORIDE SERPL-SCNC: 105 MMOL/L — SIGNIFICANT CHANGE UP (ref 96–108)
CO2 BLDV-SCNC: 27 MMOL/L — HIGH (ref 22–26)
CO2 SERPL-SCNC: 23 MMOL/L — SIGNIFICANT CHANGE UP (ref 22–31)
CREAT SERPL-MCNC: 0.66 MG/DL — SIGNIFICANT CHANGE UP (ref 0.5–1.3)
EGFR: 100 ML/MIN/1.73M2 — SIGNIFICANT CHANGE UP
EOSINOPHIL # BLD AUTO: 0.03 K/UL — SIGNIFICANT CHANGE UP (ref 0–0.5)
EOSINOPHIL NFR BLD AUTO: 0.3 % — SIGNIFICANT CHANGE UP (ref 0–6)
GAS PNL BLDV: 137 MMOL/L — SIGNIFICANT CHANGE UP (ref 136–145)
GAS PNL BLDV: SIGNIFICANT CHANGE UP
GAS PNL BLDV: SIGNIFICANT CHANGE UP
GLUCOSE BLDV-MCNC: 120 MG/DL — HIGH (ref 70–99)
GLUCOSE SERPL-MCNC: 121 MG/DL — HIGH (ref 70–99)
HCO3 BLDV-SCNC: 25 MMOL/L — SIGNIFICANT CHANGE UP (ref 22–29)
HCT VFR BLD CALC: 39.4 % — SIGNIFICANT CHANGE UP (ref 39–50)
HCT VFR BLDA CALC: 40 % — SIGNIFICANT CHANGE UP (ref 39–51)
HGB BLD CALC-MCNC: 13.2 G/DL — SIGNIFICANT CHANGE UP (ref 12.6–17.4)
HGB BLD-MCNC: 13.2 G/DL — SIGNIFICANT CHANGE UP (ref 13–17)
IMM GRANULOCYTES NFR BLD AUTO: 0.7 % — SIGNIFICANT CHANGE UP (ref 0–0.9)
LACTATE BLDV-MCNC: 1.4 MMOL/L — SIGNIFICANT CHANGE UP (ref 0.5–2)
LYMPHOCYTES # BLD AUTO: 1.07 K/UL — SIGNIFICANT CHANGE UP (ref 1–3.3)
LYMPHOCYTES # BLD AUTO: 9 % — LOW (ref 13–44)
MCHC RBC-ENTMCNC: 31.7 PG — SIGNIFICANT CHANGE UP (ref 27–34)
MCHC RBC-ENTMCNC: 33.5 GM/DL — SIGNIFICANT CHANGE UP (ref 32–36)
MCV RBC AUTO: 94.5 FL — SIGNIFICANT CHANGE UP (ref 80–100)
MONOCYTES # BLD AUTO: 1.33 K/UL — HIGH (ref 0–0.9)
MONOCYTES NFR BLD AUTO: 11.1 % — SIGNIFICANT CHANGE UP (ref 2–14)
NEUTROPHILS # BLD AUTO: 9.38 K/UL — HIGH (ref 1.8–7.4)
NEUTROPHILS NFR BLD AUTO: 78.6 % — HIGH (ref 43–77)
NRBC # BLD: 0 /100 WBCS — SIGNIFICANT CHANGE UP (ref 0–0)
PCO2 BLDV: 40 MMHG — LOW (ref 42–55)
PH BLDV: 7.41 — SIGNIFICANT CHANGE UP (ref 7.32–7.43)
PLATELET # BLD AUTO: 173 K/UL — SIGNIFICANT CHANGE UP (ref 150–400)
PO2 BLDV: 35 MMHG — SIGNIFICANT CHANGE UP (ref 25–45)
POTASSIUM BLDV-SCNC: 4.3 MMOL/L — SIGNIFICANT CHANGE UP (ref 3.5–5.1)
POTASSIUM SERPL-MCNC: 4.3 MMOL/L — SIGNIFICANT CHANGE UP (ref 3.5–5.3)
POTASSIUM SERPL-SCNC: 4.3 MMOL/L — SIGNIFICANT CHANGE UP (ref 3.5–5.3)
PROT SERPL-MCNC: 7.5 G/DL — SIGNIFICANT CHANGE UP (ref 6–8.3)
RAPID RVP RESULT: DETECTED
RBC # BLD: 4.17 M/UL — LOW (ref 4.2–5.8)
RBC # FLD: 13.5 % — SIGNIFICANT CHANGE UP (ref 10.3–14.5)
RV+EV RNA SPEC QL NAA+PROBE: DETECTED
SAO2 % BLDV: 62.2 % — LOW (ref 67–88)
SARS-COV-2 RNA SPEC QL NAA+PROBE: SIGNIFICANT CHANGE UP
SODIUM SERPL-SCNC: 141 MMOL/L — SIGNIFICANT CHANGE UP (ref 135–145)
TROPONIN T, HIGH SENSITIVITY RESULT: 18 NG/L — SIGNIFICANT CHANGE UP (ref 0–51)
TROPONIN T, HIGH SENSITIVITY RESULT: 20 NG/L — SIGNIFICANT CHANGE UP (ref 0–51)
WBC # BLD: 11.93 K/UL — HIGH (ref 3.8–10.5)
WBC # FLD AUTO: 11.93 K/UL — HIGH (ref 3.8–10.5)

## 2023-10-10 PROCEDURE — 93308 TTE F-UP OR LMTD: CPT | Mod: 26

## 2023-10-10 PROCEDURE — 99223 1ST HOSP IP/OBS HIGH 75: CPT

## 2023-10-10 PROCEDURE — 71250 CT THORAX DX C-: CPT | Mod: 26,MA

## 2023-10-10 PROCEDURE — 99285 EMERGENCY DEPT VISIT HI MDM: CPT

## 2023-10-10 PROCEDURE — 71046 X-RAY EXAM CHEST 2 VIEWS: CPT | Mod: 26

## 2023-10-10 RX ORDER — SENNA PLUS 8.6 MG/1
2 TABLET ORAL AT BEDTIME
Refills: 0 | Status: DISCONTINUED | OUTPATIENT
Start: 2023-10-10 | End: 2023-10-13

## 2023-10-10 RX ORDER — TACROLIMUS 5 MG/1
1 CAPSULE ORAL DAILY
Refills: 0 | Status: DISCONTINUED | OUTPATIENT
Start: 2023-10-10 | End: 2023-10-13

## 2023-10-10 RX ORDER — LANOLIN ALCOHOL/MO/W.PET/CERES
3 CREAM (GRAM) TOPICAL AT BEDTIME
Refills: 0 | Status: DISCONTINUED | OUTPATIENT
Start: 2023-10-10 | End: 2023-10-13

## 2023-10-10 RX ORDER — AZITHROMYCIN 500 MG/1
500 TABLET, FILM COATED ORAL EVERY 24 HOURS
Refills: 0 | Status: DISCONTINUED | OUTPATIENT
Start: 2023-10-11 | End: 2023-10-13

## 2023-10-10 RX ORDER — ALLOPURINOL 300 MG
100 TABLET ORAL AT BEDTIME
Refills: 0 | Status: DISCONTINUED | OUTPATIENT
Start: 2023-10-10 | End: 2023-10-13

## 2023-10-10 RX ORDER — ONDANSETRON 8 MG/1
4 TABLET, FILM COATED ORAL EVERY 8 HOURS
Refills: 0 | Status: DISCONTINUED | OUTPATIENT
Start: 2023-10-10 | End: 2023-10-11

## 2023-10-10 RX ORDER — MONTELUKAST 4 MG/1
10 TABLET, CHEWABLE ORAL DAILY
Refills: 0 | Status: DISCONTINUED | OUTPATIENT
Start: 2023-10-10 | End: 2023-10-13

## 2023-10-10 RX ORDER — INSULIN ASPART 100 [IU]/ML
1 INJECTION, SOLUTION SUBCUTANEOUS
Refills: 0 | Status: DISCONTINUED | OUTPATIENT
Start: 2023-10-10 | End: 2023-10-13

## 2023-10-10 RX ORDER — MYCOPHENOLATE MOFETIL 250 MG/1
1000 CAPSULE ORAL
Refills: 0 | Status: DISCONTINUED | OUTPATIENT
Start: 2023-10-10 | End: 2023-10-10

## 2023-10-10 RX ORDER — DEXTROSE 50 % IN WATER 50 %
12.5 SYRINGE (ML) INTRAVENOUS ONCE
Refills: 0 | Status: DISCONTINUED | OUTPATIENT
Start: 2023-10-10 | End: 2023-10-13

## 2023-10-10 RX ORDER — NIFEDIPINE 30 MG
30 TABLET, EXTENDED RELEASE 24 HR ORAL AT BEDTIME
Refills: 0 | Status: DISCONTINUED | OUTPATIENT
Start: 2023-10-10 | End: 2023-10-13

## 2023-10-10 RX ORDER — LABETALOL HCL 100 MG
200 TABLET ORAL THREE TIMES A DAY
Refills: 0 | Status: DISCONTINUED | OUTPATIENT
Start: 2023-10-10 | End: 2023-10-13

## 2023-10-10 RX ORDER — SODIUM CHLORIDE 9 MG/ML
1000 INJECTION, SOLUTION INTRAVENOUS
Refills: 0 | Status: DISCONTINUED | OUTPATIENT
Start: 2023-10-10 | End: 2023-10-13

## 2023-10-10 RX ORDER — ACETAMINOPHEN 500 MG
650 TABLET ORAL EVERY 6 HOURS
Refills: 0 | Status: DISCONTINUED | OUTPATIENT
Start: 2023-10-10 | End: 2023-10-13

## 2023-10-10 RX ORDER — DEXTROSE 50 % IN WATER 50 %
25 SYRINGE (ML) INTRAVENOUS ONCE
Refills: 0 | Status: DISCONTINUED | OUTPATIENT
Start: 2023-10-10 | End: 2023-10-13

## 2023-10-10 RX ORDER — IPRATROPIUM/ALBUTEROL SULFATE 18-103MCG
3 AEROSOL WITH ADAPTER (GRAM) INHALATION EVERY 6 HOURS
Refills: 0 | Status: DISCONTINUED | OUTPATIENT
Start: 2023-10-10 | End: 2023-10-13

## 2023-10-10 RX ORDER — GABAPENTIN 400 MG/1
100 CAPSULE ORAL AT BEDTIME
Refills: 0 | Status: DISCONTINUED | OUTPATIENT
Start: 2023-10-10 | End: 2023-10-13

## 2023-10-10 RX ORDER — CEFTRIAXONE 500 MG/1
1000 INJECTION, POWDER, FOR SOLUTION INTRAMUSCULAR; INTRAVENOUS EVERY 24 HOURS
Refills: 0 | Status: DISCONTINUED | OUTPATIENT
Start: 2023-10-11 | End: 2023-10-13

## 2023-10-10 RX ORDER — NIFEDIPINE 30 MG
60 TABLET, EXTENDED RELEASE 24 HR ORAL DAILY
Refills: 0 | Status: DISCONTINUED | OUTPATIENT
Start: 2023-10-10 | End: 2023-10-13

## 2023-10-10 RX ORDER — DEXTROSE 50 % IN WATER 50 %
15 SYRINGE (ML) INTRAVENOUS ONCE
Refills: 0 | Status: DISCONTINUED | OUTPATIENT
Start: 2023-10-10 | End: 2023-10-13

## 2023-10-10 RX ORDER — TAMSULOSIN HYDROCHLORIDE 0.4 MG/1
0.4 CAPSULE ORAL AT BEDTIME
Refills: 0 | Status: DISCONTINUED | OUTPATIENT
Start: 2023-10-10 | End: 2023-10-13

## 2023-10-10 RX ORDER — ATORVASTATIN CALCIUM 80 MG/1
20 TABLET, FILM COATED ORAL DAILY
Refills: 0 | Status: DISCONTINUED | OUTPATIENT
Start: 2023-10-10 | End: 2023-10-13

## 2023-10-10 RX ORDER — GLUCAGON INJECTION, SOLUTION 0.5 MG/.1ML
1 INJECTION, SOLUTION SUBCUTANEOUS ONCE
Refills: 0 | Status: DISCONTINUED | OUTPATIENT
Start: 2023-10-10 | End: 2023-10-13

## 2023-10-10 RX ORDER — CEFEPIME 1 G/1
2000 INJECTION, POWDER, FOR SOLUTION INTRAMUSCULAR; INTRAVENOUS ONCE
Refills: 0 | Status: COMPLETED | OUTPATIENT
Start: 2023-10-10 | End: 2023-10-10

## 2023-10-10 RX ORDER — ASPIRIN/CALCIUM CARB/MAGNESIUM 324 MG
81 TABLET ORAL DAILY
Refills: 0 | Status: DISCONTINUED | OUTPATIENT
Start: 2023-10-10 | End: 2023-10-13

## 2023-10-10 RX ORDER — TACROLIMUS 5 MG/1
1.5 CAPSULE ORAL AT BEDTIME
Refills: 0 | Status: DISCONTINUED | OUTPATIENT
Start: 2023-10-10 | End: 2023-10-13

## 2023-10-10 RX ORDER — IPRATROPIUM/ALBUTEROL SULFATE 18-103MCG
3 AEROSOL WITH ADAPTER (GRAM) INHALATION
Refills: 0 | Status: COMPLETED | OUTPATIENT
Start: 2023-10-10 | End: 2023-10-10

## 2023-10-10 RX ORDER — AZITHROMYCIN 500 MG/1
500 TABLET, FILM COATED ORAL ONCE
Refills: 0 | Status: COMPLETED | OUTPATIENT
Start: 2023-10-10 | End: 2023-10-10

## 2023-10-10 RX ADMIN — Medication 30 MILLIGRAM(S): at 23:44

## 2023-10-10 RX ADMIN — Medication 40 MILLIGRAM(S): at 23:44

## 2023-10-10 RX ADMIN — CEFEPIME 100 MILLIGRAM(S): 1 INJECTION, POWDER, FOR SOLUTION INTRAMUSCULAR; INTRAVENOUS at 20:42

## 2023-10-10 RX ADMIN — Medication 3 MILLILITER(S): at 11:37

## 2023-10-10 RX ADMIN — Medication 3 MILLILITER(S): at 11:47

## 2023-10-10 RX ADMIN — GABAPENTIN 100 MILLIGRAM(S): 400 CAPSULE ORAL at 23:43

## 2023-10-10 RX ADMIN — AZITHROMYCIN 255 MILLIGRAM(S): 500 TABLET, FILM COATED ORAL at 20:42

## 2023-10-10 RX ADMIN — TACROLIMUS 1.5 MILLIGRAM(S): 5 CAPSULE ORAL at 23:43

## 2023-10-10 RX ADMIN — Medication 3 MILLILITER(S): at 12:03

## 2023-10-10 NOTE — H&P ADULT - PROBLEM SELECTOR PLAN 2
CT chest findings concerning for malignancy vs PNA. Note patient has extensive smoking hx  -Cont. IV Ceftriaxone and azithromycin for empiric PNA treatment for now  -Will send procalcitonin  -Consider pulm consult as per Dr. Esteves

## 2023-10-10 NOTE — H&P ADULT - PROBLEM SELECTOR PLAN 4
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 Tacrolimus 1mg daily and 1.5mg QHS  -Holding Cellcept 1000mg BID for now given possible active infection  -F/u tacrolimus trough  -Possible transplant nephrology vs Michele Rivas consult as per Dr. Esteves

## 2023-10-10 NOTE — H&P ADULT - ENT GEN HX ROS MEA POS PC
[FreeTextEntry1] : Returning for follow-up\par \par Recent admission for ADHF - refused home ionotrope (however Im not sure he fully understands)\par Now on Hospice\par No angina\par No falls or syncope\par No orthopnea or PND\par No LE edema\par 
nasal congestion

## 2023-10-10 NOTE — ED PROVIDER NOTE - PROGRESS NOTE DETAILS
Radha Irizarry MD, PGY2: ct shows concern for PNA vs malginancy vs ILD. abx order. pt will be admitted. Radha Irizarry MD, PGY2:  heavy smoker, kidney transplant on tacro and mycophenylate, presenting with SOB, sent in for eval of hypoxia seen in PCP office, cxr shows concern for pna, pt is hypoxic in ED to the low 90s on 4L, RVP + for enterorhino, signed out to me pending CT for characterization of PNA, if CT positive for secondary bacterial pna will give abx. pt will need to be admitted

## 2023-10-10 NOTE — ED PROVIDER NOTE - OBJECTIVE STATEMENT
72-year-old male with past medical history of FRANCOIS on CPAP, 50-pack-year smoking history, hyperlipidemia, hypertension, CAD status post stents, history of kidney transplant in 2013, presenting with shortness of breath for the last several days.  Patient reports that he was at his primary care doctor Dr. Garrido, for the symptoms, and was sent in to rule out pneumonia.  Patient reports been having increased productive sputum for the last several days and has been more dyspneic on exertion with sensation of wheezing and generalized malaise.  Patient has never been diagnosed with COPD.  Denies any fever/chills, nausea/vomiting, diarrhea, abdominal pain, chest pain, back pain 72-year-old male with past medical history of FRANCOIS on CPAP, OAD, 50-pack-year smoking history, diabetes on insulin pump, hyperlipidemia, hypertension, CAD status post stent, TAVR, history of kidney transplant in 2013, presenting with 2 days of shortness of breath. Patient reports that he was at his primary care doctor Dr. Garrido for the symptoms this morning, and was sent in to rule out pneumonia. Has not started any antibiotics. Patient reports been having increased productive sputum for the last several days and has been more dyspneic on exertion with sensation of wheezing and generalized malaise. Pt follows with pulmonologist for FRANCOIS and OAD. Denies any fever/chills, nausea/vomiting, diarrhea, abdominal pain, chest pain, back pain.

## 2023-10-10 NOTE — ED ADULT NURSE NOTE - NSFALLUNIVINTERV_ED_ALL_ED
Bed/Stretcher in lowest position, wheels locked, appropriate side rails in place/Call bell, personal items and telephone in reach/Instruct patient to call for assistance before getting out of bed/chair/stretcher/Non-slip footwear applied when patient is off stretcher/Drift to call system/Physically safe environment - no spills, clutter or unnecessary equipment/Purposeful proactive rounding/Room/bathroom lighting operational, light cord in reach

## 2023-10-10 NOTE — ED ADULT NURSE REASSESSMENT NOTE - NS ED NURSE REASSESS COMMENT FT1
Received report from BELINDA schmitt. Safety checked. No c/o pain or discomfort at this time. Comfort care provided. Pt encouraged to call for assist when needed. pending imaging

## 2023-10-10 NOTE — H&P ADULT - PROBLEM SELECTOR PLAN 5
Caution regarding volume status  -Cont. Torsemide daily for now  -Trend BMP, Mg replete PRN  -Trend I/Os

## 2023-10-10 NOTE — H&P ADULT - NSHPLABSRESULTS_GEN_ALL_CORE
I have reviewed the labs, imaging and ekg. EKG with NSR HR 73 QTc 403 TWI AVL, AVR, CXR w/ bilateral patchy opacities

## 2023-10-10 NOTE — H&P ADULT - PROBLEM SELECTOR PLAN 1
Pt with heavy smoking hx and COPD with entero/rhinovirus as likely trigger. Not responsive to QID nebs at home so will likely need to start steroids. Possible overlying post-viral CAP.  -Duonebs Q6hrs  -Will start Solumedrol 40mg IV daily for now  -Montelukast daily  -Cont. IV Ceftriaxone and azithromycin for now  -Possible pulm consult, see below

## 2023-10-10 NOTE — H&P ADULT - NSICDXPASTMEDICALHX_GEN_ALL_CORE_FT
I'm confused  Did her med ab fail? And can we make sure she has an ultrasound ASAP? ? PAST MEDICAL HISTORY:  CAD (coronary artery disease) 1 stent    DM (diabetes mellitus), type 2     H/O kidney transplant 2013    HTN (Hypertension), Benign     Hyperlipidemia     FRANCOIS on CPAP     Smoking

## 2023-10-10 NOTE — H&P ADULT - PSYCHIATRIC
Hospitalist Progress Note      PCP: Jaja Richey MD    Date of Admission: 9/22/2021    Chief Complaint: Blood sugar problem    Hospital Course: Admitted for new onset DM. On lantus and SSI. Subjective: Pt states he is doing ok. Denies any complaints. Discussed with patient regarding diet/carb control, exercise, weight loss, learning to use insulin. Pt agreeable. Discussed with RN. Medications:  Reviewed    Infusion Medications    dextrose      sodium chloride       Scheduled Medications    atenolol  25 mg Oral Daily    buPROPion  150 mg Oral Daily    escitalopram  20 mg Oral Daily    fluticasone  2 spray Each Nostril Daily    lisinopril  10 mg Oral Daily    insulin glargine  15 Units SubCUTAneous Nightly    insulin lispro  0-12 Units SubCUTAneous TID WC    insulin lispro  0-6 Units SubCUTAneous Nightly    insulin lispro  0.08 Units/kg SubCUTAneous TID WC    sodium chloride flush  10 mL IntraVENous 2 times per day    sennosides-docusate sodium  1 tablet Oral BID    famotidine  20 mg Oral BID    enoxaparin  40 mg SubCUTAneous Daily     PRN Meds: glucose, dextrose, glucagon (rDNA), dextrose, sodium chloride flush, sodium chloride, ondansetron **OR** ondansetron, magnesium hydroxide, acetaminophen **OR** acetaminophen    No intake or output data in the 24 hours ending 09/23/21 0821    Physical Exam Performed:    /74   Pulse 53   Temp 98.2 °F (36.8 °C) (Oral)   Resp 16   Ht 5' 10\" (1.778 m)   Wt 229 lb 8 oz (104.1 kg)   SpO2 99%   BMI 32.93 kg/m²     General appearance: No apparent distress, appears stated age and cooperative. Obese  HEENT: Pupils equal, round, and reactive to light. Conjunctivae/corneas clear. Neck: Supple, with full range of motion. No jugular venous distention. Trachea midline. Respiratory:  Normal respiratory effort. Clear to auscultation, bilaterally without Rales/Wheezes/Rhonchi.   Cardiovascular: Regular rate and rhythm with normal S1/S2 Status:  At his baseline    Dispo - Pending BG control, insulin adjustment    Konrad Mcgovern MD details… normal affect/alert and oriented x3/normal behavior

## 2023-10-10 NOTE — ED PROVIDER NOTE - NS ED ROS FT
GENERAL: Denies weight loss, fever and chills.  EYES: denies change in vision,   HEENT: denies trouble swallowing or speaking,   CARDIAC: denies chest pain or palpitations   PULMONARY: +cough, wheezing, SOB  GI: denies abdominal pain, no nausea, no vomiting, no diarrhea or constipation,   : Denies dysuria and urinary frequency, hematuria   SKIN: denies rashes,   NEURO: denies headache, dizziness  MSK: Denies myalgia and joint pain.    All other ROS negative unless otherwise specified in HPI.

## 2023-10-10 NOTE — H&P ADULT - TIME BILLING
Need to interview and examine patient, discuss care with family, consult endocrine, provide counseling, coordinate care, place orders, document, personally review imaging, ekg and review prior medical records

## 2023-10-10 NOTE — H&P ADULT - ASSESSMENT
72M w/ pmhx of FRANCOIS on CPAP, COPD w/ 50-pack-year smoking history, DM2 on insulin pump, hyperlipidemia, hypertension, CAD status post stent, TAVR, history of kidney transplant in 2013 on immunosuppresion, presenting with 2 days of shortness of breath found to have likely acute on chronic COPD exacerbation 2/2 to entero/rhinovirus with possible component of PNA on CT chest vs malignancy

## 2023-10-10 NOTE — H&P ADULT - HISTORY OF PRESENT ILLNESS
72M w/ pmhx of FRANCOIS on CPAP, COPD w/ 50-pack-year smoking history, DM2 on insulin pump, hyperlipidemia, hypertension, CAD status post stent, TAVR, history of kidney transplant in 2013 on immunosuppresion, presenting with 2 days of shortness of breath. Wife states pt has been having coughing w/ slight yellow sputum, wheezing and SOB for past 2-3 days which was not improved with QID nebulizers at home. Pt reluctant to see doctor. Went to see PMD this AM and was sent to ER to r/o PNA. Denies fevers or chills. Endorses slight midsternal chest pressure and notable wheezing. No obvious sick contacts. No other specific symptoms.    In ER: Given IV Cefepime, azithromycin, Duoneb

## 2023-10-10 NOTE — H&P ADULT - PROBLEM SELECTOR PLAN 3
On Medtronic Mini-med insulin pump at home and Ozempic every Monday. Follows with Dr. Byrd for endocrine outpatient. Patient endorses having enough insulin in pump for tonight and wife can bring more tomorrow.    -Endocrine consult e-mailed  Pump Settings:   Glucose target   Basal: TDD is 16.3U  12AM-5AM 0.6U/hr  5AM-12AM 0.7U/hr    ICR: 12AM-11PM 15g/u            11PM-12AM 20g/u    ISF: 50mg/dl all day    -Fingersticks On Medtronic Mini-med insulin pump at home and Ozempic every Monday. Follows with Dr. Byrd for endocrine outpatient. Patient endorses having enough insulin in pump for tonight and wife can bring more tomorrow.    -Endocrine consult e-mailed  Pump Settings:   Glucose target   Basal: TDD is 16.3U  12AM-5AM 0.6U/hr  5AM-12AM 0.7U/hr    ICR: 12AM-11PM 15g/u            11PM-12AM 20g/u    ISF: 50mg/dl all day    -Fingersticks  -Change site every 3 days  -If patient becomes altered or becomes unable to operate pump then d/c pump immediately and notify provider

## 2023-10-10 NOTE — H&P ADULT - NSHPPHYSICALEXAM_GEN_ALL_CORE
Vital Signs Last 24 Hrs  T(C): 36.8 (10-10-23 @ 19:50), Max: 37.2 (10-10-23 @ 15:20)  T(F): 98.3 (10-10-23 @ 19:50), Max: 99 (10-10-23 @ 15:20)  HR: 83 (10-10-23 @ 19:50) (70 - 83)  BP: 144/63 (10-10-23 @ 19:50) (118/53 - 144/63)  BP(mean): 86 (10-10-23 @ 19:50) (86 - 86)  RR: 18 (10-10-23 @ 19:50) (18 - 22)  SpO2: 97% (10-10-23 @ 19:50) (94% - 97%)

## 2023-10-10 NOTE — ED PROVIDER NOTE - PHYSICAL EXAMINATION
Gen: AAOx3, non-toxic  Head: NCAT  HEENT: oral mucosa moist, normal conjunctiva  Lung: inspiratory stridor, diffuse expiratory wheezing throughout lung fields. NC in place  CV: RRR  Abd: soft, NTND, no guarding, no CVA tenderness  MSK: no visible deformities  Neuro: No focal sensory or motor deficits  Extremities: warm, well-perfused, trace b/l pitting LE edema  Psych: normal affect.

## 2023-10-10 NOTE — ED PROVIDER NOTE - CLINICAL SUMMARY MEDICAL DECISION MAKING FREE TEXT BOX
72-year-old male with past medical history of FRANCOIS on CPAP, 50-pack-year smoking history, hyperlipidemia, hypertension, CAD status post stents, history of kidney transplant in 2013, presenting with shortness of breath for the last several days.      Vital signs show tachypnea to 22 breaths/min, patient satting to 95 on 2 L nasal cannula, desatting to 91 when speaking.  Aside from tachypnea, patient appears relatively comfortable.  Speaking in full sentences.  Lung exam shows diffuse inspiratory stridor and expiratory wheezing.  No crackles on auscultation.  No abdominal tenderness.  Radial pulses equal bilaterally.  Mild pitting edema in lower extremities. 72-year-old male with past medical history of FRANCOIS on CPAP, OAD, 50-pack-year smoking history, diabetes on insulin pump, hyperlipidemia, hypertension, CAD status post stent, TAVR, history of kidney transplant in 2013, presenting with 2 days of shortness of breath, wheezing, and productive cough.    Vital signs show tachypnea to 22 breaths/min, patient satting to 95 on 2 L nasal cannula, desatting to 91 when speaking.  Aside from tachypnea, patient appears relatively comfortable.  Speaking in full sentences.  Lung exam shows diffuse inspiratory stridor and expiratory wheezing.  No crackles on auscultation.  No abdominal tenderness.  Radial pulses equal bilaterally.  Mild pitting edema in lower extremities. Ddx includes URI vs. CAP vs. CHF exacerbation, has underlying OAD. Will check CBC, CMP, troponins, VBG, BNP, CXR. Will administer Duonebs, monitor on NC. Dispo: pending workup 72-year-old male with past medical history of FRANCOIS on CPAP, 50-pack-year smoking history, diabetes, hyperlipidemia, hypertension, CAD status post stent, TAVR, history of kidney transplant in 2013, presenting with 2 days of shortness of breath, wheezing, and productive cough, chills.    Vital signs show tachypnea to 22 breaths/min, patient satting to 95 on 2 L nasal cannula, desatting to 91 when speaking.  Aside from tachypnea, patient appears relatively comfortable.  Speaking in full sentences.  Lung exam rhonchi and wheezes throughout most concentrated in dependent portions of lung fields. No abdominal tenderness.  LUE AVF w thrill.  Trace bl le pitting edema. Ddx includes URI vs. CAP vs. CHF exacerbation vs vol overload. Will check basic labs incl troponin and BNP, VBG, RVP, CXR. Will administer Duonebs for sx and reassess. Dispo: TBA given on immunosuppression for RT, resp sx including inc WOB w O2 req.

## 2023-10-10 NOTE — H&P ADULT - PROBLEM SELECTOR PLAN 7
-Trend BP  -Cont. Nifedipine BID  -Cont. Enalapril daily as opposed to TID for now  -Cont. Labetalol TID

## 2023-10-10 NOTE — H&P ADULT - NSHPADDITIONALINFOADULT_GEN_ALL_CORE
I was asked to see this patient by the hospitalist in charge. Dr. Esteves to assume care for patient in AM and thereafter

## 2023-10-10 NOTE — ED PROCEDURE NOTE - US CPT CODES
27308 US Chest (PTX, Pleural Effussion/CHF vs COPD)/14324 Echocardiography Transthoracic with Image 2D (Echo/FAST)

## 2023-10-11 DIAGNOSIS — E11.9 TYPE 2 DIABETES MELLITUS WITHOUT COMPLICATIONS: ICD-10-CM

## 2023-10-11 DIAGNOSIS — I10 ESSENTIAL (PRIMARY) HYPERTENSION: ICD-10-CM

## 2023-10-11 DIAGNOSIS — E78.5 HYPERLIPIDEMIA, UNSPECIFIED: ICD-10-CM

## 2023-10-11 LAB
A1C WITH ESTIMATED AVERAGE GLUCOSE RESULT: 5.4 % — SIGNIFICANT CHANGE UP (ref 4–5.6)
ALBUMIN SERPL ELPH-MCNC: 3.8 G/DL — SIGNIFICANT CHANGE UP (ref 3.3–5)
ALP SERPL-CCNC: 120 U/L — SIGNIFICANT CHANGE UP (ref 40–120)
ALT FLD-CCNC: 12 U/L — SIGNIFICANT CHANGE UP (ref 10–45)
ANION GAP SERPL CALC-SCNC: 16 MMOL/L — SIGNIFICANT CHANGE UP (ref 5–17)
AST SERPL-CCNC: 19 U/L — SIGNIFICANT CHANGE UP (ref 10–40)
BASOPHILS # BLD AUTO: 0.01 K/UL — SIGNIFICANT CHANGE UP (ref 0–0.2)
BASOPHILS NFR BLD AUTO: 0.1 % — SIGNIFICANT CHANGE UP (ref 0–2)
BILIRUB SERPL-MCNC: 1.4 MG/DL — HIGH (ref 0.2–1.2)
BUN SERPL-MCNC: 11 MG/DL — SIGNIFICANT CHANGE UP (ref 7–23)
CALCIUM SERPL-MCNC: 9.9 MG/DL — SIGNIFICANT CHANGE UP (ref 8.4–10.5)
CHLORIDE SERPL-SCNC: 104 MMOL/L — SIGNIFICANT CHANGE UP (ref 96–108)
CO2 SERPL-SCNC: 16 MMOL/L — LOW (ref 22–31)
CREAT SERPL-MCNC: 0.5 MG/DL — SIGNIFICANT CHANGE UP (ref 0.5–1.3)
EGFR: 108 ML/MIN/1.73M2 — SIGNIFICANT CHANGE UP
EOSINOPHIL # BLD AUTO: 0 K/UL — SIGNIFICANT CHANGE UP (ref 0–0.5)
EOSINOPHIL NFR BLD AUTO: 0 % — SIGNIFICANT CHANGE UP (ref 0–6)
ESTIMATED AVERAGE GLUCOSE: 108 MG/DL — SIGNIFICANT CHANGE UP (ref 68–114)
GLUCOSE BLDC GLUCOMTR-MCNC: 118 MG/DL — HIGH (ref 70–99)
GLUCOSE BLDC GLUCOMTR-MCNC: 155 MG/DL — HIGH (ref 70–99)
GLUCOSE BLDC GLUCOMTR-MCNC: 172 MG/DL — HIGH (ref 70–99)
GLUCOSE BLDC GLUCOMTR-MCNC: 243 MG/DL — HIGH (ref 70–99)
GLUCOSE SERPL-MCNC: 136 MG/DL — HIGH (ref 70–99)
HCT VFR BLD CALC: 41.9 % — SIGNIFICANT CHANGE UP (ref 39–50)
HGB BLD-MCNC: 13.5 G/DL — SIGNIFICANT CHANGE UP (ref 13–17)
IMM GRANULOCYTES NFR BLD AUTO: 0.6 % — SIGNIFICANT CHANGE UP (ref 0–0.9)
LYMPHOCYTES # BLD AUTO: 0.64 K/UL — LOW (ref 1–3.3)
LYMPHOCYTES # BLD AUTO: 6.8 % — LOW (ref 13–44)
MCHC RBC-ENTMCNC: 31.2 PG — SIGNIFICANT CHANGE UP (ref 27–34)
MCHC RBC-ENTMCNC: 32.2 GM/DL — SIGNIFICANT CHANGE UP (ref 32–36)
MCV RBC AUTO: 96.8 FL — SIGNIFICANT CHANGE UP (ref 80–100)
MONOCYTES # BLD AUTO: 0.28 K/UL — SIGNIFICANT CHANGE UP (ref 0–0.9)
MONOCYTES NFR BLD AUTO: 3 % — SIGNIFICANT CHANGE UP (ref 2–14)
NEUTROPHILS # BLD AUTO: 8.47 K/UL — HIGH (ref 1.8–7.4)
NEUTROPHILS NFR BLD AUTO: 89.5 % — HIGH (ref 43–77)
NRBC # BLD: 0 /100 WBCS — SIGNIFICANT CHANGE UP (ref 0–0)
PLATELET # BLD AUTO: 202 K/UL — SIGNIFICANT CHANGE UP (ref 150–400)
POTASSIUM SERPL-MCNC: 4.8 MMOL/L — SIGNIFICANT CHANGE UP (ref 3.5–5.3)
POTASSIUM SERPL-SCNC: 4.8 MMOL/L — SIGNIFICANT CHANGE UP (ref 3.5–5.3)
PROT SERPL-MCNC: 7.8 G/DL — SIGNIFICANT CHANGE UP (ref 6–8.3)
RBC # BLD: 4.33 M/UL — SIGNIFICANT CHANGE UP (ref 4.2–5.8)
RBC # FLD: 13.2 % — SIGNIFICANT CHANGE UP (ref 10.3–14.5)
SODIUM SERPL-SCNC: 136 MMOL/L — SIGNIFICANT CHANGE UP (ref 135–145)
TACROLIMUS SERPL-MCNC: 11.5 NG/ML — SIGNIFICANT CHANGE UP
TACROLIMUS SERPL-MCNC: 13.4 NG/ML — SIGNIFICANT CHANGE UP
WBC # BLD: 9.46 K/UL — SIGNIFICANT CHANGE UP (ref 3.8–10.5)
WBC # FLD AUTO: 9.46 K/UL — SIGNIFICANT CHANGE UP (ref 3.8–10.5)

## 2023-10-11 PROCEDURE — 99223 1ST HOSP IP/OBS HIGH 75: CPT

## 2023-10-11 PROCEDURE — 99222 1ST HOSP IP/OBS MODERATE 55: CPT

## 2023-10-11 RX ORDER — MYCOPHENOLATE MOFETIL 250 MG/1
500 CAPSULE ORAL
Refills: 0 | Status: DISCONTINUED | OUTPATIENT
Start: 2023-10-12 | End: 2023-10-12

## 2023-10-11 RX ORDER — HEPARIN SODIUM 5000 [USP'U]/ML
5000 INJECTION INTRAVENOUS; SUBCUTANEOUS EVERY 12 HOURS
Refills: 0 | Status: DISCONTINUED | OUTPATIENT
Start: 2023-10-11 | End: 2023-10-13

## 2023-10-11 RX ADMIN — Medication 3 MILLILITER(S): at 05:12

## 2023-10-11 RX ADMIN — CEFTRIAXONE 100 MILLIGRAM(S): 500 INJECTION, POWDER, FOR SOLUTION INTRAMUSCULAR; INTRAVENOUS at 07:43

## 2023-10-11 RX ADMIN — Medication 10 MILLIGRAM(S): at 05:12

## 2023-10-11 RX ADMIN — Medication 3 MILLILITER(S): at 12:51

## 2023-10-11 RX ADMIN — GABAPENTIN 100 MILLIGRAM(S): 400 CAPSULE ORAL at 22:23

## 2023-10-11 RX ADMIN — AZITHROMYCIN 255 MILLIGRAM(S): 500 TABLET, FILM COATED ORAL at 22:24

## 2023-10-11 RX ADMIN — Medication 200 MILLIGRAM(S): at 05:12

## 2023-10-11 RX ADMIN — TAMSULOSIN HYDROCHLORIDE 0.4 MILLIGRAM(S): 0.4 CAPSULE ORAL at 22:23

## 2023-10-11 RX ADMIN — Medication 3 MILLILITER(S): at 17:45

## 2023-10-11 RX ADMIN — Medication 3 MILLILITER(S): at 17:48

## 2023-10-11 RX ADMIN — Medication 30 MILLIGRAM(S): at 22:23

## 2023-10-11 RX ADMIN — Medication 81 MILLIGRAM(S): at 12:50

## 2023-10-11 RX ADMIN — Medication 60 MILLIGRAM(S): at 05:12

## 2023-10-11 RX ADMIN — TACROLIMUS 1 MILLIGRAM(S): 5 CAPSULE ORAL at 12:50

## 2023-10-11 RX ADMIN — ATORVASTATIN CALCIUM 20 MILLIGRAM(S): 80 TABLET, FILM COATED ORAL at 12:50

## 2023-10-11 RX ADMIN — MONTELUKAST 10 MILLIGRAM(S): 4 TABLET, CHEWABLE ORAL at 12:50

## 2023-10-11 RX ADMIN — Medication 200 MILLIGRAM(S): at 22:23

## 2023-10-11 RX ADMIN — Medication 100 MILLIGRAM(S): at 22:23

## 2023-10-11 RX ADMIN — TACROLIMUS 1.5 MILLIGRAM(S): 5 CAPSULE ORAL at 22:57

## 2023-10-11 RX ADMIN — SENNA PLUS 2 TABLET(S): 8.6 TABLET ORAL at 22:23

## 2023-10-11 RX ADMIN — Medication 200 MILLIGRAM(S): at 15:11

## 2023-10-11 NOTE — PATIENT PROFILE ADULT - FALL HARM RISK - RISK INTERVENTIONS
Adequate: hears normal conversation without difficulty
Assistance OOB with selected safe patient handling equipment/Communicate Fall Risk and Risk Factors to all staff, patient, and family/Discuss with provider need for PT consult/Monitor gait and stability/Provide patient with walking aids - walker, cane, crutches/Reinforce activity limits and safety measures with patient and family/Visual Cue: Yellow wristband/Bed in lowest position, wheels locked, appropriate side rails in place/Call bell, personal items and telephone in reach/Instruct patient to call for assistance before getting out of bed or chair/Non-slip footwear when patient is out of bed/[place] to call system/Physically safe environment - no spills, clutter or unnecessary equipment/Purposeful Proactive Rounding/Room/bathroom lighting operational, light cord in reach

## 2023-10-11 NOTE — ADVANCED PRACTICE NURSE CONSULT - REASON FOR CONSULT
Pt seen for insulin pump management. Pt has insulin pump. Medtronic located on right upper abdomen. Glucose monitored by finger sticks. Pt has no pump supplies or back up pump at bedside. Pt states wife will bring supplies later today. Pump due to change 10/12.

## 2023-10-11 NOTE — PROGRESS NOTE ADULT - ASSESSMENT
73 y/o M     h/o ESRD,s/p  R renal transplant 2013 , renal  cysts    DM,  CAD   s/p stents , FRANCOIS  on cpap qHS), HLD, HTN,  strep  bactreemia,  2022.  syncope    colonoscopy 8/22,  polyp         admitted  with  sob        from   COPD  acute exacerbation.  entero rhinovirus             on rocephin/  z  max.  singular.  steroid           CT chest .  probabale  malignancy /  pna ,  mediastinal  and  R  hilat  adenopathy /  will  need  bronch           house  pulm       DM,  has  insulin pump,              Medtronic  insulin pump at home and sunny [place],  Dr. Byrd for endocrine outpatient     c/c  diastolic   chf. torsemide    HTN/HLD         on  procardia,  labetolol.  enalapril     CAD,   on asa/ plavix/ ,lipitor     s/p  TAVR, 12/2021       CKD         s/p   R kidney transplant  2013,   Dr Vargas  [facility] /  on cellcept/  tacro  1mg/  1,5  mg.  hs      h/o   ILD,  restrictive/obstructive lung disease,  and   FRANCOIS / cpap    on dvt  ppx               < from: CT Chest No Cont (10.10.23 @ 18:49) >  IMPRESSION:                      1. Newly developing nodular areas of consolidation bilaterally as well as   reticular opacities and abbreviated differential includes metastatic   disease with possible lymphangitic component. Atypical infectious and/or   inflammatory etiologies cannot be excluded. Bronchoscopic correlation and   histological sampling may be in order.  2. Mild increase in now moderate mediastinal and probable right hilar   lymphadenopathy.  3. Post TAVR with aneurysmal dilatation of the ascending aorta measuring   4.2 cm moderate dilatation of the pulmonary artery measuring up to 3.6   cm. Pulmonary hypertension cannot be excluded.  4. Additional chronic findings as above.  --- End of Report ---          71 y/o M     h/o ESRD,s/p  R renal transplant 2013 , renal  cysts    DM,  CAD   s/p stents , FRANCOIS  on cpap qHS), HLD, HTN,  strep  bactreemia,  2022.  syncope    colonoscopy 8/22,  polyp         admitted  with  sob        from   COPD  acute exacerbation.  entero rhinovirus             on rocephin/  z  max.  singular.  steroid           CT chest .  probable   malignancy /  pna ,  mediastinal  and  R  hilar  adenopathy           will  need  bronch/   house  pulm   called      DM,  has  insulin pump,    hous e  endo  follwoing           Medtronic  insulin pump at home and Ozempic ,endo,  Dr. Byrd      c/c  diastolic   chf.  torsemide/ known to  card    HTN/HLD         on  procardia,  labetolol.  enalapril     CAD,   on asa/ plavix/ ,lipitor     s/p  TAVR, 12/2021       CKD         s/p   R kidney transplant  2013,   Dr Vargas  [facility] /  on cellcept/  tacro  1mg/  1,5  mg.  hs      h/o   ILD,  restrictive/obstructive lung disease,  and   FRANCOIS / cpap    on dvt  ppx     wife  at  [facility], pt  no longer  f/p  with transplant team in  [facility]/ dr edward olmstead following / defer transplant  meds to  renal              < from: CT Chest No Cont (10.10.23 @ 18:49) >  IMPRESSION:                      1. Newly developing nodular areas of consolidation bilaterally as well as   reticular opacities and abbreviated differential includes metastatic   disease with possible lymphangitic component. Atypical infectious and/or   inflammatory etiologies cannot be excluded. Bronchoscopic correlation and   histological sampling may be in order.  2. Mild increase in now moderate mediastinal and probable right hilar   lymphadenopathy.  3. Post TAVR with aneurysmal dilatation of the ascending aorta measuring   4.2 cm moderate dilatation of the pulmonary artery measuring up to 3.6   cm. Pulmonary hypertension cannot be excluded.  4. Additional chronic findings as above.  --- End of Report ---

## 2023-10-11 NOTE — CONSULT NOTE ADULT - CONSULT REQUESTED DATE/TIME
11-Oct-2023 22:29
11-Oct-2023 10:31
11-Oct-2023
11-Oct-2023 23:21
11-Oct-2023 15:21
11-Oct-2023 12:36

## 2023-10-11 NOTE — CONSULT NOTE ADULT - ASSESSMENT
72M w/ pmhx of FRANCOIS on CPAP, COPD w/ 50-pack-year smoking history, DM2 on insulin pump, hyperlipidemia, hypertension, CAD status post stent, TAVR, history of kidney transplant in 2013 on immunosuppression presenting with 2 days of shortness of breath. Wife states pt has been having coughing w/ slight yellow sputum, wheezing and SOB for past 2-3 days which was not improved with QID nebulizers at home. Pt reluctant to see doctor. Went to see PMD this AM and was sent to ER to r/o PNA. Denies fevers or chills. Endorses slight midsternal chest pressure and notable wheezing. No obvious sick contacts. No other specific symptoms.  pt with hx of ashd, s/p stents, AS , s/p TAVR in 2021 with c/o sob and cough ?pneumoniae  continue iv abx  ID noted will adjust cardiac meds  continue ASA daily  continue current cardiac meds, doubt chf, continue Demedex  pulmonary noted  check blood cultures      
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 Endocrine consulted for insulin pump     #T2DM  Home med: Medtronic pump w/ novolog insulin + Ozempic 0.5mgweekly  - Given patient’s glucose is at target and patient is AOx4 and feels comfortable using the pump, patient can continue with insulin pump while in the hospital   - given patient now hyperglycemic on steroids, recommend temp basal increase of 10% (adjusted these settings at bedside with patient)   - Please make sure patient fills out patient attestation and patient self-assessment form to use insulin pump and place in chart (forms can be found on forms on demand)   - RN to document correction insulin bolus in flow sheet   - Hypoglcyemia protocol    - In case of pump malfunction, please administer Lantus 16 units STAT and start Admelog 4 units TID with meals  (HOLD IF NPO)  - Please check FSG before meals and QHS, or q6h while NPO   - Please keep patient on a diabetic, carb controlled diet    - on discharge patient should follow up with their endocirnologist Dr. Jyoti Byrd    Home insulin pump settings:  Basal: TDD is 16.3U   12AM-5AM 0.6U/hr   5AM-12AM 0.7U/hr       ICR: 12AM-11PM 15g/u             11PM-12AM 20g/u       ISF: 50mg/dl all day   Glucose target      #HLD  - patient on atorvastatin 20mg a home  - goal LDL in diabetes mellitus is <70    #HTN  - patient on enalapril 10mg at home  - goal BP in diabetes mellitus is <130/80    Samuel Stockton MD  Endocrine Fellow  Can be reached via [employer] teams.    For follow up questions, discharge recommendations, or new consults, please email [email or web address] ([email or web address]) or [email or web address] ([facility]) or call answering service at [phone] (weekdays); [phone] (nights/weekends).  For emergiencies please page fellow on call.
PKD s/p transplant on immunosuppressive therapy 10 yrs  smoker gr than 40 years  has s/sx pneumonia  of course underlying malignancy, bronchogenic ca given smoking history cannot be ruled out. other possibilities including posttransplant lymphoproliferative disorder cannot be excluded at this time  await pulm followup, will need f/u scans after treatment of pneumonia
72M w/ pmhx of FRANCOIS on CPAP, COPD w/ 50-pack-year smoking history, DM2 on insulin pump, hyperlipidemia, hypertension, CAD status post stent, TAVR, history of kidney transplant in  on immunosuppresion, presenting with 2 days of shortness of breath. Wife states pt has been having coughing w/ slight yellow sputum, wheezing and SOB for past 2-3 days which was not improved with QID nebulizers at home. Pt reluctant to see doctor. Went to see PMD and was sent to ER to r/o PNA. Denies fevers or chills. Endorses slight midsternal chest pressure and notable wheezing. No obvious sick contacts. No other specific symptoms. follows pulm       1-  donor renal transplant   2- copd  3- HTN   4- pneumonia    cont tacro 1.5 mg pm and 1 mg am   cellcept held on admission and started on solumedrol due to infection   resume cellcept at 500 mg bid as of am [ lower dose given on steroids and infection ]   cont labetalol 200 mg tid and procardia xl 90 mg daily   rocephin 1 g daily   pulm consult for lung lesions   nebs to cont   d/w pt wife at bedside   d/w primary team at bedside when seen earlier   
72M w/ pmhx of FRANCOIS on CPAP, COPD w/ 50-pack-year smoking history, DM2 on insulin pump,  CAD status post stent, TAVR, renal transplant in 2013 on immunosuppression now p/w PNA and copd exacerbation.    Improving rapidly w tx. The new consolidations at the bases of the CT likely represent PNA. However prior imaging does show increased interstitial markings and GGO at the bases.  Needs short interval CT to ensure that areas resolve after PNA tx as well as LAD otherwise may need interventions for dx    - cont steroids, if cont to improve can change to po steroids tomorrow  - symbicort inh bid  - cont duoneb  - cont abx for PNA, f/u ID reccs  - f/u cx results  - cpap for FRANCOIS  - short interval repeat CT chest to ensure resolving dz  - counselled smoking cessation    Pt to be followed by patients primary Pulmonologist Dr. Kirby tomorrow 
73 yo M FRANCOIS, COPD, DM, TAVR, renal transplant with SOB  No fever, has mild leukocytosis  Cough/runny nose, sputum production  RVP Entero/Rhino  CT Chest nodular consolidation/reticular opacities, LAD  UA neg  Bacterial vs viral pneumonia  Overall, Viral URI, bacterial pneumonia, leukocytosis  - Ceftriaxone 1g q 24  - Azithro 500mg q 24  - Check urine legionella  - Check sputum culture  - O2 supplementation as needed  - Monitor for improvement/worsening  - Supportive care for viral URI  - Any concerns for malignancy based on CT read per team    Lalito Cornejo MD  Contact on TEAMS messaging from 9am - 5pm  From 5pm-9am, on weekends, or if no response call [phone]

## 2023-10-11 NOTE — PROGRESS NOTE ADULT - SUBJECTIVE AND OBJECTIVE BOX
date of service: 10-11-23 @ 06:31  afberile  REVIEW OF SYSTEMS:  CONSTITUTIONAL: No fever,  no  weight loss  ENT:  No  tinnitus,   no   vertigo  NECK: No pain or stiffness  RESPIRATORY: No cough, wheezing, chills or hemoptysis;    No Shortness of Breath  CARDIOVASCULAR: No chest pain, palpitations, dizziness  GASTROINTESTINAL: No abdominal or epigastric pain. No nausea, vomiting, or hematemesis; No diarrhea  No melena or hematochezia.  GENITOURINARY: No dysuria, frequency, hematuria, or incontinence  NEUROLOGICAL: No headaches  SKIN: No itching,  no   rash  LYMPH Nodes: No enlarged glands  ENDOCRINE: No heat or cold intolerance  MUSCULOSKELETAL: No joint pain or swelling  PSYCHIATRIC: No depression, anxiety  HEME/LYMPH: No easy bruising, or bleeding gums  ALLERGY AND IMMUNOLOGIC: No hives or eczema	    MEDICATIONS  (STANDING):  albuterol/ipratropium for Nebulization 3 milliLiter(s) Nebulizer every 6 hours  allopurinol 100 milliGRAM(s) Oral at bedtime  aspirin enteric coated 81 milliGRAM(s) Oral daily  atorvastatin 20 milliGRAM(s) Oral daily  dextrose 5%. 1000 milliLiter(s) (50 mL/Hr) IV Continuous <Continuous>  dextrose 5%. 1000 milliLiter(s) (100 mL/Hr) IV Continuous <Continuous>  dextrose 50% Injectable 25 Gram(s) IV Push once  dextrose 50% Injectable 12.5 Gram(s) IV Push once  dextrose 50% Injectable 25 Gram(s) IV Push once  enalapril 10 milliGRAM(s) Oral daily  gabapentin 100 milliGRAM(s) Oral at bedtime  glucagon  Injectable 1 milliGRAM(s) IntraMuscular once  insulin aspart (NovoLOG) Pump 1 Each SubCutaneous Continuous Pump  labetalol 200 milliGRAM(s) Oral three times a day  methylPREDNISolone sodium succinate Injectable 40 milliGRAM(s) IV Push daily  montelukast 10 milliGRAM(s) Oral daily  NIFEdipine XL 60 milliGRAM(s) Oral daily  NIFEdipine XL 30 milliGRAM(s) Oral at bedtime  senna 2 Tablet(s) Oral at bedtime  tacrolimus 1 milliGRAM(s) Oral daily  tacrolimus 1.5 milliGRAM(s) Oral at bedtime  tamsulosin 0.4 milliGRAM(s) Oral at bedtime  torsemide 10 milliGRAM(s) Oral daily    MEDICATIONS  (PRN):  acetaminophen     Tablet .. 650 milliGRAM(s) Oral every 6 hours PRN Temp greater or equal to 38C (100.4F), Mild Pain (1 - 3)  dextrose Oral Gel 15 Gram(s) Oral once PRN Blood Glucose LESS THAN 70 milliGRAM(s)/deciliter  melatonin 3 milliGRAM(s) Oral at bedtime PRN Insomnia  ondansetron Injectable 4 milliGRAM(s) IV Push every 8 hours PRN Nausea and/or Vomiting      Vital Signs Last 24 Hrs  T(C): 36.8 (11 Oct 2023 05:17), Max: 37.2 (10 Oct 2023 15:20)  T(F): 98.2 (11 Oct 2023 05:17), Max: 99 (10 Oct 2023 15:20)  HR: 85 (11 Oct 2023 05:17) (70 - 92)  BP: 158/89 (11 Oct 2023 05:17) (118/53 - 163/72)  BP(mean): 86 (10 Oct 2023 19:50) (86 - 86)  RR: 20 (11 Oct 2023 05:17) (18 - 22)  SpO2: 98% (11 Oct 2023 05:17) (94% - 98%)    Parameters below as of 11 Oct 2023 05:17  Patient On (Oxygen Delivery Method): nasal cannula  O2 Flow (L/min): 4    CAPILLARY BLOOD GLUCOSE      POCT Blood Glucose.: 122 mg/dL (10 Oct 2023 16:18)    I&O's Summary        Appearance: Normal	  HEENT:   Normal oral mucosa, PERRL, EOMI	  Lymphatic: No lymphadenopathy  Cardiovascular: Normal S1 S2, No JVD  Respiratory: Lungs clear to auscultation	  Gastrointestinal:  Soft, Non-tender, + BS	  Skin: No rash, No ecchymoses	  Extremities:     LABS:                        13.2   11.93 )-----------( 173      ( 10 Oct 2023 11:37 )             39.4     10-10    141  |  105  |  15  ----------------------------<  121<H>  4.3   |  23  |  0.66    Ca    9.6      10 Oct 2023 11:37    TPro  7.5  /  Alb  3.6  /  TBili  1.2  /  DBili  x   /  AST  18  /  ALT  13  /  AlkPhos  98  10-10          Urinalysis Basic - ( 10 Oct 2023 11:37 )    Color: x / Appearance: x / SG: x / pH: x  Gluc: 121 mg/dL / Ketone: x  / Bili: x / Urobili: x   Blood: x / Protein: x / Nitrite: x   Leuk Esterase: x / RBC: x / WBC x   Sq Epi: x / Non Sq Epi: x / Bacteria: x          10-10 @ 11:18  4.3  35              Consultant(s) Notes Reviewed:      Care Discussed with Consultants/Other Providers:       date of service: 10-11-23 @ 06:31  afberile  REVIEW OF SYSTEMS:  CONSTITUTIONAL: No fever,  no  weight loss  ENT:  No  tinnitus,   no   vertigo  NECK: No pain or stiffness  RESPIRATORY: No cough, wheezing, chills or hemoptysis;    No Shortness of Breath  CARDIOVASCULAR: No chest pain, palpitations, dizziness  GASTROINTESTINAL: No abdominal or epigastric pain. No nausea, vomiting, or hematemesis; No diarrhea  No melena or hematochezia.  GENITOURINARY: No dysuria, frequency, hematuria, or incontinence  NEUROLOGICAL: No headaches  SKIN: No itching,  no   rash  LYMPH Nodes: No enlarged glands  ENDOCRINE: No heat or cold intolerance  MUSCULOSKELETAL: No joint pain or swelling  PSYCHIATRIC: No depression, anxiety  HEME/LYMPH: No easy bruising, or bleeding gums  ALLERGY AND IMMUNOLOGIC: No hives or eczema	    MEDICATIONS  (STANDING):  albuterol/ipratropium for Nebulization 3 milliLiter(s) Nebulizer every 6 hours  allopurinol 100 milliGRAM(s) Oral at bedtime  aspirin enteric coated 81 milliGRAM(s) Oral daily  atorvastatin 20 milliGRAM(s) Oral daily  dextrose 5%. 1000 milliLiter(s) (50 mL/Hr) IV Continuous <Continuous>  dextrose 5%. 1000 milliLiter(s) (100 mL/Hr) IV Continuous <Continuous>  dextrose 50% Injectable 25 Gram(s) IV Push once  dextrose 50% Injectable 12.5 Gram(s) IV Push once  dextrose 50% Injectable 25 Gram(s) IV Push once  enalapril 10 milliGRAM(s) Oral daily  gabapentin 100 milliGRAM(s) Oral at bedtime  glucagon  Injectable 1 milliGRAM(s) IntraMuscular once  insulin aspart (NovoLOG) Pump 1 Each SubCutaneous Continuous Pump  labetalol 200 milliGRAM(s) Oral three times a day  methylPREDNISolone sodium succinate Injectable 40 milliGRAM(s) IV Push daily  montelukast 10 milliGRAM(s) Oral daily  NIFEdipine XL 60 milliGRAM(s) Oral daily  NIFEdipine XL 30 milliGRAM(s) Oral at bedtime  senna 2 Tablet(s) Oral at bedtime  tacrolimus 1 milliGRAM(s) Oral daily  tacrolimus 1.5 milliGRAM(s) Oral at bedtime  tamsulosin 0.4 milliGRAM(s) Oral at bedtime  torsemide 10 milliGRAM(s) Oral daily    MEDICATIONS  (PRN):  acetaminophen     Tablet .. 650 milliGRAM(s) Oral every 6 hours PRN Temp greater or equal to 38C (100.4F), Mild Pain (1 - 3)  dextrose Oral Gel 15 Gram(s) Oral once PRN Blood Glucose LESS THAN 70 milliGRAM(s)/deciliter  melatonin 3 milliGRAM(s) Oral at bedtime PRN Insomnia  ondansetron Injectable 4 milliGRAM(s) IV Push every 8 hours PRN Nausea and/or Vomiting      Vital Signs Last 24 Hrs  T(C): 36.8 (11 Oct 2023 05:17), Max: 37.2 (10 Oct 2023 15:20)  T(F): 98.2 (11 Oct 2023 05:17), Max: 99 (10 Oct 2023 15:20)  HR: 85 (11 Oct 2023 05:17) (70 - 92)  BP: 158/89 (11 Oct 2023 05:17) (118/53 - 163/72)  BP(mean): 86 (10 Oct 2023 19:50) (86 - 86)  RR: 20 (11 Oct 2023 05:17) (18 - 22)  SpO2: 98% (11 Oct 2023 05:17) (94% - 98%)    Parameters below as of 11 Oct 2023 05:17  Patient On (Oxygen Delivery Method): nasal cannula  O2 Flow (L/min): 4    CAPILLARY BLOOD GLUCOSE      POCT Blood Glucose.: 122 mg/dL (10 Oct 2023 16:18)    I&O's Summary        Appearance: Normal	  HEENT:   Normal oral mucosa, PERRL, EOMI	  Lymphatic: No lymphadenopathy  Cardiovascular: Normal S1 S2, No JVD  Respiratory: Lungs  few  coasrs  b/sounds	  Gastrointestinal:  Soft, Non-tender, + BS	  Skin: No rash, No ecchymoses	  Extremities:     LABS:                        13.2   11.93 )-----------( 173      ( 10 Oct 2023 11:37 )             39.4     10-10    141  |  105  |  15  ----------------------------<  121<H>  4.3   |  23  |  0.66    Ca    9.6      10 Oct 2023 11:37    TPro  7.5  /  Alb  3.6  /  TBili  1.2  /  DBili  x   /  AST  18  /  ALT  13  /  AlkPhos  98  10-10          Urinalysis Basic - ( 10 Oct 2023 11:37 )    Color: x / Appearance: x / SG: x / pH: x  Gluc: 121 mg/dL / Ketone: x  / Bili: x / Urobili: x   Blood: x / Protein: x / Nitrite: x   Leuk Esterase: x / RBC: x / WBC x   Sq Epi: x / Non Sq Epi: x / Bacteria: x          10-10 @ 11:18  4.3  35              Consultant(s) Notes Reviewed:      Care Discussed with Consultants/Other Providers:

## 2023-10-11 NOTE — ADVANCED PRACTICE NURSE CONSULT - RECOMMEDATIONS
Pt is aox4 and able to manage insulin pump independently. Will continue with insulin pump. Endocrine team will follow.

## 2023-10-11 NOTE — CONSULT NOTE ADULT - SUBJECTIVE AND OBJECTIVE BOX
HPI  71 yo man with polycystic kidney disease s/p renal transplant 10 yrs ago [facility] follows with pulm for extensive smoking history, chronic lung disease.  Pt got adm for dyspnea low grade fever, cough, found numerous consolidations. pt gets yearly screening ct scans.  pmh DM PKD s/p transplant  psh renal transplant sh pos smoking history born [place] fh father  PKD no PKD in children  comp ros no wt loss, pos cough fever otherwise neg  physical  appears well  vs  t97.5 163/.72 20 96 sat  lungs clear  cor s1s2  abd soft nontender rlq scar no hsm  ext no edema  skin warm dry    data  wbc 9460 hgb 13.5 plt [id number]  cr 0.5 bili 1.4    PROCEDURE DATE:  10/10/2023          INTERPRETATION:  CLINICAL INFORMATION: 72-year-old female with dyspnea   for assessment of CHF versus pneumonia.    COMPARISON: None.    CONTRAST/COMPLICATIONS:  IV Contrast: NONE  Oral Contrast: NONE  Complications: None reported at time of study completion    PROCEDURE:  CT of the Chest was performed.  Sagittal and coronal reformats were performed.    FINDINGS:    LUNGS AND AIRWAYS: Patent central airways.  There are new confluent   nodular-like areas of consolidation within the anterior segment of the   left upper lobe (5:406, anterior medial basal segment of the left lower   lobe 5:721, as well as the subpleural regions of both lower lobes, left   5:668, and right 5:880). Also increasing interlobular septal thickening   is noted bilaterally.  PLEURA: No pleural effusion.  MEDIASTINUM AND MELYSSA: Slight interval increase in mild to moderate   mediastinal lymphadenopathy with increased number of enlarged right   paratracheal measuring 17 mm in short axis, aorticopulmonary window lymph   nodes measuring 13 mm in short axis subcarinal lymph nodes measuring 22   mm in short axis as well as azygos esophageal lymph nodes measuring 16 mm   in short axis.  VESSELS: There is mild to moderate calcified aortic mural plaque with   aneurysmal dilatation of the ascending aorta measuring up to 4.2 cm.   Patient is post TAVR. Aneurysmal dilatation of the proximal descending   thoracic aorta measuring 3.2 cm. Main pulmonary artery is mildly dilated   measuring 3.6 cm. Severe coronary artery calcification. Moderate   calcification of the mitral annulus.  HEART: Cardiomegaly. Probable physiologic pericardial fluid.  CHEST WALL AND LOWER NECK: Increased number of nonenlarged axillary lymph   nodes are again noted. No significant supraclavicular or retroperitoneal   lymphadenopathy.  VISUALIZED UPPER ABDOMEN: Gallbladder is contracted. Atrophic changes   involving the left kidney with small parapelvic cysts. Probable ptotic   right kidney which is outside the field-of-view. Adrenal gland   hyperplasia stable left lateral limb 2.2 cm nodule demonstrating low   attenuation possibly a benign lipid rich cortical adenoma.  BONES: Moderate multilevel degenerative changes of the thoracic spine. No   aggressive osseous lesions.    IMPRESSION:    1. Newly developing nodular areas of consolidation bilaterally as well as   reticular opacities and abbreviated differential includes metastatic   disease with possible lymphangitic component. Atypical infectious and/or   inflammatory etiologies cannot be excluded. Bronchoscopic correlation and   histological sampling may be in order.  2. Mild increase in now moderate mediastinal and probable right hilar   lymphadenopathy.  3. Post TAVR with aneurysmal dilatation of the ascending aorta measuring   4.2 cm moderate dilatation of the pulmonary artery measuring up to 3.6   cm. Pulmonary hypertension cannot be excluded.  4. Additional chronic findings as above.        --- End of Report ---            MEE MATTHEWS MD; Attending Radiologist  
"HPI:  72M w/ pmhx of FRANCOIS on CPAP, COPD w/ 50-pack-year smoking history, DM2 on insulin pump, hyperlipidemia, hypertension, CAD status post stent, TAVR, history of kidney transplant in 2013 on immunosuppresion, presenting with 2 days of shortness of breath. Wife states pt has been having coughing w/ slight yellow sputum, wheezing and SOB for past 2-3 days which was not improved with QID nebulizers at home. Pt reluctant to see doctor. Went to see PMD this AM and was sent to ER to r/o PNA. Denies fevers or chills. Endorses slight midsternal chest pressure and notable wheezing. No obvious sick contacts. No other specific symptoms.    In ER: Given IV Cefepime, azithromycin, Duoneb (10 Oct 2023 21:09)"    Above reviewed. 73 yo M FRANCOIS, COPD, DM, TAVR, renal transplant with SOB  Patient without any recent sick contacts  Had runny nose, then developed into cough, SOB  No chest pain  No N/V/D  No dysuria, no pyuria  No other complaints  ID called for further eval    PAST MEDICAL & SURGICAL HISTORY:  HTN (Hypertension), Benign      Hyperlipidemia      Smoking      DM (diabetes mellitus), type 2      FRANCOIS on CPAP      H/O kidney transplant  2013      CAD (coronary artery disease)  1 stent      Kidney transplant recipient  Rt kidney- 2013      AV fistula      History of transcatheter aortic valve replacement (TAVR)      Personal history of spine surgery      Insulin pump status      H/O colonoscopy    Allergies    No Known Allergies    Intolerances    ANTIMICROBIALS:  azithromycin  IVPB 500 every 24 hours  cefTRIAXone   IVPB 1000 every 24 hours    OTHER MEDS:  acetaminophen     Tablet .. 650 milliGRAM(s) Oral every 6 hours PRN  albuterol/ipratropium for Nebulization 3 milliLiter(s) Nebulizer every 6 hours  allopurinol 100 milliGRAM(s) Oral at bedtime  aspirin enteric coated 81 milliGRAM(s) Oral daily  atorvastatin 20 milliGRAM(s) Oral daily  dextrose 5%. 1000 milliLiter(s) IV Continuous <Continuous>  dextrose 5%. 1000 milliLiter(s) IV Continuous <Continuous>  dextrose 50% Injectable 25 Gram(s) IV Push once  dextrose 50% Injectable 12.5 Gram(s) IV Push once  dextrose 50% Injectable 25 Gram(s) IV Push once  dextrose Oral Gel 15 Gram(s) Oral once PRN  enalapril 10 milliGRAM(s) Oral daily  gabapentin 100 milliGRAM(s) Oral at bedtime  glucagon  Injectable 1 milliGRAM(s) IntraMuscular once  insulin aspart (NovoLOG) Pump 1 Each SubCutaneous Continuous Pump  labetalol 200 milliGRAM(s) Oral three times a day  melatonin 3 milliGRAM(s) Oral at bedtime PRN  methylPREDNISolone sodium succinate Injectable 40 milliGRAM(s) IV Push daily  montelukast 10 milliGRAM(s) Oral daily  NIFEdipine XL 60 milliGRAM(s) Oral daily  NIFEdipine XL 30 milliGRAM(s) Oral at bedtime  ondansetron Injectable 4 milliGRAM(s) IV Push every 8 hours PRN  senna 2 Tablet(s) Oral at bedtime  tacrolimus 1 milliGRAM(s) Oral daily  tacrolimus 1.5 milliGRAM(s) Oral at bedtime  tamsulosin 0.4 milliGRAM(s) Oral at bedtime  torsemide 10 milliGRAM(s) Oral daily    SOCIAL HISTORY: No tobacco, no alcohol, no illicit drugs    FAMILY HISTORY: No pertinent family history relating to chief complaint      Drug Dosing Weight  Height (cm): 172.7 (10 Oct 2023 10:27)  Weight (kg): 93.4 (10 Oct 2023 10:27)  BMI (kg/m2): 31.3 (10 Oct 2023 10:27)  BSA (m2): 2.07 (10 Oct 2023 10:27)    PE:    Vital Signs Last 24 Hrs  T(C): 36.4 (11 Oct 2023 08:55), Max: 37.2 (10 Oct 2023 15:20)  T(F): 97.5 (11 Oct 2023 08:55), Max: 99 (10 Oct 2023 15:20)  HR: 76 (11 Oct 2023 08:55) (72 - 92)  BP: 163/72 (11 Oct 2023 08:55) (120/60 - 163/72)  BP(mean): 86 (10 Oct 2023 19:50) (86 - 86)  RR: 20 (11 Oct 2023 08:55) (18 - 20)  SpO2: 96% (11 Oct 2023 08:55) (94% - 98%)    Gen: AOx3, NAD, non-toxic, pleasant  CV: S1+S2 normal, nontachycardic  Resp: Clear bilat, no resp distress, no crackles/wheezes, NC  Abd: Soft, nontender, +BS  Ext: No LE edema, no wounds  : No Pruitt  IV/Skin: No thrombophlebitis  Msk: No low back pain, no arthralgias, no joint swelling  Neuro: No sensory deficits, no motor deficits    LABS:                        13.5   9.46  )-----------( 202      ( 11 Oct 2023 07:14 )             41.9     10-11    136  |  104  |  11  ----------------------------<  136<H>  4.8   |  16<L>  |  0.50    Ca    9.9      11 Oct 2023 07:14    TPro  7.8  /  Alb  3.8  /  TBili  1.4<H>  /  DBili  x   /  AST  19  /  ALT  12  /  AlkPhos  120  10-11    Urinalysis Basic - ( 11 Oct 2023 07:14 )    Color: x / Appearance: x / SG: x / pH: x  Gluc: 136 mg/dL / Ketone: x  / Bili: x / Urobili: x   Blood: x / Protein: x / Nitrite: x   Leuk Esterase: x / RBC: x / WBC x   Sq Epi: x / Non Sq Epi: x / Bacteria: x    MICROBIOLOGY:    Rapid RVP Result: Detected (10-10 @ 12:39) Entero/Rhino    RADIOLOGY:    10/10 CT      IMPRESSION:    1. Newly developing nodular areas of consolidation bilaterally as well as   reticular opacities and abbreviated differential includes metastatic   disease with possible lymphangitic component. Atypical infectious and/or   inflammatory etiologies cannot be excluded. Bronchoscopic correlation and   histological sampling may be in order.  2. Mild increase in now moderate mediastinal and probable right hilar   lymphadenopathy.  3. Post TAVR with aneurysmal dilatation of the ascending aorta measuring   4.2 cm moderate dilatation of the pulmonary artery measuring up to 3.6   cm. Pulmonary hypertension cannot be excluded.  4. Additional chronic findings as above.
[facility]  [phone]  NEPHROLOGY      INITIAL CONSULT NOTE  --------------------------------------------------------------------------------  HPI:      72M w/ pmhx of FRANCOIS on CPAP, COPD w/ 50-pack-year smoking history, DM2 on insulin pump, hyperlipidemia, hypertension, CAD status post stent, TAVR, history of kidney transplant in 2013 on immunosuppresion, presenting with 2 days of shortness of breath. Wife states pt has been having coughing w/ slight yellow sputum, wheezing and SOB for past 2-3 days which was not improved with QID nebulizers at home. Pt reluctant to see doctor. Went to see PMD and was sent to ER to r/o PNA. Denies fevers or chills. Endorses slight midsternal chest pressure and notable wheezing. No obvious sick contacts. No other specific symptoms. follows pulm   In ER: Given IV Cefepime, azithromycin, Duoneb   renal consult called given hx of renal transplant       PAST HISTORY  --------------------------------------------------------------------------------  PAST MEDICAL & SURGICAL HISTORY:  HTN (Hypertension), Benign      Hyperlipidemia      Smoking      DM (diabetes mellitus), type 2      FRANCOIS on CPAP      H/O kidney transplant  2013      CAD (coronary artery disease)  1 stent      Kidney transplant recipient  Rt kidney- 2013      AV fistula      History of transcatheter aortic valve replacement (TAVR)      Personal history of spine surgery      Insulin pump status      H/O colonoscopy        FAMILY HISTORY:    PAST SOCIAL HISTORY:  active tobacco user   no alcohol     ALLERGIES & MEDICATIONS  --------------------------------------------------------------------------------  Allergies    No Known Allergies    Intolerances      Standing Inpatient Medications  albuterol/ipratropium for Nebulization 3 milliLiter(s) Nebulizer every 6 hours  allopurinol 100 milliGRAM(s) Oral at bedtime  aspirin enteric coated 81 milliGRAM(s) Oral daily  atorvastatin 20 milliGRAM(s) Oral daily  azithromycin  IVPB 500 milliGRAM(s) IV Intermittent every 24 hours  cefTRIAXone   IVPB 1000 milliGRAM(s) IV Intermittent every 24 hours  dextrose 5%. 1000 milliLiter(s) IV Continuous <Continuous>  dextrose 5%. 1000 milliLiter(s) IV Continuous <Continuous>  dextrose 50% Injectable 25 Gram(s) IV Push once  dextrose 50% Injectable 25 Gram(s) IV Push once  dextrose 50% Injectable 12.5 Gram(s) IV Push once  enalapril 10 milliGRAM(s) Oral daily  gabapentin 100 milliGRAM(s) Oral at bedtime  glucagon  Injectable 1 milliGRAM(s) IntraMuscular once  heparin   Injectable 5000 Unit(s) SubCutaneous every 12 hours  insulin aspart (NovoLOG) Pump 1 Each SubCutaneous Continuous Pump  labetalol 200 milliGRAM(s) Oral three times a day  montelukast 10 milliGRAM(s) Oral daily  NIFEdipine XL 60 milliGRAM(s) Oral daily  NIFEdipine XL 30 milliGRAM(s) Oral at bedtime  senna 2 Tablet(s) Oral at bedtime  tacrolimus 1 milliGRAM(s) Oral daily  tacrolimus 1.5 milliGRAM(s) Oral at bedtime  tamsulosin 0.4 milliGRAM(s) Oral at bedtime  torsemide 10 milliGRAM(s) Oral daily    PRN Inpatient Medications  acetaminophen     Tablet .. 650 milliGRAM(s) Oral every 6 hours PRN  dextrose Oral Gel 15 Gram(s) Oral once PRN  melatonin 3 milliGRAM(s) Oral at bedtime PRN      REVIEW OF SYSTEMS  --------------------------------------------------------------------------------  Gen: No  fevers/chills   Skin: No rashes  Head/Eyes/Ears/Mouth: No headache; Normal hearing;  No sinus pain/discomfort, sore throat  Respiratory: + sob + yellow phlegm + cough,  - wheezing, hemoptysis -   CV: No chest pain, orthopnea  GI: No abdominal pain, diarrhea, nausea, vomiting, melena, hematochezia  : No dysuria, decrease urination or hesitancy urinating  hematuria, nocturia  MSK: No joint pain/swelling; no back pain  Neuro: No dizziness/lightheadedness  also with no edema         VITALS/PHYSICAL EXAM  --------------------------------------------------------------------------------  T(C): 36.8 (10-11-23 @ 16:55), Max: 37 (10-11-23 @ 03:10)  HR: 78 (10-11-23 @ 16:55) (76 - 92)  BP: 147/65 (10-11-23 @ 16:55) (147/65 - 163/72)  RR: 20 (10-11-23 @ 16:17) (18 - 20)  SpO2: 95% (10-11-23 @ 16:55) (95% - 99%)  Wt(kg): --  Height (cm): 170.2 (10-11-23 @ 16:55)  Weight (kg): 93.44 (10-11-23 @ 16:55)  BMI (kg/m2): 32.3 (10-11-23 @ 16:55)  BSA (m2): 2.05 (10-11-23 @ 16:55)      10-11-23 @ 07:01  -  10-11-23 @ 22:29  --------------------------------------------------------  IN: 0 mL / OUT: 0 mL / NET: 0 mL      Physical Exam:  	Gen: Non toxic comfortable appearing   	no jvd   	Pulm: decrease bs  + coarses bs intermittently mild insp wheeze   	CV: RRR, S1S2; no rub  	Back: No CVA tenderness  	Abd: +BS, soft, nontender/nondistended graft site non tender and no bruit   	: No suprapubic tenderness  	UE: Warm, no cyanosis  no clubbing,  no edema  	LE: Warm, no cyanosis  no clubbing, no edema  	Neuro: alert and oriented. speech coherent   	Psych: Normal affect and mood  	Skin: Warm, no decrease skin turgor     LABS/STUDIES  --------------------------------------------------------------------------------              13.5   9.46  >-----------<  202      [10-11-23 @ 07:14]              41.9     136  |  104  |  11  ----------------------------<  136      [10-11-23 @ 07:14]  4.8   |  16  |  0.50        Ca     9.9     [10-11-23 @ 07:14]    TPro  7.8  /  Alb  3.8  /  TBili  1.4  /  DBili  x   /  AST  19  /  ALT  12  /  AlkPhos  120  [10-11-23 @ 07:14]          Creatinine Trend:  SCr 0.50 [10-11 @ 07:14]  SCr 0.66 [10-10 @ 11:37]      inalysis + Microscopic Examination (08.21.23 @ 15:59)   pH Urine: 5.5  Urine Appearance: Clear  Color: Rhina  Specific Gravity: >=1.030  Protein, Urine: 300 mg/dL  Glucose Qualitative, Urine: Negative  Ketone - Urine: Trace  Blood, Urine: Negative  Bilirubin: Small  Urobilinogen: <2 mg/dL  Leukocyte Esterase Concentration: Negative  Nitrite: Negative  White Blood Cell - Urine: 1 /HPF  Red Blood Cell - Urine: 1 /hpf  Bacteria: Negative  Hyaline Casts: 0 /LPF  Squamous Epithelial Cells: 1  Uric Acid Crystals: Moderate      < from: CT Chest No Cont (10.10.23 @ 18:49) >  ACC: [id number] EXAM:  CT CHEST   ORDERED BY: HOSSEIN FIELD ARDIANA     PROCEDURE DATE:  10/10/2023          INTERPRETATION:  CLINICAL INFORMATION: 72-year-old female with dyspnea   for assessment of CHF versus pneumonia.    COMPARISON: None.    CONTRAST/COMPLICATIONS:  IV Contrast: NONE  Oral Contrast: NONE  Complications: None reported at time of study completion    PROCEDURE:  CT of the Chest was performed.  Sagittal and coronal reformats were performed.    FINDINGS:    LUNGS AND AIRWAYS: Patent central airways.  There are new confluent   nodular-like areas of consolidation within the anterior segment of the   left upper lobe (5:406, anterior medial basal segment of the left lower   lobe 5:721, as well as the subpleural regions of both lower lobes, left   5:668, and right 5:880). Also increasing interlobular septal thickening   is noted bilaterally.  PLEURA: No pleural effusion.  MEDIASTINUM AND MELYSSA: Slight interval increase in mild to moderate   mediastinal lymphadenopathy with increasednumber of enlarged right   paratracheal measuring 17 mm in short axis, aorticopulmonary window lymph   nodes measuring 13 mm in short axis subcarinal lymph nodes measuring 22   mm in short axis as well as azygos esophageal lymph nodes measuring 16 mm  in short axis.  VESSELS: There is mild to moderate calcified aortic mural plaque with   aneurysmal dilatation of the ascending aorta measuring up to 4.2 cm.   Patient is post TAVR. Aneurysmal dilatation of the proximal descending   thoracic aorta measuring 3.2 cm. Main pulmonary artery is mildly dilated   measuring 3.6 cm. Severe coronary artery calcification. Moderate   calcification of the mitral annulus.  HEART: Cardiomegaly. Probable physiologic pericardial fluid.  CHEST WALL AND LOWER NECK: Increased number of nonenlarged axillary lymph   nodes are again noted. No significant supraclavicular or retroperitoneal   lymphadenopathy.  VISUALIZED UPPER ABDOMEN: Gallbladder is contracted. Atrophic changes   involving the left kidney with small parapelvic cysts. Probable ptotic   right kidney which is outside the field-of-view. Adrenal gland   hyperplasia stable left lateral limb 2.2 cm nodule demonstrating low   attenuation possibly a benign lipid rich cortical adenoma.  BONES: Moderate multilevel degenerative changes of the thoracic spine. No   aggressive osseous lesions.    IMPRESSION:    1. Newly developing nodular areas of consolidation bilaterally as well as   reticular opacities and abbreviated differential includes metastatic   disease with possible lymphangitic component. Atypical infectious and/or   inflammatory etiologies cannot be excluded. Bronchoscopic correlation and   histological sampling may be in order.  2. Mild increase in now moderate mediastinal and probable right hilar   lymphadenopathy.  3. Post TAVR with aneurysmal dilatation of the ascending aorta measuring   4.2 cm moderate dilatation of the pulmonary artery measuring up to 3.6   cm. Pulmonary hypertension cannot be excluded.  4. Additional chronic findings as above.    < end of copied text >        HCV 0.21, Nonreact      [08-24-22 @ 07:07]    
Date of Service, 10-11-23 @ 20:56  CHIEF COMPLAINT:Patient is a 72y old  Male who presents with a chief complaint of SOB (11 Oct 2023 15:21)      HPI:  72M w/ pmhx of FRANCOIS on CPAP, COPD w/ 50-pack-year smoking history, DM2 on insulin pump, hyperlipidemia, hypertension, CAD status post stent, TAVR, history of kidney transplant in 2013 on immunosuppresion, presenting with 2 days of shortness of breath. Wife states pt has been having coughing w/ slight yellow sputum, wheezing and SOB for past 2-3 days which was not improved with QID nebulizers at home. Pt reluctant to see doctor. Went to see PMD this AM and was sent to ER to r/o PNA. Denies fevers or chills. Endorses slight midsternal chest pressure and notable wheezing. No obvious sick contacts. No other specific symptoms.        PAST MEDICAL & SURGICAL HISTORY:  HTN (Hypertension), Benign  Hyperlipidemia  Smoking  DM (diabetes mellitus), type 2  FRANCOIS on CPAP  H/O kidney transplant  2013  CAD (coronary artery disease)  1 stent  Kidney transplant recipient  Rt kidney- 2013  AV fistula  History of transcatheter aortic valve replacement (TAVR)  Personal history of spine surgery  Insulin pump status  H/O colonoscopy    MEDICATIONS  (STANDING):  albuterol/ipratropium for Nebulization 3 milliLiter(s) Nebulizer every 6 hours  allopurinol 100 milliGRAM(s) Oral at bedtime  aspirin enteric coated 81 milliGRAM(s) Oral daily  atorvastatin 20 milliGRAM(s) Oral daily  azithromycin  IVPB 500 milliGRAM(s) IV Intermittent every 24 hours  cefTRIAXone   IVPB 1000 milliGRAM(s) IV Intermittent every 24 hours  dextrose 5%. 1000 milliLiter(s) (50 mL/Hr) IV Continuous <Continuous>  dextrose 5%. 1000 milliLiter(s) (100 mL/Hr) IV Continuous <Continuous>  dextrose 50% Injectable 25 Gram(s) IV Push once  dextrose 50% Injectable 12.5 Gram(s) IV Push once  dextrose 50% Injectable 25 Gram(s) IV Push once  enalapril 10 milliGRAM(s) Oral daily  gabapentin 100 milliGRAM(s) Oral at bedtime  glucagon  Injectable 1 milliGRAM(s) IntraMuscular once  insulin aspart (NovoLOG) Pump 1 Each SubCutaneous Continuous Pump  labetalol 200 milliGRAM(s) Oral three times a day  methylPREDNISolone sodium succinate Injectable 40 milliGRAM(s) IV Push daily  montelukast 10 milliGRAM(s) Oral daily  NIFEdipine XL 60 milliGRAM(s) Oral daily  NIFEdipine XL 30 milliGRAM(s) Oral at bedtime  senna 2 Tablet(s) Oral at bedtime  tacrolimus 1 milliGRAM(s) Oral daily  tacrolimus 1.5 milliGRAM(s) Oral at bedtime  tamsulosin 0.4 milliGRAM(s) Oral at bedtime  torsemide 10 milliGRAM(s) Oral daily    MEDICATIONS  (PRN):  acetaminophen     Tablet .. 650 milliGRAM(s) Oral every 6 hours PRN Temp greater or equal to 38C (100.4F), Mild Pain (1 - 3)  dextrose Oral Gel 15 Gram(s) Oral once PRN Blood Glucose LESS THAN 70 milliGRAM(s)/deciliter  melatonin 3 milliGRAM(s) Oral at bedtime PRN Insomnia  ondansetron Injectable 4 milliGRAM(s) IV Push every 8 hours PRN Nausea and/or Vomiting      FAMILY HISTORY:      SOCIAL HISTORY:    [x ] Non-smoker  [ ] Smoker  [ ] Alcohol    Allergies    No Known Allergies    Intolerances    	    REVIEW OF SYSTEMS:  CONSTITUTIONAL: No fever, weight loss, or fatigue  EYES: No eye pain, visual disturbances, or discharge  ENT:  No difficulty hearing, tinnitus, vertigo; No sinus or throat pain  NECK: No pain or stiffness  RESPIRATORY: + cough, no wheezing, chills or hemoptysis; + Shortness of Breath  CARDIOVASCULAR: No chest pain, palpitations, passing out, dizziness, or leg swelling  GASTROINTESTINAL: No abdominal or epigastric pain. No nausea, vomiting, or hematemesis; No diarrhea or constipation. No melena or hematochezia.  GENITOURINARY: No dysuria, frequency, hematuria, or incontinence  NEUROLOGICAL: No headaches, memory loss, loss of strength, numbness, or tremors  SKIN: No itching, burning, rashes, or lesions   LYMPH Nodes: No enlarged glands  ENDOCRINE: No heat or cold intolerance; No hair loss  MUSCULOSKELETAL: No joint pain or swelling; No muscle, back, or extremity pain  PSYCHIATRIC: No depression, anxiety, mood swings, or difficulty sleeping  HEME/LYMPH: No easy bruising, or bleeding gums  ALLERGY AND IMMUNOLOGIC: No hives or eczema	    [x ] All others negative	  [ ] Unable to obtain    PHYSICAL EXAM:  T(C): 36.8 (10-11-23 @ 16:55), Max: 37 (10-11-23 @ 03:10)  HR: 78 (10-11-23 @ 16:55) (76 - 92)  BP: 147/65 (10-11-23 @ 16:55) (147/65 - 163/72)  RR: 20 (10-11-23 @ 16:17) (18 - 20)  SpO2: 95% (10-11-23 @ 16:55) (95% - 99%)  Wt(kg): --  I&O's Summary      Appearance: Normal	  HEENT:   Normal oral mucosa, PERRL, EOMI	  Lymphatic: No lymphadenopathy  Cardiovascular: Normal S1 S2, No JVD, + murmurs, No edema  Respiratory:rhonchi  Gastrointestinal:  Soft, Non-tender, + BS	  Skin: No rashes, No ecchymoses, No cyanosis	  Neurologic: Non-focal  Extremities: Normal range of motion, No clubbing, cyanosis or edema  Vascular: Peripheral pulses palpable 2+ bilaterally    TELEMETRY: 	    ECG:  	  RADIOLOGY:  OTHER: 	  	  LABS:	 	    CARDIAC MARKERS:                        13.5   9.46  )-----------( 202      ( 11 Oct 2023 07:14 )             41.9     10-11    136  |  104  |  11  ----------------------------<  136<H>  4.8   |  16<L>  |  0.50    Ca    9.9      11 Oct 2023 07:14    TPro  7.8  /  Alb  3.8  /  TBili  1.4<H>  /  DBili  x   /  AST  19  /  ALT  12  /  AlkPhos  120  10-11    proBNP:   Lipid Profile:   HgA1c:   TSH:       PREVIOUS DIAGNOSTIC TESTING:    < from: 12 Lead ECG (08.22.22 @ 21:02) >  Diagnosis Line NORMAL SINUS RHYTHM  LEFT VENTRICULAR HYPERTROPHY WITH REPOLARIZATION ABNORMALITY ( R in aVL )  ABNORMAL ECG  WHEN COMPARED WITH ECG OF 04-DEC-2021 11:40,  NO SIGNIFICANT CHANGE WAS FOUND      < from: CT Chest No Cont (10.10.23 @ 18:49) >  1. Newly developing nodular areas of consolidation bilaterally as well as   reticular opacities and abbreviated differential includes metastatic   disease with possible lymphangitic component. Atypical infectious and/or   inflammatory etiologies cannot be excluded. Bronchoscopic correlation and   histological sampling may be in order.  2. Mild increase in now moderate mediastinal and probable right hilar   lymphadenopathy.  3. Post TAVR with aneurysmal dilatation of the ascending aorta measuring   4.2 cm moderate dilatation of the pulmonary artery measuring up to 3.6   cm. Pulmonary hypertension cannot be excluded.  4. Additional chronic findings as above.    < from: POCUS ED TTE 2D F/U, Limited w/o Cont. (10.10.23 @ 15:04) >  No clinically significant pericardial effusion.    Right basilar subpleural consolidation, air bronchograms and B-lines,   consider infectious etiology, differential also includes compressive   atelectasis.      < from: Transthoracic Echocardiogram (08.23.22 @ 09:57) >  1. Mitral annular calcification, otherwise normal mitral  valve.  2. Transcatheter aortic valve replacement. Aortic valve not  well visualized. Peak transaortic valve gradient equals 23  mm Hg, mean transaortic valve gradient equals 11 mm Hg,  which is probably normal in the presence of a transcatheter  aortic valve replacement.  3. Severely dilated left atrium. LA volume index = 63  cc/m2.  4. Moderate concentric left ventricular hypertrophy.  5. Endocardium not well visualized; grossly normal left  ventricular systolic function.  6. Normal right ventricular size and function.  7. Normal tricuspid valve. Minimal tricuspid regurgitation.      73 yo M FRANCOIS, COPD, DM, TAVR, renal transplant with SOB  No fever, has mild leukocytosis  Cough/runny nose, sputum production  RVP Entero/Rhino  CT Chest nodular consolidation/reticular opacities, LAD  UA neg  Bacterial vs viral pneumonia  Overall, Viral URI, bacterial pneumonia, leukocytosis  - Ceftriaxone 1g q 24  - Azithro 500mg q 24  - Check urine legionella  - Check sputum culture  - O2 supplementation as needed  - Monitor for improvement/worsening  - Supportive care for viral URI  - Any concerns for malignancy based on CT read per team    - cont steroids, if cont to improve can change to po steroids tomorrow  - symbicort inh bid  - cont duoneb  - cont abx for PNA, f/u ID reccs  - f/u cx results  - cpap for FRANCIOS  - short interval repeat CT chest to ensure resolving dz  - counselled smoking cessation    
HPI:  72M w/ pmhx of FRANCOIS on CPAP, COPD w/ 50-pack-year smoking history, DM2 on insulin pump, hyperlipidemia, hypertension, CAD status post stent, TAVR, history of kidney transplant in 2013 on immunosuppresion, presenting with 2 days of shortness of breath. Wife states pt has been having coughing w/ slight yellow sputum, wheezing and SOB for past 2-3 days which was not improved with QID nebulizers at home. Pt reluctant to see doctor. Went to see PMD this AM and was sent to ER to r/o PNA. Denies fevers or chills. Endorses slight midsternal chest pressure and notable wheezing. No obvious sick contacts. No other specific symptoms.    In ER: Given IV Cefepime, azithromycin, Duoneb (10 Oct 2023 21:09)     Pt reports 5 days of coughing sputum yellowish in color  no hemoptysis  Had URI sx for a week prior to that  Had very loud wheezing and dyspnea worse than baseline for the past 3 days as well that poorly responded to tx    Denies LE edema  Denies CP      Currently feels much improved       ROS: no CP, denies HA, LH, abd pain, N/V      Social  50py smoker  still smoking  has vaped as well    ## Labs:  CBC:                        13.5   9.46  )-----------( 202      ( 11 Oct 2023 07:14 )             41.9     Chem:  10-11    136  |  104  |  11  ----------------------------<  136<H>  4.8   |  16<L>  |  0.50    Ca    9.9      11 Oct 2023 07:14    TPro  7.8  /  Alb  3.8  /  TBili  1.4<H>  /  DBili  x   /  AST  19  /  ALT  12  /  AlkPhos  120  10-11            ## Medications:  azithromycin  IVPB 500 milliGRAM(s) IV Intermittent every 24 hours  cefTRIAXone   IVPB 1000 milliGRAM(s) IV Intermittent every 24 hours    enalapril 10 milliGRAM(s) Oral daily  labetalol 200 milliGRAM(s) Oral three times a day  NIFEdipine XL 60 milliGRAM(s) Oral daily  NIFEdipine XL 30 milliGRAM(s) Oral at bedtime  torsemide 10 milliGRAM(s) Oral daily    albuterol/ipratropium for Nebulization 3 milliLiter(s) Nebulizer every 6 hours  montelukast 10 milliGRAM(s) Oral daily    allopurinol 100 milliGRAM(s) Oral at bedtime  atorvastatin 20 milliGRAM(s) Oral daily  dextrose 50% Injectable 25 Gram(s) IV Push once  dextrose 50% Injectable 25 Gram(s) IV Push once  dextrose 50% Injectable 12.5 Gram(s) IV Push once  dextrose Oral Gel 15 Gram(s) Oral once PRN  glucagon  Injectable 1 milliGRAM(s) IntraMuscular once  insulin aspart (NovoLOG) Pump 1 Each SubCutaneous Continuous Pump  methylPREDNISolone sodium succinate Injectable 40 milliGRAM(s) IV Push daily    aspirin enteric coated 81 milliGRAM(s) Oral daily    senna 2 Tablet(s) Oral at bedtime    acetaminophen     Tablet .. 650 milliGRAM(s) Oral every 6 hours PRN  gabapentin 100 milliGRAM(s) Oral at bedtime  melatonin 3 milliGRAM(s) Oral at bedtime PRN  ondansetron Injectable 4 milliGRAM(s) IV Push every 8 hours PRN      ## Vitals:  T(C): 36.4 (10-11-23 @ 08:55), Max: 37 (10-11-23 @ 03:10)  HR: 87 (10-11-23 @ 15:00) (76 - 92)  BP: 159/72 (10-11-23 @ 15:00) (144/63 - 163/72)  BP(mean): 86 (10-10-23 @ 19:50) (86 - 86)  RR: 20 (10-11-23 @ 15:00) (18 - 20)  SpO2: 97% (10-11-23 @ 15:00) (95% - 98%)  Wt(kg): --  Vent:   ABG:           ## P/E:  Gen: lying comfortably in bed in no apparent distress  Mouth: mmm  Neck: no accessory muscle use  Lungs: b/l ae, mild exp rhonchi  Heart: s1s2 reg no murmur  Abd: +BS soft nontender  Ext: no edema  Neuro: aox3, monroy       < from: CT Chest No Cont (10.10.23 @ 18:49) >    ACC: [id number] EXAM:  CT CHEST   ORDERED BY: HOSSEIN WRIGHT     PROCEDURE DATE:  10/10/2023          INTERPRETATION:  CLINICAL INFORMATION: 72-year-old female with dyspnea   for assessment of CHF versus pneumonia.    COMPARISON: None.    CONTRAST/COMPLICATIONS:  IV Contrast: NONE  Oral Contrast: NONE  Complications: None reported at time of study completion    PROCEDURE:  CT of the Chest was performed.  Sagittal and coronal reformats were performed.    FINDINGS:    LUNGS AND AIRWAYS: Patent central airways.  There are new confluent   nodular-like areas of consolidation within the anterior segment of the   left upper lobe (5:406, anterior medial basal segment of the left lower   lobe 5:721, as well as the subpleural regions of both lower lobes, left   5:668, and right 5:880). Also increasing interlobular septal thickening   is noted bilaterally.  PLEURA: No pleural effusion.  MEDIASTINUM AND MELYSSA: Slight interval increase in mild to moderate   mediastinal lymphadenopathy with increasednumber of enlarged right   paratracheal measuring 17 mm in short axis, aorticopulmonary window lymph   nodes measuring 13 mm in short axis subcarinal lymph nodes measuring 22   mm in short axis as well as azygos esophageal lymph nodes measuring 16 mm  in short axis.  VESSELS: There is mild to moderate calcified aortic mural plaque with   aneurysmal dilatation of the ascending aorta measuring up to 4.2 cm.   Patient is post TAVR. Aneurysmal dilatation of the proximal descending   thoracic aorta measuring 3.2 cm. Main pulmonary artery is mildly dilated   measuring 3.6 cm. Severe coronary artery calcification. Moderate   calcification of the mitral annulus.  HEART: Cardiomegaly. Probable physiologic pericardial fluid.  CHEST WALL AND LOWER NECK: Increased number of nonenlarged axillary lymph   nodes are again noted. No significant supraclavicular or retroperitoneal   lymphadenopathy.  VISUALIZED UPPER ABDOMEN: Gallbladder is contracted. Atrophic changes   involving the left kidney with small parapelvic cysts. Probable ptotic   right kidney which is outside the field-of-view. Adrenal gland   hyperplasia stable left lateral limb 2.2 cm nodule demonstrating low   attenuation possibly a benign lipid rich cortical adenoma.  BONES: Moderate multilevel degenerative changes of the thoracic spine. No   aggressive osseous lesions.    IMPRESSION:    1. Newly developing nodular areas of consolidation bilaterally as well as   reticular opacities and abbreviated differential includes metastatic   disease with possible lymphangitic component. Atypical infectious and/or   inflammatory etiologies cannot be excluded. Bronchoscopic correlation and   histological sampling may be in order.  2. Mild increase in now moderate mediastinal and probable right hilar   lymphadenopathy.  3. Post TAVR with aneurysmal dilatation of the ascending aorta measuring   4.2 cm moderate dilatation of the pulmonary artery measuring up to 3.6   cm. Pulmonary hypertension cannot be excluded.  4. Additional chronic findings as above.        --- End of Report ---            MEE MATTHEWS MD; Attending Radiologist  This document has been electronically signed. Oct 10 2023  8:06PM    < end of copied text >      
Samuel Stockton MD   |   PGY-4  Endocrinology Fellow  Available on [employer] Teams    HPI:  72M w/ pmhx of FRANCOIS on CPAP, COPD w/ 50-pack-year smoking history, DM2 on insulin pump, hyperlipidemia, hypertension, CAD status post stent, TAVR, history of kidney transplant in 2013 on immunosuppresion, presenting with 2 days of shortness of breath. Wife states pt has been having coughing w/ slight yellow sputum, wheezing and SOB for past 2-3 days which was not improved with QID nebulizers at home. Pt reluctant to see doctor. Went to see PMD this AM and was sent to ER to r/o PNA. Denies fevers or chills. Endorses slight midsternal chest pressure and notable wheezing. No obvious sick contacts. No other specific symptoms.    In ER: Given IV Cefepime, azithromycin, Duoneb (10 Oct 2023 21:09)      Endocrine History:  --Type of DM: T2DM   --Diagnosed at age: >20 years, on insulin pump for 7 years   --Endocrinologist: Dr Jyoti Byrd   --Recent A1c: 5.4% here   --Type of pump: Medtronic pump w/ novolog + Ozempic 0.5mgweekly   --Compliant with pump: yes   --Who manages pump: himself   --Lives with: wife    --CGM: Dexcom G6   --Glucose levels: 90s-110s   --hypoglycemia: no   --Is pt comfortable on pump while in the hospital? Yes   --Last time pump was changed: 10/9/23   --How often does patient change pump: every 3 days   --Does patient have enough supplies: Yes   --Does patient carb count/bolus: Yes   --Last bolus:Lunch       Basal: TDD is 16.3U   12AM-5AM 0.6U/hr   5AM-12AM 0.7U/hr       ICR: 12AM-11PM 15g/u             11PM-12AM 20g/u       ISF: 50mg/dl all day   Glucose target       --appetite? Good   --Microvascular complications: +nephropathy,  - neuropathy, - retinopathy   --Macrovascular complications: +CAD   --Statin:  Atorvastatin 20mg at home   --ACE/ARB: Enalapril 10mg at home   --GFR: 108   --inpatient diet:CC   --On steroids/IV dextrose? solumed 40mg IV daily (10/10 - )     PAST MEDICAL & SURGICAL HISTORY:  HTN (Hypertension), Benign      Hyperlipidemia      Smoking      DM (diabetes mellitus), type 2      FRANCOIS on CPAP      H/O kidney transplant  2013      CAD (coronary artery disease)  1 stent      Kidney transplant recipient  Rt kidney- 2013      AV fistula      History of transcatheter aortic valve replacement (TAVR)      Personal history of spine surgery      Insulin pump status      H/O colonoscopy          FAMILY HISTORY:      Home Medications:  Albuterol (Eqv-Proventil HFA) 90 mcg/inh inhalation aerosol: 2 puff(s) inhaled 4 times a day (10 Oct 2023 22:31)  alfuzosin 10 mg oral tablet, extended release: 1 tab(s) orally once a day (10 Oct 2023 22:32)  allopurinol 100 mg oral tablet: 1 tab(s) orally once a day at pm (10 Oct 2023 22:32)  Aspirin Enteric Coated 81 mg oral delayed release tablet: 1 tab(s) orally once a day (10 Oct 2023 22:32)  atorvastatin 20 mg oral tablet: 1 tab(s) orally once a day (10 Oct 2023 22:25)  CellCept 500 mg oral tablet: 2 tab(s) orally 2 times a day (10 Oct 2023 22:28)  enalapril 10 mg oral tablet: 1 tab(s) orally 3 times a day (10 Oct 2023 22:26)  finasteride 5 mg oral tablet: 1 tab(s) orally once a day (10 Oct 2023 22:32)  gabapentin 100 mg oral tablet: orally once a day (at bedtime) (10 Oct 2023 22:29)  labetalol 200 mg oral tablet: 1 tab(s) orally 3 times a day (10 Oct 2023 22:32)  montelukast 10 mg oral tablet: 1 tab(s) orally once a day (10 Oct 2023 22:32)  Myrbetriq 50 mg oral tablet, extended release: 1 tab(s) orally once a day (at bedtime) (10 Oct 2023 22:27)  NIFEdipine 30 mg oral tablet, extended release: 1 tab(s) orally once a day (at bedtime) (10 Oct 2023 22:32)  NIFEdipine 60 mg oral tablet, extended release: 1 tab(s) orally once a day (10 Oct 2023 22:32)  potassium chloride 20 mEq oral tablet, extended release: 1 tab(s) orally once a day (10 Oct 2023 22:32)  senna (sennosides) 12 mg oral tablet: 1 tab(s) orally once a day (at bedtime) (10 Oct 2023 22:30)  tacrolimus 1 mg oral capsule: 1 cap(s) orally once a day (10 Oct 2023 22:28)  tacrolimus 1 mg oral capsule: 1.5 cap(s) orally once a day (at bedtime) (10 Oct 2023 22:29)  torsemide 10 mg oral tablet: 1 tab(s) orally once a day (10 Oct 2023 22:29)      MEDICATIONS  (STANDING):  albuterol/ipratropium for Nebulization 3 milliLiter(s) Nebulizer every 6 hours  allopurinol 100 milliGRAM(s) Oral at bedtime  aspirin enteric coated 81 milliGRAM(s) Oral daily  atorvastatin 20 milliGRAM(s) Oral daily  azithromycin  IVPB 500 milliGRAM(s) IV Intermittent every 24 hours  cefTRIAXone   IVPB 1000 milliGRAM(s) IV Intermittent every 24 hours  dextrose 5%. 1000 milliLiter(s) (50 mL/Hr) IV Continuous <Continuous>  dextrose 5%. 1000 milliLiter(s) (100 mL/Hr) IV Continuous <Continuous>  dextrose 50% Injectable 25 Gram(s) IV Push once  dextrose 50% Injectable 25 Gram(s) IV Push once  dextrose 50% Injectable 12.5 Gram(s) IV Push once  enalapril 10 milliGRAM(s) Oral daily  gabapentin 100 milliGRAM(s) Oral at bedtime  glucagon  Injectable 1 milliGRAM(s) IntraMuscular once  heparin   Injectable 5000 Unit(s) SubCutaneous every 12 hours  insulin aspart (NovoLOG) Pump 1 Each SubCutaneous Continuous Pump  labetalol 200 milliGRAM(s) Oral three times a day  montelukast 10 milliGRAM(s) Oral daily  NIFEdipine XL 30 milliGRAM(s) Oral at bedtime  NIFEdipine XL 60 milliGRAM(s) Oral daily  senna 2 Tablet(s) Oral at bedtime  tacrolimus 1 milliGRAM(s) Oral daily  tacrolimus 1.5 milliGRAM(s) Oral at bedtime  tamsulosin 0.4 milliGRAM(s) Oral at bedtime  torsemide 10 milliGRAM(s) Oral daily    MEDICATIONS  (PRN):  acetaminophen     Tablet .. 650 milliGRAM(s) Oral every 6 hours PRN Temp greater or equal to 38C (100.4F), Mild Pain (1 - 3)  dextrose Oral Gel 15 Gram(s) Oral once PRN Blood Glucose LESS THAN 70 milliGRAM(s)/deciliter  melatonin 3 milliGRAM(s) Oral at bedtime PRN Insomnia      Allergies    No Known Allergies    Intolerances      Review of Systems:  Constitutional: No fever  Eyes: No blurry vision  Neuro: No tremors  HEENT: No pain  Cardiovascular: No chest pain, palpitations  Respiratory: No SOB, no cough  GI: No nausea, vomiting, abdominal pain  : No dysuria  Skin: no rash  Psych: no depression  Endocrine: no polyuria, polydipsia  Hem/lymph: no swelling  Osteoporosis: no fractures    ===================PHYSICAL EXAM=======================  VITALS: T(C): 36.8 (10-11-23 @ 16:55)  T(F): 98.2 (10-11-23 @ 16:55), Max: 98.6 (10-11-23 @ 03:10)  HR: 78 (10-11-23 @ 16:55) (76 - 92)  BP: 147/65 (10-11-23 @ 16:55) (147/65 - 163/72)  RR:  (18 - 20)  SpO2:  (95% - 99%)  Wt(kg): --  GENERAL: NAD  EYES: No proptosis, no lid lag, anicteric  THYROID: Normal size, no palpable nodules  RESPIRATORY: Clear to auscultation bilaterally  CARDIOVASCULAR: Regular rate and rhythm  GI: Soft, nontender, non distended  EXT: b/l feet without wounds; 2+ pulses  PSYCH: Alert and oriented x 3, reactive mood  ======================================================  POCT Blood Glucose.: 118 mg/dL (10-11-23 @ 21:46)  POCT Blood Glucose.: 172 mg/dL (10-11-23 @ 16:50)  POCT Blood Glucose.: 243 mg/dL (10-11-23 @ 11:10)  POCT Blood Glucose.: 155 mg/dL (10-11-23 @ 07:53)  POCT Blood Glucose.: 122 mg/dL (10-10-23 @ 16:18)                            13.5   9.46  )-----------( 202      ( 11 Oct 2023 07:14 )             41.9       10-11    136  |  104  |  11  ----------------------------<  136<H>  4.8   |  16<L>  |  0.50    eGFR: 108    Ca    9.9      10-11    TPro  7.8  /  Alb  3.8  /  TBili  1.4<H>  /  DBili  x   /  AST  19  /  ALT  12  /  AlkPhos  120  10-11      Thyroid Function Tests:      A1C with Estimated Average Glucose Result: 5.4 % (10-11-23 @ 07:14)  A1C with Estimated Average Glucose Result: 5.5 % (05-22-23 @ 18:45)  A1C with Estimated Average Glucose Result: 5.5 % (04-17-23 @ 12:46)          Radiology:

## 2023-10-11 NOTE — ADVANCED PRACTICE NURSE CONSULT - ASSESSMENT
72 yrs old male S/P Viral Pneumonia. Patient also has history Type 2 diabetes who is on insulin pump for glucose management Pt is on Novolog insulin. Dr. endocrinologist  is Dr. Brendan Montes at [facility].  Endocrine team will follow for diabetes management.  Basal Rate                                              ICR                                             ISR  12am-5am                                      12am-11pm                                  12am- 11pm        .6                                                      15                                                50  5am-12am                                       11pm-12am                                   11pm-12am          .7                                                     20                                                  50                                                    TDD: 16.30  Target Glucose:   Insulin Duration: 4hrs

## 2023-10-12 ENCOUNTER — TRANSCRIPTION ENCOUNTER (OUTPATIENT)
Age: 73
End: 2023-10-12

## 2023-10-12 DIAGNOSIS — Z96.41 PRESENCE OF INSULIN PUMP (EXTERNAL) (INTERNAL): ICD-10-CM

## 2023-10-12 LAB
ANION GAP SERPL CALC-SCNC: 10 MMOL/L — SIGNIFICANT CHANGE UP (ref 5–17)
BUN SERPL-MCNC: 21 MG/DL — SIGNIFICANT CHANGE UP (ref 7–23)
CALCIUM SERPL-MCNC: 9.9 MG/DL — SIGNIFICANT CHANGE UP (ref 8.4–10.5)
CHLORIDE SERPL-SCNC: 103 MMOL/L — SIGNIFICANT CHANGE UP (ref 96–108)
CO2 SERPL-SCNC: 25 MMOL/L — SIGNIFICANT CHANGE UP (ref 22–31)
CREAT SERPL-MCNC: 0.62 MG/DL — SIGNIFICANT CHANGE UP (ref 0.5–1.3)
EGFR: 102 ML/MIN/1.73M2 — SIGNIFICANT CHANGE UP
GLUCOSE BLDC GLUCOMTR-MCNC: 133 MG/DL — HIGH (ref 70–99)
GLUCOSE BLDC GLUCOMTR-MCNC: 141 MG/DL — HIGH (ref 70–99)
GLUCOSE BLDC GLUCOMTR-MCNC: 153 MG/DL — HIGH (ref 70–99)
GLUCOSE BLDC GLUCOMTR-MCNC: 209 MG/DL — HIGH (ref 70–99)
GLUCOSE SERPL-MCNC: 119 MG/DL — HIGH (ref 70–99)
HCT VFR BLD CALC: 41.6 % — SIGNIFICANT CHANGE UP (ref 39–50)
HGB BLD-MCNC: 13.8 G/DL — SIGNIFICANT CHANGE UP (ref 13–17)
MCHC RBC-ENTMCNC: 31.3 PG — SIGNIFICANT CHANGE UP (ref 27–34)
MCHC RBC-ENTMCNC: 33.2 GM/DL — SIGNIFICANT CHANGE UP (ref 32–36)
MCV RBC AUTO: 94.3 FL — SIGNIFICANT CHANGE UP (ref 80–100)
NRBC # BLD: 0 /100 WBCS — SIGNIFICANT CHANGE UP (ref 0–0)
PLATELET # BLD AUTO: 221 K/UL — SIGNIFICANT CHANGE UP (ref 150–400)
POTASSIUM SERPL-MCNC: 4.1 MMOL/L — SIGNIFICANT CHANGE UP (ref 3.5–5.3)
POTASSIUM SERPL-SCNC: 4.1 MMOL/L — SIGNIFICANT CHANGE UP (ref 3.5–5.3)
RBC # BLD: 4.41 M/UL — SIGNIFICANT CHANGE UP (ref 4.2–5.8)
RBC # FLD: 13.2 % — SIGNIFICANT CHANGE UP (ref 10.3–14.5)
SODIUM SERPL-SCNC: 138 MMOL/L — SIGNIFICANT CHANGE UP (ref 135–145)
TACROLIMUS SERPL-MCNC: 8.9 NG/ML — SIGNIFICANT CHANGE UP
WBC # BLD: 12.66 K/UL — HIGH (ref 3.8–10.5)
WBC # FLD AUTO: 12.66 K/UL — HIGH (ref 3.8–10.5)

## 2023-10-12 PROCEDURE — 99232 SBSQ HOSP IP/OBS MODERATE 35: CPT

## 2023-10-12 RX ORDER — MYCOPHENOLATE MOFETIL 250 MG/1
1000 CAPSULE ORAL
Refills: 0 | Status: DISCONTINUED | OUTPATIENT
Start: 2023-10-12 | End: 2023-10-13

## 2023-10-12 RX ADMIN — Medication 3 MILLIGRAM(S): at 22:39

## 2023-10-12 RX ADMIN — TACROLIMUS 1.5 MILLIGRAM(S): 5 CAPSULE ORAL at 21:13

## 2023-10-12 RX ADMIN — Medication 200 MILLIGRAM(S): at 21:13

## 2023-10-12 RX ADMIN — Medication 200 MILLIGRAM(S): at 12:38

## 2023-10-12 RX ADMIN — MONTELUKAST 10 MILLIGRAM(S): 4 TABLET, CHEWABLE ORAL at 12:38

## 2023-10-12 RX ADMIN — Medication 10 MILLIGRAM(S): at 06:29

## 2023-10-12 RX ADMIN — SENNA PLUS 2 TABLET(S): 8.6 TABLET ORAL at 21:12

## 2023-10-12 RX ADMIN — MYCOPHENOLATE MOFETIL 500 MILLIGRAM(S): 250 CAPSULE ORAL at 06:30

## 2023-10-12 RX ADMIN — Medication 100 MILLIGRAM(S): at 21:13

## 2023-10-12 RX ADMIN — Medication 81 MILLIGRAM(S): at 12:38

## 2023-10-12 RX ADMIN — Medication 10 MILLIGRAM(S): at 06:30

## 2023-10-12 RX ADMIN — TACROLIMUS 1 MILLIGRAM(S): 5 CAPSULE ORAL at 12:38

## 2023-10-12 RX ADMIN — Medication 30 MILLIGRAM(S): at 21:13

## 2023-10-12 RX ADMIN — Medication 60 MILLIGRAM(S): at 06:30

## 2023-10-12 RX ADMIN — ATORVASTATIN CALCIUM 20 MILLIGRAM(S): 80 TABLET, FILM COATED ORAL at 12:39

## 2023-10-12 RX ADMIN — Medication 200 MILLIGRAM(S): at 06:30

## 2023-10-12 RX ADMIN — TAMSULOSIN HYDROCHLORIDE 0.4 MILLIGRAM(S): 0.4 CAPSULE ORAL at 21:11

## 2023-10-12 RX ADMIN — Medication 3 MILLILITER(S): at 06:30

## 2023-10-12 RX ADMIN — AZITHROMYCIN 255 MILLIGRAM(S): 500 TABLET, FILM COATED ORAL at 21:10

## 2023-10-12 RX ADMIN — Medication 3 MILLILITER(S): at 12:38

## 2023-10-12 RX ADMIN — HEPARIN SODIUM 5000 UNIT(S): 5000 INJECTION INTRAVENOUS; SUBCUTANEOUS at 06:31

## 2023-10-12 RX ADMIN — CEFTRIAXONE 100 MILLIGRAM(S): 500 INJECTION, POWDER, FOR SOLUTION INTRAMUSCULAR; INTRAVENOUS at 08:32

## 2023-10-12 RX ADMIN — MYCOPHENOLATE MOFETIL 500 MILLIGRAM(S): 250 CAPSULE ORAL at 16:51

## 2023-10-12 RX ADMIN — HEPARIN SODIUM 5000 UNIT(S): 5000 INJECTION INTRAVENOUS; SUBCUTANEOUS at 16:51

## 2023-10-12 RX ADMIN — GABAPENTIN 100 MILLIGRAM(S): 400 CAPSULE ORAL at 21:11

## 2023-10-12 RX ADMIN — Medication 3 MILLILITER(S): at 16:52

## 2023-10-12 RX ADMIN — Medication 20 MILLIGRAM(S): at 06:29

## 2023-10-12 NOTE — PROGRESS NOTE ADULT - ASSESSMENT
72M w/ pmhx of FRANCOIS on CPAP, COPD w/ 50-pack-year smoking history, DM2 on insulin pump, hyperlipidemia, hypertension, CAD status post stent, TAVR, history of kidney transplant in  on immunosuppresion, presenting with 2 days of shortness of breath. Wife states pt has been having coughing w/ slight yellow sputum, wheezing and SOB for past 2-3 days which was not improved with QID nebulizers at home. Pt reluctant to see doctor. Went to see PMD and was sent to ER to r/o PNA. Denies fevers or chills. Endorses slight midsternal chest pressure and notable wheezing. No obvious sick contacts. No other specific symptoms. follows pulm       1-  donor renal transplant   2- copd  3- HTN   4- pneumonia      cont tacro 1.5 mg pm and 1 mg am   trend tacro level daily, must be drawn within 1 hour prior to receiving dose, do not hold medication.   cellcept at 500 mg bid as of am [ lower dose given on steroids and infection ]   prednisone 60 mg daily  labetalol 200 mg tid and   procardia xl 60 mg am, 30 mg pm   rocephin 1 g daily/azithromycin 500 mg daily  pulm consult for lung lesions   torsemide 10 mg daily  flomax 0.4 mg daily  nebs to cont

## 2023-10-12 NOTE — DISCHARGE NOTE PROVIDER - CARE PROVIDERS DIRECT ADDRESSES
,DirectAddress_Unknown ,DirectAddress_Unknown,DirectAddress_Unknown,[email or web address]

## 2023-10-12 NOTE — DISCHARGE NOTE PROVIDER - NSDCCPCAREPLAN_GEN_ALL_CORE_FT
PRINCIPAL DISCHARGE DIAGNOSIS  Diagnosis: Pneumonia due to virus  Assessment and Plan of Treatment: vs Upper respiratory infection  You will complete your course of antibiotics at home, Ceftin 500 mg twice a day  Follow up with Dr Goldberg and Dr Garrido in 2 weeks.  You will need a repeat CT Chest in 4 weeks to ensure resolution.      SECONDARY DISCHARGE DIAGNOSES  Diagnosis: DM2 (diabetes mellitus, type 2)  Assessment and Plan of Treatment: HgA1C this admission.  Follow up with Dr Jackman as previously scheduled  Insulin pump as directed.  Make sure you get your HgA1c checked every three months.  If you take oral diabetes medications, check your blood glucose two times a day.  If you take insulin, check your blood glucose before meals and at bedtime.  It's important not to skip any meals.  Keep a log of your blood glucose results and always take it with you to your doctor appointments.  Keep a list of your current medications including injectables and over the counter medications and bring this medication list with you to all your doctor appointments.      Diagnosis: Renal transplant recipient  Assessment and Plan of Treatment: Continue cellcept and tacrolimus  Follow up with Dr Vargas.

## 2023-10-12 NOTE — PROGRESS NOTE ADULT - NS ATTEND AMEND GEN_ALL_CORE FT
formulated plan with and  agree with above as scribed by NP Herman [Sabi]     states feels better   lungs mild insp wheeze right   abd soft non tender graft site non tender  ext no edema      renal transplant   pneumonia  lung lesions    cr steady   increase cellcept 1000 mg bid   cont prograf as above   zithromax and rocephin to cont   pulm follow up noted.   cont labetalol for htn and procardia xl 60 mg daily

## 2023-10-12 NOTE — DISCHARGE NOTE PROVIDER - PROVIDER TOKENS
PROVIDER:[TOKEN:[[id number]]] PROVIDER:[TOKEN:[[id number]]],FREE:[LAST:[Juang],FIRST:[Dr],PHONE:[(   )    -],FAX:[(   )    -],ADDRESS:[endocrinology]],PROVIDER:[TOKEN:[[id number]]]

## 2023-10-12 NOTE — DISCHARGE NOTE PROVIDER - CARE PROVIDER_API CALL
Homero Garrido J  Cardiovascular Disease  [address] [place] [address]  Phone: [phone]  Fax: [phone]  Follow Up Time:    Homero Garrido J  Cardiovascular Disease  [address] [place] [address]  Phone: [phone]  Fax: [phone]  Follow Up Time:     Dr Gasper  endocrinology  Phone: (   )    -  Fax: (   )    -  Follow Up Time:     Arjun Goldberg  Pulmonary Disease  [address] [place] [address]  Phone: [phone]  Fax: [phone]  Follow Up Time:

## 2023-10-12 NOTE — DISCHARGE NOTE PROVIDER - HOSPITAL COURSE
71 y/o M     h/o ESRD,s/p  R renal transplant 2013 , renal  cysts    DM,  CAD   s/p stents , FRANCOIS  on cpap qHS), HLD, HTN,  strep  bactreemia,  2022.  syncope    colonoscopy 8/22,  polyp         admitted  with  sob        from   COPD  acute exacerbation.  entero rhinovirus          on rocephin/  z  max.  singular.  steroid       CT chest .  probable   malignancy /  pna ,  mediastinal  and  R  hilar  adenopathy        will  need   ? bronch/   house  pulm   called      DM,  has  insulin pump,    hous e  endo  follwoing           Medtronic  insulin pump at home and Ozempic ,endo,  Dr. Byrd      c/c  diastolic   chf.  torsemide/ known to  card    HTN/HLD         on  procardia,  labetolol.  enalapril     CAD,   on asa/ plavix/ ,lipitor     s/p  TAVR, 12/2021       CKD         s/p   R kidney transplant  2013,   Dr Vargas  [facility] /  on cellcept/  tacro  1mg/  1,5  mg.  hs      h/o   ILD,  restrictive/obstructive lung disease,  and   FRANCOIS / cpap    on dvt  ppx      pt  no longer  f/p  with transplant team in  [facility]/ dr edward olmstead following / defer transplant  meds to  renal    dc   on ceftin/  needs  out pt  ct  in  4  weeks/  f/p  d r bellovin     Discharge Summary     Admit Date: 10-10-23  Discharge Date: 10-10-23    Admission diagnoses:   Viral pneumonia      Discharge diagnoses:   Pneumonia vs URI  DM  s/p renal transplant      Hospital Course:   72  year old male PMH ESRD,s/p R renal transplant 2013 on immunosuppresion, renal  cysts, DM on insulin pump, CAD s/p stents, FRANCOIS on cpap qHS, HLD, HTN, strep  bactreemia,  2022, syncope. colonoscopy 8/22 and found a polyp.    Patient was admitted  with shortness of breath likely from acute COPD exacerbation, RVP was positive entero/rhinovirus.  CT Chest found to have pneumonia,mediastinal and right hilar adenopathy, concerning for malignancy. Pulmonary consulted.  Patient was treated with a course of rocephin. Will need a repeat CT chest outpatient to ensure resolution.  Follow up with Dr Goldberg. Infectious disease consulted, patient to be discharged on Ceftin 500 mg BID to complete 7 day course of antibiotics.      Patient has a history of diabetes and an insulin pump in place, house endocrine were consulted for assistance with management. Follow up with outpatient endocrinologist, Dr Byrd.    Has a history of renal transplant, normally followed by a physician in the [facility].  Nephrology consulted, continue tacrolimus and cellcept, follow up outpatient.    On day of discharge, patient is clinically stable with no new exam findings or acute symptoms compared to prior. The patient was seen by the attending physician on the date of discharge and deemed stable and acceptable for discharge. The patient's chronic medical conditions were treated accordingly per the patient's home medication regimen. The patient's medication reconciliation (with changes made to chronic medications), follow up appointments, discharge orders, instructions, and significant lab and diagnostic studies are as noted.     Discharge follow up action items:     1. Follow up with PCP in 1-2 weeks, Dr Garrido, Dr Goldberg.  2. Will need repeat CT Chest in 4 weeks.  3. Ceftim 500 mg po BID to complete 7 day course.  4. On hold medications ***    Patient's ordered code status: FULL CODE    Patient disposition: HOME         · Assessment	  73 y/o M     h/o ESRD,s/p  R renal transplant 2013 , renal  cysts    DM,  CAD   s/p stents , FRANCOIS  on cpap qHS), HLD, HTN,  strep  bactreemia,  2022.  syncope    colonoscopy 8/22,  polyp         admitted  with  sob        from   COPD  acute exacerbation.  entero rhinovirus          on rocephin/  z  max.  singular.  steroid       CT chest .  probable   malignancy /  pna ,  mediastinal  and  R  hilar  adenopathy        will  need   ? bronch/   house  pulm   called/  no  intervention now      DM,  has  insulin pump,    hous e  endo  follwoing           Medtr[employer]  insulin pump at home and Ozempic ,endo,  Dr. Byrd      c/c  diastolic   chf.  torsemide/ known to  card    HTN/HLD         on  procardia,  labetolol.  enalapril     CAD,   on asa/ plavix/ ,lipitor     s/p  TAVR, 12/2021       CKD         s/p   R kidney transplant  2013,   Dr Vargas  [facility] /  on cellcept/  tacro  1mg/  1,5  mg.  hs      h/o   ILD,  restrictive/obstructive lung disease,  and   FRANCOIS / cpap    on dvt  ppx      pt  no longer  f/p  with transplant team in  [facility]/ dr edward olmstead following / defer transplant  meds to  renal    pt  doing well.  on po prednisone./  ambulating  hallways.    rx  plan per  ID. on ceftin  for  7  fdays/  f/p  pmd  d r bellovin/  f/p  ct  chest  in  4  weeks              · Assessment	  73 y/o M     h/o ESRD,s/p  R renal transplant 2013 , renal  cysts    DM,  CAD   s/p stents , FRANCOIS  on cpap qHS), HLD, HTN,  strep  bactreemia,  2022.  syncope    colonoscopy 8/22,  polyp         admitted  with  sob        from   COPD  acute exacerbation.  entero rhinovirus          on rocephin/  z  max.  singular.  steroid       CT chest .  probable   malignancy /  pna ,  mediastinal  and  R  hilar  adenopathy        will  need   ? bronch/   house  pulm   called/  no  intervention now      DM,  has  insulin pump,    hous e  endo  follwoing           Medtronic  insulin pump at home and Ozempic ,endo,  Dr. Byrd      c/c  diastolic   chf.  torsemide/ known to  card    HTN/HLD         on  procardia,  labetolol.  enalapril     CAD,   on asa/ plavix/ ,lipitor     s/p  TAVR, 12/2021       CKD         s/p   R kidney transplant  2013,   Dr Vargas  [facility] /  on cellcept/  tacro  1mg/  1,5  mg.  hs      h/o   ILD,  restrictive/obstructive lung disease,  and   FRANCOIS / cpap    on dvt  ppx      pt  no longer  f/p  with transplant team in  [facility]/ dr edward olmstead following / defer transplant  meds to  renal    pt  doing well.  on po prednisone./  ambulating  hallways.    rx  plan per  ID. on ceftin  for  7  fdays/  f/p  pmd  d r bellovin/  f/p  ct  chest  in  4  weeks  dcp with med rec discussed with Dr Esteves

## 2023-10-12 NOTE — PROGRESS NOTE ADULT - ASSESSMENT
73 y/o M     h/o ESRD,s/p  R renal transplant 2013 , renal  cysts    DM,  CAD   s/p stents , FRANCOIS  on cpap qHS), HLD, HTN,  strep  bactreemia,  2022.  syncope    colonoscopy 8/22,  polyp         admitted  with  sob        from   COPD  acute exacerbation.  entero rhinovirus          on rocephin/  z  max.  singular.  steroid       CT chest .  probable   malignancy /  pna ,  mediastinal  and  R  hilar  adenopathy        will  need   ? bronch/   house  pulm   called      DM,  has  insulin pump,    hous e  endo  follwoing           [employer]tr[employer]  insulin pump at home and Ozempic ,endo,  Dr. Byrd      c/c  diastolic   chf.  torsemide/ known to  card    HTN/HLD         on  procardia,  labetolol.  enalapril     CAD,   on asa/ plavix/ ,lipitor     s/p  TAVR, 12/2021       CKD         s/p   R kidney transplant  2013,   Dr Vargas  [facility] /  on cellcept/  tacro  1mg/  1,5  mg.  hs      h/o   ILD,  restrictive/obstructive lung disease,  and   FRANCOIS / cpap    on dvt  ppx      pt  no longer  f/p  with transplant team in  [facility]/ dr edward olmstead following / defer transplant  meds to  renal    continue  iv ab/  pt  doing well.  on po prednisone.  spoke  to wife              < from: CT Chest No Cont (10.10.23 @ 18:49) >  IMPRESSION:                      1. Newly developing nodular areas of consolidation bilaterally as well as   reticular opacities and abbreviated differential includes metastatic   disease with possible lymphangitic component. Atypical infectious and/or   inflammatory etiologies cannot be excluded. Bronchoscopic correlation and   histological sampling may be in order.  2. Mild increase in now moderate mediastinal and probable right hilar   lymphadenopathy.  3. Post TAVR with aneurysmal dilatation of the ascending aorta measuring   4.2 cm moderate dilatation of the pulmonary artery measuring up to 3.6   cm. Pulmonary hypertension cannot be excluded.  4. Additional chronic findings as above.  --- End of Report ---

## 2023-10-12 NOTE — DISCHARGE NOTE PROVIDER - NSDCMRMEDTOKEN_GEN_ALL_CORE_FT
Albuterol (Eqv-Proventil HFA) 90 mcg/inh inhalation aerosol: 2 puff(s) inhaled 4 times a day  alfuzosin 10 mg oral tablet, extended release: 1 tab(s) orally once a day  allopurinol 100 mg oral tablet: 1 tab(s) orally once a day at pm  Aspirin Enteric Coated 81 mg oral delayed release tablet: 1 tab(s) orally once a day  atorvastatin 20 mg oral tablet: 1 tab(s) orally once a day  CellCept 500 mg oral tablet: 2 tab(s) orally 2 times a day  enalapril 10 mg oral tablet: 1 tab(s) orally 3 times a day  finasteride 5 mg oral tablet: 1 tab(s) orally once a day  gabapentin 100 mg oral tablet: orally once a day (at bedtime)  labetalol 200 mg oral tablet: 1 tab(s) orally 3 times a day  montelukast 10 mg oral tablet: 1 tab(s) orally once a day  Myrbetriq 50 mg oral tablet, extended release: 1 tab(s) orally once a day (at bedtime)  NIFEdipine 30 mg oral tablet, extended release: 1 tab(s) orally once a day (at bedtime)  NIFEdipine 60 mg oral tablet, extended release: 1 tab(s) orally once a day  Ozempic (1 mg dose) 4 mg/3 mL subcutaneous solution: 0.5 milligram(s) subcutaneously once a week   potassium chloride 20 mEq oral tablet, extended release: 1 tab(s) orally once a day  senna (sennosides) 12 mg oral tablet: 1 tab(s) orally once a day (at bedtime)  tacrolimus 1 mg oral capsule: 1 cap(s) orally once a day  tacrolimus 1 mg oral capsule: 1.5 cap(s) orally once a day (at bedtime)  torsemide 10 mg oral tablet: 1 tab(s) orally once a day   Albuterol (Eqv-Proventil HFA) 90 mcg/inh inhalation aerosol: 2 puff(s) inhaled 4 times a day  alfuzosin 10 mg oral tablet, extended release: 1 tab(s) orally once a day  allopurinol 100 mg oral tablet: 1 tab(s) orally once a day at pm  Aspirin Enteric Coated 81 mg oral delayed release tablet: 1 tab(s) orally once a day  atorvastatin 20 mg oral tablet: 1 tab(s) orally once a day  cefuroxime 500 mg oral tablet: 1 tab(s) orally every 12 hours  CellCept 500 mg oral tablet: 2 tab(s) orally 2 times a day  enalapril 10 mg oral tablet: 1 tab(s) orally 3 times a day  gabapentin 100 mg oral tablet: orally once a day (at bedtime)  labetalol 200 mg oral tablet: 1 tab(s) orally 3 times a day  montelukast 10 mg oral tablet: 1 tab(s) orally once a day  mycophenolate mofetil 500 mg oral tablet: 2 tab(s) orally 2 times a day  Myrbetriq 50 mg oral tablet, extended release: 1 tab(s) orally once a day (at bedtime)  NIFEdipine 30 mg oral tablet, extended release: 1 tab(s) orally once a day (at bedtime)  NIFEdipine 60 mg oral tablet, extended release: 1 tab(s) orally once a day  Ozempic (1 mg dose) 4 mg/3 mL subcutaneous solution: 0.5 milligram(s) subcutaneously once a week   potassium chloride 20 mEq oral tablet, extended release: 1 tab(s) orally once a day  predniSONE 10 mg oral tablet: 1 tab(s) orally once a day  senna (sennosides) 12 mg oral tablet: 1 tab(s) orally once a day (at bedtime)  tacrolimus 1 mg oral capsule: 1 cap(s) orally once a day  tacrolimus 1 mg oral capsule: 1.5 cap(s) orally once a day (at bedtime)  torsemide 10 mg oral tablet: 1 tab(s) orally once a day

## 2023-10-12 NOTE — PROGRESS NOTE ADULT - SUBJECTIVE AND OBJECTIVE BOX
date of service: 10-12-23 @ 08:55  afebriel  REVIEW OF SYSTEMS:  CONSTITUTIONAL: No fever,  no  weight loss  ENT:  No  tinnitus,   no   vertigo  NECK: No pain or stiffness  RESPIRATORY: No cough, wheezing, chills or hemoptysis;    No Shortness of Breath  CARDIOVASCULAR: No chest pain, palpitations, dizziness  GASTROINTESTINAL: No abdominal or epigastric pain. No nausea, vomiting, or hematemesis; No diarrhea  No melena or hematochezia.  GENITOURINARY: No dysuria, frequency, hematuria, or incontinence  NEUROLOGICAL: No headaches  SKIN: No itching,  no   rash  LYMPH Nodes: No enlarged glands  ENDOCRINE: No heat or cold intolerance  MUSCULOSKELETAL: No joint pain or swelling  PSYCHIATRIC: No depression, anxiety  HEME/LYMPH: No easy bruising, or bleeding gums  ALLERGY AND IMMUNOLOGIC: No hives or eczema	    MEDICATIONS  (STANDING):  albuterol/ipratropium for Nebulization 3 milliLiter(s) Nebulizer every 6 hours  allopurinol 100 milliGRAM(s) Oral at bedtime  aspirin enteric coated 81 milliGRAM(s) Oral daily  atorvastatin 20 milliGRAM(s) Oral daily  azithromycin  IVPB 500 milliGRAM(s) IV Intermittent every 24 hours  cefTRIAXone   IVPB 1000 milliGRAM(s) IV Intermittent every 24 hours  dextrose 5%. 1000 milliLiter(s) (50 mL/Hr) IV Continuous <Continuous>  dextrose 5%. 1000 milliLiter(s) (100 mL/Hr) IV Continuous <Continuous>  dextrose 50% Injectable 25 Gram(s) IV Push once  dextrose 50% Injectable 25 Gram(s) IV Push once  dextrose 50% Injectable 12.5 Gram(s) IV Push once  enalapril 10 milliGRAM(s) Oral daily  gabapentin 100 milliGRAM(s) Oral at bedtime  glucagon  Injectable 1 milliGRAM(s) IntraMuscular once  heparin   Injectable 5000 Unit(s) SubCutaneous every 12 hours  insulin aspart (NovoLOG) Pump 1 Each SubCutaneous Continuous Pump  labetalol 200 milliGRAM(s) Oral three times a day  montelukast 10 milliGRAM(s) Oral daily  mycophenolate mofetil 500 milliGRAM(s) Oral two times a day  NIFEdipine XL 60 milliGRAM(s) Oral daily  NIFEdipine XL 30 milliGRAM(s) Oral at bedtime  predniSONE   Tablet 20 milliGRAM(s) Oral daily  senna 2 Tablet(s) Oral at bedtime  tacrolimus 1 milliGRAM(s) Oral daily  tacrolimus 1.5 milliGRAM(s) Oral at bedtime  tamsulosin 0.4 milliGRAM(s) Oral at bedtime  torsemide 10 milliGRAM(s) Oral daily    MEDICATIONS  (PRN):  acetaminophen     Tablet .. 650 milliGRAM(s) Oral every 6 hours PRN Temp greater or equal to 38C (100.4F), Mild Pain (1 - 3)  dextrose Oral Gel 15 Gram(s) Oral once PRN Blood Glucose LESS THAN 70 milliGRAM(s)/deciliter  melatonin 3 milliGRAM(s) Oral at bedtime PRN Insomnia      Vital Signs Last 24 Hrs  T(C): 36.4 (12 Oct 2023 00:12), Max: 36.8 (11 Oct 2023 16:17)  T(F): 97.6 (12 Oct 2023 00:12), Max: 98.3 (11 Oct 2023 16:17)  HR: 89 (12 Oct 2023 03:08) (74 - 89)  BP: 148/77 (12 Oct 2023 06:30) (140/68 - 159/72)  BP(mean): --  RR: 18 (12 Oct 2023 00:12) (18 - 20)  SpO2: 97% (12 Oct 2023 03:08) (94% - 99%)    Parameters below as of 12 Oct 2023 00:12  Patient On (Oxygen Delivery Method): nasal cannula  O2 Flow (L/min): 4    CAPILLARY BLOOD GLUCOSE      POCT Blood Glucose.: 133 mg/dL (12 Oct 2023 07:56)  POCT Blood Glucose.: 118 mg/dL (11 Oct 2023 21:46)  POCT Blood Glucose.: 172 mg/dL (11 Oct 2023 16:50)  POCT Blood Glucose.: 243 mg/dL (11 Oct 2023 11:10)    I&O's Summary    11 Oct 2023 07:01  -  12 Oct 2023 07:00  --------------------------------------------------------  IN: 0 mL / OUT: 0 mL / NET: 0 mL          Appearance: Normal	  HEENT:   Normal oral mucosa, PERRL, EOMI	  Lymphatic: No lymphadenopathy  Cardiovascular: Normal S1 S2, No JVD  Respiratory: Lungs clear to auscultation	  Gastrointestinal:  Soft, Non-tender, + BS	  Skin: No rash, No ecchymoses	  Extremities:     LABS:                        13.8   12.66 )-----------( 221      ( 12 Oct 2023 07:35 )             41.6     10-12    138  |  103  |  21  ----------------------------<  119<H>  4.1   |  25  |  0.62    Ca    9.9      12 Oct 2023 07:36    TPro  7.8  /  Alb  3.8  /  TBili  1.4<H>  /  DBili  x   /  AST  19  /  ALT  12  /  AlkPhos  120  10-11          Urinalysis Basic - ( 12 Oct 2023 07:36 )    Color: x / Appearance: x / SG: x / pH: x  Gluc: 119 mg/dL / Ketone: x  / Bili: x / Urobili: x   Blood: x / Protein: x / Nitrite: x   Leuk Esterase: x / RBC: x / WBC x   Sq Epi: x / Non Sq Epi: x / Bacteria: x          10-10 @ 11:18  4.3  35              Consultant(s) Notes Reviewed:      Care Discussed with Consultants/Other Providers:

## 2023-10-12 NOTE — PROGRESS NOTE ADULT - SUBJECTIVE AND OBJECTIVE BOX
[facility]   [phone]  RENAL FOLLOW UP NOTE  --------------------------------------------------------------------------------  Chief Complaint:    24 hour events/subjective:    patient seen and examined.   c/o productive cough with yellow sputum    PAST HISTORY  --------------------------------------------------------------------------------  No significant changes to PMH, PSH, FHx, SHx, unless otherwise noted    ALLERGIES & MEDICATIONS  --------------------------------------------------------------------------------  Allergies    No Known Allergies    Intolerances      Standing Inpatient Medications  albuterol/ipratropium for Nebulization 3 milliLiter(s) Nebulizer every 6 hours  allopurinol 100 milliGRAM(s) Oral at bedtime  aspirin enteric coated 81 milliGRAM(s) Oral daily  atorvastatin 20 milliGRAM(s) Oral daily  azithromycin  IVPB 500 milliGRAM(s) IV Intermittent every 24 hours  cefTRIAXone   IVPB 1000 milliGRAM(s) IV Intermittent every 24 hours  dextrose 5%. 1000 milliLiter(s) IV Continuous <Continuous>  dextrose 5%. 1000 milliLiter(s) IV Continuous <Continuous>  dextrose 50% Injectable 25 Gram(s) IV Push once  dextrose 50% Injectable 12.5 Gram(s) IV Push once  dextrose 50% Injectable 25 Gram(s) IV Push once  enalapril 10 milliGRAM(s) Oral daily  gabapentin 100 milliGRAM(s) Oral at bedtime  glucagon  Injectable 1 milliGRAM(s) IntraMuscular once  heparin   Injectable 5000 Unit(s) SubCutaneous every 12 hours  insulin aspart (NovoLOG) Pump 1 Each SubCutaneous Continuous Pump  labetalol 200 milliGRAM(s) Oral three times a day  montelukast 10 milliGRAM(s) Oral daily  mycophenolate mofetil 500 milliGRAM(s) Oral two times a day  NIFEdipine XL 30 milliGRAM(s) Oral at bedtime  NIFEdipine XL 60 milliGRAM(s) Oral daily  predniSONE   Tablet 20 milliGRAM(s) Oral daily  senna 2 Tablet(s) Oral at bedtime  tacrolimus 1 milliGRAM(s) Oral daily  tacrolimus 1.5 milliGRAM(s) Oral at bedtime  tamsulosin 0.4 milliGRAM(s) Oral at bedtime  torsemide 10 milliGRAM(s) Oral daily    PRN Inpatient Medications  acetaminophen     Tablet .. 650 milliGRAM(s) Oral every 6 hours PRN  dextrose Oral Gel 15 Gram(s) Oral once PRN  melatonin 3 milliGRAM(s) Oral at bedtime PRN      REVIEW OF SYSTEMS  --------------------------------------------------------------------------------    Gen: denies fevers/chills,  CVS: denies chest pain/palpitations  Resp: denies SOB/Cough+  GI: Denies N/V/Abd pain  : Denies dysuria/oliguria/hematuria    VITALS/PHYSICAL EXAM  --------------------------------------------------------------------------------  T(C): 36.4 (10-12-23 @ 00:12), Max: 36.8 (10-11-23 @ 16:17)  HR: 90 (10-12-23 @ 09:55) (74 - 90)  BP: 148/77 (10-12-23 @ 06:30) (140/68 - 159/72)  RR: 18 (10-12-23 @ 00:12) (18 - 20)  SpO2: 98% (10-12-23 @ 09:55) (94% - 99%)  Wt(kg): --  Height (cm): 170.2 (10-11-23 @ 16:55)  Weight (kg): 93.44 (10-11-23 @ 16:55)  BMI (kg/m2): 32.3 (10-11-23 @ 16:55)  BSA (m2): 2.05 (10-11-23 @ 16:55)      10-11-23 @ 07:01  -  10-12-23 @ 07:00  --------------------------------------------------------  IN: 0 mL / OUT: 0 mL / NET: 0 mL      Physical Exam:  	  	Gen: Non toxic comfortable appearing   	Pulm: decrease bs  + coarse bs, no wheeze  	CV: No JVD. RRR, S1S2; no rub  	Back: No CVA tenderness  	Abd: +BS, soft, nontender/nondistended graft site non tender and no bruit   	: No suprapubic tenderness  	UE: Warm, no cyanosis  no clubbing,  no edema  	LE: Warm, no cyanosis  no clubbing, no edema    LABS/STUDIES  --------------------------------------------------------------------------------              13.8   12.66 >-----------<  221      [10-12-23 @ 07:35]              41.6     138  |  103  |  21  ----------------------------<  119      [10-12-23 @ 07:36]  4.1   |  25  |  0.62        Ca     9.9     [10-12-23 @ 07:36]    TPro  7.8  /  Alb  3.8  /  TBili  1.4  /  DBili  x   /  AST  19  /  ALT  12  /  AlkPhos  120  [10-11-23 @ 07:14]          Creatinine Trend:  SCr 0.62 [10-12 @ 07:36]  SCr 0.50 [10-11 @ 07:14]  SCr 0.66 [10-10 @ 11:37]    Tacrolimus (), Serum: 8.9 ng/mL (10-12 @ 07:35)  Tacrolimus (), Serum: 13.4 ng/mL (10-11 @ 07:14)  Tacrolimus (), Serum: 11.5 ng/mL (10-10 @ 13:54)

## 2023-10-12 NOTE — PROGRESS NOTE ADULT - ASSESSMENT
73 yo M FRANCOIS, COPD, DM, TAVR, renal transplant with SOB  No fever, has mild leukocytosis  Cough/runny nose, sputum production  RVP Entero/Rhino  CT Chest nodular consolidation/reticular opacities, LAD  UA neg  Bacterial vs viral pneumonia  Overall, Viral URI, bacterial pneumonia, leukocytosis  - Ceftriaxone 1g q 24  - Azithro 500mg q 24  - When DC planning, would send out on Ceftin 500mg q 12 to complete 7 days total (continue above IV regimen while inpatient)  - Check urine legionella  - O2 supplementation as needed  - Monitor for improvement/worsening  - Supportive care for viral URI  - Any concerns for malignancy based on CT read per team    Lalito Cornejo MD  Contact on TEAMS messaging from 9am - 5pm  From 5pm-9am, on weekends, or if no response call [phone]

## 2023-10-12 NOTE — PROGRESS NOTE ADULT - SUBJECTIVE AND OBJECTIVE BOX
Subjective:  Patient has good appetite  No episodes of hypoglycemia  Denies any complaints     MEDICATIONS  (STANDING):  albuterol/ipratropium for Nebulization 3 milliLiter(s) Nebulizer every 6 hours  allopurinol 100 milliGRAM(s) Oral at bedtime  aspirin enteric coated 81 milliGRAM(s) Oral daily  atorvastatin 20 milliGRAM(s) Oral daily  azithromycin  IVPB 500 milliGRAM(s) IV Intermittent every 24 hours  cefTRIAXone   IVPB 1000 milliGRAM(s) IV Intermittent every 24 hours  dextrose 5%. 1000 milliLiter(s) (50 mL/Hr) IV Continuous <Continuous>  dextrose 5%. 1000 milliLiter(s) (100 mL/Hr) IV Continuous <Continuous>  dextrose 50% Injectable 25 Gram(s) IV Push once  dextrose 50% Injectable 12.5 Gram(s) IV Push once  dextrose 50% Injectable 25 Gram(s) IV Push once  enalapril 10 milliGRAM(s) Oral daily  gabapentin 100 milliGRAM(s) Oral at bedtime  glucagon  Injectable 1 milliGRAM(s) IntraMuscular once  heparin   Injectable 5000 Unit(s) SubCutaneous every 12 hours  insulin aspart (NovoLOG) Pump 1 Each SubCutaneous Continuous Pump  labetalol 200 milliGRAM(s) Oral three times a day  montelukast 10 milliGRAM(s) Oral daily  mycophenolate mofetil 500 milliGRAM(s) Oral two times a day  NIFEdipine XL 60 milliGRAM(s) Oral daily  NIFEdipine XL 30 milliGRAM(s) Oral at bedtime  predniSONE   Tablet 20 milliGRAM(s) Oral daily  senna 2 Tablet(s) Oral at bedtime  tacrolimus 1 milliGRAM(s) Oral daily  tacrolimus 1.5 milliGRAM(s) Oral at bedtime  tamsulosin 0.4 milliGRAM(s) Oral at bedtime  torsemide 10 milliGRAM(s) Oral daily    MEDICATIONS  (PRN):  acetaminophen     Tablet .. 650 milliGRAM(s) Oral every 6 hours PRN Temp greater or equal to 38C (100.4F), Mild Pain (1 - 3)  dextrose Oral Gel 15 Gram(s) Oral once PRN Blood Glucose LESS THAN 70 milliGRAM(s)/deciliter  guaiFENesin Oral Liquid (Sugar-Free) 100 milliGRAM(s) Oral every 6 hours PRN Cough  melatonin 3 milliGRAM(s) Oral at bedtime PRN Insomnia      Allergies    No Known Allergies    Intolerances      Review of Systems:  Constitutional: No fever  Eyes: No blurry vision  Neuro: No tremors  HEENT: No pain  Cardiovascular: No chest pain, palpitations  Respiratory: No SOB, no cough  GI: No nausea, vomiting, abdominal pain  : No dysuria  Skin: no rash  Psych: no depression  Endocrine: no polyuria, polydipsia  Hem/lymph: no swelling  Osteoporosis: no fractures    ALL OTHER SYSTEMS REVIEWED AND NEGATIVE    UNABLE TO OBTAIN    PHYSICAL EXAM:  VITALS: T(C): 36.7 (10-12-23 @ 13:04)  T(F): 98.1 (10-12-23 @ 13:04), Max: 98.3 (10-11-23 @ 16:17)  HR: 74 (10-12-23 @ 13:04) (74 - 90)  BP: 137/70 (10-12-23 @ 13:04) (137/70 - 148/77)  RR:  (18 - 20)  SpO2:  (94% - 99%)  Wt(kg): --  GENERAL: NAD, well-groomed, well-developed  EYES: No proptosis, no lid lag, anicteric  HEENT:  Atraumatic, Normocephalic, moist mucous membranes  THYROID: Normal size, no palpable nodules  RESPIRATORY: Clear to auscultation bilaterally; No rales, rhonchi, wheezing, or rubs  CARDIOVASCULAR: Regular rate and rhythm; No murmurs; no peripheral edema  GI: Soft, nontender, non distended, normal bowel sounds  SKIN: Dry, intact, No rashes or lesions  MUSCULOSKELETAL: Full range of motion, normal strength  NEURO: sensation intact, extraocular movements intact, no tremor, normal reflexes  PSYCH: Alert and oriented x 3, normal affect, normal mood  CUSHING'S SIGNS: no striae    POCT Blood Glucose.: 209 mg/dL (10-12-23 @ 11:41)  POCT Blood Glucose.: 133 mg/dL (10-12-23 @ 07:56)  POCT Blood Glucose.: 118 mg/dL (10-11-23 @ 21:46)  POCT Blood Glucose.: 172 mg/dL (10-11-23 @ 16:50)  POCT Blood Glucose.: 243 mg/dL (10-11-23 @ 11:10)  POCT Blood Glucose.: 155 mg/dL (10-11-23 @ 07:53)  POCT Blood Glucose.: 122 mg/dL (10-10-23 @ 16:18)      10-12    138  |  103  |  21  ----------------------------<  119<H>  4.1   |  25  |  0.62    eGFR: 102    Ca    9.9      10-12    TPro  7.8  /  Alb  3.8  /  TBili  1.4<H>  /  DBili  x   /  AST  19  /  ALT  12  /  AlkPhos  120  10-11          Thyroid Function Tests:

## 2023-10-12 NOTE — PROGRESS NOTE ADULT - SUBJECTIVE AND OBJECTIVE BOX
73 yo male known to me from out pt care, last seen two weeks ago.    Pt with multiple med problems, including OAD on ICS/LABA, FRANCOIS on CPAP, abnormal chest CT, last performed 9/30/22 , with stable pulm nodules.    Admitted complaining of weakness and cough; viral URI on evaluation    Presently NAD on R air    Feels "better"    VS Stable Afeb    Lungs- scattered rhonchi with bibasilar crackles  Heart- Reg 2/6 RADHA  Abd- non tender  Ext- no edema    IMP: Stable pulm status; viral URI in pt with OAD  Bilateral infiltrates on chest CT are new; nodules noted seen on prior outpt study one year ago    PLAN: Antibx as ordered; outpt treatment po soon  PO steroids as ordered  Will f/u in office as before for CT evaluation    Nabeel Goldberg MD [facility]  [phone]    (Ashlyn/Esdras/Ashlee)

## 2023-10-12 NOTE — PROGRESS NOTE ADULT - SUBJECTIVE AND OBJECTIVE BOX
CC: F/U for PNA    Saw/spoke to patient. No fevers, no chills. No new complaints.    Allergies  No Known Allergies    ANTIMICROBIALS:  azithromycin  IVPB 500 every 24 hours  cefTRIAXone   IVPB 1000 every 24 hours    PE:    Vital Signs Last 24 Hrs  T(C): 36.7 (12 Oct 2023 13:04), Max: 36.8 (11 Oct 2023 16:17)  T(F): 98.1 (12 Oct 2023 13:04), Max: 98.3 (11 Oct 2023 16:17)  HR: 74 (12 Oct 2023 13:04) (74 - 90)  BP: 137/70 (12 Oct 2023 13:04) (137/70 - 159/72)  RR: 18 (12 Oct 2023 13:04) (18 - 20)  SpO2: 98% (12 Oct 2023 13:04) (94% - 99%)    Gen: AOx3, NAD, non-toxic  CV: Nontachycardic  Resp: Breathing comfortably, RA  Abd: Soft, nontender  IV/Skin: No thrombophlebitis    LABS:                        13.8   12.66 )-----------( 221      ( 12 Oct 2023 07:35 )             41.6     10-12    138  |  103  |  21  ----------------------------<  119<H>  4.1   |  25  |  0.62    Ca    9.9      12 Oct 2023 07:36    TPro  7.8  /  Alb  3.8  /  TBili  1.4<H>  /  DBili  x   /  AST  19  /  ALT  12  /  AlkPhos  120  10-11    Urinalysis Basic - ( 12 Oct 2023 07:36 )    Color: x / Appearance: x / SG: x / pH: x  Gluc: 119 mg/dL / Ketone: x  / Bili: x / Urobili: x   Blood: x / Protein: x / Nitrite: x   Leuk Esterase: x / RBC: x / WBC x   Sq Epi: x / Non Sq Epi: x / Bacteria: x    MICROBIOLOGY:    Rapid RVP Result: Detected (10-10 @ 12:39) Entero/Rhino    RADIOLOGY:    10/10    IMPRESSION:    1. Newly developing nodular areas of consolidation bilaterally as well as   reticular opacities and abbreviated differential includes metastatic   disease with possible lymphangitic component. Atypical infectious and/or   inflammatory etiologies cannot be excluded. Bronchoscopic correlation and   histological sampling may be in order.  2. Mild increase in now moderate mediastinal and probable right hilar   lymphadenopathy.  3. Post TAVR with aneurysmal dilatation of the ascending aorta measuring   4.2 cm moderate dilatation of the pulmonary artery measuring up to 3.6   cm. Pulmonary hypertension cannot be excluded.  4. Additional chronic findings as above.

## 2023-10-12 NOTE — PROGRESS NOTE ADULT - SUBJECTIVE AND OBJECTIVE BOX
Date of Service: 10-12-23 @ 10:50           CARDIOLOGY     PROGRESS  NOTE   ________________________________________________    CHIEF COMPLAINT:Patient is a 72y old  Male who presents with a chief complaint of SOB (12 Oct 2023 08:55)  no complain, still with sob  	  REVIEW OF SYSTEMS:  CONSTITUTIONAL: No fever, weight loss, or fatigue  EYES: No eye pain, visual disturbances, or discharge  ENT:  No difficulty hearing, tinnitus, vertigo; No sinus or throat pain  NECK: No pain or stiffness  RESPIRATORY: No cough, wheezing, chills or hemoptysis; + Shortness of Breath  CARDIOVASCULAR: No chest pain, palpitations, passing out, dizziness, or leg swelling  GASTROINTESTINAL: No abdominal or epigastric pain. No nausea, vomiting, or hematemesis; No diarrhea or constipation. No melena or hematochezia.  GENITOURINARY: No dysuria, frequency, hematuria, or incontinence  NEUROLOGICAL: No headaches, memory loss, loss of strength, numbness, or tremors  SKIN: No itching, burning, rashes, or lesions   LYMPH Nodes: No enlarged glands  ENDOCRINE: No heat or cold intolerance; No hair loss  MUSCULOSKELETAL: No joint pain or swelling; No muscle, back, or extremity pain  PSYCHIATRIC: No depression, anxiety, mood swings, or difficulty sleeping  HEME/LYMPH: No easy bruising, or bleeding gums  ALLERGY AND IMMUNOLOGIC: No hives or eczema	    [x ] All others negative	  [ ] Unable to obtain    PHYSICAL EXAM:  T(C): 36.4 (10-12-23 @ 00:12), Max: 36.8 (10-11-23 @ 16:17)  HR: 90 (10-12-23 @ 09:55) (74 - 90)  BP: 148/77 (10-12-23 @ 06:30) (140/68 - 159/72)  RR: 18 (10-12-23 @ 00:12) (18 - 20)  SpO2: 98% (10-12-23 @ 09:55) (94% - 99%)  Wt(kg): --  I&O's Summary    11 Oct 2023 07:01  -  12 Oct 2023 07:00  --------------------------------------------------------  IN: 0 mL / OUT: 0 mL / NET: 0 mL        Appearance: Normal	  HEENT:   Normal oral mucosa, PERRL, EOMI	  Lymphatic: No lymphadenopathy  Cardiovascular: Normal S1 S2, No JVD, + murmurs, No edema  Respiratory: rhonchi  Psychiatry: A & O x 3, Mood & affect appropriate  Gastrointestinal:  Soft, Non-tender, + BS	  Skin: No rashes, No ecchymoses, No cyanosis	  Neurologic: Non-focal  Extremities: Normal range of motion, No clubbing, cyanosis or edema  Vascular: Peripheral pulses palpable 2+ bilaterally    MEDICATIONS  (STANDING):  albuterol/ipratropium for Nebulization 3 milliLiter(s) Nebulizer every 6 hours  allopurinol 100 milliGRAM(s) Oral at bedtime  aspirin enteric coated 81 milliGRAM(s) Oral daily  atorvastatin 20 milliGRAM(s) Oral daily  azithromycin  IVPB 500 milliGRAM(s) IV Intermittent every 24 hours  cefTRIAXone   IVPB 1000 milliGRAM(s) IV Intermittent every 24 hours  dextrose 5%. 1000 milliLiter(s) (100 mL/Hr) IV Continuous <Continuous>  dextrose 5%. 1000 milliLiter(s) (50 mL/Hr) IV Continuous <Continuous>  dextrose 50% Injectable 25 Gram(s) IV Push once  dextrose 50% Injectable 25 Gram(s) IV Push once  dextrose 50% Injectable 12.5 Gram(s) IV Push once  enalapril 10 milliGRAM(s) Oral daily  gabapentin 100 milliGRAM(s) Oral at bedtime  glucagon  Injectable 1 milliGRAM(s) IntraMuscular once  heparin   Injectable 5000 Unit(s) SubCutaneous every 12 hours  insulin aspart (NovoLOG) Pump 1 Each SubCutaneous Continuous Pump  labetalol 200 milliGRAM(s) Oral three times a day  montelukast 10 milliGRAM(s) Oral daily  mycophenolate mofetil 500 milliGRAM(s) Oral two times a day  NIFEdipine XL 60 milliGRAM(s) Oral daily  NIFEdipine XL 30 milliGRAM(s) Oral at bedtime  predniSONE   Tablet 20 milliGRAM(s) Oral daily  senna 2 Tablet(s) Oral at bedtime  tacrolimus 1.5 milliGRAM(s) Oral at bedtime  tacrolimus 1 milliGRAM(s) Oral daily  tamsulosin 0.4 milliGRAM(s) Oral at bedtime  torsemide 10 milliGRAM(s) Oral daily      TELEMETRY: 	    ECG:  	  RADIOLOGY:  OTHER: 	  	  LABS:	 	    CARDIAC MARKERS:                        13.8   12.66 )-----------( 221      ( 12 Oct 2023 07:35 )             41.6     10-12    138  |  103  |  21  ----------------------------<  119<H>  4.1   |  25  |  0.62    Ca    9.9      12 Oct 2023 07:36    TPro  7.8  /  Alb  3.8  /  TBili  1.4<H>  /  DBili  x   /  AST  19  /  ALT  12  /  AlkPhos  120  10-11    proBNP:   Lipid Profile:   HgA1c:   TSH:         Assessment and plan  ---------------------------  72M w/ pmhx of FRANCOIS on CPAP, COPD w/ 50-pack-year smoking history, DM2 on insulin pump, hyperlipidemia, hypertension, CAD status post stent, TAVR, history of kidney transplant in 2013 on immunosuppression presenting with 2 days of shortness of breath. Wife states pt has been having coughing w/ slight yellow sputum, wheezing and SOB for past 2-3 days which was not improved with QID nebulizers at home. Pt reluctant to see doctor. Went to see PMD this AM and was sent to ER to r/o PNA. Denies fevers or chills. Endorses slight midsternal chest pressure and notable wheezing. No obvious sick contacts. No other specific symptoms.  pt with hx of ashd, s/p stents, AS , s/p TAVR in 2021 with c/o sob and cough ?pneumoniae  continue iv abx  ID noted will adjust cardiac meds  continue ASA daily  continue current cardiac meds, doubt chf, continue Demedex  pulmonary noted  check blood cultures  oob to chair, doing better today

## 2023-10-12 NOTE — PROGRESS NOTE ADULT - ASSESSMENT
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 Endocrine consulted for insulin pump     #T2DM  Home med: Medtronic pump w/ novolog insulin + Ozempic 0.5mgweekly  - Blood sugars continue to remain stable at this time and in a reasonable range given use of steroids- patient is currently on Prednisone 20mg daily   Plan:    - Continue using pump inpatient as he is comfortable managing pump with reasonable sugars  - Continue with temp basal with 10% of basal rate in the settings of steroids- will need to be adjusted based on patient's steroid plan     - RN to document correction insulin bolus in flow sheet   - Hypoglcyemia protocol    - In case of pump malfunction, please administer Lantus 16 units STAT and start Admelog 4 units TID with meals  (HOLD IF NPO)  - Please check FSG before meals and QHS, or q6h while NPO   - Please keep patient on a diabetic, carb controlled diet    - on discharge patient should follow up with their endocirnologist Dr. Jyoti Byrd    Home insulin pump settings:  Basal: TDD is 16.3U   12AM-5AM 0.6U/hr   5AM-12AM 0.7U/hr       ICR: 12AM-11PM 15g/u             11PM-12AM 20g/u       ISF: 50mg/dl all day   Glucose target      #HLD  - patient on atorvastatin 20mg a home  - goal LDL in diabetes mellitus is <70    #HTN  - patient on enalapril 10mg at home  - goal BP in diabetes mellitus is <130/80    #Insulin pump status  - as noted above - patient remains on pump inpatient     Mica Rosales DO   Attending Physician   Department of Endocrinology, Diabetes and Metabolism     If before 9AM or after 5PM, or on weekends/holidays, please call the Endocrine answering service for assistance ([phone]).  For nonurgent matters, please email [email or web address] for assistance.

## 2023-10-13 ENCOUNTER — TRANSCRIPTION ENCOUNTER (OUTPATIENT)
Age: 73
End: 2023-10-13

## 2023-10-13 VITALS — HEART RATE: 76 BPM | OXYGEN SATURATION: 97 %

## 2023-10-13 LAB
CMV DNA CSF QL NAA+PROBE: SIGNIFICANT CHANGE UP IU/ML
CMV DNA SPEC NAA+PROBE-LOG#: SIGNIFICANT CHANGE UP LOG10IU/ML
GLUCOSE BLDC GLUCOMTR-MCNC: 110 MG/DL — HIGH (ref 70–99)
GLUCOSE BLDC GLUCOMTR-MCNC: 193 MG/DL — HIGH (ref 70–99)
LEGIONELLA AG UR QL: NEGATIVE — SIGNIFICANT CHANGE UP
MYCOPHENOLATE SERPL-MCNC: 0.6 UG/ML — LOW (ref 1–3.5)
MYCOPHENOLIC ACID GLUCURONIDE: 34 UG/ML — SIGNIFICANT CHANGE UP (ref 15–125)
TACROLIMUS SERPL-MCNC: 7.7 NG/ML — SIGNIFICANT CHANGE UP

## 2023-10-13 PROCEDURE — 99232 SBSQ HOSP IP/OBS MODERATE 35: CPT

## 2023-10-13 RX ORDER — MYCOPHENOLATE MOFETIL 250 MG/1
2 CAPSULE ORAL
Qty: 120 | Refills: 0
Start: 2023-10-13 | End: 2023-11-11

## 2023-10-13 RX ORDER — CEFUROXIME AXETIL 250 MG
500 TABLET ORAL EVERY 12 HOURS
Refills: 0 | Status: DISCONTINUED | OUTPATIENT
Start: 2023-10-13 | End: 2023-10-13

## 2023-10-13 RX ORDER — CEFUROXIME AXETIL 250 MG
250 TABLET ORAL EVERY 12 HOURS
Refills: 0 | Status: DISCONTINUED | OUTPATIENT
Start: 2023-10-13 | End: 2023-10-13

## 2023-10-13 RX ORDER — FINASTERIDE 5 MG/1
1 TABLET, FILM COATED ORAL
Qty: 0 | Refills: 0 | DISCHARGE

## 2023-10-13 RX ORDER — CEFUROXIME AXETIL 250 MG
1 TABLET ORAL
Qty: 4 | Refills: 0
Start: 2023-10-13 | End: 2023-10-14

## 2023-10-13 RX ADMIN — Medication 20 MILLIGRAM(S): at 05:32

## 2023-10-13 RX ADMIN — HEPARIN SODIUM 5000 UNIT(S): 5000 INJECTION INTRAVENOUS; SUBCUTANEOUS at 05:31

## 2023-10-13 RX ADMIN — MYCOPHENOLATE MOFETIL 1000 MILLIGRAM(S): 250 CAPSULE ORAL at 05:31

## 2023-10-13 RX ADMIN — Medication 10 MILLIGRAM(S): at 12:17

## 2023-10-13 NOTE — PROGRESS NOTE ADULT - SUBJECTIVE AND OBJECTIVE BOX
Date of Service: 10-13-23 @ 10:52           CARDIOLOGY     PROGRESS  NOTE   ________________________________________________    CHIEF COMPLAINT:Patient is a 72y old  Male who presents with a chief complaint of SOB (13 Oct 2023 09:13)  no complain  	  REVIEW OF SYSTEMS:  CONSTITUTIONAL: No fever, weight loss, or fatigue  EYES: No eye pain, visual disturbances, or discharge  ENT:  No difficulty hearing, tinnitus, vertigo; No sinus or throat pain  NECK: No pain or stiffness  RESPIRATORY: No cough, wheezing, chills or hemoptysis; decrease  Shortness of Breath  CARDIOVASCULAR: No chest pain, palpitations, passing out, dizziness, or leg swelling  GASTROINTESTINAL: No abdominal or epigastric pain. No nausea, vomiting, or hematemesis; No diarrhea or constipation. No melena or hematochezia.  GENITOURINARY: No dysuria, frequency, hematuria, or incontinence  NEUROLOGICAL: No headaches, memory loss, loss of strength, numbness, or tremors  SKIN: No itching, burning, rashes, or lesions   LYMPH Nodes: No enlarged glands  ENDOCRINE: No heat or cold intolerance; No hair loss  MUSCULOSKELETAL: No joint pain or swelling; No muscle, back, or extremity pain  PSYCHIATRIC: No depression, anxiety, mood swings, or difficulty sleeping  HEME/LYMPH: No easy bruising, or bleeding gums  ALLERGY AND IMMUNOLOGIC: No hives or eczema	    [ x] All others negative	  [ ] Unable to obtain    PHYSICAL EXAM:  T(C): 36.5 (10-12-23 @ 23:06), Max: 36.7 (10-12-23 @ 13:04)  HR: 76 (10-13-23 @ 09:42) (71 - 93)  BP: 141/72 (10-12-23 @ 23:06) (137/69 - 141/72)  RR: 18 (10-12-23 @ 23:06) (18 - 18)  SpO2: 97% (10-13-23 @ 09:42) (94% - 99%)  Wt(kg): --  I&O's Summary    12 Oct 2023 07:01  -  13 Oct 2023 07:00  --------------------------------------------------------  IN: 540 mL / OUT: 700 mL / NET: -160 mL        Appearance: Normal	  HEENT:   Normal oral mucosa, PERRL, EOMI	  Lymphatic: No lymphadenopathy  Cardiovascular: Normal S1 S2, No JVD, + murmurs, No edema  Respiratory: rhonchi  Psychiatry: A & O x 3, Mood & affect appropriate  Gastrointestinal:  Soft, Non-tender, + BS	  Skin: No rashes, No ecchymoses, No cyanosis	  Extremities: Normal range of motion, No clubbing, cyanosis or edema  Vascular: Peripheral pulses palpable 2+ bilaterally    MEDICATIONS  (STANDING):  allopurinol 100 milliGRAM(s) Oral at bedtime  aspirin enteric coated 81 milliGRAM(s) Oral daily  atorvastatin 20 milliGRAM(s) Oral daily  cefuroxime   Tablet 500 milliGRAM(s) Oral every 12 hours  dextrose 5%. 1000 milliLiter(s) (50 mL/Hr) IV Continuous <Continuous>  dextrose 5%. 1000 milliLiter(s) (100 mL/Hr) IV Continuous <Continuous>  dextrose 50% Injectable 25 Gram(s) IV Push once  dextrose 50% Injectable 12.5 Gram(s) IV Push once  dextrose 50% Injectable 25 Gram(s) IV Push once  enalapril 10 milliGRAM(s) Oral daily  gabapentin 100 milliGRAM(s) Oral at bedtime  glucagon  Injectable 1 milliGRAM(s) IntraMuscular once  heparin   Injectable 5000 Unit(s) SubCutaneous every 12 hours  insulin aspart (NovoLOG) Pump 1 Each SubCutaneous Continuous Pump  labetalol 200 milliGRAM(s) Oral three times a day  montelukast 10 milliGRAM(s) Oral daily  mycophenolate mofetil 1000 milliGRAM(s) Oral two times a day  NIFEdipine XL 60 milliGRAM(s) Oral daily  NIFEdipine XL 30 milliGRAM(s) Oral at bedtime  predniSONE   Tablet 10 milliGRAM(s) Oral daily  senna 2 Tablet(s) Oral at bedtime  tacrolimus 1.5 milliGRAM(s) Oral at bedtime  tacrolimus 1 milliGRAM(s) Oral daily  tamsulosin 0.4 milliGRAM(s) Oral at bedtime  torsemide 10 milliGRAM(s) Oral daily      TELEMETRY: 	    ECG:  	  RADIOLOGY:  OTHER: 	  	  LABS:	 	    CARDIAC MARKERS:                          13.8   12.66 )-----------( 221      ( 12 Oct 2023 07:35 )             41.6     10-12    138  |  103  |  21  ----------------------------<  119<H>  4.1   |  25  |  0.62    Ca    9.9      12 Oct 2023 07:36      proBNP:   Lipid Profile:   HgA1c:   TSH:         Assessment and plan  ---------------------------  72M w/ pmhx of FRANCOIS on CPAP, COPD w/ 50-pack-year smoking history, DM2 on insulin pump, hyperlipidemia, hypertension, CAD status post stent, TAVR, history of kidney transplant in 2013 on immunosuppression presenting with 2 days of shortness of breath. Wife states pt has been having coughing w/ slight yellow sputum, wheezing and SOB for past 2-3 days which was not improved with QID nebulizers at home. Pt reluctant to see doctor. Went to see PMD this AM and was sent to ER to r/o PNA. Denies fevers or chills. Endorses slight midsternal chest pressure and notable wheezing. No obvious sick contacts. No other specific symptoms.  pt with hx of ashd, s/p stents, AS , s/p TAVR in 2021 with c/o sob and cough ?pneumoniae  continue iv abx  ID noted will adjust cardiac meds  continue ASA daily  continue current cardiac meds, doubt chf, continue Demadex  pulmonary noted  check blood cultures  oob to chair, doing better today  increase ambulation  continue all cardiac meds will fu as out pt

## 2023-10-13 NOTE — PROGRESS NOTE ADULT - ASSESSMENT
72M w/ pmhx of FRANCOIS on CPAP, COPD w/ 50-pack-year smoking history, DM2 on insulin pump, hyperlipidemia, hypertension, CAD status post stent, TAVR, history of kidney transplant in  on immunosuppresion, presenting with 2 days of shortness of breath. Wife states pt has been having coughing w/ slight yellow sputum, wheezing and SOB for past 2-3 days which was not improved with QID nebulizers at home. Pt reluctant to see doctor. Went to see PMD and was sent to ER to r/o PNA. Denies fevers or chills. Endorses slight midsternal chest pressure and notable wheezing. No obvious sick contacts. No other specific symptoms. follows pulm       1-  donor renal transplant   2- copd  3- HTN   4- pneumonia      cont tacro 1.5 mg pm and 1 mg am   trend tacro level daily, must be drawn within 1 hour prior to receiving dose, do not hold medication.   cellcept at 500 mg bid as of am [ lower dose given on steroids and infection ]   prednisone 60 mg daily  labetalol 200 mg tid and vasotec 10 mg daily   procardia xl 60 mg am, 30 mg pm   rocephin 1 g daily/azithromycin 500 mg daily  pulm consult for lung lesions   torsemide 10 mg daily  flomax 0.4 mg daily  nebs to cont   smoking cessation d/w pt

## 2023-10-13 NOTE — PROGRESS NOTE ADULT - ASSESSMENT
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 On Prednisone 20 mg today, plan for taper ( 10 mg X3 days and 5 mg X3 days) Endocrine consulted for insulin pump management. Prelunch BGs above goal due to steroid effect. Anticipate BGs improve as going down with steroid dose. Recommended Temp basal 10 % increase for 12 hours on discharge because he had Prednisone total 30 mg today ( 20 mg earlier in am and 10 mg later), how ever he was not comfortable with any changes in pump setting on discharge. Explained pt that BGs will be  higher than usual today.    Home med: Medtronic pump w/ novolog insulin + Ozempic 0.5mgweekly    Home insulin pump settings:    Basal: TDD is 16.3U   12AM-5AM 0.6U/hr   5AM-12AM 0.7U/hr       ICR: 12AM-11PM 15g/u             11PM-12AM 20g/u       ISF: 50mg/dl all day     Glucose target

## 2023-10-13 NOTE — PROGRESS NOTE ADULT - ASSESSMENT
73 yo M FRANCOIS, COPD, DM, TAVR, renal transplant with SOB  No fever, has mild leukocytosis  Cough/runny nose, sputum production  RVP Entero/Rhino  CT Chest nodular consolidation/reticular opacities, LAD  UA neg  Bacterial vs viral pneumonia  Overall, Viral URI, bacterial pneumonia, leukocytosis  - Ceftriaxone 1g q 24  - Legionella neg, DC Azithro  - When DC planning, would send out on Ceftin 500mg q 12 to complete 7 days total  - O2 supplementation as needed  - Monitor for improvement/worsening  - Supportive care for viral URI  - Any concerns for malignancy based on CT read per team    Signing off. Please call with further questions or change in status.    Lalito Cornjeo MD  Contact on TEAMS messaging from 9am - 5pm  From 5pm-9am, on weekends, or if no response call [phone]

## 2023-10-13 NOTE — PROGRESS NOTE ADULT - SUBJECTIVE AND OBJECTIVE BOX
NAD OOB in chair on R air    VS Stable Afeb    Lungs- mild end exp wheezing bilat  Heart-Reg   Abd- non tender  Ext- no edema    IMP: Viral URI with bilateral infiltrates in pt with OAD  Clinically stable    PLAN: Complete antibx  Stop oral steroids in a few days  Outpt chest CT f/u   Continue CPAP tx for FRANCOIS  F/u in office as before    Nabeel Goldberg MD [facility]  [phone]    (Ashlyn/Esdras/Ashlee)

## 2023-10-13 NOTE — PROGRESS NOTE ADULT - SUBJECTIVE AND OBJECTIVE BOX
Seen earlier today     Chief Complaint: Diabetes Mellitus follow up    INTERVAL HX: Prelunch BGs above goal likely due to steroid effect. As per team, discharge today on Prednisone taper 10 mg X 3 days and 5 mg X 3days. Tolerating POs, eating full meals       Review of Systems:  General: As above  GI: No nausea, vomiting  Endocrine: no  S&Sx of hypoglycemia    Allergies    No Known Allergies    Intolerances      MEDICATIONS  (STANDING):  atorvastatin 20 milliGRAM(s) Oral daily  cefuroxime   Tablet 500 milliGRAM(s) Oral every 12 hours  dextrose 5%. 1000 milliLiter(s) (50 mL/Hr) IV Continuous <Continuous>  dextrose 5%. 1000 milliLiter(s) (100 mL/Hr) IV Continuous <Continuous>  dextrose 50% Injectable 25 Gram(s) IV Push once  dextrose 50% Injectable 12.5 Gram(s) IV Push once  dextrose 50% Injectable 25 Gram(s) IV Push once  enalapril 10 milliGRAM(s) Oral daily  glucagon  Injectable 1 milliGRAM(s) IntraMuscular once  insulin aspart (NovoLOG) Pump 1 Each SubCutaneous Continuous Pump  labetalol 200 milliGRAM(s) Oral three times a day  NIFEdipine XL 60 milliGRAM(s) Oral daily  NIFEdipine XL 30 milliGRAM(s) Oral at bedtime  predniSONE   Tablet 10 milliGRAM(s) Oral daily  tacrolimus 1 milliGRAM(s) Oral daily  tacrolimus 1.5 milliGRAM(s) Oral at bedtime  tamsulosin 0.4 milliGRAM(s) Oral at bedtime    allopurinol   100 milliGRAM(s) Oral (10-12-23 @ 21:13)    atorvastatin   20 milliGRAM(s) Oral (10-13-23 @ 12:18)    predniSONE   Tablet   10 milliGRAM(s) Oral (10-13-23 @ 12:17)    predniSONE   Tablet   20 milliGRAM(s) Oral (10-13-23 @ 05:32)        PHYSICAL EXAM:  VITALS: T(C): 36.5 (10-12-23 @ 23:06)  T(F): 97.7 (10-12-23 @ 23:06), Max: 98.1 (10-12-23 @ 15:38)  HR: 76 (10-13-23 @ 09:42) (71 - 93)  BP: 141/72 (10-12-23 @ 23:06) (137/69 - 141/72)  RR:  (18 - 18)  SpO2:  (94% - 99%)  Wt(kg): --  GENERAL: Male sitting in chair  in NAD  Respiratory: Respirations unlabored   Extremities: Warm, no edema  NEURO: Alert , appropriate     LABS:  POCT Blood Glucose.: 193 mg/dL (10-13-23 @ 12:12)  POCT Blood Glucose.: 110 mg/dL (10-13-23 @ 07:44)  POCT Blood Glucose.: 141 mg/dL (10-12-23 @ 21:24)  POCT Blood Glucose.: 153 mg/dL (10-12-23 @ 16:31)  POCT Blood Glucose.: 209 mg/dL (10-12-23 @ 11:41)  POCT Blood Glucose.: 133 mg/dL (10-12-23 @ 07:56)  POCT Blood Glucose.: 118 mg/dL (10-11-23 @ 21:46)  POCT Blood Glucose.: 172 mg/dL (10-11-23 @ 16:50)  POCT Blood Glucose.: 243 mg/dL (10-11-23 @ 11:10)  POCT Blood Glucose.: 155 mg/dL (10-11-23 @ 07:53)  POCT Blood Glucose.: 122 mg/dL (10-10-23 @ 16:18)                          13.8   12.66 )-----------( 221      ( 12 Oct 2023 07:35 )             41.6     10-12    138  |  103  |  21  ----------------------------<  119<H>  4.1   |  25  |  0.62    Ca    9.9      12 Oct 2023 07:36          Thyroid Function Tests:      A1C with Estimated Average Glucose Result: 5.4 % (10-11-23 @ 07:14)    Estimated Average Glucose: 108 mg/dL (10-11-23 @ 07:14)        Diet, Regular:   Consistent Carbohydrate Evening Snack (CSTCHOSN)  Low Sodium (10-10-23 @ 22:55) [Active]

## 2023-10-13 NOTE — PROGRESS NOTE ADULT - SUBJECTIVE AND OBJECTIVE BOX
[facility]   [phone]  RENAL FOLLOW UP NOTE  --------------------------------------------------------------------------------  Chief Complaint:    24 hour events/subjective:    seen earlier   states feels better cough and sob is better     PAST HISTORY  --------------------------------------------------------------------------------  No significant changes to PMH, PSH, FHx, SHx, unless otherwise noted    ALLERGIES & MEDICATIONS  --------------------------------------------------------------------------------  Allergies    No Known Allergies    Intolerances      Standing Inpatient Medications  allopurinol 100 milliGRAM(s) Oral at bedtime  aspirin enteric coated 81 milliGRAM(s) Oral daily  atorvastatin 20 milliGRAM(s) Oral daily  cefuroxime   Tablet 500 milliGRAM(s) Oral every 12 hours  dextrose 5%. 1000 milliLiter(s) IV Continuous <Continuous>  dextrose 5%. 1000 milliLiter(s) IV Continuous <Continuous>  dextrose 50% Injectable 25 Gram(s) IV Push once  dextrose 50% Injectable 25 Gram(s) IV Push once  dextrose 50% Injectable 12.5 Gram(s) IV Push once  enalapril 10 milliGRAM(s) Oral daily  gabapentin 100 milliGRAM(s) Oral at bedtime  glucagon  Injectable 1 milliGRAM(s) IntraMuscular once  heparin   Injectable 5000 Unit(s) SubCutaneous every 12 hours  insulin aspart (NovoLOG) Pump 1 Each SubCutaneous Continuous Pump  labetalol 200 milliGRAM(s) Oral three times a day  montelukast 10 milliGRAM(s) Oral daily  mycophenolate mofetil 1000 milliGRAM(s) Oral two times a day  NIFEdipine XL 60 milliGRAM(s) Oral daily  NIFEdipine XL 30 milliGRAM(s) Oral at bedtime  predniSONE   Tablet 10 milliGRAM(s) Oral daily  senna 2 Tablet(s) Oral at bedtime  tacrolimus 1 milliGRAM(s) Oral daily  tacrolimus 1.5 milliGRAM(s) Oral at bedtime  tamsulosin 0.4 milliGRAM(s) Oral at bedtime  torsemide 10 milliGRAM(s) Oral daily    PRN Inpatient Medications  acetaminophen     Tablet .. 650 milliGRAM(s) Oral every 6 hours PRN  dextrose Oral Gel 15 Gram(s) Oral once PRN  guaiFENesin Oral Liquid (Sugar-Free) 100 milliGRAM(s) Oral every 6 hours PRN  melatonin 3 milliGRAM(s) Oral at bedtime PRN      REVIEW OF SYSTEMS  --------------------------------------------------------------------------------    Gen: denies  fevers/chills,  CVS: denies chest pain/palpitations  Resp: denies worsening SOB/Cough  GI: Denies N/V/Abd pain  : Denies dysuria      VITALS/PHYSICAL EXAM  --------------------------------------------------------------------------------  T(C): 36.5 (10-12-23 @ 23:06), Max: 36.5 (10-12-23 @ 23:06)  HR: 76 (10-13-23 @ 09:42) (71 - 93)  BP: 141/72 (10-12-23 @ 23:06) (141/72 - 141/72)  RR: 18 (10-12-23 @ 23:06) (18 - 18)  SpO2: 97% (10-13-23 @ 09:42) (94% - 97%)  Wt(kg): --        10-12-23 @ 07:01  -  10-13-23 @ 07:00  --------------------------------------------------------  IN: 540 mL / OUT: 700 mL / NET: -160 mL      Physical Exam:  	  	  	Gen: Non toxic comfortable appearing   	Pulm: decrease bs  no rales no wheeze  	CV: No JVD. RRR, S1S2; no rub  	Back: No CVA tenderness  	Abd: +BS, soft, nontender/nondistended graft site non tender and no bruit   	: No suprapubic tenderness  	UE: Warm, no cyanosis  no clubbing,  no edema  	LE: Warm, no cyanosis  no clubbing, no edema      LABS/STUDIES  --------------------------------------------------------------------------------              13.8   12.66 >-----------<  221      [10-12-23 @ 07:35]              41.6     138  |  103  |  21  ----------------------------<  119      [10-12-23 @ 07:36]  4.1   |  25  |  0.62        Ca     9.9     [10-12-23 @ 07:36]            Creatinine Trend:  SCr 0.62 [10-12 @ 07:36]  SCr 0.50 [10-11 @ 07:14]  SCr 0.66 [10-10 @ 11:37]    Tacrolimus (), Serum: 7.7 ng/mL (10-13 @ 07:30)  Tacrolimus (), Serum: 8.9 ng/mL (10-12 @ 07:35)  Tacrolimus (), Serum: 13.4 ng/mL (10-11 @ 07:14)  Tacrolimus (), Serum: 11.5 ng/mL (10-10 @ 13:54)

## 2023-10-13 NOTE — DISCHARGE NOTE NURSING/CASE MANAGEMENT/SOCIAL WORK - NSDCVIVACCINE_GEN_ALL_CORE_FT
I approve of requested home care orders.    Gucci Wu MD     Referred by: Sae Yuen MD; Medical Diagnosis (from order):    Diagnosis Information      Diagnosis    728.87 (ICD-9-CM) - R53.1 (ICD-10-CM) - Left-sided weakness                Physical Therapy -  Daily Treatment Note    Visit:  7     SUBJECTIVE                                                                                                             Patient reports things are feeling okay. He reports he felt better after last session.    Functional Change: Less knee pain on his L LE       Pain / Symptoms:  Pain rating (out of 10): Current: 8     OBJECTIVE                                                                                                                          TREATMENT                                                                                                                  Therapeutic Exercise:  NuStep: 6 min, level 5 seat 15  Below exercises done on airex   Standing marching on  x 20 bilateral   Standing hip abduction x 20 bilateral   Standing hip extension x 20 bilateral   Standing hamstring curls x 20 bilateral   Anterior step ups on 4 in step x 10 bilateral   Lateral step up and over on 4 in step x 10 bilateral                Neuromuscular Re-Education:  *with gait belt and CGA   Modified tandem on airex 2 x 30 seconds   Narrow base of support EO on airex 2 x 30 seconds   Narrow base of support EC on airex 2 x 30 seconds   Narrow base of support EO on airex with vertical head turns x 10 bilateral   Narrow base of support EO on airex with horizontal head turns x 10 bilateral   SLS x 30 second -UE assist required     Skilled input: verbal instruction/cues, tactile instruction/cues, posture correction and as detailed above    Home Exercise Program: (*above indicates provided as part of home exercise program)  LAQ, seated ankle DF/PF      ASSESSMENT                                                                                                             Patient require cueing to perform  exercises slow and controlled. Patient unable to perform SL balance without UE assist. Patient did well with progression of airex with standing strengthening.   Patient Education:   Results of above outlined education: Verbalizes understanding and Needs reinforcement     PLAN                                                                                                                             Suggestions for next session as indicated: Progress per plan of care step ups/downs, balance, add resistance to hip abd/ext        Procedures and total treatment time documented Time Entry flowsheet.     pneumococcal polysaccharide PPV23; 31-Aug-2022 16:38; Angela Silver (BELINDA); [employer] [id number] (Exp. Date: 07-Aug-2023); IntraMuscular; Dorsogluteal Right.; 0.5 milliLiter(s); VIS (VIS Published: 06-Oct-2009, VIS Presented: 31-Aug-2022);

## 2023-10-13 NOTE — PROGRESS NOTE ADULT - NUTRITIONAL ASSESSMENT
Diet, Regular:   Consistent Carbohydrate {Evening Snack} (CSTCHOSN)  Low Sodium (10-10-23 @ 22:55) [Active]

## 2023-10-13 NOTE — PROGRESS NOTE ADULT - ASSESSMENT
73 y/o M     h/o ESRD,s/p  R renal transplant 2013 , renal  cysts    DM,  CAD   s/p stents , FRANCOIS  on cpap qHS), HLD, HTN,  strep  bactreemia,  2022.  syncope    colonoscopy 8/22,  polyp         admitted  with  sob        from   COPD  acute exacerbation.  entero rhinovirus          on rocephin/  z  max.  singular.  steroid       CT chest .  probable   malignancy /  pna ,  mediastinal  and  R  hilar  adenopathy        will  need   ? bronch/   house  pulm   called      DM,  has  insulin pump,    hous e  endo  follwoing           [employer]  insulin pump at home and Ozempic ,endo,  Dr. Byrd      c/c  diastolic   chf.  torsemide/ known to  card    HTN/HLD         on  procardia,  labetolol.  enalapril     CAD,   on asa/ plavix/ ,lipitor     s/p  TAVR, 12/2021       CKD         s/p   R kidney transplant  2013,   Dr Vargas  [facility] /  on cellcept/  tacro  1mg/  1,5  mg.  hs      h/o   ILD,  restrictive/obstructive lung disease,  and   FRANCOIS / cpap    on dvt  ppx      pt  no longer  f/p  with transplant team in  [facility]/ dr edward olmstead following / defer transplant  meds to  renal    pt  doing well.  on po prednisone.   rx  plan per  ID              < from: CT Chest No Cont (10.10.23 @ 18:49) >  IMPRESSION:                      1. Newly developing nodular areas of consolidation bilaterally as well as   reticular opacities and abbreviated differential includes metastatic   disease with possible lymphangitic component. Atypical infectious and/or   inflammatory etiologies cannot be excluded. Bronchoscopic correlation and   histological sampling may be in order.  2. Mild increase in now moderate mediastinal and probable right hilar   lymphadenopathy.  3. Post TAVR with aneurysmal dilatation of the ascending aorta measuring   4.2 cm moderate dilatation of the pulmonary artery measuring up to 3.6   cm. Pulmonary hypertension cannot be excluded.  4. Additional chronic findings as above.  --- End of Report ---

## 2023-10-13 NOTE — PROGRESS NOTE ADULT - PROVIDER SPECIALTY LIST ADULT
Infectious Disease
Internal Medicine
Internal Medicine
Pulmonology
Cardiology
Cardiology
Endocrinology
Infectious Disease
Nephrology
Pulmonology
Internal Medicine
Nephrology
Endocrinology

## 2023-10-13 NOTE — DISCHARGE NOTE NURSING/CASE MANAGEMENT/SOCIAL WORK - PATIENT PORTAL LINK FT
You can access the FollowMyHealth Patient Portal offered by [facility] by registering at the following website: [email or web address]. By joining [employer]’s FollowMyHealth portal, you will also be able to view your health information using other applications (apps) compatible with our system.

## 2023-10-13 NOTE — PROGRESS NOTE ADULT - PROBLEM SELECTOR PLAN 1
- Continue with temp basal with 10% of basal rate in the settings of steroids- will need to be adjusted based on patient's steroid plan     - RN to document correction insulin bolus in flow sheet   - Hypoglcyemia protocol    - In case of pump malfunction, please administer Lantus 16 units STAT and start Admelog 4 units TID with meals  (HOLD IF NPO)  - Please check FSG before meals and QHS, or q6h while NPO   - Please keep patient on a diabetic, carb controlled diet    - on discharge patient should follow up with their endocirnologist Dr. Jyoti Byrd - Test BGs ACTID and at HS, q 6 hours if NPO  - RN to document correction insulin bolus in flow sheet   - Please keep Hypoglcyemia protocol in place   - In case of pump malfunction, please administer Lantus 16 units STAT and start Admelog 4 units TID with meals  (HOLD IF NPO)  - Please keep patient on a diabetic, carb controlled diet    - Please have patient to fill out self assessment form and keep it in the chart.   Discharge planning   - Can be discharged on current pump setting ( recommended temp basal 10 % increase because he had extra steroid 10 mg today, however he did not want temp basal on discharge)   - patient to contact edocrinologist if BGs remain > 200.   - on discharge patient should follow up with their endocrinologist Dr. Jyoti Byrd

## 2023-10-13 NOTE — PROGRESS NOTE ADULT - SUBJECTIVE AND OBJECTIVE BOX
date of service: 10-13-23 @ 08:27  afberile  REVIEW OF SYSTEMS:  CONSTITUTIONAL: No fever,  no  weight loss  ENT:  No  tinnitus,   no   vertigo  NECK: No pain or stiffness  RESPIRATORY: No cough, wheezing, chills or hemoptysis;    No Shortness of Breath  CARDIOVASCULAR: No chest pain, palpitations, dizziness  GASTROINTESTINAL: No abdominal or epigastric pain. No nausea, vomiting, or hematemesis; No diarrhea  No melena or hematochezia.  GENITOURINARY: No dysuria, frequency, hematuria, or incontinence  NEUROLOGICAL: No headaches  SKIN: No itching,  no   rash  LYMPH Nodes: No enlarged glands  ENDOCRINE: No heat or cold intolerance  MUSCULOSKELETAL: No joint pain or swelling  PSYCHIATRIC: No depression, anxiety  HEME/LYMPH: No easy bruising, or bleeding gums  ALLERGY AND IMMUNOLOGIC: No hives or eczema	    MEDICATIONS  (STANDING):  albuterol/ipratropium for Nebulization 3 milliLiter(s) Nebulizer every 6 hours  allopurinol 100 milliGRAM(s) Oral at bedtime  aspirin enteric coated 81 milliGRAM(s) Oral daily  atorvastatin 20 milliGRAM(s) Oral daily  azithromycin  IVPB 500 milliGRAM(s) IV Intermittent every 24 hours  cefTRIAXone   IVPB 1000 milliGRAM(s) IV Intermittent every 24 hours  dextrose 5%. 1000 milliLiter(s) (50 mL/Hr) IV Continuous <Continuous>  dextrose 5%. 1000 milliLiter(s) (100 mL/Hr) IV Continuous <Continuous>  dextrose 50% Injectable 25 Gram(s) IV Push once  dextrose 50% Injectable 25 Gram(s) IV Push once  dextrose 50% Injectable 12.5 Gram(s) IV Push once  enalapril 10 milliGRAM(s) Oral daily  gabapentin 100 milliGRAM(s) Oral at bedtime  glucagon  Injectable 1 milliGRAM(s) IntraMuscular once  heparin   Injectable 5000 Unit(s) SubCutaneous every 12 hours  insulin aspart (NovoLOG) Pump 1 Each SubCutaneous Continuous Pump  labetalol 200 milliGRAM(s) Oral three times a day  montelukast 10 milliGRAM(s) Oral daily  mycophenolate mofetil 1000 milliGRAM(s) Oral two times a day  NIFEdipine XL 60 milliGRAM(s) Oral daily  NIFEdipine XL 30 milliGRAM(s) Oral at bedtime  predniSONE   Tablet 20 milliGRAM(s) Oral daily  senna 2 Tablet(s) Oral at bedtime  tacrolimus 1 milliGRAM(s) Oral daily  tacrolimus 1.5 milliGRAM(s) Oral at bedtime  tamsulosin 0.4 milliGRAM(s) Oral at bedtime  torsemide 10 milliGRAM(s) Oral daily    MEDICATIONS  (PRN):  acetaminophen     Tablet .. 650 milliGRAM(s) Oral every 6 hours PRN Temp greater or equal to 38C (100.4F), Mild Pain (1 - 3)  dextrose Oral Gel 15 Gram(s) Oral once PRN Blood Glucose LESS THAN 70 milliGRAM(s)/deciliter  guaiFENesin Oral Liquid (Sugar-Free) 100 milliGRAM(s) Oral every 6 hours PRN Cough  melatonin 3 milliGRAM(s) Oral at bedtime PRN Insomnia      Vital Signs Last 24 Hrs  T(C): 36.5 (12 Oct 2023 23:06), Max: 36.7 (12 Oct 2023 13:04)  T(F): 97.7 (12 Oct 2023 23:06), Max: 98.1 (12 Oct 2023 13:04)  HR: 93 (13 Oct 2023 03:15) (71 - 93)  BP: 141/72 (12 Oct 2023 23:06) (137/69 - 141/72)  BP(mean): --  RR: 18 (12 Oct 2023 23:06) (18 - 18)  SpO2: 96% (13 Oct 2023 03:15) (94% - 99%)    Parameters below as of 12 Oct 2023 23:06  Patient On (Oxygen Delivery Method): room air      CAPILLARY BLOOD GLUCOSE      POCT Blood Glucose.: 110 mg/dL (13 Oct 2023 07:44)  POCT Blood Glucose.: 141 mg/dL (12 Oct 2023 21:24)  POCT Blood Glucose.: 153 mg/dL (12 Oct 2023 16:31)  POCT Blood Glucose.: 209 mg/dL (12 Oct 2023 11:41)    I&O's Summary    12 Oct 2023 07:01  -  13 Oct 2023 07:00  --------------------------------------------------------  IN: 540 mL / OUT: 700 mL / NET: -160 mL          Appearance: Normal	  HEENT:   Normal oral mucosa, PERRL, EOMI	  Lymphatic: No lymphadenopathy  Cardiovascular: Normal S1 S2, No JVD  Respiratory: Lungs clear to auscultation	  Gastrointestinal:  Soft, Non-tender, + BS	  Skin: No rash, No ecchymoses	  Extremities:     LABS:                        13.8   12.66 )-----------( 221      ( 12 Oct 2023 07:35 )             41.6     10-12    138  |  103  |  21  ----------------------------<  119<H>  4.1   |  25  |  0.62    Ca    9.9      12 Oct 2023 07:36            Urinalysis Basic - ( 12 Oct 2023 07:36 )    Color: x / Appearance: x / SG: x / pH: x  Gluc: 119 mg/dL / Ketone: x  / Bili: x / Urobili: x   Blood: x / Protein: x / Nitrite: x   Leuk Esterase: x / RBC: x / WBC x   Sq Epi: x / Non Sq Epi: x / Bacteria: x                      Consultant(s) Notes Reviewed:      Care Discussed with Consultants/Other Providers:

## 2023-10-13 NOTE — PROGRESS NOTE ADULT - PROBLEM SELECTOR PLAN 3
- patient on enalapril 10mg at home  - goal BP in diabetes mellitus is <130/80    Tomasa Villalpando NP-C   Contact via [employer] Teams during business hours  To reach covering provider access AMION via [employer] tools  For Urgent matters/after-hours/weekends/holidays please page endocrine fellow on call   For nonurgent matters please email [email or web address]    Please note that this patient may be followed by different provider tomorrow.  Notify endocrine 24 hours prior to discharge for final recommendations

## 2023-10-13 NOTE — PROGRESS NOTE ADULT - SUBJECTIVE AND OBJECTIVE BOX
CC: F/U PNA    Saw/spoke to patient. No fevers, no chills. No new complaints.    Allergies  No Known Allergies    ANTIMICROBIALS:  cefuroxime   Tablet 500 every 12 hours    PE:    Vital Signs Last 24 Hrs  T(C): 36.5 (12 Oct 2023 23:06), Max: 36.7 (12 Oct 2023 15:38)  T(F): 97.7 (12 Oct 2023 23:06), Max: 98.1 (12 Oct 2023 15:38)  HR: 76 (13 Oct 2023 09:42) (71 - 93)  BP: 141/72 (12 Oct 2023 23:06) (137/69 - 141/72)  RR: 18 (12 Oct 2023 23:06) (18 - 18)  SpO2: 97% (13 Oct 2023 09:42) (94% - 99%)    Gen: AOx3, NAD, non-toxic  CV: Nontachycardic  Resp: Breathing comfortably, RA  Abd: Soft, nontender  IV/Skin: No thrombophlebitis    LABS:                        13.8   12.66 )-----------( 221      ( 12 Oct 2023 07:35 )             41.6     10-12    138  |  103  |  21  ----------------------------<  119<H>  4.1   |  25  |  0.62    Ca    9.9      12 Oct 2023 07:36    Urinalysis Basic - ( 12 Oct 2023 07:36 )    Color: x / Appearance: x / SG: x / pH: x  Gluc: 119 mg/dL / Ketone: x  / Bili: x / Urobili: x   Blood: x / Protein: x / Nitrite: x   Leuk Esterase: x / RBC: x / WBC x   Sq Epi: x / Non Sq Epi: x / Bacteria: x    MICROBIOLOGY:    CMVPCR Log: NotDetec [id number]/mL (10-12 @ 17:46)  Rapid RVP Result: Detected (10-10 @ 12:39)    RADIOLOGY:    10/10 CT:    IMPRESSION:    1. Newly developing nodular areas of consolidation bilaterally as well as   reticular opacities and abbreviated differential includes metastatic   disease with possible lymphangitic component. Atypical infectious and/or   inflammatory etiologies cannot be excluded. Bronchoscopic correlation and   histological sampling may be in order.  2. Mild increase in now moderate mediastinal and probable right hilar   lymphadenopathy.  3. Post TAVR with aneurysmal dilatation of the ascending aorta measuring   4.2 cm moderate dilatation of the pulmonary artery measuring up to 3.6   cm. Pulmonary hypertension cannot be excluded.  4. Additional chronic findings as above.

## 2023-10-14 LAB — BKV DNA SPEC QL NAA+PROBE: SIGNIFICANT CHANGE UP

## 2023-10-17 ENCOUNTER — APPOINTMENT (OUTPATIENT)
Dept: PULMONOLOGY | Facility: CLINIC | Age: 73
End: 2023-10-17
Payer: MEDICARE

## 2023-10-17 VITALS
DIASTOLIC BLOOD PRESSURE: 60 MMHG | SYSTOLIC BLOOD PRESSURE: 136 MMHG | WEIGHT: 203 LBS | OXYGEN SATURATION: 97 % | HEART RATE: 76 BPM | TEMPERATURE: 97.7 F | BODY MASS INDEX: 31.79 KG/M2

## 2023-10-17 PROCEDURE — 99214 OFFICE O/P EST MOD 30 MIN: CPT

## 2023-10-17 RX ORDER — PREDNISONE 20 MG/1
20 TABLET ORAL
Qty: 10 | Refills: 0 | Status: ACTIVE | COMMUNITY
Start: 2023-10-17 | End: 1900-01-01

## 2023-11-13 PROCEDURE — 0225U NFCT DS DNA&RNA 21 SARSCOV2: CPT

## 2023-11-13 PROCEDURE — 82330 ASSAY OF CALCIUM: CPT

## 2023-11-13 PROCEDURE — 82947 ASSAY GLUCOSE BLOOD QUANT: CPT

## 2023-11-13 PROCEDURE — 85014 HEMATOCRIT: CPT

## 2023-11-13 PROCEDURE — 83036 HEMOGLOBIN GLYCOSYLATED A1C: CPT

## 2023-11-13 PROCEDURE — 36415 COLL VENOUS BLD VENIPUNCTURE: CPT

## 2023-11-13 PROCEDURE — 94660 CPAP INITIATION&MGMT: CPT

## 2023-11-13 PROCEDURE — 84132 ASSAY OF SERUM POTASSIUM: CPT

## 2023-11-13 PROCEDURE — 83605 ASSAY OF LACTIC ACID: CPT

## 2023-11-13 PROCEDURE — 87449 NOS EACH ORGANISM AG IA: CPT

## 2023-11-13 PROCEDURE — 71250 CT THORAX DX C-: CPT | Mod: MA

## 2023-11-13 PROCEDURE — 80048 BASIC METABOLIC PNL TOTAL CA: CPT

## 2023-11-13 PROCEDURE — 80053 COMPREHEN METABOLIC PANEL: CPT

## 2023-11-13 PROCEDURE — 85027 COMPLETE CBC AUTOMATED: CPT

## 2023-11-13 PROCEDURE — 94640 AIRWAY INHALATION TREATMENT: CPT

## 2023-11-13 PROCEDURE — 93308 TTE F-UP OR LMTD: CPT

## 2023-11-13 PROCEDURE — 71046 X-RAY EXAM CHEST 2 VIEWS: CPT

## 2023-11-13 PROCEDURE — 99285 EMERGENCY DEPT VISIT HI MDM: CPT

## 2023-11-13 PROCEDURE — 84484 ASSAY OF TROPONIN QUANT: CPT

## 2023-11-13 PROCEDURE — 85025 COMPLETE CBC W/AUTO DIFF WBC: CPT

## 2023-11-13 PROCEDURE — 82962 GLUCOSE BLOOD TEST: CPT

## 2023-11-13 PROCEDURE — 80180 DRUG SCRN QUAN MYCOPHENOLATE: CPT

## 2023-11-13 PROCEDURE — 80197 ASSAY OF TACROLIMUS: CPT

## 2023-11-13 PROCEDURE — 82803 BLOOD GASES ANY COMBINATION: CPT

## 2023-11-13 PROCEDURE — 87799 DETECT AGENT NOS DNA QUANT: CPT

## 2023-11-13 PROCEDURE — 83880 ASSAY OF NATRIURETIC PEPTIDE: CPT

## 2023-11-13 PROCEDURE — 82435 ASSAY OF BLOOD CHLORIDE: CPT

## 2023-11-13 PROCEDURE — 84295 ASSAY OF SERUM SODIUM: CPT

## 2023-11-13 PROCEDURE — 85018 HEMOGLOBIN: CPT

## 2023-11-13 RX ORDER — MONTELUKAST 10 MG/1
10 TABLET, FILM COATED ORAL
Qty: 90 | Refills: 3 | Status: ACTIVE | COMMUNITY
Start: 2023-11-13 | End: 1900-01-01

## 2023-12-29 ENCOUNTER — INPATIENT (INPATIENT)
Facility: HOSPITAL | Age: 73
LOS: 1 days | Discharge: HOME | DRG: 178 | End: 2023-12-31
Attending: INTERNAL MEDICINE | Admitting: INTERNAL MEDICINE
Payer: COMMERCIAL

## 2023-12-29 VITALS
RESPIRATION RATE: 18 BRPM | HEART RATE: 71 BPM | TEMPERATURE: 98 F | OXYGEN SATURATION: 95 % | SYSTOLIC BLOOD PRESSURE: 147 MMHG | DIASTOLIC BLOOD PRESSURE: 70 MMHG | WEIGHT: 205.91 LBS

## 2023-12-29 DIAGNOSIS — Z95.2 PRESENCE OF PROSTHETIC HEART VALVE: Chronic | ICD-10-CM

## 2023-12-29 DIAGNOSIS — U07.1 COVID-19: ICD-10-CM

## 2023-12-29 DIAGNOSIS — I77.0 ARTERIOVENOUS FISTULA, ACQUIRED: Chronic | ICD-10-CM

## 2023-12-29 DIAGNOSIS — Z94.0 KIDNEY TRANSPLANT STATUS: Chronic | ICD-10-CM

## 2023-12-29 DIAGNOSIS — Z98.890 OTHER SPECIFIED POSTPROCEDURAL STATES: Chronic | ICD-10-CM

## 2023-12-29 DIAGNOSIS — Z96.41 PRESENCE OF INSULIN PUMP (EXTERNAL) (INTERNAL): Chronic | ICD-10-CM

## 2023-12-29 LAB
ALBUMIN SERPL ELPH-MCNC: 3.7 G/DL — SIGNIFICANT CHANGE UP (ref 3.3–5)
ALBUMIN SERPL ELPH-MCNC: 3.7 G/DL — SIGNIFICANT CHANGE UP (ref 3.3–5)
ALP SERPL-CCNC: 73 U/L — SIGNIFICANT CHANGE UP (ref 40–120)
ALP SERPL-CCNC: 73 U/L — SIGNIFICANT CHANGE UP (ref 40–120)
ALT FLD-CCNC: 7 U/L — LOW (ref 10–45)
ALT FLD-CCNC: 7 U/L — LOW (ref 10–45)
ANION GAP SERPL CALC-SCNC: 12 MMOL/L — SIGNIFICANT CHANGE UP (ref 5–17)
ANION GAP SERPL CALC-SCNC: 12 MMOL/L — SIGNIFICANT CHANGE UP (ref 5–17)
APPEARANCE UR: ABNORMAL
APPEARANCE UR: ABNORMAL
APTT BLD: 30.2 SEC — SIGNIFICANT CHANGE UP (ref 24.5–35.6)
APTT BLD: 30.2 SEC — SIGNIFICANT CHANGE UP (ref 24.5–35.6)
AST SERPL-CCNC: 11 U/L — SIGNIFICANT CHANGE UP (ref 10–40)
AST SERPL-CCNC: 11 U/L — SIGNIFICANT CHANGE UP (ref 10–40)
BACTERIA # UR AUTO: NEGATIVE /HPF — SIGNIFICANT CHANGE UP
BACTERIA # UR AUTO: NEGATIVE /HPF — SIGNIFICANT CHANGE UP
BASE EXCESS BLDV CALC-SCNC: -11.2 MMOL/L — LOW (ref -2–3)
BASE EXCESS BLDV CALC-SCNC: -11.2 MMOL/L — LOW (ref -2–3)
BASOPHILS # BLD AUTO: 0.03 K/UL — SIGNIFICANT CHANGE UP (ref 0–0.2)
BASOPHILS # BLD AUTO: 0.03 K/UL — SIGNIFICANT CHANGE UP (ref 0–0.2)
BASOPHILS NFR BLD AUTO: 0.4 % — SIGNIFICANT CHANGE UP (ref 0–2)
BASOPHILS NFR BLD AUTO: 0.4 % — SIGNIFICANT CHANGE UP (ref 0–2)
BILIRUB SERPL-MCNC: 0.8 MG/DL — SIGNIFICANT CHANGE UP (ref 0.2–1.2)
BILIRUB SERPL-MCNC: 0.8 MG/DL — SIGNIFICANT CHANGE UP (ref 0.2–1.2)
BILIRUB UR-MCNC: ABNORMAL
BILIRUB UR-MCNC: ABNORMAL
BUN SERPL-MCNC: 18 MG/DL — SIGNIFICANT CHANGE UP (ref 7–23)
BUN SERPL-MCNC: 18 MG/DL — SIGNIFICANT CHANGE UP (ref 7–23)
CA-I SERPL-SCNC: 0.83 MMOL/L — LOW (ref 1.15–1.33)
CA-I SERPL-SCNC: 0.83 MMOL/L — LOW (ref 1.15–1.33)
CALCIUM SERPL-MCNC: 9.3 MG/DL — SIGNIFICANT CHANGE UP (ref 8.4–10.5)
CALCIUM SERPL-MCNC: 9.3 MG/DL — SIGNIFICANT CHANGE UP (ref 8.4–10.5)
CAST: 2 /LPF — SIGNIFICANT CHANGE UP (ref 0–4)
CAST: 2 /LPF — SIGNIFICANT CHANGE UP (ref 0–4)
CHLORIDE BLDV-SCNC: 121 MMOL/L — HIGH (ref 96–108)
CHLORIDE BLDV-SCNC: 121 MMOL/L — HIGH (ref 96–108)
CHLORIDE SERPL-SCNC: 108 MMOL/L — SIGNIFICANT CHANGE UP (ref 96–108)
CHLORIDE SERPL-SCNC: 108 MMOL/L — SIGNIFICANT CHANGE UP (ref 96–108)
CO2 BLDV-SCNC: 13 MMOL/L — LOW (ref 22–26)
CO2 BLDV-SCNC: 13 MMOL/L — LOW (ref 22–26)
CO2 SERPL-SCNC: 22 MMOL/L — SIGNIFICANT CHANGE UP (ref 22–31)
CO2 SERPL-SCNC: 22 MMOL/L — SIGNIFICANT CHANGE UP (ref 22–31)
COLOR SPEC: SIGNIFICANT CHANGE UP
COLOR SPEC: SIGNIFICANT CHANGE UP
CREAT SERPL-MCNC: 0.69 MG/DL — SIGNIFICANT CHANGE UP (ref 0.5–1.3)
CREAT SERPL-MCNC: 0.69 MG/DL — SIGNIFICANT CHANGE UP (ref 0.5–1.3)
DIFF PNL FLD: ABNORMAL
DIFF PNL FLD: ABNORMAL
EGFR: 98 ML/MIN/1.73M2 — SIGNIFICANT CHANGE UP
EGFR: 98 ML/MIN/1.73M2 — SIGNIFICANT CHANGE UP
EOSINOPHIL # BLD AUTO: 0.14 K/UL — SIGNIFICANT CHANGE UP (ref 0–0.5)
EOSINOPHIL # BLD AUTO: 0.14 K/UL — SIGNIFICANT CHANGE UP (ref 0–0.5)
EOSINOPHIL NFR BLD AUTO: 1.6 % — SIGNIFICANT CHANGE UP (ref 0–6)
EOSINOPHIL NFR BLD AUTO: 1.6 % — SIGNIFICANT CHANGE UP (ref 0–6)
FLUAV AG NPH QL: SIGNIFICANT CHANGE UP
FLUAV AG NPH QL: SIGNIFICANT CHANGE UP
FLUBV AG NPH QL: SIGNIFICANT CHANGE UP
FLUBV AG NPH QL: SIGNIFICANT CHANGE UP
GAS PNL BLDV: 142 MMOL/L — SIGNIFICANT CHANGE UP (ref 136–145)
GAS PNL BLDV: 142 MMOL/L — SIGNIFICANT CHANGE UP (ref 136–145)
GAS PNL BLDV: SIGNIFICANT CHANGE UP
GAS PNL BLDV: SIGNIFICANT CHANGE UP
GLUCOSE BLDV-MCNC: 60 MG/DL — LOW (ref 70–99)
GLUCOSE BLDV-MCNC: 60 MG/DL — LOW (ref 70–99)
GLUCOSE SERPL-MCNC: 112 MG/DL — HIGH (ref 70–99)
GLUCOSE SERPL-MCNC: 112 MG/DL — HIGH (ref 70–99)
GLUCOSE UR QL: NEGATIVE MG/DL — SIGNIFICANT CHANGE UP
GLUCOSE UR QL: NEGATIVE MG/DL — SIGNIFICANT CHANGE UP
HCO3 BLDV-SCNC: 13 MMOL/L — LOW (ref 22–29)
HCO3 BLDV-SCNC: 13 MMOL/L — LOW (ref 22–29)
HCT VFR BLD CALC: 42 % — SIGNIFICANT CHANGE UP (ref 39–50)
HCT VFR BLD CALC: 42 % — SIGNIFICANT CHANGE UP (ref 39–50)
HCT VFR BLDA CALC: 26 % — LOW (ref 39–51)
HCT VFR BLDA CALC: 26 % — LOW (ref 39–51)
HGB BLD CALC-MCNC: 8.6 G/DL — LOW (ref 12.6–17.4)
HGB BLD CALC-MCNC: 8.6 G/DL — LOW (ref 12.6–17.4)
HGB BLD-MCNC: 13.7 G/DL — SIGNIFICANT CHANGE UP (ref 13–17)
HGB BLD-MCNC: 13.7 G/DL — SIGNIFICANT CHANGE UP (ref 13–17)
IMM GRANULOCYTES NFR BLD AUTO: 0.4 % — SIGNIFICANT CHANGE UP (ref 0–0.9)
IMM GRANULOCYTES NFR BLD AUTO: 0.4 % — SIGNIFICANT CHANGE UP (ref 0–0.9)
INR BLD: 1.24 RATIO — HIGH (ref 0.85–1.18)
INR BLD: 1.24 RATIO — HIGH (ref 0.85–1.18)
KETONES UR-MCNC: ABNORMAL MG/DL
KETONES UR-MCNC: ABNORMAL MG/DL
LACTATE BLDV-MCNC: 0.7 MMOL/L — SIGNIFICANT CHANGE UP (ref 0.5–2)
LACTATE BLDV-MCNC: 0.7 MMOL/L — SIGNIFICANT CHANGE UP (ref 0.5–2)
LEUKOCYTE ESTERASE UR-ACNC: ABNORMAL
LEUKOCYTE ESTERASE UR-ACNC: ABNORMAL
LYMPHOCYTES # BLD AUTO: 0.7 K/UL — LOW (ref 1–3.3)
LYMPHOCYTES # BLD AUTO: 0.7 K/UL — LOW (ref 1–3.3)
LYMPHOCYTES # BLD AUTO: 8.2 % — LOW (ref 13–44)
LYMPHOCYTES # BLD AUTO: 8.2 % — LOW (ref 13–44)
MCHC RBC-ENTMCNC: 31.1 PG — SIGNIFICANT CHANGE UP (ref 27–34)
MCHC RBC-ENTMCNC: 31.1 PG — SIGNIFICANT CHANGE UP (ref 27–34)
MCHC RBC-ENTMCNC: 32.6 GM/DL — SIGNIFICANT CHANGE UP (ref 32–36)
MCHC RBC-ENTMCNC: 32.6 GM/DL — SIGNIFICANT CHANGE UP (ref 32–36)
MCV RBC AUTO: 95.2 FL — SIGNIFICANT CHANGE UP (ref 80–100)
MCV RBC AUTO: 95.2 FL — SIGNIFICANT CHANGE UP (ref 80–100)
MONOCYTES # BLD AUTO: 1.05 K/UL — HIGH (ref 0–0.9)
MONOCYTES # BLD AUTO: 1.05 K/UL — HIGH (ref 0–0.9)
MONOCYTES NFR BLD AUTO: 12.4 % — SIGNIFICANT CHANGE UP (ref 2–14)
MONOCYTES NFR BLD AUTO: 12.4 % — SIGNIFICANT CHANGE UP (ref 2–14)
NEUTROPHILS # BLD AUTO: 6.55 K/UL — SIGNIFICANT CHANGE UP (ref 1.8–7.4)
NEUTROPHILS # BLD AUTO: 6.55 K/UL — SIGNIFICANT CHANGE UP (ref 1.8–7.4)
NEUTROPHILS NFR BLD AUTO: 77 % — SIGNIFICANT CHANGE UP (ref 43–77)
NEUTROPHILS NFR BLD AUTO: 77 % — SIGNIFICANT CHANGE UP (ref 43–77)
NITRITE UR-MCNC: NEGATIVE — SIGNIFICANT CHANGE UP
NITRITE UR-MCNC: NEGATIVE — SIGNIFICANT CHANGE UP
NRBC # BLD: 0 /100 WBCS — SIGNIFICANT CHANGE UP (ref 0–0)
NRBC # BLD: 0 /100 WBCS — SIGNIFICANT CHANGE UP (ref 0–0)
NT-PROBNP SERPL-SCNC: 435 PG/ML — HIGH (ref 0–300)
NT-PROBNP SERPL-SCNC: 435 PG/ML — HIGH (ref 0–300)
PCO2 BLDV: 22 MMHG — LOW (ref 42–55)
PCO2 BLDV: 22 MMHG — LOW (ref 42–55)
PH BLDV: 7.37 — SIGNIFICANT CHANGE UP (ref 7.32–7.43)
PH BLDV: 7.37 — SIGNIFICANT CHANGE UP (ref 7.32–7.43)
PH UR: 5.5 — SIGNIFICANT CHANGE UP (ref 5–8)
PH UR: 5.5 — SIGNIFICANT CHANGE UP (ref 5–8)
PLATELET # BLD AUTO: 153 K/UL — SIGNIFICANT CHANGE UP (ref 150–400)
PLATELET # BLD AUTO: 153 K/UL — SIGNIFICANT CHANGE UP (ref 150–400)
PO2 BLDV: 70 MMHG — HIGH (ref 25–45)
PO2 BLDV: 70 MMHG — HIGH (ref 25–45)
POTASSIUM BLDV-SCNC: 2.1 MMOL/L — CRITICAL LOW (ref 3.5–5.1)
POTASSIUM BLDV-SCNC: 2.1 MMOL/L — CRITICAL LOW (ref 3.5–5.1)
POTASSIUM SERPL-MCNC: 3.9 MMOL/L — SIGNIFICANT CHANGE UP (ref 3.5–5.3)
POTASSIUM SERPL-MCNC: 3.9 MMOL/L — SIGNIFICANT CHANGE UP (ref 3.5–5.3)
POTASSIUM SERPL-SCNC: 3.9 MMOL/L — SIGNIFICANT CHANGE UP (ref 3.5–5.3)
POTASSIUM SERPL-SCNC: 3.9 MMOL/L — SIGNIFICANT CHANGE UP (ref 3.5–5.3)
PROCALCITONIN SERPL-MCNC: 0.04 NG/ML — SIGNIFICANT CHANGE UP (ref 0.02–0.1)
PROCALCITONIN SERPL-MCNC: 0.04 NG/ML — SIGNIFICANT CHANGE UP (ref 0.02–0.1)
PROT SERPL-MCNC: 6.8 G/DL — SIGNIFICANT CHANGE UP (ref 6–8.3)
PROT SERPL-MCNC: 6.8 G/DL — SIGNIFICANT CHANGE UP (ref 6–8.3)
PROT UR-MCNC: 300 MG/DL
PROT UR-MCNC: 300 MG/DL
PROTHROM AB SERPL-ACNC: 13.6 SEC — HIGH (ref 9.5–13)
PROTHROM AB SERPL-ACNC: 13.6 SEC — HIGH (ref 9.5–13)
RBC # BLD: 4.41 M/UL — SIGNIFICANT CHANGE UP (ref 4.2–5.8)
RBC # BLD: 4.41 M/UL — SIGNIFICANT CHANGE UP (ref 4.2–5.8)
RBC # FLD: 14.2 % — SIGNIFICANT CHANGE UP (ref 10.3–14.5)
RBC # FLD: 14.2 % — SIGNIFICANT CHANGE UP (ref 10.3–14.5)
RBC CASTS # UR COMP ASSIST: 893 /HPF — HIGH (ref 0–4)
RBC CASTS # UR COMP ASSIST: 893 /HPF — HIGH (ref 0–4)
RSV RNA NPH QL NAA+NON-PROBE: SIGNIFICANT CHANGE UP
RSV RNA NPH QL NAA+NON-PROBE: SIGNIFICANT CHANGE UP
SAO2 % BLDV: 97 % — HIGH (ref 67–88)
SAO2 % BLDV: 97 % — HIGH (ref 67–88)
SARS-COV-2 RNA SPEC QL NAA+PROBE: DETECTED
SARS-COV-2 RNA SPEC QL NAA+PROBE: DETECTED
SODIUM SERPL-SCNC: 142 MMOL/L — SIGNIFICANT CHANGE UP (ref 135–145)
SODIUM SERPL-SCNC: 142 MMOL/L — SIGNIFICANT CHANGE UP (ref 135–145)
SP GR SPEC: >1.03 — HIGH (ref 1–1.03)
SP GR SPEC: >1.03 — HIGH (ref 1–1.03)
SQUAMOUS # UR AUTO: 2 /HPF — SIGNIFICANT CHANGE UP (ref 0–5)
SQUAMOUS # UR AUTO: 2 /HPF — SIGNIFICANT CHANGE UP (ref 0–5)
TROPONIN T, HIGH SENSITIVITY RESULT: 19 NG/L — SIGNIFICANT CHANGE UP (ref 0–51)
TROPONIN T, HIGH SENSITIVITY RESULT: 19 NG/L — SIGNIFICANT CHANGE UP (ref 0–51)
UROBILINOGEN FLD QL: 1 MG/DL — SIGNIFICANT CHANGE UP (ref 0.2–1)
UROBILINOGEN FLD QL: 1 MG/DL — SIGNIFICANT CHANGE UP (ref 0.2–1)
WBC # BLD: 8.5 K/UL — SIGNIFICANT CHANGE UP (ref 3.8–10.5)
WBC # BLD: 8.5 K/UL — SIGNIFICANT CHANGE UP (ref 3.8–10.5)
WBC # FLD AUTO: 8.5 K/UL — SIGNIFICANT CHANGE UP (ref 3.8–10.5)
WBC # FLD AUTO: 8.5 K/UL — SIGNIFICANT CHANGE UP (ref 3.8–10.5)
WBC UR QL: 32 /HPF — HIGH (ref 0–5)
WBC UR QL: 32 /HPF — HIGH (ref 0–5)

## 2023-12-29 PROCEDURE — 93010 ELECTROCARDIOGRAM REPORT: CPT

## 2023-12-29 PROCEDURE — 71045 X-RAY EXAM CHEST 1 VIEW: CPT | Mod: 26

## 2023-12-29 PROCEDURE — 99285 EMERGENCY DEPT VISIT HI MDM: CPT

## 2023-12-29 RX ORDER — REMDESIVIR 5 MG/ML
200 INJECTION INTRAVENOUS EVERY 24 HOURS
Refills: 0 | Status: COMPLETED | OUTPATIENT
Start: 2023-12-29 | End: 2023-12-29

## 2023-12-29 RX ORDER — PIPERACILLIN AND TAZOBACTAM 4; .5 G/20ML; G/20ML
3.38 INJECTION, POWDER, LYOPHILIZED, FOR SOLUTION INTRAVENOUS EVERY 8 HOURS
Refills: 0 | Status: DISCONTINUED | OUTPATIENT
Start: 2023-12-30 | End: 2023-12-31

## 2023-12-29 RX ORDER — REMDESIVIR 5 MG/ML
100 INJECTION INTRAVENOUS EVERY 24 HOURS
Refills: 0 | Status: COMPLETED | OUTPATIENT
Start: 2023-12-30 | End: 2023-12-31

## 2023-12-29 RX ORDER — MYCOPHENOLATE MOFETIL 250 MG/1
500 CAPSULE ORAL
Refills: 0 | Status: DISCONTINUED | OUTPATIENT
Start: 2023-12-29 | End: 2023-12-31

## 2023-12-29 RX ORDER — ASPIRIN/CALCIUM CARB/MAGNESIUM 324 MG
81 TABLET ORAL DAILY
Refills: 0 | Status: DISCONTINUED | OUTPATIENT
Start: 2023-12-29 | End: 2023-12-31

## 2023-12-29 RX ORDER — SENNA PLUS 8.6 MG/1
2 TABLET ORAL AT BEDTIME
Refills: 0 | Status: DISCONTINUED | OUTPATIENT
Start: 2023-12-29 | End: 2023-12-31

## 2023-12-29 RX ORDER — ATORVASTATIN CALCIUM 80 MG/1
20 TABLET, FILM COATED ORAL AT BEDTIME
Refills: 0 | Status: DISCONTINUED | OUTPATIENT
Start: 2023-12-29 | End: 2023-12-31

## 2023-12-29 RX ORDER — ALBUTEROL 90 UG/1
2 AEROSOL, METERED ORAL EVERY 8 HOURS
Refills: 0 | Status: DISCONTINUED | OUTPATIENT
Start: 2023-12-29 | End: 2023-12-31

## 2023-12-29 RX ORDER — REMDESIVIR 5 MG/ML
INJECTION INTRAVENOUS
Refills: 0 | Status: COMPLETED | OUTPATIENT
Start: 2023-12-29 | End: 2023-12-31

## 2023-12-29 RX ORDER — PIPERACILLIN AND TAZOBACTAM 4; .5 G/20ML; G/20ML
3.38 INJECTION, POWDER, LYOPHILIZED, FOR SOLUTION INTRAVENOUS ONCE
Refills: 0 | Status: DISCONTINUED | OUTPATIENT
Start: 2023-12-29 | End: 2023-12-31

## 2023-12-29 RX ORDER — ALLOPURINOL 300 MG
100 TABLET ORAL DAILY
Refills: 0 | Status: DISCONTINUED | OUTPATIENT
Start: 2023-12-29 | End: 2023-12-31

## 2023-12-29 RX ORDER — TACROLIMUS 5 MG/1
1 CAPSULE ORAL DAILY
Refills: 0 | Status: DISCONTINUED | OUTPATIENT
Start: 2023-12-29 | End: 2023-12-31

## 2023-12-29 RX ORDER — GABAPENTIN 400 MG/1
100 CAPSULE ORAL DAILY
Refills: 0 | Status: DISCONTINUED | OUTPATIENT
Start: 2023-12-29 | End: 2023-12-31

## 2023-12-29 RX ORDER — PIPERACILLIN AND TAZOBACTAM 4; .5 G/20ML; G/20ML
3.38 INJECTION, POWDER, LYOPHILIZED, FOR SOLUTION INTRAVENOUS ONCE
Refills: 0 | Status: COMPLETED | OUTPATIENT
Start: 2023-12-29 | End: 2023-12-29

## 2023-12-29 RX ORDER — HEPARIN SODIUM 5000 [USP'U]/ML
5000 INJECTION INTRAVENOUS; SUBCUTANEOUS EVERY 12 HOURS
Refills: 0 | Status: DISCONTINUED | OUTPATIENT
Start: 2023-12-29 | End: 2023-12-31

## 2023-12-29 RX ORDER — MONTELUKAST 4 MG/1
10 TABLET, CHEWABLE ORAL DAILY
Refills: 0 | Status: DISCONTINUED | OUTPATIENT
Start: 2023-12-29 | End: 2023-12-31

## 2023-12-29 RX ORDER — CHLORHEXIDINE GLUCONATE 213 G/1000ML
1 SOLUTION TOPICAL DAILY
Refills: 0 | Status: DISCONTINUED | OUTPATIENT
Start: 2023-12-29 | End: 2023-12-31

## 2023-12-29 RX ORDER — TACROLIMUS 5 MG/1
1.5 CAPSULE ORAL AT BEDTIME
Refills: 0 | Status: DISCONTINUED | OUTPATIENT
Start: 2023-12-29 | End: 2023-12-31

## 2023-12-29 RX ORDER — NIFEDIPINE 30 MG
90 TABLET, EXTENDED RELEASE 24 HR ORAL DAILY
Refills: 0 | Status: DISCONTINUED | OUTPATIENT
Start: 2023-12-29 | End: 2023-12-31

## 2023-12-29 RX ORDER — ACETAMINOPHEN 500 MG
650 TABLET ORAL ONCE
Refills: 0 | Status: COMPLETED | OUTPATIENT
Start: 2023-12-29 | End: 2023-12-29

## 2023-12-29 RX ORDER — LABETALOL HCL 100 MG
200 TABLET ORAL THREE TIMES A DAY
Refills: 0 | Status: DISCONTINUED | OUTPATIENT
Start: 2023-12-29 | End: 2023-12-31

## 2023-12-29 RX ADMIN — Medication 650 MILLIGRAM(S): at 20:55

## 2023-12-29 RX ADMIN — Medication 650 MILLIGRAM(S): at 21:38

## 2023-12-29 RX ADMIN — REMDESIVIR 200 MILLIGRAM(S): 5 INJECTION INTRAVENOUS at 20:46

## 2023-12-29 RX ADMIN — TACROLIMUS 1.5 MILLIGRAM(S): 5 CAPSULE ORAL at 21:52

## 2023-12-29 RX ADMIN — Medication 200 MILLIGRAM(S): at 21:32

## 2023-12-29 RX ADMIN — PIPERACILLIN AND TAZOBACTAM 25 GRAM(S): 4; .5 INJECTION, POWDER, LYOPHILIZED, FOR SOLUTION INTRAVENOUS at 22:26

## 2023-12-29 RX ADMIN — ATORVASTATIN CALCIUM 20 MILLIGRAM(S): 80 TABLET, FILM COATED ORAL at 21:32

## 2023-12-29 RX ADMIN — PIPERACILLIN AND TAZOBACTAM 200 GRAM(S): 4; .5 INJECTION, POWDER, LYOPHILIZED, FOR SOLUTION INTRAVENOUS at 19:50

## 2023-12-29 NOTE — ED PROVIDER NOTE - OBJECTIVE STATEMENT
72M w/ pmhx of FRANCOIS on CPAP, COPD w/ 50-pack-year smoking history, DM2 on insulin pump, hyperlipidemia, hypertension, CAD status post stent, TAVR, history of kidney transplant in 2013 on immunosuppresion, presenting with 2 days of cough, headache, light headedness. Pt tested positive twice at home for covid 19. Denies chest pain, nvd, sore throat.

## 2023-12-29 NOTE — CONSULT NOTE ADULT - SUBJECTIVE AND OBJECTIVE BOX
Date of Service, 12-29-23 @ 19:38  CHIEF COMPLAINT:Patient is a 73y old  Male who presents with a chief complaint of malaise/  fevers (29 Dec 2023 18:39)      HPI:  72M w/ pmhx of FRANCOIS on CPAP, COPD w/ 50-pack-year smoking history, DM2 on insulin pump, hyperlipidemia, hypertension, CAD status post stent, TAVR, history of kidney transplant in 2013 on immunosuppresion, presenting with 2 days of cough, headache, light headedness. Pt tested positive twice at home for covid 19. Denies chest pain, nvd, sore throat.      PAST MEDICAL & SURGICAL HISTORY:  HTN (Hypertension), Benign  Hyperlipidemia  Smoking  DM (diabetes mellitus), type 2  FRANCOIS on CPAP  H/O kidney transplant  CAD (coronary artery disease)  Kidney transplant recipient  Rt kidney- 2013  AV fistula  History of transcatheter aortic valve replacement (TAVR)  Personal history of spine surgery  Insulin pump status  H/O colonoscopy    MEDICATIONS  (STANDING):  albuterol    90 MICROgram(s) HFA Inhaler 2 Puff(s) Inhalation every 8 hours  allopurinol 100 milliGRAM(s) Oral daily  aspirin enteric coated 81 milliGRAM(s) Oral daily  atorvastatin 20 milliGRAM(s) Oral at bedtime  gabapentin 100 milliGRAM(s) Oral daily  heparin   Injectable 5000 Unit(s) SubCutaneous every 12 hours  labetalol 200 milliGRAM(s) Oral three times a day  montelukast 10 milliGRAM(s) Oral daily  mycophenolate mofetil 500 milliGRAM(s) Oral two times a day  NIFEdipine XL 90 milliGRAM(s) Oral daily  piperacillin/tazobactam IVPB. 3.375 Gram(s) IV Intermittent once  piperacillin/tazobactam IVPB.- 3.375 Gram(s) IV Intermittent once  piperacillin/tazobactam IVPB... 3.375 Gram(s) IV Intermittent once  remdesivir  IVPB 200 milliGRAM(s) IV Intermittent every 24 hours  remdesivir  IVPB   IV Intermittent   senna 2 Tablet(s) Oral at bedtime  tacrolimus 1 milliGRAM(s) Oral daily  tacrolimus 1.5 milliGRAM(s) Oral at bedtime  torsemide 10 milliGRAM(s) Oral daily    MEDICATIONS  (PRN):  · 	mycophenolate mofetil 500 mg oral tablet: 2 tab(s) orally 2 times a day  · 	predniSONE 10 mg oral tablet: 1 tab(s) orally once a day  · 	cefuroxime 500 mg oral tablet: 1 tab(s) orally every 12 hours  · 	NIFEdipine 60 mg oral tablet, extended release: 1 tab(s) orally once a day  · 	NIFEdipine 30 mg oral tablet, extended release: 1 tab(s) orally once a day (at bedtime)  · 	Ozempic (1 mg dose) 4 mg/3 mL subcutaneous solution: 0.5 milligram(s) subcutaneously once a week   · 	Aspirin Enteric Coated 81 mg oral delayed release tablet: 1 tab(s) orally once a day  · 	allopurinol 100 mg oral tablet: 1 tab(s) orally once a day at pm  · 	potassium chloride 20 mEq oral tablet, extended release: 1 tab(s) orally once a day  · 	labetalol 200 mg oral tablet: 1 tab(s) orally 3 times a day  · 	Albuterol (Eqv-Proventil HFA) 90 mcg/inh inhalation aerosol: 2 puff(s) inhaled 4 times a day  · 	senna (sennosides) 12 mg oral tablet: 1 tab(s) orally once a day (at bedtime)  · 	gabapentin 100 mg oral tablet: orally once a day (at bedtime)  · 	atorvastatin 20 mg oral tablet: 1 tab(s) orally once a day  · 	montelukast 10 mg oral tablet: 1 tab(s) orally once a day  · 	alfuzosin 10 mg oral tablet, extended release: 1 tab(s) orally once a day  · 	Myrbetriq 50 mg oral tablet, extended release: 1 tab(s) orally once a day (at bedtime)  · 	CellCept 500 mg oral tablet: 2 tab(s) orally 2 times a day  · 	tacrolimus 1 mg oral capsule: 1 cap(s) orally once a day  · 	tacrolimus 1 mg oral capsule: 1.5 cap(s) orally once a day (at bedtime)  · 	torsemide 10 mg oral tablet: 1 tab(s) orally once a day  · 	enalapril 10 mg oral tablet: 1 tab(s) orally 3 times a day    FAMILY HISTORY:      SOCIAL HISTORY:    [ x] Non-smoker  [ ] Smoker  [ ] Alcohol    Allergies    No Known Allergies    Intolerances    	    REVIEW OF SYSTEMS:  CONSTITUTIONAL: No fever, weight loss, or fatigue  EYES: No eye pain, visual disturbances, or discharge  ENT:  No difficulty hearing, tinnitus, vertigo; No sinus or throat pain  NECK: No pain or stiffness  RESPIRATORY: + cough,no  wheezing, chills or hemoptysis; + Shortness of Breath  CARDIOVASCULAR: No chest pain, palpitations, passing out, dizziness, or leg swelling  GASTROINTESTINAL: No abdominal or epigastric pain. No nausea, vomiting, or hematemesis; No diarrhea or constipation. No melena or hematochezia.  GENITOURINARY: No dysuria, frequency, hematuria, or incontinence  NEUROLOGICAL: No headaches, memory loss, loss of strength, numbness, or tremors  SKIN: No itching, burning, rashes, or lesions   LYMPH Nodes: No enlarged glands  ENDOCRINE: No heat or cold intolerance; No hair loss  MUSCULOSKELETAL: No joint pain or swelling; No muscle, back, or extremity pain  PSYCHIATRIC: No depression, anxiety, mood swings, or difficulty sleeping  HEME/LYMPH: No easy bruising, or bleeding gums  ALLERGY AND IMMUNOLOGIC: No hives or eczema	    [cx ] All others negative	  [ ] Unable to obtain    PHYSICAL EXAM:  T(C): 36.7 (12-29-23 @ 18:11), Max: 36.9 (12-29-23 @ 17:34)  HR: 68 (12-29-23 @ 18:11) (68 - 71)  BP: 144/67 (12-29-23 @ 18:11) (144/67 - 147/70)  RR: 18 (12-29-23 @ 18:11) (18 - 18)  SpO2: 97% (12-29-23 @ 18:11) (95% - 97%)  Wt(kg): --  I&O's Summary      Appearance: Normal	  HEENT:   Normal oral mucosa, PERRL, EOMI	  Lymphatic: No lymphadenopathy  Cardiovascular: Normal S1 S2, No JVD, N+ murmurs, No edema  Respiratory: +rhonchi  Psychiatry: A & O x 3, Mood & affect appropriate  Gastrointestinal:  Soft, Non-tender, + BS	  Skin: No rashes, No ecchymoses, No cyanosis	  Neurologic: Non-focal  Extremities: Normal range of motion, No clubbing, cyanosis or edema  Vascular: Peripheral pulses palpable 2+ bilaterally    TELEMETRY: 	    ECG:  	  RADIOLOGY:  OTHER: 	  	  LABS:	 	    CARDIAC MARKERS:                              13.7   8.50  )-----------( 153      ( 29 Dec 2023 18:29 )             42.0     12-29    142  |  108  |  18  ----------------------------<  112<H>  3.9   |  22  |  0.69    Ca    9.3      29 Dec 2023 18:29    TPro  6.8  /  Alb  3.7  /  TBili  0.8  /  DBili  x   /  AST  11  /  ALT  7<L>  /  AlkPhos  73  12-29    proBNP:   Lipid Profile:   HgA1c:   TSH:   PT/INR - ( 29 Dec 2023 18:29 )   PT: 13.6 sec;   INR: 1.24 ratio         PTT - ( 29 Dec 2023 18:29 )  PTT:30.2 sec    PREVIOUS DIAGNOSTIC TESTING:    < from: Transesophageal Echocardiogram w/o TTE (08.26.22 @ 17:07) >  1. Transcatheter aortic valve replacement.  2. Concentric left ventricular hypertrophy.  3. Normal left ventricular systolic function. No segmental  wall motion abnormalities.  4. Normal right ventricular size and function.  No echocardiographic evidence of endocarditis.      < from: Cardiac Catheterization (11.22.21 @ 15:22) >  Coronary Angiography   The coronary circulation is left dominant.      LAD   Proximal left anterior descending: There is a 40 % stenosis. Distal  left anterior descending: Angiography shows severe  atherosclerosis.      CX   Distal circumflex: There is a 95 % stenosis. Mid circumflex: There is  a 30 % stenosis. First obtuse marginal: There is a 100 %  stenosis.      Patient: EDIN VILLA               MRN: [id number]  Study Date: 11/22/2021   03:22 PM      Page 1 of 4          RCA   Proximal right coronary artery: There is a 100 % stenosis.      Ramus   Proximal ramus intermedius: There is a 50 % stenosis.        < from: Xray Chest 1 View- PORTABLE-Urgent (12.29.23 @ 19:27) >  INTERPRETATION:  Bilateral, lower lobe predominant airspace disease.    < end of copied text >         Date of Service, 12-29-23 @ 19:38  CHIEF COMPLAINT:Patient is a 73y old  Male who presents with a chief complaint of malaise/  fevers (29 Dec 2023 18:39)      HPI:  72M w/ pmhx of FRANCOIS on CPAP, COPD w/ 50-pack-year smoking history, DM2 on insulin pump, hyperlipidemia, hypertension, CAD status post stent, TAVR, history of kidney transplant in 2013 on immunosuppresion, presenting with 2 days of cough, headache, light headedness. Pt tested positive twice at home for covid 19. Denies chest pain, nvd, sore throat.      PAST MEDICAL & SURGICAL HISTORY:  HTN (Hypertension), Benign  Hyperlipidemia  Smoking  DM (diabetes mellitus), type 2  FRANCOIS on CPAP  H/O kidney transplant  CAD (coronary artery disease)  Kidney transplant recipient  Rt kidney- 2013  AV fistula  History of transcatheter aortic valve replacement (TAVR)  Personal history of spine surgery  Insulin pump status  H/O colonoscopy    MEDICATIONS  (STANDING):  albuterol    90 MICROgram(s) HFA Inhaler 2 Puff(s) Inhalation every 8 hours  allopurinol 100 milliGRAM(s) Oral daily  aspirin enteric coated 81 milliGRAM(s) Oral daily  atorvastatin 20 milliGRAM(s) Oral at bedtime  gabapentin 100 milliGRAM(s) Oral daily  heparin   Injectable 5000 Unit(s) SubCutaneous every 12 hours  labetalol 200 milliGRAM(s) Oral three times a day  montelukast 10 milliGRAM(s) Oral daily  mycophenolate mofetil 500 milliGRAM(s) Oral two times a day  NIFEdipine XL 90 milliGRAM(s) Oral daily  piperacillin/tazobactam IVPB. 3.375 Gram(s) IV Intermittent once  piperacillin/tazobactam IVPB.- 3.375 Gram(s) IV Intermittent once  piperacillin/tazobactam IVPB... 3.375 Gram(s) IV Intermittent once  remdesivir  IVPB 200 milliGRAM(s) IV Intermittent every 24 hours  remdesivir  IVPB   IV Intermittent   senna 2 Tablet(s) Oral at bedtime  tacrolimus 1 milliGRAM(s) Oral daily  tacrolimus 1.5 milliGRAM(s) Oral at bedtime  torsemide 10 milliGRAM(s) Oral daily    MEDICATIONS  (PRN):  · 	mycophenolate mofetil 500 mg oral tablet: 2 tab(s) orally 2 times a day  · 	predniSONE 10 mg oral tablet: 1 tab(s) orally once a day  · 	cefuroxime 500 mg oral tablet: 1 tab(s) orally every 12 hours  · 	NIFEdipine 60 mg oral tablet, extended release: 1 tab(s) orally once a day  · 	NIFEdipine 30 mg oral tablet, extended release: 1 tab(s) orally once a day (at bedtime)  · 	Ozempic (1 mg dose) 4 mg/3 mL subcutaneous solution: 0.5 milligram(s) subcutaneously once a week   · 	Aspirin Enteric Coated 81 mg oral delayed release tablet: 1 tab(s) orally once a day  · 	allopurinol 100 mg oral tablet: 1 tab(s) orally once a day at pm  · 	potassium chloride 20 mEq oral tablet, extended release: 1 tab(s) orally once a day  · 	labetalol 200 mg oral tablet: 1 tab(s) orally 3 times a day  · 	Albuterol (Eqv-Proventil HFA) 90 mcg/inh inhalation aerosol: 2 puff(s) inhaled 4 times a day  · 	senna (sennosides) 12 mg oral tablet: 1 tab(s) orally once a day (at bedtime)  · 	gabapentin 100 mg oral tablet: orally once a day (at bedtime)  · 	atorvastatin 20 mg oral tablet: 1 tab(s) orally once a day  · 	montelukast 10 mg oral tablet: 1 tab(s) orally once a day  · 	alfuzosin 10 mg oral tablet, extended release: 1 tab(s) orally once a day  · 	Myrbetriq 50 mg oral tablet, extended release: 1 tab(s) orally once a day (at bedtime)  · 	CellCept 500 mg oral tablet: 2 tab(s) orally 2 times a day  · 	tacrolimus 1 mg oral capsule: 1 cap(s) orally once a day  · 	tacrolimus 1 mg oral capsule: 1.5 cap(s) orally once a day (at bedtime)  · 	torsemide 10 mg oral tablet: 1 tab(s) orally once a day  · 	enalapril 10 mg oral tablet: 1 tab(s) orally 3 times a day    FAMILY HISTORY:      SOCIAL HISTORY:    [ x] Non-smoker  [ ] Smoker  [ ] Alcohol    Allergies    No Known Allergies    Intolerances    	    REVIEW OF SYSTEMS:  CONSTITUTIONAL: No fever, weight loss, or fatigue  EYES: No eye pain, visual disturbances, or discharge  ENT:  No difficulty hearing, tinnitus, vertigo; No sinus or throat pain  NECK: No pain or stiffness  RESPIRATORY: + cough,no  wheezing, chills or hemoptysis; + Shortness of Breath  CARDIOVASCULAR: No chest pain, palpitations, passing out, dizziness, or leg swelling  GASTROINTESTINAL: No abdominal or epigastric pain. No nausea, vomiting, or hematemesis; No diarrhea or constipation. No melena or hematochezia.  GENITOURINARY: No dysuria, frequency, hematuria, or incontinence  NEUROLOGICAL: No headaches, memory loss, loss of strength, numbness, or tremors  SKIN: No itching, burning, rashes, or lesions   LYMPH Nodes: No enlarged glands  ENDOCRINE: No heat or cold intolerance; No hair loss  MUSCULOSKELETAL: No joint pain or swelling; No muscle, back, or extremity pain  PSYCHIATRIC: No depression, anxiety, mood swings, or difficulty sleeping  HEME/LYMPH: No easy bruising, or bleeding gums  ALLERGY AND IMMUNOLOGIC: No hives or eczema	    [cx ] All others negative	  [ ] Unable to obtain    PHYSICAL EXAM:  T(C): 36.7 (12-29-23 @ 18:11), Max: 36.9 (12-29-23 @ 17:34)  HR: 68 (12-29-23 @ 18:11) (68 - 71)  BP: 144/67 (12-29-23 @ 18:11) (144/67 - 147/70)  RR: 18 (12-29-23 @ 18:11) (18 - 18)  SpO2: 97% (12-29-23 @ 18:11) (95% - 97%)  Wt(kg): --  I&O's Summary      Appearance: Normal	  HEENT:   Normal oral mucosa, PERRL, EOMI	  Lymphatic: No lymphadenopathy  Cardiovascular: Normal S1 S2, No JVD, N+ murmurs, No edema  Respiratory: +rhonchi  Psychiatry: A & O x 3, Mood & affect appropriate  Gastrointestinal:  Soft, Non-tender, + BS	  Skin: No rashes, No ecchymoses, No cyanosis	  Neurologic: Non-focal  Extremities: Normal range of motion, No clubbing, cyanosis or edema  Vascular: Peripheral pulses palpable 2+ bilaterally    TELEMETRY: 	    ECG:  	  RADIOLOGY:  OTHER: 	  	  LABS:	 	    CARDIAC MARKERS:                              13.7   8.50  )-----------( 153      ( 29 Dec 2023 18:29 )             42.0     12-29    142  |  108  |  18  ----------------------------<  112<H>  3.9   |  22  |  0.69    Ca    9.3      29 Dec 2023 18:29    TPro  6.8  /  Alb  3.7  /  TBili  0.8  /  DBili  x   /  AST  11  /  ALT  7<L>  /  AlkPhos  73  12-29    proBNP:   Lipid Profile:   HgA1c:   TSH:   PT/INR - ( 29 Dec 2023 18:29 )   PT: 13.6 sec;   INR: 1.24 ratio         PTT - ( 29 Dec 2023 18:29 )  PTT:30.2 sec    PREVIOUS DIAGNOSTIC TESTING:    < from: Transesophageal Echocardiogram w/o TTE (08.26.22 @ 17:07) >  1. Transcatheter aortic valve replacement.  2. Concentric left ventricular hypertrophy.  3. Normal left ventricular systolic function. No segmental  wall motion abnormalities.  4. Normal right ventricular size and function.  No echocardiographic evidence of endocarditis.      < from: Cardiac Catheterization (11.22.21 @ 15:22) >  Coronary Angiography   The coronary circulation is left dominant.      LAD   Proximal left anterior descending: There is a 40 % stenosis. Distal  left anterior descending: Angiography shows severe  atherosclerosis.      CX   Distal circumflex: There is a 95 % stenosis. Mid circumflex: There is  a 30 % stenosis. First obtuse marginal: There is a 100 %  stenosis.      Patient: EDIN VILLA               MRN: [id number]  Study Date: 11/22/2021   03:22 PM      Page 1 of 4          RCA   Proximal right coronary artery: There is a 100 % stenosis.      Ramus   Proximal ramus intermedius: There is a 50 % stenosis.        < from: Xray Chest 1 View- PORTABLE-Urgent (12.29.23 @ 19:27) >  INTERPRETATION:  Bilateral, lower lobe predominant airspace disease.    < end of copied text >

## 2023-12-29 NOTE — H&P ADULT - HISTORY OF PRESENT ILLNESS
: 72M      w/ pmhx of FRANCOIS on CPAP, COPD w/ 50-pack-year smoking history, DM2 on insulin pump, hyperlipidemia, hypertension, CAD status post stent, TAVR     history of kidney transplant in 2013 on immunosuppresion    s/p    prostate surg, 3 days  ago at an outsid e facility,  wa s in  cipro,        presenting with 2 days of cough, headache, light headedness.      Pt tested positive twice at home for covid 19     denies chest pain, nvd, sore throat./  has  been  taking  tylenol

## 2023-12-29 NOTE — ED PROVIDER NOTE - PHYSICAL EXAMINATION
PHYSICAL EXAM:  CONSTITUTIONAL: Well appearing, awake, alert, oriented to person, place, time/situation and in no apparent distress.  HEAD: Atraumatic  CARDIAC: Normal rate, regular rhythm. +S1/S2. No murmurs, rubs or gallops.  RESPIRATORY: Breathing unlabored. Breath sounds clear and equal bilaterally.  ABDOMEN:  Soft, nontender, nondistended. No rebound tenderness or guarding.  NEUROLOGICAL: Alert and oriented, grossly nonfocal.  SKIN: Skin warm and dry. No evidence of rashes or lesions.

## 2023-12-29 NOTE — CONSULT NOTE ADULT - ASSESSMENT
72M w/ pmhx of FRANCOIS on CPAP, COPD w/ 50-pack-year smoking history, DM2 on insulin pump, hyperlipidemia, hypertension, CAD status post stent, TAVR, history of kidney transplant in 2013 on immunosuppresion, presenting with 2 days of cough, headache, light headedness. Pt tested positive twice at home for covid 19. Denies chest pain, nvd, sore throat.  pt is well known to me with sig cardiac hx with c/o sob  pt s/p renal transplant on immunosuppression  pt with malignant HTN on may meds, continue all bp meds  transplant/ ID consult  s/p TAVR no need to repeat echo  dvt prophylaxos  check d dimer  chest x ray noted ?covid pneumoniae

## 2023-12-29 NOTE — CHART NOTE - NSCHARTNOTEFT_GEN_A_CORE
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  Endocrine consulted for insulin pump management     Endocrine History:   --Type of DM: T2DM    --Diagnosed at age: >20 years, on insulin pump for 7 years    --Endocrinologist: Dr Jyoti Byrd    --Recent A1c: 5.4% in 10/2023   --Type of pump: Medtronic pump w/ novolog + Ozempic 0.5mg weekly    --Compliant with pump: yes    --Who manages pump: himself    --Lives with: wife     --CGM: Dexcom G6    --Glucose levels at home: 90s-130s   --hypoglycemia: no    --Is pt comfortable on pump while in the hospital? yes   --Last time pump was changed: yesterday 12/28/23   --How often does patient change pump: every 3 days    --Does patient have enough supplies: Yes   --Does patient carb count/bolus: Yes       Basal: TDD is 16.3U    12AM-5AM 0.6U/hr    5AM-12AM 0.7U/hr         ICR: 12AM-11PM 15g/u              11PM-12AM 20g/u         ISF: 50mg/dl all day    Glucose target         --appetite? good   --Microvascular complications: +nephropathy,  - neuropathy, - retinopathy    --Macrovascular complications: +CAD    --Statin:  Atorvastatin 20mg at home    --ACE/ARB: Enalapril 10mg at home    --GFR: 98      Vitals and labs reviewed by me         Recommendations:   - Given patient’s glucose is at target and patient is AOx4 and feels comfortable using the pump, patient can continue with insulin pump while overnight   - Please make sure patient fills out patient attestation and patient self-assessment form to use insulin pump and place in chart (forms can be found on forms on demand)   - last pump change on 12/28/23, Next pump change due for 12/31/23   - RN to document correction insulin bolus in flow sheet   - Hypoglcyemia protocol    - In case of pump malfunction, please administer Lantus 16 units STAT and start Admelog 4 units TID with meals  (HOLD IF NPO)   - Please check FSG before meals and QHS, or q6h while NPO   - Please keep patient on a diabetic, carb controlled diet    - please check HbA1c   - on discharge patient should follow up with their endocrinologist Dr. Byrd      Full consult to follow in AM, I will put insulin pump order in the AM after I confirm his insulin pump settings in person     Samuel Stockton MD   Endocrinology Fellow

## 2023-12-29 NOTE — ED PROVIDER NOTE - CLINICAL SUMMARY MEDICAL DECISION MAKING FREE TEXT BOX
2M w/ pmhx of FRANCOIS on CPAP, COPD w/ 50-pack-year smoking history, DM2 on insulin pump, hyperlipidemia, hypertension, CAD status post stent, TAVR, history of kidney transplant in 2013 on immunosuppresion, presenting with 2 days of cough, headache, light headedness. Pt tested positive twice at home for covid 19. Pt well appearing, clear lungs, normal heart sounds. Will obtain lab work per sepsis bundle, due to immunosuppressed status will admit for antiviral treatment and monitoring. 2M w/ pmhx of FRANCOIS on CPAP, COPD w/ 50-pack-year smoking history, DM2 on insulin pump, hyperlipidemia, hypertension, CAD status post stent, TAVR, history of kidney transplant in 2013 on immunosuppresion, presenting with 2 days of cough, headache, light headedness. Pt tested positive twice at home for covid 19. Pt well appearing, clear lungs, normal heart sounds. Will obtain lab work per sepsis bundle, due to immunosuppressed status will admit for antiviral treatment and monitoring.    Neena Aguero MD - Attending Physician: Pt here with +COVID at home, sent in for admit/remdesivir. High risk, renal transplant on immunosupp. No hypoxia here, no tachypnea. Labs, XR, to admit

## 2023-12-29 NOTE — ED ADULT NURSE NOTE - OBJECTIVE STATEMENT
pt arrived via EMS with SOB and a positive home test for covid.  he had a kidney transplant 10 years ago and currently had a procedure that has left him with a functioning thurston to be removed next week.  pt reports no fever  history of insulin pump

## 2023-12-29 NOTE — H&P ADULT - ASSESSMENT
72M      w/ pmhx of FRANCOIS on CPAP, COPD w/ 50-pack-year smoking history, DM2 on insulin pump,         HTN/  HLD,  CAD/ stents,        status post stent, TAVR/  colonoscopy 2022, polyp       h/o kidney transplant in 2013 on immunosuppresion      s/p    prostate surg, 3 days  ago at an outsid e facility,  wa s in  cipro,        presenting with 2 days of cough, headache, light headedness.,,  was  + for  covid  today,  denies chest pain, nvd, sore throat./  has  been  taking  tylenol         weakness/  malaise  form covid         on iv remdisivir        given recent  prostate surg   for bph,  immunocompromised  state.  on iv zostn/ folwo bcx/ ucx           transplant ID informed, will f/p in  am     DM,  has  insulin pump,    house   endo .  known to pt           [employer]  insulin pump at home and sunyn Aguilar,  Dr. Byrd      c/c  diastolic   chf.  torsemide/ known to  card    HTN/HLD         on  procardia,  labetolol.  enalapril     CAD,   on asa/ plavix/ ,lipitor     s/p  TAVR, 12/2021       CKD         s/p   R kidney transplant  2013,   Dr Vargas  [facility] /  on cellcept/  tacro  1mg/  1,5  mg.  hs      h/o   ILD,  restrictive/obstructive lung disease,  and   FRANCOIS / cpap    ILD/  known to  house pulm     on dvt  ppx      pt  no longer  f/p  with transplant team in  [facility]/ dr edward olmstead following        defer transplant  meds to  renal/ ID    labs/  imaging  ct  c/a/p.  pending    wife at beside        < from: CT Chest No Cont (10.10.23 @ 18:49) >  IMPRESSION:                      1. Newly developing nodular areas of consolidation bilaterally as well as   reticular opacities and abbreviated differential includes metastatic   disease with possible lymphangitic component. Atypical infectious and/or   inflammatory etiologies cannot be excluded. Bronchoscopic correlation and   histological sampling may be in order.  2. Mild increase in now moderate medi  72M      w/ pmhx of FRANCOIS on CPAP, COPD w/ 50-pack-year smoking history, DM2 on insulin pump,         HTN/  HLD,  CAD/ stents,        status post stent, TAVR/  colonoscopy 2022, polyp       h/o kidney transplant in 2013 on immunosuppresion      s/p    prostate surg, 3 days  ago at an outsid e facility,  wa s in  cipro,        presenting with 2 days of cough, headache, light headedness.,,  was  + for  covid  today,  denies chest pain, nvd, sore throat./  has  been  taking  tylenol         weakness/  malaise  form covid         on iv remdisivir        given recent  prostate surg   for bph,  immunocompromised  state.  on iv zostn/ folwo bcx/ ucx           transplant ID informed, will f/p in  am     DM,  has  insulin pump,    house   endo .  known to pt           [employer]  insulin pump at home and sunny Aguilar,  Dr. Byrd      c/c  diastolic   chf.  torsemide/ known to  card    HTN/HLD         on  procardia,  labetolol.  enalapril     CAD,   on asa/ plavix/ ,lipitor     s/p  TAVR, 12/2021       CKD         s/p   R kidney transplant  2013,   Dr Vargas  [facility] /  on cellcept/  tacro  1mg/  1,5  mg.  hs      h/o   ILD,  restrictive/obstructive lung disease,  and   FRANCOIS / cpap    ILD/  known to  house pulm     on dvt  ppx      pt  no longer  f/p  with transplant team in  [facility]/ dr edward olmstead following        defer transplant  meds to  renal/ ID    labs/  imaging  ct  c/a/p.  pending    wife at beside        < from: CT Chest No Cont (10.10.23 @ 18:49) >  IMPRESSION:                      1. Newly developing nodular areas of consolidation bilaterally as well as   reticular opacities and abbreviated differential includes metastatic   disease with possible lymphangitic component. Atypical infectious and/or   inflammatory etiologies cannot be excluded. Bronchoscopic correlation and   histological sampling may be in order.  2. Mild increase in now moderate medi  72M      w/ pmhx of FRANCOIS on CPAP, COPD w/ 50-pack-year smoking history, DM2 on insulin pump,         HTN/  HLD,  CAD/ stents,        status post stent, TAVR/  colonoscopy 2022, polyp       h/o kidney transplant in 2013 on immunosuppresion      s/p    prostate surg, 3 days  ago at an outsid e facility,  wa s in  cipro,        presenting with 2 days of cough, headache, light headedness.,,  was  + for  covid  today,  denies chest pain, nvd, sore throat./  has  been  taking  tylenol         weakness/  malaise  form covid         on iv remdisivir        given recent  prostate surg   for bph,  immunocompromised  state.  on iv zostn/ folwo bcx/ ucx           transplant ID informed, will f/p in  am     DM,  has  insulin pump,           house   endo ,  called   known to pt           [employer]  insulin pump at home and Ozsunny golden,  Dr. Byrd      c/c  diastolic   chf.  torsemide/ known to  card    HTN /HLD         on  procardia,  labetolol.  enalapril     CAD,   on asa/ plavix/ ,lipitor     s/p  TAVR, 12/2021       CKD         s/p   R kidney transplant  2013,   Dr Vargas  [facility] /  on cellcept/  tacro  1mg/  1,5  mg.  hs      h/o   ILD,  restrictive/obstructive lung disease,  and   FRANCOIS / cpap    ILD/  known to  house pulm     on dvt  ppx      pt  no longer  f/p  with transplant team in  [facility]/ dr edward olmstead following        defer transplant  meds to  renal/ ID    labs/  imaging  ct  c/a/p.  pending    wife at beside        < from: CT Chest No Cont (10.10.23 @ 18:49) >  IMPRESSION:                      1. Newly developing nodular areas of consolidation bilaterally as well as   reticular opacities and abbreviated differential includes metastatic   disease with possible lymphangitic component. Atypical infectious and/or   inflammatory etiologies cannot be excluded. Bronchoscopic correlation and   histological sampling may be in order.  2. Mild increase in now moderate medi  72M      w/ pmhx of FRANCOIS on CPAP, COPD w/ 50-pack-year smoking history, DM2 on insulin pump,         HTN/  HLD,  CAD/ stents,        status post stent, TAVR/  colonoscopy 2022, polyp       h/o kidney transplant in 2013 on immunosuppresion      s/p    prostate surg, 3 days  ago at an outsid e facility,  wa s in  cipro,        presenting with 2 days of cough, headache, light headedness.,,  was  + for  covid  today,  denies chest pain, nvd, sore throat./  has  been  taking  tylenol         weakness/  malaise  form covid         on iv remdisivir        given recent  prostate surg   for bph,  immunocompromised  state.  on iv zostn/ folwo bcx/ ucx           transplant ID informed, will f/p in  am     DM,  has  insulin pump,           house   endo ,  called   known to pt           [employer]  insulin pump at home and Ozsunny golden,  Dr. Byrd      c/c  diastolic   chf.  torsemide/ known to  card    HTN /HLD         on  procardia,  labetolol.  enalapril     CAD,   on asa/ plavix/ ,lipitor     s/p  TAVR, 12/2021       CKD         s/p   R kidney transplant  2013,   Dr Vargas  [facility] /  on cellcept/  tacro  1mg/  1,5  mg.  hs      h/o   ILD,  restrictive/obstructive lung disease,  and   FRANCOIS / cpap    ILD/  known to  house pulm     on dvt  ppx      pt  no longer  f/p  with transplant team in  [facility]/ dr edward olmstead following        defer transplant  meds to  renal/ ID    labs/  imaging  ct  c/a/p.  pending    wife at beside        < from: CT Chest No Cont (10.10.23 @ 18:49) >  IMPRESSION:                      1. Newly developing nodular areas of consolidation bilaterally as well as   reticular opacities and abbreviated differential includes metastatic   disease with possible lymphangitic component. Atypical infectious and/or   inflammatory etiologies cannot be excluded. Bronchoscopic correlation and   histological sampling may be in order.  2. Mild increase in now moderate medi

## 2023-12-29 NOTE — ED ADULT NURSE NOTE - NSFALLRISKASMTTYPE_ED_ALL_ED
Acute Otitis Media with Infection (Child)    Your child has a middle ear infection (acute otitis media). It's caused by bacteria or viruses. The middle ear is the space behind the eardrum. The eustachian tube connects the ear to the nasal passage. The eustachian tubes help drain fluid from the ears. They also keep the air pressure equal inside and outside the ears. These tubes are shorter and more horizontal in children. This makes it more likely for the tubes to become blocked. A blockage lets fluid and pressure build up in the middle ear. Bacteria or fungi can grow in this fluid and cause an ear infection. This infection is commonly known as an earache.   The main symptom of an ear infection is ear pain. Other symptoms may include pulling at the ear, being more fussy than usual, fever, decreased appetite, and vomiting or diarrhea. Your child’s hearing may also be affected. Your child may have had a respiratory infection first.   An ear infection may clear up on its own. Or your child may need to take medicine. After the infection goes away, your child may still have fluid in the middle ear. It may take weeks or months for this fluid to go away. During that time, your child may have temporary hearing loss. But all other symptoms of the earache should be gone.   Home care  Follow these guidelines when caring for your child at home:  · The healthcare provider will likely prescribe medicines for pain. The provider may also prescribe antibiotics to treat the infection. These may be liquid medicines to give by mouth. Or they may be ear drops. Follow the provider’s instructions for giving these medicines to your child.  Don't give your child any other medicine without first asking your child's healthcare provider, especially the first time.  · Because ear infections can clear up on their own, the provider may suggest waiting for a few days before giving your child medicines for infection.  · To reduce pain, have your  child rest in an upright position. Hot or cold compresses held against the ear may help ease pain.  · Don't smoke in the house or around your child. Keep your child away from secondhand smoke.  To help prevent future infections:  · Don't smoke near your child. Secondhand smoke raises the risk for ear infections in children.  · Make sure your child gets all appropriate vaccines.  · Don't bottle-feed while your baby is lying on his or her back. (This position can cause middle ear infections because it allows milk to run into the eustachian tubes.)      · If you breastfeed, continue until your child is 6 to 12 months of age.  To apply ear drops:  1. Put the bottle in warm water if the medicine is kept in the refrigerator. Cold drops in the ear are uncomfortable.  2. Have your child lie down on a flat surface. Gently hold your child’s head to one side.  3. Remove any drainage from the ear with a clean tissue or cotton swab. Clean only the outer ear. Don’t put the cotton swab into the ear canal.  4. Straighten the ear canal by gently pulling the earlobe up and back.  5. Keep the dropper a half-inch above the ear canal. This will keep the dropper from becoming contaminated. Put the drops against the side of the ear canal.  6. Have your child stay lying down for 2 to 3 minutes. This gives time for the medicine to enter the ear canal. If your child doesn’t have pain, gently massage the outer ear near the opening.  7. Wipe any extra medicine away from the outer ear with a clean cotton ball.    Follow-up care  Follow up with your child’s healthcare provider as directed. Your child will need to have the ear rechecked to make sure the infection has gone away. Check with the healthcare provider to see when they want to see your child.   Special note to parents  If your child continues to get earaches, he or she may need ear tubes. The provider will put small tubes in your child’s eardrum to help keep fluid from building up. This  procedure is a simple and works well.   When to seek medical advice  Call your child's healthcare provider for any of the following:   · Fever (see Fever and children, below)  · New symptoms, especially swelling around the ear or weakness of face muscles  · Severe pain  · Infection seems to get worse, not better   · Neck pain  · Your child acts very sick or not himself or herself  · Fever or pain don't improve with antibiotics after 48 hours  Fever and children  Use a digital thermometer to check your child’s temperature. Don’t use a mercury thermometer. There are different kinds and uses of digital thermometers. They include:   · Rectal. For children younger than 3 years, a rectal temperature is the most accurate.  · Forehead (temporal). This works for children age 3 months and older. If a child under 3 months old has signs of illness, this can be used for a first pass. The provider may want to confirm with a rectal temperature.  · Ear (tympanic). Ear temperatures are accurate after 6 months of age, but not before.  · Armpit (axillary). This is the least reliable but may be used for a first pass to check a child of any age with signs of illness. The provider may want to confirm with a rectal temperature.  · Mouth (oral). Don’t use a thermometer in your child’s mouth until he or she is at least 4 years old.  Use the rectal thermometer with care. Follow the product maker’s directions for correct use. Insert it gently. Label it and make sure it’s not used in the mouth. It may pass on germs from the stool. If you don’t feel OK using a rectal thermometer, ask the healthcare provider what type to use instead. When you talk with any healthcare provider about your child’s fever, tell him or her which type you used.   Below are guidelines to know if your young child has a fever. Your child’s healthcare provider may give you different numbers for your child. Follow your provider’s specific instructions.   Fever readings for  a baby under 3 months old:   · First, ask your child’s healthcare provider how you should take the temperature.  · Rectal or forehead: 100.4°F (38°C) or higher  · Armpit: 99°F (37.2°C) or higher  Fever readings for a child age 3 months to 36 months (3 years):   · Rectal, forehead, or ear: 102°F (38.9°C) or higher  · Armpit: 101°F (38.3°C) or higher  Call the healthcare provider in these cases:   · Repeated temperature of 104°F (40°C) or higher in a child of any age  · Fever of 100.4° F (38° C) or higher in baby younger than 3 months  · Fever that lasts more than 24 hours in a child under age 2  · Fever that lasts for 3 days in a child age 2 or older    Sarath last reviewed this educational content on 4/1/2020 © 2000-2021 [employer]. All rights reserved. This information is not intended as a substitute for professional medical care. Always follow your healthcare professional's instructions.         Initial (On Arrival)

## 2023-12-29 NOTE — ED ADULT NURSE NOTE - NSFALLUNIVINTERV_ED_ALL_ED
Bed/Stretcher in lowest position, wheels locked, appropriate side rails in place/Call bell, personal items and telephone in reach/Instruct patient to call for assistance before getting out of bed/chair/stretcher/Non-slip footwear applied when patient is off stretcher/[place] to call system/Physically safe environment - no spills, clutter or unnecessary equipment/Purposeful proactive rounding/Room/bathroom lighting operational, light cord in reach Bed/Stretcher in lowest position, wheels locked, appropriate side rails in place/Call bell, personal items and telephone in reach/Instruct patient to call for assistance before getting out of bed/chair/stretcher/Non-slip footwear applied when patient is off stretcher/[place] to call system/Physically safe environment - no spills, clutter or unnecessary equipment/Purposeful proactive rounding/Room/bathroom lighting operational, light cord in reach

## 2023-12-29 NOTE — H&P ADULT - NSHPPHYSICALEXAM_GEN_ALL_CORE
T(C): 36.7 (12-29-23 @ 18:11), Max: 36.9 (12-29-23 @ 17:34)  HR: 68 (12-29-23 @ 18:11) (68 - 71)  BP: 144/67 (12-29-23 @ 18:11) (144/67 - 147/70)  RR: 18 (12-29-23 @ 18:11) (18 - 18)  SpO2: 97% (12-29-23 @ 18:11) (95% - 97%)  GENERAL: NAD, lying in bed comfortably  HEAD:  Atraumatic, normocephalic  EYES: EOMI, PERRLA, conjunctiva and sclera clear  NECK: Supple, trachea midline, no JVD  HEART: Regular rate and rhythm, no murmurs, rubs, or gallops  LUNGS: Unlabored respirations.  Clear to auscultation bilaterally, no crackles, wheezing, or rhonchi  ABDOMEN: Soft, nontender, nondistended, +BS  EXTREMITIES: 2+ peripheral pulses bilaterally. No clubbing, cyanosis, or edema  NERVOUS SYSTEM:  A&Ox3, moving all extremities, no focal deficits   SKIN: No rashes or lesions 20-Feb-2019

## 2023-12-30 ENCOUNTER — TRANSCRIPTION ENCOUNTER (OUTPATIENT)
Age: 73
End: 2023-12-30

## 2023-12-30 DIAGNOSIS — E78.5 HYPERLIPIDEMIA, UNSPECIFIED: ICD-10-CM

## 2023-12-30 DIAGNOSIS — I10 ESSENTIAL (PRIMARY) HYPERTENSION: ICD-10-CM

## 2023-12-30 DIAGNOSIS — E11.9 TYPE 2 DIABETES MELLITUS WITHOUT COMPLICATIONS: ICD-10-CM

## 2023-12-30 LAB
A1C WITH ESTIMATED AVERAGE GLUCOSE RESULT: 5.3 % — SIGNIFICANT CHANGE UP (ref 4–5.6)
A1C WITH ESTIMATED AVERAGE GLUCOSE RESULT: 5.3 % — SIGNIFICANT CHANGE UP (ref 4–5.6)
ALBUMIN SERPL ELPH-MCNC: 3.6 G/DL — SIGNIFICANT CHANGE UP (ref 3.3–5)
ALBUMIN SERPL ELPH-MCNC: 3.6 G/DL — SIGNIFICANT CHANGE UP (ref 3.3–5)
ALP SERPL-CCNC: 69 U/L — SIGNIFICANT CHANGE UP (ref 40–120)
ALP SERPL-CCNC: 69 U/L — SIGNIFICANT CHANGE UP (ref 40–120)
ALT FLD-CCNC: <5 U/L — LOW (ref 10–45)
ALT FLD-CCNC: <5 U/L — LOW (ref 10–45)
ANION GAP SERPL CALC-SCNC: 15 MMOL/L — SIGNIFICANT CHANGE UP (ref 5–17)
ANION GAP SERPL CALC-SCNC: 15 MMOL/L — SIGNIFICANT CHANGE UP (ref 5–17)
AST SERPL-CCNC: 13 U/L — SIGNIFICANT CHANGE UP (ref 10–40)
AST SERPL-CCNC: 13 U/L — SIGNIFICANT CHANGE UP (ref 10–40)
BILIRUB DIRECT SERPL-MCNC: 0.2 MG/DL — SIGNIFICANT CHANGE UP (ref 0–0.3)
BILIRUB DIRECT SERPL-MCNC: 0.2 MG/DL — SIGNIFICANT CHANGE UP (ref 0–0.3)
BILIRUB INDIRECT FLD-MCNC: 0.4 MG/DL — SIGNIFICANT CHANGE UP (ref 0.2–1)
BILIRUB INDIRECT FLD-MCNC: 0.4 MG/DL — SIGNIFICANT CHANGE UP (ref 0.2–1)
BILIRUB SERPL-MCNC: 0.6 MG/DL — SIGNIFICANT CHANGE UP (ref 0.2–1.2)
BILIRUB SERPL-MCNC: 0.6 MG/DL — SIGNIFICANT CHANGE UP (ref 0.2–1.2)
BUN SERPL-MCNC: 13 MG/DL — SIGNIFICANT CHANGE UP (ref 7–23)
BUN SERPL-MCNC: 13 MG/DL — SIGNIFICANT CHANGE UP (ref 7–23)
CALCIUM SERPL-MCNC: 8.7 MG/DL — SIGNIFICANT CHANGE UP (ref 8.4–10.5)
CALCIUM SERPL-MCNC: 8.7 MG/DL — SIGNIFICANT CHANGE UP (ref 8.4–10.5)
CHLORIDE SERPL-SCNC: 103 MMOL/L — SIGNIFICANT CHANGE UP (ref 96–108)
CHLORIDE SERPL-SCNC: 103 MMOL/L — SIGNIFICANT CHANGE UP (ref 96–108)
CO2 SERPL-SCNC: 22 MMOL/L — SIGNIFICANT CHANGE UP (ref 22–31)
CO2 SERPL-SCNC: 22 MMOL/L — SIGNIFICANT CHANGE UP (ref 22–31)
CREAT SERPL-MCNC: 0.66 MG/DL — SIGNIFICANT CHANGE UP (ref 0.5–1.3)
CULTURE RESULTS: SIGNIFICANT CHANGE UP
CULTURE RESULTS: SIGNIFICANT CHANGE UP
EGFR: 99 ML/MIN/1.73M2 — SIGNIFICANT CHANGE UP
ESTIMATED AVERAGE GLUCOSE: 105 MG/DL — SIGNIFICANT CHANGE UP (ref 68–114)
ESTIMATED AVERAGE GLUCOSE: 105 MG/DL — SIGNIFICANT CHANGE UP (ref 68–114)
GLUCOSE BLDC GLUCOMTR-MCNC: 102 MG/DL — HIGH (ref 70–99)
GLUCOSE BLDC GLUCOMTR-MCNC: 102 MG/DL — HIGH (ref 70–99)
GLUCOSE BLDC GLUCOMTR-MCNC: 105 MG/DL — HIGH (ref 70–99)
GLUCOSE BLDC GLUCOMTR-MCNC: 105 MG/DL — HIGH (ref 70–99)
GLUCOSE BLDC GLUCOMTR-MCNC: 118 MG/DL — HIGH (ref 70–99)
GLUCOSE BLDC GLUCOMTR-MCNC: 118 MG/DL — HIGH (ref 70–99)
GLUCOSE SERPL-MCNC: 187 MG/DL — HIGH (ref 70–99)
GLUCOSE SERPL-MCNC: 187 MG/DL — HIGH (ref 70–99)
INR BLD: 1.26 RATIO — HIGH (ref 0.85–1.18)
INR BLD: 1.26 RATIO — HIGH (ref 0.85–1.18)
MRSA PCR RESULT.: SIGNIFICANT CHANGE UP
MRSA PCR RESULT.: SIGNIFICANT CHANGE UP
POTASSIUM SERPL-MCNC: 3.5 MMOL/L — SIGNIFICANT CHANGE UP (ref 3.5–5.3)
POTASSIUM SERPL-MCNC: 3.5 MMOL/L — SIGNIFICANT CHANGE UP (ref 3.5–5.3)
POTASSIUM SERPL-SCNC: 3.5 MMOL/L — SIGNIFICANT CHANGE UP (ref 3.5–5.3)
POTASSIUM SERPL-SCNC: 3.5 MMOL/L — SIGNIFICANT CHANGE UP (ref 3.5–5.3)
PROT SERPL-MCNC: 6.6 G/DL — SIGNIFICANT CHANGE UP (ref 6–8.3)
PROT SERPL-MCNC: 6.6 G/DL — SIGNIFICANT CHANGE UP (ref 6–8.3)
PROTHROM AB SERPL-ACNC: 13.8 SEC — HIGH (ref 9.5–13)
PROTHROM AB SERPL-ACNC: 13.8 SEC — HIGH (ref 9.5–13)
S AUREUS DNA NOSE QL NAA+PROBE: SIGNIFICANT CHANGE UP
S AUREUS DNA NOSE QL NAA+PROBE: SIGNIFICANT CHANGE UP
SODIUM SERPL-SCNC: 140 MMOL/L — SIGNIFICANT CHANGE UP (ref 135–145)
SODIUM SERPL-SCNC: 140 MMOL/L — SIGNIFICANT CHANGE UP (ref 135–145)
SPECIMEN SOURCE: SIGNIFICANT CHANGE UP
SPECIMEN SOURCE: SIGNIFICANT CHANGE UP

## 2023-12-30 PROCEDURE — 74177 CT ABD & PELVIS W/CONTRAST: CPT | Mod: 26

## 2023-12-30 PROCEDURE — 99223 1ST HOSP IP/OBS HIGH 75: CPT

## 2023-12-30 PROCEDURE — 71260 CT THORAX DX C+: CPT | Mod: 26

## 2023-12-30 PROCEDURE — 99223 1ST HOSP IP/OBS HIGH 75: CPT | Mod: GC

## 2023-12-30 RX ORDER — REMDESIVIR 5 MG/ML
100 INJECTION INTRAVENOUS EVERY 24 HOURS
Refills: 0 | Status: DISCONTINUED | OUTPATIENT
Start: 2024-01-01 | End: 2023-12-31

## 2023-12-30 RX ORDER — DEXTROSE 50 % IN WATER 50 %
25 SYRINGE (ML) INTRAVENOUS ONCE
Refills: 0 | Status: DISCONTINUED | OUTPATIENT
Start: 2023-12-30 | End: 2023-12-31

## 2023-12-30 RX ORDER — INSULIN ASPART 100 [IU]/ML
1 INJECTION, SOLUTION SUBCUTANEOUS
Refills: 0 | Status: DISCONTINUED | OUTPATIENT
Start: 2023-12-30 | End: 2023-12-31

## 2023-12-30 RX ORDER — GLUCAGON INJECTION, SOLUTION 0.5 MG/.1ML
1 INJECTION, SOLUTION SUBCUTANEOUS ONCE
Refills: 0 | Status: DISCONTINUED | OUTPATIENT
Start: 2023-12-30 | End: 2023-12-31

## 2023-12-30 RX ORDER — DEXTROSE 50 % IN WATER 50 %
15 SYRINGE (ML) INTRAVENOUS ONCE
Refills: 0 | Status: DISCONTINUED | OUTPATIENT
Start: 2023-12-30 | End: 2023-12-31

## 2023-12-30 RX ORDER — DEXTROSE 50 % IN WATER 50 %
12.5 SYRINGE (ML) INTRAVENOUS ONCE
Refills: 0 | Status: DISCONTINUED | OUTPATIENT
Start: 2023-12-30 | End: 2023-12-31

## 2023-12-30 RX ADMIN — PIPERACILLIN AND TAZOBACTAM 25 GRAM(S): 4; .5 INJECTION, POWDER, LYOPHILIZED, FOR SOLUTION INTRAVENOUS at 14:19

## 2023-12-30 RX ADMIN — Medication 100 MILLIGRAM(S): at 12:40

## 2023-12-30 RX ADMIN — Medication 200 MILLIGRAM(S): at 14:19

## 2023-12-30 RX ADMIN — MYCOPHENOLATE MOFETIL 500 MILLIGRAM(S): 250 CAPSULE ORAL at 17:48

## 2023-12-30 RX ADMIN — TACROLIMUS 1.5 MILLIGRAM(S): 5 CAPSULE ORAL at 21:39

## 2023-12-30 RX ADMIN — Medication 10 MILLIGRAM(S): at 05:36

## 2023-12-30 RX ADMIN — ALBUTEROL 2 PUFF(S): 90 AEROSOL, METERED ORAL at 14:19

## 2023-12-30 RX ADMIN — ALBUTEROL 2 PUFF(S): 90 AEROSOL, METERED ORAL at 05:37

## 2023-12-30 RX ADMIN — MONTELUKAST 10 MILLIGRAM(S): 4 TABLET, CHEWABLE ORAL at 12:40

## 2023-12-30 RX ADMIN — ALBUTEROL 2 PUFF(S): 90 AEROSOL, METERED ORAL at 21:39

## 2023-12-30 RX ADMIN — HEPARIN SODIUM 5000 UNIT(S): 5000 INJECTION INTRAVENOUS; SUBCUTANEOUS at 17:48

## 2023-12-30 RX ADMIN — REMDESIVIR 200 MILLIGRAM(S): 5 INJECTION INTRAVENOUS at 18:28

## 2023-12-30 RX ADMIN — HEPARIN SODIUM 5000 UNIT(S): 5000 INJECTION INTRAVENOUS; SUBCUTANEOUS at 05:36

## 2023-12-30 RX ADMIN — ATORVASTATIN CALCIUM 20 MILLIGRAM(S): 80 TABLET, FILM COATED ORAL at 21:38

## 2023-12-30 RX ADMIN — Medication 200 MILLIGRAM(S): at 21:38

## 2023-12-30 RX ADMIN — TACROLIMUS 1 MILLIGRAM(S): 5 CAPSULE ORAL at 12:40

## 2023-12-30 RX ADMIN — Medication 81 MILLIGRAM(S): at 12:40

## 2023-12-30 RX ADMIN — Medication 5 MILLIGRAM(S): at 14:19

## 2023-12-30 RX ADMIN — MYCOPHENOLATE MOFETIL 500 MILLIGRAM(S): 250 CAPSULE ORAL at 05:36

## 2023-12-30 RX ADMIN — CHLORHEXIDINE GLUCONATE 1 APPLICATION(S): 213 SOLUTION TOPICAL at 12:41

## 2023-12-30 RX ADMIN — Medication 90 MILLIGRAM(S): at 05:35

## 2023-12-30 RX ADMIN — PIPERACILLIN AND TAZOBACTAM 25 GRAM(S): 4; .5 INJECTION, POWDER, LYOPHILIZED, FOR SOLUTION INTRAVENOUS at 05:36

## 2023-12-30 RX ADMIN — GABAPENTIN 100 MILLIGRAM(S): 400 CAPSULE ORAL at 12:40

## 2023-12-30 RX ADMIN — PIPERACILLIN AND TAZOBACTAM 25 GRAM(S): 4; .5 INJECTION, POWDER, LYOPHILIZED, FOR SOLUTION INTRAVENOUS at 21:39

## 2023-12-30 RX ADMIN — SENNA PLUS 2 TABLET(S): 8.6 TABLET ORAL at 21:39

## 2023-12-30 RX ADMIN — Medication 200 MILLIGRAM(S): at 05:35

## 2023-12-30 NOTE — DISCHARGE NOTE PROVIDER - NSDCFUSCHEDAPPT_GEN_ALL_CORE_FT
[facility]  [address]SCAN [address]  Scheduled Appointment: 01/09/2024    Nabeel Goldberg  [facility]  PULED [address]  Scheduled Appointment: 01/11/2024    Nabeel Goldberg  [facility] [address]  Scheduled Appointment: 01/25/2024     [facility]  [address]SC[address]  Scheduled Appointment: 01/09/2024    Nabeel Goldberg  [facility]  [address]UL[address]  Scheduled Appointment: 01/11/2024    Nabeel Goldberg  [facility] [address]  Scheduled Appointment: 01/25/2024

## 2023-12-30 NOTE — CONSULT NOTE ADULT - SUBJECTIVE AND OBJECTIVE BOX
70 y/o M with h/o ESRD, s/p renal transplant 2013 , DM, CAD s/p stents , FRANCOIS, HLD, HTN 70 y/o M with h/o ESRD, s/p renal transplant 2013 , DM, CAD s/p stents , ILD followed by pulmonology, FRANCOIS, HLD, HTN and BPH s/p recent TURP and thurston placement per patient on 12/27 presenting with 2-3 days of cough, lightheadedness, HA and myalgias and fever home.. on admission, afebrile, WBC stable at 8 with lymphopenia. COVID 19 +, UA with some27 WBC and 800 RBC in c/o thurston, UC and BC sent. Started on remdesivir and zosyn. He denies dysuria, N/V flank pain, bladder pain. Denies diarrhea.     Originally from [place], no recent travel, no animal exposures      ____________________________________________________  ROS  GENERAL: denies chills, , night sweats, weight loss.   PSYCH: denies depression, anxiety, suicidal ideation, hallucination, and delusions  SKIN: no rash or lesions; no color changes, no abnormal nevi,no  dryness, and nojaundice    EYES: denies visual changes, floaters, pain, inflammation, blurred vision, and discharge  ENT: denies tinnitus, vertigo, epistaxis, oral lesion, and decreased acuity  PULM: denies, hemoptysis, pleurisy  CVS: denies angina, palpitations,+ orthopnea, no syncope, or heart murmur  GI: denies constipation, diarrhea, melena, abdominal pain, nausea.   : denies dysuria, frequency, discharge, incontinence, stones or macroscopic hematuria  MS: no arthralgias, no erythema or swelling, no myalgias, noedema, or lower back pain.   CNS: denies numbness, dizziness, seizure, or tremor  ENDO: denies heat/cold intolerance, polyuria, polydipsia, malaise.    HEME: denies bruising, bleeding, lymphadenopathy, anemia, and calf pain    Allergies  No Known Allergies    __________________________________________________  MEDS:  MEDICATIONS  (STANDING):  albuterol    90 MICROgram(s) HFA Inhaler 2 every 8 hours  allopurinol 100 daily  aspirin enteric coated 81 daily  atorvastatin 20 at bedtime  dextrose 50% Injectable 25 once  dextrose 50% Injectable 25 once  dextrose 50% Injectable 12.5 once  dextrose Oral Gel 15 once  enalapril 5 daily  gabapentin 100 daily  glucagon  Injectable 1 once  heparin   Injectable 5000 every 12 hours  labetalol 200 three times a day  montelukast 10 daily  mycophenolate mofetil 500 two times a day  NIFEdipine XL 90 daily  senna 2 at bedtime  tacrolimus 1 daily  tacrolimus 1.5 at bedtime  torsemide 10 daily    _________________________________________________  ANTIMICOBIALS  mycophenolate mofetil 500 two times a day  piperacillin/tazobactam IVPB.. 3.375 every 8 hours  piperacillin/tazobactam IVPB... 3.375 once  remdesivir  IVPB    remdesivir  IVPB 100 every 24 hours  tacrolimus 1 daily  tacrolimus 1.5 at bedtime      GENERAL LABS              x                    140  | 22   | 13           x     >-----------< x       ------------------------< 187                   x                    3.5  | 103  | 0.66                                         Ca 8.7   Mg x     Ph x          Urinalysis Basic - ( 30 Dec 2023 07:17 )    Color: x / Appearance: x / SG: x / pH: x  Gluc: 187 mg/dL / Ketone: x  / Bili: x / Urobili: x   Blood: x / Protein: x / Nitrite: x   Leuk Esterase: x / RBC: x / WBC x   Sq Epi: x / Non Sq Epi: x / Bacteria: x        _________________________________________________  MICROBIOLOGY  -----------                  Fungitell:   _______________________________________________  PERTINENT IMAGING        T(C): 36.7 (12-30-23 @ 14:20), Max: 37.9 (12-29-23 @ 19:48)  HR: 69 (12-30-23 @ 14:45) (68 - 92)  BP: 175/76 (12-30-23 @ 14:20) (137/70 - 175/76)  RR: 18 (12-30-23 @ 14:20) (18 - 19)  SpO2: 97% (12-30-23 @ 14:45) (95% - 100%)    CONSTITUTIONAL: Well groomed, no apparent distress  EYES: PERRLA and symmetric, EOMI, No conjunctival or scleral injection, non-icteric  ENMT: Oral mucosa with moist membranes. Normal dentition; no pharyngeal injection or exudates             NECK: Supple, symmetric and without tracheal deviation   RESP: bilateral base crackles  CV: RRR, +S1S2, no MRG; no JVD; no peripheral edema  GI: Soft, NT, ND, no rebound, no guarding; no palpable masses; no hepatosplenomegaly; no hernia palpated  LYMPH: No cervical LAD or tenderness; no axillary LAD or tenderness; no inguinal LAD or tenderness  MSK: Normal gait; No digital clubbing or cyanosis; examination of the (head/neck/spine/ribs/pelvis, RUE, LUE, RLE, LLE) without misalignment,            Normal ROM without pain, no spinal tenderness, normal muscle strength/tone  SKIN: No rashes or ulcers noted; no subcutaneous nodules or induration palpable  NEURO: non focal  Th[place] in place old AVF  72 y/o M with h/o ESRD, s/p renal transplant 2013 , DM, CAD s/p stents , ILD followed by pulmonology, FRANCOIS, HLD, HTN and BPH s/p recent TURP and [place] placement per patient on 12/27 presenting with 2-3 days of cough, lightheadedness, HA and myalgias and fever home.. on admission, afebrile, WBC stable at 8 with lymphopenia. COVID 19 +, UA with some27 WBC and 800 RBC in c/o [place], UC and BC sent. Started on remdesivir and zosyn. He denies dysuria, N/V flank pain, bladder pain. Denies diarrhea.     Originally from [place], no recent travel, no animal exposures      ____________________________________________________  ROS  GENERAL: denies chills, , night sweats, weight loss.   PSYCH: denies depression, anxiety, suicidal ideation, hallucination, and delusions  SKIN: no rash or lesions; no color changes, no abnormal nevi,no  dryness, and nojaundice    EYES: denies visual changes, floaters, pain, inflammation, blurred vision, and discharge  ENT: denies tinnitus, vertigo, epistaxis, oral lesion, and decreased acuity  PULM: denies, hemoptysis, pleurisy  CVS: denies angina, palpitations,+ orthopnea, no syncope, or heart murmur  GI: denies constipation, diarrhea, melena, abdominal pain, nausea.   : denies dysuria, frequency, discharge, incontinence, stones or macroscopic hematuria  MS: no arthralgias, no erythema or swelling, no myalgias, noedema, or lower back pain.   CNS: denies numbness, dizziness, seizure, or tremor  ENDO: denies heat/cold intolerance, polyuria, polydipsia, malaise.    HEME: denies bruising, bleeding, lymphadenopathy, anemia, and calf pain    Allergies  No Known Allergies    __________________________________________________  MEDS:  MEDICATIONS  (STANDING):  albuterol    90 MICROgram(s) HFA Inhaler 2 every 8 hours  allopurinol 100 daily  aspirin enteric coated 81 daily  atorvastatin 20 at bedtime  dextrose 50% Injectable 25 once  dextrose 50% Injectable 25 once  dextrose 50% Injectable 12.5 once  dextrose Oral Gel 15 once  enalapril 5 daily  gabapentin 100 daily  glucagon  Injectable 1 once  heparin   Injectable 5000 every 12 hours  labetalol 200 three times a day  montelukast 10 daily  mycophenolate mofetil 500 two times a day  NIFEdipine XL 90 daily  senna 2 at bedtime  tacrolimus 1 daily  tacrolimus 1.5 at bedtime  torsemide 10 daily    _________________________________________________  ANTIMICOBIALS  mycophenolate mofetil 500 two times a day  piperacillin/tazobactam IVPB.. 3.375 every 8 hours  piperacillin/tazobactam IVPB... 3.375 once  remdesivir  IVPB    remdesivir  IVPB 100 every 24 hours  tacrolimus 1 daily  tacrolimus 1.5 at bedtime      GENERAL LABS              x                    140  | 22   | 13           x     >-----------< x       ------------------------< 187                   x                    3.5  | 103  | 0.66                                         Ca 8.7   Mg x     Ph x          Urinalysis Basic - ( 30 Dec 2023 07:17 )    Color: x / Appearance: x / SG: x / pH: x  Gluc: 187 mg/dL / Ketone: x  / Bili: x / Urobili: x   Blood: x / Protein: x / Nitrite: x   Leuk Esterase: x / RBC: x / WBC x   Sq Epi: x / Non Sq Epi: x / Bacteria: x        _________________________________________________  MICROBIOLOGY  -----------                  Fungitell:   _______________________________________________  PERTINENT IMAGING        T(C): 36.7 (12-30-23 @ 14:20), Max: 37.9 (12-29-23 @ 19:48)  HR: 69 (12-30-23 @ 14:45) (68 - 92)  BP: 175/76 (12-30-23 @ 14:20) (137/70 - 175/76)  RR: 18 (12-30-23 @ 14:20) (18 - 19)  SpO2: 97% (12-30-23 @ 14:45) (95% - 100%)    CONSTITUTIONAL: Well groomed, no apparent distress  EYES: PERRLA and symmetric, EOMI, No conjunctival or scleral injection, non-icteric  ENMT: Oral mucosa with moist membranes. Normal dentition; no pharyngeal injection or exudates             NECK: Supple, symmetric and without tracheal deviation   RESP: bilateral base crackles  CV: RRR, +S1S2, no MRG; no JVD; no peripheral edema  GI: Soft, NT, ND, no rebound, no guarding; no palpable masses; no hepatosplenomegaly; no hernia palpated  LYMPH: No cervical LAD or tenderness; no axillary LAD or tenderness; no inguinal LAD or tenderness  MSK: Normal gait; No digital clubbing or cyanosis; examination of the (head/neck/spine/ribs/pelvis, RUE, LUE, RLE, LLE) without misalignment,            Normal ROM without pain, no spinal tenderness, normal muscle strength/tone  SKIN: No rashes or ulcers noted; no subcutaneous nodules or induration palpable  NEURO: non focal  Th[place] in place old AVF

## 2023-12-30 NOTE — CONSULT NOTE ADULT - SUBJECTIVE AND OBJECTIVE BOX
CHIEF COMPLAINT:    HPI:  72M w/ pmhx of FRANCOIS on CPAP, COPD w/ 50-pack-year smoking history, DM2 on insulin pump, hyperlipidemia, hypertension, CAD status post stent, TAVR, history of kidney transplant in 2013 on immunosuppresion, presenting with 2 days of cough, headache, light headedness. Pt tested positive twice at home for covid 19. Denies chest pain, nvd, sore throat.    vss on RA, no significant leukocytosis, eos, not coagulopathic, bicarb not elevated, with good kidney function, procal, trop neg, bnp 435, lower than in the past  7.37/22/70/13/97.0 lac 0.7, UA bloody and with WBC, positive for covid, CXR with bilateral basal disease   2022 ECHO with normal RV, moderate diastolic dysfunction, normal TAVR, severe sleep apnea, and also had normal PFTs  carries diagnosis of ILD and intermittently on steroids, bibasilar nodular disease     PAST MEDICAL & SURGICAL HISTORY:  HTN (Hypertension), Benign      Hyperlipidemia      Smoking      DM (diabetes mellitus), type 2      FRANCOIS on CPAP      H/O kidney transplant  2013      CAD (coronary artery disease)  1 stent      Kidney transplant recipient  Rt kidney- 2013      AV fistula      History of transcatheter aortic valve replacement (TAVR)      Personal history of spine surgery      Insulin pump status      H/O colonoscopy          FAMILY HISTORY:      SOCIAL HISTORY:  Smoking: [ ] Never Smoked [ ] Former Smoker (__ packs x ___ years) [ ] Current Smoker  (__ packs x ___ years)  Substance Use: [ ] Never Used [ ] Used ____  EtOH Use:  Marital Status: [ ] Single [ ]  [ ]  [ ]   Sexual History:   Occupation:  Recent Travel:  Country of Birth:  Advance Directives:    Allergies    No Known Allergies    Intolerances        HOME MEDICATIONS:  Home Medications:  Albuterol (Eqv-Proventil HFA) 90 mcg/inh inhalation aerosol: 2 puff(s) inhaled 4 times a day (10 Oct 2023 22:31)  alfuzosin 10 mg oral tablet, extended release: 1 tab(s) orally once a day (10 Oct 2023 22:32)  allopurinol 100 mg oral tablet: 1 tab(s) orally once a day at pm (10 Oct 2023 22:32)  Aspirin Enteric Coated 81 mg oral delayed release tablet: 1 tab(s) orally once a day (10 Oct 2023 22:32)  atorvastatin 20 mg oral tablet: 1 tab(s) orally once a day (10 Oct 2023 22:25)  CellCept 500 mg oral tablet: 2 tab(s) orally 2 times a day (10 Oct 2023 22:28)  enalapril 10 mg oral tablet: 1 tab(s) orally 3 times a day (10 Oct 2023 22:26)  gabapentin 100 mg oral tablet: orally once a day (at bedtime) (10 Oct 2023 22:29)  labetalol 200 mg oral tablet: 1 tab(s) orally 3 times a day (10 Oct 2023 22:32)  montelukast 10 mg oral tablet: 1 tab(s) orally once a day (10 Oct 2023 22:32)  Myrbetriq 50 mg oral tablet, extended release: 1 tab(s) orally once a day (at bedtime) (10 Oct 2023 22:27)  NIFEdipine 60 mg oral tablet, extended release: 1 tab(s) orally once a day (10 Oct 2023 22:32)  potassium chloride 20 mEq oral tablet, extended release: 1 tab(s) orally once a day (10 Oct 2023 22:32)  senna (sennosides) 12 mg oral tablet: 1 tab(s) orally once a day (at bedtime) (10 Oct 2023 22:30)  tacrolimus 1 mg oral capsule: 1 cap(s) orally once a day (10 Oct 2023 22:28)  tacrolimus 1 mg oral capsule: 1.5 cap(s) orally once a day (at bedtime) (10 Oct 2023 22:29)  torsemide 10 mg oral tablet: 1 tab(s) orally once a day (10 Oct 2023 22:29)      REVIEW OF SYSTEMS:  Constitutional: [ ] negative [ ] fevers [ ] chills [ ] weight loss [ ] weight gain  HEENT: [ ] negative [ ] dry eyes [ ] eye irritation [ ] postnasal drip [ ] nasal congestion  CV: [ ] negative  [ ] chest pain [ ] orthopnea [ ] palpitations [ ] murmur  Resp: [ ] negative [ ] cough [ ] shortness of breath [ ] dyspnea [ ] wheezing [ ] sputum [ ] hemoptysis  GI: [ ] negative [ ] nausea [ ] vomiting [ ] diarrhea [ ] constipation [ ] abd pain [ ] dysphagia   : [ ] negative [ ] dysuria [ ] nocturia [ ] hematuria [ ] increased urinary frequency  Musculoskeletal: [ ] negative [ ] back pain [ ] myalgias [ ] arthralgias [ ] fracture  Skin: [ ] negative [ ] rash [ ] itch  Neurological: [ ] negative [ ] headache [ ] dizziness [ ] syncope [ ] weakness [ ] numbness  Psychiatric: [ ] negative [ ] anxiety [ ] depression  Endocrine: [ ] negative [ ] diabetes [ ] thyroid problem  Hematologic/Lymphatic: [ ] negative [ ] anemia [ ] bleeding problem  Allergic/Immunologic: [ ] negative [ ] itchy eyes [ ] nasal discharge [ ] hives [ ] angioedema  [ ] All other systems negative  [ ] Unable to assess ROS because ________    OBJECTIVE:  ICU Vital Signs Last 24 Hrs  T(C): 37 (29 Dec 2023 23:51), Max: 37.9 (29 Dec 2023 19:48)  T(F): 98.6 (29 Dec 2023 23:51), Max: 100.2 (29 Dec 2023 19:48)  HR: 74 (30 Dec 2023 06:54) (68 - 80)  BP: 151/64 (29 Dec 2023 23:51) (144/67 - 155/89)  BP(mean): --  ABP: --  ABP(mean): --  RR: 18 (29 Dec 2023 23:51) (18 - 19)  SpO2: 97% (30 Dec 2023 06:54) (95% - 97%)    O2 Parameters below as of 29 Dec 2023 23:51  Patient On (Oxygen Delivery Method): room air              CAPILLARY BLOOD GLUCOSE      POCT Blood Glucose.: 75 mg/dL (29 Dec 2023 18:08)      PHYSICAL EXAM:  General: awake and alert, nontoxic appearing *** lying in bed  HEENT: NC/AT, EOMI b/l, conjunctiva normal, MMM  Lymph Nodes: no cervical LAD  Neck: supple. full range of motion  Respiratory: CTA b/l, no w/r/c, appears comfortable on ***, no conversational dyspnea or accessory muscle use  Cardiovascular: S1 S2 present, RRR, no m/r/g  Abdomen: soft, NT/ND, +BS  Extremities: no c/c/e  Skin: no rashes or lesions noted  Neurological: AAOx3, no focal deficits  Psychiatry: calm, cooperative    LINES:     HOSPITAL MEDICATIONS:  Standing Meds:  albuterol    90 MICROgram(s) HFA Inhaler 2 Puff(s) Inhalation every 8 hours  allopurinol 100 milliGRAM(s) Oral daily  aspirin enteric coated 81 milliGRAM(s) Oral daily  atorvastatin 20 milliGRAM(s) Oral at bedtime  chlorhexidine 2% Cloths 1 Application(s) Topical daily  enalapril 5 milliGRAM(s) Oral daily  gabapentin 100 milliGRAM(s) Oral daily  heparin   Injectable 5000 Unit(s) SubCutaneous every 12 hours  labetalol 200 milliGRAM(s) Oral three times a day  montelukast 10 milliGRAM(s) Oral daily  mycophenolate mofetil 500 milliGRAM(s) Oral two times a day  NIFEdipine XL 90 milliGRAM(s) Oral daily  piperacillin/tazobactam IVPB.. 3.375 Gram(s) IV Intermittent every 8 hours  piperacillin/tazobactam IVPB... 3.375 Gram(s) IV Intermittent once  remdesivir  IVPB   IV Intermittent   remdesivir  IVPB 100 milliGRAM(s) IV Intermittent every 24 hours  senna 2 Tablet(s) Oral at bedtime  tacrolimus 1 milliGRAM(s) Oral daily  tacrolimus 1.5 milliGRAM(s) Oral at bedtime  torsemide 10 milliGRAM(s) Oral daily      PRN Meds:      LABS:                        13.7   8.50  )-----------( 153      ( 29 Dec 2023 18:29 )             42.0     Hgb Trend: 13.7<--  12-29    142  |  108  |  18  ----------------------------<  112<H>  3.9   |  22  |  0.69    Ca    9.3      29 Dec 2023 18:29    TPro  6.8  /  Alb  3.7  /  TBili  0.8  /  DBili  x   /  AST  11  /  ALT  7<L>  /  AlkPhos  73  12-29    Creatinine Trend: 0.69<--  PT/INR - ( 29 Dec 2023 18:29 )   PT: 13.6 sec;   INR: 1.24 ratio         PTT - ( 29 Dec 2023 18:29 )  PTT:30.2 sec  Urinalysis Basic - ( 29 Dec 2023 18:43 )    Color: Dark Yellow / Appearance: Cloudy / SG: >1.030 / pH: x  Gluc: x / Ketone: Trace mg/dL  / Bili: Small / Urobili: 1.0 mg/dL   Blood: x / Protein: 300 mg/dL / Nitrite: Negative   Leuk Esterase: Small / RBC: 893 /HPF / WBC 32 /HPF   Sq Epi: x / Non Sq Epi: 2 /HPF / Bacteria: Negative /HPF        Venous Blood Gas:  12-29 @ 18:29  7.37/22/70/13/97.0  VBG Lactate: 0.7      MICROBIOLOGY:       RADIOLOGY:  [ ] Reviewed and interpreted by me    PULMONARY FUNCTION TESTS:    EKG:   CHIEF COMPLAINT:    HPI:  72M w/ pmhx of FRANCOIS on CPAP, COPD w/ 50-pack-year smoking history, DM2 on insulin pump, hyperlipidemia, hypertension, CAD status post stent, TAVR, history of kidney transplant in 2013 on immunosuppresion, presenting with 2 days of cough, headache, light headedness. Pt tested positive twice at home for covid 19. Denies chest pain, nvd, sore throat.    vss on RA, no significant leukocytosis, eos, not coagulopathic, bicarb not elevated, with good kidney function, procal, trop neg, bnp 435, lower than in the past  7.37/22/70/13/97.0 lac 0.7, UA bloody and with WBC, positive for covid, CXR with bilateral basal disease   2022 ECHO with normal RV, moderate diastolic dysfunction, normal TAVR, severe sleep apnea, and also had normal PFTs  carries diagnosis of ILD and intermittently on steroids, bibasilar nodular disease     patient with some weakness, however without increased cough, fevers, chill     PAST MEDICAL & SURGICAL HISTORY:  HTN (Hypertension), Benign      Hyperlipidemia      Smoking      DM (diabetes mellitus), type 2      FRANCOIS on CPAP      H/O kidney transplant  2013      CAD (coronary artery disease)  1 stent      Kidney transplant recipient  Rt kidney- 2013      AV fistula      History of transcatheter aortic valve replacement (TAVR)      Personal history of spine surgery      Insulin pump status      H/O colonoscopy          FAMILY HISTORY:      SOCIAL HISTORY:  active smoker    Allergies    No Known Allergies    Intolerances        HOME MEDICATIONS:  Home Medications:  Albuterol (Eqv-Proventil HFA) 90 mcg/inh inhalation aerosol: 2 puff(s) inhaled 4 times a day (10 Oct 2023 22:31)  alfuzosin 10 mg oral tablet, extended release: 1 tab(s) orally once a day (10 Oct 2023 22:32)  allopurinol 100 mg oral tablet: 1 tab(s) orally once a day at pm (10 Oct 2023 22:32)  Aspirin Enteric Coated 81 mg oral delayed release tablet: 1 tab(s) orally once a day (10 Oct 2023 22:32)  atorvastatin 20 mg oral tablet: 1 tab(s) orally once a day (10 Oct 2023 22:25)  CellCept 500 mg oral tablet: 2 tab(s) orally 2 times a day (10 Oct 2023 22:28)  enalapril 10 mg oral tablet: 1 tab(s) orally 3 times a day (10 Oct 2023 22:26)  gabapentin 100 mg oral tablet: orally once a day (at bedtime) (10 Oct 2023 22:29)  labetalol 200 mg oral tablet: 1 tab(s) orally 3 times a day (10 Oct 2023 22:32)  montelukast 10 mg oral tablet: 1 tab(s) orally once a day (10 Oct 2023 22:32)  Myrbetriq 50 mg oral tablet, extended release: 1 tab(s) orally once a day (at bedtime) (10 Oct 2023 22:27)  NIFEdipine 60 mg oral tablet, extended release: 1 tab(s) orally once a day (10 Oct 2023 22:32)  potassium chloride 20 mEq oral tablet, extended release: 1 tab(s) orally once a day (10 Oct 2023 22:32)  senna (sennosides) 12 mg oral tablet: 1 tab(s) orally once a day (at bedtime) (10 Oct 2023 22:30)  tacrolimus 1 mg oral capsule: 1 cap(s) orally once a day (10 Oct 2023 22:28)  tacrolimus 1 mg oral capsule: 1.5 cap(s) orally once a day (at bedtime) (10 Oct 2023 22:29)  torsemide 10 mg oral tablet: 1 tab(s) orally once a day (10 Oct 2023 22:29)      REVIEW OF SYSTEMS:      OBJECTIVE:  ICU Vital Signs Last 24 Hrs  T(C): 37 (29 Dec 2023 23:51), Max: 37.9 (29 Dec 2023 19:48)  T(F): 98.6 (29 Dec 2023 23:51), Max: 100.2 (29 Dec 2023 19:48)  HR: 74 (30 Dec 2023 06:54) (68 - 80)  BP: 151/64 (29 Dec 2023 23:51) (144/67 - 155/89)  BP(mean): --  ABP: --  ABP(mean): --  RR: 18 (29 Dec 2023 23:51) (18 - 19)  SpO2: 97% (30 Dec 2023 06:54) (95% - 97%)    O2 Parameters below as of 29 Dec 2023 23:51  Patient On (Oxygen Delivery Method): room air              CAPILLARY BLOOD GLUCOSE      POCT Blood Glucose.: 75 mg/dL (29 Dec 2023 18:08)      PHYSICAL EXAM:  General: awake and alert, nontoxic appearing *** lying in bed  HEENT: NC/AT, EOMI b/l, conjunctiva normal, MMM  Lymph Nodes: no cervical LAD  Neck: supple. full range of motion  Respiratory: CTA b/l, no w/r/c, appears comfortable on ***, no conversational dyspnea or accessory muscle use  Cardiovascular: S1 S2 present, RRR, no m/r/g  Abdomen: soft, NT/ND, +BS  Extremities: no c/c/e  Skin: no rashes or lesions noted  Neurological: AAOx3, no focal deficits  Psychiatry: calm, cooperative    LINES:     HOSPITAL MEDICATIONS:  Standing Meds:  albuterol    90 MICROgram(s) HFA Inhaler 2 Puff(s) Inhalation every 8 hours  allopurinol 100 milliGRAM(s) Oral daily  aspirin enteric coated 81 milliGRAM(s) Oral daily  atorvastatin 20 milliGRAM(s) Oral at bedtime  chlorhexidine 2% Cloths 1 Application(s) Topical daily  enalapril 5 milliGRAM(s) Oral daily  gabapentin 100 milliGRAM(s) Oral daily  heparin   Injectable 5000 Unit(s) SubCutaneous every 12 hours  labetalol 200 milliGRAM(s) Oral three times a day  montelukast 10 milliGRAM(s) Oral daily  mycophenolate mofetil 500 milliGRAM(s) Oral two times a day  NIFEdipine XL 90 milliGRAM(s) Oral daily  piperacillin/tazobactam IVPB.. 3.375 Gram(s) IV Intermittent every 8 hours  piperacillin/tazobactam IVPB... 3.375 Gram(s) IV Intermittent once  remdesivir  IVPB   IV Intermittent   remdesivir  IVPB 100 milliGRAM(s) IV Intermittent every 24 hours  senna 2 Tablet(s) Oral at bedtime  tacrolimus 1 milliGRAM(s) Oral daily  tacrolimus 1.5 milliGRAM(s) Oral at bedtime  torsemide 10 milliGRAM(s) Oral daily      PRN Meds:      LABS:                        13.7   8.50  )-----------( 153      ( 29 Dec 2023 18:29 )             42.0     Hgb Trend: 13.7<--  12-29    142  |  108  |  18  ----------------------------<  112<H>  3.9   |  22  |  0.69    Ca    9.3      29 Dec 2023 18:29    TPro  6.8  /  Alb  3.7  /  TBili  0.8  /  DBili  x   /  AST  11  /  ALT  7<L>  /  AlkPhos  73  12-29    Creatinine Trend: 0.69<--  PT/INR - ( 29 Dec 2023 18:29 )   PT: 13.6 sec;   INR: 1.24 ratio         PTT - ( 29 Dec 2023 18:29 )  PTT:30.2 sec  Urinalysis Basic - ( 29 Dec 2023 18:43 )    Color: Dark Yellow / Appearance: Cloudy / SG: >1.030 / pH: x  Gluc: x / Ketone: Trace mg/dL  / Bili: Small / Urobili: 1.0 mg/dL   Blood: x / Protein: 300 mg/dL / Nitrite: Negative   Leuk Esterase: Small / RBC: 893 /HPF / WBC 32 /HPF   Sq Epi: x / Non Sq Epi: 2 /HPF / Bacteria: Negative /HPF        Venous Blood Gas:  12-29 @ 18:29  7.37/22/70/13/97.0  VBG Lactate: 0.7      MICROBIOLOGY:       RADIOLOGY:  [ ] Reviewed and interpreted by me    PULMONARY FUNCTION TESTS:    EKG:   CHIEF COMPLAINT:    HPI:  72M w/ pmhx of FRANCOIS on CPAP, COPD w/ 50-pack-year smoking history, DM2 on insulin pump, hyperlipidemia, hypertension, CAD status post stent, TAVR, history of kidney transplant in 2013 on immunosuppresion, presenting with 2 days of cough, headache, light headedness. Pt tested positive twice at home for covid 19. Denies chest pain, nvd, sore throat.    vss on RA, no significant leukocytosis, eos, not coagulopathic, bicarb not elevated, with good kidney function, procal, trop neg, bnp 435, lower than in the past  7.37/22/70/13/97.0 lac 0.7, UA bloody and with WBC, positive for covid, CXR with bilateral basal disease   2022 ECHO with normal RV, moderate diastolic dysfunction, normal TAVR, severe sleep apnea, and also had normal PFTs  carries diagnosis of ILD and intermittently on steroids, bibasilar nodular disease     patient with some weakness, however without increased cough, fevers, chill     PAST MEDICAL & SURGICAL HISTORY:  HTN (Hypertension), Benign      Hyperlipidemia      Smoking      DM (diabetes mellitus), type 2      FRANCOIS on CPAP      H/O kidney transplant  2013      CAD (coronary artery disease)  1 stent      Kidney transplant recipient  Rt kidney- 2013      AV fistula      History of transcatheter aortic valve replacement (TAVR)      Personal history of spine surgery      Insulin pump status      H/O colonoscopy          FAMILY HISTORY:      SOCIAL HISTORY:  active smoker    Allergies    No Known Allergies    Intolerances        HOME MEDICATIONS:  Home Medications:  Albuterol (Eqv-Proventil HFA) 90 mcg/inh inhalation aerosol: 2 puff(s) inhaled 4 times a day (10 Oct 2023 22:31)  alfuzosin 10 mg oral tablet, extended release: 1 tab(s) orally once a day (10 Oct 2023 22:32)  allopurinol 100 mg oral tablet: 1 tab(s) orally once a day at pm (10 Oct 2023 22:32)  Aspirin Enteric Coated 81 mg oral delayed release tablet: 1 tab(s) orally once a day (10 Oct 2023 22:32)  atorvastatin 20 mg oral tablet: 1 tab(s) orally once a day (10 Oct 2023 22:25)  CellCept 500 mg oral tablet: 2 tab(s) orally 2 times a day (10 Oct 2023 22:28)  enalapril 10 mg oral tablet: 1 tab(s) orally 3 times a day (10 Oct 2023 22:26)  gabapentin 100 mg oral tablet: orally once a day (at bedtime) (10 Oct 2023 22:29)  labetalol 200 mg oral tablet: 1 tab(s) orally 3 times a day (10 Oct 2023 22:32)  montelukast 10 mg oral tablet: 1 tab(s) orally once a day (10 Oct 2023 22:32)  Myrbetriq 50 mg oral tablet, extended release: 1 tab(s) orally once a day (at bedtime) (10 Oct 2023 22:27)  NIFEdipine 60 mg oral tablet, extended release: 1 tab(s) orally once a day (10 Oct 2023 22:32)  potassium chloride 20 mEq oral tablet, extended release: 1 tab(s) orally once a day (10 Oct 2023 22:32)  senna (sennosides) 12 mg oral tablet: 1 tab(s) orally once a day (at bedtime) (10 Oct 2023 22:30)  tacrolimus 1 mg oral capsule: 1 cap(s) orally once a day (10 Oct 2023 22:28)  tacrolimus 1 mg oral capsule: 1.5 cap(s) orally once a day (at bedtime) (10 Oct 2023 22:29)  torsemide 10 mg oral tablet: 1 tab(s) orally once a day (10 Oct 2023 22:29)      REVIEW OF SYSTEMS:      OBJECTIVE:  ICU Vital Signs Last 24 Hrs  T(C): 37 (29 Dec 2023 23:51), Max: 37.9 (29 Dec 2023 19:48)  T(F): 98.6 (29 Dec 2023 23:51), Max: 100.2 (29 Dec 2023 19:48)  HR: 74 (30 Dec 2023 06:54) (68 - 80)  BP: 151/64 (29 Dec 2023 23:51) (144/67 - 155/89)  BP(mean): --  ABP: --  ABP(mean): --  RR: 18 (29 Dec 2023 23:51) (18 - 19)  SpO2: 97% (30 Dec 2023 06:54) (95% - 97%)    O2 Parameters below as of 29 Dec 2023 23:51  Patient On (Oxygen Delivery Method): room air              CAPILLARY BLOOD GLUCOSE      POCT Blood Glucose.: 75 mg/dL (29 Dec 2023 18:08)      PHYSICAL EXAM:  General: awake and alert, nontoxic appearing *** lying in bed  HEENT: NC/AT, EOMI b/l, conjunctiva normal, MMM  Lymph Nodes: no cervical LAD  Neck: supple. full range of motion  Respiratory: crackles and rhonchi, no conversational dyspnea or accessory muscle use  Cardiovascular: S1 S2 present, RRR, no m/r/g  Abdomen: soft, NT/ND, +BS  Extremities: no c/c/e  Skin: no rashes or lesions noted  Neurological: AAOx3, no focal deficits  Psychiatry: calm, cooperative    LINES:     HOSPITAL MEDICATIONS:  Standing Meds:  albuterol    90 MICROgram(s) HFA Inhaler 2 Puff(s) Inhalation every 8 hours  allopurinol 100 milliGRAM(s) Oral daily  aspirin enteric coated 81 milliGRAM(s) Oral daily  atorvastatin 20 milliGRAM(s) Oral at bedtime  chlorhexidine 2% Cloths 1 Application(s) Topical daily  enalapril 5 milliGRAM(s) Oral daily  gabapentin 100 milliGRAM(s) Oral daily  heparin   Injectable 5000 Unit(s) SubCutaneous every 12 hours  labetalol 200 milliGRAM(s) Oral three times a day  montelukast 10 milliGRAM(s) Oral daily  mycophenolate mofetil 500 milliGRAM(s) Oral two times a day  NIFEdipine XL 90 milliGRAM(s) Oral daily  piperacillin/tazobactam IVPB.. 3.375 Gram(s) IV Intermittent every 8 hours  piperacillin/tazobactam IVPB... 3.375 Gram(s) IV Intermittent once  remdesivir  IVPB   IV Intermittent   remdesivir  IVPB 100 milliGRAM(s) IV Intermittent every 24 hours  senna 2 Tablet(s) Oral at bedtime  tacrolimus 1 milliGRAM(s) Oral daily  tacrolimus 1.5 milliGRAM(s) Oral at bedtime  torsemide 10 milliGRAM(s) Oral daily      PRN Meds:      LABS:                        13.7   8.50  )-----------( 153      ( 29 Dec 2023 18:29 )             42.0     Hgb Trend: 13.7<--  12-29    142  |  108  |  18  ----------------------------<  112<H>  3.9   |  22  |  0.69    Ca    9.3      29 Dec 2023 18:29    TPro  6.8  /  Alb  3.7  /  TBili  0.8  /  DBili  x   /  AST  11  /  ALT  7<L>  /  AlkPhos  73  12-29    Creatinine Trend: 0.69<--  PT/INR - ( 29 Dec 2023 18:29 )   PT: 13.6 sec;   INR: 1.24 ratio         PTT - ( 29 Dec 2023 18:29 )  PTT:30.2 sec  Urinalysis Basic - ( 29 Dec 2023 18:43 )    Color: Dark Yellow / Appearance: Cloudy / SG: >1.030 / pH: x  Gluc: x / Ketone: Trace mg/dL  / Bili: Small / Urobili: 1.0 mg/dL   Blood: x / Protein: 300 mg/dL / Nitrite: Negative   Leuk Esterase: Small / RBC: 893 /HPF / WBC 32 /HPF   Sq Epi: x / Non Sq Epi: 2 /HPF / Bacteria: Negative /HPF        Venous Blood Gas:  12-29 @ 18:29  7.37/22/70/13/97.0  VBG Lactate: 0.7      MICROBIOLOGY:       RADIOLOGY:  [ ] Reviewed and interpreted by me    PULMONARY FUNCTION TESTS:    EKG:

## 2023-12-30 NOTE — CONSULT NOTE ADULT - ASSESSMENT
72M w/ pmhx of FRANCOIS on CPAP, COPD w/ 50-pack-year smoking history, DM2 on insulin pump, hyperlipidemia, hypertension, CAD status post stent, TAVR, history of kidney transplant in  on immunosuppresion, presenting with 2 days of cough, headache, light headedness. Pt tested positive twice at home for covid 19. Denies chest pain, nvd, sore throat.    vss on RA, no significant leukocytosis, eos, not coagulopathic, bicarb not elevated, with good kidney function, procal, trop neg, bnp 435, lower than in the past  7.37/22/70/13/97.0 lac 0.7, UA bloody and with WBC, positive for covid, CXR with bilateral basal disease    ECHO with normal RV, moderate diastolic dysfunction, normal TAVR, severe sleep apnea, and also had normal PFTs  carries diagnosis of ILD and intermittently on steroids, bibasilar nodular disease     patient with some weakness, however without increased cough, fevers, chill     #recommendations  please obtain  full RVP, sputum culture, blood cultures, U/A, urine legionella, nasal MRSA  pro BNP, ECHO with bubble study if considered by cardiology team   CT chest with new lung nodules and fibrosis which will need outpatient follow up,  please obtain serum fungitell, galactomannin, sputum PCP  defer abx and COVID treatment to ID  titrate oxygen to O2 >88%, use humidified oxygen  incentive spirometry, OOB to chair, PT/OT  aspiration precautions as necessary  DVT ppx as tolerated  airway clearance as necessary    Prior to discharge:  Please email: [email or web address] to setup an appointment prior to discharge. Include the patient's name, , MRN and contact information in the email.      P[facility]  [address] [place] [address]  [phone]    Earnest Ritchie PGY5 PCCM Fellow 72M w/ pmhx of FRANCOIS on CPAP, COPD w/ 50-pack-year smoking history, DM2 on insulin pump, hyperlipidemia, hypertension, CAD status post stent, TAVR, history of kidney transplant in  on immunosuppresion, presenting with 2 days of cough, headache, light headedness. Pt tested positive twice at home for covid 19. Denies chest pain, nvd, sore throat.    vss on RA, no significant leukocytosis, eos, not coagulopathic, bicarb not elevated, with good kidney function, procal, trop neg, bnp 435, lower than in the past  7.37/22/70/13/97.0 lac 0.7, UA bloody and with WBC, positive for covid, CXR with bilateral basal disease    ECHO with normal RV, moderate diastolic dysfunction, normal TAVR, severe sleep apnea, and also had normal PFTs  carries diagnosis of ILD and intermittently on steroids, bibasilar nodular disease     patient with some weakness, however without increased cough, fevers, chill     #recommendations  please obtain  full RVP, sputum culture, blood cultures, U/A, urine legionella, nasal MRSA  pro BNP, ECHO with bubble study if considered by cardiology team   CT chest with new lung nodules and fibrosis which will need outpatient follow up,  please obtain serum fungitell, galactomannin, sputum PCP  defer abx and COVID treatment to ID  titrate oxygen to O2 >88%, use humidified oxygen  incentive spirometry, OOB to chair, PT/OT  aspiration precautions as necessary  DVT ppx as tolerated  airway clearance as necessary    Prior to discharge:  Please email: [email or web address] to setup an appointment prior to discharge. Include the patient's name, , MRN and contact information in the email.      P[facility]Sleep Clinic  [address] [place] [address]  [phone]    Earnest Ritchie PGY5 PCCM Fellow

## 2023-12-30 NOTE — DISCHARGE NOTE PROVIDER - NSDCMRMEDTOKEN_GEN_ALL_CORE_FT
Albuterol (Eqv-Proventil HFA) 90 mcg/inh inhalation aerosol: 2 puff(s) inhaled 4 times a day  alfuzosin 10 mg oral tablet, extended release: 1 tab(s) orally once a day  allopurinol 100 mg oral tablet: 1 tab(s) orally once a day at pm  Aspirin Enteric Coated 81 mg oral delayed release tablet: 1 tab(s) orally once a day  atorvastatin 20 mg oral tablet: 1 tab(s) orally once a day  cefuroxime 500 mg oral tablet: 1 tab(s) orally every 12 hours  CellCept 500 mg oral tablet: 2 tab(s) orally 2 times a day  enalapril 10 mg oral tablet: 1 tab(s) orally 3 times a day  gabapentin 100 mg oral tablet: orally once a day (at bedtime)  labetalol 200 mg oral tablet: 1 tab(s) orally 3 times a day  montelukast 10 mg oral tablet: 1 tab(s) orally once a day  mycophenolate mofetil 500 mg oral tablet: 2 tab(s) orally 2 times a day  Myrbetriq 50 mg oral tablet, extended release: 1 tab(s) orally once a day (at bedtime)  NIFEdipine 60 mg oral tablet, extended release: 1 tab(s) orally once a day  Ozempic (1 mg dose) 4 mg/3 mL subcutaneous solution: 0.5 milligram(s) subcutaneously once a week   potassium chloride 20 mEq oral tablet, extended release: 1 tab(s) orally once a day  predniSONE 10 mg oral tablet: 1 tab(s) orally once a day  senna (sennosides) 12 mg oral tablet: 1 tab(s) orally once a day (at bedtime)  tacrolimus 1 mg oral capsule: 1 cap(s) orally once a day  tacrolimus 1 mg oral capsule: 1.5 cap(s) orally once a day (at bedtime)  torsemide 10 mg oral tablet: 1 tab(s) orally once a day   Albuterol (Eqv-Proventil HFA) 90 mcg/inh inhalation aerosol: 2 puff(s) inhaled 4 times a day  allopurinol 100 mg oral tablet: 1 tab(s) orally once a day at pm  Aspirin Enteric Coated 81 mg oral delayed release tablet: 1 tab(s) orally once a day  atorvastatin 20 mg oral tablet: 1 tab(s) orally once a day  CellCept 500 mg oral tablet: 1 tab(s) orally 2 times a day  enalapril 10 mg oral tablet: 1 tab(s) orally once a day  gabapentin 100 mg oral tablet: orally once a day (at bedtime)  labetalol 200 mg oral tablet: 1 tab(s) orally 3 times a day  levoFLOXacin 750 mg oral tablet: 1 tab(s) orally every 24 hours  montelukast 10 mg oral tablet: 1 tab(s) orally once a day  NIFEdipine 90 mg oral tablet, extended release: 1 tab(s) orally once a day can give home regimen of 30mg 2 tabs in am, 1 tab in PM  Ozempic (1 mg dose) 4 mg/3 mL subcutaneous solution: 0.5 milligram(s) subcutaneously once a week   senna (sennosides) 12 mg oral tablet: 1 tab(s) orally once a day (at bedtime)  tacrolimus 1 mg oral capsule: 1 cap(s) orally once a day  tacrolimus 1 mg oral capsule: 1.5 cap(s) orally once a day (at bedtime)  torsemide 10 mg oral tablet: 1 tab(s) orally once a day

## 2023-12-30 NOTE — CONSULT NOTE ADULT - SUBJECTIVE AND OBJECTIVE BOX
Samuel Stockton MD   |   PGY-4  Endocrinology Fellow  Available on [employer] Teams    HPI:  : [id number]      w/ pmhx of FRANCOIS on CPAP, COPD w/ 50-pack-year smoking history, DM2 on insulin pump, hyperlipidemia, hypertension, CAD status post stent, TAVR     history of kidney transplant in 2013 on immunosuppresion    s[facility]    [facility]  [facility]  [facility],        presenting with 2 days of cough, headache, light headedness.      Pt tested positive twice at home for covid 19     denies chest pain, nvd, sore throat./  has  been  taking  tylenol (29 Dec 2023 18:39)      Endocrine History:  --Type of DM: T2DM    --Diagnosed at age: >20 years, on insulin pump for 7 years    --Endocrinologist: Dr Jyoti Byrd    --Recent A1c: 5.4% in 10/2023   --Type of pump: Medtronic pump w/ novolog + Ozempic 0.5mg weekly    --Compliant with pump: yes    --Who manages pump: himself    --Lives with: wife     --CGM: Does not use   --Glucose levels at home: 90s-130s   --hypoglycemia: no    --Is pt comfortable on pump while in the hospital? yes   --Last time pump was changed: Thursday 12/28/23   --How often does patient change pump: every 3 days    --Does patient have enough supplies: Yes   --Does patient carb count/bolus: Yes      Insulin pump settings:  Basal:  12AM-5AM 0.6U/hr    5AM-12AM 0.7U/hr    TDD is 16.3U         ICR:  12AM-11PM 15g/u    11PM-12AM 20g/u      ISF: 50mg/dl all day    Glucose target       --Inpatient diet: CC   --Steroids: Not on    --appetite? good   --Microvascular complications: +nephropathy,  - neuropathy, - retinopathy    --Macrovascular complications: +CAD    --Statin:  Atorvastatin 20mg at home    --ACE/ARB: Enalapril 10mg at home    --GFR: 98     PAST MEDICAL & SURGICAL HISTORY:  HTN (Hypertension), Benign      Hyperlipidemia      Smoking      DM (diabetes mellitus), type 2      FRANCOIS on CPAP      H/O kidney transplant  2013      CAD (coronary artery disease)  1 stent      Kidney transplant recipient  Rt kidney- 2013      AV fistula      History of transcatheter aortic valve replacement (TAVR)      Personal history of spine surgery      Insulin pump status      H/O colonoscopy          FAMILY HISTORY:      Home Medications:  Albuterol (Eqv-Proventil HFA) 90 mcg/inh inhalation aerosol: 2 puff(s) inhaled 4 times a day (10 Oct 2023 22:31)  alfuzosin 10 mg oral tablet, extended release: 1 tab(s) orally once a day (10 Oct 2023 22:32)  allopurinol 100 mg oral tablet: 1 tab(s) orally once a day at pm (10 Oct 2023 22:32)  Aspirin Enteric Coated 81 mg oral delayed release tablet: 1 tab(s) orally once a day (10 Oct 2023 22:32)  atorvastatin 20 mg oral tablet: 1 tab(s) orally once a day (10 Oct 2023 22:25)  CellCept 500 mg oral tablet: 2 tab(s) orally 2 times a day (10 Oct 2023 22:28)  enalapril 10 mg oral tablet: 1 tab(s) orally 3 times a day (10 Oct 2023 22:26)  gabapentin 100 mg oral tablet: orally once a day (at bedtime) (10 Oct 2023 22:29)  labetalol 200 mg oral tablet: 1 tab(s) orally 3 times a day (10 Oct 2023 22:32)  montelukast 10 mg oral tablet: 1 tab(s) orally once a day (10 Oct 2023 22:32)  Myrbetriq 50 mg oral tablet, extended release: 1 tab(s) orally once a day (at bedtime) (10 Oct 2023 22:27)  NIFEdipine 60 mg oral tablet, extended release: 1 tab(s) orally once a day (10 Oct 2023 22:32)  potassium chloride 20 mEq oral tablet, extended release: 1 tab(s) orally once a day (10 Oct 2023 22:32)  senna (sennosides) 12 mg oral tablet: 1 tab(s) orally once a day (at bedtime) (10 Oct 2023 22:30)  tacrolimus 1 mg oral capsule: 1 cap(s) orally once a day (10 Oct 2023 22:28)  tacrolimus 1 mg oral capsule: 1.5 cap(s) orally once a day (at bedtime) (10 Oct 2023 22:29)  torsemide 10 mg oral tablet: 1 tab(s) orally once a day (10 Oct 2023 22:29)      MEDICATIONS  (STANDING):  albuterol    90 MICROgram(s) HFA Inhaler 2 Puff(s) Inhalation every 8 hours  allopurinol 100 milliGRAM(s) Oral daily  aspirin enteric coated 81 milliGRAM(s) Oral daily  atorvastatin 20 milliGRAM(s) Oral at bedtime  chlorhexidine 2% Cloths 1 Application(s) Topical daily  dextrose 50% Injectable 25 Gram(s) IV Push once  dextrose 50% Injectable 25 Gram(s) IV Push once  dextrose 50% Injectable 12.5 Gram(s) IV Push once  dextrose Oral Gel 15 Gram(s) Oral once  enalapril 10 milliGRAM(s) Oral daily  gabapentin 100 milliGRAM(s) Oral daily  glucagon  Injectable 1 milliGRAM(s) IntraMuscular once  heparin   Injectable 5000 Unit(s) SubCutaneous every 12 hours  insulin aspart (NovoLOG) Pump 1 Each SubCutaneous Continuous Pump  labetalol 200 milliGRAM(s) Oral three times a day  montelukast 10 milliGRAM(s) Oral daily  mycophenolate mofetil 500 milliGRAM(s) Oral two times a day  NIFEdipine XL 90 milliGRAM(s) Oral daily  piperacillin/tazobactam IVPB.. 3.375 Gram(s) IV Intermittent every 8 hours  piperacillin/tazobactam IVPB... 3.375 Gram(s) IV Intermittent once  remdesivir  IVPB 100 milliGRAM(s) IV Intermittent every 24 hours  remdesivir  IVPB   IV Intermittent   senna 2 Tablet(s) Oral at bedtime  tacrolimus 1 milliGRAM(s) Oral daily  tacrolimus 1.5 milliGRAM(s) Oral at bedtime  torsemide 10 milliGRAM(s) Oral daily    MEDICATIONS  (PRN):      Allergies    No Known Allergies    Intolerances      Review of Systems:  Constitutional: No fever  Eyes: No blurry vision  Neuro: No tremors  HEENT: No pain  Cardiovascular: No chest pain, palpitations  Respiratory: No SOB, no cough  GI: No nausea, vomiting, abdominal pain  : No dysuria  Skin: no rash  Psych: no depression  Endocrine: no polyuria, polydipsia  Hem/lymph: no swelling  Osteoporosis: no fractures    ===================PHYSICAL EXAM=======================  VITALS: T(C): 36.7 (12-30-23 @ 15:35)  T(F): 98 (12-30-23 @ 15:35), Max: 100.2 (12-29-23 @ 19:48)  HR: 73 (12-30-23 @ 15:35) (69 - 92)  BP: 151/64 (12-30-23 @ 15:35) (137/70 - 175/76)  RR:  (18 - 19)  SpO2:  (95% - 100%)  Wt(kg): --  GENERAL: NAD  EYES: No proptosis, no lid lag, anicteric  THYROID: Normal size, no palpable nodules  RESPIRATORY: Clear to auscultation bilaterally  CARDIOVASCULAR: Regular rate and rhythm  GI: Soft, nontender, non distended  EXT: b/l feet without wounds; 2+ pulses  PSYCH: Alert and oriented x 3, reactive mood  ======================================================  POCT Blood Glucose.: 105 mg/dL (12-30-23 @ 16:24)  POCT Blood Glucose.: 118 mg/dL (12-30-23 @ 12:29)  POCT Blood Glucose.: 75 mg/dL (12-29-23 @ 18:08)                            13.7   8.50  )-----------( 153      ( 29 Dec 2023 18:29 )             42.0       12-30    140  |  103  |  13  ----------------------------<  187<H>  3.5   |  22  |  0.66    eGFR: 99    Ca    8.7      12-30    TPro  6.6  /  Alb  3.6  /  TBili  0.6  /  DBili  0.2  /  AST  13  /  ALT  <5<L>  /  AlkPhos  69  12-30      Thyroid Function Tests:      A1C with Estimated Average Glucose Result: 5.3 % (12-30-23 @ 07:17)  A1C with Estimated Average Glucose Result: 5.4 % (10-11-23 @ 07:14)  A1C with Estimated Average Glucose Result: 5.5 % (05-22-23 @ 18:45)  A1C with Estimated Average Glucose Result: 5.5 % (04-17-23 @ 12:46)          Radiology:              Samuel Stockton MD   |   PGY-4  Endocrinology Fellow  Available on [employer] Teams    HPI:  : [id number]      w/ pmhx of FRANCOIS on CPAP, COPD w/ 50-pack-year smoking history, DM2 on insulin pump, hyperlipidemia, hypertension, CAD status post stent, TAVR     history of kidney transplant in 2013 on immunosuppresion    s[facility]    [facility]  [facility]  [facility],        presenting with 2 days of cough, headache, light headedness.      Pt tested positive twice at home for covid 19     denies chest pain, nvd, sore throat./  has  been  taking  tylenol (29 Dec 2023 18:39)      Endocrine History:  --Type of DM: T2DM    --Diagnosed at age: >20 years, on insulin pump for 7 years    --Endocrinologist: Dr Jyoti Byrd    --Recent A1c: 5.4% in 10/2023   --Type of pump: Medtronic pump w/ novolog + Ozempic 0.5mg weekly    --Compliant with pump: yes    --Who manages pump: himself    --Lives with: wife     --CGM: Does not use   --Glucose levels at home: 90s-130s   --hypoglycemia: no    --Is pt comfortable on pump while in the hospital? yes   --Last time pump was changed: Thursday 12/28/23   --How often does patient change pump: every 3 days    --Does patient have enough supplies: Yes   --Does patient carb count/bolus: Yes      Insulin pump settings:  Basal:  12AM-5AM 0.6U/hr    5AM-12AM 0.7U/hr    TDD is 16.3U         ICR:  12AM-11PM 15g/u    11PM-12AM 20g/u      ISF: 50mg/dl all day    Glucose target       --Inpatient diet: CC   --Steroids: Not on    --appetite? good   --Microvascular complications: +nephropathy,  - neuropathy, - retinopathy    --Macrovascular complications: +CAD    --Statin:  Atorvastatin 20mg at home    --ACE/ARB: Enalapril 10mg at home    --GFR: 98     PAST MEDICAL & SURGICAL HISTORY:  HTN (Hypertension), Benign      Hyperlipidemia      Smoking      DM (diabetes mellitus), type 2      FRANCOIS on CPAP      H/O kidney transplant  2013      CAD (coronary artery disease)  1 stent      Kidney transplant recipient  Rt kidney- 2013      AV fistula      History of transcatheter aortic valve replacement (TAVR)      Personal history of spine surgery      Insulin pump status      H/O colonoscopy          FAMILY HISTORY:      Home Medications:  Albuterol (Eqv-Proventil HFA) 90 mcg/inh inhalation aerosol: 2 puff(s) inhaled 4 times a day (10 Oct 2023 22:31)  alfuzosin 10 mg oral tablet, extended release: 1 tab(s) orally once a day (10 Oct 2023 22:32)  allopurinol 100 mg oral tablet: 1 tab(s) orally once a day at pm (10 Oct 2023 22:32)  Aspirin Enteric Coated 81 mg oral delayed release tablet: 1 tab(s) orally once a day (10 Oct 2023 22:32)  atorvastatin 20 mg oral tablet: 1 tab(s) orally once a day (10 Oct 2023 22:25)  CellCept 500 mg oral tablet: 2 tab(s) orally 2 times a day (10 Oct 2023 22:28)  enalapril 10 mg oral tablet: 1 tab(s) orally 3 times a day (10 Oct 2023 22:26)  gabapentin 100 mg oral tablet: orally once a day (at bedtime) (10 Oct 2023 22:29)  labetalol 200 mg oral tablet: 1 tab(s) orally 3 times a day (10 Oct 2023 22:32)  montelukast 10 mg oral tablet: 1 tab(s) orally once a day (10 Oct 2023 22:32)  Myrbetriq 50 mg oral tablet, extended release: 1 tab(s) orally once a day (at bedtime) (10 Oct 2023 22:27)  NIFEdipine 60 mg oral tablet, extended release: 1 tab(s) orally once a day (10 Oct 2023 22:32)  potassium chloride 20 mEq oral tablet, extended release: 1 tab(s) orally once a day (10 Oct 2023 22:32)  senna (sennosides) 12 mg oral tablet: 1 tab(s) orally once a day (at bedtime) (10 Oct 2023 22:30)  tacrolimus 1 mg oral capsule: 1 cap(s) orally once a day (10 Oct 2023 22:28)  tacrolimus 1 mg oral capsule: 1.5 cap(s) orally once a day (at bedtime) (10 Oct 2023 22:29)  torsemide 10 mg oral tablet: 1 tab(s) orally once a day (10 Oct 2023 22:29)      MEDICATIONS  (STANDING):  albuterol    90 MICROgram(s) HFA Inhaler 2 Puff(s) Inhalation every 8 hours  allopurinol 100 milliGRAM(s) Oral daily  aspirin enteric coated 81 milliGRAM(s) Oral daily  atorvastatin 20 milliGRAM(s) Oral at bedtime  chlorhexidine 2% Cloths 1 Application(s) Topical daily  dextrose 50% Injectable 25 Gram(s) IV Push once  dextrose 50% Injectable 25 Gram(s) IV Push once  dextrose 50% Injectable 12.5 Gram(s) IV Push once  dextrose Oral Gel 15 Gram(s) Oral once  enalapril 10 milliGRAM(s) Oral daily  gabapentin 100 milliGRAM(s) Oral daily  glucagon  Injectable 1 milliGRAM(s) IntraMuscular once  heparin   Injectable 5000 Unit(s) SubCutaneous every 12 hours  insulin aspart (NovoLOG) Pump 1 Each SubCutaneous Continuous Pump  labetalol 200 milliGRAM(s) Oral three times a day  montelukast 10 milliGRAM(s) Oral daily  mycophenolate mofetil 500 milliGRAM(s) Oral two times a day  NIFEdipine XL 90 milliGRAM(s) Oral daily  piperacillin/tazobactam IVPB.. 3.375 Gram(s) IV Intermittent every 8 hours  piperacillin/tazobactam IVPB... 3.375 Gram(s) IV Intermittent once  remdesivir  IVPB 100 milliGRAM(s) IV Intermittent every 24 hours  remdesivir  IVPB   IV Intermittent   senna 2 Tablet(s) Oral at bedtime  tacrolimus 1 milliGRAM(s) Oral daily  tacrolimus 1.5 milliGRAM(s) Oral at bedtime  torsemide 10 milliGRAM(s) Oral daily    MEDICATIONS  (PRN):      Allergies    No Known Allergies    Intolerances      Review of Systems:  Constitutional: No fever  Eyes: No blurry vision  Neuro: No tremors  HEENT: No pain  Cardiovascular: No chest pain, palpitations  Respiratory: No SOB, no cough  GI: No nausea, vomiting, abdominal pain  : No dysuria  Skin: no rash  Psych: no depression  Endocrine: no polyuria, polydipsia  Hem/lymph: no swelling  Osteoporosis: no fractures    ===================PHYSICAL EXAM=======================  VITALS: T(C): 36.7 (12-30-23 @ 15:35)  T(F): 98 (12-30-23 @ 15:35), Max: 100.2 (12-29-23 @ 19:48)  HR: 73 (12-30-23 @ 15:35) (69 - 92)  BP: 151/64 (12-30-23 @ 15:35) (137/70 - 175/76)  RR:  (18 - 19)  SpO2:  (95% - 100%)  Wt(kg): --  GENERAL: NAD  EYES: No proptosis, no lid lag, anicteric  THYROID: Normal size, no palpable nodules  RESPIRATORY: Clear to auscultation bilaterally  CARDIOVASCULAR: Regular rate and rhythm  GI: Soft, nontender, non distended  EXT: b/l feet without wounds; 2+ pulses  PSYCH: Alert and oriented x 3, reactive mood  ======================================================  POCT Blood Glucose.: 105 mg/dL (12-30-23 @ 16:24)  POCT Blood Glucose.: 118 mg/dL (12-30-23 @ 12:29)  POCT Blood Glucose.: 75 mg/dL (12-29-23 @ 18:08)                            13.7   8.50  )-----------( 153      ( 29 Dec 2023 18:29 )             42.0       12-30    140  |  103  |  13  ----------------------------<  187<H>  3.5   |  22  |  0.66    eGFR: 99    Ca    8.7      12-30    TPro  6.6  /  Alb  3.6  /  TBili  0.6  /  DBili  0.2  /  AST  13  /  ALT  <5<L>  /  AlkPhos  69  12-30      Thyroid Function Tests:      A1C with Estimated Average Glucose Result: 5.3 % (12-30-23 @ 07:17)  A1C with Estimated Average Glucose Result: 5.4 % (10-11-23 @ 07:14)  A1C with Estimated Average Glucose Result: 5.5 % (05-22-23 @ 18:45)  A1C with Estimated Average Glucose Result: 5.5 % (04-17-23 @ 12:46)          Radiology:

## 2023-12-30 NOTE — DISCHARGE NOTE PROVIDER - NSFOLLOWUPCLINICS_GEN_ALL_ED_FT
[facility] - Infectious Disease  Infectious Disease  [address], [place] [address]  Phone: [phone]  Fax:   Follow Up Time: 1 week     [facility] Infectious Disease  Infectious Disease  [address], [place] [address]  Phone: [phone]  Fax:   Follow Up Time: 1 week

## 2023-12-30 NOTE — CONSULT NOTE ADULT - TIME BILLING
face-to-face encounter, review of extensive medical records in this and prior charts, laboratory findings, radiographic and microbiology results; documentation as noted above and discussion of diagnostic impressions and plan with the patient and team
review of laboratory data, radiology results, discussion with primary team\patient, and monitoring for potential decompensation. Interventions were performed as documented above.

## 2023-12-30 NOTE — DISCHARGE NOTE PROVIDER - HOSPITAL COURSE
· Assessment	   72M      w/ pmhx of FRANCOIS on CPAP, COPD w/ 50-pack-year smoking history, DM2 on insulin pump,         HTN/  HLD,  CAD/ stents,        status post stent, TAVR/  colonoscopy 2022, polyp       h/o kidney transplant in 2013 on immunosuppresion      s/p    prostate surg, 3 days  ago at [facility],  wa s in  cipro,        presenting with 2 days of cough, headache, light headedness.,,  was  + for  covid  today,  denies chest pain, nvd, sore throat./  has  been  taking  tylenol         weakness/  malaise  form covid         on iv remdisivir        given recent  prostate surg   for bph,  immunocompromised  state.  on iv zostn/ follow  bcx/ ucx    DM,  has  insulin pump,           house   endo ,    known to pt .   Medtronic  insulin pump at home and Ozempic     c/c  diastolic   chf.  torsemid    HTN /HLD         on  procardia,  labetolol.  enalapril     CAD,   on asa/ plavix/ ,lipitor     s/p  TAVR, 12/2021       CKD,    s/p   R kidney transplant  2013,   Dr Vargas  [facility] /  on cellcept/  tacro       h/o   ILD,  restrictive/obstructive lung disease,  and   FRANCOIS / cpap             ILD/  known to  house pulm     on dvt  ppx      pt  no longer  f/p  with transplant team in  [facility]/ dr edward olmstead following        defer transplant  meds to  renal/ ID       · Assessment	   72M      w/ pmhx of FRANCOIS on CPAP, COPD w/ 50-pack-year smoking history, DM2 on insulin pump,         HTN/  HLD,  CAD/ stents,        status post stent, TAVR/  colonoscopy 2022, polyp       h/o kidney transplant in 2013 on immunosuppresion      s/p    prostate surg, 3 days  ago at an outsid e facility,  wa s in  cipro,        presenting with 2 days of cough, headache, light headedness.,,  was  + for  covid  today,  denies chest pain, nvd, sore throat./  has  been  taking  tylenol         weakness/  malaise  form covid         on iv remdisivir        given recent  prostate surg   for bph,  immunocompromised  state.  on iv zostn/ follow  bcx/ ucx    DM,  has  insulin pump,           house   endo ,    known to pt .   Medtronic  insulin pump at home and Ozempic     c/c  diastolic   chf.  torsemid    HTN /HLD         on  procardia,  labetolol.  enalapril     CAD,   on asa/ plavix/ ,lipitor     s/p  TAVR, 12/2021       CKD,    s/p   R kidney transplant  2013,   Dr Vargas  [facility] /  on cellcept/  tacro       h/o   ILD,  restrictive/obstructive lung disease,  and   FRANCOIS / cpap             ILD/  known to  house pulm     on dvt  ppx      pt  no longer  f/p  with transplant team in  [facility]/ dr edward olmstead following        defer transplant  meds to  renal/ ID       · Assessment	   72M      w/ pmhx of FRANCOIS on CPAP, COPD w/ 50-pack-year smoking history, DM2 on insulin pump,         HTN/  HLD,  CAD/ stents,        status post stent, TAVR/  colonoscopy 2022, polyp       h/o kidney transplant in 2013 on immunosuppresion      s/p    prostate surg, 3 days  ago at an outsid e facility,  wa s in  cipro,        presenting with 2 days of cough, headache, light headedness.,,  was  + for  covid  today,  denies chest pain, nvd, sore throat./  has  been  taking  tylenol         weakness/  malaise  form covid         on iv remdisivir        given recent  prostate surg   for bph,  immunocompromised  state.  on iv zostn/ follow  bcx/ ucx    DM,  has  insulin pump,           house   endo ,    known to pt .   Medtronic  insulin pump at home and Ozempic     c/c  diastolic   chf.  torsemid    HTN /HLD         on  procardia,  labetolol.  enalapril     CAD,   on asa/ plavix/ ,lipitor     s/p  TAVR, 12/2021       CKD,    s/p   R kidney transplant  2013,   Dr Vargas  [facility] /  on cellcept/  tacro       h/o   ILD,  restrictive/obstructive lung disease,  and   FRANCOIS / cpap             ILD/  known to  house pulm     on dvt  ppx      pt  no longer  f/p  with transplant team in  [facility]/ dr edward olmstead following        defer transplant  meds to  renal/ ID    ct.  prostatitis, on levaquin  prt  transplant ID/  and  will  need  pulm  clerance  also         · Assessment	   72M      w/ pmhx of FRANCOIS on CPAP, COPD w/ 50-pack-year smoking history, DM2 on insulin pump,         HTN/  HLD,  CAD/ stents,        status post stent, TAVR/  colonoscopy 2022, polyp       h/o kidney transplant in 2013 on immunosuppresion      s/p    prostate surg, 3 days  ago at an outsid e facility,  wa s in  cipro,        presenting with 2 days of cough, headache, light headedness.,,  was  + for  covid  today,  denies chest pain, nvd, sore throat./  has  been  taking  tylenol         weakness/  malaise  form covid         on iv remdisivir        given recent  prostate surg   for bph,  immunocompromised  state.  on iv zostn/ follow  bcx/ ucx    DM,  has  insulin pump,           house   endo ,    known to pt .   Medtronic  insulin pump at home and Ozempic     c/c  diastolic   chf.  torsemid    HTN /HLD         on  procardia,  labetolol.  enalapril     CAD,   on asa/ plavix/ ,lipitor     s/p  TAVR, 12/2021       CKD,    s/p   R kidney transplant  2013,   Dr Vargas  [facility] /  on cellcept/  tacro       h/o   ILD,  restrictive/obstructive lung disease,  and   FRANCOIS / cpap             ILD/  known to  house pulm     on dvt  ppx      pt  no longer  f/p  with transplant team in  [facility]/ dr edward olmstead following        defer transplant  meds to  renal/ ID    ct.  prostatitis, on levaquin  prt  transplant ID/  and  will  need  pulm  clerance  also

## 2023-12-30 NOTE — CONSULT NOTE ADULT - ASSESSMENT
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  Endocrine consulted for insulin pump management      #T2DM (A1c  5.3%)  - Given patient’s glucose is at target and patient is AOx4 and feels comfortable using the pump, patient can continue with insulin pump while overnight   - Please make sure patient fills out patient attestation and patient self-assessment form to use insulin pump and place in chart (forms can be found on forms on demand)   - last pump change on 12/28/23, Next pump change due for 12/31/23 (pt has supplies)  - RN to document correction insulin bolus in flow sheet   - Hypoglcyemia protocol    - In case of pump malfunction, please administer Lantus 16 units STAT and start Admelog 4 units TID with meals  (HOLD IF NPO)   - Please check FSG before meals and QHS, or q6h while NPO   - Please keep patient on a diabetic, carb controlled diet    - on discharge patient should follow up with their endocrinologist Dr. Byrd     #HLD  - patient on atorvastatin 20mg at home  - goal LDL in diabetes mellitus is <70    #HTN  - patient on enalapril 10mg at home  - goal BP in diabetes mellitus is <130/80  - defer to primary team for management    Samuel Stockton MD  Endocrine Fellow  Can be reached via [employer] teams.    For follow up questions, discharge recommendations, or new consults, please email [email or web address] ([email or web address]) or [email or web address] ([facility]) or call answering service at [phone] (weekdays); [phone] (nights/weekends).  For emergiencies please page fellow on call.     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  Endocrine consulted for insulin pump management      #T2DM (A1c  5.3%)  - Given patient’s glucose is at target and patient is AOx4 and feels comfortable using the pump, patient can continue with insulin pump while overnight   - Please make sure patient fills out patient attestation and patient self-assessment form to use insulin pump and place in chart (forms can be found on forms on demand)   - last pump change on 12/28/23, Next pump change due for 12/31/23 (pt has supplies)  - RN to document correction insulin bolus in flow sheet   - Hypoglcyemia protocol    - In case of pump malfunction, please administer Lantus 16 units STAT and start Admelog 4 units TID with meals  (HOLD IF NPO)   - Please check FSG before meals and QHS, or q6h while NPO   - Please keep patient on a diabetic, carb controlled diet    - on discharge patient should follow up with their endocrinologist Dr. Byrd     #HLD  - patient on atorvastatin 20mg at home  - goal LDL in diabetes mellitus is <70    #HTN  - patient on enalapril 10mg at home  - goal BP in diabetes mellitus is <130/80  - defer to primary team for management    Samuel Stockton MD  Endocrine Fellow  Can be reached via [employer] teams.    For follow up questions, discharge recommendations, or new consults, please email [email or web address] ([email or web address]) or [email or web address] ([facility]) or call answering service at [phone] (weekdays); [phone] (nights/weekends).  For emergiencies please page fellow on call.

## 2023-12-30 NOTE — PATIENT PROFILE ADULT - FUNCTIONAL ASSESSMENT - BASIC MOBILITY 6.
4-calculated by average/Not able to assess (calculate score using [facility] averaging method)  4-calculated by average/Not able to assess (calculate score using [facility] averaging method)

## 2023-12-30 NOTE — CONSULT NOTE ADULT - ATTENDING COMMENTS
Agree with plan as outlined above. Complete infectious work up. ID eval. Continue to titrate off oxygen as tolerated. Pulmonary team to follow. Pulmonary hygiene.
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  Endocrine consulted for insulin pump management.   Patient feels comfortable managing pump inpatient- blood sugars currently at goal. continue with home pump settings.

## 2023-12-30 NOTE — PROGRESS NOTE ADULT - ASSESSMENT
72M      w/ pmhx of FRANCOIS on CPAP, COPD w/ 50-pack-year smoking history, DM2 on insulin pump,         HTN/  HLD,  CAD/ stents,        status post stent, TAVR/  colonoscopy 2022, polyp       h/o kidney transplant in 2013 on immunosuppresion      s/p    prostate surg, 3 days  ago at an outsid e facility,  wa s in  cipro,        presenting with 2 days of cough, headache, light headedness.,,  was  + for  covid  today,  denies chest pain, nvd, sore throat./  has  been  taking  tylenol         weakness/  malaise  form covid         on iv remdisivir        given recent  prostate surg   for bph,  immunocompromised  state.  on iv zostn/ follow  bcx/ ucx    DM,  has  insulin pump,           house   endo ,    known to pt .   Medtronic  insulin pump at home and Ozempic     c/c  diastolic   chf.  torsemid    HTN /HLD         on  procardia,  labetolol.  enalapril     CAD,   on asa/ plavix/ ,lipitor     s/p  TAVR, 12/2021       CKD,    s/p   R kidney transplant  2013,   Dr Vargsa  [facility] /  on cellcept/  tacro       h/o   ILD,  restrictive/obstructive lung disease,  and   FRANCOIS / cpap             ILD/  known to  house pulm     on dvt  ppx      pt  no longer  f/p  with transplant team in  [facility]/ dr edward olmstead following        defer transplant  meds to  renal/ ID      labs pending/  CT  c/ap./  follow  bcx       < from: CT Chest No Cont (10.10.23 @ 18:49) >  IMPRESSION:                      1. Newly developing nodular areas of consolidation bilaterally as well as   reticular opacities and abbreviated differential includes metastatic   disease with possible lymphangitic component. Atypical infectious and/or   inflammatory etiologies cannot be excluded. Bronchoscopic correlation and   histological sampling may be in order.  2. Mild increase in now moderate medi  72M      w/ pmhx of FRANCOIS on CPAP, COPD w/ 50-pack-year smoking history, DM2 on insulin pump,         HTN/  HLD,  CAD/ stents,        status post stent, TAVR/  colonoscopy 2022, polyp       h/o kidney transplant in 2013 on immunosuppresion      s/p    prostate surg, 3 days  ago at an outsid e facility,  wa s in  cipro,        presenting with 2 days of cough, headache, light headedness.,,  was  + for  covid  today,  denies chest pain, nvd, sore throat./  has  been  taking  tylenol         weakness/  malaise  form covid         on iv remdisivir        given recent  prostate surg   for bph,  immunocompromised  state.  on iv zostn/ follow  bcx/ ucx    DM,  has  insulin pump,           house   endo ,    known to pt .   Medtronic  insulin pump at home and Ozempic     c/c  diastolic   chf.  torsemid    HTN /HLD         on  procardia,  labetolol.  enalapril     CAD,   on asa/ plavix/ ,lipitor     s/p  TAVR, 12/2021       CKD,    s/p   R kidney transplant  2013,   Dr Vargas  [facility] /  on cellcept/  tacro       h/o   ILD,  restrictive/obstructive lung disease,  and   FRANCOIS / cpap             ILD/  known to  house pulm     on dvt  ppx      pt  no longer  f/p  with transplant team in  [facility]/ dr edward olmstead following        defer transplant  meds to  renal/ ID      labs pending/  CT  c/ap./  follow  bcx       < from: CT Chest No Cont (10.10.23 @ 18:49) >  IMPRESSION:                      1. Newly developing nodular areas of consolidation bilaterally as well as   reticular opacities and abbreviated differential includes metastatic   disease with possible lymphangitic component. Atypical infectious and/or   inflammatory etiologies cannot be excluded. Bronchoscopic correlation and   histological sampling may be in order.  2. Mild increase in now moderate medi

## 2023-12-30 NOTE — PROGRESS NOTE ADULT - SUBJECTIVE AND OBJECTIVE BOX
Date of Service: 12-30-23 @ 16:26           CARDIOLOGY     PROGRESS  NOTE   ________________________________________________    CHIEF COMPLAINT:Patient is a 73y old  Male who presents with a chief complaint of malaise/  fevers (30 Dec 2023 16:03)  doing better  	  REVIEW OF SYSTEMS:  CONSTITUTIONAL: No fever, weight loss, or fatigue  EYES: No eye pain, visual disturbances, or discharge  ENT:  No difficulty hearing, tinnitus, vertigo; No sinus or throat pain  NECK: No pain or stiffness  RESPIRATORY: No cough, wheezing, chills or hemoptysis; No Shortness of Breath  CARDIOVASCULAR: No chest pain, palpitations, passing out, dizziness, or leg swelling  GASTROINTESTINAL: No abdominal or epigastric pain. No nausea, vomiting, or hematemesis; No diarrhea or constipation. No melena or hematochezia.  GENITOURINARY: No dysuria, frequency, hematuria, or incontinence  NEUROLOGICAL: No headaches, memory loss, loss of strength, numbness, or tremors  SKIN: No itching, burning, rashes, or lesions   LYMPH Nodes: No enlarged glands  ENDOCRINE: No heat or cold intolerance; No hair loss  MUSCULOSKELETAL: No joint pain or swelling; No muscle, back, or extremity pain  PSYCHIATRIC: No depression, anxiety, mood swings, or difficulty sleeping  HEME/LYMPH: No easy bruising, or bleeding gums  ALLERGY AND IMMUNOLOGIC: No hives or eczema	    [x ] All others negative	  [ ] Unable to obtain    PHYSICAL EXAM:  T(C): 36.7 (12-30-23 @ 14:20), Max: 37.9 (12-29-23 @ 19:48)  HR: 69 (12-30-23 @ 14:45) (68 - 92)  BP: 175/76 (12-30-23 @ 14:20) (137/70 - 175/76)  RR: 18 (12-30-23 @ 14:20) (18 - 19)  SpO2: 97% (12-30-23 @ 14:45) (95% - 100%)  Wt(kg): --  I&O's Summary      Appearance: Normal	  HEENT:   Normal oral mucosa, PERRL, EOMI	  Lymphatic: No lymphadenopathy  Cardiovascular: Normal S1 S2, No JVD, + murmurs, No edema  Respiratory: rjhonchi  Psychiatry: A & O x 3, Mood & affect appropriate  Gastrointestinal:  Soft, Non-tender, + BS	  Skin: No rashes, No ecchymoses, No cyanosis	  Extremities: Normal range of motion, No clubbing, cyanosis or edema  Vascular: Peripheral pulses palpable 2+ bilaterally    MEDICATIONS  (STANDING):  albuterol    90 MICROgram(s) HFA Inhaler 2 Puff(s) Inhalation every 8 hours  allopurinol 100 milliGRAM(s) Oral daily  aspirin enteric coated 81 milliGRAM(s) Oral daily  atorvastatin 20 milliGRAM(s) Oral at bedtime  chlorhexidine 2% Cloths 1 Application(s) Topical daily  dextrose 50% Injectable 25 Gram(s) IV Push once  dextrose 50% Injectable 25 Gram(s) IV Push once  dextrose 50% Injectable 12.5 Gram(s) IV Push once  dextrose Oral Gel 15 Gram(s) Oral once  enalapril 5 milliGRAM(s) Oral daily  gabapentin 100 milliGRAM(s) Oral daily  glucagon  Injectable 1 milliGRAM(s) IntraMuscular once  heparin   Injectable 5000 Unit(s) SubCutaneous every 12 hours  labetalol 200 milliGRAM(s) Oral three times a day  montelukast 10 milliGRAM(s) Oral daily  mycophenolate mofetil 500 milliGRAM(s) Oral two times a day  NIFEdipine XL 90 milliGRAM(s) Oral daily  piperacillin/tazobactam IVPB.. 3.375 Gram(s) IV Intermittent every 8 hours  piperacillin/tazobactam IVPB... 3.375 Gram(s) IV Intermittent once  remdesivir  IVPB 100 milliGRAM(s) IV Intermittent every 24 hours  remdesivir  IVPB   IV Intermittent   senna 2 Tablet(s) Oral at bedtime  tacrolimus 1 milliGRAM(s) Oral daily  tacrolimus 1.5 milliGRAM(s) Oral at bedtime  torsemide 10 milliGRAM(s) Oral daily      TELEMETRY: 	    ECG:  	  RADIOLOGY:  OTHER: 	  	  LABS:	 	    CARDIAC MARKERS:                          13.7   8.50  )-----------( 153      ( 29 Dec 2023 18:29 )             42.0     12-30    140  |  103  |  13  ----------------------------<  187<H>  3.5   |  22  |  0.66    Ca    8.7      30 Dec 2023 07:17    TPro  6.6  /  Alb  3.6  /  TBili  0.6  /  DBili  0.2  /  AST  13  /  ALT  <5<L>  /  AlkPhos  69  12-30    proBNP:   Lipid Profile:   HgA1c:   TSH:   PT/INR - ( 30 Dec 2023 07:17 )   PT: 13.8 sec;   INR: 1.26 ratio         PTT - ( 29 Dec 2023 18:29 )  PTT:30.2 sec      Assessment and plan  ---------------------------  72M w/ pmhx of FRANCOIS on CPAP, COPD w/ 50-pack-year smoking history, DM2 on insulin pump, hyperlipidemia, hypertension, CAD status post stent, TAVR, history of kidney transplant in 2013 on immunosuppresion, presenting with 2 days of cough, headache, light headedness. Pt tested positive twice at home for covid 19. Denies chest pain, nvd, sore throat.  pt is well known to me with sig cardiac hx with c/o sob  pt s/p renal transplant on immunosuppression  pt with malignant HTN on may meds, continue all bp meds  transplant/ ID consult  s/p TAVR no need to repeat echo  dvt prophylaxis  check d dimer  chest x ray noted ?covid pneumoniae  increase bp restart on enalapril 10 mg daily  ID Transplant conmsult  COVID + ob RDV

## 2023-12-30 NOTE — DISCHARGE NOTE PROVIDER - CARE PROVIDER_API CALL
Homero Garrido J  Cardiovascular Disease  [address] [place] [address]  Phone: [phone]  Fax: [phone]  Follow Up Time:    Homero Garrido J  Cardiovascular Disease  [address] [place] [address]  Phone: [phone]  Fax: [phone]  Follow Up Time:    Homero Garrido J  Cardiovascular Disease  [address] [place] [address]  Phone: [phone]  Fax: [phone]  Established Patient  Follow Up Time: 1 week    Michele Cole  Nephrology  [address] [address]  Phone: [phone]  Fax: [phone]  Established Patient  Follow Up Time: 1 week   Homero Garrido J  Cardiovascular Disease  [address] [place] [address]  Phone: [phone]  Fax: [phone]  Established Patient  Follow Up Time: 1 week    Michele Cole  Nephrology  [address] [address]  Phone: [phone]  Fax: [phone]  Established Patient  Follow Up Time: 1 week

## 2023-12-30 NOTE — DISCHARGE NOTE PROVIDER - NSDCCPCAREPLAN_GEN_ALL_CORE_FT
PRINCIPAL DISCHARGE DIAGNOSIS  Diagnosis: 2019 novel coronavirus disease (COVID-19)  Assessment and Plan of Treatment: You received a 4 day course of an IV antiviral medication called remdesivir. Follow up with Infectious Disease and Pulmonolgy. You have been scheduled for pulmonology follow ups in January.  You tested positive for COVID 19.  You no longer require hospitalization.  Complete abx as recommended if prescribed for home. Quarantine yourself for 14 days from your date of hospitalization  Please follow up with your PCP in 1-2 weeks -Call your Provider before hand to make then aware of your hospitalization   Take Tylenol for Fevers every 6 hours as needed- Do not exceed 4gm of Tylenol in a 24 hour period  Stay hydrated   WEAR A FACE MASK   Cover your cough and sneezes   Clean your hands often   Avoid sharing personal house hold items   Clean all high touch surfaces- everyday items like table tops , door knobs, cell phones etc      SECONDARY DISCHARGE DIAGNOSES  Diagnosis: Pulmonary nodule  Assessment and Plan of Treatment: Outpatient pulmonology follow up. You should obtain a repeat CT scan of your chest in 3 months for further monitoring. Your home prednisone is being held after discharge due to infection concern. Do not resume prednisone until instructed to do so at your outpatient follow ups. Follow up with Pulmology at your already scheduled appointments. Follow up with [facility] Infectious Disease 1 week after discharge for monitoring to and discuss some infection workup labs that are pending in the lab at the time of your discharge.    Diagnosis: Prostatitis  Assessment and Plan of Treatment: You were treated with Zosyn, an IV antibiotic. You were switched to oral antibiotics prior to discharge. Continue taking oral Levofloxacin once per day to complete a 21 day course and follow up with [facility] Division of Infectious Disease 1 week after discharge from the hospital.     PRINCIPAL DISCHARGE DIAGNOSIS  Diagnosis: 2019 novel coronavirus disease (COVID-19)  Assessment and Plan of Treatment: You received a 4 day course of an IV antiviral medication called remdesivir. Follow up with Infectious Disease and Pulmonolgy. You have been scheduled for pulmonology follow ups in January.  You tested positive for COVID 19.  You no longer require hospitalization.  Complete abx as recommended if prescribed for home. Quarantine yourself for 14 days from your date of hospitalization  Please follow up with your PCP in 1-2 weeks -Call your Provider before hand to make then aware of your hospitalization   Take Tylenol for Fevers every 6 hours as needed- Do not exceed 4gm of Tylenol in a 24 hour period  Stay hydrated   WEAR A FACE MASK   Cover your cough and sneezes   Clean your hands often   Avoid sharing personal house hold items   Clean all high touch surfaces- everyday items like table tops , door knobs, cell phones etc      SECONDARY DISCHARGE DIAGNOSES  Diagnosis: Pulmonary nodule  Assessment and Plan of Treatment: Outpatient pulmonology follow up. You should obtain a repeat CT scan of your chest in 3 months for further monitoring. Your home prednisone is being held after discharge due to infection concern. Do not resume prednisone until instructed to do so at your outpatient follow ups. Follow up with Pulmology at your already scheduled appointments. Follow up with [facility] Infectious Disease 1 week after discharge for monitoring to and discuss some infection workup labs that are pending in the lab at the time of your discharge.    Diagnosis: Prostatitis  Assessment and Plan of Treatment: You were treated with Zosyn, an IV antibiotic. You were switched to oral antibiotics prior to discharge. Continue taking oral Levofloxacin once per day to complete a 21 day course and follow up with [facility] Division of Infectious Disease 1 week after discharge from the hospital.

## 2023-12-30 NOTE — CONSULT NOTE ADULT - ASSESSMENT
Ct Chest 10/10/23   1. Newly developing nodular areas of consolidation bilaterally as well as reticular opacities and abbreviated differential includes metastatic disease with possible lymphangitic component. Atypical infectious and/or inflammatory etiologies cannot be excluded. Bronchoscopic correlation and histological sampling may be in order.  2. Mild increase in now moderate mediastinal and probable right hilar lymphadenopathy.  3. Post TAVR with aneurysmal dilatation of the ascending aorta measuring 4.2 cm moderate dilatation of the pulmonary artery measuring up to 3.6 cm. Pulmonary hypertension cannot be excluded.  4. Additional chronic findings as above. 72 y/o M with h/o ESRD, s/p renal transplant 2013 , DM, CAD s/p stents , ILD followed by pulmonology, FRANCOIS, HLD, HTN and BPH s/p recent TURP and thurston placement per patient on 12/27 presenting with 2-3 days of cough, lightheadedness, HA and myalgias and fever home.. on admission, afebrile, WBC stable at 8 with lymphopenia. COVID 19 +, UA with some27 WBC and 800 RBC in c/o thurston, UC and BC sent. Started on remdesivir and zosyn. He denies dysuria, N/V flank pain, bladder pain. Denies diarrhea.     Ct Chest 10/10/23   1. Newly developing nodular areas of consolidation bilaterally as well as reticular opacities and abbreviated differential includes metastatic disease with possible lymphangitic component. Atypical infectious and/or inflammatory etiologies cannot be excluded. Bronchoscopic correlation and histological sampling may be in order.  2. Mild increase in now moderate mediastinal and probable right hilar lymphadenopathy.  3. Post TAVR with aneurysmal dilatation of the ascending aorta measuring 4.2 cm moderate dilatation of the pulmonary artery measuring up to 3.6 cm. Pulmonary hypertension cannot be excluded.  4. Additional chronic findings as above.    CXR bilateral lower lobe dz    MIcro   11/29 UA 27 WBC, 583 RBC ([facility])   11/29 COVID 19 pos    1. S/p renal transplant 2013 at [facility]  on pred, MMF, tacro  2. Fever  3.COVID 19 LRTI  lymphopenia noted, on RA. CXR with likely lower tract infection  procalcitonin neg  3. Pyuria, hematuria  suspect in c/o thurston and recent TURP  UC pending  4. history of pulmonary nodules on CT 10/2023  5. h/o ILD    Recommendations  ·	Suspect presentation consistent with COVID 19 with unlikely superimposed bacterial pneumonia   ·	Still, recommend repeat CT chest to investigate COVID 19 LRTI as well as a follow up of recent CT chest abnormalities in 10/2023  ·	MEanwhile, agree with MRSA nare PCR  ·	Would also send serum CMV PCR, Cryptococcus antigen, Histoplasma antigen in urine and serum, Coccidioides Ab and ASpergillus GM /fungitell in serum in light of recent pulmonary nodules and IS  ·	continue remdesivir- with LRTI would favor 5 day course as opposed to 3 and considering lymphopenia as poor prognostic factor  ·	Follow UC and CT chest- if no clear e/o superinfection/bacterial infection, anticipate early discontinuation of this  ·	for completeness in screening would also send Schistosoma Ab (still hematuria most likely in c/o surgery/thurston), urine ADenovirus PCR/BK PCR   ·	discuss decrease in IS with nephrology (MMF)        Thank you for involving us in the care of this patient  Transplant ID will continue to follow  Please call or page with additional questions  Pager; #[phone]  Teams: from 8 am to 5 pm  Victoria Gdofrey MD   70 y/o M with h/o ESRD, s/p renal transplant 2013 , DM, CAD s/p stents , ILD followed by pulmonology, FRANCOIS, HLD, HTN and BPH s/p recent TURP and thurston placement per patient on 12/27 presenting with 2-3 days of cough, lightheadedness, HA and myalgias and fever home.. on admission, afebrile, WBC stable at 8 with lymphopenia. COVID 19 +, UA with some27 WBC and 800 RBC in c/o thurston, UC and BC sent. Started on remdesivir and zosyn. He denies dysuria, N/V flank pain, bladder pain. Denies diarrhea.     Ct Chest 10/10/23   1. Newly developing nodular areas of consolidation bilaterally as well as reticular opacities and abbreviated differential includes metastatic disease with possible lymphangitic component. Atypical infectious and/or inflammatory etiologies cannot be excluded. Bronchoscopic correlation and histological sampling may be in order.  2. Mild increase in now moderate mediastinal and probable right hilar lymphadenopathy.  3. Post TAVR with aneurysmal dilatation of the ascending aorta measuring 4.2 cm moderate dilatation of the pulmonary artery measuring up to 3.6 cm. Pulmonary hypertension cannot be excluded.  4. Additional chronic findings as above.    CXR bilateral lower lobe dz    MIcro   11/29 UA 27 WBC, 583 RBC ([facility])   11/29 COVID 19 pos    1. S/p renal transplant 2013 at [place]  on pred, MMF, tacro  2. Fever  3.COVID 19 LRTI  lymphopenia noted, on RA. CXR with likely lower tract infection  procalcitonin neg  3. Pyuria, hematuria  suspect in c/o thur[place] and recent TURP  UC pending  4. history of pulmonary nodules on CT 10/2023  5. h/o ILD    Recommendations  ·	Suspect presentation consistent with COVID 19 with unlikely superimposed bacterial pneumonia   ·	Still, recommend repeat CT chest to investigate COVID 19 LRTI as well as a follow up of recent CT chest abnormalities in 10/2023  ·	MEanwhile, agree with MRSA nare PCR  ·	Would also send serum CMV PCR, Cryptococcus antigen, Histoplasma antigen in urine and serum, Coccidioides Ab and ASpergillus GM /fungitell in serum in light of recent pulmonary nodules and IS  ·	continue remdesivir- with LRTI would favor 5 day course as opposed to 3 and considering lymphopenia as poor prognostic factor  ·	Follow UC and CT chest- if no clear e/o superinfection/bacterial infection, anticipate early discontinuation of this  ·	for completeness in screening would also send Schistosoma Ab (still hematuria most likely in c/o surgery/[place]), urine ADenovirus PCR/BK PCR   ·	discuss decrease in IS with nephrology (MMF)        Thank you for involving us in the care of this patient  Transplant ID will continue to follow  Please call or page with additional questions  Pager; #[phone]  Teams: from 8 am to 5 pm  Victoria Godfrey MD

## 2023-12-30 NOTE — CONSULT NOTE ADULT - SUBJECTIVE AND OBJECTIVE BOX
seen. cont cellcept prograf no dose change . add remdisivir full consult to follow seen. cont cellcept prograf no dose change . add remdisivir full consult to follow    [facility]  [phone]  NEPHROLOGY      INITIAL CONSULT NOTE  --------------------------------------------------------------------------------  HPI:      : 72M   w/ pmhx of FRANCOIS on CPAP, COPD w/ 50-pack-year smoking history, DM2 on insulin pump, hyperlipidemia, hypertension, CAD status post stent, TAVR,    history of kidney transplant in 2013   s/p    prostate surg, lung lesiojn    presenting with 2 days of cough, headache, light headedness.     Pt tested positive twice at home for covid 19 + mild LOYA. active smoker. follows pul outpt. admitted for covid 19 renal consult called.       PAST HISTORY  --------------------------------------------------------------------------------  PAST MEDICAL & SURGICAL HISTORY:  HTN (Hypertension), Benign      Hyperlipidemia      Smoking      DM (diabetes mellitus), type 2      FRANCOIS on CPAP      H/O kidney transplant  2013      CAD (coronary artery disease)  1 stent      Kidney transplant recipient  Rt kidney- 2013      AV fistula      History of transcatheter aortic valve replacement (TAVR)      Personal history of spine surgery      Insulin pump status      H/O colonoscopy        FAMILY HISTORY:    PAST SOCIAL HISTORY:    ALLERGIES & MEDICATIONS  --------------------------------------------------------------------------------  Allergies    No Known Allergies    Intolerances      Standing Inpatient Medications  albuterol    90 MICROgram(s) HFA Inhaler 2 Puff(s) Inhalation every 8 hours  allopurinol 100 milliGRAM(s) Oral daily  aspirin enteric coated 81 milliGRAM(s) Oral daily  atorvastatin 20 milliGRAM(s) Oral at bedtime  chlorhexidine 2% Cloths 1 Application(s) Topical daily  dextrose 50% Injectable 25 Gram(s) IV Push once  dextrose 50% Injectable 25 Gram(s) IV Push once  dextrose 50% Injectable 12.5 Gram(s) IV Push once  dextrose Oral Gel 15 Gram(s) Oral once  enalapril 10 milliGRAM(s) Oral daily  gabapentin 100 milliGRAM(s) Oral daily  glucagon  Injectable 1 milliGRAM(s) IntraMuscular once  heparin   Injectable 5000 Unit(s) SubCutaneous every 12 hours  insulin aspart (NovoLOG) Pump 1 Each SubCutaneous Continuous Pump  labetalol 200 milliGRAM(s) Oral three times a day  montelukast 10 milliGRAM(s) Oral daily  mycophenolate mofetil 500 milliGRAM(s) Oral two times a day  NIFEdipine XL 90 milliGRAM(s) Oral daily  piperacillin/tazobactam IVPB.. 3.375 Gram(s) IV Intermittent every 8 hours  piperacillin/tazobactam IVPB... 3.375 Gram(s) IV Intermittent once  remdesivir  IVPB   IV Intermittent   remdesivir  IVPB 100 milliGRAM(s) IV Intermittent every 24 hours  senna 2 Tablet(s) Oral at bedtime  tacrolimus 1 milliGRAM(s) Oral daily  tacrolimus 1.5 milliGRAM(s) Oral at bedtime  torsemide 10 milliGRAM(s) Oral daily    PRN Inpatient Medications      REVIEW OF SYSTEMS  --------------------------------------------------------------------------------  Gen: No  fevers/chills   Skin: No rashes  Head/Eyes/Ears/Mouth:  +  headache; Normal hearing;  No sinus pain/discomfort, sore throat  Respiratory: No worsening  dyspnea, cough, wheezing, hemoptysis  CV: No chest pain, orthopnea  GI: No abdominal pain, diarrhea, nausea, vomiting, melena, hematochezia  : No dysuria, decrease urination or hesitancy urinating  hematuria, nocturia  MSK: No joint pain/swelling; no back pain  Neuro:  +  dizziness/lightheadedness  also with no edema       VITALS/PHYSICAL EXAM  --------------------------------------------------------------------------------  T(C): 36.7 (12-30-23 @ 15:35), Max: 37 (12-29-23 @ 23:51)  HR: 73 (12-30-23 @ 15:35) (69 - 92)  BP: 151/64 (12-30-23 @ 15:35) (137/70 - 175/76)  RR: 18 (12-30-23 @ 15:35) (18 - 18)  SpO2: 98% (12-30-23 @ 15:35) (95% - 100%)  Wt(kg): --    Weight (kg): 93.4 (12-29-23 @ 17:34)      Physical Exam:  	Gen: Non toxic comfortable appearing   	no jvd   	Pulm: decrease bs  no rales or ronchi or wheezing  	CV: RRR, S1S2; no rub  	Abd: +BS, soft, nontender/nondistended RLQ allograft site non tender   	: No suprapubic tenderness  	UE: Warm, no cyanosis  no clubbing,  no edema; no asterixis  	LE: Warm, no cyanosis  no clubbing, no edema  	Neuro: alert and oriented. speech coherent   	Psych: Normal affect and mood  	Skin: Warm, no decrease skin turgor   	  LABS/STUDIES  --------------------------------------------------------------------------------              13.7   8.50  >-----------<  153      [12-29-23 @ 18:29]              42.0     140  |  103  |  13  ----------------------------<  187      [12-30-23 @ 07:17]  3.5   |  22  |  0.66        Ca     8.7     [12-30-23 @ 07:17]    TPro  6.6  /  Alb  3.6  /  TBili  0.6  /  DBili  0.2  /  AST  13  /  ALT  <5  /  AlkPhos  69  [12-30-23 @ 07:17]    PT/INR: PT 13.8 , INR 1.26       [12-30-23 @ 07:17]  PTT: 30.2       [12-29-23 @ 18:29]      Creatinine Trend:  SCr 0.66 [12-30 @ 07:17]  SCr 0.69 [12-29 @ 18:29]      urinUrinalysis + Microscopic Examination (12.29.23 @ 18:43)   pH Urine: 5.5  Urine Appearance: Cloudy  Color: Dark Yellow  Specific Gravity: >1.030  Protein, Urine: 300 mg/dL  Glucose Qualitative, Urine: Negative mg/dL  Ketone - Urine: Trace mg/dL  Blood, Urine: Large  Bilirubin: Small  Urobilinogen: 1.0 mg/dL  Leukocyte Esterase Concentration: Small  Nitrite: Negative  White Blood Cell - Urine: 32 /HPF  Red Blood Cell - Urine: 893 /HPF  Bacteria: Negative /HPF  Cast: 2 /LPF  Epithelial Cells: 2 /HPF        HCV 0.21, Nonreact      [08-24-22 @ 07:07]     seen. cont cellcept prograf no dose change . add remdisivir full consult to follow    [facility]  [phone]  NEPHROLOGY      INITIAL CONSULT NOTE  --------------------------------------------------------------------------------  HPI:      : 72M   w/ pmhx of FRANCOIS on CPAP, COPD w/ 50-pack-year smoking history, DM2 on insulin pump, hyperlipidemia, hypertension, CAD status post stent, TAVR,    history of kidney transplant in 2013   s/p    prostate surg, lung lesiojn    presenting with 2 days of cough, headache, light headedness.     Pt tested positive twice at home for covid 19 + mild LOYA. active smoker. follows pul outpt. admitted for covid 19 renal consult called.       PAST HISTORY  --------------------------------------------------------------------------------  PAST MEDICAL & SURGICAL HISTORY:  HTN (Hypertension), Benign      Hyperlipidemia      Smoking      DM (diabetes mellitus), type 2      FRANCOIS on CPAP      H/O kidney transplant  2013      CAD (coronary artery disease)  1 stent      Kidney transplant recipient  Rt kidney- 2013      AV fistula      History of transcatheter aortic valve replacement (TAVR)      Personal history of spine surgery      Insulin pump status      H/O colonoscopy        FAMILY HISTORY:    PAST SOCIAL HISTORY:    ALLERGIES & MEDICATIONS  --------------------------------------------------------------------------------  Allergies    No Known Allergies    Intolerances      Standing Inpatient Medications  albuterol    90 MICROgram(s) HFA Inhaler 2 Puff(s) Inhalation every 8 hours  allopurinol 100 milliGRAM(s) Oral daily  aspirin enteric coated 81 milliGRAM(s) Oral daily  atorvastatin 20 milliGRAM(s) Oral at bedtime  chlorhexidine 2% Cloths 1 Application(s) Topical daily  dextrose 50% Injectable 25 Gram(s) IV Push once  dextrose 50% Injectable 25 Gram(s) IV Push once  dextrose 50% Injectable 12.5 Gram(s) IV Push once  dextrose Oral Gel 15 Gram(s) Oral once  enalapril 10 milliGRAM(s) Oral daily  gabapentin 100 milliGRAM(s) Oral daily  glucagon  Injectable 1 milliGRAM(s) IntraMuscular once  heparin   Injectable 5000 Unit(s) SubCutaneous every 12 hours  insulin aspart (NovoLOG) Pump 1 Each SubCutaneous Continuous Pump  labetalol 200 milliGRAM(s) Oral three times a day  montelukast 10 milliGRAM(s) Oral daily  mycophenolate mofetil 500 milliGRAM(s) Oral two times a day  NIFEdipine XL 90 milliGRAM(s) Oral daily  piperacillin/tazobactam IVPB.. 3.375 Gram(s) IV Intermittent every 8 hours  piperacillin/tazobactam IVPB... 3.375 Gram(s) IV Intermittent once  remdesivir  IVPB   IV Intermittent   remdesivir  IVPB 100 milliGRAM(s) IV Intermittent every 24 hours  senna 2 Tablet(s) Oral at bedtime  tacrolimus 1 milliGRAM(s) Oral daily  tacrolimus 1.5 milliGRAM(s) Oral at bedtime  torsemide 10 milliGRAM(s) Oral daily    PRN Inpatient Medications      REVIEW OF SYSTEMS  --------------------------------------------------------------------------------  Gen: No  fevers/chills   Skin: No rashes  Head/Eyes/Ears/Mouth:  +  headache; Normal hearing;  No sinus pain/discomfort, sore throat  Respiratory: No worsening  dyspnea, cough, wheezing, hemoptysis  CV: No chest pain, orthopnea  GI: No abdominal pain, diarrhea, nausea, vomiting, melena, hematochezia  : No dysuria, decrease urination or hesitancy urinating  hematuria, nocturia  MSK: No joint pain/swelling; no back pain  Neuro:  +  dizziness/lightheadedness  also with no edema       VITALS/PHYSICAL EXAM  --------------------------------------------------------------------------------  T(C): 36.7 (12-30-23 @ 15:35), Max: 37 (12-29-23 @ 23:51)  HR: 73 (12-30-23 @ 15:35) (69 - 92)  BP: 151/64 (12-30-23 @ 15:35) (137/70 - 175/76)  RR: 18 (12-30-23 @ 15:35) (18 - 18)  SpO2: 98% (12-30-23 @ 15:35) (95% - 100%)  Wt(kg): --    Weight (kg): 93.4 (12-29-23 @ 17:34)      Physical Exam:  	Gen: Non toxic comfortable appearing   	no jvd   	Pulm: decrease bs  no rales or ronchi or wheezing  	CV: RRR, S1S2; no rub  	Abd: +BS, soft, nontender/nondistended RLQ allograft site non tender   	: No suprapubic tenderness  	UE: Warm, no cyanosis  no clubbing,  no edema; no asterixis  	LE: Warm, no cyanosis  no clubbing, no edema  	Neuro: alert and oriented. speech coherent   	Psych: Normal affect and mood  	Skin: Warm, no decrease skin turgor   	  LABS/STUDIES  --------------------------------------------------------------------------------              13.7   8.50  >-----------<  153      [12-29-23 @ 18:29]              42.0     140  |  103  |  13  ----------------------------<  187      [12-30-23 @ 07:17]  3.5   |  22  |  0.66        Ca     8.7     [12-30-23 @ 07:17]    TPro  6.6  /  Alb  3.6  /  TBili  0.6  /  DBili  0.2  /  AST  13  /  ALT  <5  /  AlkPhos  69  [12-30-23 @ 07:17]    PT/INR: PT 13.8 , INR 1.26       [12-30-23 @ 07:17]  PTT: 30.2       [12-29-23 @ 18:29]      Creatinine Trend:  SCr 0.66 [12-30 @ 07:17]  SCr 0.69 [12-29 @ 18:29]      urinUrinalysis + Microscopic Examination (12.29.23 @ 18:43)   pH Urine: 5.5  Urine Appearance: Cloudy  Color: Dark Yellow  Specific Gravity: >1.030  Protein, Urine: 300 mg/dL  Glucose Qualitative, Urine: Negative mg/dL  Ketone - Urine: Trace mg/dL  Blood, Urine: Large  Bilirubin: Small  Urobilinogen: 1.0 mg/dL  Leukocyte Esterase Concentration: Small  Nitrite: Negative  White Blood Cell - Urine: 32 /HPF  Red Blood Cell - Urine: 893 /HPF  Bacteria: Negative /HPF  Cast: 2 /LPF  Epithelial Cells: 2 /HPF        HCV 0.21, Nonreact      [08-24-22 @ 07:07]

## 2023-12-30 NOTE — DISCHARGE NOTE PROVIDER - PROVIDER TOKENS
PROVIDER:[TOKEN:[[id number]]] PROVIDER:[TOKEN:[[id number]],FOLLOWUP:[1 week],ESTABLISHEDPATIENT:[T]],PROVIDER:[TOKEN:[[id number]],FOLLOWUP:[1 week],ESTABLISHEDPATIENT:[T]]

## 2023-12-30 NOTE — PROGRESS NOTE ADULT - SUBJECTIVE AND OBJECTIVE BOX
date of service: 12-30-23 @ 06:50  afberile  REVIEW OF SYSTEMS:  CONSTITUTIONAL: No fever,  no  weight loss  ENT:  No  tinnitus,   no   vertigo  NECK: No pain or stiffness  RESPIRATORY: No cough, wheezing, chills or hemoptysis;    No Shortness of Breath  CARDIOVASCULAR: No chest pain, palpitations, dizziness  GASTROINTESTINAL: No abdominal or epigastric pain. No nausea, vomiting, or hematemesis; No diarrhea  No melena or hematochezia.  GENITOURINARY: No dysuria, frequency, hematuria, or incontinence  NEUROLOGICAL: No headaches  SKIN: No itching,  no   rash  LYMPH Nodes: No enlarged glands  ENDOCRINE: No heat or cold intolerance  MUSCULOSKELETAL: No joint pain or swelling  PSYCHIATRIC: No depression, anxiety  HEME/LYMPH: No easy bruising, or bleeding gums  ALLERGY AND IMMUNOLOGIC: No hives or eczema	    MEDICATIONS  (STANDING):  albuterol    90 MICROgram(s) HFA Inhaler 2 Puff(s) Inhalation every 8 hours  allopurinol 100 milliGRAM(s) Oral daily  aspirin enteric coated 81 milliGRAM(s) Oral daily  atorvastatin 20 milliGRAM(s) Oral at bedtime  chlorhexidine 2% Cloths 1 Application(s) Topical daily  enalapril 5 milliGRAM(s) Oral daily  gabapentin 100 milliGRAM(s) Oral daily  heparin   Injectable 5000 Unit(s) SubCutaneous every 12 hours  labetalol 200 milliGRAM(s) Oral three times a day  montelukast 10 milliGRAM(s) Oral daily  mycophenolate mofetil 500 milliGRAM(s) Oral two times a day  NIFEdipine XL 90 milliGRAM(s) Oral daily  piperacillin/tazobactam IVPB.. 3.375 Gram(s) IV Intermittent every 8 hours  piperacillin/tazobactam IVPB... 3.375 Gram(s) IV Intermittent once  remdesivir  IVPB 100 milliGRAM(s) IV Intermittent every 24 hours  remdesivir  IVPB   IV Intermittent   senna 2 Tablet(s) Oral at bedtime  tacrolimus 1 milliGRAM(s) Oral daily  tacrolimus 1.5 milliGRAM(s) Oral at bedtime  torsemide 10 milliGRAM(s) Oral daily    MEDICATIONS  (PRN):      Vital Signs Last 24 Hrs  T(C): 37 (29 Dec 2023 23:51), Max: 37.9 (29 Dec 2023 19:48)  T(F): 98.6 (29 Dec 2023 23:51), Max: 100.2 (29 Dec 2023 19:48)  HR: 77 (29 Dec 2023 23:51) (68 - 80)  BP: 151/64 (29 Dec 2023 23:51) (144/67 - 155/89)  BP(mean): --  RR: 18 (29 Dec 2023 23:51) (18 - 19)  SpO2: 97% (29 Dec 2023 23:51) (95% - 97%)    Parameters below as of 29 Dec 2023 23:51  Patient On (Oxygen Delivery Method): room air      CAPILLARY BLOOD GLUCOSE      POCT Blood Glucose.: 75 mg/dL (29 Dec 2023 18:08)    I&O's Summary        Appearance: Normal	  HEENT:   Normal oral mucosa, PERRL, EOMI	  Lymphatic: No lymphadenopathy  Cardiovascular: Normal S1 S2, No JVD  Respiratory: Lungs clear to auscultation	  Gastrointestinal:  Soft, Non-tender, + BS	  Skin: No rash, No ecchymoses	  Extremities:     LABS:                        13.7   8.50  )-----------( 153      ( 29 Dec 2023 18:29 )             42.0     12-29    142  |  108  |  18  ----------------------------<  112<H>  3.9   |  22  |  0.69    Ca    9.3      29 Dec 2023 18:29    TPro  6.8  /  Alb  3.7  /  TBili  0.8  /  DBili  x   /  AST  11  /  ALT  7<L>  /  AlkPhos  73  12-29    PT/INR - ( 29 Dec 2023 18:29 )   PT: 13.6 sec;   INR: 1.24 ratio         PTT - ( 29 Dec 2023 18:29 )  PTT:30.2 sec      Urinalysis Basic - ( 29 Dec 2023 18:43 )    Color: Dark Yellow / Appearance: Cloudy / SG: >1.030 / pH: x  Gluc: x / Ketone: Trace mg/dL  / Bili: Small / Urobili: 1.0 mg/dL   Blood: x / Protein: 300 mg/dL / Nitrite: Negative   Leuk Esterase: Small / RBC: 893 /HPF / WBC 32 /HPF   Sq Epi: x / Non Sq Epi: 2 /HPF / Bacteria: Negative /HPF          12-29 @ 18:29  2.1  70              Consultant(s) Notes Reviewed:      Care Discussed with Consultants/Other Providers:

## 2023-12-30 NOTE — CONSULT NOTE ADULT - ASSESSMENT
: 72M   w/ pmhx of FRANCOIS on CPAP, COPD w/ 50-pack-year smoking history, DM2 on insulin pump, hyperlipidemia, hypertension, CAD status post stent, TAVR,    history of kidney transplant in 2013   s/p    prostate surg recently , lung lesiojn    presenting with 2 days of cough, headache, light headedness.     Pt tested positive twice at home for covid 19 + mild LOYA. active smoker. follows pulm outpt. admitted for covid 19       1- renal transplant   2- covid 19   3- active tobacco use  4- copd   5- DM  on insulin pump  6- hematuria   7- proteinuria    cont cellcept 1 g bid prednisone 5 mg daily along with prograf 1 mg and 1.5 mg pm   remdisivir 100 mg iv daily   concerned with his pulm lesions will need inpatient pulm work up once covid 19 resolves to have bronchoscopy   in the interim serum CMV PCR, Cryptococcus antigen, Histoplasma antigen in urine and serum, Coccidioides Ab and ASpergillus GM /fungitell in serum in light of recent pulmonary nodules and IS as recommended by ID   pulm follow up   hematuria likely in setting of recent turp proteinuria to be re evaluated once hematuria resolves   check tacro level in am   tobacco cessation

## 2023-12-31 ENCOUNTER — TRANSCRIPTION ENCOUNTER (OUTPATIENT)
Age: 73
End: 2023-12-31

## 2023-12-31 VITALS
SYSTOLIC BLOOD PRESSURE: 155 MMHG | TEMPERATURE: 99 F | RESPIRATION RATE: 18 BRPM | DIASTOLIC BLOOD PRESSURE: 73 MMHG | OXYGEN SATURATION: 93 % | HEART RATE: 65 BPM

## 2023-12-31 DIAGNOSIS — Z96.41 PRESENCE OF INSULIN PUMP (EXTERNAL) (INTERNAL): ICD-10-CM

## 2023-12-31 LAB
ALBUMIN SERPL ELPH-MCNC: 4.1 G/DL — SIGNIFICANT CHANGE UP (ref 3.3–5)
ALBUMIN SERPL ELPH-MCNC: 4.1 G/DL — SIGNIFICANT CHANGE UP (ref 3.3–5)
ALBUMIN SERPL ELPH-MCNC: 4.4 G/DL — SIGNIFICANT CHANGE UP (ref 3.3–5)
ALBUMIN SERPL ELPH-MCNC: 4.4 G/DL — SIGNIFICANT CHANGE UP (ref 3.3–5)
ALP SERPL-CCNC: 83 U/L — SIGNIFICANT CHANGE UP (ref 40–120)
ALP SERPL-CCNC: 83 U/L — SIGNIFICANT CHANGE UP (ref 40–120)
ALP SERPL-CCNC: 86 U/L — SIGNIFICANT CHANGE UP (ref 40–120)
ALP SERPL-CCNC: 86 U/L — SIGNIFICANT CHANGE UP (ref 40–120)
ALT FLD-CCNC: 10 U/L — SIGNIFICANT CHANGE UP (ref 10–45)
ALT FLD-CCNC: 10 U/L — SIGNIFICANT CHANGE UP (ref 10–45)
ALT FLD-CCNC: 6 U/L — LOW (ref 10–45)
ALT FLD-CCNC: 6 U/L — LOW (ref 10–45)
ANION GAP SERPL CALC-SCNC: 12 MMOL/L — SIGNIFICANT CHANGE UP (ref 5–17)
ANION GAP SERPL CALC-SCNC: 12 MMOL/L — SIGNIFICANT CHANGE UP (ref 5–17)
AST SERPL-CCNC: 15 U/L — SIGNIFICANT CHANGE UP (ref 10–40)
AST SERPL-CCNC: 15 U/L — SIGNIFICANT CHANGE UP (ref 10–40)
AST SERPL-CCNC: 16 U/L — SIGNIFICANT CHANGE UP (ref 10–40)
AST SERPL-CCNC: 16 U/L — SIGNIFICANT CHANGE UP (ref 10–40)
BILIRUB DIRECT SERPL-MCNC: 0.2 MG/DL — SIGNIFICANT CHANGE UP (ref 0–0.3)
BILIRUB DIRECT SERPL-MCNC: 0.2 MG/DL — SIGNIFICANT CHANGE UP (ref 0–0.3)
BILIRUB INDIRECT FLD-MCNC: 0.5 MG/DL — SIGNIFICANT CHANGE UP (ref 0.2–1)
BILIRUB INDIRECT FLD-MCNC: 0.5 MG/DL — SIGNIFICANT CHANGE UP (ref 0.2–1)
BILIRUB SERPL-MCNC: 0.7 MG/DL — SIGNIFICANT CHANGE UP (ref 0.2–1.2)
BUN SERPL-MCNC: 11 MG/DL — SIGNIFICANT CHANGE UP (ref 7–23)
BUN SERPL-MCNC: 11 MG/DL — SIGNIFICANT CHANGE UP (ref 7–23)
CALCIUM SERPL-MCNC: 9.7 MG/DL — SIGNIFICANT CHANGE UP (ref 8.4–10.5)
CALCIUM SERPL-MCNC: 9.7 MG/DL — SIGNIFICANT CHANGE UP (ref 8.4–10.5)
CHLORIDE SERPL-SCNC: 103 MMOL/L — SIGNIFICANT CHANGE UP (ref 96–108)
CHLORIDE SERPL-SCNC: 103 MMOL/L — SIGNIFICANT CHANGE UP (ref 96–108)
CO2 SERPL-SCNC: 24 MMOL/L — SIGNIFICANT CHANGE UP (ref 22–31)
CO2 SERPL-SCNC: 24 MMOL/L — SIGNIFICANT CHANGE UP (ref 22–31)
CREAT SERPL-MCNC: 0.68 MG/DL — SIGNIFICANT CHANGE UP (ref 0.5–1.3)
CREAT SERPL-MCNC: 0.68 MG/DL — SIGNIFICANT CHANGE UP (ref 0.5–1.3)
CREAT SERPL-MCNC: 0.69 MG/DL — SIGNIFICANT CHANGE UP (ref 0.5–1.3)
CREAT SERPL-MCNC: 0.69 MG/DL — SIGNIFICANT CHANGE UP (ref 0.5–1.3)
CRYPTOC AG FLD QL: NEGATIVE — SIGNIFICANT CHANGE UP
CRYPTOC AG FLD QL: NEGATIVE — SIGNIFICANT CHANGE UP
EGFR: 98 ML/MIN/1.73M2 — SIGNIFICANT CHANGE UP
GLUCOSE BLDC GLUCOMTR-MCNC: 110 MG/DL — HIGH (ref 70–99)
GLUCOSE BLDC GLUCOMTR-MCNC: 110 MG/DL — HIGH (ref 70–99)
GLUCOSE BLDC GLUCOMTR-MCNC: 119 MG/DL — HIGH (ref 70–99)
GLUCOSE BLDC GLUCOMTR-MCNC: 119 MG/DL — HIGH (ref 70–99)
GLUCOSE BLDC GLUCOMTR-MCNC: 142 MG/DL — HIGH (ref 70–99)
GLUCOSE BLDC GLUCOMTR-MCNC: 142 MG/DL — HIGH (ref 70–99)
GLUCOSE SERPL-MCNC: 89 MG/DL — SIGNIFICANT CHANGE UP (ref 70–99)
GLUCOSE SERPL-MCNC: 89 MG/DL — SIGNIFICANT CHANGE UP (ref 70–99)
INR BLD: 1.17 RATIO — SIGNIFICANT CHANGE UP (ref 0.85–1.18)
INR BLD: 1.17 RATIO — SIGNIFICANT CHANGE UP (ref 0.85–1.18)
POTASSIUM SERPL-MCNC: 3.3 MMOL/L — LOW (ref 3.5–5.3)
POTASSIUM SERPL-MCNC: 3.3 MMOL/L — LOW (ref 3.5–5.3)
POTASSIUM SERPL-SCNC: 3.3 MMOL/L — LOW (ref 3.5–5.3)
POTASSIUM SERPL-SCNC: 3.3 MMOL/L — LOW (ref 3.5–5.3)
PROT SERPL-MCNC: 7.6 G/DL — SIGNIFICANT CHANGE UP (ref 6–8.3)
PROTHROM AB SERPL-ACNC: 12.8 SEC — SIGNIFICANT CHANGE UP (ref 9.5–13)
PROTHROM AB SERPL-ACNC: 12.8 SEC — SIGNIFICANT CHANGE UP (ref 9.5–13)
SODIUM SERPL-SCNC: 139 MMOL/L — SIGNIFICANT CHANGE UP (ref 135–145)
SODIUM SERPL-SCNC: 139 MMOL/L — SIGNIFICANT CHANGE UP (ref 135–145)
TACROLIMUS SERPL-MCNC: 6.2 NG/ML — SIGNIFICANT CHANGE UP
TACROLIMUS SERPL-MCNC: 6.2 NG/ML — SIGNIFICANT CHANGE UP

## 2023-12-31 PROCEDURE — 86635 COCCIDIOIDES ANTIBODY: CPT

## 2023-12-31 PROCEDURE — 93005 ELECTROCARDIOGRAM TRACING: CPT

## 2023-12-31 PROCEDURE — 85610 PROTHROMBIN TIME: CPT

## 2023-12-31 PROCEDURE — 99222 1ST HOSP IP/OBS MODERATE 55: CPT

## 2023-12-31 PROCEDURE — 82947 ASSAY GLUCOSE BLOOD QUANT: CPT

## 2023-12-31 PROCEDURE — 84295 ASSAY OF SERUM SODIUM: CPT

## 2023-12-31 PROCEDURE — 87305 ASPERGILLUS AG IA: CPT

## 2023-12-31 PROCEDURE — 94660 CPAP INITIATION&MGMT: CPT

## 2023-12-31 PROCEDURE — 86403 PARTICLE AGGLUT ANTBDY SCRN: CPT

## 2023-12-31 PROCEDURE — 36415 COLL VENOUS BLD VENIPUNCTURE: CPT

## 2023-12-31 PROCEDURE — 87086 URINE CULTURE/COLONY COUNT: CPT

## 2023-12-31 PROCEDURE — 71260 CT THORAX DX C+: CPT

## 2023-12-31 PROCEDURE — 87385 HISTOPLASMA CAPSUL AG IA: CPT

## 2023-12-31 PROCEDURE — 82803 BLOOD GASES ANY COMBINATION: CPT

## 2023-12-31 PROCEDURE — 83605 ASSAY OF LACTIC ACID: CPT

## 2023-12-31 PROCEDURE — 83036 HEMOGLOBIN GLYCOSYLATED A1C: CPT

## 2023-12-31 PROCEDURE — 74177 CT ABD & PELVIS W/CONTRAST: CPT

## 2023-12-31 PROCEDURE — 99232 SBSQ HOSP IP/OBS MODERATE 35: CPT | Mod: GC

## 2023-12-31 PROCEDURE — 87640 STAPH A DNA AMP PROBE: CPT

## 2023-12-31 PROCEDURE — 86682 HELMINTH ANTIBODY: CPT

## 2023-12-31 PROCEDURE — 85025 COMPLETE CBC W/AUTO DIFF WBC: CPT

## 2023-12-31 PROCEDURE — 80053 COMPREHEN METABOLIC PANEL: CPT

## 2023-12-31 PROCEDURE — 83880 ASSAY OF NATRIURETIC PEPTIDE: CPT

## 2023-12-31 PROCEDURE — 85730 THROMBOPLASTIN TIME PARTIAL: CPT

## 2023-12-31 PROCEDURE — 94640 AIRWAY INHALATION TREATMENT: CPT

## 2023-12-31 PROCEDURE — 85014 HEMATOCRIT: CPT

## 2023-12-31 PROCEDURE — 82962 GLUCOSE BLOOD TEST: CPT

## 2023-12-31 PROCEDURE — 87799 DETECT AGENT NOS DNA QUANT: CPT

## 2023-12-31 PROCEDURE — 80197 ASSAY OF TACROLIMUS: CPT

## 2023-12-31 PROCEDURE — 82565 ASSAY OF CREATININE: CPT

## 2023-12-31 PROCEDURE — 87040 BLOOD CULTURE FOR BACTERIA: CPT

## 2023-12-31 PROCEDURE — 80048 BASIC METABOLIC PNL TOTAL CA: CPT

## 2023-12-31 PROCEDURE — 81001 URINALYSIS AUTO W/SCOPE: CPT

## 2023-12-31 PROCEDURE — 99233 SBSQ HOSP IP/OBS HIGH 50: CPT

## 2023-12-31 PROCEDURE — 87637 SARSCOV2&INF A&B&RSV AMP PRB: CPT

## 2023-12-31 PROCEDURE — 84132 ASSAY OF SERUM POTASSIUM: CPT

## 2023-12-31 PROCEDURE — 93010 ELECTROCARDIOGRAM REPORT: CPT

## 2023-12-31 PROCEDURE — 80076 HEPATIC FUNCTION PANEL: CPT

## 2023-12-31 PROCEDURE — 82435 ASSAY OF BLOOD CHLORIDE: CPT

## 2023-12-31 PROCEDURE — 71045 X-RAY EXAM CHEST 1 VIEW: CPT

## 2023-12-31 PROCEDURE — 85018 HEMOGLOBIN: CPT

## 2023-12-31 PROCEDURE — 99285 EMERGENCY DEPT VISIT HI MDM: CPT

## 2023-12-31 PROCEDURE — 86606 ASPERGILLUS ANTIBODY: CPT

## 2023-12-31 PROCEDURE — 87449 NOS EACH ORGANISM AG IA: CPT

## 2023-12-31 PROCEDURE — 84145 PROCALCITONIN (PCT): CPT

## 2023-12-31 PROCEDURE — 82330 ASSAY OF CALCIUM: CPT

## 2023-12-31 PROCEDURE — 87641 MR-STAPH DNA AMP PROBE: CPT

## 2023-12-31 PROCEDURE — 84484 ASSAY OF TROPONIN QUANT: CPT

## 2023-12-31 RX ORDER — ALFUZOSIN HYDROCHLORIDE 10 MG/1
1 TABLET, EXTENDED RELEASE ORAL
Qty: 0 | Refills: 0 | DISCHARGE

## 2023-12-31 RX ORDER — NIFEDIPINE 30 MG
1 TABLET, EXTENDED RELEASE 24 HR ORAL
Qty: 0 | Refills: 0 | DISCHARGE
Start: 2023-12-31

## 2023-12-31 RX ORDER — POTASSIUM CHLORIDE 20 MEQ
1 PACKET (EA) ORAL
Refills: 0 | DISCHARGE

## 2023-12-31 RX ORDER — MIRABEGRON 50 MG/1
1 TABLET, EXTENDED RELEASE ORAL
Refills: 0 | DISCHARGE

## 2023-12-31 RX ORDER — POTASSIUM CHLORIDE 20 MEQ
40 PACKET (EA) ORAL ONCE
Refills: 0 | Status: COMPLETED | OUTPATIENT
Start: 2023-12-31 | End: 2023-12-31

## 2023-12-31 RX ADMIN — MONTELUKAST 10 MILLIGRAM(S): 4 TABLET, CHEWABLE ORAL at 12:14

## 2023-12-31 RX ADMIN — PIPERACILLIN AND TAZOBACTAM 25 GRAM(S): 4; .5 INJECTION, POWDER, LYOPHILIZED, FOR SOLUTION INTRAVENOUS at 09:43

## 2023-12-31 RX ADMIN — Medication 90 MILLIGRAM(S): at 05:06

## 2023-12-31 RX ADMIN — ALBUTEROL 2 PUFF(S): 90 AEROSOL, METERED ORAL at 13:04

## 2023-12-31 RX ADMIN — GABAPENTIN 100 MILLIGRAM(S): 400 CAPSULE ORAL at 12:14

## 2023-12-31 RX ADMIN — Medication 100 MILLIGRAM(S): at 12:14

## 2023-12-31 RX ADMIN — HEPARIN SODIUM 5000 UNIT(S): 5000 INJECTION INTRAVENOUS; SUBCUTANEOUS at 05:06

## 2023-12-31 RX ADMIN — Medication 10 MILLIGRAM(S): at 05:06

## 2023-12-31 RX ADMIN — Medication 10 MILLIGRAM(S): at 17:07

## 2023-12-31 RX ADMIN — Medication 200 MILLIGRAM(S): at 13:04

## 2023-12-31 RX ADMIN — MYCOPHENOLATE MOFETIL 500 MILLIGRAM(S): 250 CAPSULE ORAL at 05:06

## 2023-12-31 RX ADMIN — ALBUTEROL 2 PUFF(S): 90 AEROSOL, METERED ORAL at 05:06

## 2023-12-31 RX ADMIN — Medication 81 MILLIGRAM(S): at 12:14

## 2023-12-31 RX ADMIN — Medication 40 MILLIEQUIVALENT(S): at 09:43

## 2023-12-31 RX ADMIN — CHLORHEXIDINE GLUCONATE 1 APPLICATION(S): 213 SOLUTION TOPICAL at 12:20

## 2023-12-31 RX ADMIN — Medication 200 MILLIGRAM(S): at 05:06

## 2023-12-31 RX ADMIN — REMDESIVIR 200 MILLIGRAM(S): 5 INJECTION INTRAVENOUS at 17:06

## 2023-12-31 RX ADMIN — TACROLIMUS 1 MILLIGRAM(S): 5 CAPSULE ORAL at 09:44

## 2023-12-31 RX ADMIN — MYCOPHENOLATE MOFETIL 500 MILLIGRAM(S): 250 CAPSULE ORAL at 17:07

## 2023-12-31 RX ADMIN — HEPARIN SODIUM 5000 UNIT(S): 5000 INJECTION INTRAVENOUS; SUBCUTANEOUS at 17:07

## 2023-12-31 NOTE — PROGRESS NOTE ADULT - SUBJECTIVE AND OBJECTIVE BOX
Age: 73y    Gender: Male    POCT Blood Glucose:  142 mg/dL (12-31-23 @ 11:44)  110 mg/dL (12-31-23 @ 08:30)  102 mg/dL (12-30-23 @ 21:58)  105 mg/dL (12-30-23 @ 16:24)  118 mg/dL (12-30-23 @ 12:29)      eMAR:allopurinol   100 milliGRAM(s) Oral (12-30-23 @ 12:40)    atorvastatin   20 milliGRAM(s) Oral (12-30-23 @ 21:38)     ENDOCRINE FOLLOW UP     Chief Complaint: dm2, insulin pump     History: patient seen earlier today, resting comfortably in bedside chair. Pump/settings reviewed. patient reports he may be discharged either later today or tomorrow.   Discussed tightly controlled BG levels and risk for hypoglycemia as well as inpatient target range for BG levels. Patient reports he feels well and BG levels are in range for him. Does not want to make any adjustments to pump settings and does not want to use a temp basal.   Did not receive a bolus with breakfast. Received 1.5 units with lunch. Using bolus wizard. Estimates he only about 10 carbs for breakfast and 17 carbs with lunch. Pump site to be changed tomorrow per patient.     MEDICATIONS  (STANDING):  albuterol    90 MICROgram(s) HFA Inhaler 2 Puff(s) Inhalation every 8 hours  allopurinol 100 milliGRAM(s) Oral daily  aspirin enteric coated 81 milliGRAM(s) Oral daily  atorvastatin 20 milliGRAM(s) Oral at bedtime  chlorhexidine 2% Cloths 1 Application(s) Topical daily  dextrose 50% Injectable 25 Gram(s) IV Push once  dextrose 50% Injectable 25 Gram(s) IV Push once  dextrose 50% Injectable 12.5 Gram(s) IV Push once  dextrose Oral Gel 15 Gram(s) Oral once  enalapril 10 milliGRAM(s) Oral daily  gabapentin 100 milliGRAM(s) Oral daily  glucagon  Injectable 1 milliGRAM(s) IntraMuscular once  heparin   Injectable 5000 Unit(s) SubCutaneous every 12 hours  insulin aspart (NovoLOG) Pump 1 Each SubCutaneous Continuous Pump  labetalol 200 milliGRAM(s) Oral three times a day  levoFLOXacin  Tablet 750 milliGRAM(s) Oral every 24 hours  montelukast 10 milliGRAM(s) Oral daily  mycophenolate mofetil 500 milliGRAM(s) Oral two times a day  NIFEdipine XL 90 milliGRAM(s) Oral daily  piperacillin/tazobactam IVPB... 3.375 Gram(s) IV Intermittent once  remdesivir  IVPB   IV Intermittent   remdesivir  IVPB 100 milliGRAM(s) IV Intermittent every 24 hours  senna 2 Tablet(s) Oral at bedtime  tacrolimus 1 milliGRAM(s) Oral daily  tacrolimus 1.5 milliGRAM(s) Oral at bedtime  torsemide 10 milliGRAM(s) Oral daily    MEDICATIONS  (PRN):      Allergies    No Known Allergies    Intolerances        ROS: All other systems reviewed and negative    PHYSICAL EXAM:  VITALS: T(C): 36.7 (12-31-23 @ 09:31)  T(F): 98.1 (12-31-23 @ 09:31), Max: 98.4 (12-30-23 @ 23:29)  HR: 68 (12-31-23 @ 11:05) (62 - 73)  BP: 117/68 (12-31-23 @ 09:31) (117/68 - 156/75)  RR:  (18 - 18)  SpO2:  (95% - 98%)  Wt(kg): --  GENERAL: NAD, resting comfortably   EYES: No proptosis,  anicteric  HEENT:  Atraumatic, Normocephalic, moist mucous membranes  RESPIRATORY: Nonlabored respirations on room air, normal rate/effort   NEURO: AOx3, moves all extremities spontaneously   SKIN: pump site near epigastric region, not wearing sensor   PSYCH:  reactive affect, euthymic mood    POCT Blood Glucose.: 142 mg/dL (12-31-23 @ 11:44)  POCT Blood Glucose.: 110 mg/dL (12-31-23 @ 08:30)  POCT Blood Glucose.: 102 mg/dL (12-30-23 @ 21:58)  POCT Blood Glucose.: 105 mg/dL (12-30-23 @ 16:24)  POCT Blood Glucose.: 118 mg/dL (12-30-23 @ 12:29)  POCT Blood Glucose.: 75 mg/dL (12-29-23 @ 18:08)      12-31    139  |  103  |  11  ----------------------------<  89  3.3<L>   |  24  |  0.69    eGFR: 98    Ca    9.7      12-31    TPro  7.6  /  Alb  4.4  /  TBili  0.7  /  DBili  0.2  /  AST  15  /  ALT  10  /  AlkPhos  83  12-31      A1C with Estimated Average Glucose Result: 5.3 % (12-30-23 @ 07:17)  A1C with Estimated Average Glucose Result: 5.4 % (10-11-23 @ 07:14)

## 2023-12-31 NOTE — DISCHARGE NOTE NURSING/CASE MANAGEMENT/SOCIAL WORK - PATIENT PORTAL LINK FT
You can access the FollowMyHealth Patient Portal offered by [facility] by registering at the following website: [email or web address]. By joining [facility]’s FollowMyHealth portal, you will also be able to view your health information using other applications (apps) compatible with our system. You can access the FollowMyHealth Patient Portal offered by [facility] by registering at the following website: [email or web address]. By joining [facility]’s FollowMyHealth portal, you will also be able to view your health information using other applications (apps) compatible with our system.

## 2023-12-31 NOTE — PROGRESS NOTE ADULT - SUBJECTIVE AND OBJECTIVE BOX
[facility]   [phone]  RENAL FOLLOW UP NOTE  --------------------------------------------------------------------------------  Chief Complaint:    24 hour events/subjective:    had seen  outpt and had thurston placed   states feels well     PAST HISTORY  --------------------------------------------------------------------------------  No significant changes to PMH, PSH, FHx, SHx, unless otherwise noted    ALLERGIES & MEDICATIONS  --------------------------------------------------------------------------------  Allergies    No Known Allergies    Intolerances      Standing Inpatient Medications  albuterol    90 MICROgram(s) HFA Inhaler 2 Puff(s) Inhalation every 8 hours  allopurinol 100 milliGRAM(s) Oral daily  aspirin enteric coated 81 milliGRAM(s) Oral daily  atorvastatin 20 milliGRAM(s) Oral at bedtime  chlorhexidine 2% Cloths 1 Application(s) Topical daily  dextrose 50% Injectable 25 Gram(s) IV Push once  dextrose 50% Injectable 25 Gram(s) IV Push once  dextrose 50% Injectable 12.5 Gram(s) IV Push once  dextrose Oral Gel 15 Gram(s) Oral once  enalapril 10 milliGRAM(s) Oral daily  gabapentin 100 milliGRAM(s) Oral daily  glucagon  Injectable 1 milliGRAM(s) IntraMuscular once  heparin   Injectable 5000 Unit(s) SubCutaneous every 12 hours  insulin aspart (NovoLOG) Pump 1 Each SubCutaneous Continuous Pump  labetalol 200 milliGRAM(s) Oral three times a day  levoFLOXacin  Tablet 750 milliGRAM(s) Oral every 24 hours  montelukast 10 milliGRAM(s) Oral daily  mycophenolate mofetil 500 milliGRAM(s) Oral two times a day  NIFEdipine XL 90 milliGRAM(s) Oral daily  piperacillin/tazobactam IVPB... 3.375 Gram(s) IV Intermittent once  senna 2 Tablet(s) Oral at bedtime  tacrolimus 1 milliGRAM(s) Oral daily  tacrolimus 1.5 milliGRAM(s) Oral at bedtime  torsemide 10 milliGRAM(s) Oral daily    PRN Inpatient Medications      REVIEW OF SYSTEMS  --------------------------------------------------------------------------------    Gen: denies  fevers/chills,  CVS: denies chest pain/palpitations  Resp: denies SOB/Cough  GI: Denies N/V/Abd pain  : Denies dysuria    VITALS/PHYSICAL EXAM  --------------------------------------------------------------------------------  T(C): 37.1 (12-31-23 @ 16:18), Max: 37.1 (12-31-23 @ 16:18)  HR: 65 (12-31-23 @ 16:18) (62 - 68)  BP: 155/73 (12-31-23 @ 16:18) (117/68 - 156/75)  RR: 18 (12-31-23 @ 16:18) (18 - 18)  SpO2: 93% (12-31-23 @ 16:18) (93% - 97%)  Wt(kg): --        12-31-23 @ 07:01  -  12-31-23 @ 18:50  --------------------------------------------------------  IN: 240 mL / OUT: 200 mL / NET: 40 mL      Physical Exam:  	    Gen: Non toxic comfortable appearing   	no jvd   	Pulm: decrease bs  no rales or ronchi or wheezing  	CV: RRR, S1S2; no rub  	Abd: +BS, soft, nontender/nondistended RLQ allograft site non tender   	: No suprapubic tenderness + thurston   	UE: Warm, no cyanosis  no clubbing,  no edema  	LE: Warm, no cyanosis  no clubbing, no edema  	Neuro: alert and oriented. speech coherent   	Psych: Normal affect and mood      LABS/STUDIES  --------------------------------------------------------------------------------    139  |  103  |  11  ----------------------------<  89      [12-31-23 @ 07:05]  3.3   |  24  |  0.69        Ca     9.7     [12-31-23 @ 07:05]    TPro  7.6  /  Alb  4.4  /  TBili  0.7  /  DBili  0.2  /  AST  15  /  ALT  10  /  AlkPhos  83  [12-31-23 @ 07:05]    PT/INR: PT 12.8 , INR 1.17       [12-31-23 @ 07:08]      Creatinine Trend:  SCr 0.69 [12-31 @ 07:05]  SCr 0.66 [12-30 @ 07:17]  SCr 0.69 [12-29 @ 18:29]    Tacrolimus (), Serum: 6.2 ng/mL (12-31 @ 07:10)           [facility]   [phone]  RENAL FOLLOW UP NOTE  --------------------------------------------------------------------------------  Chief Complaint:    24 hour events/subjective:    had seen  outpt and had thurston placed   states feels well     PAST HISTORY  --------------------------------------------------------------------------------  No significant changes to PMH, PSH, FHx, SHx, unless otherwise noted    ALLERGIES & MEDICATIONS  --------------------------------------------------------------------------------  Allergies    No Known Allergies    Intolerances      Standing Inpatient Medications  albuterol    90 MICROgram(s) HFA Inhaler 2 Puff(s) Inhalation every 8 hours  allopurinol 100 milliGRAM(s) Oral daily  aspirin enteric coated 81 milliGRAM(s) Oral daily  atorvastatin 20 milliGRAM(s) Oral at bedtime  chlorhexidine 2% Cloths 1 Application(s) Topical daily  dextrose 50% Injectable 25 Gram(s) IV Push once  dextrose 50% Injectable 25 Gram(s) IV Push once  dextrose 50% Injectable 12.5 Gram(s) IV Push once  dextrose Oral Gel 15 Gram(s) Oral once  enalapril 10 milliGRAM(s) Oral daily  gabapentin 100 milliGRAM(s) Oral daily  glucagon  Injectable 1 milliGRAM(s) IntraMuscular once  heparin   Injectable 5000 Unit(s) SubCutaneous every 12 hours  insulin aspart (NovoLOG) Pump 1 Each SubCutaneous Continuous Pump  labetalol 200 milliGRAM(s) Oral three times a day  levoFLOXacin  Tablet 750 milliGRAM(s) Oral every 24 hours  montelukast 10 milliGRAM(s) Oral daily  mycophenolate mofetil 500 milliGRAM(s) Oral two times a day  NIFEdipine XL 90 milliGRAM(s) Oral daily  piperacillin/tazobactam IVPB... 3.375 Gram(s) IV Intermittent once  senna 2 Tablet(s) Oral at bedtime  tacrolimus 1 milliGRAM(s) Oral daily  tacrolimus 1.5 milliGRAM(s) Oral at bedtime  torsemide 10 milliGRAM(s) Oral daily    PRN Inpatient Medications      REVIEW OF SYSTEMS  --------------------------------------------------------------------------------    Gen: denies  fevers/chills,  CVS: denies chest pain/palpitations  Resp: denies SOB/Cough  GI: Denies N/V/Abd pain  : Denies dysuria    VITALS/PHYSICAL EXAM  --------------------------------------------------------------------------------  T(C): 37.1 (12-31-23 @ 16:18), Max: 37.1 (12-31-23 @ 16:18)  HR: 65 (12-31-23 @ 16:18) (62 - 68)  BP: 155/73 (12-31-23 @ 16:18) (117/68 - 156/75)  RR: 18 (12-31-23 @ 16:18) (18 - 18)  SpO2: 93% (12-31-23 @ 16:18) (93% - 97%)  Wt(kg): --        12-31-23 @ 07:01  -  12-31-23 @ 18:50  --------------------------------------------------------  IN: 240 mL / OUT: 200 mL / NET: 40 mL      Physical Exam:  	    Gen: Non toxic comfortable appearing   	no jvd   	Pulm: decrease bs  no rales or ronchi or wheezing  	CV: RRR, S1S2; no rub  	Abd: +BS, soft, nontender/nondistended RLQ allograft site non tender   	: No suprapubic tenderness + thurston   	UE: Warm, no cyanosis  no clubbing,  no edema  	LE: Warm, no cyanosis  no clubbing, no edema  	Neuro: alert and oriented. speech coherent   	Psych: Normal affect and mood      LABS/STUDIES  --------------------------------------------------------------------------------    139  |  103  |  11  ----------------------------<  89      [12-31-23 @ 07:05]  3.3   |  24  |  0.69        Ca     9.7     [12-31-23 @ 07:05]    TPro  7.6  /  Alb  4.4  /  TBili  0.7  /  DBili  0.2  /  AST  15  /  ALT  10  /  AlkPhos  83  [12-31-23 @ 07:05]    PT/INR: PT 12.8 , INR 1.17       [12-31-23 @ 07:08]      Creatinine Trend:  SCr 0.69 [12-31 @ 07:05]  SCr 0.66 [12-30 @ 07:17]  SCr 0.69 [12-29 @ 18:29]    Tacrolimus (), Serum: 6.2 ng/mL (12-31 @ 07:10)

## 2023-12-31 NOTE — PROGRESS NOTE ADULT - ASSESSMENT
: 72M   w/ pmhx of FRANCOIS on CPAP, COPD w/ 50-pack-year smoking history, DM2 on insulin pump, hyperlipidemia, hypertension, CAD status post stent, TAVR,    history of kidney transplant in 2013   s/p    prostate surg recently , lung lesiojn    presenting with 2 days of cough, headache, light headedness.     Pt tested positive twice at home for covid 19 + mild LOYA. active smoker. follows pulm outpt. admitted for covid 19       1- renal transplant   2- covid 19   3- active tobacco use  4- copd   5- DM  on insulin pump  6- hematuria   7- proteinuria    cont cellcept 1 g bid prednisone 5 mg daily along with prograf 1 mg and 1.5 mg pm   remdisivir 100 mg iv daily to complete   concerned with his pulm lesions will need pulm work up once covid 19 resolves to have bronchoscopy   in the interim serum CMV PCR, Cryptococcus antigen, Histoplasma antigen in urine and serum, Coccidioides Ab and ASpergillus GM /fungitell in serum in light of recent pulmonary nodules and IS as recommended by ID   pulm follow up   hematuria  unclear if pt had TURP pt is not a good historian for this. he has thurston cath placed earlier during the week and states this is the only procedure he has had therefore unclear if had any other invasive procedure   check tacro level in range   tobacco cessation   abx per ID

## 2023-12-31 NOTE — DISCHARGE NOTE NURSING/CASE MANAGEMENT/SOCIAL WORK - NSDCVIVACCINE_GEN_ALL_CORE_FT
pneumococcal polysaccharide PPV23; 31-Aug-2022 16:38; Angela Silver (BELINDA); [employer] [id number] (Exp. Date: 07-Aug-2023); IntraMuscular; Dorsogluteal Right.; 0.5 milliLiter(s); VIS (VIS Published: 06-Oct-2009, VIS Presented: 31-Aug-2022);    pneumococcal polysaccharide PPV23; 31-Aug-2022 16:38; Angela Silver (BELINDA); [employer] [id number] (Exp. Date: 07-Aug-2023); IntraMuscular; Dorsogluteal Right.; 0.5 milliLiter(s); VIS (VIS Published: 06-Oct-2009, VIS Presented: 31-Aug-2022);

## 2023-12-31 NOTE — PROGRESS NOTE ADULT - ASSESSMENT
72M        h/o  FRANCOIS on CPAP, COPD w/ 50-pack-year smoking history, DM2 on insulin pump,         HTN/  HLD,  CAD/ stents,        status post stent, TAVR/  colonoscopy 2022, polyp       h/o kidney transplant in 2013 on immunosuppresion      s/p    prostate surg, 3 days  ago at an outsid e facility,  wa s in  [facility],          c/o 2 days of cough,  light headedness.,  + for  covid denies chest pain, nvd, sore throat./  has  been  taking  tylenol         weakness/  malaise  form covid         on iv remdisivir        given recent  prostate surg   for bph,  immunocompromised  state    DM,  has  insulin pump,           house   endo ,    known to pt . /e  Medtronic  insulin pump at home and Ozempic     c/c  diastolic   chf.  torsemid    HTN /HLD         on  procardia,  labetolol.  enalapril     CAD,   on asa/ plavix/ ,lipitor     s/p  TAVR, 12/2021       CKD,    s/p   R kidney transplant  2013,   Dr Hartono Weill [place] /  on cellcept/  tacro       h/o   ILD,  restrictive/obstructive lung disease,  and   FRANCOIS / cpap             ILD/  known to  house pulm     on dvt  ppx      pt  no longer  f/p  with transplant team in  [facility]/ dr edward olmstead following        defer transplant  meds to  renal/ ID      bcx/ ucx,  negative        CT  c/ap.. prostatitis/   on iv zosyn/  will  need  extended  course of ab       < from: CT Chest No Cont (10.10.23 @ 18:49) >  IMPRESSION:                      1. Newly developing nodular areas of consolidation bilaterally as well as   reticular opacities and abbreviated differential includes metastatic   disease with possible lymphangitic component. Atypical infectious and/or   inflammatory etiologies cannot be excluded. Bronchoscopic correlation and   histological sampling may be in order.  2. Mild increase in now moderate medi  72M        h/o  FRANCOIS on CPAP, COPD w/ 50-pack-year smoking history, DM2 on insulin pump,         HTN/  HLD,  CAD/ stents,        status post stent, TAVR/  colonoscopy 2022, polyp       h/o kidney transplant in 2013 on immunosuppresion      s/p    prostate surg, 3 days  ago at an outsid e facility,  wa s in  [facility],          c/o 2 days of cough,  light headedness.,  + for  covid denies chest pain, nvd, sore throat./  has  been  taking  tylenol         weakness/  malaise  form covid         on iv remdisivir        given recent  prostate surg   for bph,  immunocompromised  state    DM,  has  insulin pump,           house   endo ,    known to pt . /e  [employer]tronic  insulin pump at home and Ozempic     c/c  diastolic   chf.  torsemid    HTN /HLD         on  procardia,  labetolol.  enalapril     CAD,   on asa/ plavix/ ,lipitor     s/p  TAVR, 12/2021       CKD,    s/p   R kidney transplant  2013,   Dr Hartono Weill [place] /  on cellcept/  tacro       h/o   ILD,  restrictive/obstructive lung disease,  and   FRANCOIS / cpap             ILD/  known to  house pulm     on dvt  ppx      pt  no longer  f/p  with transplant team in  [facility]/ dr edward olmstead following        defer transplant  meds to  renal/ ID      bcx/ ucx,  negative        CT  c/ap.. prostatitis/   on iv zosyn/  will  need  extended  course of ab       < from: CT Chest No Cont (10.10.23 @ 18:49) >  IMPRESSION:                      1. Newly developing nodular areas of consolidation bilaterally as well as   reticular opacities and abbreviated differential includes metastatic   disease with possible lymphangitic component. Atypical infectious and/or   inflammatory etiologies cannot be excluded. Bronchoscopic correlation and   histological sampling may be in order.  2. Mild increase in now moderate medi  72M        h/o  FRANCOIS on CPAP, COPD w/ 50-pack-year smoking history, DM2 on insulin pump,         HTN/  HLD,  CAD/ stents,        status post stent, TAVR/  colonoscopy 2022, polyp       h/o kidney transplant in 2013 on immunosuppresion      s/p    prostate surg, 3 days  ago at an outsid e facility,  wa s in  [facility],          c/o 2 days of cough,  light headedness.,  + for  covid denies chest pain, nvd, sore throat./  has  been  taking  tylenol         weakness/  malaise  form covid         on iv remdisivir        given recent  prostate surg   for bph,  immunocompromised  state    DM,  has  insulin pump,           house   endo ,    known to pt . /e  [employer]tr[employer]  insulin pump at home and Ozempic     c/c  diastolic   chf.  torsemid    HTN /HLD         on  procardia,  labetolol.  enalapril     CAD,   on asa/ plavix/ ,lipitor     s/p  TAVR, 12/2021       CKD,    s/p   R kidney transplant  2013,   Dr Hartono Weill [place] /  on cellcept/  tacro       h/o   ILD,  restrictive/obstructive lung disease,  and   FRANCOIS / cpap             ILD/  known to  house pulm     on dvt  ppx      pt  no longer  f/p  with transplant team in  [facility]/ dr edward olmstead  following         defer transplant  meds to  renal/ ID         bcx/ ucx,  negative     CT  c/ap.. prostatitis/   on iv zosyn/  will  need  extended  course of ab/  Levaquin , per  ID    will  need  pulm  f/p  for  pulm  nodule .  discussed  with ID    pt  requesting to  go  home/  will  need   close  f/p        < from: CT Chest No Cont (10.10.23 @ 18:49) >  IMPRESSION:                      1. Newly developing nodular areas of consolidation bilaterally as well as   reticular opacities and abbreviated differential includes metastatic   disease with possible lymphangitic component. Atypical infectious and/or   inflammatory etiologies cannot be excluded. Bronchoscopic correlation and   histological sampling may be in order.  2. Mild increase in now moderate medi  72M        h/o  FRANCOIS on CPAP, COPD w/ 50-pack-year smoking history, DM2 on insulin pump,         HTN/  HLD,  CAD/ stents,        status post stent, TAVR/  colonoscopy 2022, polyp       h/o kidney transplant in 2013 on immunosuppresion      s/p    prostate surg, 3 days  ago at an outsid e facility,  wa s in  [facility],          c/o 2 days of cough,  light headedness.,  + for  covid denies chest pain, nvd, sore throat./  has  been  taking  tylenol         weakness/  malaise  form covid         on iv remdisivir        given recent  prostate surg   for bph,  immunocompromised  state    DM,  has  insulin pump,           house   endo ,    known to pt . /e  [employer]tronic  insulin pump at home and Ozempic     c/c  diastolic   chf.  torsemid    HTN /HLD         on  procardia,  labetolol.  enalapril     CAD,   on asa/ plavix/ ,lipitor     s/p  TAVR, 12/2021       CKD,    s/p   R kidney transplant  2013,   Dr Hartono Weill [place] /  on cellcept/  tacro       h/o   ILD,  restrictive/obstructive lung disease,  and   FRANCOIS / cpap             ILD/  known to  house pulm     on dvt  ppx      pt  no longer  f/p  with transplant team in  [facility]/ dr edward olmstead  following         defer transplant  meds to  renal/ ID         bcx/ ucx,  negative     CT  c/ap.. prostatitis/   on iv zosyn/  will  need  extended  course of ab/  Levaquin , per  ID    will  need  pulm  f/p  for  pulm  nodule .  discussed  with ID    pt  requesting to  go  home/  will  need   close  f/p        < from: CT Chest No Cont (10.10.23 @ 18:49) >  IMPRESSION:                      1. Newly developing nodular areas of consolidation bilaterally as well as   reticular opacities and abbreviated differential includes metastatic   disease with possible lymphangitic component. Atypical infectious and/or   inflammatory etiologies cannot be excluded. Bronchoscopic correlation and   histological sampling may be in order.  2. Mild increase in now moderate medi  72M        h/o  FRANCOIS on CPAP, COPD w/ 50-pack-year smoking history, DM2 on insulin pump,         HTN/  HLD,  CAD/ stents,        status post stent, TAVR/  colonoscopy 2022, polyp       h/o kidney transplant in 2013 on immunosuppresion      s/p    prostate surg, 3 days  ago at an outsid e facility,  wa s in  [facility],          c/o 2 days of cough,  light headedness.,  + for  covid denies chest pain, nvd, sore throat./  has  been  taking  tylenol         weakness/  malaise  form covid         on iv remdisivir        given recent  prostate surg   for bph,  immunocompromised  state    DM,  has  insulin pump,           house   endo ,    known to pt . /e  [employer]tr[employer]  insulin pump at home and Ozempic     c/c  diastolic   chf.  torsemid    HTN /HLD         on  procardia,  labetolol.  enalapril     CAD,   on asa/ plavix/ ,lipitor     s/p  TAVR, 12/2021       CKD,    s/p   R kidney [facility]  2013[facility]   [facility] /  on cellcept/  tacro       h/o   ILD,  restrictive/obstructive lung disease,  and   FRANCOIS / cpap             ILD/  known to  house pulm     on dvt  ppx      pt  no longer  f/p  with transplant team in  [facility]/ dr edward olmstead  following         defer transplant  meds to  renal/ ID         bcx/ ucx,  negative     CT  c/ap.. prostatitis/   on iv zosyn/     will  need  extended  course of ab/  Levaquin , per  ID    will  need  pulm  f/p  for  pulm  nodule /  na d per  house  pulm,  will  need  out  pt  f/p   .  discussed   with ID    pt  requesting to  go  home/  will  need   close  f/p        < from: CT Chest No Cont (10.10.23 @ 18:49) >  IMPRESSION:                      1. Newly developing nodular areas of consolidation bilaterally as well as   reticular opacities and abbreviated differential includes metastatic   disease with possible lymphangitic component. Atypical infectious and/or   inflammatory etiologies cannot be excluded. Bronchoscopic correlation and   histological sampling may be in order.  2. Mild increase in now moderate medi  72M        h/o  FRANCOIS on CPAP, COPD w/ 50-pack-year smoking history, DM2 on insulin pump,         HTN/  HLD,  CAD/ stents,        status post stent, TAVR/  colonoscopy 2022, polyp       h/o kidney transplant in 2013 on immunosuppresion      s/p    prostate surg, 3 days  ago at an outsid e facility,  wa s in  [facility],          c/o 2 days of cough,  light headedness.,  + for  covid denies chest pain, nvd, sore throat./  has  been  taking  tylenol         weakness/  malaise  form covid         on iv remdisivir        given recent  prostate surg   for bph,  immunocompromised  state    DM,  has  insulin pump,           house   endo ,    known to pt . /e  [employer]tr[employer]  insulin pump at home and Ozempic     c/c  diastolic   chf.  torsemid    HTN /HLD         on  procardia,  labetolol.  enalapril     CAD,   on asa/ plavix/ ,lipitor     s/p  TAVR, 12/2021       CKD,    s/p   R kidney transplant  2013,   [facility] /  on cellcept/  tacro       h/o   ILD,  restrictive/obstructive lung disease,  and   FRANCOIS / cpap             ILD/  known to  house pulm     on dvt  ppx      pt  no longer  f/p  with transplant team in  [facility]/ dr edward olmstead  following         defer transplant  meds to  renal/ ID         bcx/ ucx,  negative     CT  c/ap.. prostatitis/   on iv zosyn/     will  need  extended  course of ab/  Levaquin , per  ID    will  need  pulm  f/p  for  pulm  nodule /  na d per  house  pulm,  will  need  out  pt  f/p   .  discussed   with ID    pt  requesting to  go  home/  will  need   close  f/p        < from: CT Chest No Cont (10.10.23 @ 18:49) >  IMPRESSION:                      1. Newly developing nodular areas of consolidation bilaterally as well as   reticular opacities and abbreviated differential includes metastatic   disease with possible lymphangitic component. Atypical infectious and/or   inflammatory etiologies cannot be excluded. Bronchoscopic correlation and   histological sampling may be in order.  2. Mild increase in now moderate medi  72M        h/o  FRANCOIS on CPAP, COPD w/ 50-pack-year smoking history, DM2 on insulin pump,         HTN/  HLD,  CAD/ stents,        status post stent, TAVR/  colonoscopy 2022, polyp       h/o kidney transplant in 2013 on immunosuppresion       ?    prostate surg, 3 days  ago at an outsid e facility,  wa s in  [facility], / pt  [place]  about  details.  does  not  know  nam e of   urologist         c/o 2 days of cough,  light headedness.,  + for  covid denies chest pain, nvd, sore throat./  has  been  taking  tylenol         weakness/  malaise  form covid         on iv remdisivir        given recent  prostate surg   for bph,  immunocompromised  state    DM,  has  insulin pump,           house   endo ,    known to pt . /e  [employer]tr[employer]  insulin pump at home and Ozempic     c/c  diastolic   chf.  torsemid    HTN /HLD         on  procardia,  labetolol.  enalapril     CAD,   on asa/ plavix/ ,lipitor     s/p  TAVR, 12/2021       CKD,    s/p   R kidney transplant  2013,   Dr Vargas  [facility] /  on cellcept/  tacro       h/o   ILD,  restrictive/obstructive lung disease,  and   FRANCOIS / cpap             ILD/  known to  house pulm     on dvt  ppx      pt  no longer  f/p  with transplant team in  [facility]/ dr edward olmstead  following         defer transplant  meds to  renal/ ID         bcx/ ucx,  negative     CT  c/ap.. prostatitis/   on iv zosyn/  has   c/c  [place]/ doubt  denisse/  seen  by ID     will  need  extended  course of ab/  Levaquin , per  ID    will  need  pulm  f/p  for  pulm  nodule /  na d per  house  pulm,  will  need  out  pt  f/p   .  discussed   with ID    pt  requesting to  go  home/  will  need   close  f/p with  ID/ pulm       < from: CT Chest No Cont (10.10.23 @ 18:49) >  IMPRESSION:                      1. Newly developing nodular areas of consolidation bilaterally as well as   reticular opacities and abbreviated differential includes metastatic   disease with possible lymphangitic component. Atypical infectious and/or   inflammatory etiologies cannot be excluded. Bronchoscopic correlation and   histological sampling may be in order.  2. Mild increase in now moderate medi  72M        h/o  FRANCOIS on CPAP, COPD w/ 50-pack-year smoking history, DM2 on insulin pump,         HTN/  HLD,  CAD/ stents,        status post stent, TAVR/  colonoscopy 2022, polyp       h/o kidney transplant in 2013 on immunosuppresion       ?    prostate surg, 3 days  ago at an outsid e facility,  wa s in  [facility], / pt  [place]  about  details.  does  not  know  nam e of   urologist         c/o 2 days of cough,  light headedness.,  + for  covid denies chest pain, nvd, sore throat./  has  been  taking  tylenol         weakness/  malaise  form covid         on iv remdisivir        given recent  prostate surg   for bph,  immunocompromised  state    DM,  has  insulin pump,           house   endo ,    known to pt . /e  [employer]  insulin pump at home and Ozempic     c/c  diastolic   chf.  torsemid    HTN /HLD         on  procardia,  labetolol.  enalapril     CAD,   on asa/ plavix/ ,lipitor     s/p  TAVR, 12/2021       CKD,    s/p   R kidney transplant  2013,   Dr Vargas  [facility] /  on cellcept/  tacro       h/o   ILD,  restrictive/obstructive lung disease,  and   FRANCOIS / cpap             ILD/  known to  house pulm     on dvt  ppx      pt  no longer  f/p  with transplant team in  [facility]/ dr edward olmstead  following         defer transplant  meds to  renal/ ID         bcx/ ucx,  negative     CT  c/ap.. prostatitis/   on iv zosyn/  has   c/c  [place]/ doubt  denisse/  seen  by ID     will  need  extended  course of ab/  Levaquin , per  ID    will  need  pulm  f/p  for  pulm  nodule /  na d per  house  pulm,  will  need  out  pt  f/p   .  discussed   with ID    pt  requesting to  go  home/  will  need   close  f/p with  ID/ pulm       < from: CT Chest No Cont (10.10.23 @ 18:49) >  IMPRESSION:                      1. Newly developing nodular areas of consolidation bilaterally as well as   reticular opacities and abbreviated differential includes metastatic   disease with possible lymphangitic component. Atypical infectious and/or   inflammatory etiologies cannot be excluded. Bronchoscopic correlation and   histological sampling may be in order.  2. Mild increase in now moderate medi

## 2023-12-31 NOTE — PROGRESS NOTE ADULT - ASSESSMENT
70 y/o M with h/o ESRD, s/p renal transplant 2013 , DM, CAD s/p stents , ILD followed by pulmonology, FRANCOIS, HLD, HTN and BPH s/p recent TURP and thurston placement per patient on 12/27 presenting with 2-3 days of cough, lightheadedness, HA and myalgias and fever home.. on admission, afebrile, WBC stable at 8 with lymphopenia. COVID 19 +, UA with some27 WBC and 800 RBC in c/o thurston, UC and BC sent. Started on remdesivir and zosyn. He denies dysuria, N/V flank pain, bladder pain. Denies diarrhea.     Ct Chest 10/10/23   1. Newly developing nodular areas of consolidation bilaterally as well as reticular opacities and abbreviated differential includes metastatic disease with possible lymphangitic component. Atypical infectious and/or inflammatory etiologies cannot be excluded. Bronchoscopic correlation and histological sampling may be in order.  2. Mild increase in now moderate mediastinal and probable right hilar lymphadenopathy.  3. Post TAVR with aneurysmal dilatation of the ascending aorta measuring 4.2 cm moderate dilatation of the pulmonary artery measuring up to 3.6 cm. Pulmonary hypertension cannot be excluded.  4. Additional chronic findings as above.    CXR bilateral lower lobe dz    MIcro   11/29 UA 27 WBC, 583 RBC (thurston)   11/29 COVID 19 pos    1. S/p renal transplant 2013 at [place]  on pred, MMF, tacro  2. Fever  3.COVID 19 LRTI  lymphopenia noted, on RA. CXR with likely lower tract infection  procalcitonin neg  3. Pyuria, hematuria  suspect in c/o thurston and recent TURP  UC pending  4. history of pulmonary nodules on CT 10/2023  5. h/o ILD    Recommendations  ·	Suspect presentation consistent with COVID 19 with unlikely superimposed bacterial pneumonia   ·	Still, recommend repeat CT chest to investigate COVID 19 LRTI as well as a follow up of recent CT chest abnormalities in 10/2023  ·	MEanwhile, agree with MRSA nare PCR  ·	Would also send serum CMV PCR, Cryptococcus antigen, Histoplasma antigen in urine and serum, Coccidioides Ab and ASpergillus GM /fungitell in serum in light of recent pulmonary nodules and IS  ·	continue remdesivir- with LRTI would favor 5 day course as opposed to 3 and considering lymphopenia as poor prognostic factor  ·	Follow UC and CT chest- if no clear e/o superinfection/bacterial infection, anticipate early discontinuation of this  ·	for completeness in screening would also send Schistosoma Ab (still hematuria most likely in c/o surgery/thurston), urine ADenovirus PCR/BK PCR   ·	discuss decrease in IS with nephrology (MMF)        Thank you for involving us in the care of this patient  Transplant ID will continue to follow  Please call or page with additional questions  Pager; #[phone]  Teams: from 8 am to 5 pm  Victoria Godfrey MD   72 y/o M with h/o ESRD, s/p renal transplant 2013 , DM, CAD s/p stents , ILD followed by pulmonology, FRANCOIS, HLD, HTN and BPH s/p recent TURP and thurston placement per patient on 12/27 presenting with 2-3 days of cough, lightheadedness, HA and myalgias and fever home.. on admission, afebrile, WBC stable at 8 with lymphopenia. COVID 19 +, UA with some27 WBC and 800 RBC in c/o thurston, UC and BC sent. Started on remdesivir and zosyn. He denies dysuria, N/V flank pain, bladder pain. Denies diarrhea.     Ct Chest 10/10/23   1. Newly developing nodular areas of consolidation bilaterally as well as reticular opacities and abbreviated differential includes metastatic disease with possible lymphangitic component. Atypical infectious and/or inflammatory etiologies cannot be excluded. Bronchoscopic correlation and histological sampling may be in order.  2. Mild increase in now moderate mediastinal and probable right hilar lymphadenopathy.  3. Post TAVR with aneurysmal dilatation of the ascending aorta measuring 4.2 cm moderate dilatation of the pulmonary artery measuring up to 3.6 cm. Pulmonary hypertension cannot be excluded.  4. Additional chronic findings as above.    CXR bilateral lower lobe dz    MIcro   11/29 UA 27 WBC, 583 RBC ([facility]ston)   11/29 COVID 19 pos    1. S/p renal transplant 2013 at [facility]  on pred, MMF, tacro  2. Fever  3.COVID 19 LRTI  lymphopenia noted, on RA. CXR with likely lower tract infection  procalcitonin neg  3. Pyuria, hematuria  suspect in c/o thurston and recent TURP  UC pending  4. history of pulmonary nodules on CT 10/2023  5. h/o ILD    Recommendations  ·	Suspect presentation consistent with COVID 19 with unlikely superimposed bacterial pneumonia   ·	Still, recommend repeat CT chest to investigate COVID 19 LRTI as well as a follow up of recent CT chest abnormalities in 10/2023  ·	MEanwhile, agree with MRSA nare PCR  ·	Would also send serum CMV PCR, Cryptococcus antigen, Histoplasma antigen in urine and serum, Coccidioides Ab and ASpergillus GM /fungitell in serum in light of recent pulmonary nodules and IS  ·	continue remdesivir- with LRTI would favor 5 day course as opposed to 3 and considering lymphopenia as poor prognostic factor  ·	Follow UC and CT chest- if no clear e/o superinfection/bacterial infection, anticipate early discontinuation of this  ·	for completeness in screening would also send Schistosoma Ab (still hematuria most likely in c/o surgery/thurston), urine ADenovirus PCR/BK PCR   ·	discuss decrease in IS with nephrology (MMF)        Thank you for involving us in the care of this patient  Transplant ID will continue to follow  Please call or page with additional questions  Pager; #[phone]  Teams: from 8 am to 5 pm  Victoria Godfrey MD   72 y/o M with h/o ESRD, s/p renal transplant 2013 , DM, CAD s/p stents , ILD followed by pulmonology, FRANCOIS, HLD, HTN and BPH s/p recent TURP and thurston placement per patient on 12/27 presenting with 2-3 days of cough, lightheadedness, HA and myalgias and fever home.. on admission, afebrile, WBC stable at 8 with lymphopenia. COVID 19 +, UA with some27 WBC and 800 RBC in c/o thurston, UC and BC sent. Started on remdesivir and zosyn. He denies dysuria, N/V flank pain, bladder pain. Denies diarrhea.     Ct Chest 10/10/23   1. Newly developing nodular areas of consolidation bilaterally as well as reticular opacities and abbreviated differential includes metastatic disease with possible lymphangitic component. Atypical infectious and/or inflammatory etiologies cannot be excluded. Bronchoscopic correlation and histological sampling may be in order.  2. Mild increase in now moderate mediastinal and probable right hilar lymphadenopathy.  3. Post TAVR with aneurysmal dilatation of the ascending aorta measuring 4.2 cm moderate dilatation of the pulmonary artery measuring up to 3.6 cm. Pulmonary hypertension cannot be excluded.  4. Additional chronic findings as above.    CXR bilateral lower lobe dz    MIcro   11/29 UA 27 WBC, 583 RBC ([place])   11/29 COVID 19 pos    1. S/p renal transplant 2013 at [place]  on pred, MMF, tacro  2. Fever  3.COVID 19 LRTI  lymphopenia noted, on RA. CXR with likely lower tract infection  procalcitonin neg  3. Pyuria, hematuria  suspect in c/o thurston and recent TURP  UC pending  4. history of pulmonary nodules on CT 10/2023  5. h/o ILD    Recommendations  ·	Suspect presentation consistent with COVID 19  ·	Follow CMV PCR, Cryptococcus antigen, Histoplasma antigen in urine and serum, Coccidioides Ab and ASpergillus GM /fungitell in serum in light of pulmonary nodule  ·	NEw pulmonary nodule in immunosuppressed patient would discuss with pulmonology amenability for BAL/biopsy for microbiological diagnosis. Not the classical presentation for COVID and could be opportunistic-   ·	continue remdesivir- day 3 today- CT chest with no new finding suggesting COVID LRTI  ·	UC with <10K brittaney and pyuria in context of thurston and recent prostate surgery. CT with inflammation of bladder and prostate. Query if antibiotics were given prior to UC although on chart review  seems like antibiotics started at the time of UC. would stop zosyn and transition to po levoquin  750 mg daily for 10 days  ·	for completeness in screening would also send Schistosoma Ab (still hematuria most likely in c/o surgery/thurston), urine ADenovirus PCR/BK PCR   ·	discuss decrease in IS with nephrology (MMF)        Thank you for involving us in the care of this patient  Transplant ID will continue to follow  Please call or page with additional questions  Pager; #[phone]  Teams: from 8 am to 5 pm  Victoria Godfrey MD   72 y/o M with h/o ESRD, s/p renal transplant 2013 , DM, CAD s/p stents , ILD followed by pulmonology, FRANCOIS, HLD, HTN and BPH s/p recent TURP and thurston placement per patient on 12/27 presenting with 2-3 days of cough, lightheadedness, HA and myalgias and fever home.. on admission, afebrile, WBC stable at 8 with lymphopenia. COVID 19 +, UA with some27 WBC and 800 RBC in c/o thurston, UC and BC sent. Started on remdesivir and zosyn. He denies dysuria, N/V flank pain, bladder pain. Denies diarrhea.     Ct Chest 10/10/23   1. Newly developing nodular areas of consolidation bilaterally as well as reticular opacities and abbreviated differential includes metastatic disease with possible lymphangitic component. Atypical infectious and/or inflammatory etiologies cannot be excluded. Bronchoscopic correlation and histological sampling may be in order.  2. Mild increase in now moderate mediastinal and probable right hilar lymphadenopathy.  3. Post TAVR with aneurysmal dilatation of the ascending aorta measuring 4.2 cm moderate dilatation of the pulmonary artery measuring up to 3.6 cm. Pulmonary hypertension cannot be excluded.  4. Additional chronic findings as above.    CXR bilateral lower lobe dz    MIcro   11/29 UA 27 WBC, 583 RBC ([facility]ston)   11/29 COVID 19 pos    1. S/p renal transplant 2013 at [place]  on pred, MMF, tacro  2. Fever  3.COVID 19 LRTI  lymphopenia noted, on RA. CXR with likely lower tract infection  procalcitonin neg  3. Pyuria, hematuria  suspect in c/o thurston and recent TURP  UC pending  4. history of pulmonary nodules on CT 10/2023  5. h/o ILD    Recommendations  ·	Suspect presentation consistent with COVID 19  ·	Follow CMV PCR, Cryptococcus antigen, Histoplasma antigen in urine and serum, Coccidioides Ab and ASpergillus GM /fungitell in serum in light of pulmonary nodule  ·	NEw pulmonary nodule in immunosuppressed patient would discuss with pulmonology amenability for BAL/biopsy for microbiological diagnosis. Not the classical presentation for COVID and could be opportunistic-   ·	continue remdesivir- day 3 today- CT chest with no new finding suggesting COVID LRTI  ·	UC with <10K brittaney and pyuria in context of thurston and recent prostate surgery. CT with inflammation of bladder and prostate. Query if antibiotics were given prior to UC although on chart review  seems like antibiotics started at the time of UC. would stop zosyn and transition to po levoquin  750 mg daily for 10 days  ·	for completeness in screening would also send Schistosoma Ab (still hematuria most likely in c/o surgery/thurston), urine ADenovirus PCR/BK PCR   ·	discuss decrease in IS with nephrology (MMF)        Thank you for involving us in the care of this patient  Transplant ID will continue to follow  Please call or page with additional questions  Pager; #[phone]  Teams: from 8 am to 5 pm  Victoria Godfrey MD   70 y/o M with h/o ESRD, s/p renal transplant 2013 , DM, CAD s/p stents , ILD followed by pulmonology, FRANCOIS, HLD, HTN and BPH s/p recent TURP and thurston placement per patient on 12/27 presenting with 2-3 days of cough, lightheadedness, HA and myalgias and fever home.. on admission, afebrile, WBC stable at 8 with lymphopenia. COVID 19 +, UA with some27 WBC and 800 RBC in c/o thurston, UC and BC sent. Started on remdesivir and zosyn. He denies dysuria, N/V flank pain, bladder pain. Denies diarrhea.     Ct Chest 10/10/23   1. Newly developing nodular areas of consolidation bilaterally as well as reticular opacities and abbreviated differential includes metastatic disease with possible lymphangitic component. Atypical infectious and/or inflammatory etiologies cannot be excluded. Bronchoscopic correlation and histological sampling may be in order.  2. Mild increase in now moderate mediastinal and probable right hilar lymphadenopathy.  3. Post TAVR with aneurysmal dilatation of the ascending aorta measuring 4.2 cm moderate dilatation of the pulmonary artery measuring up to 3.6 cm. Pulmonary hypertension cannot be excluded.  4. Additional chronic findings as above.    CXR bilateral lower lobe dz    MIcro   11/29 UA 27 WBC, 583 RBC (thurston)   11/29 COVID 19 pos    1. S/p renal transplant 2013 at [place]  on pred, MMF, tacro  2. Fever  3.COVID 19 LRTI  lymphopenia noted, on RA. CXR with likely lower tract infection  procalcitonin neg  3. Pyuria, hematuria probable prostatitis  suspect in c/o thurston and recent TURP  UC pending  4. history of pulmonary nodules on CT 10/2023  5. h/o ILD    Recommendations  ·	Suspect presentation consistent with COVID 19  ·	Follow CMV PCR, Cryptococcus antigen, Histoplasma antigen in urine and serum, Coccidioides Ab and ASpergillus GM /fungitell in serum in light of pulmonary nodule  ·	NEw pulmonary nodule in immunosuppressed patient would discuss with pulmonology amenability for BAL/biopsy for microbiological diagnosis. Not the classical presentation for COVID and could be opportunistic-   ·	continue remdesivir- day 3 today- CT chest with no new finding suggesting COVID LRTI  ·	UC with <10K brittaney and pyuria in context of thurston - Patient clarifies he did not get any recent surgery but only a thurston placed.  CT with inflammation of bladder and prostate. Not patient was given ciprofloxacin by urologist 4 days prior to admission so urine culture growth may have been blunted. Recommend stopping zosyn and starting levoquin 750 mg daily with plan to continue for 3 weeks.   ·	for completeness in screening would also send Schistosoma Ab (still hematuria most likely in c/o surgery/thurston), urine ADenovirus PCR/BK PCR   ·	discuss decrease in IS with nephrology (MMF)        Thank you for involving us in the care of this patient  Transplant ID will continue to follow  Please call or page with additional questions  Pager; #[phone]  Teams: from 8 am to 5 pm  Victoria Godfrey MD   72 y/o M with h/o ESRD, s/p renal transplant 2013 , DM, CAD s/p stents , ILD followed by pulmonology, FRANCOIS, HLD, HTN and BPH s/p recent TURP and thurston placement per patient on 12/27 presenting with 2-3 days of cough, lightheadedness, HA and myalgias and fever home.. on admission, afebrile, WBC stable at 8 with lymphopenia. COVID 19 +, UA with some27 WBC and 800 RBC in c/o thurston, UC and BC sent. Started on remdesivir and zosyn. He denies dysuria, N/V flank pain, bladder pain. Denies diarrhea.     Ct Chest 10/10/23   1. Newly developing nodular areas of consolidation bilaterally as well as reticular opacities and abbreviated differential includes metastatic disease with possible lymphangitic component. Atypical infectious and/or inflammatory etiologies cannot be excluded. Bronchoscopic correlation and histological sampling may be in order.  2. Mild increase in now moderate mediastinal and probable right hilar lymphadenopathy.  3. Post TAVR with aneurysmal dilatation of the ascending aorta measuring 4.2 cm moderate dilatation of the pulmonary artery measuring up to 3.6 cm. Pulmonary hypertension cannot be excluded.  4. Additional chronic findings as above.    CXR bilateral lower lobe dz    MIcro   11/29 UA 27 WBC, 583 RBC (thurston)   11/29 COVID 19 pos    1. S/p renal transplant 2013 at [facility]  on pred, MMF, tacro  2. Fever  3.COVID 19 LRTI  lymphopenia noted, on RA. CXR with likely lower tract infection  procalcitonin neg  3. Pyuria, hematuria probable prostatitis  suspect in c/o thurston and recent TURP  UC pending  4. history of pulmonary nodules on CT 10/2023  5. h/o ILD    Recommendations  ·	Suspect presentation consistent with COVID 19  ·	Follow CMV PCR, Cryptococcus antigen, Histoplasma antigen in urine and serum, Coccidioides Ab and ASpergillus GM /fungitell in serum in light of pulmonary nodule  ·	NEw pulmonary nodule in immunosuppressed patient would discuss with pulmonology amenability for BAL/biopsy for microbiological diagnosis. Not the classical presentation for COVID and could be opportunistic-   ·	continue remdesivir- day 3 today- CT chest with no new finding suggesting COVID LRTI  ·	UC with <10K brittaney and pyuria in context of thurston - Patient clarifies he did not get any recent surgery but only a thurston placed.  CT with inflammation of bladder and prostate. Not patient was given ciprofloxacin by urologist 4 days prior to admission so urine culture growth may have been blunted. Recommend stopping zosyn and starting levoquin 750 mg daily with plan to continue for 3 weeks.   ·	for completeness in screening would also send Schistosoma Ab (still hematuria most likely in c/o surgery/thurston), urine ADenovirus PCR/BK PCR   ·	discuss decrease in IS with nephrology (MMF)        Thank you for involving us in the care of this patient  Transplant ID will continue to follow  Please call or page with additional questions  Pager; #[phone]  Teams: from 8 am to 5 pm  Victoria Godfrey MD

## 2023-12-31 NOTE — PROGRESS NOTE ADULT - ASSESSMENT
72M w/ pmhx of FRANCOIS on CPAP, COPD w/ 50-pack-year smoking history, DM2 on insulin pump, hyperlipidemia, hypertension, CAD status post stent, TAVR, history of kidney transplant in  on immunosuppresion, presenting with 2 days of cough, headache, light headedness. Pt tested positive twice at home for covid 19. Denies chest pain, nvd, sore throat.    vss on RA, no significant leukocytosis, eos, not coagulopathic, bicarb not elevated, with good kidney function, procal, trop neg, bnp 435, lower than in the past  7.37/22/70/13/97.0 lac 0.7, UA bloody and with WBC, positive for covid, CXR with bilateral basal disease    ECHO with normal RV, moderate diastolic dysfunction, normal TAVR, severe sleep apnea, and also had normal PFTs  carries diagnosis of ILD and intermittently on steroids, bibasilar nodular disease     patient with some weakness, however without increased cough, fevers, chill     #recommendations  please obtain  nasal MRSA (neg)  full RVP, sputum culture, blood cultures, U/A, urine legionella,   pro BNP mildly elevated, ECHO with bubble study if considered by cardiology team   CT chest with new lung nodules and fibrosis which will need outpatient follow up,  please obtain serum fungitell, galactomannin, sputum PCP  defer abx and COVID treatment to ID  titrate oxygen to O2 >88%, use humidified oxygen  incentive spirometry, OOB to chair, PT/OT  aspiration precautions as necessary  DVT ppx as tolerated  airway clearance as necessary    Prior to discharge:  Please email: [email or web address] to setup an appointment prior to discharge. Include the patient's name, , MRN and contact information in the email.      P[facility]  [address] [place] [address]  [phone]    Earnest Ritchie PGY5 PCCM Fellow 72M w/ pmhx of FRANCOIS on CPAP, COPD w/ 50-pack-year smoking history, DM2 on insulin pump, hyperlipidemia, hypertension, CAD status post stent, TAVR, history of kidney transplant in  on immunosuppresion, presenting with 2 days of cough, headache, light headedness. Pt tested positive twice at home for covid 19. Denies chest pain, nvd, sore throat.    vss on RA, no significant leukocytosis, eos, not coagulopathic, bicarb not elevated, with good kidney function, procal, trop neg, bnp 435, lower than in the past  7.37/22/70/13/97.0 lac 0.7, UA bloody and with WBC, positive for covid, CXR with bilateral basal disease    ECHO with normal RV, moderate diastolic dysfunction, normal TAVR, severe sleep apnea, and also had normal PFTs  carries diagnosis of ILD and intermittently on steroids, bibasilar nodular disease     patient with some weakness, however without increased cough, fevers, chill     #recommendations  please obtain  nasal MRSA (neg)  full RVP, sputum culture, blood cultures, U/A, urine legionella,   pro BNP mildly elevated, ECHO with bubble study if considered by cardiology team   CT chest with new lung nodules and fibrosis which will need outpatient follow up,  please obtain serum fungitell, galactomannin, sputum PCP  defer abx and COVID treatment to ID  titrate oxygen to O2 >88%, use humidified oxygen  incentive spirometry, OOB to chair, PT/OT  aspiration precautions as necessary  DVT ppx as tolerated  airway clearance as necessary    Prior to discharge:  Please email: [email or web address] to setup an appointment prior to discharge. Include the patient's name, , MRN and contact information in the email.      Pulmonary[facility]Sleep Clinic  [address] [place] [address]  [phone]    Earnest Ritchie PGY5 PCCM Fellow

## 2023-12-31 NOTE — DISCHARGE NOTE NURSING/CASE MANAGEMENT/SOCIAL WORK - NSDCPEFALRISK_GEN_ALL_CORE
For information on Fall & Injury Prevention, visit: [email or web address] OR  [email or web address] OR  https://www.cdc.gov/steadi/patient.html For information on Fall & Injury Prevention, visit: [email or web address] OR  [email or web address] OR  https://www.cdc.gov/steadi/patient.html

## 2023-12-31 NOTE — PROGRESS NOTE ADULT - SUBJECTIVE AND OBJECTIVE BOX
Transplant ID follow up      ____________________________________________________  ROS  GENERAL: denies chills, , night sweats, weight loss.   PSYCH: denies depression, anxiety, suicidal ideation, hallucination, and delusions  SKIN: no rash or lesions; no color changes, no abnormal nevi,no  dryness, and nojaundice    EYES: denies visual changes, floaters, pain, inflammation, blurred vision, and discharge  ENT: denies tinnitus, vertigo, epistaxis, oral lesion, and decreased acuity  PULM: denies, hemoptysis, pleurisy  CVS: denies angina, palpitations,+ orthopnea, no syncope, or heart murmur  GI: denies constipation, diarrhea, melena, abdominal pain, nausea.   : denies dysuria, frequency, discharge, incontinence, stones or macroscopic hematuria  MS: no arthralgias, no erythema or swelling, no myalgias, noedema, or lower back pain.   CNS: denies numbness, dizziness, seizure, or tremor  ENDO: denies heat/cold intolerance, polyuria, polydipsia, malaise.    HEME: denies bruising, bleeding, lymphadenopathy, anemia, and calf pain    Allergies  No Known Allergies    __________________________________________________  MEDS:  MEDICATIONS  (STANDING):  albuterol    90 MICROgram(s) HFA Inhaler 2 every 8 hours  allopurinol 100 daily  aspirin enteric coated 81 daily  atorvastatin 20 at bedtime  dextrose 50% Injectable 25 once  dextrose 50% Injectable 25 once  dextrose 50% Injectable 12.5 once  dextrose Oral Gel 15 once  enalapril 5 daily  gabapentin 100 daily  glucagon  Injectable 1 once  heparin   Injectable 5000 every 12 hours  labetalol 200 three times a day  montelukast 10 daily  mycophenolate mofetil 500 two times a day  NIFEdipine XL 90 daily  senna 2 at bedtime  tacrolimus 1 daily  tacrolimus 1.5 at bedtime  torsemide 10 daily    _________________________________________________  ANTIMICOBIALS  mycophenolate mofetil 500 two times a day  piperacillin/tazobactam IVPB.. 3.375 every 8 hours  piperacillin/tazobactam IVPB... 3.375 once  remdesivir  IVPB    remdesivir  IVPB 100 every 24 hours  tacrolimus 1 daily  tacrolimus 1.5 at bedtime      GENERAL LABS              x                    140  | 22   | 13           x     >-----------< x       ------------------------< 187                   x                    3.5  | 103  | 0.66                                         Ca 8.7   Mg x     Ph x          Urinalysis Basic - ( 30 Dec 2023 07:17 )    Color: x / Appearance: x / SG: x / pH: x  Gluc: 187 mg/dL / Ketone: x  / Bili: x / Urobili: x   Blood: x / Protein: x / Nitrite: x   Leuk Esterase: x / RBC: x / WBC x   Sq Epi: x / Non Sq Epi: x / Bacteria: x        _________________________________________________  MICROBIOLOGY  -----------                  Fungitell:   _______________________________________________  PERTINENT IMAGING        T(C): 36.7 (12-30-23 @ 14:20), Max: 37.9 (12-29-23 @ 19:48)  HR: 69 (12-30-23 @ 14:45) (68 - 92)  BP: 175/76 (12-30-23 @ 14:20) (137/70 - 175/76)  RR: 18 (12-30-23 @ 14:20) (18 - 19)  SpO2: 97% (12-30-23 @ 14:45) (95% - 100%)    CONSTITUTIONAL: Well groomed, no apparent distress  EYES: PERRLA and symmetric, EOMI, No conjunctival or scleral injection, non-icteric  ENMT: Oral mucosa with moist membranes. Normal dentition; no pharyngeal injection or exudates             NECK: Supple, symmetric and without tracheal deviation   RESP: bilateral base crackles  CV: RRR, +S1S2, no MRG; no JVD; no peripheral edema  GI: Soft, NT, ND, no rebound, no guarding; no palpable masses; no hepatosplenomegaly; no hernia palpated  LYMPH: No cervical LAD or tenderness; no axillary LAD or tenderness; no inguinal LAD or tenderness  MSK: Normal gait; No digital clubbing or cyanosis; examination of the (head/neck/spine/ribs/pelvis, RUE, LUE, RLE, LLE) without misalignment,            Normal ROM without pain, no spinal tenderness, normal muscle strength/tone  SKIN: No rashes or ulcers noted; no subcutaneous nodules or induration palpable  NEURO: non focal  Pruitt in place old AVF  Transplant ID follow up  Afebrile, on RA  Ct C/A/P showing cystitis/prostatitis and improved interstitial infiltrates c/t 10/2023 CT with new pulmonary nodules  No change in symptoms      ____________________________________________________  ROS  GENERAL: denies chills, , night sweats, weight loss.   PSYCH: denies depression, anxiety, suicidal ideation, hallucination, and delusions  SKIN: no rash or lesions; no color changes, no abnormal nevi,no  dryness, and nojaundice    EYES: denies visual changes, floaters, pain, inflammation, blurred vision, and discharge  ENT: denies tinnitus, vertigo, epistaxis, oral lesion, and decreased acuity  PULM: denies, hemoptysis, pleurisy  CVS: denies angina, palpitations,+ orthopnea, no syncope, or heart murmur  GI: denies constipation, diarrhea, melena, abdominal pain, nausea.   : denies dysuria, frequency, discharge, incontinence, stones or macroscopic hematuria  MS: no arthralgias, no erythema or swelling, no myalgias, noedema, or lower back pain.   CNS: denies numbness, dizziness, seizure, or tremor  ENDO: denies heat/cold intolerance, polyuria, polydipsia, malaise.    HEME: denies bruising, bleeding, lymphadenopathy, anemia, and calf pain    Allergies  No Known Allergies    __________________________________________________  MEDS:  MEDICATIONS  (STANDING):  albuterol    90 MICROgram(s) HFA Inhaler 2 every 8 hours  allopurinol 100 daily  aspirin enteric coated 81 daily  atorvastatin 20 at bedtime  dextrose 50% Injectable 25 once  dextrose 50% Injectable 25 once  dextrose 50% Injectable 12.5 once  dextrose Oral Gel 15 once  enalapril 10 daily  gabapentin 100 daily  glucagon  Injectable 1 once  heparin   Injectable 5000 every 12 hours  insulin aspart (NovoLOG) Pump 1 Continuous Pump  labetalol 200 three times a day  montelukast 10 daily  mycophenolate mofetil 500 two times a day  NIFEdipine XL 90 daily  senna 2 at bedtime  tacrolimus 1 daily  tacrolimus 1.5 at bedtime  torsemide 10 daily    _________________________________________________  ANTIMICOBIALS  mycophenolate mofetil 500 two times a day  piperacillin/tazobactam IVPB.. 3.375 every 8 hours  piperacillin/tazobactam IVPB... 3.375 once  remdesivir  IVPB    remdesivir  IVPB 100 every 24 hours  tacrolimus 1 daily  tacrolimus 1.5 at bedtime      GENERAL LABS              x                    139  | 24   | 11           x     >-----------< x       ------------------------< 89                    x                    3.3  | 103  | 0.69                                         Ca 9.7   Mg x     Ph x          Urinalysis Basic - ( 31 Dec 2023 07:05 )    Color: x / Appearance: x / SG: x / pH: x  Gluc: 89 mg/dL / Ketone: x  / Bili: x / Urobili: x   Blood: x / Protein: x / Nitrite: x   Leuk Esterase: x / RBC: x / WBC x   Sq Epi: x / Non Sq Epi: x / Bacteria: x        _________________________________________________  MICROBIOLOGY  -----------    Culture - Blood (collected 29 Dec 2023 18:43)  Source: .Blood Blood-Peripheral  Preliminary Report (31 Dec 2023 02:03):    No growth at 24 hours    Culture - Blood (collected 29 Dec 2023 18:43)  Source: .Blood Blood-Peripheral  Preliminary Report (31 Dec 2023 02:03):    No growth at 24 hours    Culture - Urine (collected 29 Dec 2023 18:43)  Source: Clean Catch Clean Catch (Midstream)  Final Report (30 Dec 2023 20:54):    <10,000 CFU/mL Normal Urogenital Heather                    Fungitell:   _______________________________________________  PERTINENT IMAGING

## 2023-12-31 NOTE — PROGRESS NOTE ADULT - PROVIDER SPECIALTY LIST ADULT
Cardiology
Internal Medicine
Nephrology
Internal Medicine
Cardiology
Endocrinology
Pulmonology
Transplant ID

## 2023-12-31 NOTE — PROGRESS NOTE ADULT - SUBJECTIVE AND OBJECTIVE BOX
Date of Service: 12-31-23 @ 09:33           CARDIOLOGY     PROGRESS  NOTE   ________________________________________________    CHIEF COMPLAINT:Patient is a 73y old  Male who presents with a chief complaint of malaise/  fevers (30 Dec 2023 18:19)  no complain  	  REVIEW OF SYSTEMS:  CONSTITUTIONAL: No fever, weight loss, or fatigue  EYES: No eye pain, visual disturbances, or discharge  ENT:  No difficulty hearing, tinnitus, vertigo; No sinus or throat pain  NECK: No pain or stiffness  RESPIRATORY: No cough, wheezing, chills or hemoptysis; decrease Shortness of Breath  CARDIOVASCULAR: No chest pain, palpitations, passing out, dizziness, or leg swelling  GASTROINTESTINAL: No abdominal or epigastric pain. No nausea, vomiting, or hematemesis; No diarrhea or constipation. No melena or hematochezia.  GENITOURINARY: No dysuria, frequency, hematuria, or incontinence  NEUROLOGICAL: No headaches, memory loss, loss of strength, numbness, or tremors  SKIN: No itching, burning, rashes, or lesions   LYMPH Nodes: No enlarged glands  ENDOCRINE: No heat or cold intolerance; No hair loss  MUSCULOSKELETAL: No joint pain or swelling; No muscle, back, or extremity pain  PSYCHIATRIC: No depression, anxiety, mood swings, or difficulty sleeping  HEME/LYMPH: No easy bruising, or bleeding gums  ALLERGY AND IMMUNOLOGIC: No hives or eczema	    [x ] All others negative	  [ ] Unable to obtain    PHYSICAL EXAM:  T(C): 36.7 (12-31-23 @ 09:31), Max: 36.9 (12-30-23 @ 23:29)  HR: 68 (12-31-23 @ 09:31) (62 - 92)  BP: 117/68 (12-31-23 @ 09:31) (117/68 - 175/76)  RR: 18 (12-31-23 @ 09:31) (18 - 18)  SpO2: 97% (12-31-23 @ 09:31) (95% - 100%)  Wt(kg): --  I&O's Summary    31 Dec 2023 07:01  -  31 Dec 2023 09:33  --------------------------------------------------------  IN: 240 mL / OUT: 200 mL / NET: 40 mL        Appearance: Normal	  HEENT:   Normal oral mucosa, PERRL, EOMI	  Lymphatic: No lymphadenopathy  Cardiovascular: Normal S1 S2, No JVD, + murmurs, No edema  Respiratory: rhonchi  Psychiatry: A & O x 3, Mood & affect appropriate  Gastrointestinal:  Soft, Non-tender, + BS	  Skin: No rashes, No ecchymoses, No cyanosis	  Extremities: Normal range of motion, No clubbing, cyanosis or edema  Vascular: Peripheral pulses palpable 2+ bilaterally    MEDICATIONS  (STANDING):  albuterol    90 MICROgram(s) HFA Inhaler 2 Puff(s) Inhalation every 8 hours  allopurinol 100 milliGRAM(s) Oral daily  aspirin enteric coated 81 milliGRAM(s) Oral daily  atorvastatin 20 milliGRAM(s) Oral at bedtime  chlorhexidine 2% Cloths 1 Application(s) Topical daily  dextrose 50% Injectable 25 Gram(s) IV Push once  dextrose 50% Injectable 25 Gram(s) IV Push once  dextrose 50% Injectable 12.5 Gram(s) IV Push once  dextrose Oral Gel 15 Gram(s) Oral once  enalapril 10 milliGRAM(s) Oral daily  gabapentin 100 milliGRAM(s) Oral daily  glucagon  Injectable 1 milliGRAM(s) IntraMuscular once  heparin   Injectable 5000 Unit(s) SubCutaneous every 12 hours  insulin aspart (NovoLOG) Pump 1 Each SubCutaneous Continuous Pump  labetalol 200 milliGRAM(s) Oral three times a day  montelukast 10 milliGRAM(s) Oral daily  mycophenolate mofetil 500 milliGRAM(s) Oral two times a day  NIFEdipine XL 90 milliGRAM(s) Oral daily  piperacillin/tazobactam IVPB.. 3.375 Gram(s) IV Intermittent every 8 hours  piperacillin/tazobactam IVPB... 3.375 Gram(s) IV Intermittent once  potassium chloride    Tablet ER 40 milliEquivalent(s) Oral once  remdesivir  IVPB   IV Intermittent   remdesivir  IVPB 100 milliGRAM(s) IV Intermittent every 24 hours  senna 2 Tablet(s) Oral at bedtime  tacrolimus 1 milliGRAM(s) Oral daily  tacrolimus 1.5 milliGRAM(s) Oral at bedtime  torsemide 10 milliGRAM(s) Oral daily      TELEMETRY: 	    ECG:  	  RADIOLOGY:  OTHER: 	  	  LABS:	 	    CARDIAC MARKERS:                          13.7   8.50  )-----------( 153      ( 29 Dec 2023 18:29 )             42.0     12-31    139  |  103  |  11  ----------------------------<  89  3.3<L>   |  24  |  0.69    Ca    9.7      31 Dec 2023 07:05    TPro  7.6  /  Alb  4.4  /  TBili  0.7  /  DBili  0.2  /  AST  15  /  ALT  10  /  AlkPhos  83  12-31    proBNP:   Lipid Profile:   HgA1c:   TSH:   PT/INR - ( 31 Dec 2023 07:08 )   PT: 12.8 sec;   INR: 1.17 ratio         PTT - ( 29 Dec 2023 18:29 )  PTT:30.2 sec      Assessment and plan  ---------------------------  72M w/ pmhx of FRANCOIS on CPAP, COPD w/ 50-pack-year smoking history, DM2 on insulin pump, hyperlipidemia, hypertension, CAD status post stent, TAVR, history of kidney transplant in 2013 on immunosuppresion, presenting with 2 days of cough, headache, light headedness. Pt tested positive twice at home for covid 19. Denies chest pain, nvd, sore throat.  pt is well known to me with sig cardiac hx with c/o sob  pt s/p renal transplant on immunosuppression  pt with malignant HTN on may meds, continue all bp meds  transplant/ ID consult  s/p TAVR no need to repeat echo  dvt prophylaxis  check d dimer  chest x ray noted ?covid pneumoniae  increase bp restart on enalapril 10 mg daily  ID Transplant conmsult  COVID + ob RDV  bp is better controlled  \increase ambulation, pt with no complain doing much better

## 2023-12-31 NOTE — PROGRESS NOTE ADULT - ASSESSMENT
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  Endocrine consulted for insulin pump & DM2 management     #T2DM (A1c  5.3%)  #Insulin pump in place   Home regimen: Medtronic w/ Novolog +Ozempic 0.5mg weekly   Insulin pump settings:  Basal:  12AM-5AM 0.6U/hr    5AM-12AM 0.7U/hr    TDD is 16.3U         ICR:  12AM-11PM 15g/u    11PM-12AM 20g/u      ISF: 50mg/dl all day    Glucose target       BG levels tightly controlled today  Recommendations:   - Continue insulin pump with current settings, adjustments made as above    - Please make sure patient fills out patient attestation and patient self-assessment form to use insulin pump and place in chart (forms can be found on forms on demand)   - last pump change on 12/28/23, Next pump change due for 12/31/23 (pt has supplies)  - RN to document correction insulin bolus in flow sheet   - Hypoglcyemia protocol    - In case of pump malfunction, please administer Lantus 16 units STAT and start Admelog 4 units TID with meals  (HOLD IF NPO)   - Please check FSG before meals and QHS, or q6h while NPO   - Please keep patient on a diabetic, carb controlled diet    - on discharge patient should follow up with their endocrinologist Dr. Byrd     #HLD  - patient on atorvastatin 20mg at home  - goal LDL in diabetes mellitus is <70    #HTN  - patient on enalapril 10mg at home  - goal BP in diabetes mellitus is <130/80  - defer to primary team for management      Mery White DO   Attending Physician   Department of Endocrinology, Diabetes and Metabolism     If before 9AM or after 5PM, or on weekends/holidays, please call the Endocrine answering service for assistance ([phone]).  For nonurgent matters, please email [email or web address] or [email or web address]     Please note that this patient may be followed by different provider tomorrow.  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  Endocrine consulted for insulin pump & DM2 management     #T2DM (A1c  5.3%)  #Insulin pump in place   Home regimen: Medtronic w/ Novolog +Ozempic 0.5mg weekly   Insulin pump settings:  Basal:  12AM-5AM 0.6U/hr    5AM-12AM 0.7U/hr    TDD is 16.3U         ICR:  12AM-11PM 15g/u    11PM-12AM 20g/u      ISF: 50mg/dl all day    Glucose target       BG levels tightly controlled today  Recommendations:   - Continue insulin pump with current settings, adjustments made as above    - Please make sure patient fills out patient attestation and patient self-assessment form to use insulin pump and place in chart (forms can be found on forms on demand)   - last pump change on 12/28/23, Next pump change due for 12/31/23 (pt has supplies)  - RN to document correction insulin bolus in flow sheet   - Hypoglcyemia protocol    - In case of pump malfunction, please administer Lantus 16 units STAT and start Admelog 4 units TID with meals  (HOLD IF NPO)   - Please check FSG before meals and QHS, or q6h while NPO   - Please keep patient on a diabetic, carb controlled diet    - on discharge patient should follow up with their endocrinologist Dr. Byrd     #HLD  - patient on atorvastatin 20mg at home  - goal LDL in diabetes mellitus is <70    #HTN  - patient on enalapril 10mg at home  - goal BP in diabetes mellitus is <130/80  - defer to primary team for management      Mery White DO   Attending Physician   Department of Endocrinology, Diabetes and Metabolism     If before 9AM or after 5PM, or on weekends/holidays, please call the Endocrine answering service for assistance ([phone]).  For nonurgent matters, please email [email or web address] or [email or web address]     Please note that this patient may be followed by different provider tomorrow.  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  Endocrine consulted for insulin pump & DM2 management     #T2DM (A1c  5.3%)  #Insulin pump in place   Home regimen: Medtronic w/ Novolog +Ozempic 0.5mg weekly   Insulin pump settings:  Basal:  12AM-5AM 0.6U/hr    5AM-12AM 0.7U/hr    TDD is 16.3U         ICR:  12AM-11PM 15g/u    11PM-12AM 20g/u      ISF: 50mg/dl all day    Glucose target       BG levels tightly controlled today. Patient declines pump adjustments/temp basal. Understands risk for hypoglycemia and verbalizes understanding of recommendations but states that he feels well and BG levels are within his usual range. Does not wish to make any adjustments at this time.     Recommendations:   - Continue insulin pump at current settings  - Please check FSG before meals and QHS, or q6h while NPO   - BG goal while inpatient 100-180mg/dL   - Will revisit discussion of temp basal if BG levels remain tightly controlled, < 100mg/dL.   - Please ensure patient attestation and patient self-assessment forms are complete (forms can be found on forms on demand)   - last pump change due for 1/1/24 (pt has supplies)  - RN to document correction insulin bolus in flow sheet   - Hypoglcyemia protocol    - In case of pump malfunction, please administer Lantus 14 units STAT and start Admelog 3 units TID with meals  (HOLD IF NPO)   - Please keep patient on a diabetic, carb controlled diet    - on discharge patient should follow up with their endocrinologist Dr. Byrd. Discharge on home insulin pump at current settings.     #HLD  - patient on atorvastatin 20mg at home  - goal LDL in diabetes mellitus is <70    #HTN  - patient on enalapril 10mg at home  - goal BP in diabetes mellitus is <130/80  - defer to primary team for management      Mery White DO   Attending Physician   Department of Endocrinology, Diabetes and Metabolism     If before 9AM or after 5PM, or on weekends/holidays, please call the Endocrine answering service for assistance ([phone]).  For nonurgent matters, please email [email or web address] or [email or web address]     Please note that this patient may be followed by different provider tomorrow.  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  Endocrine consulted for insulin pump & DM2 management     #T2DM (A1c  5.3%)  #Insulin pump in place   Home regimen: Medtronic w/ Novolog +Ozempic 0.5mg weekly   Insulin pump settings:  Basal:  12AM-5AM 0.6U/hr    5AM-12AM 0.7U/hr    TDD is 16.3U         ICR:  12AM-11PM 15g/u    11PM-12AM 20g/u      ISF: 50mg/dl all day    Glucose target       BG levels tightly controlled today. Patient declines pump adjustments/temp basal. Understands risk for hypoglycemia and verbalizes understanding of recommendations but states that he feels well and BG levels are within his usual range. Does not wish to make any adjustments at this time.     Recommendations:   - Continue insulin pump at current settings  - Please check FSG before meals and QHS, or q6h while NPO   - BG goal while inpatient 100-180mg/dL   - Will revisit discussion of temp basal if BG levels remain tightly controlled, < 100mg/dL.   - Please ensure patient attestation and patient self-assessment forms are complete (forms can be found on forms on demand)   - last pump change due for 1/1/24 (pt has supplies)  - RN to document correction insulin bolus in flow sheet   - Hypoglcyemia protocol    - In case of pump malfunction, please administer Lantus 14 units STAT and start Admelog 3 units TID with meals  (HOLD IF NPO)   - Please keep patient on a diabetic, carb controlled diet    - on discharge patient should follow up with their endocrinologist Dr. Byrd. Discharge on home insulin pump at current settings.     #HLD  - patient on atorvastatin 20mg at home  - goal LDL in diabetes mellitus is <70    #HTN  - patient on enalapril 10mg at home  - goal BP in diabetes mellitus is <130/80  - defer to primary team for management      Mery White DO   Attending Physician   Department of Endocrinology, Diabetes and Metabolism     If before 9AM or after 5PM, or on weekends/holidays, please call the Endocrine answering service for assistance ([phone]).  For nonurgent matters, please email [email or web address] or [email or web address]     Please note that this patient may be followed by different provider tomorrow.  Notify endocrine 24 hours prior to discharge for final recommendations

## 2023-12-31 NOTE — PROGRESS NOTE ADULT - SUBJECTIVE AND OBJECTIVE BOX
CHIEF COMPLAINT:Patient is a 73y old  Male who presents with a chief complaint of malaise/  fevers (31 Dec 2023 12:11)      Interval Events: Patient denies fevers, chills, chest pain, shortness of breath, nausea, abdominal pain, diarrhea, constipation, dysuria, leg swelling, headache, light headedness    REVIEW OF SYSTEMS:  [x] All other systems negative except per HPI   [ ] Unable to assess ROS because ________    OBJECTIVE:  ICU Vital Signs Last 24 Hrs  T(C): 37.1 (31 Dec 2023 16:18), Max: 37.1 (31 Dec 2023 16:18)  T(F): 98.7 (31 Dec 2023 16:18), Max: 98.7 (31 Dec 2023 16:18)  HR: 65 (31 Dec 2023 16:18) (62 - 68)  BP: 155/73 (31 Dec 2023 16:18) (117/68 - 156/75)  BP(mean): --  ABP: --  ABP(mean): --  RR: 18 (31 Dec 2023 16:18) (18 - 18)  SpO2: 93% (31 Dec 2023 16:18) (93% - 97%)    O2 Parameters below as of 31 Dec 2023 16:18  Patient On (Oxygen Delivery Method): room air              12-31 @ 07:01  -  12-31 @ 18:04  --------------------------------------------------------  IN: 240 mL / OUT: 200 mL / NET: 40 mL        PHYSICAL EXAM:  GENERAL: NAD, well-groomed, well-developed  HEAD:  Atraumatic, Normocephalic  EYES: EOMI, PERRLA, conjunctiva and sclera clear  ENMT: No tonsillar erythema, exudates, or enlargement; Moist mucous membranes, Good dentition, No lesions  NECK: Supple, No JVD, Normal thyroid  CHEST/LUNG: Clear to auscultation bilaterally; No rales, rhonchi, wheezing, or rubs  HEART: Regular rate and rhythm; No murmurs, rubs, or gallops  ABDOMEN: Soft, Nontender, Nondistended; Bowel sounds present  VASCULAR:  2+ Peripheral Pulses, No clubbing, cyanosis, or edema  LYMPH: No lymphadenopathy noted  SKIN: No rashes or lesions  NERVOUS SYSTEM:  Alert & Oriented X3, Good concentration; Motor Strength 5/5 B/L upper and lower extremities; DTRs 2+ intact and symmetric    HOSPITAL MEDICATIONS:  MEDICATIONS  (STANDING):  albuterol    90 MICROgram(s) HFA Inhaler 2 Puff(s) Inhalation every 8 hours  allopurinol 100 milliGRAM(s) Oral daily  aspirin enteric coated 81 milliGRAM(s) Oral daily  atorvastatin 20 milliGRAM(s) Oral at bedtime  chlorhexidine 2% Cloths 1 Application(s) Topical daily  dextrose 50% Injectable 25 Gram(s) IV Push once  dextrose 50% Injectable 12.5 Gram(s) IV Push once  dextrose 50% Injectable 25 Gram(s) IV Push once  dextrose Oral Gel 15 Gram(s) Oral once  enalapril 10 milliGRAM(s) Oral daily  gabapentin 100 milliGRAM(s) Oral daily  glucagon  Injectable 1 milliGRAM(s) IntraMuscular once  heparin   Injectable 5000 Unit(s) SubCutaneous every 12 hours  insulin aspart (NovoLOG) Pump 1 Each SubCutaneous Continuous Pump  labetalol 200 milliGRAM(s) Oral three times a day  levoFLOXacin  Tablet 750 milliGRAM(s) Oral every 24 hours  montelukast 10 milliGRAM(s) Oral daily  mycophenolate mofetil 500 milliGRAM(s) Oral two times a day  NIFEdipine XL 90 milliGRAM(s) Oral daily  piperacillin/tazobactam IVPB... 3.375 Gram(s) IV Intermittent once  senna 2 Tablet(s) Oral at bedtime  tacrolimus 1 milliGRAM(s) Oral daily  tacrolimus 1.5 milliGRAM(s) Oral at bedtime  torsemide 10 milliGRAM(s) Oral daily    MEDICATIONS  (PRN):      LABS:    The Labs were reviewed by me   The Radiology was reviewed by me    EKG tracing reviewed by me    12-31    139  |  103  |  11  ----------------------------<  89  3.3<L>   |  24  |  0.69  12-30    140  |  103  |  13  ----------------------------<  187<H>  3.5   |  22  |  0.66  12-29    142  |  108  |  18  ----------------------------<  112<H>  3.9   |  22  |  0.69    Ca    9.7      31 Dec 2023 07:05  Ca    8.7      30 Dec 2023 07:17  Ca    9.3      29 Dec 2023 18:29    TPro  7.6  /  Alb  4.4  /  TBili  0.7  /  DBili  0.2  /  AST  15  /  ALT  10  /  AlkPhos  83  12-31  TPro  6.6  /  Alb  3.6  /  TBili  0.6  /  DBili  0.2  /  AST  13  /  ALT  <5<L>  /  AlkPhos  69  12-30  TPro  6.8  /  Alb  3.7  /  TBili  0.8  /  DBili  x   /  AST  11  /  ALT  7<L>  /  AlkPhos  73  12-29          PT/INR - ( 31 Dec 2023 07:08 )   PT: 12.8 sec;   INR: 1.17 ratio         PTT - ( 29 Dec 2023 18:29 )  PTT:30.2 sec              Urinalysis Basic - ( 31 Dec 2023 07:05 )    Color: x / Appearance: x / SG: x / pH: x  Gluc: 89 mg/dL / Ketone: x  / Bili: x / Urobili: x   Blood: x / Protein: x / Nitrite: x   Leuk Esterase: x / RBC: x / WBC x   Sq Epi: x / Non Sq Epi: x / Bacteria: x                              13.7   8.50  )-----------( 153      ( 29 Dec 2023 18:29 )             42.0     CAPILLARY BLOOD GLUCOSE      POCT Blood Glucose.: 119 mg/dL (31 Dec 2023 16:47)  POCT Blood Glucose.: 142 mg/dL (31 Dec 2023 11:44)  POCT Blood Glucose.: 110 mg/dL (31 Dec 2023 08:30)  POCT Blood Glucose.: 102 mg/dL (30 Dec 2023 21:58)        MICROBIOLOGY:     RADIOLOGY:  [ ] Reviewed and interpreted by me    Point of Care Ultrasound Findings:    PFT:    EKG:

## 2023-12-31 NOTE — PROGRESS NOTE ADULT - REASON FOR ADMISSION
malaise/  fevers

## 2023-12-31 NOTE — PROGRESS NOTE ADULT - SUBJECTIVE AND OBJECTIVE BOX
date of service: 12-31-23 @ 09:54  afberile  REVIEW OF SYSTEMS:  CONSTITUTIONAL: No fever,  no  weight loss  ENT:  No  tinnitus,   no   vertigo  NECK: No pain or stiffness  RESPIRATORY: No cough, wheezing, chills or hemoptysis;    No Shortness of Breath  CARDIOVASCULAR: No chest pain, palpitations, dizziness  GASTROINTESTINAL: No abdominal or epigastric pain. No nausea, vomiting, or hematemesis; No diarrhea  No melena or hematochezia.  GENITOURINARY: No dysuria, frequency, hematuria, or incontinence  NEUROLOGICAL: No headaches  SKIN: No itching,  no   rash  LYMPH Nodes: No enlarged glands  ENDOCRINE: No heat or cold intolerance  MUSCULOSKELETAL: No joint pain or swelling  PSYCHIATRIC: No depression, anxiety  HEME/LYMPH: No easy bruising, or bleeding gums  ALLERGY AND IMMUNOLOGIC: No hives or eczema	    MEDICATIONS  (STANDING):  albuterol    90 MICROgram(s) HFA Inhaler 2 Puff(s) Inhalation every 8 hours  allopurinol 100 milliGRAM(s) Oral daily  aspirin enteric coated 81 milliGRAM(s) Oral daily  atorvastatin 20 milliGRAM(s) Oral at bedtime  chlorhexidine 2% Cloths 1 Application(s) Topical daily  dextrose 50% Injectable 25 Gram(s) IV Push once  dextrose 50% Injectable 12.5 Gram(s) IV Push once  dextrose 50% Injectable 25 Gram(s) IV Push once  dextrose Oral Gel 15 Gram(s) Oral once  enalapril 10 milliGRAM(s) Oral daily  gabapentin 100 milliGRAM(s) Oral daily  glucagon  Injectable 1 milliGRAM(s) IntraMuscular once  heparin   Injectable 5000 Unit(s) SubCutaneous every 12 hours  insulin aspart (NovoLOG) Pump 1 Each SubCutaneous Continuous Pump  labetalol 200 milliGRAM(s) Oral three times a day  montelukast 10 milliGRAM(s) Oral daily  mycophenolate mofetil 500 milliGRAM(s) Oral two times a day  NIFEdipine XL 90 milliGRAM(s) Oral daily  piperacillin/tazobactam IVPB.. 3.375 Gram(s) IV Intermittent every 8 hours  piperacillin/tazobactam IVPB... 3.375 Gram(s) IV Intermittent once  remdesivir  IVPB   IV Intermittent   remdesivir  IVPB 100 milliGRAM(s) IV Intermittent every 24 hours  senna 2 Tablet(s) Oral at bedtime  tacrolimus 1 milliGRAM(s) Oral daily  tacrolimus 1.5 milliGRAM(s) Oral at bedtime  torsemide 10 milliGRAM(s) Oral daily    MEDICATIONS  (PRN):      Vital Signs Last 24 Hrs  T(C): 36.7 (31 Dec 2023 09:31), Max: 36.9 (30 Dec 2023 23:29)  T(F): 98.1 (31 Dec 2023 09:31), Max: 98.4 (30 Dec 2023 23:29)  HR: 68 (31 Dec 2023 09:31) (62 - 92)  BP: 117/68 (31 Dec 2023 09:31) (117/68 - 175/76)  BP(mean): --  RR: 18 (31 Dec 2023 09:31) (18 - 18)  SpO2: 97% (31 Dec 2023 09:31) (95% - 100%)    Parameters below as of 31 Dec 2023 09:31  Patient On (Oxygen Delivery Method): room air      CAPILLARY BLOOD GLUCOSE      POCT Blood Glucose.: 110 mg/dL (31 Dec 2023 08:30)  POCT Blood Glucose.: 102 mg/dL (30 Dec 2023 21:58)  POCT Blood Glucose.: 105 mg/dL (30 Dec 2023 16:24)  POCT Blood Glucose.: 118 mg/dL (30 Dec 2023 12:29)    I&O's Summary    31 Dec 2023 07:01  -  31 Dec 2023 09:54  --------------------------------------------------------  IN: 240 mL / OUT: 200 mL / NET: 40 mL          Appearance: Normal	  HEENT:   Normal oral mucosa, PERRL, EOMI	  Lymphatic: No lymphadenopathy  Cardiovascular: Normal S1 S2, No JVD  Respiratory: Lungs clear to auscultation	  Gastrointestinal:  Soft, Non-tender, + BS	  Skin: No rash, No ecchymoses	  Extremities:     LABS:                        13.7   8.50  )-----------( 153      ( 29 Dec 2023 18:29 )             42.0     12-31    139  |  103  |  11  ----------------------------<  89  3.3<L>   |  24  |  0.69    Ca    9.7      31 Dec 2023 07:05    TPro  7.6  /  Alb  4.4  /  TBili  0.7  /  DBili  0.2  /  AST  15  /  ALT  10  /  AlkPhos  83  12-31    PT/INR - ( 31 Dec 2023 07:08 )   PT: 12.8 sec;   INR: 1.17 ratio         PTT - ( 29 Dec 2023 18:29 )  PTT:30.2 sec      Urinalysis Basic - ( 31 Dec 2023 07:05 )    Color: x / Appearance: x / SG: x / pH: x  Gluc: 89 mg/dL / Ketone: x  / Bili: x / Urobili: x   Blood: x / Protein: x / Nitrite: x   Leuk Esterase: x / RBC: x / WBC x   Sq Epi: x / Non Sq Epi: x / Bacteria: x          12-29 @ 18:29  2.1  70              Consultant(s) Notes Reviewed:      Care Discussed with Consultants/Other Providers:

## 2024-01-01 LAB
CMV DNA CSF QL NAA+PROBE: SIGNIFICANT CHANGE UP IU/ML
CMV DNA CSF QL NAA+PROBE: SIGNIFICANT CHANGE UP IU/ML
CMV DNA SPEC NAA+PROBE-LOG#: SIGNIFICANT CHANGE UP LOG10IU/ML
CMV DNA SPEC NAA+PROBE-LOG#: SIGNIFICANT CHANGE UP LOG10IU/ML

## 2024-01-01 RX ORDER — LEVOFLOXACIN 5 MG/ML
1 INJECTION, SOLUTION INTRAVENOUS
Qty: 19 | Refills: 0
Start: 2024-01-01 | End: 2024-01-19

## 2024-01-03 LAB
SPECIMEN SOURCE: SIGNIFICANT CHANGE UP COPIES/ML
SPECIMEN SOURCE: SIGNIFICANT CHANGE UP COPIES/ML

## 2024-01-04 LAB
CULTURE RESULTS: SIGNIFICANT CHANGE UP
SPECIMEN SOURCE: SIGNIFICANT CHANGE UP

## 2024-01-05 LAB
1,3 BETA GLUCAN SER QL: NEGATIVE — SIGNIFICANT CHANGE UP
1,3 BETA GLUCAN SER QL: NEGATIVE — SIGNIFICANT CHANGE UP
A FLAVUS AB FLD QL: NEGATIVE — SIGNIFICANT CHANGE UP
A FLAVUS AB FLD QL: NEGATIVE — SIGNIFICANT CHANGE UP
A NIGER AB FLD QL: NEGATIVE — SIGNIFICANT CHANGE UP
FUNGITELL: <31 PG/ML — SIGNIFICANT CHANGE UP
FUNGITELL: <31 PG/ML — SIGNIFICANT CHANGE UP
SCHISTOSOMA IGG SER-ACNC: <1 — SIGNIFICANT CHANGE UP
SCHISTOSOMA IGG SER-ACNC: <1 — SIGNIFICANT CHANGE UP

## 2024-01-06 LAB
C IMMITIS AB SER QL IA: REACTIVE — SIGNIFICANT CHANGE UP
C IMMITIS AB SER QL IA: REACTIVE — SIGNIFICANT CHANGE UP
H CAPSUL AG SER IA-MCNC: SIGNIFICANT CHANGE UP
H CAPSUL AG SER IA-MCNC: SIGNIFICANT CHANGE UP

## 2024-01-09 ENCOUNTER — APPOINTMENT (OUTPATIENT)
Dept: CT IMAGING | Facility: CLINIC | Age: 74
End: 2024-01-09

## 2024-01-09 LAB
GALACTOMANNAN AG SERPL-ACNC: 0.08 INDEX — SIGNIFICANT CHANGE UP (ref 0–0.49)
GALACTOMANNAN AG SERPL-ACNC: 0.08 INDEX — SIGNIFICANT CHANGE UP (ref 0–0.49)

## 2024-01-10 NOTE — H&P ADULT - NSHPSOURCEINFORD_GEN_ALL_CORE
Medication refill requested for lisinopril-hydroCHLOROthiazide (ZESTORETIC) 20-25 MG per tablet   Last Refill/Prescribed: Date 07/17/23, # of tablets: 90, # of refills approved: 1     Medication: lisinopril-hydroCHLOROthiazide (ZESTORETIC) 20-25 MG per tablet   Last office visit date: 07/17/23  Medication Refill Protocol Failed.  ACE Inhibitor Refill Protocol - 12 Month Protocol Failed    Chart(s)/Patient Chart(s)/Patient/Spouse/Significant Other

## 2024-01-11 ENCOUNTER — APPOINTMENT (OUTPATIENT)
Dept: PULMONOLOGY | Facility: CLINIC | Age: 74
End: 2024-01-11
Payer: MEDICARE

## 2024-01-11 VITALS
SYSTOLIC BLOOD PRESSURE: 116 MMHG | BODY MASS INDEX: 30.7 KG/M2 | DIASTOLIC BLOOD PRESSURE: 60 MMHG | WEIGHT: 196 LBS | HEART RATE: 80 BPM | TEMPERATURE: 97.3 F | OXYGEN SATURATION: 98 %

## 2024-01-11 PROCEDURE — 99214 OFFICE O/P EST MOD 30 MIN: CPT

## 2024-01-14 ENCOUNTER — INPATIENT (INPATIENT)
Facility: HOSPITAL | Age: 74
LOS: 3 days | Discharge: HOME HEALTH | DRG: 695 | End: 2024-01-18
Attending: INTERNAL MEDICINE | Admitting: INTERNAL MEDICINE
Payer: MEDICARE

## 2024-01-14 VITALS
DIASTOLIC BLOOD PRESSURE: 82 MMHG | HEIGHT: 67 IN | WEIGHT: 197.09 LBS | OXYGEN SATURATION: 94 % | HEART RATE: 86 BPM | SYSTOLIC BLOOD PRESSURE: 166 MMHG | RESPIRATION RATE: 18 BRPM

## 2024-01-14 DIAGNOSIS — R33.9 RETENTION OF URINE, UNSPECIFIED: ICD-10-CM

## 2024-01-14 DIAGNOSIS — Z98.890 OTHER SPECIFIED POSTPROCEDURAL STATES: Chronic | ICD-10-CM

## 2024-01-14 DIAGNOSIS — Z94.0 KIDNEY TRANSPLANT STATUS: Chronic | ICD-10-CM

## 2024-01-14 DIAGNOSIS — Z96.41 PRESENCE OF INSULIN PUMP (EXTERNAL) (INTERNAL): Chronic | ICD-10-CM

## 2024-01-14 DIAGNOSIS — I77.0 ARTERIOVENOUS FISTULA, ACQUIRED: Chronic | ICD-10-CM

## 2024-01-14 DIAGNOSIS — Z95.2 PRESENCE OF PROSTHETIC HEART VALVE: Chronic | ICD-10-CM

## 2024-01-14 LAB
ALBUMIN SERPL ELPH-MCNC: 4.2 G/DL — SIGNIFICANT CHANGE UP (ref 3.3–5)
ALBUMIN SERPL ELPH-MCNC: 4.2 G/DL — SIGNIFICANT CHANGE UP (ref 3.3–5)
ALP SERPL-CCNC: 90 U/L — SIGNIFICANT CHANGE UP (ref 40–120)
ALP SERPL-CCNC: 90 U/L — SIGNIFICANT CHANGE UP (ref 40–120)
ALT FLD-CCNC: 15 U/L — SIGNIFICANT CHANGE UP (ref 10–45)
ALT FLD-CCNC: 15 U/L — SIGNIFICANT CHANGE UP (ref 10–45)
ANION GAP SERPL CALC-SCNC: 11 MMOL/L — SIGNIFICANT CHANGE UP (ref 5–17)
ANION GAP SERPL CALC-SCNC: 11 MMOL/L — SIGNIFICANT CHANGE UP (ref 5–17)
APPEARANCE UR: CLEAR — SIGNIFICANT CHANGE UP
APPEARANCE UR: CLEAR — SIGNIFICANT CHANGE UP
AST SERPL-CCNC: 26 U/L — SIGNIFICANT CHANGE UP (ref 10–40)
AST SERPL-CCNC: 26 U/L — SIGNIFICANT CHANGE UP (ref 10–40)
BACTERIA # UR AUTO: NEGATIVE /HPF — SIGNIFICANT CHANGE UP
BACTERIA # UR AUTO: NEGATIVE /HPF — SIGNIFICANT CHANGE UP
BASOPHILS # BLD AUTO: 0.03 K/UL — SIGNIFICANT CHANGE UP (ref 0–0.2)
BASOPHILS # BLD AUTO: 0.03 K/UL — SIGNIFICANT CHANGE UP (ref 0–0.2)
BASOPHILS NFR BLD AUTO: 0.3 % — SIGNIFICANT CHANGE UP (ref 0–2)
BASOPHILS NFR BLD AUTO: 0.3 % — SIGNIFICANT CHANGE UP (ref 0–2)
BILIRUB SERPL-MCNC: 1.1 MG/DL — SIGNIFICANT CHANGE UP (ref 0.2–1.2)
BILIRUB SERPL-MCNC: 1.1 MG/DL — SIGNIFICANT CHANGE UP (ref 0.2–1.2)
BILIRUB UR-MCNC: NEGATIVE — SIGNIFICANT CHANGE UP
BILIRUB UR-MCNC: NEGATIVE — SIGNIFICANT CHANGE UP
BUN SERPL-MCNC: 13 MG/DL — SIGNIFICANT CHANGE UP (ref 7–23)
BUN SERPL-MCNC: 13 MG/DL — SIGNIFICANT CHANGE UP (ref 7–23)
CALCIUM SERPL-MCNC: 10.2 MG/DL — SIGNIFICANT CHANGE UP (ref 8.4–10.5)
CALCIUM SERPL-MCNC: 10.2 MG/DL — SIGNIFICANT CHANGE UP (ref 8.4–10.5)
CAST: 0 /LPF — SIGNIFICANT CHANGE UP (ref 0–4)
CAST: 0 /LPF — SIGNIFICANT CHANGE UP (ref 0–4)
CHLORIDE SERPL-SCNC: 106 MMOL/L — SIGNIFICANT CHANGE UP (ref 96–108)
CHLORIDE SERPL-SCNC: 106 MMOL/L — SIGNIFICANT CHANGE UP (ref 96–108)
CO2 SERPL-SCNC: 24 MMOL/L — SIGNIFICANT CHANGE UP (ref 22–31)
CO2 SERPL-SCNC: 24 MMOL/L — SIGNIFICANT CHANGE UP (ref 22–31)
COLOR SPEC: YELLOW — SIGNIFICANT CHANGE UP
COLOR SPEC: YELLOW — SIGNIFICANT CHANGE UP
CREAT SERPL-MCNC: 0.53 MG/DL — SIGNIFICANT CHANGE UP (ref 0.5–1.3)
CREAT SERPL-MCNC: 0.53 MG/DL — SIGNIFICANT CHANGE UP (ref 0.5–1.3)
DIFF PNL FLD: ABNORMAL
DIFF PNL FLD: ABNORMAL
EGFR: 106 ML/MIN/1.73M2 — SIGNIFICANT CHANGE UP
EGFR: 106 ML/MIN/1.73M2 — SIGNIFICANT CHANGE UP
EOSINOPHIL # BLD AUTO: 0.12 K/UL — SIGNIFICANT CHANGE UP (ref 0–0.5)
EOSINOPHIL # BLD AUTO: 0.12 K/UL — SIGNIFICANT CHANGE UP (ref 0–0.5)
EOSINOPHIL NFR BLD AUTO: 1 % — SIGNIFICANT CHANGE UP (ref 0–6)
EOSINOPHIL NFR BLD AUTO: 1 % — SIGNIFICANT CHANGE UP (ref 0–6)
GLUCOSE SERPL-MCNC: 103 MG/DL — HIGH (ref 70–99)
GLUCOSE SERPL-MCNC: 103 MG/DL — HIGH (ref 70–99)
GLUCOSE UR QL: NEGATIVE MG/DL — SIGNIFICANT CHANGE UP
GLUCOSE UR QL: NEGATIVE MG/DL — SIGNIFICANT CHANGE UP
HCT VFR BLD CALC: 43.9 % — SIGNIFICANT CHANGE UP (ref 39–50)
HCT VFR BLD CALC: 43.9 % — SIGNIFICANT CHANGE UP (ref 39–50)
HGB BLD-MCNC: 14.7 G/DL — SIGNIFICANT CHANGE UP (ref 13–17)
HGB BLD-MCNC: 14.7 G/DL — SIGNIFICANT CHANGE UP (ref 13–17)
IMM GRANULOCYTES NFR BLD AUTO: 0.4 % — SIGNIFICANT CHANGE UP (ref 0–0.9)
IMM GRANULOCYTES NFR BLD AUTO: 0.4 % — SIGNIFICANT CHANGE UP (ref 0–0.9)
KETONES UR-MCNC: ABNORMAL MG/DL
KETONES UR-MCNC: ABNORMAL MG/DL
LEUKOCYTE ESTERASE UR-ACNC: NEGATIVE — SIGNIFICANT CHANGE UP
LEUKOCYTE ESTERASE UR-ACNC: NEGATIVE — SIGNIFICANT CHANGE UP
LYMPHOCYTES # BLD AUTO: 17.8 % — SIGNIFICANT CHANGE UP (ref 13–44)
LYMPHOCYTES # BLD AUTO: 17.8 % — SIGNIFICANT CHANGE UP (ref 13–44)
LYMPHOCYTES # BLD AUTO: 2.05 K/UL — SIGNIFICANT CHANGE UP (ref 1–3.3)
LYMPHOCYTES # BLD AUTO: 2.05 K/UL — SIGNIFICANT CHANGE UP (ref 1–3.3)
MCHC RBC-ENTMCNC: 30.8 PG — SIGNIFICANT CHANGE UP (ref 27–34)
MCHC RBC-ENTMCNC: 30.8 PG — SIGNIFICANT CHANGE UP (ref 27–34)
MCHC RBC-ENTMCNC: 33.5 GM/DL — SIGNIFICANT CHANGE UP (ref 32–36)
MCHC RBC-ENTMCNC: 33.5 GM/DL — SIGNIFICANT CHANGE UP (ref 32–36)
MCV RBC AUTO: 92 FL — SIGNIFICANT CHANGE UP (ref 80–100)
MCV RBC AUTO: 92 FL — SIGNIFICANT CHANGE UP (ref 80–100)
MONOCYTES # BLD AUTO: 1.16 K/UL — HIGH (ref 0–0.9)
MONOCYTES # BLD AUTO: 1.16 K/UL — HIGH (ref 0–0.9)
MONOCYTES NFR BLD AUTO: 10.1 % — SIGNIFICANT CHANGE UP (ref 2–14)
MONOCYTES NFR BLD AUTO: 10.1 % — SIGNIFICANT CHANGE UP (ref 2–14)
NEUTROPHILS # BLD AUTO: 8.11 K/UL — HIGH (ref 1.8–7.4)
NEUTROPHILS # BLD AUTO: 8.11 K/UL — HIGH (ref 1.8–7.4)
NEUTROPHILS NFR BLD AUTO: 70.4 % — SIGNIFICANT CHANGE UP (ref 43–77)
NEUTROPHILS NFR BLD AUTO: 70.4 % — SIGNIFICANT CHANGE UP (ref 43–77)
NITRITE UR-MCNC: NEGATIVE — SIGNIFICANT CHANGE UP
NITRITE UR-MCNC: NEGATIVE — SIGNIFICANT CHANGE UP
NRBC # BLD: 0 /100 WBCS — SIGNIFICANT CHANGE UP (ref 0–0)
NRBC # BLD: 0 /100 WBCS — SIGNIFICANT CHANGE UP (ref 0–0)
PH UR: 6 — SIGNIFICANT CHANGE UP (ref 5–8)
PH UR: 6 — SIGNIFICANT CHANGE UP (ref 5–8)
PLATELET # BLD AUTO: 217 K/UL — SIGNIFICANT CHANGE UP (ref 150–400)
PLATELET # BLD AUTO: 217 K/UL — SIGNIFICANT CHANGE UP (ref 150–400)
POTASSIUM SERPL-MCNC: 4.1 MMOL/L — SIGNIFICANT CHANGE UP (ref 3.5–5.3)
POTASSIUM SERPL-MCNC: 4.1 MMOL/L — SIGNIFICANT CHANGE UP (ref 3.5–5.3)
POTASSIUM SERPL-SCNC: 4.1 MMOL/L — SIGNIFICANT CHANGE UP (ref 3.5–5.3)
POTASSIUM SERPL-SCNC: 4.1 MMOL/L — SIGNIFICANT CHANGE UP (ref 3.5–5.3)
PROT SERPL-MCNC: 7.7 G/DL — SIGNIFICANT CHANGE UP (ref 6–8.3)
PROT SERPL-MCNC: 7.7 G/DL — SIGNIFICANT CHANGE UP (ref 6–8.3)
PROT UR-MCNC: 300 MG/DL
PROT UR-MCNC: 300 MG/DL
RBC # BLD: 4.77 M/UL — SIGNIFICANT CHANGE UP (ref 4.2–5.8)
RBC # BLD: 4.77 M/UL — SIGNIFICANT CHANGE UP (ref 4.2–5.8)
RBC # FLD: 14.1 % — SIGNIFICANT CHANGE UP (ref 10.3–14.5)
RBC # FLD: 14.1 % — SIGNIFICANT CHANGE UP (ref 10.3–14.5)
RBC CASTS # UR COMP ASSIST: 41 /HPF — HIGH (ref 0–4)
RBC CASTS # UR COMP ASSIST: 41 /HPF — HIGH (ref 0–4)
SODIUM SERPL-SCNC: 141 MMOL/L — SIGNIFICANT CHANGE UP (ref 135–145)
SODIUM SERPL-SCNC: 141 MMOL/L — SIGNIFICANT CHANGE UP (ref 135–145)
SP GR SPEC: 1.02 — SIGNIFICANT CHANGE UP (ref 1–1.03)
SP GR SPEC: 1.02 — SIGNIFICANT CHANGE UP (ref 1–1.03)
SQUAMOUS # UR AUTO: 0 /HPF — SIGNIFICANT CHANGE UP (ref 0–5)
SQUAMOUS # UR AUTO: 0 /HPF — SIGNIFICANT CHANGE UP (ref 0–5)
UROBILINOGEN FLD QL: 0.2 MG/DL — SIGNIFICANT CHANGE UP (ref 0.2–1)
UROBILINOGEN FLD QL: 0.2 MG/DL — SIGNIFICANT CHANGE UP (ref 0.2–1)
WBC # BLD: 11.52 K/UL — HIGH (ref 3.8–10.5)
WBC # BLD: 11.52 K/UL — HIGH (ref 3.8–10.5)
WBC # FLD AUTO: 11.52 K/UL — HIGH (ref 3.8–10.5)
WBC # FLD AUTO: 11.52 K/UL — HIGH (ref 3.8–10.5)
WBC UR QL: 6 /HPF — HIGH (ref 0–5)
WBC UR QL: 6 /HPF — HIGH (ref 0–5)

## 2024-01-14 PROCEDURE — 99285 EMERGENCY DEPT VISIT HI MDM: CPT

## 2024-01-14 RX ORDER — MORPHINE SULFATE 50 MG/1
4 CAPSULE, EXTENDED RELEASE ORAL ONCE
Refills: 0 | Status: DISCONTINUED | OUTPATIENT
Start: 2024-01-14 | End: 2024-01-14

## 2024-01-14 RX ORDER — CEFTRIAXONE 500 MG/1
1000 INJECTION, POWDER, FOR SOLUTION INTRAMUSCULAR; INTRAVENOUS ONCE
Refills: 0 | Status: COMPLETED | OUTPATIENT
Start: 2024-01-14 | End: 2024-01-14

## 2024-01-14 RX ADMIN — MORPHINE SULFATE 4 MILLIGRAM(S): 50 CAPSULE, EXTENDED RELEASE ORAL at 20:23

## 2024-01-14 NOTE — PROCEDURE NOTE - ADDITIONAL PROCEDURE DETAILS
Called by ED team for place a Thurston for the patient for urinary retention s/p retention procedure. Patient was seen and examined at bedside and was prepped and draped with sterile technique. Lidocaine jelly 2% inserted for local anesthetic. 20F Coude (catheter) inserted through urethral meatus with immediate return of clear urine. (Patient manually irrigated with sterile water with no clots found). Balloon inflated with 10cc sterile water and thurston was secured to leg. Called by ED team for place a Thurston for the patient for urinary retention s/p rezum procedure. Patient was seen and examined at bedside and was prepped and draped with sterile technique. Lidocaine jelly 2% inserted for local anesthetic. 20F Coude (catheter) inserted through urethral meatus with immediate return of clear urine. (Patient manually irrigated with sterile water with no clots found). Balloon inflated with 10cc sterile water and thurston was secured to leg. Home with thurston and follow up with Dr. Vargas. Called by ED team for place a Thurston for the patient for urinary retention s/p rezum procedure. Patient was seen and examined at bedside and was prepped and draped with sterile technique. Lidocaine jelly 2% inserted for local anesthetic. 20F Coude (catheter) inserted through urethral meatus with immediate return of clear yellow urine. Balloon inflated with 10cc sterile water and thurston was secured to leg. Home with thurston and follow up with Dr. Vargas.

## 2024-01-14 NOTE — ED ADULT NURSE NOTE - OBJECTIVE STATEMENT
72 yo male AAOX3 presents to ED for difficulty urinating x5 days. As per pt, only urinating "drops" at a time and sometimes blood in it. Pt also c/o abdominal pain, +distention. Pt had procedure done at urologist in December 2023, pt was then hospitalized for COVID 12/29/23. Hx of kidney transplant 10 years ago and insulin pump which pt manages at home. Denies CP, SOB, n/v/d. Safety maintained, bed in low position and side rials up. 74 yo male AAOX3 presents to ED for difficulty urinating x5 days. As per pt, only urinating "drops" at a time and sometimes blood in it. Pt also c/o abdominal pain, +distention. Pt had procedure done at urologist in December 2023, pt was then hospitalized for COVID 12/29/23. Hx of kidney transplant 10 years ago and insulin pump which pt manages at home. Denies CP, SOB, n/v/d. Safety maintained, bed in low position and side rials up. 72 yo male AAOX3 presents to ED for difficulty urinating x5 days. As per pt, only urinating "drops" at a time and sometimes blood in it when "pushing". Pt also c/o abdominal pain, +distention. Pt had REZUME procedure done on 12/27/23 at urologist and had indwelling Pruitt catheter placed after which was removed on 1/2/23. Hx of kidney transplant 10 years ago and insulin pump which pt manages at home. Denies CP, SOB, n/v/d. Safety maintained, bed in low position and side rials up. 74 yo male AAOX3 presents to ED for difficulty urinating x5 days. As per pt, only urinating "drops" at a time and sometimes blood in it when "pushing". Pt also c/o abdominal pain, +distention. Pt had REZUME procedure done on 12/27/23 at urologist and had indwelling Pruitt catheter placed after which was removed on 1/2/23. Hx of kidney transplant 10 years ago and insulin pump which pt manages at home. Denies CP, SOB, n/v/d. Safety maintained, bed in low position and side rials up.

## 2024-01-14 NOTE — ED PROVIDER NOTE - CLINICAL SUMMARY MEDICAL DECISION MAKING FREE TEXT BOX
73-year-old male history of BPH, FRANCOIS, COPD, DM 2, hypertension, CAD, TAVR, history of renal transplant in 2013, presenting for urinary retention, dribbling bloody urine.  The patient states that he had a fever to 102.5 at home yesterday.  The patient denies chest pain, shortness of breath, headache, visual changes, nausea, vomiting, headaches. VS. PE. TTP of the suprapubic region.     Differential is not limited to urinary retention, urinary tract infection, will need to assess the patient's renal function since he has been retaining for the past 3 days.  Will also assess for hematological derangements.  Will obtain basic labs, UA, will consult urology to replace Pruitt.  Dispo pending labs imaging and reassessment.

## 2024-01-14 NOTE — ED PROVIDER NOTE - PHYSICAL EXAMINATION
GENERAL: Awake, alert, NAD  HEENT: NC/AT, moist mucous membranes, PERRL, EOMI  LUNGS: CTAB, no wheezes or crackles   CARDIAC: RRR, no m/r/g  ABDOMEN: Soft, TTP of the abdomen, non distended, no rebound, no guarding  BACK: No midline spinal tenderness, no CVA tenderness  EXT: No edema, no calf tenderness, 2+ DP pulses bilaterally, no deformities.  NEURO: A&Ox3. Moving all extremities.  SKIN: Warm and dry. No rash.  PSYCH: Normal affect.

## 2024-01-14 NOTE — ED PROVIDER NOTE - ATTENDING CONTRIBUTION TO CARE
I performed a face to face history and physical exam of the patient and discussed their management with the resident. I reviewed the resident's note and agree with the documented findings and plan of care.     74 y/o male with hx of BPH, DM, COPD, HTN, hx of renal transplant, with recent Urologic procedure on Dec 27, p/w hematuria x several days, with reduced urine output and increasing suprapubic pressure and pain, pt states he had a fever yesterday of 102.5, pt denies CP, SOB, H/A, cough, N/V, pt took Tylenol at 2pm today, pertinent on exam vit

## 2024-01-14 NOTE — ED ADULT TRIAGE NOTE - NS ED NURSE AMBULANCES
[facility]

## 2024-01-14 NOTE — ED PROVIDER NOTE - NS ED MD DISPO DIVISION
[facility]

## 2024-01-14 NOTE — PROCEDURE NOTE - NSURICATHINSERTED_GU_A_CORE
[address] ED  EMERGENCY DEPARTMENT ENCOUNTER        Pt Name: Jayson Hui  MRN: [id number]  [place] 1951  Date of evaluation: 2/16/2022  Provider: Mary Saldivar PA-C  PCP: RICCO Sheffield CNP  Note Started: 2:24 PM EST       I have seen and evaluated this patient with my supervising physician Triny Terrazas, [address] [place] [address]       Chief Complaint   Patient presents with    Dizziness     pt brought in by EMS from [facility] due to dizziness. Pt states that he did not pass out at facility. Pt reports ongoing problems with B/p and BS       HISTORY OF PRESENT ILLNESS   (Location, Timing/Onset, Context/Setting, Quality, Duration, Modifying Factors, Severity, Associated Signs and Symptoms)  Note limiting factors. Chief Complaint: generalized weakness; dizziness     Jayson Hui is a 79 y.o. male with a past medical history of type 1 diabetes, fatty liver, hypertension, CHF, cerebral artery occlusion with cerebral infarct, COPD, dementia, history of alcohol use, GERD, hepatitis C in today by EMS from [facility] with complaints of generalized weakness and dizziness which he describes as feeling like he is going to pass out. He did not pass out. Patient does have a history of dementia therefore he is a poor historian. He states he did have some watery stool couple of days ago. denies blood in stool. He denies nausea or vomiting. Denies chest pain or shortness of breath. Denies numbness or tingling to extremities. He does report that he has not had much to eat or drink. States his \"mouth feels dry\". Denies cough or congestion. Denies urinary symptoms. Onset of symptoms today. Duration symptoms have been persistent since onset. Context includes generalized weakness. No aggravating symptoms. No alleviating symptoms. Otherwise denies any other complaints. Nothing seems to make symptoms better or worse at this time.   States he never fell and never hit his head. Nursing Notes were all reviewed and agreed with or any disagreements were addressed in the HPI. REVIEW OF SYSTEMS    (2-9 systems for level 4, 10 or more for level 5)     Review of Systems   Constitutional: Negative. HENT: Negative. Respiratory: Negative. Cardiovascular: Negative. Gastrointestinal: Positive for diarrhea. Genitourinary: Negative. Musculoskeletal: Negative. Skin: Negative. Neurological: Positive for weakness. Positives and Pertinent negatives as per HPI. Except as noted above in the ROS, all other systems were reviewed and negative. PAST MEDICAL HISTORY     Past Medical History:   Diagnosis Date    Acute on chronic systolic (congestive) heart failure (HCC)     Anxiety disorder     BPH (benign prostatic hypertrophy)     Calcified granuloma of lung ([address] 2014    2:stable per 3/25/14 CT chest    Cataract 1/20/14    OU:Dr. hayward:[facility]    Cerebral artery occlusion with cerebral infarction ([address]     Chronic diastolic heart failure (HCC)     Chronic pain     Chronic viral hepatitis (HCC)     Cognitive communication deficit     COPD (chronic obstructive pulmonary disease) ([address]     Under care of pulmo(Dr. Calhoun)    Degenerative arthritis of hip     Dementia ([address]     Depression 3/11/2015    Depression with anxiety     Prior psychiatrist & therapist:Dr. Radha Lakhani.  Diabetes mellitus ([address] 2004    Endo Dr. Chantale Olivares type 1 note below in [facility]    Diabetic eye exam ([facility] 1/20/14    [facility]:Dr. STEPHEN Hayward:No retinopathy.  Cataract    Diabetic retinopathy ([address]     under care of [facility]:Dr. Aleksandra Ludwig    Diverticulitis 11/26/2011    Diverticulosis 11/26/2011    DKA (diabetic ketoacidoses)     Emphysema     Esophageal candidiasis ([address] 7/16/13    GI:EGD    Essential hypertension, benign 11/2010    ETOH abuse     Fatty liver 10/9/2012    Foot ulcer:left 9/30/2013    Gastric ulcer, unspecified as acute or chronic, without mention of hemorrhage or Yes perforation     Generalized anxiety disorder     GERD (gastroesophageal reflux disease)     Gout 1997    Right big toe    Hearing decreased     Left ear=60%. Right ear=80%.  Hemangioma 12/2010    Liver as per CT abdo    hepatitis c 7/26/17, 2010    Under care of Liver doc:Dr. Sherron Ervin    Hyperlipidemia LDL goal < 100     Hypertensive heart disease with heart failure ([facility]     Left-sided weakness     Low HDL (under 40) 10/9/2012    Myocardial infarction ([facility]     Orthostatic hypotension     Peripheral neuropathy 2006    Pneumonia 10/15/13    Protein calorie malnutrition (HCC)     PTSD (post-traumatic stress disorder)     Pyogenic granuloma     per derm:Dr. Jorge Rios Restrictive lung disease     Under care of pulmo(Dr. Calhoun)    S/P colonoscopy 1996    Done secondary to rectal bleed:dx=hemorrhoids    S/P colonoscopy 11/11/10;10/23/2013    Dr. Sky Lozoya 10/2016(3yrs):polyps;diverticulosis. Diverticulosis & polpy(removed). Next in10/2016.  S/P endoscopy 2009    EGD:stomach ulcers.     Sciatica     Superficial phlebitis of left leg 9/30/2013    Tachycardia     Therapeutic drug monitoring 4/8/15    OARRS report is consistent on 4/8/15;7/9/15;10/9/15;1/8/16;4/5/16    Type 1 diabetes mellitus not at goal [facility]) 3/17/14    updated diagnosis as per endo:Dr. Renato Banegas         SURGICAL HISTORY     Past Surgical History:   Procedure Laterality Date    COLONOSCOPY      DIAGNOSTIC CARDIAC CATH LAB PROCEDURE  2013    DILATATION, ESOPHAGUS      FOOT SURGERY Left Hammer toe, 2nd toe    10/9/14    FOOT SURGERY Left 12/5/2019    REMOVAL HARDWARE LEFT FOOT performed by Nguyen Lopez DPM at [address] [place] [address] [address] Left 12/03/2019    REMOVAL HARDWARE LEFT FOOT w. Dr Angie Eaton 12/5/19    Chely Rosales      broken leg    OTHER SURGICAL HISTORY Left 11/21/2013    EXCISION 5TH METATRSAL LEFT FOOT          OTHER SURGICAL HISTORY Right 05/30/2019    Procedure: PARTIAL RESECTION RIGHT FIFTH METATARSAL, ULCER DEBRIDEMENT WITH GRAFT APPLICATION    PRESSURE ULCER DEBRIDEMENT Right 5/30/2019    PARTIAL RESECTION RIGHT FIFTH METATARSAL, ULCER DEBRIDEMENT WITH GRAFT APPLICATION performed by Amina Jesus DPM at [place] [address]  06/27/2017    COLOSTOMY REVERSAL     TONSILLECTOMY      UPPER GASTROINTESTINAL ENDOSCOPY  7/16/2013    Esophageal Brushing         CURRENTMEDICATIONS       Previous Medications    ACETAMINOPHEN (TYLENOL) 325 MG TABLET    Take 650 mg by mouth every 4 hours as needed for Pain    ALBUTEROL SULFATE HFA (PROAIR HFA) 108 (90 BASE) MCG/ACT INHALER    Inhale 2 puffs into the lungs every 4 hours as needed for Wheezing or Shortness of Breath    ALUMINUM & MAGNESIUM HYDROXIDE-SIMETHICONE (MAALOX) 200-200-20 MG/5ML SUSP SUSPENSION    Take 5 mLs by mouth every 6 hours as needed for Indigestion Takes mylanta 30 ml Q4 hours PRN    ASPIRIN 81 MG TABLET    Take 81 mg by mouth daily chewable    ATORVASTATIN (LIPITOR) 80 MG TABLET    Take 80 mg by mouth nightly     CLONIDINE (CATAPRES) 0.1 MG TABLET    Take 1 tablet by mouth daily    CONTINUOUS BLOOD GLUC  (FREESTYLE BRIDGET READER) DENIS    Use to monitor sugars    DULAGLUTIDE (TRULICITY) 2.93 AW/5.4QG SOPN    Inject 0.75 mg into the skin once a week On Saturday    FINASTERIDE (PROSCAR) 5 MG TABLET    Take 5 mg by mouth daily    FLUTICASONE (ARNUITY ELLIPTA) 100 MCG/ACT AEPB    Inhale 1 puff into the lungs daily    FOLIC ACID (FOLVITE) 1 MG TABLET    Take 1 mg by mouth daily    GLUCAGON 1 MG INJECTION    Inject 1 kit into the skin as needed    INSULIN GLARGINE (LANTUS SOLOSTAR SC)    Inject 20 Units into the skin 2 times daily     INSULIN LISPRO (HUMALOG SC)    Inject 6 Units into the skin 3 times daily (before meals) And sliding scale PRN    LANCETS MISC    Testing 2-3 times daily DX Code: E11.9    LOPERAMIDE (IMODIUM) 2 MG CAPSULE    Take 2 mg by mouth every 6 hours as needed for Diarrhea LORATADINE (CLARITIN) 10 MG TABLET    Take 10 mg by mouth daily    MELATONIN 10 MG CAPS CAPSULE    Take 10 mg by mouth nightly    MEMANTINE (NAMENDA) 10 MG TABLET    Take 10 mg by mouth 2 times daily    METFORMIN (GLUCOPHAGE) 500 MG TABLET    Take 500 mg by mouth daily (with breakfast)    METOPROLOL SUCCINATE (TOPROL XL) 50 MG EXTENDED RELEASE TABLET    Take 1 tablet by mouth nightly    NIFEDIPINE (ADALAT CC) 30 MG EXTENDED RELEASE TABLET    Take 1 tablet by mouth daily    OXYCODONE (ROXICODONE) 5 MG IMMEDIATE RELEASE TABLET    Take 10 mg by mouth every 6 hours as needed for Pain. OXYCODONE ER (XTAMPZA ER) 9 MG C12A    Take 9 mg by mouth every 12 hours.     PANTOPRAZOLE (PROTONIX) 40 MG TABLET    Take 40 mg by mouth daily    POLYETHYLENE GLYCOL (GLYCOLAX) POWDER    Take 17 g by mouth daily    SERTRALINE (ZOLOFT) 100 MG TABLET    Take 100 mg by mouth daily    SUMATRIPTAN (IMITREX) 100 MG TABLET    Take 100 mg by mouth daily as needed for Migraine Give one additional dose if needed after 2 hours if 1st dose is ineffective (NTE 200mg/24 hrs)    THIAMINE 100 MG TABLET    Take 1 tablet by mouth daily    TIZANIDINE (ZANAFLEX) 4 MG TABLET    Take 4 mg by mouth every 8 hours as needed    UMECLIDINIUM-VILANTEROL (ANORO ELLIPTA) 62.5-25 MCG/INH AEPB INHALER    Inhale 1 puff into the lungs daily    VITAMIN B-12 (CYANOCOBALAMIN) 1000 MCG TABLET    TAKE 1 TABLET BY MOUTH DAILY    VITAMIN D (ERGOCALCIFEROL) 1.25 MG (37676 UT) CAPS CAPSULE    Take 50,000 Units by mouth once a week thursday         ALLERGIES     Neurontin [gabapentin]    FAMILYHISTORY       Family History   Problem Relation Age of Onset    Stroke Mother     Hypertension Mother     Arthritis Father     Substance Abuse Father         Etoh    Cancer Father         esophageal    Hypertension Father     Diabetes Brother     Diabetes Paternal Aunt     Asthma Neg Hx     Emphysema Neg Hx     Heart Failure Neg Hx           SOCIAL HISTORY       Social History Tobacco Use    Smoking status: Former Smoker     Packs/day: 0.50     Years: 35.00     Pack years: 17.50     Types: Cigarettes     Quit date: 9/20/2018     Years since quitting: 3.4    Smokeless tobacco: Never Used   Vaping Use    Vaping Use: Never used   Substance Use Topics    Alcohol use: Not Currently    Drug use: No       SCREENINGS   NIH Stroke Scale  Interval: Baseline  Level of Consciousness (1a. ): Alert  LOC Questions (1b. ): Answers both correctly  LOC Commands (1c. ): Performs both tasks correctly  Best Gaze (2. ): Normal  Visual (3. ): No visual loss  Facial Palsy (4. ): Normal symmetrical movement  Motor Arm, Left (5a. ): No drift  Motor Arm, Right (5b. ): No drift  Motor Leg, Left (6a. ): No drift  Motor Leg, Right (6b. ): No drift  Limb Ataxia (7. ): Absent  Sensory (8. ): Normal  Best Language (9. ): No aphasia  Dysarthria (10. ): Normal  Extinction and Inattention (11): No abnormality  Total: 0Glasgow Coma Scale  Eye Opening: Spontaneous  Best Verbal Response: Oriented  Best Motor Response: Obeys commands  Con Coma Scale Score: 15        PHYSICAL EXAM    (up to 7 for level 4, 8 or more for level 5)     ED Triage Vitals   BP Temp Temp src Pulse Resp SpO2 Height Weight   -- -- -- -- -- -- -- --       Physical Exam  Vitals and nursing note reviewed. Constitutional:       General: He is awake. He is not in acute distress. Appearance: Normal appearance. He is well-developed and normal weight. He is ill-appearing. He is not toxic-appearing or diaphoretic. HENT:      Head: Normocephalic and atraumatic. Nose: Nose normal.      Mouth/Throat:      Mouth: Mucous membranes are dry. Eyes:      General:         Right eye: No discharge. Left eye: No discharge. Cardiovascular:      Rate and Rhythm: Normal rate and regular rhythm. Pulses:           Radial pulses are 2+ on the right side and 2+ on the left side. Heart sounds: Normal heart sounds. No murmur heard.   No gallop. Pulmonary:      Effort: Pulmonary effort is normal. No respiratory distress. Breath sounds: Normal breath sounds. No decreased breath sounds, wheezing, rhonchi or rales. Chest:      Chest wall: No tenderness. Musculoskeletal:         General: No deformity. Normal range of motion. Cervical back: Normal range of motion and neck supple. Right lower leg: No edema. Left lower leg: No edema. Skin:     General: Skin is warm and dry. Coloration: Skin is pale. Neurological:      Mental Status: He is alert and oriented to person, place, and time. Psychiatric:         Behavior: Behavior normal. Behavior is cooperative.          DIAGNOSTIC RESULTS   LABS:    Labs Reviewed   CBC WITH AUTO DIFFERENTIAL - Abnormal; Notable for the following components:       Result Value    RBC 3.87 (*)     Hemoglobin 11.6 (*)     Hematocrit 35.0 (*)     All other components within normal limits    Narrative:     Performed at:  [place] [address],  [facility]   Phone [phone]   COMPREHENSIVE METABOLIC PANEL W/ REFLEX TO MG FOR LOW K - Abnormal; Notable for the following components:    Sodium 133 (*)     Chloride 98 (*)     Glucose 211 (*)     BUN 23 (*)     CREATININE 1.6 (*)     GFR Non- 43 (*)     GFR  52 (*)     All other components within normal limits    Narrative:     Performed at:  [facility],  [facility]   Phone [phone] - Abnormal; Notable for the following components:    Pro-BNP 2,921 (*)     All other components within normal limits    Narrative:     Performed at:  [place] [address],  [facility]   Phone [phone]   POCT GLUCOSE - Abnormal; Notable for the following components:    POC Glucose 256 (*)     All other components within normal limits Narrative:     Performed at:  [facility] - [facility],  [place], [facility]   Phone [phone]   POCT GLUCOSE - Normal   COVID-19 & INFLUENZA COMBO   TROPONIN    Narrative:     Performed at:  [facility],  [place], [facility]   Phone [phone]   URINALYSIS WITH REFLEX TO CULTURE   TYPE AND SCREEN    Narrative:     Performed at:  [facility],  [place], [facility]   Phone [phone]       When ordered only abnormal lab results are displayed. All other labs were within normal range or not returned as of this dictation. EKG: When ordered, EKG's are interpreted by the Emergency Department Physician in the absence of a cardiologist.  Please see their note for interpretation of EKG. RADIOLOGY:   Non-plain film images such as CT, Ultrasound and MRI are read by the radiologist. Plain radiographic images are visualized and preliminarily interpreted by the ED Provider with the below findings:        Interpretation per the Radiologist below, if available at the time of this note:    CT HEAD WO CONTRAST   Final Result   No acute intracranial abnormality. Encephalomalacia with old right occipital infarction and small basal ganglia   lacunar infarctions unchanged. XR CHEST PORTABLE   Final Result   No acute abnormality. No results found.         PROCEDURES   Unless otherwise noted below, none     Procedures    CRITICAL CARE TIME       CONSULTS:  IP CONSULT TO HOSPITALIST      EMERGENCY DEPARTMENT COURSE and DIFFERENTIAL DIAGNOSIS/MDM:   Vitals:    Vitals:    02/16/22 1427 02/16/22 1545   BP: 109/76 135/70   Pulse: 88 85   Resp: 17    Temp: 98.3 °F (36.8 °C)    TempSrc: Oral    SpO2: 96%    Weight: 180 lb (81.6 kg)    Height: 6' (1.829 m)        Patient was given the following medications:  Medications   0.9 % sodium chloride bolus (500 mLs IntraVENous New Bag 2/16/22 1541)           Patient brought in today by EMS from [facility] with complaints of generalized weakness. Patient is a poor historian denies current complaints. He is alert and oriented x3 afebrile breathing on room air satting at 96%. Appears chronically ill. No acute respiratory distress. Nontoxic appearing. Old labs and records reviewed. Patient seen by myself and my attending. POCT glucose of 256. CBC with no leukocytosis. Hemoglobin of 11.6. No acute electrolyte abnormalities. Blood sugar of 211. No anion gap. BUN of 23 creatinine of 1.6. Troponin less than 0.01. BNP of 2921. Chest x-ray shows no acute abnormality. CT head shows no acute intracranial abnormality. Encephalomalacia with old right occipital infarct and small basal ganglia lacunar infarcts unchanged. Orthostatic vitals were obtained. Patient felt like he was going to pass out. Patient was given fluids for CHIP. Plan at this time will be to admit for dehydration and generalized weakness. I spoke to [facility] hospitalist [address] who did accept admission. Patient stable at this time. FINAL IMPRESSION      1. CHIP (acute kidney injury) ([facility]    2. Generalized weakness          DISPOSITION/PLAN   DISPOSITION        PATIENT REFERRED TO:  No follow-up provider specified.     DISCHARGE MEDICATIONS:  New Prescriptions    No medications on file       DISCONTINUED MEDICATIONS:  Discontinued Medications    No medications on file              (Please note that portions of this note were completed with a voice recognition program.  Efforts were made to edit the dictations but occasionally words are mis-transcribed.)    Susan Qureshi PA-C (electronically signed)            Susan Qureshi PA-C  02/16/22 [phone]

## 2024-01-14 NOTE — ED PROVIDER NOTE - OBJECTIVE STATEMENT
73-year-old male history of BPH, FRANCOIS, COPD, DM 2, hypertension, CAD, TAVR, history of renal transplant in 2013, presenting for urinary retention, dribbling bloody urine.  The patient reports that he had a REZUM procedure done on 12/27 by Dr. Mcfarland, and had his Pruitt removed on 1/2. the patient states that since then he has been having difficulty urinating and over the past 3 days it has been much worse.  The patient states that he is not able to use the restroom and only little dribbles come out.  The patient states that he had a fever to 102.5 at home yesterday.  The patient denies chest pain, shortness of breath, headache, visual changes, nausea, vomiting, headaches.

## 2024-01-15 ENCOUNTER — TRANSCRIPTION ENCOUNTER (OUTPATIENT)
Age: 74
End: 2024-01-15

## 2024-01-15 DIAGNOSIS — U07.1 COVID-19: ICD-10-CM

## 2024-01-15 DIAGNOSIS — Z94.0 KIDNEY TRANSPLANT STATUS: ICD-10-CM

## 2024-01-15 DIAGNOSIS — R33.9 RETENTION OF URINE, UNSPECIFIED: ICD-10-CM

## 2024-01-15 DIAGNOSIS — M10.9 GOUT, UNSPECIFIED: ICD-10-CM

## 2024-01-15 DIAGNOSIS — J44.9 CHRONIC OBSTRUCTIVE PULMONARY DISEASE, UNSPECIFIED: ICD-10-CM

## 2024-01-15 DIAGNOSIS — I25.10 ATHEROSCLEROTIC HEART DISEASE OF NATIVE CORONARY ARTERY WITHOUT ANGINA PECTORIS: ICD-10-CM

## 2024-01-15 DIAGNOSIS — I10 ESSENTIAL (PRIMARY) HYPERTENSION: ICD-10-CM

## 2024-01-15 LAB
ANION GAP SERPL CALC-SCNC: 12 MMOL/L — SIGNIFICANT CHANGE UP (ref 5–17)
ANION GAP SERPL CALC-SCNC: 12 MMOL/L — SIGNIFICANT CHANGE UP (ref 5–17)
BUN SERPL-MCNC: 14 MG/DL — SIGNIFICANT CHANGE UP (ref 7–23)
BUN SERPL-MCNC: 14 MG/DL — SIGNIFICANT CHANGE UP (ref 7–23)
CALCIUM SERPL-MCNC: 9.7 MG/DL — SIGNIFICANT CHANGE UP (ref 8.4–10.5)
CALCIUM SERPL-MCNC: 9.7 MG/DL — SIGNIFICANT CHANGE UP (ref 8.4–10.5)
CHLORIDE SERPL-SCNC: 108 MMOL/L — SIGNIFICANT CHANGE UP (ref 96–108)
CHLORIDE SERPL-SCNC: 108 MMOL/L — SIGNIFICANT CHANGE UP (ref 96–108)
CO2 SERPL-SCNC: 23 MMOL/L — SIGNIFICANT CHANGE UP (ref 22–31)
CO2 SERPL-SCNC: 23 MMOL/L — SIGNIFICANT CHANGE UP (ref 22–31)
CREAT ?TM UR-MCNC: 77 MG/DL — SIGNIFICANT CHANGE UP
CREAT SERPL-MCNC: 0.59 MG/DL — SIGNIFICANT CHANGE UP (ref 0.5–1.3)
CREAT SERPL-MCNC: 0.59 MG/DL — SIGNIFICANT CHANGE UP (ref 0.5–1.3)
EGFR: 102 ML/MIN/1.73M2 — SIGNIFICANT CHANGE UP
EGFR: 102 ML/MIN/1.73M2 — SIGNIFICANT CHANGE UP
GLUCOSE BLDC GLUCOMTR-MCNC: 181 MG/DL — HIGH (ref 70–99)
GLUCOSE BLDC GLUCOMTR-MCNC: 181 MG/DL — HIGH (ref 70–99)
GLUCOSE BLDC GLUCOMTR-MCNC: 75 MG/DL — SIGNIFICANT CHANGE UP (ref 70–99)
GLUCOSE BLDC GLUCOMTR-MCNC: 75 MG/DL — SIGNIFICANT CHANGE UP (ref 70–99)
GLUCOSE SERPL-MCNC: 83 MG/DL — SIGNIFICANT CHANGE UP (ref 70–99)
GLUCOSE SERPL-MCNC: 83 MG/DL — SIGNIFICANT CHANGE UP (ref 70–99)
HCT VFR BLD CALC: 43.1 % — SIGNIFICANT CHANGE UP (ref 39–50)
HCT VFR BLD CALC: 43.1 % — SIGNIFICANT CHANGE UP (ref 39–50)
HGB BLD-MCNC: 14.1 G/DL — SIGNIFICANT CHANGE UP (ref 13–17)
HGB BLD-MCNC: 14.1 G/DL — SIGNIFICANT CHANGE UP (ref 13–17)
MCHC RBC-ENTMCNC: 31 PG — SIGNIFICANT CHANGE UP (ref 27–34)
MCHC RBC-ENTMCNC: 31 PG — SIGNIFICANT CHANGE UP (ref 27–34)
MCHC RBC-ENTMCNC: 32.7 GM/DL — SIGNIFICANT CHANGE UP (ref 32–36)
MCHC RBC-ENTMCNC: 32.7 GM/DL — SIGNIFICANT CHANGE UP (ref 32–36)
MCV RBC AUTO: 94.7 FL — SIGNIFICANT CHANGE UP (ref 80–100)
MCV RBC AUTO: 94.7 FL — SIGNIFICANT CHANGE UP (ref 80–100)
NRBC # BLD: 0 /100 WBCS — SIGNIFICANT CHANGE UP (ref 0–0)
NRBC # BLD: 0 /100 WBCS — SIGNIFICANT CHANGE UP (ref 0–0)
PLATELET # BLD AUTO: 213 K/UL — SIGNIFICANT CHANGE UP (ref 150–400)
PLATELET # BLD AUTO: 213 K/UL — SIGNIFICANT CHANGE UP (ref 150–400)
POTASSIUM SERPL-MCNC: 3.6 MMOL/L — SIGNIFICANT CHANGE UP (ref 3.5–5.3)
POTASSIUM SERPL-MCNC: 3.6 MMOL/L — SIGNIFICANT CHANGE UP (ref 3.5–5.3)
POTASSIUM SERPL-SCNC: 3.6 MMOL/L — SIGNIFICANT CHANGE UP (ref 3.5–5.3)
POTASSIUM SERPL-SCNC: 3.6 MMOL/L — SIGNIFICANT CHANGE UP (ref 3.5–5.3)
PROT ?TM UR-MCNC: 82 MG/DL — HIGH (ref 0–12)
PROT/CREAT UR-RTO: 1.1 RATIO — HIGH (ref 0–0.2)
RAPID RVP RESULT: DETECTED
RAPID RVP RESULT: DETECTED
RBC # BLD: 4.55 M/UL — SIGNIFICANT CHANGE UP (ref 4.2–5.8)
RBC # BLD: 4.55 M/UL — SIGNIFICANT CHANGE UP (ref 4.2–5.8)
RBC # FLD: 14.1 % — SIGNIFICANT CHANGE UP (ref 10.3–14.5)
RBC # FLD: 14.1 % — SIGNIFICANT CHANGE UP (ref 10.3–14.5)
SARS-COV-2 RNA SPEC QL NAA+PROBE: DETECTED
SARS-COV-2 RNA SPEC QL NAA+PROBE: DETECTED
SODIUM SERPL-SCNC: 143 MMOL/L — SIGNIFICANT CHANGE UP (ref 135–145)
SODIUM SERPL-SCNC: 143 MMOL/L — SIGNIFICANT CHANGE UP (ref 135–145)
TACROLIMUS SERPL-MCNC: 3.2 NG/ML — SIGNIFICANT CHANGE UP
TACROLIMUS SERPL-MCNC: 3.2 NG/ML — SIGNIFICANT CHANGE UP
WBC # BLD: 10.44 K/UL — SIGNIFICANT CHANGE UP (ref 3.8–10.5)
WBC # BLD: 10.44 K/UL — SIGNIFICANT CHANGE UP (ref 3.8–10.5)
WBC # FLD AUTO: 10.44 K/UL — SIGNIFICANT CHANGE UP (ref 3.8–10.5)
WBC # FLD AUTO: 10.44 K/UL — SIGNIFICANT CHANGE UP (ref 3.8–10.5)

## 2024-01-15 PROCEDURE — 99223 1ST HOSP IP/OBS HIGH 75: CPT

## 2024-01-15 RX ORDER — CEFTRIAXONE 500 MG/1
1000 INJECTION, POWDER, FOR SOLUTION INTRAMUSCULAR; INTRAVENOUS EVERY 24 HOURS
Refills: 0 | Status: DISCONTINUED | OUTPATIENT
Start: 2024-01-15 | End: 2024-01-15

## 2024-01-15 RX ORDER — ALLOPURINOL 300 MG
1 TABLET ORAL
Qty: 0 | Refills: 0 | DISCHARGE

## 2024-01-15 RX ORDER — INSULIN ASPART 100 [IU]/ML
1 INJECTION, SOLUTION SUBCUTANEOUS
Refills: 0 | Status: DISCONTINUED | OUTPATIENT
Start: 2024-01-15 | End: 2024-01-18

## 2024-01-15 RX ORDER — NIFEDIPINE 30 MG
90 TABLET, EXTENDED RELEASE 24 HR ORAL DAILY
Refills: 0 | Status: DISCONTINUED | OUTPATIENT
Start: 2024-01-15 | End: 2024-01-18

## 2024-01-15 RX ORDER — SENNA PLUS 8.6 MG/1
1 TABLET ORAL
Refills: 0 | DISCHARGE

## 2024-01-15 RX ORDER — PIPERACILLIN AND TAZOBACTAM 4; .5 G/20ML; G/20ML
3.38 INJECTION, POWDER, LYOPHILIZED, FOR SOLUTION INTRAVENOUS ONCE
Refills: 0 | Status: COMPLETED | OUTPATIENT
Start: 2024-01-15 | End: 2024-01-15

## 2024-01-15 RX ORDER — SODIUM CHLORIDE 9 MG/ML
1000 INJECTION, SOLUTION INTRAVENOUS
Refills: 0 | Status: DISCONTINUED | OUTPATIENT
Start: 2024-01-15 | End: 2024-01-18

## 2024-01-15 RX ORDER — ATORVASTATIN CALCIUM 80 MG/1
1 TABLET, FILM COATED ORAL
Refills: 0 | DISCHARGE

## 2024-01-15 RX ORDER — ACETAMINOPHEN 500 MG
650 TABLET ORAL EVERY 6 HOURS
Refills: 0 | Status: DISCONTINUED | OUTPATIENT
Start: 2024-01-15 | End: 2024-01-18

## 2024-01-15 RX ORDER — ATORVASTATIN CALCIUM 80 MG/1
20 TABLET, FILM COATED ORAL AT BEDTIME
Refills: 0 | Status: DISCONTINUED | OUTPATIENT
Start: 2024-01-15 | End: 2024-01-18

## 2024-01-15 RX ORDER — INSULIN LISPRO 100/ML
VIAL (ML) SUBCUTANEOUS
Refills: 0 | Status: DISCONTINUED | OUTPATIENT
Start: 2024-01-15 | End: 2024-01-15

## 2024-01-15 RX ORDER — ASPIRIN/CALCIUM CARB/MAGNESIUM 324 MG
1 TABLET ORAL
Qty: 0 | Refills: 0 | DISCHARGE

## 2024-01-15 RX ORDER — MONTELUKAST 4 MG/1
10 TABLET, CHEWABLE ORAL DAILY
Refills: 0 | Status: DISCONTINUED | OUTPATIENT
Start: 2024-01-15 | End: 2024-01-18

## 2024-01-15 RX ORDER — TAMSULOSIN HYDROCHLORIDE 0.4 MG/1
0.4 CAPSULE ORAL AT BEDTIME
Refills: 0 | Status: DISCONTINUED | OUTPATIENT
Start: 2024-01-15 | End: 2024-01-18

## 2024-01-15 RX ORDER — ALBUTEROL 90 UG/1
2 AEROSOL, METERED ORAL EVERY 6 HOURS
Refills: 0 | Status: DISCONTINUED | OUTPATIENT
Start: 2024-01-15 | End: 2024-01-18

## 2024-01-15 RX ORDER — ASPIRIN/CALCIUM CARB/MAGNESIUM 324 MG
81 TABLET ORAL DAILY
Refills: 0 | Status: DISCONTINUED | OUTPATIENT
Start: 2024-01-15 | End: 2024-01-18

## 2024-01-15 RX ORDER — DEXTROSE 50 % IN WATER 50 %
25 SYRINGE (ML) INTRAVENOUS ONCE
Refills: 0 | Status: DISCONTINUED | OUTPATIENT
Start: 2024-01-15 | End: 2024-01-18

## 2024-01-15 RX ORDER — TACROLIMUS 5 MG/1
1 CAPSULE ORAL
Refills: 0 | DISCHARGE

## 2024-01-15 RX ORDER — LABETALOL HCL 100 MG
1 TABLET ORAL
Qty: 0 | Refills: 0 | DISCHARGE

## 2024-01-15 RX ORDER — MONTELUKAST 4 MG/1
1 TABLET, CHEWABLE ORAL
Refills: 0 | DISCHARGE

## 2024-01-15 RX ORDER — DEXTROSE 50 % IN WATER 50 %
12.5 SYRINGE (ML) INTRAVENOUS ONCE
Refills: 0 | Status: DISCONTINUED | OUTPATIENT
Start: 2024-01-15 | End: 2024-01-18

## 2024-01-15 RX ORDER — TACROLIMUS 5 MG/1
1.5 CAPSULE ORAL
Refills: 0 | DISCHARGE

## 2024-01-15 RX ORDER — GABAPENTIN 400 MG/1
100 CAPSULE ORAL AT BEDTIME
Refills: 0 | Status: DISCONTINUED | OUTPATIENT
Start: 2024-01-15 | End: 2024-01-18

## 2024-01-15 RX ORDER — CHLORHEXIDINE GLUCONATE 213 G/1000ML
1 SOLUTION TOPICAL DAILY
Refills: 0 | Status: DISCONTINUED | OUTPATIENT
Start: 2024-01-15 | End: 2024-01-18

## 2024-01-15 RX ORDER — PIPERACILLIN AND TAZOBACTAM 4; .5 G/20ML; G/20ML
3.38 INJECTION, POWDER, LYOPHILIZED, FOR SOLUTION INTRAVENOUS EVERY 8 HOURS
Refills: 0 | Status: DISCONTINUED | OUTPATIENT
Start: 2024-01-16 | End: 2024-01-18

## 2024-01-15 RX ORDER — GLUCAGON INJECTION, SOLUTION 0.5 MG/.1ML
1 INJECTION, SOLUTION SUBCUTANEOUS ONCE
Refills: 0 | Status: DISCONTINUED | OUTPATIENT
Start: 2024-01-15 | End: 2024-01-18

## 2024-01-15 RX ORDER — TACROLIMUS 5 MG/1
1.5 CAPSULE ORAL AT BEDTIME
Refills: 0 | Status: DISCONTINUED | OUTPATIENT
Start: 2024-01-15 | End: 2024-01-18

## 2024-01-15 RX ORDER — DEXTROSE 50 % IN WATER 50 %
15 SYRINGE (ML) INTRAVENOUS ONCE
Refills: 0 | Status: DISCONTINUED | OUTPATIENT
Start: 2024-01-15 | End: 2024-01-18

## 2024-01-15 RX ORDER — HEPARIN SODIUM 5000 [USP'U]/ML
5000 INJECTION INTRAVENOUS; SUBCUTANEOUS EVERY 12 HOURS
Refills: 0 | Status: DISCONTINUED | OUTPATIENT
Start: 2024-01-15 | End: 2024-01-18

## 2024-01-15 RX ORDER — LABETALOL HCL 100 MG
200 TABLET ORAL THREE TIMES A DAY
Refills: 0 | Status: DISCONTINUED | OUTPATIENT
Start: 2024-01-15 | End: 2024-01-18

## 2024-01-15 RX ORDER — LANOLIN ALCOHOL/MO/W.PET/CERES
3 CREAM (GRAM) TOPICAL AT BEDTIME
Refills: 0 | Status: DISCONTINUED | OUTPATIENT
Start: 2024-01-15 | End: 2024-01-18

## 2024-01-15 RX ORDER — GABAPENTIN 400 MG/1
0 CAPSULE ORAL
Refills: 0 | DISCHARGE

## 2024-01-15 RX ORDER — MYCOPHENOLATE MOFETIL 250 MG/1
1000 CAPSULE ORAL
Refills: 0 | Status: DISCONTINUED | OUTPATIENT
Start: 2024-01-15 | End: 2024-01-18

## 2024-01-15 RX ORDER — TACROLIMUS 5 MG/1
1 CAPSULE ORAL DAILY
Refills: 0 | Status: DISCONTINUED | OUTPATIENT
Start: 2024-01-15 | End: 2024-01-18

## 2024-01-15 RX ORDER — NICOTINE POLACRILEX 2 MG
1 GUM BUCCAL DAILY
Refills: 0 | Status: DISCONTINUED | OUTPATIENT
Start: 2024-01-15 | End: 2024-01-15

## 2024-01-15 RX ADMIN — Medication 200 MILLIGRAM(S): at 13:56

## 2024-01-15 RX ADMIN — Medication 10 MILLIGRAM(S): at 05:50

## 2024-01-15 RX ADMIN — PIPERACILLIN AND TAZOBACTAM 25 GRAM(S): 4; .5 INJECTION, POWDER, LYOPHILIZED, FOR SOLUTION INTRAVENOUS at 23:12

## 2024-01-15 RX ADMIN — PIPERACILLIN AND TAZOBACTAM 200 GRAM(S): 4; .5 INJECTION, POWDER, LYOPHILIZED, FOR SOLUTION INTRAVENOUS at 18:57

## 2024-01-15 RX ADMIN — Medication 81 MILLIGRAM(S): at 11:36

## 2024-01-15 RX ADMIN — Medication 200 MILLIGRAM(S): at 05:51

## 2024-01-15 RX ADMIN — GABAPENTIN 100 MILLIGRAM(S): 400 CAPSULE ORAL at 21:28

## 2024-01-15 RX ADMIN — Medication 600 MILLIGRAM(S): at 05:51

## 2024-01-15 RX ADMIN — MYCOPHENOLATE MOFETIL 1000 MILLIGRAM(S): 250 CAPSULE ORAL at 21:28

## 2024-01-15 RX ADMIN — Medication 600 MILLIGRAM(S): at 18:45

## 2024-01-15 RX ADMIN — TACROLIMUS 1 MILLIGRAM(S): 5 CAPSULE ORAL at 11:42

## 2024-01-15 RX ADMIN — ATORVASTATIN CALCIUM 20 MILLIGRAM(S): 80 TABLET, FILM COATED ORAL at 21:28

## 2024-01-15 RX ADMIN — MORPHINE SULFATE 4 MILLIGRAM(S): 50 CAPSULE, EXTENDED RELEASE ORAL at 21:14

## 2024-01-15 RX ADMIN — Medication 10 MILLIGRAM(S): at 05:51

## 2024-01-15 RX ADMIN — CEFTRIAXONE 100 MILLIGRAM(S): 500 INJECTION, POWDER, FOR SOLUTION INTRAMUSCULAR; INTRAVENOUS at 16:08

## 2024-01-15 RX ADMIN — Medication 3 MILLIGRAM(S): at 21:37

## 2024-01-15 RX ADMIN — MONTELUKAST 10 MILLIGRAM(S): 4 TABLET, CHEWABLE ORAL at 11:36

## 2024-01-15 RX ADMIN — Medication 200 MILLIGRAM(S): at 21:28

## 2024-01-15 RX ADMIN — HEPARIN SODIUM 5000 UNIT(S): 5000 INJECTION INTRAVENOUS; SUBCUTANEOUS at 18:45

## 2024-01-15 RX ADMIN — TAMSULOSIN HYDROCHLORIDE 0.4 MILLIGRAM(S): 0.4 CAPSULE ORAL at 21:29

## 2024-01-15 RX ADMIN — TACROLIMUS 1.5 MILLIGRAM(S): 5 CAPSULE ORAL at 21:28

## 2024-01-15 RX ADMIN — CEFTRIAXONE 100 MILLIGRAM(S): 500 INJECTION, POWDER, FOR SOLUTION INTRAMUSCULAR; INTRAVENOUS at 00:34

## 2024-01-15 RX ADMIN — MORPHINE SULFATE 4 MILLIGRAM(S): 50 CAPSULE, EXTENDED RELEASE ORAL at 00:34

## 2024-01-15 RX ADMIN — Medication 90 MILLIGRAM(S): at 05:50

## 2024-01-15 NOTE — DISCHARGE NOTE PROVIDER - CARE PROVIDER_API CALL
JES PEREZ  [address] [place] [id number]  Phone: [phone]  Fax: [phone]  Follow Up Time:    JES PEREZ  [address] [place] [id number]  Phone: [phone]  Fax: [phone]  Follow Up Time:    JES PEREZ  [address] [place] [address]  Phone: [phone]  Fax: [phone]  Follow Up Time:    JES PEREZ  [address] [place] [id number]  Phone: [phone]  Fax: [phone]  Follow Up Time:    JES PEREZ  [address] [place] [address]  Phone: [phone]  Fax: [phone]  Follow Up Time:     Homero Garrido  Cardiovascular Disease  [address] [place] [address]  Phone: [phone]  Fax: [phone]  Follow Up Time:    JES PEREZ  [address] [place] [address]  Phone: [phone]  Fax: [phone]  Follow Up Time:     Homero Garrido  Cardiovascular Disease  [address] [place] [address]  Phone: [phone]  Fax: [phone]  Follow Up Time:    JES PEREZ  [address] [place] [address]  Phone: [phone]  Fax: [phone]  Follow Up Time:     Homero Garrido  Cardiovascular Disease  [address] [place] [address]  Phone: [phone]  Fax: [phone]  Follow Up Time:    JES PEREZ  [address] [place] [address]  Phone: [phone]  Fax: [phone]  Follow Up Time:     Homero Garrido  Cardiovascular Disease  [address] [place] [address]  Phone: [phone]  Fax: [phone]  Follow Up Time:

## 2024-01-15 NOTE — PROGRESS NOTE ADULT - ASSESSMENT
72M        h/o  FRANCOIS on CPAP, COPD w/ 50-pack-year smoking history, DM2 on insulin pump,         HTN/  HLD,  CAD/ stents,        status post stent, TAVR/  colonoscopy 2022, polyp       h/o kidney transplant in 2013 on immunosuppresion/   covid  12/2023            admitted  with urinary  retention,  and  [facility]ston  was placed  in  er/  s/p  prostate surg         uro  eval/  crt 0/5      s/p prostate  surg 12/27, dr aranza rosado  started      DM,  has  insulin pump,            house   endo  called  this  am,  , known to pt ./   Medtronic  insulin pump at home and Ozempic     c/c  diastolic   chf.             torsemide    HTN /HLD           on  procardia,  labetolol.  enalapril     CAD, stents             on asa/ plavix/ ,lipitor/ known to  card         s/p  TAVR, 12/2021       CKD,    s/p   R kidney transplant  2013,   Dr Vargas  [facility] /  on cellcept/  tacro       h/o   ILD,  restrictive/obstructive lung disease,  and   FRANCOIS / cpap             ILD/  known to  house pulm      pt  no longer  f/p  with transplant team in  city/ dr edward olmstead  following         defer transplant  meds to  renal   on dvt ppx                 < from: CT Chest No Cont (10.10.23 @ 18:49) >  IMPRESSION:                      1. Newly developing nodular areas of consolidation bilaterally as well as   reticular opacities and abbreviated differential includes metastatic   disease with possible lymphangitic component. Atypical infectious and/or   inflammatory etiologies cannot be excluded. Bronchoscopic correlation and   histological sampling may be in order.  2. Mild increase in now moderate medi  72M        h/o  FRANCOIS on CPAP, COPD w/ 50-pack-year smoking history, DM2 on insulin pump,         HTN/  HLD,  CAD/ stents,        status post stent, TAVR/  colonoscopy 2022, polyp       h/o kidney transplant in 2013 on immunosuppresion/   covid  12/2023            admitted  with urinary  retention,  and  thurston  was placed  in  er/  s/p  prostate surg  ,s/p   Rezum    surg  on  12/27, dr cobian         uro  eval/.  called.    crt 0/5              Flomax   started      DM,  has  insulin pump,             house   endo  called  this  am,  , known to pt ./   Medtronic  insulin pump at home and Ozempic     c/c  diastolic   chf.             torsemide    HTN /HLD           on  procardia,  labetolol.  enalapril/  meds pe r card     CAD, stents             on asa/ plavix/ ,lipitor/ known to  card         s/p  TAVR, 12/2021       CKD,    s/p   R kidney transplant  2013,   Dr Vargas  [facility] /  on cellcept/  tacro       h/o   ILD,  restrictive/obstructive lung disease,  and   FRANCOIS / cpap                  ILD/  known to  house pulm       pt has  been covid  +. since 12/ 29,   and  +  status  now  on arrival, is reflective of  his prior  covid        pt  no longer  f/p  with transplant team in  city/ dr edward olmstead  following /     defer transplant  meds to  renal       on dvt ppx                 < from: CT Chest No Cont (10.10.23 @ 18:49) >  IMPRESSION:                      1. Newly developing nodular areas of consolidation bilaterally as well as   reticular opacities and abbreviated differential includes metastatic   disease with possible lymphangitic component. Atypical infectious and/or   inflammatory etiologies cannot be excluded. Bronchoscopic correlation and   histological sampling may be in order.  2. Mild increase in now moderate medi  72M        h/o  FRANCOIS on CPAP, COPD w/ 50-pack-year smoking history, DM2 on insulin pump,         HTN/  HLD,  CAD/ stents,        status post stent, TAVR/  colonoscopy 2022, polyp       h/o kidney transplant in 2013 on immunosuppresion/   covid  12/2023            admitted  with urinary  retention,  and  thurston  was placed  in  er/  s/p  prostate surg  ,s/p   Rezum    surg  on  12/27, dr cobian         uro  eval/.  called.    crt 0/5              Flomax   started      DM,  has  insulin pump,             house   endo  called  this  am,  , known to pt ./   Medtronic  insulin pump at home and Ozempic     c/c  diastolic   chf.             torsemide    HTN /HLD           on  procardia,  labetolol.  enalapril/  meds pe r card     CAD, stents             on asa/ plavix/ ,lipitor/ known to  card         s/p  TAVR, 12/2021       CKD,    s/p   R kidney transplant  2013,   Dr Vargas  [facility] /  on cellcept/  tacro       h/o   ILD,  restrictive/obstructive lung disease,  and   FRANCOIS / cpap                  ILD/  known to  house pulm       pt has  been covid  +. since 12/ 29,   and  +  status  now  on arrival, is reflective of  his prior  covid    on empiric rocephin/   id eval        pt  no longer  f/p  with transplant team in  [facility]/ dr edward olmstead  following /     defer transplant  meds to  renal       on dvt ppx                 < from: CT Chest No Cont (10.10.23 @ 18:49) >  IMPRESSION:                      1. Newly developing nodular areas of consolidation bilaterally as well as   reticular opacities and abbreviated differential includes metastatic   disease with possible lymphangitic component. Atypical infectious and/or   inflammatory etiologies cannot be excluded. Bronchoscopic correlation and   histological sampling may be in order.  2. Mild increase in now moderate medi  72M        h/o  FRANCOIS on CPAP, COPD w/ 50-pack-year smoking history, DM2 on insulin pump,         HTN/  HLD,  CAD/ stents,        status post stent, TAVR/  colonoscopy 2022, polyp       h/o kidney transplant in 2013 on immunosuppresion/   covid  12/2023            admitted  with urinary  retention,  and  thurston  was placed  in  er/  s/p  prostate surg  ,s/p   Rezum    surg  on  12/27, dr cobian         uro  eval/.  called.    crt 0/5              Flomax   started      DM,  has  insulin pump,             house   endo  called  this  am,  , known to pt ./   Medtronic  insulin pump at home and Ozempic     c/c  diastolic   chf.             torsemide    HTN /HLD           on  procardia,  labetolol.  enalapril/  meds pe r card     CAD, stents             on asa/ plavix/ ,lipitor/ known to  card         s/p  TAVR, 12/2021       CKD,    s/p   R kidney transplant  2013,   Dr Vargas  [facility] /  on cellcept/  tacro       h/o   ILD,  restrictive/obstructive lung disease,  and   FRANCOIS / cpap                  ILD/  known to  house pulm       pt has  been covid  +. since 12/ 29,   and  +  status  now  on arrival, is reflective of  his prior  covid    on empiric rocephin/   id eval        pt  no longer  f/p  with transplant team in  [facility]/ dr edward olmstead  following /     defer transplant  meds to  renal       on dvt ppx                 < from: CT Chest No Cont (10.10.23 @ 18:49) >  IMPRESSION:                      1. Newly developing nodular areas of consolidation bilaterally as well as   reticular opacities and abbreviated differential includes metastatic   disease with possible lymphangitic component. Atypical infectious and/or   inflammatory etiologies cannot be excluded. Bronchoscopic correlation and   histological sampling may be in order.  2. Mild increase in now moderate medi  72M        h/o  FRANCOIS on CPAP, COPD w/ 50-pack-year smoking history, DM2 on insulin pump,         HTN/  HLD,  CAD/ stents,        status post stent, TAVR/  colonoscopy 2022, polyp       h/o kidney transplant in 2013 on immunosuppresion/   covid  12/2023            admitted  with urinary  retention,  and  thurston  was placed  in  er/  s/p  prostate surg  ,s/p   Rezum    surg  on  12/27, dr cobian         uro  eval/.  called.    crt 0/5              Flomax   started      DM,  has  insulin pump,             house   endo  called  this  am,  , known to pt ./   Medtronic  insulin pump at home and Ozempic     c/c  diastolic   chf.             torsemide    HTN /HLD           on  procardia,  labetolol.  enalapril/  meds pe r card     CAD, stents             on asa/ plavix/ ,lipitor/ known to  card         s/p  TAVR, 12/2021       CKD,    s/p   R kidney transplant  2013,   Dr Vargas  [facility] /  on cellcept/  tacro       h/o   ILD,  restrictive/obstructive lung disease,  and   FRANCOIS / cpap                  ILD/  known to  house pulm       pt has  been covid  +. since 12/ 29,   and  +  status  now  on arrival, is reflective of  his prior  covid    on empiric rocephin/   id eval        pt  no longer  f/p  with transplant team in  [facility]/ dr edward olmstead  following /     defer transplant  meds to  renal       on dvt ppx                 < from: CT Chest No Cont (10.10.23 @ 18:49) >  IMPRESSION:                      1. Newly developing nodular areas of consolidation bilaterally as well as   reticular opacities and abbreviated differential includes metastatic   disease with possible lymphangitic component. Atypical infectious and/or   inflammatory etiologies cannot be excluded. Bronchoscopic correlation and   histological sampling may be in order.  2. Mild increase in now moderate medi

## 2024-01-15 NOTE — DISCUSSION/SUMMARY
[FreeTextEntry1] : 73-year-old male with history of OAD at baseline.  He is to continue Breo montelukast and albuterol as before.  And nebulizer will be ordered for occasional use.  A follow-up chest CT will be performed in 3 months.  Compliance with CPAP use was stressed.  He is to follow-up with his PMD as before.

## 2024-01-15 NOTE — CONSULT NOTE ADULT - SUBJECTIVE AND OBJECTIVE BOX
HPI: Patient is a 73 M with history of FRANCOIS on CPAP, T2D on insulin pump, hLD, HTN, CAD, kidney transplant in 2013 here for urinary retention. endocrinology consulted for insulin pump management.     Diabetes history:  Patient seen with his wife in the ED. patient was diagnosed with T2D >20 years, on sinulin pump for 7 years.     Follows with Dr. Jyoti Byrd for pump management.     Home on medtronic pump with novolog insulin + ozempic 0.5 mg weekly. Manages the pump himself. Glucose at home usually , comfortable using the pump. Has backup supplies with him.     Diabetes is complicated by neprhopathy, neuropathy, no retinopathy. Also with history of CAD.     Most recent A1C 5.3 12/30/2023    Home DM medications:  - Medtronic pump with novolog insulin   Basal:  12AM-5AM 0.6U/hr    5AM-12AM 0.7U/hr    TDD is 16.3U         ICR:  12AM-11PM 15g/u    11PM-12AM 20g/u      ISF: 50mg/dl all day    Glucose target          FH:denies     SH:  Smoking: denies  Etoh: denies  Recreational Drugs: denies       PAST MEDICAL & SURGICAL HISTORY:  HTN (Hypertension), Benign      Hyperlipidemia      Smoking      DM (diabetes mellitus), type 2      FRANCOIS on CPAP      H/O kidney transplant  2013      CAD (coronary artery disease)  1 stent      Kidney transplant recipient  Rt kidney- 2013      AV fistula      History of transcatheter aortic valve replacement (TAVR)      Personal history of spine surgery      Insulin pump status      H/O colonoscopy              Current Meds:  acetaminophen     Tablet .. 650 milliGRAM(s) Oral every 6 hours PRN  albuterol    90 MICROgram(s) HFA Inhaler 2 Puff(s) Inhalation every 6 hours PRN  aspirin enteric coated 81 milliGRAM(s) Oral daily  atorvastatin 20 milliGRAM(s) Oral at bedtime  chlorhexidine 2% Cloths 1 Application(s) Topical daily  dextrose 5%. 1000 milliLiter(s) IV Continuous <Continuous>  dextrose 5%. 1000 milliLiter(s) IV Continuous <Continuous>  dextrose 50% Injectable 12.5 Gram(s) IV Push once  dextrose 50% Injectable 25 Gram(s) IV Push once  dextrose 50% Injectable 25 Gram(s) IV Push once  dextrose Oral Gel 15 Gram(s) Oral once PRN  enalapril 10 milliGRAM(s) Oral daily  gabapentin 100 milliGRAM(s) Oral at bedtime  glucagon  Injectable 1 milliGRAM(s) IntraMuscular once  guaiFENesin  milliGRAM(s) Oral every 12 hours  heparin   Injectable 5000 Unit(s) SubCutaneous every 12 hours  insulin aspart (NovoLOG) Pump 1 Each SubCutaneous Continuous Pump  labetalol 200 milliGRAM(s) Oral three times a day  melatonin 3 milliGRAM(s) Oral at bedtime PRN  montelukast 10 milliGRAM(s) Oral daily  NIFEdipine XL 90 milliGRAM(s) Oral daily  piperacillin/tazobactam IVPB.- 3.375 Gram(s) IV Intermittent once  tacrolimus 1 milliGRAM(s) Oral daily  tacrolimus 1.5 milliGRAM(s) Oral at bedtime  tamsulosin 0.4 milliGRAM(s) Oral at bedtime  torsemide 10 milliGRAM(s) Oral daily      Allergies:  No Known Allergies      ROS:     Constitutional: No fever, good appetite/po intake  Eyes: No blurry vision, diplopia  Neuro: No tremors  HEENT: No pain  Cardiovascular: No chest pain, palpitations  Respiratory: No SOB, no cough  GI: No nausea, vomiting,   : No dysuria, hematuria  Skin: no rash  Psych: no depression  Endocrine: no polyuria, polydipsia  Hem/lymph: no swelling  Osteoporosis: no fractures     Vital Signs Last 24 Hrs  T(C): 36.7 (15 Kamlesh 2024 15:30), Max: 37.1 (14 Jan 2024 22:50)  T(F): 98 (15 Kamlesh 2024 15:30), Max: 98.8 (14 Jan 2024 22:50)  HR: 71 (15 Kamlesh 2024 15:30) (67 - 88)  BP: 137/70 (15 Kamlesh 2024 15:30) (137/70 - 203/80)  BP(mean): --  RR: 18 (15 Kamlesh 2024 15:30) (16 - 18)  SpO2: 96% (15 Kamlesh 2024 15:30) (94% - 97%)    Parameters below as of 15 Kamlesh 2024 15:30  Patient On (Oxygen Delivery Method): room air      Height (cm): 170.2 (01-14 @ 19:04)  Weight (kg): 89.4 (01-14 @ 19:04)  BMI (kg/m2): 30.9 (01-14 @ 19:04)    VITALS: T(C): 36.7 (01-15-24 @ 15:30)  T(F): 98 (01-15-24 @ 15:30), Max: 98.8 (01-14-24 @ 22:50)  HR: 71 (01-15-24 @ 15:30) (67 - 88)  BP: 137/70 (01-15-24 @ 15:30) (137/70 - 203/80)  RR:  (16 - 18)  SpO2:  (94% - 97%)  Wt(kg): --  GENERAL: NAD, well-groomed, well-developed  EYES: No proptosis, no lid lag, anicteric, extraocular movements intact  HEENT:  Atraumatic, Normocephalic, moist mucous membranes  THYROID: Normal size, no palpable nodules, no thyromegaly  RESPIRATORY: non labored breathing, no accessory muscle use  CARDIOVASCULAR: non tachycardic, no peripheral edema  GI: Soft, nontender, non distended   SKIN: Dry, intact, No rashes or lesions  NEURO: sensation intact, no tremor  EXTREMITIES: no foot ulcers and bilateral distal pedal pulses intact  PSYCH: reactive affect, euthymic mood      LABS:                        14.1   10.44 )-----------( 213      ( 15 Kamlesh 2024 07:55 )             43.1     01-15    143  |  108  |  14  ----------------------------<  83  3.6   |  23  |  0.59    Ca    9.7      15 Kamlesh 2024 07:55    TPro  7.7  /  Alb  4.2  /  TBili  1.1  /  DBili  x   /  AST  26  /  ALT  15  /  AlkPhos  90  01-14      Urinalysis Basic - ( 15 Kamlesh 2024 07:55 )    Color: x / Appearance: x / SG: x / pH: x  Gluc: 83 mg/dL / Ketone: x  / Bili: x / Urobili: x   Blood: x / Protein: x / Nitrite: x   Leuk Esterase: x / RBC: x / WBC x   Sq Epi: x / Non Sq Epi: x / Bacteria: x            RADIOLOGY & ADDITIONAL STUDIES:  CAPILLARY BLOOD GLUCOSE      POCT Blood Glucose.: 181 mg/dL (15 Kamlesh 2024 11:17)  POCT Blood Glucose.: 75 mg/dL (15 Kamlesh 2024 07:26)

## 2024-01-15 NOTE — DISCHARGE NOTE PROVIDER - HOSPITAL COURSE
73-year-old male history of BPH, FRANCOIS, COPD, DM 2, hypertension, CAD, TAVR, history of renal transplant in 2013, presenting for urinary retention, dribbling bloody urine.  The patient reports that he had a REZUM procedure done on 12/27 by Dr. Mcfarland, and had his Thurston removed on 1/2. the patient states that since then he has been having difficulty urinating and over the past 3 days it has been much worse.  The patient states that he is not able to use the restroom and only little dribbles come out.  The patient states that he had a fever to 102.5 at home yesterday.  history of renal transplant:  Cw cellcept, tacrolimus  Dx: Urinary retention, COVID +  Thurston placed in ED, hematuria     He came into the ER for progressive difficulty voiding and had a 20f thurston catheter placed by us for urinary retention .  D/w Dr Mcnair - On d/c pt will go with the thurston catheter to leg bag and to follow up with his urologist Dr Mcfarland for trial of void. 7· Assessment	   72M        h/o  FRANCOIS on CPAP, COPD w/ 50-pack-year smoking history, DM2 on insulin pump,         HTN/  HLD,  CAD/ stents,        status post stent, TAVR/  colonoscopy 2022, polyp       h/o kidney transplant in 2013 on immunosuppresion/   covid  12/2023            admitted  with urinary  retention,  and  thurston  was placed  in  er/  s/p  prostate surg  ,s/p   Rezum    surg  on  12/27, dr cobian         uro  eval/.  called.    crt  0/5              Flomax   started      DM,  has  insulin pump,             house   endo  . has   Medtronic  insulin pump at home and Ozempic     c/c  diastolic   chf.             torsemide    HTN /HLD           on  procardia,  labetolol.  enalapril/  meds pe r card     CAD, stents             on asa/ plavix/ ,lipitor/ known to  card         s/p  TAVR, 12/2021       CKD,    s/p   R kidney [facility]  2013,   [facility] /  on cellcept/  tacro       h/o   ILD,  restrictive/obstructive lung disease,  and   FRANCOIS / cpap                  ILD/  known to  house pulm       pt has  been covid  +. since 12/ 29,   and  +  status  now  on arrival, is reflective of  his prior  covid      per  ID., on iv  zosyn      CT  a/p        pt  no longer  f/p  with transplant team in  [facility]/ dr edward olmstead  following /   defer transplant  meds to  renal       on dvt ppx             7· Assessment	   72M        h/o  FRANCOIS on CPAP, COPD w/ 50-pack-year smoking history, DM2 on insulin pump,         HTN/  HLD,  CAD/ stents,        status post stent, TAVR/  colonoscopy 2022, polyp       h/o kidney transplant in 2013 on immunosuppresion/   covid  12/2023            admitted  with urinary  retention,  and  thurston  was placed  in  er/  s/p  prostate surg  ,s/p   Rezum    surg  on  12/27, dr cobian         uro  eval/.  called.    crt  0/5              Flomax   started      DM,  has  insulin pump,             house   endo  . has   Medtronic  insulin pump at home and Ozempic     c/c  diastolic   chf.             torsemide    HTN /HLD           on  procardia,  labetolol.  enalapril/  meds pe r card     CAD, stents             on asa/ plavix/ ,lipitor/ known to  card         s/p  TAVR, 12/2021       CKD,    s/p   R kidney [facility]  2013,   [facility] [place] /  on cellcept/  tacro       h/o   ILD,  restrictive/obstructive lung disease,  and   FRANCOIS / cpap                  ILD/  known to  house pulm       pt has  been covid  +. since 12/ 29,   and  +  status  now  on arrival, is reflective of  his prior  covid      per  ID., on iv  zosyn      CT  a/p        pt  no longer  f/p  with transplant team in  [facility]/ dr edward olmstead  following /   defer transplant  meds to  renal       on dvt ppx             7· Assessment	   72M        h/o  FRANCOIS on CPAP, COPD w/ 50-pack-year smoking history, DM2 on insulin pump,         HTN/  HLD,  CAD/ stents,        status post stent, TAVR/  colonoscopy 2022, polyp       h/o kidney transplant in 2013 on immunosuppresion/   covid  12/2023            admitted  with urinary  retention,  and  thurston  was placed  in  er/  s/p  prostate surg  ,s/p   Rezum    surg  on  12/27, dr cobian         uro  eval/.  called.    crt  0/5              Flomax   started      DM,  has  insulin pump,             house   endo  . has   Medtronic  insulin pump at home and Ozempic     c/c  diastolic   chf.             torsemide    HTN /HLD           on  procardia,  labetolol.  enalapril/  meds pe r card     CAD, stents             on asa/ plavix/ ,lipitor/ known to  card         s/p  TAVR, 12/2021       CKD,    s/p   R kidney transplant  2013,   [facility] [place] /  on cellcept/  tacro       h/o   ILD,  restrictive/obstructive lung disease,  and   FRANCOIS / cpap                  ILD/  known to  house pulm       pt has  been covid  +. since 12/ 29,   and  +  status  now  on arrival, is reflective of  his prior  covid      per  ID., on iv  zosyn      CT  a/p        pt  no longer  f/p  with transplant team in  [facility]/ dr edward olmstead  following /   defer transplant  meds to  renal       on dvt ppx             7· Assessment	   72M        h/o  FRANCOIS on CPAP, COPD w/ 50-pack-year smoking history, DM2 on insulin pump,         HTN/  HLD,  CAD/ stents,        status post stent, TAVR/  colonoscopy 2022, polyp       h/o kidney transplant in 2013 on immunosuppresion/   covid  12/2023            admitted  with urinary  retention,  and  thurston  was placed  in  er/  s/p  prostate surg  ,s/p   Rezum    surg  on  12/27, dr cobian         uro  eval/.  called.    crt  0/5              Flomax   started      DM,  has  insulin pump,             house   endo  . has   Medtronic  insulin pump at home and Ozempic     c/c  diastolic   chf.             torsemide    HTN /HLD           on  procardia,  labetolol.  enalapril/  meds pe r card     CAD, stents             on asa/ plavix/ ,lipitor/ known to  card         s/p  TAVR, 12/2021       CKD,    s/p   R kidney transplant  2013,   [facility] [place] /  on cellcept/  tacro       h/o   ILD,  restrictive/obstructive lung disease,  and   FRANCOIS / cpap                  ILD/  known to  house pulm       pt has  been covid  +. since 12/ 29,   and  +  status  now  on arrival, is reflective of  his prior  covid      per  ID., on iv  zosyn      CT  a/p        pt  no longer  f/p  with transplant team in  [facility]/ dr edward olmstead  following /   defer transplant  meds to  renal       on dvt ppx             7· Assessment	   72M        h/o  FRANCOIS on CPAP, COPD w/ 50-pack-year smoking history, DM2 on insulin pump,         HTN/  HLD,  CAD/ stents,        status post stent, TAVR/  colonoscopy 2022, polyp       h/o kidney transplant in 2013 on immunosuppresion/   covid  12/2023            admitted  with urinary  retention,  and  thurston  was placed  in  er/  s/p  prostate surg  ,s/p   Rezum    surg  on  12/27, dr cobian         uro  eval/.  called.    crt  0/5              Flomax   started      DM,  has  insulin pump,             house   endo  . has   Medtronic  insulin pump at home and Ozempic     c/c  diastolic   chf.             torsemide    HTN /HLD           on  procardia,  labetolol.  enalapril/  meds pe r card     CAD, stents             on asa/ plavix/ ,lipitor/ known to  card         s/p  TAVR, 12/2021       CKD,    s/p   R kidney transplant  2013,   Dr Hartono [facility] /  on cellcept/  tacro       h/o   ILD,  restrictive/obstructive lung disease,  and   FRANCOIS / cpap                  ILD/  known to  house pul       pt has  been covid  +. since 12/ 29,   and  +  status  now  on arrival, is reflective of  his prior  covid      per  ID., on iv  zosyn      CT  c/a/p shows all thickening of the bladder, which may be seen in the setting of   cystitis; Enlarged prostate, decrease in size compared to prior examination. No   evidence of prostatic abscess; Fibrotic changes in both lungs with stable pulmonary nodules.     As per ID, patient no longer requires antibiotics - d/c'd on 1/18        pt  no longer  f/p  [place] edward olmstead  following /   defer transplant  meds to  renal       on dvt ppx             7· Assessment	   72M        h/o  FRANCOIS on CPAP, COPD w/ 50-pack-year smoking history, DM2 on insulin pump,         HTN/  HLD,  CAD/ stents,        status post stent, TAVR/  colonoscopy 2022, polyp       h/o kidney transplant in 2013 on immunosuppresion/   covid  12/2023            admitted  with urinary  retention,  and  thurston  was placed  in  er/  s/p  prostate surg  ,s/p   Rezum    surg  on  12/27, dr cobian         uro  eval/.  called.    crt  0/5              Flomax   started      DM,  has  insulin pump,             house   endo  . has   Medtronic  insulin pump at home and Ozempic     c/c  diastolic   chf.             torsemide    HTN /HLD           on  procardia,  labetolol.  enalapril/  meds pe r card     CAD, stents             on asa/ plavix/ ,lipitor/ known to  card         s/p  TAVR, 12/2021       CKD,    s/p   R kidney transplant  2013,   Dr Hartono [facility] /  on cellcept/  tacro       h/o   ILD,  restrictive/obstructive lung disease,  and   FRANCOIS / cpap                  ILD/  known to  house pulm       pt has  been covid  +. since 12/ 29,   and  +  status  now  on arrival, is reflective of  his prior  covid      per  ID., on iv  zosyn      CT  c/a/p shows all thickening of the bladder, which may be seen in the setting of   cystitis; Enlarged prostate, decrease in size compared to prior examination. No   evidence of prostatic abscess; Fibrotic changes in both lungs with stable pulmonary nodules.     As per ID, patient no longer requires antibiotics - d/c'd on 1/18   d/c with  thurston           pt  no longer  f/p  with transplant team in  [facility]/ dr edward olmstead  following /   defer transplant  meds to  renal       on dvt ppx             7· Assessment	   72M        h/o  FRANCOIS on CPAP, COPD w/ 50-pack-year smoking history, DM2 on insulin pump,         HTN/  HLD,  CAD/ stents,        status post stent, TAVR/  colonoscopy 2022, polyp       h/o kidney transplant in 2013 on immunosuppresion/   covid  12/2023            admitted  with urinary  retention,  and  thurston  was placed  in  er/  s/p  prostate surg  ,s/p   Rezum    surg  on  12/27, dr cobian         uro  eval/.  called.    crt  0/5              Flomax   started      DM,  has  insulin pump,             house   endo  . has   Medtronic  insulin pump at home and Ozempic     c/c  diastolic   chf.             torsemide    HTN /HLD           on  procardia,  labetolol.  enalapril/  meds pe r card     CAD, stents             on asa/ plavix/ ,lipitor/ known to  card         s/p  TAVR, 12/2021       CKD,    s/p   R kidney transplant  2013,   Dr Hartono [facility] /  on cellcept/  tacro       h/o   ILD,  restrictive/obstructive lung disease,  and   FRANCOIS / cpap                  ILD/  known to  house pulm       pt has  been covid  +. since 12/ 29,   and  +  status  now  on arrival, is reflective of  his prior  covid      per  ID., on iv  zosyn      CT  c/a/p shows all thickening of the bladder, which may be seen in the setting of   cystitis; Enlarged prostate, decrease in size compared to prior examination. No   evidence of prostatic abscess; Fibrotic changes in both lungs with stable pulmonary nodules.     As per ID, patient no longer requires antibiotics - d/c'd on 1/18   d/c with  thurston           pt  no longer  f/p  with transplant team in  [facility]/ dr edward olmstead  following /   defer transplant  meds to  renal       on dvt ppx             7· Assessment	   72M        h/o  FRANCOIS on CPAP, COPD w/ 50-pack-year smoking history, DM2 on insulin pump,         HTN/  HLD,  CAD/ stents,        status post stent, TAVR/  colonoscopy 2022, polyp       h/o kidney transplant in 2013 on immunosuppresion/   covid  12/2023            admitted  with urinary  retention,  and  thurston  was placed  in  er/  s/p  prostate surg  ,s/p   Rezum    surg  on  12/27, dr cobian         uro  eval/.  called.    crt  0/5              Flomax   started      DM,  has  insulin pump,             house   endo  . has   Medtronic  insulin pump at home and Ozempic     c/c  diastolic   chf.             torsemide    HTN /HLD           on  procardia,  labetolol.  enalapril/  meds pe r card     CAD, stents             on asa/ plavix/ ,lipitor/ known to  card         s/p  TAVR, 12/2021       CKD,    s/p   R kidney transplant  2013,   Dr Hartono [facility] /  on cellcept/  tacro       h/o   ILD,  restrictive/obstructive lung disease,  and   FRANCOIS / cpap                  ILD/  known to  house pulm       pt has  been covid  +. since 12/ 29,   and  +  status  now  on arrival, is reflective of  his prior  covid      per  ID., on iv  zosyn      CT  c/a/p shows all thickening of the bladder, which may be seen in the setting of   cystitis; Enlarged prostate, decrease in size compared to prior examination. No   evidence of prostatic abscess; Fibrotic changes in both lungs with stable pulmonary nodules.     As per ID, patient no longer requires antibiotics - d/c'd on 1/18   d/c with  thurston           pt  no longer  f/p  with transplant team in  [facility]/ dr edward olmstead  following /   defer transplant  meds to  renal       on dvt ppx             7· Assessment	   72M        h/o  FRANCOIS on CPAP, COPD w/ 50-pack-year smoking history, DM2 on insulin pump,         HTN/  HLD,  CAD/ stents,        status post stent, TAVR/  colonoscopy 2022, polyp       h/o kidney transplant in 2013 on immunosuppresion/   covid  12/2023            admitted  with urinary  retention,  and  thurston  was placed  in  er/  s/p  prostate surg  ,s/p   Rezum    surg  on  12/27, dr cobian         uro  eval/.  called.    crt  0/5              Flomax   started      DM,  has  insulin pump,             house   endo  . has   Medtronic  insulin pump at home and Ozempic     c/c  diastolic   chf.             torsemide    HTN /HLD           on  procardia,  labetolol.  enalapril/  meds pe r card     CAD, stents             on asa/ plavix/ ,lipitor/ known to  card         s/p  TAVR, 12/2021       CKD,    s/p   R kidney transplant  2013,   Dr [facility]ono [facility] /  on cellcept/  tacro       h/o   ILD,  restrictive/obstructive lung disease,  and   FRANCOIS / cpap                  ILD/  known to  house pulm       pt has  been covid  +. since 12/ 29,   and  +  status  now  on arrival, is reflective of  his prior  covid      per  ID., on iv  zosyn      CT  c/a/p shows all thickening of the bladder, which may be seen in the setting of   cystitis; Enlarged prostate, decrease in size compared to prior examination. No   evidence of prostatic abscess; Fibrotic changes in both lungs with stable pulmonary nodules.     As per ID, patient no longer requires antibiotics - d/c'd on 1/18   d/c with  thurston           pt  no longer  f/p  with transplant team in  [facility]/ dr edward olmstead  following /   defer transplant  meds to  renal       on dvt ppx

## 2024-01-15 NOTE — HISTORY OF PRESENT ILLNESS
[Former] : former [>= 20 pack years] : >= 20 pack years [TextBox_4] : 73-year-old male with history of FRANCOIS and OAD with abnormal chest CT presents for follow-up.  The patient is doing "okay" on daily Breo and montelukast with occasional albuterol use.  He complains of occasional dyspnea with productive cough without fever, chills, chest pain or hemoptysis.  He is requesting a nebulizer for bronchodilator use.  He is compliant with CPAP use most of the time but complains of mask discomfort. [YearQuit] : 2023 [TextBox_29] : Denies snoring, daytime somnolence, apneic episodes, AM headaches

## 2024-01-15 NOTE — DISCHARGE NOTE PROVIDER - CARE PROVIDERS DIRECT ADDRESSES
,[email or web address] ,[email or web address] ,[email or web address] ,[email or web address] ,[email or web address],[email or web address] ,[email or web address],DirectAddress_Unknown ,[email or web address],DirectAddress_Unknown ,[email or web address],DirectAddress_Unknown

## 2024-01-15 NOTE — REVIEW OF SYSTEMS
[Fatigue] : no fatigue [Cough] : cough [Sputum] : sputum [Dyspnea] : dyspnea [SOB on Exertion] : no sob on exertion [Diabetes] : diabetes [Negative] : Psychiatric

## 2024-01-15 NOTE — DISCHARGE NOTE PROVIDER - PROVIDER TOKENS
PROVIDER:[TOKEN:[[id number]:[facility]:[id number]]] PROVIDER:[TOKEN:[[id number]:[facility]:5228]] PROVIDER:[TOKEN:[[id number]:[facility]:5229]] PROVIDER:[TOKEN:[[id number]:[facility]:5227]] PROVIDER:[TOKEN:[[id number]:[facility]:5241]],PROVIDER:[TOKEN:[[id number]:[facility]:[id number]]] PROVIDER:[TOKEN:[[id number]:[facility]:5241]],PROVIDER:[TOKEN:[[id number]:MI[facility]:[id number]]] PROVIDER:[TOKEN:[[id number]:[facility]:5241]],PROVIDER:[TOKEN:[[id number]:MIIS:[id number]]] PROVIDER:[TOKEN:[[id number]:[facility]:5241]],PROVIDER:[TOKEN:[[id number]:MIIS:[id number]]]

## 2024-01-15 NOTE — H&P ADULT - PROBLEM SELECTOR PLAN 1
- Thurston placed in ED  - Consider trial of removal prior to discharge  - Hematuria was mild likely 2/2 to retention/scarring/recent procedure/recent thurston placement and removal  - Hgb wnl  - Tylenol PRN pain  - Cw torsemide - Thurston placed in ED  - Consider trial of removal prior to discharge  - Hematuria was mild likely 2/2 to retention/scarring/recent procedure/recent thurston placement and removal  - Hgb wnl  - Tylenol PRN pain  - Cw torsemide  - Please obtain urology consult for eval.

## 2024-01-15 NOTE — ED ADULT NURSE REASSESSMENT NOTE - NS ED NURSE REASSESS COMMENT FT1
Report received from BELINDA Saldana. PT is resting comfortably in bed, breathing unlabored on room air, and speaking in complete sentences. Pruitt catheter noted. Updated PT on plan of care. PT admitted to medicine, awaiting a bed. Safety and comfort maintained. Call bell within reach.

## 2024-01-15 NOTE — DISCHARGE NOTE PROVIDER - NPI NUMBER (FOR SYSADMIN USE ONLY) :
[[id number]] [[id number] [[id number]] [[id number]] [[id number]],[[id number]] [[id number]],[[id number]] [[id number]],[[id number]] [[id number]],[[id number]]

## 2024-01-15 NOTE — ED ADULT NURSE REASSESSMENT NOTE - NS ED NURSE REASSESS COMMENT FT1
pt 72 yo male received in purple area isolation maintained pt states covid positive 12/29 still positive on swab pt resting comfortably in bed thurston  cath emptied 700 ml dark cullen urine no order found for thurston attempt to contact team for order pt given breakfast tray

## 2024-01-15 NOTE — DISCHARGE NOTE PROVIDER - NSDCMRMEDTOKEN_GEN_ALL_CORE_FT
Albuterol (Eqv-Proventil HFA) 90 mcg/inh inhalation aerosol: 2 puff(s) inhaled 4 times a day  allopurinol 100 mg oral tablet: 1 tab(s) orally once a day at pm  Aspirin Enteric Coated 81 mg oral delayed release tablet: 1 tab(s) orally once a day  atorvastatin 20 mg oral tablet: 1 tab(s) orally once a day  CellCept 500 mg oral tablet: 1 tab(s) orally 2 times a day  enalapril 10 mg oral tablet: 1 tab(s) orally once a day  gabapentin 100 mg oral tablet: orally once a day (at bedtime)  labetalol 200 mg oral tablet: 1 tab(s) orally 3 times a day  montelukast 10 mg oral tablet: 1 tab(s) orally once a day  NIFEdipine 90 mg oral tablet, extended release: 1 tab(s) orally once a day can give home regimen of 30mg 2 tabs in am, 1 tab in PM  Ozempic (1 mg dose) 4 mg/3 mL subcutaneous solution: 0.5 milligram(s) subcutaneously once a week   senna (sennosides) 12 mg oral tablet: 1 tab(s) orally once a day (at bedtime)  tacrolimus 1 mg oral capsule: 1 cap(s) orally once a day  tacrolimus 1 mg oral capsule: 1.5 cap(s) orally once a day (at bedtime)  torsemide 10 mg oral tablet: 1 tab(s) orally once a day   Albuterol (Eqv-Proventil HFA) 90 mcg/inh inhalation aerosol: 2 puff(s) inhaled 4 times a day as needed for  shortness of breath and/or wheezing  allopurinol 100 mg oral tablet: 1 tab(s) orally once a day at pm  Aspirin Enteric Coated 81 mg oral delayed release tablet: 1 tab(s) orally once a day  atorvastatin 20 mg oral tablet: 1 tab(s) orally once a day  CellCept 500 mg oral tablet: 1 tab(s) orally 2 times a day  enalapril 10 mg oral tablet: 1 tab(s) orally once a day  gabapentin 100 mg oral tablet: orally once a day (at bedtime)  Insulin Pump insulin: Per home settings, no adjustments made this admission, next pump change 1/1/2024  labetalol 200 mg oral tablet: 1 tab(s) orally 3 times a day  montelukast 10 mg oral tablet: 1 tab(s) orally once a day  NIFEdipine 90 mg oral tablet, extended release: 1 tab(s) orally once a day can give home regimen of 30mg 2 tabs in am, 1 tab in PM  senna (sennosides) 12 mg oral tablet: 1 tab(s) orally once a day (at bedtime)  tacrolimus 1 mg oral capsule: 1 cap(s) orally once a day  tacrolimus 1 mg oral capsule: 1.5 cap(s) orally once a day (at bedtime)  tamsulosin 0.4 mg oral capsule: 1 cap(s) orally once a day (at bedtime)  torsemide 10 mg oral tablet: 1 tab(s) orally once a day   Albuterol (Eqv-Proventil HFA) 90 mcg/inh inhalation aerosol: 2 puff(s) inhaled 4 times a day as needed for  shortness of breath and/or wheezing  allopurinol 100 mg oral tablet: 1 tab(s) orally once a day at pm  Aspirin Enteric Coated 81 mg oral delayed release tablet: 1 tab(s) orally once a day  atorvastatin 20 mg oral tablet: 1 tab(s) orally once a day  CellCept 500 mg oral tablet: 2 tab(s) orally 2 times a day  enalapril 10 mg oral tablet: 1 tab(s) orally once a day  gabapentin 100 mg oral tablet: orally once a day (at bedtime)  Insulin Pump insulin: Per home settings, no adjustments made this admission, next pump change 1/1/2024  labetalol 200 mg oral tablet: 1 tab(s) orally 3 times a day  montelukast 10 mg oral tablet: 1 tab(s) orally once a day  NIFEdipine 90 mg oral tablet, extended release: 1 tab(s) orally once a day can give home regimen of 30mg 2 tabs in am, 1 tab in PM  senna (sennosides) 12 mg oral tablet: 1 tab(s) orally once a day (at bedtime)  tacrolimus 1 mg oral capsule: 1 cap(s) orally once a day  tacrolimus 1 mg oral capsule: 1.5 cap(s) orally once a day (at bedtime)  tamsulosin 0.4 mg oral capsule: 1 cap(s) orally once a day (at bedtime)  torsemide 10 mg oral tablet: 1 tab(s) orally once a day

## 2024-01-15 NOTE — DISCHARGE NOTE PROVIDER - NSDCHHNEEDSERVICE_GEN_ALL_CORE
Observation and assessment/Other, specify... Observation and assessment/Rehabilitation services/Teaching and training/Other, specify...

## 2024-01-15 NOTE — H&P ADULT - NSHPPHYSICALEXAM_GEN_ALL_CORE
T(C): 36.7 (01-15-24 @ 04:23), Max: 37.1 (01-14-24 @ 22:50)  HR: 69 (01-15-24 @ 04:23) (69 - 88)  BP: 194/88 (01-15-24 @ 04:23) (166/82 - 202/90)  RR: 18 (01-15-24 @ 04:23) (16 - 18)  SpO2: 96% (01-15-24 @ 04:23) (94% - 97%)    CONSTITUTIONAL: No apparent distress  EYES: PERRLA and symmetric, EOMI, No conjunctival or scleral injection, non-icteric  ENMT: Oral mucosa with moist membranes.  NECK: Supple, symmetric. No JVD.  RESP: No respiratory distress, no use of accessory muscles; CTA b/l, no WRR  CV: RRR, +S1S2, no MRG  GI: Soft, NT, ND, no rebound, no guarding  : No suprapubic tenderness. No CVA tenderness. Pruitt catheter draining yellow urine.  LYMPH: No cervical LAD or tenderness  MSK: No spinal tenderness, normal muscle strength/tone  EXTREMITIES: No pedal edema. Av fistula.  SKIN: No rashes or ulcers noted  NEURO: A+Ox3, responsive. No tremor, sensation intact in upper and lower extremities b/l  PSYCH: Appropriate insight/judgment; mood and affect appropriate, recent/remote memory intact

## 2024-01-15 NOTE — DISCHARGE NOTE PROVIDER - NSFOLLOWUPCLINICS_GEN_ALL_ED_FT
[facility] [place]  [facility]  [address] [place] [address], [place] [address]  Phone: [phone]  Fax:      [facility] [place] [facility] [place]  [facility]  [address] [place] [address], [place] [address]  Phone: [phone]  Fax:      [facility] [place] [facility] [place]  [facility]  [address] [place] [address], [place] [address]  Phone: [phone]  Fax:      [facility] [place]  [facility]  [address] [place] [address], [place] [address]  Phone: [phone]  Fax:

## 2024-01-15 NOTE — CONSULT NOTE ADULT - SUBJECTIVE AND OBJECTIVE BOX
Patient is a 73y old  Male who presents with a chief complaint of Urinary retention (15 Kamlesh 2024 15:12)    HPI:    73 year old male PMH BPH s/p REZUM, FRANCOIS, COPD, DM2, HTN, CAD, S/p TAVR, renal transplant (2013) presents due to worsening urinary retention and mild hematuria. Patient recently had REZUM procedure for his BPH performed on 12/27/23 by Dr. Mcfarland. Had thurston from procedure removed on 1/2/24 but still had difficulty urinating since. Difficulty urinating acutely worsened the last 3 days. Today noticed some mild bleeding with urination as well. Thurston cathter placed in ED. Pt COVID+ since 12/29/23. Reports his only related symptom is nasal congestion now. ED note reports that he claimed to have a fever but on my exam he denies ever being febrile. Patient denies fever, chills, chest pain, SOB, headache, dizziness, abd pain, nausea, vomiting, constipation. (15 Kamlesh 2024 05:23)     prior hospital charts reviewed [  ]  primary team notes reviewed [ x ]  other consultant notes reviewed [ x ]    PAST MEDICAL & SURGICAL HISTORY:  HTN (Hypertension), Benign      Hyperlipidemia      Smoking      DM (diabetes mellitus), type 2      FRANCOIS on CPAP      H/O kidney transplant  2013      CAD (coronary artery disease)  1 stent      Kidney transplant recipient  Rt kidney- 2013      AV fistula      History of transcatheter aortic valve replacement (TAVR)      Personal history of spine surgery      Insulin pump status      H/O colonoscopy          Allergies  No Known Allergies    ANTIMICROBIALS (past 90 days)  MEDICATIONS  (STANDING):  cefTRIAXone   IVPB   100 mL/Hr IV Intermittent (01-15-24 @ 16:08)    cefTRIAXone   IVPB   100 mL/Hr IV Intermittent (01-15-24 @ 00:34)      ANTIMICROBIALS:    cefTRIAXone   IVPB 1000 every 24 hours    OTHER MEDS: MEDICATIONS  (STANDING):  acetaminophen     Tablet .. 650 every 6 hours PRN  albuterol    90 MICROgram(s) HFA Inhaler 2 every 6 hours PRN  aspirin enteric coated 81 daily  atorvastatin 20 at bedtime  dextrose 50% Injectable 12.5 once  dextrose 50% Injectable 25 once  dextrose 50% Injectable 25 once  dextrose Oral Gel 15 once PRN  enalapril 10 daily  gabapentin 100 at bedtime  glucagon  Injectable 1 once  guaiFENesin  every 12 hours  heparin   Injectable 5000 every 12 hours  insulin aspart (NovoLOG) Pump 1 Continuous Pump  labetalol 200 three times a day  melatonin 3 at bedtime PRN  montelukast 10 daily  NIFEdipine XL 90 daily  tacrolimus 1 daily  tacrolimus 1.5 at bedtime  tamsulosin 0.4 at bedtime  torsemide 10 daily    SOCIAL HISTORY: no active smoking or etoh use.     FAMILY HISTORY: noncontributory    REVIEW OF SYSTEMS  [  ] ROS unobtainable because:    [ x ] All other systems negative except as noted below:	    Constitutional:  [x ] fever [ ] chills  [ ] weight loss  [ ] weakness  Skin:  [ ] rash [ ] phlebitis	  Eyes: [ ] icterus [ ] pain  [ ] discharge	  ENMT: [ ] sore throat  [ ] thrush [ ] ulcers [ ] exudates  Respiratory: [ ] dyspnea [ ] hemoptysis [ ] cough [ ] sputum	  Cardiovascular:  [ ] chest pain [ ] palpitations [ ] edema	  Gastrointestinal:  [ ] nausea [ ] vomiting [ ] diarrhea [ ] constipation [ ] pain	  Genitourinary:  [ ] dysuria [ ] frequency [ ] hematuria [ ] discharge [ ] flank pain  [ ] incontinence +suprapubic pain  Musculoskeletal:  [ ] myalgias [ ] arthralgias [ ] arthritis  [ ] back pain  Neurological:  [ ] headache [ ] seizures  [ ] confusion/altered mental status  Psychiatric:  [ ] anxiety [ ] depression	  Hematology/Lymphatics:  [ ] lymphadenopathy  Endocrine:  [ ] adrenal [ ] thyroid  Allergic/Immunologic:	 [ ] transplant [ ] seasonal    Vital Signs Last 24 Hrs  T(F): 98 (01-15-24 @ 15:30), Max: 98.8 (01-14-24 @ 22:50)  Vital Signs Last 24 Hrs  HR: 71 (01-15-24 @ 15:30) (67 - 88)  BP: 137/70 (01-15-24 @ 15:30) (137/70 - 203/80)  RR: 18 (01-15-24 @ 15:30)  SpO2: 96% (01-15-24 @ 15:30) (94% - 97%)  Wt(kg): --    PHYSICAL EXAMINATION:  General: Alert and Awake, NAD  HEENT: PERRL, EOMI  Neck: Supple  Cardiac: RRR, No M/R/G  Resp: CTAB, No Wh/Rh/Ra  Abdomen: NBS, NT/ND, Suprapubic tenderness to palpation.   MSK: No LE edema. No stigmata of IE. No evidence of phlebitis. No evidence of synovitis.  : + thurston  Skin: No rashes or lesions. Skin is warm and dry to the touch.   Neuro: Alert and Awake. CN 2-12 Grossly intact. Moves all four extremities spontaneously.  Psych: Calm, Pleasant, Cooperative                          14.1   10.44 )-----------( 213      ( 15 Kamlesh 2024 07:55 )             43.1     01-15    143  |  108  |  14  ----------------------------<  83  3.6   |  23  |  0.59    Ca    9.7      15 Kamlesh 2024 07:55    TPro  7.7  /  Alb  4.2  /  TBili  1.1  /  DBili  x   /  AST  26  /  ALT  15  /  AlkPhos  90  01-14    Urinalysis Basic - ( 15 Kamlesh 2024 07:55 )    Color: x / Appearance: x / SG: x / pH: x  Gluc: 83 mg/dL / Ketone: x  / Bili: x / Urobili: x   Blood: x / Protein: x / Nitrite: x   Leuk Esterase: x / RBC: x / WBC x   Sq Epi: x / Non Sq Epi: x / Bacteria: x    MICROBIOLOGY:    Rapid RVP Result: Detected (01-15 @ 00:46)    RADIOLOGY:    <The imaging below has been reviewed and visualized by me independently. Findings as detailed in report below>    < from: CT Chest w/ IV Cont (12.30.23 @ 13:50) >  IMPRESSION:  Urinary bladder cystitis and prostatitis.  New 0.9 cm right upper lobe nodule. Recommend follow-up in 3 months.  Improving bilateral airspace disease including resolving nodular   opacities.    < end of copied text >

## 2024-01-15 NOTE — CONSULT NOTE ADULT - SUBJECTIVE AND OBJECTIVE BOX
[facility]  [phone]  NEPHROLOGY      INITIAL CONSULT NOTE  --------------------------------------------------------------------------------  HPI:      73M w/Hx of BPH s/p REZUM, FRANCOIS, COPD, DM2, HTN, CAD, S/p TAVR, renal transplant (2013) presents due to worsening urinary retention and mild hematuria. Patient recently had REZUM procedure for his BPH performed on 12/27/23 by Dr. Mcfraland. Had thurston from procedure removed on 1/2/24  vs 1/12  but still had difficulty urinating since. Difficulty urinating acutely worsened the last 3 days.  noticed some mild bleeding with urination as well. Thurston cathter placed in ED. Pt COVID+ since 12/29/23. Reports his only related symptom is nasal congestion now. ED note reports that he claimed to have a fever but  pt denies  exam he denies ever being febrile. Patient denies fever, chills, chest pain, SOB, headache, dizziness, abd pain, nausea, vomiting, constipation.       PAST HISTORY  --------------------------------------------------------------------------------  PAST MEDICAL & SURGICAL HISTORY:  HTN (Hypertension), Benign      Hyperlipidemia      Smoking      DM (diabetes mellitus), type 2      FRANCOIS on CPAP      H/O kidney transplant  2013      CAD (coronary artery disease)  1 stent      Kidney transplant recipient  Rt kidney- 2013      AV fistula      History of transcatheter aortic valve replacement (TAVR)      Personal history of spine surgery      Insulin pump status      H/O colonoscopy        FAMILY HISTORY:    PAST SOCIAL HISTORY:  + active tobacco use    ALLERGIES & MEDICATIONS  --------------------------------------------------------------------------------  Allergies    No Known Allergies    Intolerances      Standing Inpatient Medications  aspirin enteric coated 81 milliGRAM(s) Oral daily  atorvastatin 20 milliGRAM(s) Oral at bedtime  chlorhexidine 2% Cloths 1 Application(s) Topical daily  dextrose 5%. 1000 milliLiter(s) IV Continuous <Continuous>  dextrose 5%. 1000 milliLiter(s) IV Continuous <Continuous>  dextrose 50% Injectable 12.5 Gram(s) IV Push once  dextrose 50% Injectable 25 Gram(s) IV Push once  dextrose 50% Injectable 25 Gram(s) IV Push once  enalapril 10 milliGRAM(s) Oral daily  gabapentin 100 milliGRAM(s) Oral at bedtime  glucagon  Injectable 1 milliGRAM(s) IntraMuscular once  guaiFENesin  milliGRAM(s) Oral every 12 hours  heparin   Injectable 5000 Unit(s) SubCutaneous every 12 hours  insulin aspart (NovoLOG) Pump 1 Each SubCutaneous Continuous Pump  labetalol 200 milliGRAM(s) Oral three times a day  montelukast 10 milliGRAM(s) Oral daily  NIFEdipine XL 90 milliGRAM(s) Oral daily  piperacillin/tazobactam IVPB.- 3.375 Gram(s) IV Intermittent once  tacrolimus 1 milliGRAM(s) Oral daily  tacrolimus 1.5 milliGRAM(s) Oral at bedtime  tamsulosin 0.4 milliGRAM(s) Oral at bedtime  torsemide 10 milliGRAM(s) Oral daily    PRN Inpatient Medications  acetaminophen     Tablet .. 650 milliGRAM(s) Oral every 6 hours PRN  albuterol    90 MICROgram(s) HFA Inhaler 2 Puff(s) Inhalation every 6 hours PRN  dextrose Oral Gel 15 Gram(s) Oral once PRN  melatonin 3 milliGRAM(s) Oral at bedtime PRN      REVIEW OF SYSTEMS  --------------------------------------------------------------------------------  Gen: No  fevers/chills   Skin: No rashes  Head/Eyes/Ears/Mouth: No headache; Normal hearing;  No sinus pain/discomfort, sore throat  Respiratory: No dyspnea, cough, wheezing, hemoptysis  CV: No chest pain, orthopnea  GI: No abdominal pain, diarrhea, nausea, vomiting, melena, hematochezia  : No dysuria,  + hesitancy urinating +  hematuria, nocturia +  MSK: No joint pain/swelling; no back pain  Neuro: No dizziness/lightheadedness  also with no edema       VITALS/PHYSICAL EXAM  --------------------------------------------------------------------------------  T(C): 36.9 (01-15-24 @ 19:50), Max: 37.1 (01-14-24 @ 22:50)  HR: 75 (01-15-24 @ 19:50) (67 - 82)  BP: 176/74 (01-15-24 @ 19:50) (137/70 - 203/80)  RR: 18 (01-15-24 @ 19:50) (18 - 18)  SpO2: 98% (01-15-24 @ 19:50) (95% - 98%)  Wt(kg): --  Height (cm): 170.2 (01-14-24 @ 19:04)  Weight (kg): 89.4 (01-14-24 @ 19:04)  BMI (kg/m2): 30.9 (01-14-24 @ 19:04)  BSA (m2): 2.01 (01-14-24 @ 19:04)      01-14-24 @ 07:01  -  01-15-24 @ 07:00  --------------------------------------------------------  IN: 0 mL / OUT: 700 mL / NET: -700 mL    01-15-24 @ 07:01  -  01-15-24 @ 20:54  --------------------------------------------------------  IN: 0 mL / OUT: 1800 mL / NET: -1800 mL      Physical Exam:  	Gen: Non toxic comfortable appearing   	no jvd   	Pulm: decrease bs  no rales or ronchi or wheezing  	CV: RRR, S1S2; no rub  	Abd: +BS, soft, nontender/nondistended + renal allograft site RLQ non tender and no renal artery bruit   	: No suprapubic tenderness + thurston   	UE: Warm, no cyanosis  no clubbing,  no edema  	LE: Warm, no cyanosis  no clubbing, no edema  	Neuro: alert and oriented. speech coherent   	Psych: Normal affect and mood  	Skin: Warm, no decrease skin turgor   	:    LABS/STUDIES  --------------------------------------------------------------------------------              14.1   10.44 >-----------<  213      [01-15-24 @ 07:55]              43.1     143  |  108  |  14  ----------------------------<  83      [01-15-24 @ 07:55]  3.6   |  23  |  0.59        Ca     9.7     [01-15-24 @ 07:55]    TPro  7.7  /  Alb  4.2  /  TBili  1.1  /  DBili  x   /  AST  26  /  ALT  15  /  AlkPhos  90  [01-14-24 @ 20:30]          Creatinine Trend:  SCr 0.59 [01-15 @ 07:55]  SCr 0.53 [01-14 @ 20:30]  SCr 0.69 [12-31 @ 07:05]  SCr 0.66 [12-30 @ 07:17]  SCr 0.69 [12-29 @ 18:29]      Urine Microscopic-Add On (NC) (01.14.24 @ 21:15)   Epithelial Cells: 0 /HPF  Cast: 0 /LPF  Red Blood Cell - Urine: 41 /HPF  White Blood Cell - Urine: 6 /HPF  Bacteria: Negative /HPFUrinalysis (01.14.24 @ 21:15)   Glucose Qualitative, Urine: Negative mg/dL  pH Urine: 6.0  Blood, Urine: Moderate  Color: Yellow  Urine Appearance: Clear  Bilirubin: Negative  Ketone - Urine: Trace mg/dL  Specific Gravity: 1.018  Protein, Urine: 300 mg/dL  Urobilinogen: 0.2 mg/dL  Nitrite: Negative  Leukocyte Esterase Concentration: Negative      HCV 0.21, Nonreact      [08-24-22 @ 07:07]

## 2024-01-15 NOTE — ED ADULT NURSE REASSESSMENT NOTE - NS ED NURSE REASSESS COMMENT FT1
pt wants to go home wife at bedside primary team reached out to on teams to come speak with patient pending response from team provider

## 2024-01-15 NOTE — DISCHARGE NOTE PROVIDER - NSDCFUSCHEDAPPT_GEN_ALL_CORE_FT
Labs placed on Dr. De Los Santos Laws desk. Please see telephone encounter dated 7/24. Nabeel Goldberg  [facility] [address]  Scheduled Appointment: 01/25/2024    Elvin Vallejo  [facility]  [address]  Scheduled Appointment: 01/29/2024    [facility] [address]  Scheduled Appointment: 04/11/2024     Nabeel Goldberg  [facility] [address]  Scheduled Appointment: 01/25/2024    Elvin Vallejo  [facility]  [address]  Scheduled Appointment: 01/29/2024    [facility] [address]  Scheduled Appointment: 04/11/2024     Nabeel Goldberg  [facility] [address]  Scheduled Appointment: 01/25/2024    Elvin Vallejo  [facility]  [address]  Scheduled Appointment: 01/29/2024    [facility] [address]  Scheduled Appointment: 04/11/2024     Nabeel Goldberg  [facility] [address]  Scheduled Appointment: 01/25/2024    Elvin Vallejo  [facility]  [address]  Scheduled Appointment: 01/29/2024    [facility] [address]  Scheduled Appointment: 04/11/2024     Nabeel Goldberg  [facility] [address]  Scheduled Appointment: 01/25/2024    Elvin Vallejo  [facility]  [address]  Scheduled Appointment: 01/31/2024    [facility]  [facility] [address]  Scheduled Appointment: 04/11/2024     Nabeel Goldberg  [facility] [address]  Scheduled Appointment: 01/25/2024    Elvin Vallejo  [facility]  [address]  Scheduled Appointment: 01/31/2024    [facility] [address]  Scheduled Appointment: 04/11/2024     Nabeel Goldberg  [facility] [address]  Scheduled Appointment: 01/25/2024    Elvin Vallejo  [facility]  [address]  Scheduled Appointment: 01/31/2024    [facility] [address]  Scheduled Appointment: 04/11/2024     Nabeel Goldberg  [facility] [address]  Scheduled Appointment: 01/25/2024    Elvin Vallejo  [facility]  [address]  Scheduled Appointment: 01/31/2024    [facility] [address]  Scheduled Appointment: 04/11/2024

## 2024-01-15 NOTE — ED ADULT NURSE REASSESSMENT NOTE - NS ED NURSE REASSESS COMMENT FT1
MD spoke with patient and wife pt agreeable to staying In hospital after speaking with them pt pending med bed assignment

## 2024-01-15 NOTE — H&P ADULT - HISTORY OF PRESENT ILLNESS
73M w/Hx of BPH s/p REZUM, FRANCOIS, COPD, DM2, HTN, CAD, S/p TAVR, renal transplant (2013) presents due to worsening urinary retention.     presenting for urinary retention, dribbling bloody urine.  The patient reports that he had a REZUM procedure done on 12/27 by Dr. Mcfarland, and had his [place] removed on 1/2. the patient states that since then he has been having difficulty urinating and over the past 3 days it has been much worse.  The patient states that he is not able to use the restroom and only little dribbles come out.  The patient states that he had a fever to 102.5 at home yesterday.   73M w/Hx of BPH s/p REZUM, FRANCOIS, COPD, DM2, HTN, CAD, S/p TAVR, renal transplant (2013) presents due to worsening urinary retention and mild hematuria. Patient recently had REZUM procedure for his BPH performed on 12/27/23 by Dr. Mcfarland. Had thurston from procedure removed on 1/2/24 but still had difficulty urinating since. Today noticed some mild bleeding with urination as well and urination got acutely worse over the last few days. Thurston cathter placed in ED. Pt COVID+ since 12/29/23. Reports his only related symptom is nasal congestion now. ED note reports that he claimed to have a fever but on my exam he denies ever being febrile. Patient denies fever, chills, chest pain, SOB, headache, dizziness, abd pain, nausea, vomiting, constipation. 73M w/Hx of BPH s/p REZUM, FRANCOIS, COPD, DM2, HTN, CAD, S/p TAVR, renal transplant (2013) presents due to worsening urinary retention and mild hematuria. Patient recently had REZUM procedure for his BPH performed on 12/27/23 by Dr. Mcfarland. Had thurston from procedure removed on 1/2/24 but still had difficulty urinating since. Difficulty urinating acutely worsened the last 3 days. Today noticed some mild bleeding with urination as well. Thurston cathter placed in ED. Pt COVID+ since 12/29/23. Reports his only related symptom is nasal congestion now. ED note reports that he claimed to have a fever but on my exam he denies ever being febrile. Patient denies fever, chills, chest pain, SOB, headache, dizziness, abd pain, nausea, vomiting, constipation.

## 2024-01-15 NOTE — PROGRESS NOTE ADULT - SUBJECTIVE AND OBJECTIVE BOX
date of service: 01-15-24 @ 10:25  afberile  REVIEW OF SYSTEMS:  CONSTITUTIONAL: No fever,  no  weight loss  ENT:  No  tinnitus,   no   vertigo  NECK: No pain or stiffness  RESPIRATORY: No cough, wheezing, chills or hemoptysis;    No Shortness of Breath  CARDIOVASCULAR: No chest pain, palpitations, dizziness  GASTROINTESTINAL: No abdominal or epigastric pain. No nausea, vomiting, or hematemesis; No diarrhea  No melena or hematochezia.  GENITOURINARY: No dysuria, frequency, hematuria, or incontinence  NEUROLOGICAL: No headaches  SKIN: No itching,  no   rash  LYMPH Nodes: No enlarged glands  ENDOCRINE: No heat or cold intolerance  MUSCULOSKELETAL: No joint pain or swelling  PSYCHIATRIC: No depression, anxiety  HEME/LYMPH: No easy bruising, or bleeding gums  ALLERGY AND IMMUNOLOGIC: No hives or eczema	    MEDICATIONS  (STANDING):  aspirin enteric coated 81 milliGRAM(s) Oral daily  atorvastatin 20 milliGRAM(s) Oral at bedtime  chlorhexidine 2% Cloths 1 Application(s) Topical daily  dextrose 5%. 1000 milliLiter(s) (100 mL/Hr) IV Continuous <Continuous>  dextrose 5%. 1000 milliLiter(s) (50 mL/Hr) IV Continuous <Continuous>  dextrose 50% Injectable 12.5 Gram(s) IV Push once  dextrose 50% Injectable 25 Gram(s) IV Push once  dextrose 50% Injectable 25 Gram(s) IV Push once  enalapril 10 milliGRAM(s) Oral daily  gabapentin 100 milliGRAM(s) Oral at bedtime  glucagon  Injectable 1 milliGRAM(s) IntraMuscular once  guaiFENesin  milliGRAM(s) Oral every 12 hours  insulin lispro (ADMELOG) corrective regimen sliding scale   SubCutaneous three times a day before meals  labetalol 200 milliGRAM(s) Oral three times a day  montelukast 10 milliGRAM(s) Oral daily  NIFEdipine XL 90 milliGRAM(s) Oral daily  tacrolimus 1 milliGRAM(s) Oral daily  tacrolimus 1.5 milliGRAM(s) Oral at bedtime  tamsulosin 0.4 milliGRAM(s) Oral at bedtime  torsemide 10 milliGRAM(s) Oral daily    MEDICATIONS  (PRN):  acetaminophen     Tablet .. 650 milliGRAM(s) Oral every 6 hours PRN Temp greater or equal to 38C (100.4F), Mild Pain (1 - 3)  albuterol    90 MICROgram(s) HFA Inhaler 2 Puff(s) Inhalation every 6 hours PRN Shortness of Breath and/or Wheezing  dextrose Oral Gel 15 Gram(s) Oral once PRN Blood Glucose LESS THAN 70 milliGRAM(s)/deciliter  melatonin 3 milliGRAM(s) Oral at bedtime PRN Insomnia      Vital Signs Last 24 Hrs  T(C): 36.9 (15 Kamlesh 2024 07:26), Max: 37.1 (14 Jan 2024 22:50)  T(F): 98.4 (15 Kamlesh 2024 07:26), Max: 98.8 (14 Jan 2024 22:50)  HR: 72 (15 Kamlesh 2024 07:26) (67 - 88)  BP: 177/85 (15 Kamlesh 2024 07:26) (166/82 - 203/80)  BP(mean): --  RR: 18 (15 Kamlesh 2024 07:26) (16 - 18)  SpO2: 96% (15 Kamlesh 2024 07:26) (94% - 97%)    Parameters below as of 15 Kamlesh 2024 07:26  Patient On (Oxygen Delivery Method): room air      CAPILLARY BLOOD GLUCOSE      POCT Blood Glucose.: 75 mg/dL (15 Kamlesh 2024 07:26)    I&O's Summary    14 Jan 2024 07:01  -  15 Kamlesh 2024 07:00  --------------------------------------------------------  IN: 0 mL / OUT: 700 mL / NET: -700 mL          Appearance: Normal	  HEENT:   Normal oral mucosa, PERRL, EOMI	  Lymphatic: No lymphadenopathy  Cardiovascular: Normal S1 S2, No JVD  Respiratory: Lungs clear to auscultation	  Gastrointestinal:  Soft, Non-tender, + BS	  Skin: No rash, No ecchymoses	  Extremities:     LABS:                        14.1   10.44 )-----------( 213      ( 15 Kamlesh 2024 07:55 )             43.1     01-15    143  |  108  |  14  ----------------------------<  83  3.6   |  23  |  0.59    Ca    9.7      15 Kamlesh 2024 07:55    TPro  7.7  /  Alb  4.2  /  TBili  1.1  /  DBili  x   /  AST  26  /  ALT  15  /  AlkPhos  90  01-14          Urinalysis Basic - ( 15 Kamlesh 2024 07:55 )    Color: x / Appearance: x / SG: x / pH: x  Gluc: 83 mg/dL / Ketone: x  / Bili: x / Urobili: x   Blood: x / Protein: x / Nitrite: x   Leuk Esterase: x / RBC: x / WBC x   Sq Epi: x / Non Sq Epi: x / Bacteria: x                      Consultant(s) Notes Reviewed:      Care Discussed with Consultants/Other Providers:

## 2024-01-15 NOTE — CONSULT NOTE ADULT - ASSESSMENT
73 year old male PMH BPH s/p REZUM, FRANCOIS, COPD, DM2, HTN, CAD, S/p TAVR, renal transplant (2013) presents due to worsening urinary retention and mild hematuria. Patient recently had REZUM procedure for his BPH performed on 12/27/23 by Dr. Mcfarland. Admission afterwards for prostatitis, treated with Zosyn and then discharged with Levofloxacin. Was also noted to have pulmonary nodules and course c/b by COVID19 s/p 3 days of Remdesivir.     UA (1/14/24) 6 WBC  RVP (1/15) +COVID19    Given extended time since thurston removal and preceding prostatitis would repeat abdominal imaging  Would prefer broader coverage pending blood cultures and UCx given fever while on Levofloxacin    #Prostatitis, Leukocytosis, Fever  --Recommend CT A/P (preferably with IV contrast - depending on nephrology clearance)  --Recommend 2x blood cultures (ordered)  --Stop Ceftriaxone (ordered)  --Start Zosyn 3.375 mg IV Q8H (ordered)  --Continue to follow CBC with diff  --Continue to follow temperature curve  --Follow up on preliminary urine culture    #Pulmonary Nodules  Cryptococcal Serum Ag (12/31/23) Negative  Serum Fungitell (12/31/23) Negative  Serum Aspergillus Galactomannan Ag (12/31/23) Negative  Histoplasma Serum Antigen (12/31/23) Negative  Coccidioides Ab (12/31/23) Negative  Schistosoma IgG (12/31/23) Negative  --If we are pursuing CT A/P would check CT Chest noncontrast    #COVID19  Doubt active viral illness, likely persistent positive PCR from previous admission  --COVID19 isolation per infection control protocol    Dr. Ross Constantino will be covering the patient starting from tomorrow. Please reach out to her for further questions and follow up.     Elvin Vallejo M.D.  [facility] Division of Infectious Disease  8AM-5PM Monday - Friday: Available on [employer] Teams  After Hours and Holidays (or if no response on Microsoft Teams): Please contact the Infectious Diseases Office at [phone]     The above assessment and plan were discussed with medicine NP  
 Patient is a 73 M with history of FRANCOIS on CPAP, T2D on insulin pump, hLD, HTN, CAD, kidney transplant in 2013 here for urinary retention. endocrinology consulted for insulin pump management.     # T2DM   # Insulin pump in place  - Most recent Hemoglobin A1C 5.4  - Home DM med: medtornic pump with novolog insulin   - Current FS ranges from 100-200  - Current diet: CHO  - Please monitor blood glucose values TID AC & QHS while eating regular meals and Q6H while NPO  - Blood glucose goals pre-meal less than 140 mg/dL and random blood glucose less than 180 mg/dL  - Recommendations:  - - Given patient’s glucose is at target and patient is AOx4 and feels comfortable using the pump, patient can continue with insulin pump while overnight   - Please make sure patient fills out patient attestation and patient self-assessment form to use insulin pump and place in chart (forms can be found on forms on demand)   - RN to document correction insulin bolus in flow sheet   - Hypoglcyemia protocol    - In case of pump malfunction, please administer Lantus 16 units STAT and start Admelog 4 units TID with meals  (HOLD IF NPO)   - Please check FSG before meals and QHS, or q6h while NPO   - Please keep patient on a diabetic, carb controlled diet    - on discharge patient should follow up with their endocrinologist Dr. Byrd     Discharge planning:   - Home DM medications: medtronic pump with novolog insulin   - Discharge DM medications:  medtornic pump with novolog insulin   - Patient will need opthalmology and podiatry follow up as outpatient   - Follow up with outpatient endocrinologist Dr. Jyoti Byrd     # HTN  - BP goal 130/80  - Manage per primary team     # HLD  - Continue with atovastatin 20 mg QHS  - Goal LDL<70  - Manage as outpatient         Thank you for the consult. Will continue to monitor. Please inform the endocrinology team at least 24 hours prior to intended discharge date.     Contact via pager or [employer] Teams during business hours. For follow up questions, discharge recommendations, or new consults please call answering service at [phone] (weekdays), [phone] (nights/weekends). For nonurgent matters, please email  [email or web address].     Tanisha Sousa MD  Department of Endocrinology, Diabetes and metabolism   Pager [phone]
73M w/Hx of BPH s/p REZUM, FRANCOIS, COPD, DM2, HTN, CAD, S/p TAVR, renal transplant (2013) presents due to worsening urinary retention and mild hematuria. Patient recently had REZUM procedure for his BPH performed on 12/27/23 by Dr. Mcfarland. Had thurston from procedure removed on 1/2/24 but still had difficulty urinating since. Difficulty urinating acutely worsened the last 3 days.  noticed some mild bleeding with urination as well. Thurston cathter placed in ED. Pt COVID+ since 12/29/23. Reports his only related symptom is nasal congestion now. ED note reports that he claimed to have a fever but  pt denies  exam he denies ever being febrile. Patient denies fever, chills, chest pain, SOB, headache, dizziness, abd pain, nausea, vomiting, constipation.       1- renal transplant  2- HTN   3- lung lesion   4- recent covid 19 with still ag +   5- urinary retention   6- ?? fevers   7- proteinuria         after discharge his cellcept was resume at 1 g bid to cont  renal transplant meds as :   cellcept 1 g bid  tacrolimus 1 mg and 1.5 mg pm   to have urine pro/cr ratio   prostatitis cont abx as above   lung nodules have been concerning. pulm consult   enalapril 10 mg daily   chf hx resume torsemide 10 mg daily   urinary retention ---> thurston cath to gravity.    
73M w/Hx of BPH s/p REZUM, FRANCOIS, COPD, DM2, HTN, CAD, S/p TAVR, renal transplant (2013) presents due to worsening urinary retention and mild hematuria. Patient recently had REZUM procedure for his BPH performed on 12/27/23 by Dr. Mcfarland. Had thurston from procedure removed on 1/2/24 but still had difficulty urinating since. Difficulty urinating acutely worsened the last 3 days. Today noticed some mild bleeding with urination as well. Thurston cathter placed in ED. Pt COVID+ since 12/29/23. Reports his only related symptom is nasal congestion now. ED note reports that he claimed to have a fever but on my exam he denies ever being febrile. Patient denies fever, chills, chest pain, SOB, headache, dizziness, abd pain, nausea, vomiting, constipation.   pt is well known to me with hx of htn, cad s/p renal transplant with urinary retention and sig elevated bp  continue all cardiac / bp meds as before  increase bp ?sec to urinary retention  check urinalysis/ urology eval  dvt prophylaxis

## 2024-01-15 NOTE — H&P ADULT - ASSESSMENT
73M w/Hx of BPH s/p REZUM, FRANCOIS, COPD, DM2, HTN, CAD, S/p TAVR, renal transplant (2013) presents due to worsening urinary retention and mild hematuria.

## 2024-01-15 NOTE — DISCHARGE NOTE PROVIDER - NSDCCPCAREPLAN_GEN_ALL_CORE_FT
PRINCIPAL DISCHARGE DIAGNOSIS  Diagnosis: Urinary retention  Assessment and Plan of Treatment: thurston in place f/u with your urologist     PRINCIPAL DISCHARGE DIAGNOSIS  Diagnosis: Urinary retention  Assessment and Plan of Treatment: thurston in place f/u with your urologist tomorrow, as previous scheduled  Continue with flomax      SECONDARY DISCHARGE DIAGNOSES  Diagnosis: History of renal transplant  Assessment and Plan of Treatment: Follow up with your nephrologist upon discharge.  Continue with tacrolimus and cellcept    Diagnosis: HTN (hypertension)  Assessment and Plan of Treatment: Low salt diet  Activity as tolerated.  Take all medication as prescribed.  Follow up with your medical doctor for routine blood pressure monitoring at your next visit.  Notify your doctor if you have any of the following symptoms:   Dizziness, Lightheadedness, Blurry vision, Headache, Chest pain, Shortness of breath      Diagnosis: Gout  Assessment and Plan of Treatment: Continue with allopurinol    Diagnosis: Diabetes  Assessment and Plan of Treatment: Continue with your insulin pump.  Follow up with your endocrinologist within one week of discharge.  Make sure you get your HgA1c checked every three months.  Check your blood glucose before meals and at bedtime.  It's important not to skip any meals.  Keep a log of your blood glucose results and always take it with you to your doctor appointments.  Keep a list of your current medications including injectables and over the counter medications and bring this medication list with you to all your doctor appointments.  If you have not seen your ophthalmologist this year call for appointment.  Check your feet daily for redness, sores, or openings. Do not self treat. If no improvement in two days call your primary care physician for an appointment.  Low blood sugar (hypoglycemia) is a blood sugar below 70mg/dl. Check your blood sugar if you feel signs/symptoms of hypoglycemia. If your blood sugar is below 70 take 15 grams of carbohydrates (ex 4 oz of apple juice, 3-4 glucose tablets, or 4-6 oz of regular soda) wait 15 minutes and repeat blood sugar to make sure it comes up above 70.  If your blood sugar is above 70 and you are due for a meal, have a meal.  If you are not due for a meal have a snack.  This snack helps keeps your blood sugar at a safe range.       PRINCIPAL DISCHARGE DIAGNOSIS  Diagnosis: Urinary retention  Assessment and Plan of Treatment: thurston in place f/u with your urologist tomorrow, as previous scheduled  Continue with flomax      SECONDARY DISCHARGE DIAGNOSES  Diagnosis: History of renal transplant  Assessment and Plan of Treatment: Follow up with your nephrologist upon discharge.  Continue with tacrolimus 1mg in the morning and 1.5mg at bedtime  Continue with cellcept 1000mg oral every 12 hours    Diagnosis: HTN (hypertension)  Assessment and Plan of Treatment: Low salt diet  Activity as tolerated.  Take all medication as prescribed.  Follow up with your medical doctor for routine blood pressure monitoring at your next visit.  Notify your doctor if you have any of the following symptoms:   Dizziness, Lightheadedness, Blurry vision, Headache, Chest pain, Shortness of breath      Diagnosis: Gout  Assessment and Plan of Treatment: Continue with allopurinol    Diagnosis: Diabetes  Assessment and Plan of Treatment: Continue with your insulin pump.  Follow up with your endocrinologist within one week of discharge.  Make sure you get your HgA1c checked every three months.  Check your blood glucose before meals and at bedtime.  It's important not to skip any meals.  Keep a log of your blood glucose results and always take it with you to your doctor appointments.  Keep a list of your current medications including injectables and over the counter medications and bring this medication list with you to all your doctor appointments.  If you have not seen your ophthalmologist this year call for appointment.  Check your feet daily for redness, sores, or openings. Do not self treat. If no improvement in two days call your primary care physician for an appointment.  Low blood sugar (hypoglycemia) is a blood sugar below 70mg/dl. Check your blood sugar if you feel signs/symptoms of hypoglycemia. If your blood sugar is below 70 take 15 grams of carbohydrates (ex 4 oz of apple juice, 3-4 glucose tablets, or 4-6 oz of regular soda) wait 15 minutes and repeat blood sugar to make sure it comes up above 70.  If your blood sugar is above 70 and you are due for a meal, have a meal.  If you are not due for a meal have a snack.  This snack helps keeps your blood sugar at a safe range.      Diagnosis: Pulmonary nodules  Assessment and Plan of Treatment: Stable on CT scan  Follow up with your pulmonologist upon discharge     PRINCIPAL DISCHARGE DIAGNOSIS  Diagnosis: Urinary retention  Assessment and Plan of Treatment: thurston in place f/u with your urologist tomorrow, as previous scheduled  Continue with flomax      SECONDARY DISCHARGE DIAGNOSES  Diagnosis: History of renal transplant  Assessment and Plan of Treatment: Follow up with your nephrologist upon discharge.  Continue with tacrolimus 1mg in the morning and 1.5mg at bedtime  Continue with cellcept 1000mg oral twice a day    Diagnosis: HTN (hypertension)  Assessment and Plan of Treatment: Low salt diet  Activity as tolerated.  Take all medication as prescribed.  Follow up with your medical doctor for routine blood pressure monitoring at your next visit.  Notify your doctor if you have any of the following symptoms:   Dizziness, Lightheadedness, Blurry vision, Headache, Chest pain, Shortness of breath      Diagnosis: Gout  Assessment and Plan of Treatment: Continue with allopurinol    Diagnosis: Diabetes  Assessment and Plan of Treatment: Continue with your insulin pump.  Follow up with your endocrinologist within one week of discharge.  Make sure you get your HgA1c checked every three months.  Check your blood glucose before meals and at bedtime.  It's important not to skip any meals.  Keep a log of your blood glucose results and always take it with you to your doctor appointments.  Keep a list of your current medications including injectables and over the counter medications and bring this medication list with you to all your doctor appointments.  If you have not seen your ophthalmologist this year call for appointment.  Check your feet daily for redness, sores, or openings. Do not self treat. If no improvement in two days call your primary care physician for an appointment.  Low blood sugar (hypoglycemia) is a blood sugar below 70mg/dl. Check your blood sugar if you feel signs/symptoms of hypoglycemia. If your blood sugar is below 70 take 15 grams of carbohydrates (ex 4 oz of apple juice, 3-4 glucose tablets, or 4-6 oz of regular soda) wait 15 minutes and repeat blood sugar to make sure it comes up above 70.  If your blood sugar is above 70 and you are due for a meal, have a meal.  If you are not due for a meal have a snack.  This snack helps keeps your blood sugar at a safe range.      Diagnosis: Pulmonary nodules  Assessment and Plan of Treatment: Stable on CT scan  Follow up with your pulmonologist upon discharge

## 2024-01-15 NOTE — CONSULT NOTE ADULT - SUBJECTIVE AND OBJECTIVE BOX
Date of Service, 01-15-24 @ 10:29  CHIEF COMPLAINT:Patient is a 73y old  Male who presents with a chief complaint of Urinary retention (15 Kamlesh 2024 05:23)      HPI:  73M w/Hx of BPH s/p REZUM, FRANCOIS, COPD, DM2, HTN, CAD, S/p TAVR, renal transplant (2013) presents due to worsening urinary retention and mild hematuria. Patient recently had REZUM procedure for his BPH performed on 12/27/23 by Dr. Mcfarland. Had thurston from procedure removed on 1/2/24 but still had difficulty urinating since. Difficulty urinating acutely worsened the last 3 days. Today noticed some mild bleeding with urination as well. Thurston cathter placed in ED. Pt COVID+ since 12/29/23. Reports his only related symptom is nasal congestion now. ED note reports that he claimed to have a fever but on my exam he denies ever being febrile. Patient denies fever, chills, chest pain, SOB, headache, dizziness, abd pain, nausea, vomiting, constipation.       PAST MEDICAL & SURGICAL HISTORY:  HTN (Hypertension), Benign  Hyperlipidemia  Smoking  DM (diabetes mellitus), type 2  FRANCOIS on CPAP  H/O kidney transplant  2013  CAD (coronary artery disease)  Kidney transplant recipient  Rt kidney- 2013  AV fistula  History of transcatheter aortic valve replacement (TAVR)  Personal history of spine surgery  Insulin pump status  H/O colonoscopy      MEDICATIONS  (STANDING):  aspirin enteric coated 81 milliGRAM(s) Oral daily  atorvastatin 20 milliGRAM(s) Oral at bedtime  chlorhexidine 2% Cloths 1 Application(s) Topical daily  dextrose 5%. 1000 milliLiter(s) (100 mL/Hr) IV Continuous <Continuous>  dextrose 5%. 1000 milliLiter(s) (50 mL/Hr) IV Continuous <Continuous>  dextrose 50% Injectable 12.5 Gram(s) IV Push once  dextrose 50% Injectable 25 Gram(s) IV Push once  dextrose 50% Injectable 25 Gram(s) IV Push once  enalapril 10 milliGRAM(s) Oral daily  gabapentin 100 milliGRAM(s) Oral at bedtime  glucagon  Injectable 1 milliGRAM(s) IntraMuscular once  guaiFENesin  milliGRAM(s) Oral every 12 hours  insulin lispro (ADMELOG) corrective regimen sliding scale   SubCutaneous three times a day before meals  labetalol 200 milliGRAM(s) Oral three times a day  montelukast 10 milliGRAM(s) Oral daily  NIFEdipine XL 90 milliGRAM(s) Oral daily  tacrolimus 1 milliGRAM(s) Oral daily  tacrolimus 1.5 milliGRAM(s) Oral at bedtime  tamsulosin 0.4 milliGRAM(s) Oral at bedtime  torsemide 10 milliGRAM(s) Oral daily    MEDICATIONS  (PRN):  acetaminophen     Tablet .. 650 milliGRAM(s) Oral every 6 hours PRN Temp greater or equal to 38C (100.4F), Mild Pain (1 - 3)  albuterol    90 MICROgram(s) HFA Inhaler 2 Puff(s) Inhalation every 6 hours PRN Shortness of Breath and/or Wheezing  dextrose Oral Gel 15 Gram(s) Oral once PRN Blood Glucose LESS THAN 70 milliGRAM(s)/deciliter  melatonin 3 milliGRAM(s) Oral at bedtime PRN Insomnia      FAMILY HISTORY:      SOCIAL HISTORY:    [x ] Non-smoker  [ ] Smoker  [ ] Alcohol    Allergies    No Known Allergies    Intolerances    	    REVIEW OF SYSTEMS:  CONSTITUTIONAL: No fever, weight loss, or fatigue  EYES: No eye pain, visual disturbances, or discharge  ENT:  No difficulty hearing, tinnitus, vertigo; No sinus or throat pain  NECK: No pain or stiffness  RESPIRATORY: No cough, wheezing, chills or hemoptysis; + Shortness of Breath  CARDIOVASCULAR: No chest pain, palpitations, passing out, dizziness, or leg swelling  GASTROINTESTINAL: No abdominal or epigastric pain. No nausea, vomiting, or hematemesis; No diarrhea or constipation. No melena or hematochezia.  GENITOURINARY: No dysuria, frequency, hematuria, or incontinence  NEUROLOGICAL: No headaches, memory loss, loss of strength, numbness, or tremors  SKIN: No itching, burning, rashes, or lesions   LYMPH Nodes: No enlarged glands  ENDOCRINE: No heat or cold intolerance; No hair loss  MUSCULOSKELETAL: No joint pain or swelling; No muscle, back, or extremity pain  PSYCHIATRIC: No depression, anxiety, mood swings, or difficulty sleeping  HEME/LYMPH: No easy bruising, or bleeding gums  ALLERGY AND IMMUNOLOGIC: No hives or eczema	    [x ] All others negative	  [ ] Unable to obtain    PHYSICAL EXAM:  T(C): 36.9 (01-15-24 @ 07:26), Max: 37.1 (01-14-24 @ 22:50)  HR: 72 (01-15-24 @ 07:26) (67 - 88)  BP: 177/85 (01-15-24 @ 07:26) (166/82 - 203/80)  RR: 18 (01-15-24 @ 07:26) (16 - 18)  SpO2: 96% (01-15-24 @ 07:26) (94% - 97%)  Wt(kg): --  I&O's Summary    14 Jan 2024 07:01  -  15 Kamlesh 2024 07:00  --------------------------------------------------------  IN: 0 mL / OUT: 700 mL / NET: -700 mL        Appearance: Normal	  HEENT:   Normal oral mucosa, PERRL, EOMI	  Lymphatic: No lymphadenopathy  Cardiovascular: Normal S1 S2, No JVD, + murmurs, No edema  Respiratoryrhonchi  Psychiatry: A & O x 3, Mood & affect appropriate  Gastrointestinal:  Soft, Non-tender, + BS	  Skin: No rashes, No ecchymoses, No cyanosis	  Neurologic: Non-focal  Extremities: Normal range of motion, No clubbing, cyanosis or edema  Vascular: Peripheral pulses palpable 2+ bilaterally    TELEMETRY: 	    ECG:  	  RADIOLOGY:  OTHER: 	  	  LABS:	 	    CARDIAC MARKERS:                              14.1   10.44 )-----------( 213      ( 15 Kamlesh 2024 07:55 )             43.1     01-15    143  |  108  |  14  ----------------------------<  83  3.6   |  23  |  0.59    Ca    9.7      15 Kamlesh 2024 07:55    TPro  7.7  /  Alb  4.2  /  TBili  1.1  /  DBili  x   /  AST  26  /  ALT  15  /  AlkPhos  90  01-14    proBNP:   Lipid Profile:   HgA1c:   TSH:       PREVIOUS DIAGNOSTIC TESTING:     < from: 12 Lead ECG (12.31.23 @ 16:20) >  Diagnosis Line NORMAL SINUS RHYTHM  POSSIBLE LEFT ATRIAL ENLARGEMENT  LEFT AXIS DEVIATION  LEFT VENTRICULAR HYPERTROPHY ( R in aVL , [place] product )  ABNORMAL ECG  WHEN COMPARED WITH ECG OF 29-DEC-2023 17:46, (UNCONFIRMED)  NO SIGNIFICANT CHANGE WAS FOUND    < from: POCUS ED TTE 2D F/U, Limited w/o Cont. (10.10.23 @ 15:04) >  No clinically significant pericardial effusion.    Right basilar subpleural consolidation, air bronchograms and B-lines,   consider infectious etiology, differential also includes compressive   atelectasis.               Date of Service, 01-15-24 @ 10:29  CHIEF COMPLAINT:Patient is a 73y old  Male who presents with a chief complaint of Urinary retention (15 Kamlesh 2024 05:23)      HPI:  73M w/Hx of BPH s/p REZUM, FRANCOIS, COPD, DM2, HTN, CAD, S/p TAVR, renal transplant (2013) presents due to worsening urinary retention and mild hematuria. Patient recently had REZUM procedure for his BPH performed on 12/27/23 by Dr. Mcfarland. Had thurston from procedure removed on 1/2/24 but still had difficulty urinating since. Difficulty urinating acutely worsened the last 3 days. Today noticed some mild bleeding with urination as well. Thurston cathter placed in ED. Pt COVID+ since 12/29/23. Reports his only related symptom is nasal congestion now. ED note reports that he claimed to have a fever but on my exam he denies ever being febrile. Patient denies fever, chills, chest pain, SOB, headache, dizziness, abd pain, nausea, vomiting, constipation.       PAST MEDICAL & SURGICAL HISTORY:  HTN (Hypertension), Benign  Hyperlipidemia  Smoking  DM (diabetes mellitus), type 2  FRANCOIS on CPAP  H/O kidney transplant  2013  CAD (coronary artery disease)  Kidney transplant recipient  Rt kidney- 2013  AV fistula  History of transcatheter aortic valve replacement (TAVR)  Personal history of spine surgery  Insulin pump status  H/O colonoscopy      MEDICATIONS  (STANDING):  aspirin enteric coated 81 milliGRAM(s) Oral daily  atorvastatin 20 milliGRAM(s) Oral at bedtime  chlorhexidine 2% Cloths 1 Application(s) Topical daily  dextrose 5%. 1000 milliLiter(s) (100 mL/Hr) IV Continuous <Continuous>  dextrose 5%. 1000 milliLiter(s) (50 mL/Hr) IV Continuous <Continuous>  dextrose 50% Injectable 12.5 Gram(s) IV Push once  dextrose 50% Injectable 25 Gram(s) IV Push once  dextrose 50% Injectable 25 Gram(s) IV Push once  enalapril 10 milliGRAM(s) Oral daily  gabapentin 100 milliGRAM(s) Oral at bedtime  glucagon  Injectable 1 milliGRAM(s) IntraMuscular once  guaiFENesin  milliGRAM(s) Oral every 12 hours  insulin lispro (ADMELOG) corrective regimen sliding scale   SubCutaneous three times a day before meals  labetalol 200 milliGRAM(s) Oral three times a day  montelukast 10 milliGRAM(s) Oral daily  NIFEdipine XL 90 milliGRAM(s) Oral daily  tacrolimus 1 milliGRAM(s) Oral daily  tacrolimus 1.5 milliGRAM(s) Oral at bedtime  tamsulosin 0.4 milliGRAM(s) Oral at bedtime  torsemide 10 milliGRAM(s) Oral daily    MEDICATIONS  (PRN):  acetaminophen     Tablet .. 650 milliGRAM(s) Oral every 6 hours PRN Temp greater or equal to 38C (100.4F), Mild Pain (1 - 3)  albuterol    90 MICROgram(s) HFA Inhaler 2 Puff(s) Inhalation every 6 hours PRN Shortness of Breath and/or Wheezing  dextrose Oral Gel 15 Gram(s) Oral once PRN Blood Glucose LESS THAN 70 milliGRAM(s)/deciliter  melatonin 3 milliGRAM(s) Oral at bedtime PRN Insomnia      FAMILY HISTORY:      SOCIAL HISTORY:    [x ] Non-smoker  [ ] Smoker  [ ] Alcohol    Allergies    No Known Allergies    Intolerances    	    REVIEW OF SYSTEMS:  CONSTITUTIONAL: No fever, weight loss, or fatigue  EYES: No eye pain, visual disturbances, or discharge  ENT:  No difficulty hearing, tinnitus, vertigo; No sinus or throat pain  NECK: No pain or stiffness  RESPIRATORY: No cough, wheezing, chills or hemoptysis; + Shortness of Breath  CARDIOVASCULAR: No chest pain, palpitations, passing out, dizziness, or leg swelling  GASTROINTESTINAL: No abdominal or epigastric pain. No nausea, vomiting, or hematemesis; No diarrhea or constipation. No melena or hematochezia.  GENITOURINARY: No dysuria, frequency, hematuria, or incontinence  NEUROLOGICAL: No headaches, memory loss, loss of strength, numbness, or tremors  SKIN: No itching, burning, rashes, or lesions   LYMPH Nodes: No enlarged glands  ENDOCRINE: No heat or cold intolerance; No hair loss  MUSCULOSKELETAL: No joint pain or swelling; No muscle, back, or extremity pain  PSYCHIATRIC: No depression, anxiety, mood swings, or difficulty sleeping  HEME/LYMPH: No easy bruising, or bleeding gums  ALLERGY AND IMMUNOLOGIC: No hives or eczema	    [x ] All others negative	  [ ] Unable to obtain    PHYSICAL EXAM:  T(C): 36.9 (01-15-24 @ 07:26), Max: 37.1 (01-14-24 @ 22:50)  HR: 72 (01-15-24 @ 07:26) (67 - 88)  BP: 177/85 (01-15-24 @ 07:26) (166/82 - 203/80)  RR: 18 (01-15-24 @ 07:26) (16 - 18)  SpO2: 96% (01-15-24 @ 07:26) (94% - 97%)  Wt(kg): --  I&O's Summary    14 Jan 2024 07:01  -  15 Kamlesh 2024 07:00  --------------------------------------------------------  IN: 0 mL / OUT: 700 mL / NET: -700 mL        Appearance: Normal	  HEENT:   Normal oral mucosa, PERRL, EOMI	  Lymphatic: No lymphadenopathy  Cardiovascular: Normal S1 S2, No JVD, + murmurs, No edema  Respiratoryrhonchi  Psychiatry: A & O x 3, Mood & affect appropriate  Gastrointestinal:  Soft, Non-tender, + BS	  Skin: No rashes, No ecchymoses, No cyanosis	  Neurologic: Non-focal  Extremities: Normal range of motion, No clubbing, cyanosis or edema  Vascular: Peripheral pulses palpable 2+ bilaterally    TELEMETRY: 	    ECG:  	  RADIOLOGY:  OTHER: 	  	  LABS:	 	    CARDIAC MARKERS:                              14.1   10.44 )-----------( 213      ( 15 Kamlesh 2024 07:55 )             43.1     01-15    143  |  108  |  14  ----------------------------<  83  3.6   |  23  |  0.59    Ca    9.7      15 Kamlesh 2024 07:55    TPro  7.7  /  Alb  4.2  /  TBili  1.1  /  DBili  x   /  AST  26  /  ALT  15  /  AlkPhos  90  01-14    proBNP:   Lipid Profile:   HgA1c:   TSH:       PREVIOUS DIAGNOSTIC TESTING:     < from: 12 Lead ECG (12.31.23 @ 16:20) >  Diagnosis Line NORMAL SINUS RHYTHM  POSSIBLE LEFT ATRIAL ENLARGEMENT  LEFT AXIS DEVIATION  LEFT VENTRICULAR HYPERTROPHY ( R in aVL , [place] product )  ABNORMAL ECG  WHEN COMPARED WITH ECG OF 29-DEC-2023 17:46, (UNCONFIRMED)  NO SIGNIFICANT CHANGE WAS FOUND    < from: POCUS ED TTE 2D F/U, Limited w/o Cont. (10.10.23 @ 15:04) >  No clinically significant pericardial effusion.    Right basilar subpleural consolidation, air bronchograms and B-lines,   consider infectious etiology, differential also includes compressive   atelectasis.               Date of Service, 01-15-24 @ 10:29  CHIEF COMPLAINT:Patient is a 73y old  Male who presents with a chief complaint of Urinary retention (15 Kamlesh 2024 05:23)      HPI:  73M w/Hx of BPH s/p REZUM, FRANCOIS, COPD, DM2, HTN, CAD, S/p TAVR, renal transplant (2013) presents due to worsening urinary retention and mild hematuria. Patient recently had REZUM procedure for his BPH performed on 12/27/23 by Dr. Mcfarland. Had thurston from procedure removed on 1/2/24 but still had difficulty urinating since. Difficulty urinating acutely worsened the last 3 days. Today noticed some mild bleeding with urination as well. Thurston cathter placed in ED. Pt COVID+ since 12/29/23. Reports his only related symptom is nasal congestion now. ED note reports that he claimed to have a fever but on my exam he denies ever being febrile. Patient denies fever, chills, chest pain, SOB, headache, dizziness, abd pain, nausea, vomiting, constipation.       PAST MEDICAL & SURGICAL HISTORY:  HTN (Hypertension), Benign  Hyperlipidemia  Smoking  DM (diabetes mellitus), type 2  FRANCOIS on CPAP  H/O kidney transplant  2013  CAD (coronary artery disease)  Kidney transplant recipient  Rt kidney- 2013  AV fistula  History of transcatheter aortic valve replacement (TAVR)  Personal history of spine surgery  Insulin pump status  H/O colonoscopy      MEDICATIONS  (STANDING):  aspirin enteric coated 81 milliGRAM(s) Oral daily  atorvastatin 20 milliGRAM(s) Oral at bedtime  chlorhexidine 2% Cloths 1 Application(s) Topical daily  dextrose 5%. 1000 milliLiter(s) (100 mL/Hr) IV Continuous <Continuous>  dextrose 5%. 1000 milliLiter(s) (50 mL/Hr) IV Continuous <Continuous>  dextrose 50% Injectable 12.5 Gram(s) IV Push once  dextrose 50% Injectable 25 Gram(s) IV Push once  dextrose 50% Injectable 25 Gram(s) IV Push once  enalapril 10 milliGRAM(s) Oral daily  gabapentin 100 milliGRAM(s) Oral at bedtime  glucagon  Injectable 1 milliGRAM(s) IntraMuscular once  guaiFENesin  milliGRAM(s) Oral every 12 hours  insulin lispro (ADMELOG) corrective regimen sliding scale   SubCutaneous three times a day before meals  labetalol 200 milliGRAM(s) Oral three times a day  montelukast 10 milliGRAM(s) Oral daily  NIFEdipine XL 90 milliGRAM(s) Oral daily  tacrolimus 1 milliGRAM(s) Oral daily  tacrolimus 1.5 milliGRAM(s) Oral at bedtime  tamsulosin 0.4 milliGRAM(s) Oral at bedtime  torsemide 10 milliGRAM(s) Oral daily    MEDICATIONS  (PRN):  acetaminophen     Tablet .. 650 milliGRAM(s) Oral every 6 hours PRN Temp greater or equal to 38C (100.4F), Mild Pain (1 - 3)  albuterol    90 MICROgram(s) HFA Inhaler 2 Puff(s) Inhalation every 6 hours PRN Shortness of Breath and/or Wheezing  dextrose Oral Gel 15 Gram(s) Oral once PRN Blood Glucose LESS THAN 70 milliGRAM(s)/deciliter  melatonin 3 milliGRAM(s) Oral at bedtime PRN Insomnia      FAMILY HISTORY:      SOCIAL HISTORY:    [x ] Non-smoker  [ ] Smoker  [ ] Alcohol    Allergies    No Known Allergies    Intolerances    	    REVIEW OF SYSTEMS:  CONSTITUTIONAL: No fever, weight loss, or fatigue  EYES: No eye pain, visual disturbances, or discharge  ENT:  No difficulty hearing, tinnitus, vertigo; No sinus or throat pain  NECK: No pain or stiffness  RESPIRATORY: No cough, wheezing, chills or hemoptysis; + Shortness of Breath  CARDIOVASCULAR: No chest pain, palpitations, passing out, dizziness, or leg swelling  GASTROINTESTINAL: No abdominal or epigastric pain. No nausea, vomiting, or hematemesis; No diarrhea or constipation. No melena or hematochezia.  GENITOURINARY: No dysuria, frequency, hematuria, or incontinence  NEUROLOGICAL: No headaches, memory loss, loss of strength, numbness, or tremors  SKIN: No itching, burning, rashes, or lesions   LYMPH Nodes: No enlarged glands  ENDOCRINE: No heat or cold intolerance; No hair loss  MUSCULOSKELETAL: No joint pain or swelling; No muscle, back, or extremity pain  PSYCHIATRIC: No depression, anxiety, mood swings, or difficulty sleeping  HEME/LYMPH: No easy bruising, or bleeding gums  ALLERGY AND IMMUNOLOGIC: No hives or eczema	    [x ] All others negative	  [ ] Unable to obtain    PHYSICAL EXAM:  T(C): 36.9 (01-15-24 @ 07:26), Max: 37.1 (01-14-24 @ 22:50)  HR: 72 (01-15-24 @ 07:26) (67 - 88)  BP: 177/85 (01-15-24 @ 07:26) (166/82 - 203/80)  RR: 18 (01-15-24 @ 07:26) (16 - 18)  SpO2: 96% (01-15-24 @ 07:26) (94% - 97%)  Wt(kg): --  I&O's Summary    14 Jan 2024 07:01  -  15 Kamlesh 2024 07:00  --------------------------------------------------------  IN: 0 mL / OUT: 700 mL / NET: -700 mL        Appearance: Normal	  HEENT:   Normal oral mucosa, PERRL, EOMI	  Lymphatic: No lymphadenopathy  Cardiovascular: Normal S1 S2, No JVD, + murmurs, No edema  Respiratoryrhonchi  Psychiatry: A & O x 3, Mood & affect appropriate  Gastrointestinal:  Soft, Non-tender, + BS	  Skin: No rashes, No ecchymoses, No cyanosis	  Neurologic: Non-focal  Extremities: Normal range of motion, No clubbing, cyanosis or edema  Vascular: Peripheral pulses palpable 2+ bilaterally    TELEMETRY: 	    ECG:  	  RADIOLOGY:  OTHER: 	  	  LABS:	 	    CARDIAC MARKERS:                              14.1   10.44 )-----------( 213      ( 15 Kamlesh 2024 07:55 )             43.1     01-15    143  |  108  |  14  ----------------------------<  83  3.6   |  23  |  0.59    Ca    9.7      15 Kamlesh 2024 07:55    TPro  7.7  /  Alb  4.2  /  TBili  1.1  /  DBili  x   /  AST  26  /  ALT  15  /  AlkPhos  90  01-14    proBNP:   Lipid Profile:   HgA1c:   TSH:       PREVIOUS DIAGNOSTIC TESTING:     < from: 12 Lead ECG (12.31.23 @ 16:20) >  Diagnosis Line NORMAL SINUS RHYTHM  POSSIBLE LEFT ATRIAL ENLARGEMENT  LEFT AXIS DEVIATION  LEFT VENTRICULAR HYPERTROPHY ( R in aVL , [place] product )  ABNORMAL ECG  WHEN COMPARED WITH ECG OF 29-DEC-2023 17:46, (UNCONFIRMED)  NO SIGNIFICANT CHANGE WAS FOUND    < from: POCUS ED TTE 2D F/U, Limited w/o Cont. (10.10.23 @ 15:04) >  No clinically significant pericardial effusion.    Right basilar subpleural consolidation, air bronchograms and B-lines,   consider infectious etiology, differential also includes compressive   atelectasis.

## 2024-01-15 NOTE — DISCHARGE NOTE PROVIDER - NSFOLLOWUPCLINICSTOKEN_GEN_ALL_ED_FT
[id number]: || ||00\01||False; [id number]: || ||00\01||False; [id number]: || ||00\01||False; [id number]: || ||00\01||False;

## 2024-01-15 NOTE — DISCHARGE NOTE PROVIDER - NSDCFUADDAPPT_GEN_ALL_CORE_FT
You must follow up with your primary medical doctor within one week of discharge - please call to make an appointment.    You must follow up with your urologist within one week of discharge - as previously discussed prior to admission.    You must follow up with your nephrologist within 1-2 weeks of discharge - please call to make an appointment.          APPTS ARE READY TO BE MADE: [X] YES    Best Family or Patient Contact (if needed):    Additional Information about above appointments (if needed):    1: Primary medical doctor  2: Urologist  3: Nephrologist    Other comments or requests:    You must follow up with your primary medical doctor within one week of discharge - please call to make an appointment.    You must follow up with your urologist within one week of discharge - as previously discussed prior to admission.    You must follow up with your nephrologist within 1-2 weeks of discharge - please call to make an appointment.          APPTS ARE READY TO BE MADE: [X] YES    Best Family or Patient Contact (if needed):    Additional Information about above appointments (if needed):    1: Primary medical doctor  2: Urologist  3: Nephrologist    Other comments or requests:     Patient informed us they already have secured a follow up appointment which is not visible on [facility].     Prior to outreaching the patient, it was visible that the patient has secured a follow up appointment which was not scheduled by our team. Patient is scheduled with Dr. Vallejo 1/31 11:00am [address] [place] [address]   You must follow up with your primary medical doctor within one week of discharge - please call to make an appointment.    You must follow up with your urologist within one week of discharge - as previously discussed prior to admission.    You must follow up with your nephrologist within 1-2 weeks of discharge - please call to make an appointment.          APPTS ARE READY TO BE MADE: [X] YES    Best Family or Patient Contact (if needed):    Additional Information about above appointments (if needed):    1: Primary medical doctor  2: Urologist  3: Nephrologist    Other comments or requests:     Patient informed us they already have secured a follow up appointment which is not visible on [facility].     Prior to outreaching the patient, it was visible that the patient has secured a follow up appointment which was not scheduled by our team. Patient is scheduled with Dr. Vallejo 1/31 11:00am [address] [place] [address]   You must follow up with your primary medical doctor within one week of discharge - please call to make an appointment.    You must follow up with your urologist within one week of discharge - as previously discussed prior to admission.    You must follow up with your nephrologist within 1-2 weeks of discharge - please call to make an appointment.          APPTS ARE READY TO BE MADE: [X] YES    Best Family or Patient Contact (if needed):    Additional Information about above appointments (if needed):    1: Primary medical doctor  2: Urologist  3: Nephrologist    Other comments or requests:     Patient informed us they already have secured a follow up appointment which is not visible on [facility].     Prior to outreaching the patient, it was visible that the patient has secured a follow up appointment which was not scheduled by our team. Patient is scheduled with Dr. Vallejo 1/31 11:00am [address] [place] [address]   You must follow up with your primary medical doctor within one week of discharge - please call to make an appointment.    You must follow up with your urologist within one week of discharge - as previously discussed prior to admission.    You must follow up with your nephrologist within 1-2 weeks of discharge - please call to make an appointment.          APPTS ARE READY TO BE MADE: [X] YES    Best Family or Patient Contact (if needed):    Additional Information about above appointments (if needed):    1: Primary medical doctor  2: Urologist  3: Nephrologist    Other comments or requests:     Patient informed us they already have secured a follow up appointment which is not visible on [facility].     Prior to outreaching the patient, it was visible that the patient has secured a follow up appointment which was not scheduled by our team. Patient is scheduled with Dr. Vallejo 1/31 11:00am [address] [place] [address]

## 2024-01-15 NOTE — REASON FOR VISIT
[Follow-Up] : a follow-up visit [Abnormal CXR/ Chest CT] : an abnormal CXR/ chest CT [Sleep Apnea] : sleep apnea [TextBox_[id number]] : OAD

## 2024-01-16 LAB
ANION GAP SERPL CALC-SCNC: 12 MMOL/L — SIGNIFICANT CHANGE UP (ref 5–17)
BUN SERPL-MCNC: 16 MG/DL — SIGNIFICANT CHANGE UP (ref 7–23)
CALCIUM SERPL-MCNC: 9.6 MG/DL — SIGNIFICANT CHANGE UP (ref 8.4–10.5)
CHLORIDE SERPL-SCNC: 106 MMOL/L — SIGNIFICANT CHANGE UP (ref 96–108)
CO2 SERPL-SCNC: 24 MMOL/L — SIGNIFICANT CHANGE UP (ref 22–31)
CREAT SERPL-MCNC: 0.56 MG/DL — SIGNIFICANT CHANGE UP (ref 0.5–1.3)
CULTURE RESULTS: NO GROWTH — SIGNIFICANT CHANGE UP
EGFR: 104 ML/MIN/1.73M2 — SIGNIFICANT CHANGE UP
GLUCOSE BLDC GLUCOMTR-MCNC: 114 MG/DL — HIGH (ref 70–99)
GLUCOSE BLDC GLUCOMTR-MCNC: 122 MG/DL — HIGH (ref 70–99)
GLUCOSE BLDC GLUCOMTR-MCNC: 122 MG/DL — HIGH (ref 70–99)
GLUCOSE BLDC GLUCOMTR-MCNC: 139 MG/DL — HIGH (ref 70–99)
GLUCOSE SERPL-MCNC: 94 MG/DL — SIGNIFICANT CHANGE UP (ref 70–99)
HCT VFR BLD CALC: 42.7 % — SIGNIFICANT CHANGE UP (ref 39–50)
HGB BLD-MCNC: 14.6 G/DL — SIGNIFICANT CHANGE UP (ref 13–17)
MCHC RBC-ENTMCNC: 31.5 PG — SIGNIFICANT CHANGE UP (ref 27–34)
MCHC RBC-ENTMCNC: 34.2 GM/DL — SIGNIFICANT CHANGE UP (ref 32–36)
MCV RBC AUTO: 92.2 FL — SIGNIFICANT CHANGE UP (ref 80–100)
MRSA PCR RESULT.: SIGNIFICANT CHANGE UP
NRBC # BLD: 0 /100 WBCS — SIGNIFICANT CHANGE UP (ref 0–0)
PLATELET # BLD AUTO: 193 K/UL — SIGNIFICANT CHANGE UP (ref 150–400)
POTASSIUM SERPL-MCNC: 3.3 MMOL/L — LOW (ref 3.5–5.3)
POTASSIUM SERPL-SCNC: 3.3 MMOL/L — LOW (ref 3.5–5.3)
RBC # BLD: 4.63 M/UL — SIGNIFICANT CHANGE UP (ref 4.2–5.8)
RBC # FLD: 14.2 % — SIGNIFICANT CHANGE UP (ref 10.3–14.5)
S AUREUS DNA NOSE QL NAA+PROBE: SIGNIFICANT CHANGE UP
SODIUM SERPL-SCNC: 142 MMOL/L — SIGNIFICANT CHANGE UP (ref 135–145)
SPECIMEN SOURCE: SIGNIFICANT CHANGE UP
WBC # BLD: 9.21 K/UL — SIGNIFICANT CHANGE UP (ref 3.8–10.5)
WBC # FLD AUTO: 9.21 K/UL — SIGNIFICANT CHANGE UP (ref 3.8–10.5)

## 2024-01-16 PROCEDURE — 99233 SBSQ HOSP IP/OBS HIGH 50: CPT

## 2024-01-16 RX ORDER — POTASSIUM CHLORIDE 20 MEQ
20 PACKET (EA) ORAL ONCE
Refills: 0 | Status: COMPLETED | OUTPATIENT
Start: 2024-01-16 | End: 2024-01-16

## 2024-01-16 RX ADMIN — TACROLIMUS 1 MILLIGRAM(S): 5 CAPSULE ORAL at 14:11

## 2024-01-16 RX ADMIN — Medication 90 MILLIGRAM(S): at 05:52

## 2024-01-16 RX ADMIN — PIPERACILLIN AND TAZOBACTAM 25 GRAM(S): 4; .5 INJECTION, POWDER, LYOPHILIZED, FOR SOLUTION INTRAVENOUS at 14:12

## 2024-01-16 RX ADMIN — Medication 600 MILLIGRAM(S): at 05:52

## 2024-01-16 RX ADMIN — Medication 10 MILLIGRAM(S): at 05:51

## 2024-01-16 RX ADMIN — HEPARIN SODIUM 5000 UNIT(S): 5000 INJECTION INTRAVENOUS; SUBCUTANEOUS at 05:53

## 2024-01-16 RX ADMIN — PIPERACILLIN AND TAZOBACTAM 25 GRAM(S): 4; .5 INJECTION, POWDER, LYOPHILIZED, FOR SOLUTION INTRAVENOUS at 22:07

## 2024-01-16 RX ADMIN — Medication 3 MILLIGRAM(S): at 22:06

## 2024-01-16 RX ADMIN — Medication 200 MILLIGRAM(S): at 22:07

## 2024-01-16 RX ADMIN — ATORVASTATIN CALCIUM 20 MILLIGRAM(S): 80 TABLET, FILM COATED ORAL at 22:07

## 2024-01-16 RX ADMIN — GABAPENTIN 100 MILLIGRAM(S): 400 CAPSULE ORAL at 22:07

## 2024-01-16 RX ADMIN — Medication 20 MILLIEQUIVALENT(S): at 19:39

## 2024-01-16 RX ADMIN — Medication 650 MILLIGRAM(S): at 06:53

## 2024-01-16 RX ADMIN — Medication 650 MILLIGRAM(S): at 05:53

## 2024-01-16 RX ADMIN — MYCOPHENOLATE MOFETIL 1000 MILLIGRAM(S): 250 CAPSULE ORAL at 05:52

## 2024-01-16 RX ADMIN — Medication 200 MILLIGRAM(S): at 05:51

## 2024-01-16 RX ADMIN — PIPERACILLIN AND TAZOBACTAM 25 GRAM(S): 4; .5 INJECTION, POWDER, LYOPHILIZED, FOR SOLUTION INTRAVENOUS at 05:53

## 2024-01-16 RX ADMIN — TACROLIMUS 1.5 MILLIGRAM(S): 5 CAPSULE ORAL at 22:44

## 2024-01-16 RX ADMIN — CHLORHEXIDINE GLUCONATE 1 APPLICATION(S): 213 SOLUTION TOPICAL at 12:50

## 2024-01-16 RX ADMIN — Medication 20 MILLIEQUIVALENT(S): at 10:01

## 2024-01-16 RX ADMIN — MYCOPHENOLATE MOFETIL 1000 MILLIGRAM(S): 250 CAPSULE ORAL at 17:05

## 2024-01-16 RX ADMIN — MONTELUKAST 10 MILLIGRAM(S): 4 TABLET, CHEWABLE ORAL at 14:11

## 2024-01-16 RX ADMIN — TAMSULOSIN HYDROCHLORIDE 0.4 MILLIGRAM(S): 0.4 CAPSULE ORAL at 22:06

## 2024-01-16 RX ADMIN — Medication 600 MILLIGRAM(S): at 17:22

## 2024-01-16 RX ADMIN — Medication 200 MILLIGRAM(S): at 14:11

## 2024-01-16 RX ADMIN — Medication 81 MILLIGRAM(S): at 14:11

## 2024-01-16 RX ADMIN — HEPARIN SODIUM 5000 UNIT(S): 5000 INJECTION INTRAVENOUS; SUBCUTANEOUS at 17:06

## 2024-01-16 NOTE — ADVANCED PRACTICE NURSE CONSULT - REASON FOR CONSULT
Pt seen for insulin pump assessment. Pt has insulin pump [employer] right upper abdomen. Pt has no insulin supplies at bedside side. Pt states his family will bring in insulin supplies for him tonight. Pt endocrinologist is Dr. Montes.

## 2024-01-16 NOTE — PROGRESS NOTE ADULT - SUBJECTIVE AND OBJECTIVE BOX
[facility]   [phone]  RENAL FOLLOW UP NOTE  --------------------------------------------------------------------------------  Chief Complaint:    24 hour events/subjective:    patient seen and examined.   appears comfortable    PAST HISTORY  --------------------------------------------------------------------------------  No significant changes to PMH, PSH, FHx, SHx, unless otherwise noted    ALLERGIES & MEDICATIONS  --------------------------------------------------------------------------------  Allergies    No Known Allergies    Intolerances      Standing Inpatient Medications  aspirin enteric coated 81 milliGRAM(s) Oral daily  atorvastatin 20 milliGRAM(s) Oral at bedtime  chlorhexidine 2% Cloths 1 Application(s) Topical daily  dextrose 5%. 1000 milliLiter(s) IV Continuous <Continuous>  dextrose 5%. 1000 milliLiter(s) IV Continuous <Continuous>  dextrose 50% Injectable 12.5 Gram(s) IV Push once  dextrose 50% Injectable 25 Gram(s) IV Push once  dextrose 50% Injectable 25 Gram(s) IV Push once  enalapril 10 milliGRAM(s) Oral daily  gabapentin 100 milliGRAM(s) Oral at bedtime  glucagon  Injectable 1 milliGRAM(s) IntraMuscular once  guaiFENesin  milliGRAM(s) Oral every 12 hours  heparin   Injectable 5000 Unit(s) SubCutaneous every 12 hours  insulin aspart (NovoLOG) Pump 1 Each SubCutaneous Continuous Pump  labetalol 200 milliGRAM(s) Oral three times a day  montelukast 10 milliGRAM(s) Oral daily  mycophenolate mofetil 1000 milliGRAM(s) Oral two times a day  NIFEdipine XL 90 milliGRAM(s) Oral daily  piperacillin/tazobactam IVPB.. 3.375 Gram(s) IV Intermittent every 8 hours  tacrolimus 1.5 milliGRAM(s) Oral at bedtime  tacrolimus 1 milliGRAM(s) Oral daily  tamsulosin 0.4 milliGRAM(s) Oral at bedtime  torsemide 10 milliGRAM(s) Oral daily    PRN Inpatient Medications  acetaminophen     Tablet .. 650 milliGRAM(s) Oral every 6 hours PRN  albuterol    90 MICROgram(s) HFA Inhaler 2 Puff(s) Inhalation every 6 hours PRN  dextrose Oral Gel 15 Gram(s) Oral once PRN  melatonin 3 milliGRAM(s) Oral at bedtime PRN      REVIEW OF SYSTEMS  --------------------------------------------------------------------------------    Gen: denies fevers/chills,  CVS: denies chest pain/palpitations  Resp: denies SOB/Cough  GI: Denies N/V/Abd pain  : Denies dysuria    VITALS/PHYSICAL EXAM  --------------------------------------------------------------------------------  T(C): 36.6 (01-16-24 @ 13:24), Max: 36.9 (01-15-24 @ 19:50)  HR: 65 (01-16-24 @ 13:24) (65 - 78)  BP: 125/62 (01-16-24 @ 13:24) (125/62 - 176/74)  RR: 18 (01-16-24 @ 13:24) (18 - 18)  SpO2: 96% (01-16-24 @ 13:24) (96% - 98%)  Wt(kg): --  Height (cm): 170.2 (01-14-24 @ 19:04)  Weight (kg): 89.4 (01-14-24 @ 19:04)  BMI (kg/m2): 30.9 (01-14-24 @ 19:04)  BSA (m2): 2.01 (01-14-24 @ 19:04)      01-15-24 @ 07:01  -  01-16-24 @ 07:00  --------------------------------------------------------  IN: 0 mL / OUT: 2300 mL / NET: -2300 mL    01-16-24 @ 07:01  -  01-16-24 @ 16:43  --------------------------------------------------------  IN: 240 mL / OUT: 0 mL / NET: 240 mL      Physical Exam:  	  	Gen: Non toxic comfortable appearing   	Pulm: decrease bs  no rales or ronchi or wheezing  	CV: RRR, S1S2; no rub  	Abd: +BS, soft, nontender/nondistended + renal allograft site RLQ non tender and no renal artery bruit   	: No suprapubic tenderness + thurston   	UE: Warm, no cyanosis  no clubbing,  no edema  	LE: Warm, no cyanosis  no clubbing, no edema  	Neuro: alert and oriented. speech coherent   	Skin: Warm, no decrease skin turgor     LABS/STUDIES  --------------------------------------------------------------------------------              14.6   9.21  >-----------<  193      [01-16-24 @ 07:50]              42.7     142  |  106  |  16  ----------------------------<  94      [01-16-24 @ 07:50]  3.3   |  24  |  0.56        Ca     9.6     [01-16-24 @ 07:50]    TPro  7.7  /  Alb  4.2  /  TBili  1.1  /  DBili  x   /  AST  26  /  ALT  15  /  AlkPhos  90  [01-14-24 @ 20:30]          Creatinine Trend:  SCr 0.56 [01-16 @ 07:50]  SCr 0.59 [01-15 @ 07:55]  SCr 0.53 [01-14 @ 20:30]  SCr 0.69 [12-31 @ 07:05]  SCr 0.66 [12-30 @ 07:17]    Tacrolimus (), Serum: 3.2 ng/mL (01-15 @ 07:55)        Urine Creatinine 77      [01-15-24 @ 21:42]  Urine Protein 82      [01-15-24 @ 21:42]         [facility]   [phone]  RENAL FOLLOW UP NOTE  --------------------------------------------------------------------------------  Chief Complaint:    24 hour events/subjective:    patient seen and examined.   appears comfortable    PAST HISTORY  --------------------------------------------------------------------------------  No significant changes to PMH, PSH, FHx, SHx, unless otherwise noted    ALLERGIES & MEDICATIONS  --------------------------------------------------------------------------------  Allergies    No Known Allergies    Intolerances      Standing Inpatient Medications  aspirin enteric coated 81 milliGRAM(s) Oral daily  atorvastatin 20 milliGRAM(s) Oral at bedtime  chlorhexidine 2% Cloths 1 Application(s) Topical daily  dextrose 5%. 1000 milliLiter(s) IV Continuous <Continuous>  dextrose 5%. 1000 milliLiter(s) IV Continuous <Continuous>  dextrose 50% Injectable 12.5 Gram(s) IV Push once  dextrose 50% Injectable 25 Gram(s) IV Push once  dextrose 50% Injectable 25 Gram(s) IV Push once  enalapril 10 milliGRAM(s) Oral daily  gabapentin 100 milliGRAM(s) Oral at bedtime  glucagon  Injectable 1 milliGRAM(s) IntraMuscular once  guaiFENesin  milliGRAM(s) Oral every 12 hours  heparin   Injectable 5000 Unit(s) SubCutaneous every 12 hours  insulin aspart (NovoLOG) Pump 1 Each SubCutaneous Continuous Pump  labetalol 200 milliGRAM(s) Oral three times a day  montelukast 10 milliGRAM(s) Oral daily  mycophenolate mofetil 1000 milliGRAM(s) Oral two times a day  NIFEdipine XL 90 milliGRAM(s) Oral daily  piperacillin/tazobactam IVPB.. 3.375 Gram(s) IV Intermittent every 8 hours  tacrolimus 1.5 milliGRAM(s) Oral at bedtime  tacrolimus 1 milliGRAM(s) Oral daily  tamsulosin 0.4 milliGRAM(s) Oral at bedtime  torsemide 10 milliGRAM(s) Oral daily    PRN Inpatient Medications  acetaminophen     Tablet .. 650 milliGRAM(s) Oral every 6 hours PRN  albuterol    90 MICROgram(s) HFA Inhaler 2 Puff(s) Inhalation every 6 hours PRN  dextrose Oral Gel 15 Gram(s) Oral once PRN  melatonin 3 milliGRAM(s) Oral at bedtime PRN      REVIEW OF SYSTEMS  --------------------------------------------------------------------------------    Gen: denies fevers/chills,  CVS: denies chest pain/palpitations  Resp: denies SOB/Cough  GI: Denies N/V/Abd pain  : Denies dysuria    VITALS/PHYSICAL EXAM  --------------------------------------------------------------------------------  T(C): 36.6 (01-16-24 @ 13:24), Max: 36.9 (01-15-24 @ 19:50)  HR: 65 (01-16-24 @ 13:24) (65 - 78)  BP: 125/62 (01-16-24 @ 13:24) (125/62 - 176/74)  RR: 18 (01-16-24 @ 13:24) (18 - 18)  SpO2: 96% (01-16-24 @ 13:24) (96% - 98%)  Wt(kg): --  Height (cm): 170.2 (01-14-24 @ 19:04)  Weight (kg): 89.4 (01-14-24 @ 19:04)  BMI (kg/m2): 30.9 (01-14-24 @ 19:04)  BSA (m2): 2.01 (01-14-24 @ 19:04)      01-15-24 @ 07:01  -  01-16-24 @ 07:00  --------------------------------------------------------  IN: 0 mL / OUT: 2300 mL / NET: -2300 mL    01-16-24 @ 07:01  -  01-16-24 @ 16:43  --------------------------------------------------------  IN: 240 mL / OUT: 0 mL / NET: 240 mL      Physical Exam:  	  	Gen: Non toxic comfortable appearing   	Pulm: decrease bs  no rales or ronchi or wheezing  	CV: RRR, S1S2; no rub  	Abd: +BS, soft, nontender/nondistended + renal allograft site RLQ non tender and no renal artery bruit   	: No suprapubic tenderness + thurston   	UE: Warm, no cyanosis  no clubbing,  no edema  	LE: Warm, no cyanosis  no clubbing, no edema  	Neuro: alert and oriented. speech coherent   	Skin: Warm, no decrease skin turgor     LABS/STUDIES  --------------------------------------------------------------------------------              14.6   9.21  >-----------<  193      [01-16-24 @ 07:50]              42.7     142  |  106  |  16  ----------------------------<  94      [01-16-24 @ 07:50]  3.3   |  24  |  0.56        Ca     9.6     [01-16-24 @ 07:50]    TPro  7.7  /  Alb  4.2  /  TBili  1.1  /  DBili  x   /  AST  26  /  ALT  15  /  AlkPhos  90  [01-14-24 @ 20:30]          Creatinine Trend:  SCr 0.56 [01-16 @ 07:50]  SCr 0.59 [01-15 @ 07:55]  SCr 0.53 [01-14 @ 20:30]  SCr 0.69 [12-31 @ 07:05]  SCr 0.66 [12-30 @ 07:17]    Tacrolimus (), Serum: 3.2 ng/mL (01-15 @ 07:55)        Urine Creatinine 77      [01-15-24 @ 21:42]  Urine Protein 82      [01-15-24 @ 21:42]

## 2024-01-16 NOTE — PROGRESS NOTE ADULT - SUBJECTIVE AND OBJECTIVE BOX
Date of Service: 01-16-24 @ 08:59           CARDIOLOGY     PROGRESS  NOTE   ________________________________________________    CHIEF COMPLAINT:Patient is a 73y old  Male who presents with a chief complaint of Urinary retention (16 Jan 2024 08:21)  no complain  	  REVIEW OF SYSTEMS:  CONSTITUTIONAL: No fever, weight loss, or fatigue  EYES: No eye pain, visual disturbances, or discharge  ENT:  No difficulty hearing, tinnitus, vertigo; No sinus or throat pain  NECK: No pain or stiffness  RESPIRATORY: No cough, wheezing, chills or hemoptysis; No Shortness of Breath  CARDIOVASCULAR: No chest pain, palpitations, passing out, dizziness, or leg swelling  GASTROINTESTINAL: No abdominal or epigastric pain. No nausea, vomiting, or hematemesis; No diarrhea or constipation. No melena or hematochezia.  GENITOURINARY: No dysuria, frequency, hematuria, or incontinence  NEUROLOGICAL: No headaches, memory loss, loss of strength, numbness, or tremors  SKIN: No itching, burning, rashes, or lesions   LYMPH Nodes: No enlarged glands  ENDOCRINE: No heat or cold intolerance; No hair loss  MUSCULOSKELETAL: No joint pain or swelling; No muscle, back, or extremity pain  PSYCHIATRIC: No depression, anxiety, mood swings, or difficulty sleeping  HEME/LYMPH: No easy bruising, or bleeding gums  ALLERGY AND IMMUNOLOGIC: No hives or eczema	    [ x] All others negative	  [ ] Unable to obtain    PHYSICAL EXAM:  T(C): 36.2 (01-16-24 @ 05:39), Max: 36.9 (01-15-24 @ 19:50)  HR: 71 (01-16-24 @ 05:39) (71 - 78)  BP: 138/76 (01-16-24 @ 05:39) (137/70 - 176/74)  RR: 18 (01-16-24 @ 05:39) (18 - 18)  SpO2: 98% (01-16-24 @ 05:39) (95% - 98%)  Wt(kg): --  I&O's Summary    15 Kamlesh 2024 07:01  -  16 Jan 2024 07:00  --------------------------------------------------------  IN: 0 mL / OUT: 2300 mL / NET: -2300 mL        Appearance: Normal	  HEENT:   Normal oral mucosa, PERRL, EOMI	  Lymphatic: No lymphadenopathy  Cardiovascular: Normal S1 S2, No JVD, + murmurs, No edema  Respiratory: rhonchi  Psychiatry: A & O x 3, Mood & affect appropriate  Gastrointestinal:  Soft, Non-tender, + BS	  Skin: No rashes, No ecchymoses, No cyanosis	  Extremities: Normal range of motion, No clubbing, cyanosis or edema  Vascular: Peripheral pulses palpable 2+ bilaterally    MEDICATIONS  (STANDING):  aspirin enteric coated 81 milliGRAM(s) Oral daily  atorvastatin 20 milliGRAM(s) Oral at bedtime  chlorhexidine 2% Cloths 1 Application(s) Topical daily  dextrose 5%. 1000 milliLiter(s) (100 mL/Hr) IV Continuous <Continuous>  dextrose 5%. 1000 milliLiter(s) (50 mL/Hr) IV Continuous <Continuous>  dextrose 50% Injectable 12.5 Gram(s) IV Push once  dextrose 50% Injectable 25 Gram(s) IV Push once  dextrose 50% Injectable 25 Gram(s) IV Push once  enalapril 10 milliGRAM(s) Oral daily  gabapentin 100 milliGRAM(s) Oral at bedtime  glucagon  Injectable 1 milliGRAM(s) IntraMuscular once  guaiFENesin  milliGRAM(s) Oral every 12 hours  heparin   Injectable 5000 Unit(s) SubCutaneous every 12 hours  insulin aspart (NovoLOG) Pump 1 Each SubCutaneous Continuous Pump  labetalol 200 milliGRAM(s) Oral three times a day  montelukast 10 milliGRAM(s) Oral daily  mycophenolate mofetil 1000 milliGRAM(s) Oral two times a day  NIFEdipine XL 90 milliGRAM(s) Oral daily  piperacillin/tazobactam IVPB.. 3.375 Gram(s) IV Intermittent every 8 hours  tacrolimus 1.5 milliGRAM(s) Oral at bedtime  tacrolimus 1 milliGRAM(s) Oral daily  tamsulosin 0.4 milliGRAM(s) Oral at bedtime  torsemide 10 milliGRAM(s) Oral daily      TELEMETRY: 	    ECG:  	  RADIOLOGY:  OTHER: 	  	  LABS:	 	    CARDIAC MARKERS:                          14.6   9.21  )-----------( 193      ( 16 Jan 2024 07:50 )             42.7     01-16    142  |  106  |  16  ----------------------------<  94  3.3<L>   |  24  |  0.56    Ca    9.6      16 Jan 2024 07:50    TPro  7.7  /  Alb  4.2  /  TBili  1.1  /  DBili  x   /  AST  26  /  ALT  15  /  AlkPhos  90  01-14    proBNP:   Lipid Profile:   HgA1c:   TSH:     Culture - Urine (01.14.24 @ 21:15)    Specimen Source: Clean Catch Clean Catch (Midstream)   Culture Results:   No growth        Assessment and plan  ---------------------------  73M w/Hx of BPH s/p REZUM, FRANCOIS, COPD, DM2, HTN, CAD, S/p TAVR, renal transplant (2013) presents due to worsening urinary retention and mild hematuria. Patient recently had REZUM procedure for his BPH performed on 12/27/23 by Dr. Mcfarland. Had thurston from procedure removed on 1/2/24 but still had difficulty urinating since. Difficulty urinating acutely worsened the last 3 days. Today noticed some mild bleeding with urination as well. Thurston cathter placed in ED. Pt COVID+ since 12/29/23. Reports his only related symptom is nasal congestion now. ED note reports that he claimed to have a fever but on my exam he denies ever being febrile. Patient denies fever, chills, chest pain, SOB, headache, dizziness, abd pain, nausea, vomiting, constipation.   pt is well known to me with hx of htn, cad s/p renal transplant with urinary retention and sig elevated bp  continue all cardiac / bp meds as before  increase bp ?sec to urinary retention  check urinalysis/ urology eval  abx as per medicine  dvt prophylaxis  continue all bp / cardiac meds  will adjust bp meds  increase ambulation  replete K

## 2024-01-16 NOTE — PROGRESS NOTE ADULT - NS ATTEND AMEND GEN_ALL_CORE FT
Seen, examined with, formulated plan with and  agree with above as scribed by NP Herman [Sabi]     states feels well  lungs no rales   heart RRR  RLQ graft site non tender  ext no edema     LRT renal transplant   prostatitis   htn   hx chf   htn proteinuria    cr steady   if scheduled for ct with iv contrast to receive additional fluid hydration d/w pt  cont cellcept 1 g bid and prograf 1 and 1.5 mg   check tacro level   abx   hold torsemide today

## 2024-01-16 NOTE — PATIENT PROFILE ADULT - FUNCTIONAL ASSESSMENT - BASIC MOBILITY 6.
2-calculated by average/Not able to assess (calculate score using [facility] averaging method)  2-calculated by average/Not able to assess (calculate score using [facility] averaging method)

## 2024-01-16 NOTE — ADVANCED PRACTICE NURSE CONSULT - ASSESSMENT
Pt is a 73 year old male. S/P urinary Retention. Pt has history of [id number],who has an insulin pump for glucose management. Endocrinologist will follo for diabetes management.   Pt is a 73 year old male. S/P urinary Retention. Pt has history of [id number],who has an insulin pump for glucose management. Endocrinologist will follo for diabetes management.   Pt is a 73 year old male. S/P urinary Retention. Pt has history of [id number],who has an insulin pump for glucose management. Endocrinologist will follo for diabetes management.  Basal Rate                     ICR                    ISF     Pt is a 73 year old male. S/P urinary Retention. Pt has history of [id number],who has an insulin pump for glucose management. Endocrinologist will follo for diabetes management.  Basal Rate                     ICR                    ISF     Pt is a 73 year old male. S/P urinary Retention. Pt has history of [id number],who has an insulin pump for glucose management. Endocrinologist will follo for diabetes management.        Basal Rate       12:00am-5:00am                  0.6          5:00am-12:00am                0.7                 ICR            12:00am-5:00am               15        5:00am- 12:00am                20               ISR         12am-12am                 50  TDD:16.3  Target Glucose:   Duration: 4               Pt is a 73 year old male. S/P urinary Retention. Pt has history of [id number],who has an insulin pump for glucose management. Endocrinologist will follo for diabetes management.        Basal Rate       12:00am-5:00am                  0.6          5:00am-12:00am                0.7                 ICR            12:00am-5:00am               15        5:00am- 12:00am                20               ISR         12am-12am                 50  TDD:16.3  Target Glucose:   Duration: 4               Pt is a 73 year old male. S/P urinary Retention. Pt has history of [id number],who has an insulin pump for glucose management. Endocrinologist will follo for diabetes management.        Basal Rate       12:00am-5:00am                  0.6          5:00am-12:00am                0.7                 ICR            12:00am-5:00am               15        5:00am- 12:00am                20               ISR         12am-12am                 50  TDD:16.3  Target Glucose:   Duration: 4               Pt is a 73 year old male. S/P urinary Retention. Pt has history of [id number],who has an insulin pump for glucose management. Endocrinologist will follo for diabetes management.        Basal Rate       12:00am-5:00am                  0.6          5:00am-12:00am                0.7                 ICR            12:00am-5:00am               15        5:00am- 12:00am                20               ISR         12am-12am                 50  TDD:16.3  Target Glucose:   Duration: 4

## 2024-01-16 NOTE — PROGRESS NOTE ADULT - NS_MD_PANP_GEN_ALL_CORE
Sarika Ramirez, FNP-C  [address]  [id number] [facility].  [facility]  [place], [facility]  [phone]  [phone]    Kandy Johnson is a 48 y.o. female presents today for Chief Complaint: Pelvic Pain (right side)      Patient is Accompanied by: n/a    HPI - Kandy Johnson is here today for the following:     RLQ pain   -Pain started 2 days ago  -Reports severe pain to right lower quadrant  -Denies nausea, vomiting, constipation, diarrhea, fever  -Has history of diverticulitis, but reports this is not the same kind of pain in diverticulitis pain for her is usually on the left  -Has history of right ovarian cyst in the past  -She has been taking 4 Aleve and meloxicam for her pain and this is not helped    Patient Active Problem List   Diagnosis    Intractable chronic migraine without aura and with status migrainosus    Diverticulosis of large intestine    Acquired hypothyroidism     Past Medical History:   Diagnosis Date    Diverticulitis large intestine     Diverticulosis of colon     Headache     Hypothyroidism       Past Surgical History:   Procedure Laterality Date    COLONOSCOPY  10/24/2016    diverticulosis    COSMETIC SURGERY      lapo    TONSILLECTOMY       Family History   Problem Relation Age of Onset   Bruce Loser Cancer Mother 37        ovarian  age 48    Diabetes Maternal Uncle     Heart Disease Maternal Uncle     Stroke Maternal Uncle     Cancer Maternal Grandmother         lung    Diabetes Maternal Grandmother     Heart Disease Maternal Grandmother     Stroke Maternal Grandmother     Cancer Paternal Grandmother         unknown    Cancer Sister 52        liver and kidney      Social History     Tobacco Use    Smoking status: Never Smoker    Smokeless tobacco: Never Used   Substance Use Topics    Alcohol use: No     ALLERGIES:    Allergies   Allergen Reactions    Augmentin [Amoxicillin-Pot Clavulanate] Diarrhea    Macrobid [Nitrofurantoin Monohyd Macro]           Subjective · Constitutional:  Negative for activity change, appetite change,unexpected weight change, chills, fever, and fatigue. · HENT: Negative for ear pain, sore throat,  Rhinorrhea, sinus pain, sinus pressure, congestion. · Eyes:  Negative for pain and discharge. · Respiratory:  Negative for chest tightness, shortness of breath, wheezing, and cough. · Cardiovascular:  Negative for chest pain, palpitations and leg swelling. · Gastrointestinal: Negative for blood in stool, constipation,diarrhea, nausea and vomiting. Positive for abdominal pain  · Endocrine: Negative for cold intolerance, heat intolerance, polydipsia, polyphagia and polyuria. · Genitourinary: Negative for difficulty urinating, dysuria, flank pain, frequency, hematuria and urgency. · Musculoskeletal: Negative for arthralgias, back pain, joint swelling, myalgias, neck pain and neck stiffness. · Skin: Negative for rash and wound. · Allergic/Immunologic: Negative for environmental allergies and food allergies. · Neurological:  Negative for dizziness, light-headedness, numbness and headaches. · Hematological:  Negative for adenopathy. Does not bruise/bleed easily. · Psychiatric/Behavioral: Negative for self-injury, sleep disturbance and suicidal ideas. Objective     PHYSICAL EXAM:   · Constitutional: Ashley is oriented to person, place, and time. Vital signs are normal. Appears well-developed and well-nourished. · HEENT:   · Head: Normocephalic and atraumatic. · Cardiovascular: Normal rate, regular rhythm, S1, S2, no murmur, no gallop, no friction rub, intact distal pulses. No carotid bruit. No lower extremity edema  · Pulmonary/Chest: Breath sounds are clear throughout, No respiratory distress, No wheezing, No chest tenderness. Effort normal  Abdominal: Physical Exam  Abdominal:      General: Abdomen is flat. Bowel sounds are normal. There is no distension. Palpations: Abdomen is soft. There is no hepatomegaly. Tenderness: There is abdominal tenderness in the right lower quadrant. Positive signs include McBurney's sign. · Neurological: Alert and oriented to person, place, and time. Normal motor function, Normal sensory function, No focal deficits noted. He has normal strength. · Skin: Skin is warm, dry and intact. No obvious lesions on exposed skin  · Psychiatric: Normal mood and affect. Speech is normal and behavior is normal.     Nursing note and vitals reviewed. Blood pressure 126/76, pulse 81, temperature 97.3 °F (36.3 °C), temperature source Temporal, weight 120 lb (54.4 kg), SpO2 97 %, not currently breastfeeding. Body mass index is 22.67 kg/m². Wt Readings from Last 3 Encounters:   04/06/22 120 lb (54.4 kg)   03/02/22 118 lb (53.5 kg)   10/26/21 118 lb (53.5 kg)     BP Readings from Last 3 Encounters:   04/06/22 126/76   03/02/22 118/76   10/26/21 118/82       No results found for this visit on 04/06/22. Completed Orders/Prescriptions   No orders of the defined types were placed in this encounter. AssessmentPlan/Medical Decision Making     1. Acute right lower quadrant pain  - DDX: appendicitis, ovarian cyst(rupture)  - CT ABDOMEN PELVIS W IV CONTRAST Additional Contrast? Radiologist Recommendation  - CBC with Auto Differential; Future  - Comprehensive Metabolic Panel; Future  - Urinalysis with Microscopic; Future  - Culture, Urine; Future  -Treatment pending results of ordered testing      Return in about 2 days (around 4/8/2022), or if symptoms worsen or fail to improve. 1.  Ashley received counseling on the following healthy behaviors: nutrition, exercise and medication adherence  2. Patient given educational materials - see patient instructions  3. Was a self-tracking handout given in paper form or via Shared [employer]? No  If yes, see orders or list here. 4.  Discussed use, benefit, and side effects of prescribed medications. Barriers to medication compliance addressed.   All patient questions answered. Pt voiced understanding. 5.  Reviewed prior labs, imaging, consultation, follow up, and health maintenance  6. Continue current medications, diet and exercise. 7. Discussed use, benefit, and side effects of prescribed medications. Barriers to medication compliance addressed. All her questions were answered. Pt voiced understanding. Ashley will continue current medications, diet and exercise. Medical Decision Making: High    Of the 25 minute duration appointment visit, Zuly Kwok CNP spent at least 50% of the face-to-face time in counseling, explanation of diagnosis, planning of further management, and answering all questions. Signed:  Zuly Kwok CNP    This note is created with the assistance of a speech-recognition program.  While intending to generate a document that actually reflects the content of the visit, no guarantees can be provided that every mistake has been identified and corrected by editing. Attending and PA/NP shared services statement (NON-critical care):

## 2024-01-16 NOTE — PROGRESS NOTE ADULT - ASSESSMENT
73M w/Hx of BPH s/p REZUM, FRANCOIS, COPD, DM2, HTN, CAD, S/p TAVR, renal transplant (2013) presents due to worsening urinary retention and mild hematuria. Patient recently had REZUM procedure for his BPH performed on 12/27/23 by Dr. Mcfarland. Had thurston from procedure removed on 1/2/24 but still had difficulty urinating since. Difficulty urinating acutely worsened the last 3 days.  noticed some mild bleeding with urination as well. Thurston cathter placed in ED. Pt COVID+ since 12/29/23. Reports his only related symptom is nasal congestion now. ED note reports that he claimed to have a fever but  pt denies  exam he denies ever being febrile. Patient denies fever, chills, chest pain, SOB, headache, dizziness, abd pain, nausea, vomiting, constipation.       1- renal transplant  2- HTN   3- lung lesion   4- recent covid 19 with still ag +   5- urinary retention   6- ?? fevers   7- proteinuria       creatinine in range  after discharge his cellcept was resume at 1 g bid to cont  renal transplant meds as :   cellcept 1g bid  tacrolimus 1 mg am and 1.5 mg pm   prostatitis cont abx as above   lung nodules have been concerning. pulm consult   enalapril 10 mg daily   hypokalemia, kcl 20 meq supplemented  chf hx resume torsemide 10 mg daily   urinary retention ---> thurston cath to gravity.  no renal contraindication for CT with iv contrast if needed.  73M w/Hx of BPH s/p REZUM, FRANCOIS, COPD, DM2, HTN, CAD, S/p TAVR, renal transplant (2013) presents due to worsening urinary retention and mild hematuria. Patient recently had REZUM procedure for his BPH performed on 12/27/23 by Dr. Mcfarland. Had thurston from procedure removed on 1/12/24 but still had difficulty urinating since. Difficulty urinating acutely worsened the last 3 days.  noticed some mild bleeding with urination as well. Thurston cathter placed in ED. Pt COVID+ since 12/29/23. Reports his only related symptom is nasal congestion now. ED note reports that he claimed to have a fever but  pt denies  exam he denies ever being febrile. Patient denies fever, chills, chest pain, SOB, headache, dizziness, abd pain, nausea, vomiting, constipation.       1- renal transplant  2- HTN   3- lung lesion   4- recent covid 19 with still ag +   5- urinary retention   6- ?? fevers   7- proteinuria       creatinine in range  after discharge his cellcept was resume at 1 g bid to cont  renal transplant meds as :   cellcept 1g bid  tacrolimus 1 mg am and 1.5 mg pm   prostatitis cont abx as above   lung nodules have been concerning. pulm consult   enalapril 10 mg daily   hypokalemia, kcl 20 meq supplemented  chf hx resume torsemide 10 mg daily   urinary retention ---> thurston cath to gravity.  no renal contraindication for CT with iv contrast if needed.

## 2024-01-16 NOTE — PROGRESS NOTE ADULT - SUBJECTIVE AND OBJECTIVE BOX
date of service: 01-16-24 @ 08:21  afberile  REVIEW OF SYSTEMS:  CONSTITUTIONAL: No fever,  no  weight loss  ENT:  No  tinnitus,   no   vertigo  NECK: No pain or stiffness  RESPIRATORY: No cough, wheezing, chills or hemoptysis;    No Shortness of Breath  CARDIOVASCULAR: No chest pain, palpitations, dizziness  GASTROINTESTINAL: No abdominal or epigastric pain. No nausea, vomiting, or hematemesis; No diarrhea  No melena or hematochezia.  GENITOURINARY: No dysuria, frequency, hematuria, or incontinence  NEUROLOGICAL: No headaches  SKIN: No itching,  no   rash  LYMPH Nodes: No enlarged glands  ENDOCRINE: No heat or cold intolerance  MUSCULOSKELETAL: No joint pain or swelling  PSYCHIATRIC: No depression, anxiety  HEME/LYMPH: No easy bruising, or bleeding gums  ALLERGY AND IMMUNOLOGIC: No hives or eczema	    MEDICATIONS  (STANDING):  aspirin enteric coated 81 milliGRAM(s) Oral daily  atorvastatin 20 milliGRAM(s) Oral at bedtime  chlorhexidine 2% Cloths 1 Application(s) Topical daily  dextrose 5%. 1000 milliLiter(s) (100 mL/Hr) IV Continuous <Continuous>  dextrose 5%. 1000 milliLiter(s) (50 mL/Hr) IV Continuous <Continuous>  dextrose 50% Injectable 12.5 Gram(s) IV Push once  dextrose 50% Injectable 25 Gram(s) IV Push once  dextrose 50% Injectable 25 Gram(s) IV Push once  enalapril 10 milliGRAM(s) Oral daily  gabapentin 100 milliGRAM(s) Oral at bedtime  glucagon  Injectable 1 milliGRAM(s) IntraMuscular once  guaiFENesin  milliGRAM(s) Oral every 12 hours  heparin   Injectable 5000 Unit(s) SubCutaneous every 12 hours  insulin aspart (NovoLOG) Pump 1 Each SubCutaneous Continuous Pump  labetalol 200 milliGRAM(s) Oral three times a day  montelukast 10 milliGRAM(s) Oral daily  mycophenolate mofetil 1000 milliGRAM(s) Oral two times a day  NIFEdipine XL 90 milliGRAM(s) Oral daily  piperacillin/tazobactam IVPB.. 3.375 Gram(s) IV Intermittent every 8 hours  tacrolimus 1 milliGRAM(s) Oral daily  tacrolimus 1.5 milliGRAM(s) Oral at bedtime  tamsulosin 0.4 milliGRAM(s) Oral at bedtime  torsemide 10 milliGRAM(s) Oral daily    MEDICATIONS  (PRN):  acetaminophen     Tablet .. 650 milliGRAM(s) Oral every 6 hours PRN Temp greater or equal to 38C (100.4F), Mild Pain (1 - 3)  albuterol    90 MICROgram(s) HFA Inhaler 2 Puff(s) Inhalation every 6 hours PRN Shortness of Breath and/or Wheezing  dextrose Oral Gel 15 Gram(s) Oral once PRN Blood Glucose LESS THAN 70 milliGRAM(s)/deciliter  melatonin 3 milliGRAM(s) Oral at bedtime PRN Insomnia      Vital Signs Last 24 Hrs  T(C): 36.2 (16 Jan 2024 05:39), Max: 36.9 (15 Kamlesh 2024 19:50)  T(F): 97.1 (16 Jan 2024 05:39), Max: 98.5 (15 Kamlesh 2024 19:50)  HR: 71 (16 Jan 2024 05:39) (71 - 78)  BP: 138/76 (16 Jan 2024 05:39) (137/70 - 176/74)  BP(mean): 97 (15 Kamlesh 2024 21:25) (97 - 108)  RR: 18 (16 Jan 2024 05:39) (18 - 18)  SpO2: 98% (16 Jan 2024 05:39) (95% - 98%)    Parameters below as of 16 Jan 2024 05:39  Patient On (Oxygen Delivery Method): room air      CAPILLARY BLOOD GLUCOSE      POCT Blood Glucose.: 181 mg/dL (15 Kamlesh 2024 11:17)    I&O's Summary    15 Kamlesh 2024 07:01  -  16 Jan 2024 07:00  --------------------------------------------------------  IN: 0 mL / OUT: 2300 mL / NET: -2300 mL          Appearance: Normal	  HEENT:   Normal oral mucosa, PERRL, EOMI	  Lymphatic: No lymphadenopathy  Cardiovascular: Normal S1 S2, No JVD  Respiratory: Lungs clear to auscultation	  Gastrointestinal:  Soft, Non-tender, + BS	  Skin: No rash, No ecchymoses	  Extremities:     LABS:                        14.1   10.44 )-----------( 213      ( 15 Kamlesh 2024 07:55 )             43.1     01-15    143  |  108  |  14  ----------------------------<  83  3.6   |  23  |  0.59    Ca    9.7      15 Kamlesh 2024 07:55    TPro  7.7  /  Alb  4.2  /  TBili  1.1  /  DBili  x   /  AST  26  /  ALT  15  /  AlkPhos  90  01-14          Urinalysis Basic - ( 15 Kamlesh 2024 07:55 )    Color: x / Appearance: x / SG: x / pH: x  Gluc: 83 mg/dL / Ketone: x  / Bili: x / Urobili: x   Blood: x / Protein: x / Nitrite: x   Leuk Esterase: x / RBC: x / WBC x   Sq Epi: x / Non Sq Epi: x / Bacteria: x                      Consultant(s) Notes Reviewed:      Care Discussed with Consultants/Other Providers:

## 2024-01-16 NOTE — PROGRESS NOTE ADULT - SUBJECTIVE AND OBJECTIVE BOX
INFECTIOUS DISEASES FOLLOW UP-- Tiffany Constantino  [phone]    This is a follow up note for this  73yMale with  Retention of urine        ROS:  CONSTITUTIONAL:  No fever, good appetite  CARDIOVASCULAR:  No chest pain or palpitations  RESPIRATORY:  No dyspnea  GASTROINTESTINAL:  No nausea, vomiting, diarrhea, or abdominal pain  GENITOURINARY:  No dysuria  NEUROLOGIC:  No headache,     Allergies    No Known Allergies    Intolerances        ANTIBIOTICS/RELEVANT:  antimicrobials  piperacillin/tazobactam IVPB.. 3.375 Gram(s) IV Intermittent every 8 hours    immunologic:  mycophenolate mofetil 1000 milliGRAM(s) Oral two times a day  tacrolimus 1.5 milliGRAM(s) Oral at bedtime  tacrolimus 1 milliGRAM(s) Oral daily    OTHER:  acetaminophen     Tablet .. 650 milliGRAM(s) Oral every 6 hours PRN  albuterol    90 MICROgram(s) HFA Inhaler 2 Puff(s) Inhalation every 6 hours PRN  aspirin enteric coated 81 milliGRAM(s) Oral daily  atorvastatin 20 milliGRAM(s) Oral at bedtime  chlorhexidine 2% Cloths 1 Application(s) Topical daily  dextrose 5%. 1000 milliLiter(s) IV Continuous <Continuous>  dextrose 5%. 1000 milliLiter(s) IV Continuous <Continuous>  dextrose 50% Injectable 12.5 Gram(s) IV Push once  dextrose 50% Injectable 25 Gram(s) IV Push once  dextrose 50% Injectable 25 Gram(s) IV Push once  dextrose Oral Gel 15 Gram(s) Oral once PRN  enalapril 10 milliGRAM(s) Oral daily  gabapentin 100 milliGRAM(s) Oral at bedtime  glucagon  Injectable 1 milliGRAM(s) IntraMuscular once  guaiFENesin  milliGRAM(s) Oral every 12 hours  heparin   Injectable 5000 Unit(s) SubCutaneous every 12 hours  insulin aspart (NovoLOG) Pump 1 Each SubCutaneous Continuous Pump  labetalol 200 milliGRAM(s) Oral three times a day  melatonin 3 milliGRAM(s) Oral at bedtime PRN  montelukast 10 milliGRAM(s) Oral daily  NIFEdipine XL 90 milliGRAM(s) Oral daily  tamsulosin 0.4 milliGRAM(s) Oral at bedtime  torsemide 10 milliGRAM(s) Oral daily      Objective:  Vital Signs Last 24 Hrs  T(C): 36.6 (16 Jan 2024 13:24), Max: 36.9 (15 Kamlesh 2024 19:50)  T(F): 97.9 (16 Jan 2024 13:24), Max: 98.5 (15 Kamlesh 2024 19:50)  HR: 65 (16 Jan 2024 13:24) (65 - 78)  BP: 125/62 (16 Jan 2024 13:24) (125/62 - 176/74)  BP(mean): 97 (15 Kamlesh 2024 21:25) (97 - 108)  RR: 18 (16 Jan 2024 13:24) (18 - 18)  SpO2: 96% (16 Jan 2024 13:24) (96% - 98%)    Parameters below as of 16 Jan 2024 13:24  Patient On (Oxygen Delivery Method): room air        PHYSICAL EXAM:  Constitutional:no acute distress  Eyes:ASHUTOSH, EOMI  Ear/Nose/Throat: no oral lesions, 	  Respiratory: clear BL  Cardiovascular: S1S2  Gastrointestinal:soft, (+) BS, no tenderness  Extremities:no e/e/c  No Lymphadenopathy  IV sites not inflammed.    LABS:                        14.6   9.21  )-----------( 193      ( 16 Jan 2024 07:50 )             42.7     01-16    142  |  106  |  16  ----------------------------<  94  3.3<L>   |  24  |  0.56    Ca    9.6      16 Jan 2024 07:50    TPro  7.7  /  Alb  4.2  /  TBili  1.1  /  DBili  x   /  AST  26  /  ALT  15  /  AlkPhos  90  01-14      Urinalysis Basic - ( 16 Jan 2024 07:50 )    Color: x / Appearance: x / SG: x / pH: x  Gluc: 94 mg/dL / Ketone: x  / Bili: x / Urobili: x   Blood: x / Protein: x / Nitrite: x   Leuk Esterase: x / RBC: x / WBC x   Sq Epi: x / Non Sq Epi: x / Bacteria: x        MICROBIOLOGY:            RECENT CULTURES:  01-14 @ 21:15  Clean Catch Clean Catch (Midstream)  --  --  --    No growth  --      RADIOLOGY & ADDITIONAL STUDIES:  < from: CT Chest w/ IV Cont (12.30.23 @ 13:50) >  IMPRESSION:  Urinary bladder cystitis and prostatitis.  New 0.9 cm right upper lobe nodule. Recommend follow-up in 3 months.  Improving bilateral airspace disease including resolving nodular   opacities.    < end of copied text >   INFECTIOUS DISEASES FOLLOW UP-- Tiffany Constantino  [phone]    This is a follow up note for this  73yMale with  Retention of urine  feeling improved        ROS:  CONSTITUTIONAL:  No fever, good appetite  CARDIOVASCULAR:  No chest pain or palpitations  RESPIRATORY:  No dyspnea  GASTROINTESTINAL:  No nausea, vomiting, diarrhea, or abdominal pain  GENITOURINARY:  No dysuria  NEUROLOGIC:  No headache,     Allergies    No Known Allergies    Intolerances        ANTIBIOTICS/RELEVANT:  antimicrobials  piperacillin/tazobactam IVPB.. 3.375 Gram(s) IV Intermittent every 8 hours    immunologic:  mycophenolate mofetil 1000 milliGRAM(s) Oral two times a day  tacrolimus 1.5 milliGRAM(s) Oral at bedtime  tacrolimus 1 milliGRAM(s) Oral daily    OTHER:  acetaminophen     Tablet .. 650 milliGRAM(s) Oral every 6 hours PRN  albuterol    90 MICROgram(s) HFA Inhaler 2 Puff(s) Inhalation every 6 hours PRN  aspirin enteric coated 81 milliGRAM(s) Oral daily  atorvastatin 20 milliGRAM(s) Oral at bedtime  chlorhexidine 2% Cloths 1 Application(s) Topical daily  dextrose 5%. 1000 milliLiter(s) IV Continuous <Continuous>  dextrose 5%. 1000 milliLiter(s) IV Continuous <Continuous>  dextrose 50% Injectable 12.5 Gram(s) IV Push once  dextrose 50% Injectable 25 Gram(s) IV Push once  dextrose 50% Injectable 25 Gram(s) IV Push once  dextrose Oral Gel 15 Gram(s) Oral once PRN  enalapril 10 milliGRAM(s) Oral daily  gabapentin 100 milliGRAM(s) Oral at bedtime  glucagon  Injectable 1 milliGRAM(s) IntraMuscular once  guaiFENesin  milliGRAM(s) Oral every 12 hours  heparin   Injectable 5000 Unit(s) SubCutaneous every 12 hours  insulin aspart (NovoLOG) Pump 1 Each SubCutaneous Continuous Pump  labetalol 200 milliGRAM(s) Oral three times a day  melatonin 3 milliGRAM(s) Oral at bedtime PRN  montelukast 10 milliGRAM(s) Oral daily  NIFEdipine XL 90 milliGRAM(s) Oral daily  tamsulosin 0.4 milliGRAM(s) Oral at bedtime  torsemide 10 milliGRAM(s) Oral daily      Objective:  Vital Signs Last 24 Hrs  T(C): 36.6 (16 Jan 2024 13:24), Max: 36.9 (15 Kamlesh 2024 19:50)  T(F): 97.9 (16 Jan 2024 13:24), Max: 98.5 (15 Kamlesh 2024 19:50)  HR: 65 (16 Jan 2024 13:24) (65 - 78)  BP: 125/62 (16 Jan 2024 13:24) (125/62 - 176/74)  BP(mean): 97 (15 Kamlesh 2024 21:25) (97 - 108)  RR: 18 (16 Jan 2024 13:24) (18 - 18)  SpO2: 96% (16 Jan 2024 13:24) (96% - 98%)    Parameters below as of 16 Jan 2024 13:24  Patient On (Oxygen Delivery Method): room air        PHYSICAL EXAM:  Constitutional:no acute distress  Eyes:ASHUTOSH, EOMI  Ear/Nose/Throat: no oral lesions, 	  Respiratory: clear BL  Cardiovascular: S1S2  Gastrointestinal:soft, (+) BS, no tenderness  Extremities:no e/e/c  No Lymphadenopathy  IV sites not inflammed.    LABS:                        14.6   9.21  )-----------( 193      ( 16 Jan 2024 07:50 )             42.7     01-16    142  |  106  |  16  ----------------------------<  94  3.3<L>   |  24  |  0.56    Ca    9.6      16 Jan 2024 07:50    TPro  7.7  /  Alb  4.2  /  TBili  1.1  /  DBili  x   /  AST  26  /  ALT  15  /  AlkPhos  90  01-14      Urinalysis Basic - ( 16 Jan 2024 07:50 )    Color: x / Appearance: x / SG: x / pH: x  Gluc: 94 mg/dL / Ketone: x  / Bili: x / Urobili: x   Blood: x / Protein: x / Nitrite: x   Leuk Esterase: x / RBC: x / WBC x   Sq Epi: x / Non Sq Epi: x / Bacteria: x        MICROBIOLOGY:    SARS-CoV-2: Detected: This Respiratory Panel uses polymerase chain reaction (PCR) to detect for  adenovirus; coronavirus (HKU1, NL63, 229E, OC43); human metapneumovirus  (hMPV); human enterovirus/rhinovirus (Entero/RV); influenza A; influenza  A/H1; influenza A/H3; influenza A/H1-2009; influenza B; parainfluenza  viruses 1, 2, 3, 4; respiratory syncytial virus; Mycoplasma pneumoniae;  Chlamydophila pneumoniae; and SARS-CoV-2. (01.15.24 @ 00:46)            RECENT CULTURES:  01-14 @ 21:15  Clean Catch Clean Catch (Midstream)  --  --  --    No growth  --      RADIOLOGY & ADDITIONAL STUDIES:  < from: CT Chest w/ IV Cont (12.30.23 @ 13:50) >  IMPRESSION:  Urinary bladder cystitis and prostatitis.  New 0.9 cm right upper lobe nodule. Recommend follow-up in 3 months.  Improving bilateral airspace disease including resolving nodular   opacities.    < end of copied text >

## 2024-01-16 NOTE — PROGRESS NOTE ADULT - ASSESSMENT
72M        h/o  FRANCOIS on CPAP, COPD w/ 50-pack-year smoking history, DM2 on insulin pump,         HTN/  HLD,  CAD/ stents,        status post stent, TAVR/  colonoscopy 2022, polyp       h/o kidney transplant in 2013 on immunosuppresion/   covid  12/2023            admitted  with urinary  retention,  and  thurston  was placed  in  er/  s/p  prostate surg  ,s/p   Rezum    surg  on  12/27, dr cobian         uro  eval/.  called.    crt  0/5              Flomax   started      DM,  has  insulin pump,             house   endo  . has   Medtronic  insulin pump at home and Ozempic     c/c  diastolic   chf.             torsemide    HTN /HLD           on  procardia,  labetolol.  enalapril/  meds pe r card     CAD, stents             on asa/ plavix/ ,lipitor/ known to  card         s/p  TAVR, 12/2021       CKD,    s/p   R kidney transplant  2013,   Dr Hartono Weill [facility] /  on cellcept/  tacro       h/o   ILD,  restrictive/obstructive lung disease,  and   FRANCOIS / cpap                  ILD/  known to  house pulm       pt has  been covid  +. since 12/ 29,   and  +  status  now  on arrival, is reflective of  his prior  covid      per  ID., on iv  zosyn /  CT  a/p         pt  no longer  f/p  with transplant team in  [facility]/ dr edward olmstead  following /   defer transplant  meds to  renal       on dvt ppx                 < from: CT Chest No Cont (10.10.23 @ 18:49) >  IMPRESSION:                      1. Newly developing nodular areas of consolidation bilaterally as well as   reticular opacities and abbreviated differential includes metastatic   disease with possible lymphangitic component. Atypical infectious and/or   inflammatory etiologies cannot be excluded. Bronchoscopic correlation and   histological sampling may be in order.  2. Mild increase in now moderate medi  72M        h/o  FRANCOIS on CPAP, COPD w/ 50-pack-year smoking history, DM2 on insulin pump,         HTN/  HLD,  CAD/ stents,        status post stent, TAVR/  colonoscopy 2022, polyp       h/o kidney transplant in 2013 on immunosuppresion/   covid  12/2023            admitted  with urinary  retention,  and  thurston  was placed  in  er/  s/p  prostate surg  ,s/p   Rezum    surg  on  12/27, dr cobian         uro  eval/.  called.    crt  0/5              Flomax   started      DM,  has  insulin pump,             house   endo  . has   Medtronic  insulin pump at home and Ozempic     c/c  diastolic   chf.             torsemide    HTN /HLD           on  procardia,  labetolol.  enalapril/  meds pe r card     CAD, stents             on asa/ plavix/ ,lipitor/ known to  card         s/p  TAVR, 12/2021       CKD,    s/p   R kidney transplant  2013,   Dr Hartono Weill [place] /  on cellcept/  tacro       h/o   ILD,  restrictive/obstructive lung disease,  and   FRANCOIS / cpap                  ILD/  known to  house pulm       pt has  been covid  +. since 12/ 29,   and  +  status  now  on arrival, is reflective of  his prior  covid      per  ID., on iv  zosyn /  CT  a/p         pt  no longer  f/p  with transplant team in  [facility]/ dr edward olmstead  following /   defer transplant  meds to  renal       on dvt ppx                 < from: CT Chest No Cont (10.10.23 @ 18:49) >  IMPRESSION:                      1. Newly developing nodular areas of consolidation bilaterally as well as   reticular opacities and abbreviated differential includes metastatic   disease with possible lymphangitic component. Atypical infectious and/or   inflammatory etiologies cannot be excluded. Bronchoscopic correlation and   histological sampling may be in order.  2. Mild increase in now moderate medi

## 2024-01-16 NOTE — PATIENT PROFILE ADULT - FUNCTIONAL ASSESSMENT - DAILY ACTIVITY 5.
General: Denies fever, chills  HEENT: Denies sensory changes, sore throat  Neck: Denies neck pain, neck stiffness  Resp: Denies coughing, SOB  Cardiovascular: Denies CP, palpitations, LE edema  GI: +nausea, vomiting, abdominal pain, Denies diarrhea, constipation, blood in stool  : Denies dysuria, hematuria, frequency, incontinence  MSK: Denies back pain  Neuro: Denies HA, dizziness, numbness, weakness  Skin: Denies rashes.
2 = A lot of assistance

## 2024-01-16 NOTE — PATIENT PROFILE ADULT - FALL HARM RISK - RISK INTERVENTIONS
Assistance OOB with selected safe patient handling equipment/Assistance with ambulation/Communicate Fall Risk and Risk Factors to all staff, patient, and family/Reinforce activity limits and safety measures with patient and family/Visual Cue: Yellow wristband/Bed in lowest position, wheels locked, appropriate side rails in place/Call bell, personal items and telephone in reach/Instruct patient to call for assistance before getting out of bed or chair/Non-slip footwear when patient is out of bed/[place] to call system/Physically safe environment - no spills, clutter or unnecessary equipment/Purposeful Proactive Rounding/Room/bathroom lighting operational, light cord in reach Assistance OOB with selected safe patient handling equipment/Assistance with ambulation/Communicate Fall Risk and Risk Factors to all staff, patient, and family/Reinforce activity limits and safety measures with patient and family/Visual Cue: Yellow wristband/Bed in lowest position, wheels locked, appropriate side rails in place/Call bell, personal items and telephone in reach/Instruct patient to call for assistance before getting out of bed or chair/Non-slip footwear when patient is out of bed/[place] to call system/Physically safe environment - no spills, clutter or unnecessary equipment/Purposeful Proactive Rounding/Room/bathroom lighting operational, light cord in reach

## 2024-01-16 NOTE — ADVANCED PRACTICE NURSE CONSULT - RECOMMEDATIONS
Pt is aox4 able to manage with insulin pump independently. Pt will continue with insulin pump. Endocrine will follow.

## 2024-01-16 NOTE — PROGRESS NOTE ADULT - ASSESSMENT
73 year old male PMH BPH s/p REZUM, FRANCOIS, COPD, DM2, HTN, CAD, S/p TAVR, renal transplant (2013) presents due to worsening urinary retention and mild hematuria. Patient recently had REZUM procedure for his BPH performed on 12/27/23 by Dr. Mcfarland. Admission afterwards for prostatitis, treated with Zosyn and then discharged with Levofloxacin. Was also noted to have pulmonary nodules and course c/b by COVID19 s/p 3 days of Remdesivir.     UA (1/14/24) 6 WBC  RVP (1/15) +COVID19    Given extended time since thurston removal and preceding prostatitis would repeat abdominal imaging  Would prefer broader coverage pending blood cultures and UCx given fever while on Levofloxacin    #Prostatitis, Leukocytosis, Fever  --Recommend CT A/P (preferably with IV contrast - depending on nephrology clearance)  --Recommend 2x blood cultures (ordered)  -- Zosyn 3.375 mg IV Q8H (ordered)  --Continue to follow CBC with diff  --Continue to follow temperature curve  --Follow up on preliminary urine culture    #Pulmonary Nodules  Cryptococcal Serum Ag (12/31/23) Negative  Serum Fungitell (12/31/23) Negative  Serum Aspergillus Galactomannan Ag (12/31/23) Negative  Histoplasma Serum Antigen (12/31/23) Negative  Coccidioides Ab (12/31/23) Negative  Schistosoma IgG (12/31/23) Negative  --If we are pursuing CT A/P would check CT Chest noncontrast    #COVID19  Doubt active viral illness, likely persistent positive PCR from previous admission  --COVID19 isolation per infection control protocol    Ross Constantino MD  Can be called via Teams  After 5pm/weekends [phone]     73 year old male PMH BPH s/p REZUM, FRANCOIS, COPD, DM2, HTN, CAD, S/p TAVR, renal transplant (2013) presents due to worsening urinary retention and mild hematuria. Patient recently had REZUM procedure for his BPH performed on 12/27/23 by Dr. Mcfarland. Admission afterwards for prostatitis, treated with Zosyn and then discharged with Levofloxacin. Was also noted to have pulmonary nodules and course c/b by COVID19 s/p 3 days of Remdesivir.     UA (1/14/24) 6 WBC  RVP (1/15) +COVID19    Given extended time since thurston removal and preceding prostatitis would repeat abdominal imaging  Would prefer broader coverage pending blood cultures and UCx given fever while on Levofloxacin    #Prostatitis, Leukocytosis, Fever  --Recommend CT A/P (preferably with IV contrast - depending on nephrology clearance)  --Recommend 2x blood cultures (ordered)  -- Zosyn 3.375 mg IV Q8H (ordered)  --Continue to follow CBC with diff  --Continue to follow temperature curve  --Follow up on preliminary urine culture    #Pulmonary Nodules  Cryptococcal Serum Ag (12/31/23) Negative  Serum Fungitell (12/31/23) Negative  Serum Aspergillus Galactomannan Ag (12/31/23) Negative  Histoplasma Serum Antigen (12/31/23) Negative  Coccidioides Ab (12/31/23) Negative  Schistosoma IgG (12/31/23) Negative  --If we are pursuing CT A/P would check CT Chest noncontrast    #COVID19  Doubt active viral illness, likely persistent positive PCR from previous admission  --COVID19 isolation per infection control protocol  -- could repeat swab to ask for cycle threshold    Ross Constantino MD  Can be called via Teams  After 5pm/weekends [phone]

## 2024-01-17 LAB
ANION GAP SERPL CALC-SCNC: 14 MMOL/L — SIGNIFICANT CHANGE UP (ref 5–17)
BUN SERPL-MCNC: 18 MG/DL — SIGNIFICANT CHANGE UP (ref 7–23)
CALCIUM SERPL-MCNC: 9.7 MG/DL — SIGNIFICANT CHANGE UP (ref 8.4–10.5)
CHLORIDE SERPL-SCNC: 105 MMOL/L — SIGNIFICANT CHANGE UP (ref 96–108)
CO2 SERPL-SCNC: 23 MMOL/L — SIGNIFICANT CHANGE UP (ref 22–31)
CREAT SERPL-MCNC: 0.62 MG/DL — SIGNIFICANT CHANGE UP (ref 0.5–1.3)
EGFR: 101 ML/MIN/1.73M2 — SIGNIFICANT CHANGE UP
GLUCOSE BLDC GLUCOMTR-MCNC: 121 MG/DL — HIGH (ref 70–99)
GLUCOSE BLDC GLUCOMTR-MCNC: 140 MG/DL — HIGH (ref 70–99)
GLUCOSE BLDC GLUCOMTR-MCNC: 145 MG/DL — HIGH (ref 70–99)
GLUCOSE BLDC GLUCOMTR-MCNC: 146 MG/DL — HIGH (ref 70–99)
GLUCOSE SERPL-MCNC: 95 MG/DL — SIGNIFICANT CHANGE UP (ref 70–99)
POTASSIUM SERPL-MCNC: 3.8 MMOL/L — SIGNIFICANT CHANGE UP (ref 3.5–5.3)
POTASSIUM SERPL-SCNC: 3.8 MMOL/L — SIGNIFICANT CHANGE UP (ref 3.5–5.3)
SODIUM SERPL-SCNC: 142 MMOL/L — SIGNIFICANT CHANGE UP (ref 135–145)
TACROLIMUS SERPL-MCNC: 6.3 NG/ML — SIGNIFICANT CHANGE UP

## 2024-01-17 PROCEDURE — 74177 CT ABD & PELVIS W/CONTRAST: CPT | Mod: 26

## 2024-01-17 PROCEDURE — 99232 SBSQ HOSP IP/OBS MODERATE 35: CPT

## 2024-01-17 PROCEDURE — 71260 CT THORAX DX C+: CPT | Mod: 26

## 2024-01-17 RX ADMIN — Medication 90 MILLIGRAM(S): at 05:47

## 2024-01-17 RX ADMIN — Medication 200 MILLIGRAM(S): at 05:48

## 2024-01-17 RX ADMIN — MONTELUKAST 10 MILLIGRAM(S): 4 TABLET, CHEWABLE ORAL at 13:07

## 2024-01-17 RX ADMIN — HEPARIN SODIUM 5000 UNIT(S): 5000 INJECTION INTRAVENOUS; SUBCUTANEOUS at 19:14

## 2024-01-17 RX ADMIN — TAMSULOSIN HYDROCHLORIDE 0.4 MILLIGRAM(S): 0.4 CAPSULE ORAL at 22:19

## 2024-01-17 RX ADMIN — PIPERACILLIN AND TAZOBACTAM 25 GRAM(S): 4; .5 INJECTION, POWDER, LYOPHILIZED, FOR SOLUTION INTRAVENOUS at 06:24

## 2024-01-17 RX ADMIN — GABAPENTIN 100 MILLIGRAM(S): 400 CAPSULE ORAL at 22:19

## 2024-01-17 RX ADMIN — TACROLIMUS 1 MILLIGRAM(S): 5 CAPSULE ORAL at 13:07

## 2024-01-17 RX ADMIN — TACROLIMUS 1.5 MILLIGRAM(S): 5 CAPSULE ORAL at 22:20

## 2024-01-17 RX ADMIN — Medication 650 MILLIGRAM(S): at 16:28

## 2024-01-17 RX ADMIN — PIPERACILLIN AND TAZOBACTAM 25 GRAM(S): 4; .5 INJECTION, POWDER, LYOPHILIZED, FOR SOLUTION INTRAVENOUS at 22:20

## 2024-01-17 RX ADMIN — Medication 200 MILLIGRAM(S): at 22:19

## 2024-01-17 RX ADMIN — Medication 10 MILLIGRAM(S): at 05:48

## 2024-01-17 RX ADMIN — PIPERACILLIN AND TAZOBACTAM 25 GRAM(S): 4; .5 INJECTION, POWDER, LYOPHILIZED, FOR SOLUTION INTRAVENOUS at 15:15

## 2024-01-17 RX ADMIN — Medication 600 MILLIGRAM(S): at 19:14

## 2024-01-17 RX ADMIN — Medication 650 MILLIGRAM(S): at 15:28

## 2024-01-17 RX ADMIN — ATORVASTATIN CALCIUM 20 MILLIGRAM(S): 80 TABLET, FILM COATED ORAL at 22:19

## 2024-01-17 RX ADMIN — HEPARIN SODIUM 5000 UNIT(S): 5000 INJECTION INTRAVENOUS; SUBCUTANEOUS at 05:48

## 2024-01-17 RX ADMIN — MYCOPHENOLATE MOFETIL 1000 MILLIGRAM(S): 250 CAPSULE ORAL at 05:48

## 2024-01-17 RX ADMIN — CHLORHEXIDINE GLUCONATE 1 APPLICATION(S): 213 SOLUTION TOPICAL at 15:15

## 2024-01-17 RX ADMIN — MYCOPHENOLATE MOFETIL 1000 MILLIGRAM(S): 250 CAPSULE ORAL at 19:14

## 2024-01-17 RX ADMIN — Medication 200 MILLIGRAM(S): at 13:10

## 2024-01-17 RX ADMIN — Medication 81 MILLIGRAM(S): at 13:07

## 2024-01-17 RX ADMIN — Medication 600 MILLIGRAM(S): at 05:48

## 2024-01-17 RX ADMIN — Medication 10 MILLIGRAM(S): at 05:47

## 2024-01-17 NOTE — PROGRESS NOTE ADULT - SUBJECTIVE AND OBJECTIVE BOX
Date of Service: 01-17-24 @ 09:24           CARDIOLOGY     PROGRESS  NOTE   ________________________________________________    CHIEF COMPLAINT:Patient is a 73y old  Male who presents with a chief complaint of Urinary retention (16 Jan 2024 17:46)  no complain  	  REVIEW OF SYSTEMS:  CONSTITUTIONAL: No fever, weight loss, or fatigue  EYES: No eye pain, visual disturbances, or discharge  ENT:  No difficulty hearing, tinnitus, vertigo; No sinus or throat pain  NECK: No pain or stiffness  RESPIRATORY: No cough, wheezing, chills or hemoptysis; No Shortness of Breath  CARDIOVASCULAR: No chest pain, palpitations, passing out, dizziness, or leg swelling  GASTROINTESTINAL: No abdominal or epigastric pain. No nausea, vomiting, or hematemesis; No diarrhea or constipation. No melena or hematochezia.  GENITOURINARY: No dysuria, frequency, hematuria, or incontinence  NEUROLOGICAL: No headaches, memory loss, loss of strength, numbness, or tremors  SKIN: No itching, burning, rashes, or lesions   LYMPH Nodes: No enlarged glands  ENDOCRINE: No heat or cold intolerance; No hair loss  MUSCULOSKELETAL: No joint pain or swelling; No muscle, back, or extremity pain  PSYCHIATRIC: No depression, anxiety, mood swings, or difficulty sleeping  HEME/LYMPH: No easy bruising, or bleeding gums  ALLERGY AND IMMUNOLOGIC: No hives or eczema	    [ x] All others negative	  [ ] Unable to obtain    PHYSICAL EXAM:  T(C): 36.7 (01-17-24 @ 05:15), Max: 36.7 (01-17-24 @ 05:15)  HR: 74 (01-17-24 @ 05:15) (65 - 74)  BP: 160/81 (01-17-24 @ 05:15) (125/62 - 160/81)  RR: 18 (01-17-24 @ 05:15) (18 - 18)  SpO2: 97% (01-17-24 @ 05:15) (96% - 98%)  Wt(kg): --  I&O's Summary    16 Jan 2024 07:01  -  17 Jan 2024 07:00  --------------------------------------------------------  IN: 680 mL / OUT: 1050 mL / NET: -370 mL        Appearance: Normal	  HEENT:   Normal oral mucosa, PERRL, EOMI	  Lymphatic: No lymphadenopathy  Cardiovascular: Normal S1 S2, No JVD, + murmurs, No edema  Respiratory: rhonchi  Psychiatry: A & O x 3, Mood & affect appropriate  Gastrointestinal:  Soft, Non-tender, + BS	  Skin: No rashes, No ecchymoses, No cyanosis	  Extremities: Normal range of motion, No clubbing, cyanosis or edema  Vascular: Peripheral pulses palpable 2+ bilaterally    MEDICATIONS  (STANDING):  aspirin enteric coated 81 milliGRAM(s) Oral daily  atorvastatin 20 milliGRAM(s) Oral at bedtime  chlorhexidine 2% Cloths 1 Application(s) Topical daily  dextrose 5%. 1000 milliLiter(s) (50 mL/Hr) IV Continuous <Continuous>  dextrose 5%. 1000 milliLiter(s) (100 mL/Hr) IV Continuous <Continuous>  dextrose 50% Injectable 12.5 Gram(s) IV Push once  dextrose 50% Injectable 25 Gram(s) IV Push once  dextrose 50% Injectable 25 Gram(s) IV Push once  enalapril 10 milliGRAM(s) Oral daily  gabapentin 100 milliGRAM(s) Oral at bedtime  glucagon  Injectable 1 milliGRAM(s) IntraMuscular once  guaiFENesin  milliGRAM(s) Oral every 12 hours  heparin   Injectable 5000 Unit(s) SubCutaneous every 12 hours  insulin aspart (NovoLOG) Pump 1 Each SubCutaneous Continuous Pump  labetalol 200 milliGRAM(s) Oral three times a day  montelukast 10 milliGRAM(s) Oral daily  mycophenolate mofetil 1000 milliGRAM(s) Oral two times a day  NIFEdipine XL 90 milliGRAM(s) Oral daily  piperacillin/tazobactam IVPB.. 3.375 Gram(s) IV Intermittent every 8 hours  tacrolimus 1.5 milliGRAM(s) Oral at bedtime  tacrolimus 1 milliGRAM(s) Oral daily  tamsulosin 0.4 milliGRAM(s) Oral at bedtime  torsemide 10 milliGRAM(s) Oral daily      TELEMETRY: 	    ECG:  	  RADIOLOGY:  OTHER: 	  	  LABS:	 	    CARDIAC MARKERS:                          14.6   9.21  )-----------( 193      ( 16 Jan 2024 07:50 )             42.7     01-17    142  |  105  |  18  ----------------------------<  95  3.8   |  23  |  0.62    Ca    9.7      17 Jan 2024 07:40      proBNP:   Lipid Profile:   HgA1c:   TSH:         Assessment and plan  ---------------------------  73M w/Hx of BPH s/p REZUM, FRANCOIS, COPD, DM2, HTN, CAD, S/p TAVR, renal transplant (2013) presents due to worsening urinary retention and mild hematuria. Patient recently had REZUM procedure for his BPH performed on 12/27/23 by Dr. Mcfarland. Had thurston from procedure removed on 1/2/24 but still had difficulty urinating since. Difficulty urinating acutely worsened the last 3 days. Today noticed some mild bleeding with urination as well. Thurston cathter placed in ED. Pt COVID+ since 12/29/23. Reports his only related symptom is nasal congestion now. ED note reports that he claimed to have a fever but on my exam he denies ever being febrile. Patient denies fever, chills, chest pain, SOB, headache, dizziness, abd pain, nausea, vomiting, constipation.   pt is well known to me with hx of htn, cad s/p renal transplant with urinary retention and sig elevated bp  continue all cardiac / bp meds as before  increase bp ?sec to urinary retention  check urinalysis/ urology eval  abx as per medicine  dvt prophylaxis  continue all bp / cardiac meds  will adjust bp meds  increase ambulation  replete K  increase enalapril as bp elevated  transplant team, Cellcept is on hold

## 2024-01-17 NOTE — PROGRESS NOTE ADULT - ASSESSMENT
73M w/Hx of BPH s/p REZUM, FRANCOIS, COPD, DM2, HTN, CAD, S/p TAVR, renal transplant (2013) presents due to worsening urinary retention and mild hematuria. Patient recently had REZUM procedure for his BPH performed on 12/27/23 by Dr. Mcfarland. Had thurston from procedure removed on 1/12/24 but still had difficulty urinating since. Difficulty urinating acutely worsened the last 3 days.  noticed some mild bleeding with urination as well. Thurston cathter placed in ED. Pt COVID+ since 12/29/23. Reports his only related symptom is nasal congestion now. ED note reports that he claimed to have a fever but  pt denies  exam he denies ever being febrile. Patient denies fever, chills, chest pain, SOB, headache, dizziness, abd pain, nausea, vomiting, constipation.       1- renal transplant  2- HTN   3- lung lesion   4- recent covid 19 with still ag +   5- urinary retention   6- ?? fevers   7- proteinuria       creatinine in range  after discharge his cellcept was resume at 1 g bid to cont  renal transplant meds as :   cellcept 1g bid  tacrolimus 1 mg am and 1.5 mg pm   tacro level in range   abx per ID   lung nodules have been concerning. pulm follow up outpt d.w pt and his wife they are aware   enalapril 10 mg daily   hypokalemia k in range   chf hx resume torsemide 10 mg daily   urinary retention ---> thurston cath to gravity.

## 2024-01-17 NOTE — PROGRESS NOTE ADULT - SUBJECTIVE AND OBJECTIVE BOX
[facility]   [phone]  RENAL FOLLOW UP NOTE  --------------------------------------------------------------------------------  Chief Complaint:    24 hour events/subjective:    seen earlier   states feels well     PAST HISTORY  --------------------------------------------------------------------------------  No significant changes to PMH, PSH, FHx, SHx, unless otherwise noted    ALLERGIES & MEDICATIONS  --------------------------------------------------------------------------------  Allergies    No Known Allergies    Intolerances      Standing Inpatient Medications  aspirin enteric coated 81 milliGRAM(s) Oral daily  atorvastatin 20 milliGRAM(s) Oral at bedtime  chlorhexidine 2% Cloths 1 Application(s) Topical daily  dextrose 5%. 1000 milliLiter(s) IV Continuous <Continuous>  dextrose 5%. 1000 milliLiter(s) IV Continuous <Continuous>  dextrose 50% Injectable 12.5 Gram(s) IV Push once  dextrose 50% Injectable 25 Gram(s) IV Push once  dextrose 50% Injectable 25 Gram(s) IV Push once  enalapril 10 milliGRAM(s) Oral daily  gabapentin 100 milliGRAM(s) Oral at bedtime  glucagon  Injectable 1 milliGRAM(s) IntraMuscular once  guaiFENesin  milliGRAM(s) Oral every 12 hours  heparin   Injectable 5000 Unit(s) SubCutaneous every 12 hours  insulin aspart (NovoLOG) Pump 1 Each SubCutaneous Continuous Pump  labetalol 200 milliGRAM(s) Oral three times a day  montelukast 10 milliGRAM(s) Oral daily  mycophenolate mofetil 1000 milliGRAM(s) Oral two times a day  NIFEdipine XL 90 milliGRAM(s) Oral daily  piperacillin/tazobactam IVPB.. 3.375 Gram(s) IV Intermittent every 8 hours  tacrolimus 1 milliGRAM(s) Oral daily  tacrolimus 1.5 milliGRAM(s) Oral at bedtime  tamsulosin 0.4 milliGRAM(s) Oral at bedtime  torsemide 10 milliGRAM(s) Oral daily    PRN Inpatient Medications  acetaminophen     Tablet .. 650 milliGRAM(s) Oral every 6 hours PRN  albuterol    90 MICROgram(s) HFA Inhaler 2 Puff(s) Inhalation every 6 hours PRN  dextrose Oral Gel 15 Gram(s) Oral once PRN  melatonin 3 milliGRAM(s) Oral at bedtime PRN      REVIEW OF SYSTEMS  --------------------------------------------------------------------------------    Gen: denies  fevers/chills,  CVS: denies chest pain/palpitations  Resp: denies SOB/Cough  GI: Denies N/V/Abd pain  : Denies dysuria/oliguria/hematuria        VITALS/PHYSICAL EXAM  --------------------------------------------------------------------------------  T(C): 36.8 (01-17-24 @ 21:11), Max: 36.8 (01-17-24 @ 12:47)  HR: 67 (01-17-24 @ 21:11) (67 - 82)  BP: 148/68 (01-17-24 @ 21:11) (118/64 - 160/81)  RR: 18 (01-17-24 @ 21:11) (18 - 18)  SpO2: 97% (01-17-24 @ 21:11) (95% - 97%)  Wt(kg): --        01-16-24 @ 07:01  -  01-17-24 @ 07:00  --------------------------------------------------------  IN: 680 mL / OUT: 1050 mL / NET: -370 mL    01-17-24 @ 07:01  -  01-17-24 @ 22:43  --------------------------------------------------------  IN: 480 mL / OUT: 1250 mL / NET: -770 mL      Physical Exam:  	    	  	Gen: Non toxic comfortable appearing   	Pulm: decrease bs  no rales or ronchi or wheezing  	CV: RRR, S1S2; no rub  	Abd: +BS, soft, nontender/nondistended + renal allograft site RLQ non tender and no renal artery bruit   	: No suprapubic tenderness + thurston   	UE: Warm, no cyanosis  no clubbing,  no edema  	LE: Warm, no cyanosis  no clubbing, no edema  	Neuro: alert and oriented. speech coherent       LABS/STUDIES  --------------------------------------------------------------------------------              14.6   9.21  >-----------<  193      [01-16-24 @ 07:50]              42.7     142  |  105  |  18  ----------------------------<  95      [01-17-24 @ 07:40]  3.8   |  23  |  0.62        Ca     9.7     [01-17-24 @ 07:40]            Creatinine Trend:  SCr 0.62 [01-17 @ 07:40]  SCr 0.56 [01-16 @ 07:50]  SCr 0.59 [01-15 @ 07:55]  SCr 0.53 [01-14 @ 20:30]  SCr 0.69 [12-31 @ 07:05]    Tacrolimus (), Serum: 6.3 ng/mL (01-17 @ 07:38)  Tacrolimus (), Serum: 3.2 ng/mL (01-15 @ 07:55)          Urine Creatinine 77      [01-15-24 @ 21:42]  Urine Protein 82      [01-15-24 @ 21:42]

## 2024-01-17 NOTE — PROGRESS NOTE ADULT - SUBJECTIVE AND OBJECTIVE BOX
HPI:   Patient is a 73 M with history of FRANCOIS on CPAP, T2D on insulin pump, hLD, HTN, CAD, kidney transplant in 2013 here for urinary retention. endocrinology consulted for insulin pump management.   Home DM medications:  - Medtronic pump with novolog insulin   Basal:  12AM-5AM 0.6U/hr    5AM-12AM 0.7U/hr    TDD is 16.3U         ICR:  12AM-11PM 15g/u    11PM-12AM 20g/u      ISF: 50mg/dl all day    Glucose target       Inpatient diabetes medications:   - medtronic pump with setting as above    Most recent HbA1C: 5.3 12/30/2023      INTERVAL HPI/OVERNIGHT EVENTS:  Seen at the bedside. Tolerating diet   Currently denies polydipsia, polyuria , visual changes, numbness in feet.      Review of systems:   CONSTITUTIONAL:  Feels well, good appetite  CARDIOVASCULAR:  Negative for chest pain or palpitations  RESPIRATORY:  Negative for cough, or SOB   GASTROINTESTINAL:  Negative for nausea, vomiting, or abdominal pain  GENITOURINARY:  Negative frequency, urgency or dysuria     CAPILLARY BLOOD GLUCOSE      POCT Blood Glucose.: 145 mg/dL (17 Jan 2024 12:42)  POCT Blood Glucose.: 121 mg/dL (17 Jan 2024 09:10)  POCT Blood Glucose.: 122 mg/dL (16 Jan 2024 21:30)  POCT Blood Glucose.: 139 mg/dL (16 Jan 2024 17:27)       MEDICATIONS  (STANDING):  aspirin enteric coated 81 milliGRAM(s) Oral daily  atorvastatin 20 milliGRAM(s) Oral at bedtime  chlorhexidine 2% Cloths 1 Application(s) Topical daily  dextrose 5%. 1000 milliLiter(s) (50 mL/Hr) IV Continuous <Continuous>  dextrose 5%. 1000 milliLiter(s) (100 mL/Hr) IV Continuous <Continuous>  dextrose 50% Injectable 12.5 Gram(s) IV Push once  dextrose 50% Injectable 25 Gram(s) IV Push once  dextrose 50% Injectable 25 Gram(s) IV Push once  enalapril 10 milliGRAM(s) Oral daily  gabapentin 100 milliGRAM(s) Oral at bedtime  glucagon  Injectable 1 milliGRAM(s) IntraMuscular once  guaiFENesin  milliGRAM(s) Oral every 12 hours  heparin   Injectable 5000 Unit(s) SubCutaneous every 12 hours  insulin aspart (NovoLOG) Pump 1 Each SubCutaneous Continuous Pump  labetalol 200 milliGRAM(s) Oral three times a day  montelukast 10 milliGRAM(s) Oral daily  mycophenolate mofetil 1000 milliGRAM(s) Oral two times a day  NIFEdipine XL 90 milliGRAM(s) Oral daily  piperacillin/tazobactam IVPB.. 3.375 Gram(s) IV Intermittent every 8 hours  tacrolimus 1.5 milliGRAM(s) Oral at bedtime  tacrolimus 1 milliGRAM(s) Oral daily  tamsulosin 0.4 milliGRAM(s) Oral at bedtime  torsemide 10 milliGRAM(s) Oral daily    MEDICATIONS  (PRN):  acetaminophen     Tablet .. 650 milliGRAM(s) Oral every 6 hours PRN Temp greater or equal to 38C (100.4F), Mild Pain (1 - 3)  albuterol    90 MICROgram(s) HFA Inhaler 2 Puff(s) Inhalation every 6 hours PRN Shortness of Breath and/or Wheezing  dextrose Oral Gel 15 Gram(s) Oral once PRN Blood Glucose LESS THAN 70 milliGRAM(s)/deciliter  melatonin 3 milliGRAM(s) Oral at bedtime PRN Insomnia      PHYSICAL EXAM  Vital Signs Last 24 Hrs  T(C): 36.8 (17 Jan 2024 12:47), Max: 36.8 (17 Jan 2024 12:47)  T(F): 98.2 (17 Jan 2024 12:47), Max: 98.2 (17 Jan 2024 12:47)  HR: 82 (17 Jan 2024 12:47) (67 - 82)  BP: 118/64 (17 Jan 2024 12:47) (118/64 - 160/81)  BP(mean): --  RR: 18 (17 Jan 2024 12:47) (18 - 18)  SpO2: 95% (17 Jan 2024 12:47) (95% - 98%)    Parameters below as of 17 Jan 2024 12:47  Patient On (Oxygen Delivery Method): room air        GENERAL: male sitting in a chair, NAD  RESPIRATORY: nonlabored breathing, no accessory muscle use  Extremities: Warm, no edema in all 4 exts   NEURO: A&O X3    LABS:                        14.6   9.21  )-----------( 193      ( 16 Jan 2024 07:50 )             42.7     01-17    142  |  105  |  18  ----------------------------<  95  3.8   |  23  |  0.62    Ca    9.7      17 Jan 2024 07:40        Urinalysis Basic - ( 17 Jan 2024 07:40 )    Color: x / Appearance: x / SG: x / pH: x  Gluc: 95 mg/dL / Ketone: x  / Bili: x / Urobili: x   Blood: x / Protein: x / Nitrite: x   Leuk Esterase: x / RBC: x / WBC x   Sq Epi: x / Non Sq Epi: x / Bacteria: x

## 2024-01-17 NOTE — PHYSICAL THERAPY INITIAL EVALUATION ADULT - ADDITIONAL COMMENTS
pt reports lives alone but has HHA 4.5-6 hours x5 days and family assists as needed; uses cane indoors, RW outdoors, reports still drives, apartment with elevator access

## 2024-01-17 NOTE — PROGRESS NOTE ADULT - ASSESSMENT
Patient is a 73 M with history of FRANCOIS on CPAP, T2D on insulin pump, hLD, HTN, CAD, kidney transplant in 2013 here for urinary retention. endocrinology consulted for insulin pump management.     # T2DM   # Insulin pump in place  - Most recent Hemoglobin A1C 5.4  - Home DM med: medtornic pump with novolog insulin   - Current FS ranges from 100-200  - Current diet: CHO  - Please monitor blood glucose values TID AC & QHS while eating regular meals and Q6H while NPO  - Blood glucose goals pre-meal less than 140 mg/dL and random blood glucose less than 180 mg/dL  - Recommendations:  - - Given patient’s glucose is at target and patient is AOx4 and feels comfortable using the pump, patient can continue with insulin pump while overnight   - RN to document correction insulin bolus in flow sheet   - Hypoglcyemia protocol    - In case of pump malfunction, please administer Lantus 16 units STAT and start Admelog 4 units TID with meals  (HOLD IF NPO)   - Please check FSG before meals and QHS, or q6h while NPO   - Please keep patient on a diabetic, carb controlled diet    - on discharge patient should follow up with their endocrinologist Dr. Byrd     Discharge planning:   - Home DM medications: medtronic pump with novolog insulin   - Discharge DM medications:  medtornic pump with novolog insulin   - Patient will need opthalmology and podiatry follow up as outpatient   - Follow up with outpatient endocrinologist Dr. Jyoti Byrd     # HTN  - BP goal 130/80  - Manage per primary team     # HLD  - Continue with atovastatin 20 mg QHS  - Goal LDL<70  - Manage as outpatient         Thank you for the consult. Will continue to monitor. Please inform the endocrinology team at least 24 hours prior to intended discharge date.     Contact via pager or [employer] Teams during business hours. For follow up questions, discharge recommendations, or new consults please call answering service at [phone] (weekdays), [phone] (nights/weekends). For nonurgent matters, please email  [email or web address].     Tanisha Sousa MD  Department of Endocrinology, Diabetes and metabolism   Pager [phone]

## 2024-01-17 NOTE — PHYSICAL THERAPY INITIAL EVALUATION ADULT - GAIT DEVIATIONS NOTED, PT EVAL
forward flexed posture/decreased eveline/decreased step length/decreased stride length/increased stride width

## 2024-01-17 NOTE — PROGRESS NOTE ADULT - SUBJECTIVE AND OBJECTIVE BOX
date of service: 01-17-24 @ 09:29  afberile  REVIEW OF SYSTEMS:  CONSTITUTIONAL: No fever,  no  weight loss  ENT:  No  tinnitus,   no   vertigo  NECK: No pain or stiffness  RESPIRATORY: No cough, wheezing, chills or hemoptysis;    No Shortness of Breath  CARDIOVASCULAR: No chest pain, palpitations, dizziness  GASTROINTESTINAL: No abdominal or epigastric pain. No nausea, vomiting, or hematemesis; No diarrhea  No melena or hematochezia.  GENITOURINARY: No dysuria, frequency, hematuria, or incontinence  NEUROLOGICAL: No headaches  SKIN: No itching,  no   rash  LYMPH Nodes: No enlarged glands  ENDOCRINE: No heat or cold intolerance  MUSCULOSKELETAL: No joint pain or swelling  PSYCHIATRIC: No depression, anxiety  HEME/LYMPH: No easy bruising, or bleeding gums  ALLERGY AND IMMUNOLOGIC: No hives or eczema	    MEDICATIONS  (STANDING):  aspirin enteric coated 81 milliGRAM(s) Oral daily  atorvastatin 20 milliGRAM(s) Oral at bedtime  chlorhexidine 2% Cloths 1 Application(s) Topical daily  dextrose 5%. 1000 milliLiter(s) (50 mL/Hr) IV Continuous <Continuous>  dextrose 5%. 1000 milliLiter(s) (100 mL/Hr) IV Continuous <Continuous>  dextrose 50% Injectable 12.5 Gram(s) IV Push once  dextrose 50% Injectable 25 Gram(s) IV Push once  dextrose 50% Injectable 25 Gram(s) IV Push once  enalapril 10 milliGRAM(s) Oral daily  gabapentin 100 milliGRAM(s) Oral at bedtime  glucagon  Injectable 1 milliGRAM(s) IntraMuscular once  guaiFENesin  milliGRAM(s) Oral every 12 hours  heparin   Injectable 5000 Unit(s) SubCutaneous every 12 hours  insulin aspart (NovoLOG) Pump 1 Each SubCutaneous Continuous Pump  labetalol 200 milliGRAM(s) Oral three times a day  montelukast 10 milliGRAM(s) Oral daily  mycophenolate mofetil 1000 milliGRAM(s) Oral two times a day  NIFEdipine XL 90 milliGRAM(s) Oral daily  piperacillin/tazobactam IVPB.. 3.375 Gram(s) IV Intermittent every 8 hours  tacrolimus 1.5 milliGRAM(s) Oral at bedtime  tacrolimus 1 milliGRAM(s) Oral daily  tamsulosin 0.4 milliGRAM(s) Oral at bedtime  torsemide 10 milliGRAM(s) Oral daily    MEDICATIONS  (PRN):  acetaminophen     Tablet .. 650 milliGRAM(s) Oral every 6 hours PRN Temp greater or equal to 38C (100.4F), Mild Pain (1 - 3)  albuterol    90 MICROgram(s) HFA Inhaler 2 Puff(s) Inhalation every 6 hours PRN Shortness of Breath and/or Wheezing  dextrose Oral Gel 15 Gram(s) Oral once PRN Blood Glucose LESS THAN 70 milliGRAM(s)/deciliter  melatonin 3 milliGRAM(s) Oral at bedtime PRN Insomnia      Vital Signs Last 24 Hrs  T(C): 36.7 (17 Jan 2024 05:15), Max: 36.7 (17 Jan 2024 05:15)  T(F): 98 (17 Jan 2024 05:15), Max: 98 (17 Jan 2024 05:15)  HR: 74 (17 Jan 2024 05:15) (65 - 74)  BP: 160/81 (17 Jan 2024 05:15) (125/62 - 160/81)  BP(mean): --  RR: 18 (17 Jan 2024 05:15) (18 - 18)  SpO2: 97% (17 Jan 2024 05:15) (96% - 98%)    Parameters below as of 17 Jan 2024 05:15  Patient On (Oxygen Delivery Method): room air      CAPILLARY BLOOD GLUCOSE      POCT Blood Glucose.: 121 mg/dL (17 Jan 2024 09:10)  POCT Blood Glucose.: 122 mg/dL (16 Jan 2024 21:30)  POCT Blood Glucose.: 139 mg/dL (16 Jan 2024 17:27)  POCT Blood Glucose.: 122 mg/dL (16 Jan 2024 12:23)    I&O's Summary    16 Jan 2024 07:01  -  17 Jan 2024 07:00  --------------------------------------------------------  IN: 680 mL / OUT: 1050 mL / NET: -370 mL          Appearance: Normal	  HEENT:   Normal oral mucosa, PERRL, EOMI	  Lymphatic: No lymphadenopathy  Cardiovascular: Normal S1 S2, No JVD  Respiratory: Lungs clear to auscultation	  Gastrointestinal:  Soft, Non-tender, + BS	  Skin: No rash, No ecchymoses	  Extremities:     LABS:                        14.6   9.21  )-----------( 193      ( 16 Jan 2024 07:50 )             42.7     01-17    142  |  105  |  18  ----------------------------<  95  3.8   |  23  |  0.62    Ca    9.7      17 Jan 2024 07:40            Urinalysis Basic - ( 17 Jan 2024 07:40 )    Color: x / Appearance: x / SG: x / pH: x  Gluc: 95 mg/dL / Ketone: x  / Bili: x / Urobili: x   Blood: x / Protein: x / Nitrite: x   Leuk Esterase: x / RBC: x / WBC x   Sq Epi: x / Non Sq Epi: x / Bacteria: x                      Consultant(s) Notes Reviewed:      Care Discussed with Consultants/Other Providers:

## 2024-01-17 NOTE — PHYSICAL THERAPY INITIAL EVALUATION ADULT - ASSISTIVE DEVICE FOR TRANSFER: SIT/STAND, REHAB EVAL
rolling walker
Assistance with ambulation/Assistance OOB with selected safe patient handling equipment/Communicate Risk of Fall with Harm to all staff/Reinforce activity limits and safety measures with patient and family/Tailored Fall Risk Interventions/Visual Cue: Yellow wristband and red socks/Bed in lowest position, wheels locked, appropriate side rails in place/Call bell, personal items and telephone in reach/Instruct patient to call for assistance before getting out of bed or chair/Non-slip footwear when patient is out of bed/[place]allory to call system/Physically safe environment - no spills, clutter or unnecessary equipment/Purposeful Proactive Rounding/Room/bathroom lighting operational, light cord in reach

## 2024-01-17 NOTE — PHYSICAL THERAPY INITIAL EVALUATION ADULT - PERTINENT HX OF CURRENT PROBLEM, REHAB EVAL
72M with PMHx FRANCOIS on CPAP, COPD w/ 50-pack-year smoking history, DM2 on insulin pump,  HTN/  HLD,  CAD/ stents, status post stent, TAVR/  colonoscopy 2022, polyp, h/o kidney transplant in 2013 on immunosuppresion/   covid  12/2023. Pt admitted  with urinary  retention,  and  thurston  was placed  in  er/  s/p  prostate surg.

## 2024-01-17 NOTE — PROGRESS NOTE ADULT - ASSESSMENT
72M        h/o  FRANCOIS on CPAP, COPD w/ 50-pack-year smoking history, DM2 on insulin pump,         HTN/  HLD,  CAD/ stents,        status post stent, TAVR/  colonoscopy 2022, polyp       h/o kidney transplant in 2013 on immunosuppresion/   covid  12/2023            admitted  with urinary  retention,  and  thurston  was placed  in  er/  s/p  prostate surg  ,s/p   Rezum    surg  on  12/27, dr cobian         uro  eval/.  called.    crt  0/5              Flomax   started      DM,  has  insulin pump,             house   endo  . has   Medtronic  insulin pump at home and Ozempic     c/c  diastolic   chf.             torsemide    HTN /HLD           on  procardia,  labetolol.  enalapril/  meds pe r card     CAD, stents             on asa/ plavix/ ,lipitor/ known to  card         s/p  TAVR, 12/2021       CKD,    s/p   R kidney transplant  2013,   Dr Hartono Weill [place] /  on cellcept/  tacro       h/o   ILD,  restrictive/obstructive lung disease,  and   FRANCOIS / cpap                  ILD/  known to  house pulm       pt has  been covid  +. since 12/ 29,   and  +  status  now  on arrival, is reflective of  his prior  covid      per  ID., on iv  zosyn      CT  a/p / pending         bcx.  pending   pt states  hes  eager  to  leave/  will  need  id clearance        pt  no longer  f/p  with transplant team in  city/ dr edward olmstead  following /   defer transplant  meds to  renal       on dvt ppx                 < from: CT Chest No Cont (10.10.23 @ 18:49) >  IMPRESSION:                      1. Newly developing nodular areas of consolidation bilaterally as well as   reticular opacities and abbreviated differential includes metastatic   disease with possible lymphangitic component. Atypical infectious and/or   inflammatory etiologies cannot be excluded. Bronchoscopic correlation and   histological sampling may be in order.  2. Mild increase in now moderate medi

## 2024-01-18 ENCOUNTER — TRANSCRIPTION ENCOUNTER (OUTPATIENT)
Age: 74
End: 2024-01-18

## 2024-01-18 VITALS
OXYGEN SATURATION: 93 % | TEMPERATURE: 98 F | HEART RATE: 67 BPM | RESPIRATION RATE: 18 BRPM | DIASTOLIC BLOOD PRESSURE: 62 MMHG | SYSTOLIC BLOOD PRESSURE: 123 MMHG

## 2024-01-18 DIAGNOSIS — Z96.41 PRESENCE OF INSULIN PUMP (EXTERNAL) (INTERNAL): ICD-10-CM

## 2024-01-18 DIAGNOSIS — E11.9 TYPE 2 DIABETES MELLITUS WITHOUT COMPLICATIONS: ICD-10-CM

## 2024-01-18 LAB
ANION GAP SERPL CALC-SCNC: 11 MMOL/L — SIGNIFICANT CHANGE UP (ref 5–17)
BUN SERPL-MCNC: 15 MG/DL — SIGNIFICANT CHANGE UP (ref 7–23)
CALCIUM SERPL-MCNC: 9.9 MG/DL — SIGNIFICANT CHANGE UP (ref 8.4–10.5)
CHLORIDE SERPL-SCNC: 106 MMOL/L — SIGNIFICANT CHANGE UP (ref 96–108)
CO2 SERPL-SCNC: 27 MMOL/L — SIGNIFICANT CHANGE UP (ref 22–31)
CREAT SERPL-MCNC: 0.57 MG/DL — SIGNIFICANT CHANGE UP (ref 0.5–1.3)
EGFR: 104 ML/MIN/1.73M2 — SIGNIFICANT CHANGE UP
GLUCOSE BLDC GLUCOMTR-MCNC: 119 MG/DL — HIGH (ref 70–99)
GLUCOSE BLDC GLUCOMTR-MCNC: 120 MG/DL — HIGH (ref 70–99)
GLUCOSE SERPL-MCNC: 108 MG/DL — HIGH (ref 70–99)
HCT VFR BLD CALC: 43.2 % — SIGNIFICANT CHANGE UP (ref 39–50)
HGB BLD-MCNC: 14 G/DL — SIGNIFICANT CHANGE UP (ref 13–17)
MCHC RBC-ENTMCNC: 31.2 PG — SIGNIFICANT CHANGE UP (ref 27–34)
MCHC RBC-ENTMCNC: 32.4 GM/DL — SIGNIFICANT CHANGE UP (ref 32–36)
MCV RBC AUTO: 96.2 FL — SIGNIFICANT CHANGE UP (ref 80–100)
NRBC # BLD: 0 /100 WBCS — SIGNIFICANT CHANGE UP (ref 0–0)
PLATELET # BLD AUTO: 213 K/UL — SIGNIFICANT CHANGE UP (ref 150–400)
POTASSIUM SERPL-MCNC: 3.8 MMOL/L — SIGNIFICANT CHANGE UP (ref 3.5–5.3)
POTASSIUM SERPL-SCNC: 3.8 MMOL/L — SIGNIFICANT CHANGE UP (ref 3.5–5.3)
RBC # BLD: 4.49 M/UL — SIGNIFICANT CHANGE UP (ref 4.2–5.8)
RBC # FLD: 14.3 % — SIGNIFICANT CHANGE UP (ref 10.3–14.5)
SODIUM SERPL-SCNC: 144 MMOL/L — SIGNIFICANT CHANGE UP (ref 135–145)
WBC # BLD: 8.39 K/UL — SIGNIFICANT CHANGE UP (ref 3.8–10.5)
WBC # FLD AUTO: 8.39 K/UL — SIGNIFICANT CHANGE UP (ref 3.8–10.5)

## 2024-01-18 PROCEDURE — 80053 COMPREHEN METABOLIC PANEL: CPT

## 2024-01-18 PROCEDURE — 74177 CT ABD & PELVIS W/CONTRAST: CPT

## 2024-01-18 PROCEDURE — 0225U NFCT DS DNA&RNA 21 SARSCOV2: CPT

## 2024-01-18 PROCEDURE — 87641 MR-STAPH DNA AMP PROBE: CPT

## 2024-01-18 PROCEDURE — 99232 SBSQ HOSP IP/OBS MODERATE 35: CPT

## 2024-01-18 PROCEDURE — 71260 CT THORAX DX C+: CPT

## 2024-01-18 PROCEDURE — 85025 COMPLETE CBC W/AUTO DIFF WBC: CPT

## 2024-01-18 PROCEDURE — 36415 COLL VENOUS BLD VENIPUNCTURE: CPT

## 2024-01-18 PROCEDURE — 96374 THER/PROPH/DIAG INJ IV PUSH: CPT

## 2024-01-18 PROCEDURE — 81001 URINALYSIS AUTO W/SCOPE: CPT

## 2024-01-18 PROCEDURE — 82570 ASSAY OF URINE CREATININE: CPT

## 2024-01-18 PROCEDURE — 87086 URINE CULTURE/COLONY COUNT: CPT

## 2024-01-18 PROCEDURE — 80048 BASIC METABOLIC PNL TOTAL CA: CPT

## 2024-01-18 PROCEDURE — 87040 BLOOD CULTURE FOR BACTERIA: CPT

## 2024-01-18 PROCEDURE — 82962 GLUCOSE BLOOD TEST: CPT

## 2024-01-18 PROCEDURE — 99285 EMERGENCY DEPT VISIT HI MDM: CPT

## 2024-01-18 PROCEDURE — 84156 ASSAY OF PROTEIN URINE: CPT

## 2024-01-18 PROCEDURE — 85027 COMPLETE CBC AUTOMATED: CPT

## 2024-01-18 PROCEDURE — 87640 STAPH A DNA AMP PROBE: CPT

## 2024-01-18 PROCEDURE — 97161 PT EVAL LOW COMPLEX 20 MIN: CPT

## 2024-01-18 PROCEDURE — 80197 ASSAY OF TACROLIMUS: CPT

## 2024-01-18 RX ORDER — MYCOPHENOLATE MOFETIL 250 MG/1
1 CAPSULE ORAL
Qty: 0 | Refills: 0 | DISCHARGE

## 2024-01-18 RX ORDER — TAMSULOSIN HYDROCHLORIDE 0.4 MG/1
1 CAPSULE ORAL
Qty: 30 | Refills: 0
Start: 2024-01-18 | End: 2024-02-16

## 2024-01-18 RX ORDER — MYCOPHENOLATE MOFETIL 250 MG/1
2 CAPSULE ORAL
Qty: 0 | Refills: 0 | DISCHARGE

## 2024-01-18 RX ORDER — ALBUTEROL 90 UG/1
2 AEROSOL, METERED ORAL
Qty: 0 | Refills: 0 | DISCHARGE

## 2024-01-18 RX ADMIN — Medication 10 MILLIGRAM(S): at 05:53

## 2024-01-18 RX ADMIN — Medication 10 MILLIGRAM(S): at 05:54

## 2024-01-18 RX ADMIN — Medication 200 MILLIGRAM(S): at 14:12

## 2024-01-18 RX ADMIN — MYCOPHENOLATE MOFETIL 1000 MILLIGRAM(S): 250 CAPSULE ORAL at 05:53

## 2024-01-18 RX ADMIN — Medication 200 MILLIGRAM(S): at 05:54

## 2024-01-18 RX ADMIN — HEPARIN SODIUM 5000 UNIT(S): 5000 INJECTION INTRAVENOUS; SUBCUTANEOUS at 05:54

## 2024-01-18 RX ADMIN — Medication 600 MILLIGRAM(S): at 05:54

## 2024-01-18 RX ADMIN — Medication 81 MILLIGRAM(S): at 11:52

## 2024-01-18 RX ADMIN — MONTELUKAST 10 MILLIGRAM(S): 4 TABLET, CHEWABLE ORAL at 11:52

## 2024-01-18 RX ADMIN — TACROLIMUS 1 MILLIGRAM(S): 5 CAPSULE ORAL at 11:53

## 2024-01-18 RX ADMIN — PIPERACILLIN AND TAZOBACTAM 25 GRAM(S): 4; .5 INJECTION, POWDER, LYOPHILIZED, FOR SOLUTION INTRAVENOUS at 06:01

## 2024-01-18 RX ADMIN — Medication 650 MILLIGRAM(S): at 06:24

## 2024-01-18 RX ADMIN — Medication 650 MILLIGRAM(S): at 05:54

## 2024-01-18 RX ADMIN — CHLORHEXIDINE GLUCONATE 1 APPLICATION(S): 213 SOLUTION TOPICAL at 11:53

## 2024-01-18 RX ADMIN — Medication 90 MILLIGRAM(S): at 05:54

## 2024-01-18 NOTE — PROGRESS NOTE ADULT - REASON FOR ADMISSION
Urinary retention

## 2024-01-18 NOTE — PROGRESS NOTE ADULT - SUBJECTIVE AND OBJECTIVE BOX
INFECTIOUS DISEASES FOLLOW UP-- Tiffany Constantino  [phone]    This is a follow up note for this  73yMale with  Retention of urine        ROS:  CONSTITUTIONAL:  No fever, good appetite  CARDIOVASCULAR:  No chest pain or palpitations  RESPIRATORY:  No dyspnea  GASTROINTESTINAL:  No nausea, vomiting, diarrhea, or abdominal pain  GENITOURINARY:  No dysuria  NEUROLOGIC:  No headache,     Allergies    No Known Allergies    Intolerances        ANTIBIOTICS/RELEVANT:  antimicrobials    immunologic:  mycophenolate mofetil 1000 milliGRAM(s) Oral two times a day  tacrolimus 1.5 milliGRAM(s) Oral at bedtime  tacrolimus 1 milliGRAM(s) Oral daily    OTHER:  aspirin enteric coated 81 milliGRAM(s) Oral daily  atorvastatin 20 milliGRAM(s) Oral at bedtime  chlorhexidine 2% Cloths 1 Application(s) Topical daily  dextrose 5%. 1000 milliLiter(s) IV Continuous <Continuous>  dextrose 5%. 1000 milliLiter(s) IV Continuous <Continuous>  dextrose 50% Injectable 12.5 Gram(s) IV Push once  dextrose 50% Injectable 25 Gram(s) IV Push once  dextrose 50% Injectable 25 Gram(s) IV Push once  dextrose Oral Gel 15 Gram(s) Oral once PRN  enalapril 10 milliGRAM(s) Oral daily  gabapentin 100 milliGRAM(s) Oral at bedtime  glucagon  Injectable 1 milliGRAM(s) IntraMuscular once  heparin   Injectable 5000 Unit(s) SubCutaneous every 12 hours  insulin aspart (NovoLOG) Pump 1 Each SubCutaneous Continuous Pump  labetalol 200 milliGRAM(s) Oral three times a day  montelukast 10 milliGRAM(s) Oral daily  NIFEdipine XL 90 milliGRAM(s) Oral daily  tamsulosin 0.4 milliGRAM(s) Oral at bedtime  torsemide 10 milliGRAM(s) Oral daily      Objective:  Vital Signs Last 24 Hrs  T(C): 36.4 (18 Jan 2024 13:26), Max: 36.8 (17 Jan 2024 21:11)  T(F): 97.5 (18 Jan 2024 13:26), Max: 98.3 (17 Jan 2024 21:11)  HR: 67 (18 Jan 2024 13:26) (67 - 72)  BP: 123/62 (18 Jan 2024 13:26) (123/62 - 148/68)  BP(mean): --  RR: 18 (18 Jan 2024 13:26) (18 - 18)  SpO2: 93% (18 Jan 2024 13:26) (93% - 97%)    Parameters below as of 18 Jan 2024 13:26  Patient On (Oxygen Delivery Method): room air        PHYSICAL EXAM:  Constitutional:no acute distress  Eyes:ASHUTOSH, EOMI  Ear/Nose/Throat: no oral lesions, 	  Respiratory: clear BL  Cardiovascular: S1S2  Gastrointestinal:soft, (+) BS, no tenderness  Extremities:no e/e/c  No Lymphadenopathy  IV sites not inflammed.    LABS:                        14.0   8.39  )-----------( 213      ( 18 Jan 2024 07:15 )             43.2     01-18    144  |  106  |  15  ----------------------------<  108<H>  3.8   |  27  |  0.57    Ca    9.9      18 Jan 2024 07:13        Urinalysis Basic - ( 18 Jan 2024 07:13 )    Color: x / Appearance: x / SG: x / pH: x  Gluc: 108 mg/dL / Ketone: x  / Bili: x / Urobili: x   Blood: x / Protein: x / Nitrite: x   Leuk Esterase: x / RBC: x / WBC x   Sq Epi: x / Non Sq Epi: x / Bacteria: x        MICROBIOLOGY:            RECENT CULTURES:  01-16 @ 07:43  .Blood Blood-Venous  --  --  --    No growth at 48 Hours  --  01-16 @ 07:42  .Blood Blood-Peripheral  --  --  --    No growth at 48 Hours  --  01-14 @ 21:15  Clean Catch Clean Catch (Midstream)  --  --  --    No growth  --      RADIOLOGY & ADDITIONAL STUDIES:  < from: CT Abdomen and Pelvis w/ IV Cont (01.17.24 @ 12:41) >  IMPRESSION:  *  Wall thickening of the bladder, which may be seen in the setting of   cystitis.  *  Enlarged prostate, decrease in size compared to prior examination. No   evidence of prostatic abscess.  *  Fibrotic changes in both lungs with stable pulmonary nodules.    < end of copied text >

## 2024-01-18 NOTE — DISCHARGE NOTE NURSING/CASE MANAGEMENT/SOCIAL WORK - NSDCPEFALRISK_GEN_ALL_CORE
For information on Fall & Injury Prevention, visit: [email or web address] OR  [email or web address] OR  https://www.cdc.gov/steadi/patient.html

## 2024-01-18 NOTE — PROGRESS NOTE ADULT - SUBJECTIVE AND OBJECTIVE BOX
date of service: 01-18-24 @ 08:02  afberile  REVIEW OF SYSTEMS:  CONSTITUTIONAL: No fever,  no  weight loss  ENT:  No  tinnitus,   no   vertigo  NECK: No pain or stiffness  RESPIRATORY: No cough, wheezing, chills or hemoptysis;    No Shortness of Breath  CARDIOVASCULAR: No chest pain, palpitations, dizziness  GASTROINTESTINAL: No abdominal or epigastric pain. No nausea, vomiting, or hematemesis; No diarrhea  No melena or hematochezia.  GENITOURINARY: No dysuria, frequency, hematuria, or incontinence  NEUROLOGICAL: No headaches  SKIN: No itching,  no   rash  LYMPH Nodes: No enlarged glands  ENDOCRINE: No heat or cold intolerance  MUSCULOSKELETAL: No joint pain or swelling  PSYCHIATRIC: No depression, anxiety  HEME/LYMPH: No easy bruising, or bleeding gums  ALLERGY AND IMMUNOLOGIC: No hives or eczema	    MEDICATIONS  (STANDING):  aspirin enteric coated 81 milliGRAM(s) Oral daily  atorvastatin 20 milliGRAM(s) Oral at bedtime  chlorhexidine 2% Cloths 1 Application(s) Topical daily  dextrose 5%. 1000 milliLiter(s) (50 mL/Hr) IV Continuous <Continuous>  dextrose 5%. 1000 milliLiter(s) (100 mL/Hr) IV Continuous <Continuous>  dextrose 50% Injectable 12.5 Gram(s) IV Push once  dextrose 50% Injectable 25 Gram(s) IV Push once  dextrose 50% Injectable 25 Gram(s) IV Push once  enalapril 10 milliGRAM(s) Oral daily  gabapentin 100 milliGRAM(s) Oral at bedtime  glucagon  Injectable 1 milliGRAM(s) IntraMuscular once  guaiFENesin  milliGRAM(s) Oral every 12 hours  heparin   Injectable 5000 Unit(s) SubCutaneous every 12 hours  insulin aspart (NovoLOG) Pump 1 Each SubCutaneous Continuous Pump  labetalol 200 milliGRAM(s) Oral three times a day  montelukast 10 milliGRAM(s) Oral daily  mycophenolate mofetil 1000 milliGRAM(s) Oral two times a day  NIFEdipine XL 90 milliGRAM(s) Oral daily  piperacillin/tazobactam IVPB.. 3.375 Gram(s) IV Intermittent every 8 hours  tacrolimus 1.5 milliGRAM(s) Oral at bedtime  tacrolimus 1 milliGRAM(s) Oral daily  tamsulosin 0.4 milliGRAM(s) Oral at bedtime  torsemide 10 milliGRAM(s) Oral daily    MEDICATIONS  (PRN):  acetaminophen     Tablet .. 650 milliGRAM(s) Oral every 6 hours PRN Temp greater or equal to 38C (100.4F), Mild Pain (1 - 3)  albuterol    90 MICROgram(s) HFA Inhaler 2 Puff(s) Inhalation every 6 hours PRN Shortness of Breath and/or Wheezing  dextrose Oral Gel 15 Gram(s) Oral once PRN Blood Glucose LESS THAN 70 milliGRAM(s)/deciliter  melatonin 3 milliGRAM(s) Oral at bedtime PRN Insomnia      Vital Signs Last 24 Hrs  T(C): 36.5 (18 Jan 2024 05:22), Max: 36.8 (17 Jan 2024 12:47)  T(F): 97.7 (18 Jan 2024 05:22), Max: 98.3 (17 Jan 2024 21:11)  HR: 72 (18 Jan 2024 05:22) (67 - 82)  BP: 145/72 (18 Jan 2024 05:22) (118/64 - 148/68)  BP(mean): --  RR: 18 (18 Jan 2024 05:22) (18 - 18)  SpO2: 94% (18 Jan 2024 05:22) (94% - 97%)    Parameters below as of 18 Jan 2024 05:22  Patient On (Oxygen Delivery Method): room air      CAPILLARY BLOOD GLUCOSE      POCT Blood Glucose.: 146 mg/dL (17 Jan 2024 22:03)  POCT Blood Glucose.: 140 mg/dL (17 Jan 2024 17:16)  POCT Blood Glucose.: 145 mg/dL (17 Jan 2024 12:42)  POCT Blood Glucose.: 121 mg/dL (17 Jan 2024 09:10)    I&O's Summary    17 Jan 2024 07:01  -  18 Jan 2024 07:00  --------------------------------------------------------  IN: 680 mL / OUT: 1550 mL / NET: -870 mL          Appearance: Normal	  HEENT:   Normal oral mucosa, PERRL, EOMI	  Lymphatic: No lymphadenopathy  Cardiovascular: Normal S1 S2, No JVD  Respiratory: Lungs clear to auscultation	  Gastrointestinal:  Soft, Non-tender, + BS	  Skin: No rash, No ecchymoses	  Extremities:     LABS:                        14.0   8.39  )-----------( 213      ( 18 Jan 2024 07:15 )             43.2     01-18    144  |  106  |  15  ----------------------------<  108<H>  3.8   |  27  |  0.57    Ca    9.9      18 Jan 2024 07:13            Urinalysis Basic - ( 18 Jan 2024 07:13 )    Color: x / Appearance: x / SG: x / pH: x  Gluc: 108 mg/dL / Ketone: x  / Bili: x / Urobili: x   Blood: x / Protein: x / Nitrite: x   Leuk Esterase: x / RBC: x / WBC x   Sq Epi: x / Non Sq Epi: x / Bacteria: x                      Consultant(s) Notes Reviewed:      Care Discussed with Consultants/Other Providers:

## 2024-01-18 NOTE — PROGRESS NOTE ADULT - SUBJECTIVE AND OBJECTIVE BOX
Date of Service: 01-18-24 @ 06:20           CARDIOLOGY     PROGRESS  NOTE   ________________________________________________    CHIEF COMPLAINT:Patient is a 73y old  Male who presents with a chief complaint of Urinary retention (17 Jan 2024 22:43)  no complain, wants to go home  	  REVIEW OF SYSTEMS:  CONSTITUTIONAL: No fever, weight loss, or fatigue  EYES: No eye pain, visual disturbances, or discharge  ENT:  No difficulty hearing, tinnitus, vertigo; No sinus or throat pain  NECK: No pain or stiffness  RESPIRATORY: No cough, wheezing, chills or hemoptysis; No Shortness of Breath  CARDIOVASCULAR: No chest pain, palpitations, passing out, dizziness, or leg swelling  GASTROINTESTINAL: No abdominal or epigastric pain. No nausea, vomiting, or hematemesis; No diarrhea or constipation. No melena or hematochezia.  GENITOURINARY: No dysuria, frequency, hematuria, or incontinence  NEUROLOGICAL: No headaches, memory loss, loss of strength, numbness, or tremors  SKIN: No itching, burning, rashes, or lesions   LYMPH Nodes: No enlarged glands  ENDOCRINE: No heat or cold intolerance; No hair loss  MUSCULOSKELETAL: No joint pain or swelling; No muscle, back, or extremity pain  PSYCHIATRIC: No depression, anxiety, mood swings, or difficulty sleeping  HEME/LYMPH: No easy bruising, or bleeding gums  ALLERGY AND IMMUNOLOGIC: No hives or eczema	    [x ] All others negative	  [ ] Unable to obtain    PHYSICAL EXAM:  T(C): 36.5 (01-18-24 @ 05:22), Max: 36.8 (01-17-24 @ 12:47)  HR: 72 (01-18-24 @ 05:22) (67 - 82)  BP: 145/72 (01-18-24 @ 05:22) (118/64 - 148/68)  RR: 18 (01-18-24 @ 05:22) (18 - 18)  SpO2: 94% (01-18-24 @ 05:22) (94% - 97%)  Wt(kg): --  I&O's Summary    16 Jan 2024 07:01  -  17 Jan 2024 07:00  --------------------------------------------------------  IN: 680 mL / OUT: 1050 mL / NET: -370 mL    17 Jan 2024 07:01  -  18 Jan 2024 06:20  --------------------------------------------------------  IN: 580 mL / OUT: 1550 mL / NET: -970 mL        Appearance: Normal	  HEENT:   Normal oral mucosa, PERRL, EOMI	  Lymphatic: No lymphadenopathy  Cardiovascular: Normal S1 S2, No JVD, + murmurs, No edema  Respiratory: rhonchi  Psychiatry: A & O x 3, Mood & affect appropriate  Gastrointestinal:  Soft, Non-tender, + BS	  Skin: No rashes, No ecchymoses, No cyanosis	  Neurologic: Non-focal  Extremities: Normal range of motion, No clubbing, cyanosis or edema  Vascular: Peripheral pulses palpable 2+ bilaterally    MEDICATIONS  (STANDING):  aspirin enteric coated 81 milliGRAM(s) Oral daily  atorvastatin 20 milliGRAM(s) Oral at bedtime  chlorhexidine 2% Cloths 1 Application(s) Topical daily  dextrose 5%. 1000 milliLiter(s) (50 mL/Hr) IV Continuous <Continuous>  dextrose 5%. 1000 milliLiter(s) (100 mL/Hr) IV Continuous <Continuous>  dextrose 50% Injectable 25 Gram(s) IV Push once  dextrose 50% Injectable 12.5 Gram(s) IV Push once  dextrose 50% Injectable 25 Gram(s) IV Push once  enalapril 10 milliGRAM(s) Oral daily  gabapentin 100 milliGRAM(s) Oral at bedtime  glucagon  Injectable 1 milliGRAM(s) IntraMuscular once  guaiFENesin  milliGRAM(s) Oral every 12 hours  heparin   Injectable 5000 Unit(s) SubCutaneous every 12 hours  insulin aspart (NovoLOG) Pump 1 Each SubCutaneous Continuous Pump  labetalol 200 milliGRAM(s) Oral three times a day  montelukast 10 milliGRAM(s) Oral daily  mycophenolate mofetil 1000 milliGRAM(s) Oral two times a day  NIFEdipine XL 90 milliGRAM(s) Oral daily  piperacillin/tazobactam IVPB.. 3.375 Gram(s) IV Intermittent every 8 hours  tacrolimus 1 milliGRAM(s) Oral daily  tacrolimus 1.5 milliGRAM(s) Oral at bedtime  tamsulosin 0.4 milliGRAM(s) Oral at bedtime  torsemide 10 milliGRAM(s) Oral daily      TELEMETRY: 	    ECG:  	  RADIOLOGY:  OTHER: 	  	  LABS:	 	    CARDIAC MARKERS:                                14.6   9.21  )-----------( 193      ( 16 Jan 2024 07:50 )             42.7     01-17    142  |  105  |  18  ----------------------------<  95  3.8   |  23  |  0.62    Ca    9.7      17 Jan 2024 07:40      proBNP:   Lipid Profile:   HgA1c:   TSH:         Assessment and plan  ---------------------------  73M w/Hx of BPH s/p REZUM, FRANCOIS, COPD, DM2, HTN, CAD, S/p TAVR, renal transplant (2013) presents due to worsening urinary retention and mild hematuria. Patient recently had REZUM procedure for his BPH performed on 12/27/23 by Dr. Mcfarland. Had thurston from procedure removed on 1/2/24 but still had difficulty urinating since. Difficulty urinating acutely worsened the last 3 days. Today noticed some mild bleeding with urination as well. Thurston cathter placed in ED. Pt COVID+ since 12/29/23. Reports his only related symptom is nasal congestion now. ED note reports that he claimed to have a fever but on my exam he denies ever being febrile. Patient denies fever, chills, chest pain, SOB, headache, dizziness, abd pain, nausea, vomiting, constipation.   pt is well known to me with hx of htn, cad s/p renal transplant with urinary retention and sig elevated bp  continue all cardiac / bp meds as before  increase bp ?sec to urinary retention  check urinalysis/ urology eval  abx as per medicine  dvt prophylaxis  continue all bp / cardiac meds  will adjust bp meds  increase ambulation  replete K  increase enalapril as bp elevated  transplant team, Cellcept is on hold  abx as p;er Medicine  will increase Enalapril dose if bp elevated

## 2024-01-18 NOTE — DISCHARGE NOTE NURSING/CASE MANAGEMENT/SOCIAL WORK - NSDCFUADDAPPT_GEN_ALL_CORE_FT
You must follow up with your primary medical doctor within one week of discharge - please call to make an appointment.    You must follow up with your urologist within one week of discharge - as previously discussed prior to admission.    You must follow up with your nephrologist within 1-2 weeks of discharge - please call to make an appointment.          APPTS ARE READY TO BE MADE: [X] YES    Best Family or Patient Contact (if needed):    Additional Information about above appointments (if needed):    1: Primary medical doctor  2: Urologist  3: Nephrologist    Other comments or requests:

## 2024-01-18 NOTE — DISCHARGE NOTE NURSING/CASE MANAGEMENT/SOCIAL WORK - PATIENT PORTAL LINK FT
You can access the FollowMyHealth Patient Portal offered by [facility] by registering at the following website: [email or web address]. By joining [employer]’s FollowMyHealth portal, you will also be able to view your health information using other applications (apps) compatible with our system.

## 2024-01-18 NOTE — PROGRESS NOTE ADULT - ASSESSMENT
Patient is a 73 M with history of FRANCOIS on CPAP, T2D on insulin pump, hLD, HTN, CAD, kidney transplant in 2013 here for urinary retention. endocrinology consulted for insulin pump management.     # T2DM   # Insulin pump in place  - Most recent Hemoglobin A1C 5.4  - Home DM med: medtornic pump with novolog insulin   - Current FS ranges from 100-200  - Current diet: CHO  - Please monitor blood glucose values TID AC & QHS while eating regular meals and Q6H while NPO  - Blood glucose goals pre-meal less than 140 mg/dL and random blood glucose less than 180 mg/dL  - Recommendations:  - - Given patient’s glucose is at target and patient is AOx4 and feels comfortable using the pump, patient can continue with insulin pump while overnight   - RN to document correction insulin bolus in flow sheet   - Hypoglcyemia protocol    - In case of pump malfunction, please administer Lantus 16 units STAT and start Admelog 4 units TID with meals  (HOLD IF NPO)   - Please check FSG before meals and QHS, or q6h while NPO   - Please keep patient on a diabetic, carb controlled diet    - on discharge patient should follow up with their endocrinologist Dr. Byrd     Discharge planning:   - Home DM medications: medtronic pump with novolog insulin   - Discharge DM medications:  medtornic pump with novolog insulin   - Patient will need opthalmology and podiatry follow up as outpatient   - Follow up with outpatient endocrinologist Dr. Jyoti Byrd     # HTN  - BP goal 130/80  - Manage per primary team     # HLD  - Continue with atovastatin 20 mg QHS  - Goal LDL<70  - Manage as outpatient         Thank you for the consult. Will continue to monitor. Please inform the endocrinology team at least 24 hours prior to intended discharge date.     Contact via pager or [employer] Teams during business hours. For follow up questions, discharge recommendations, or new consults please call answering service at [phone] (weekdays), [phone] (nights/weekends). For nonurgent matters, please email  [email or web address].

## 2024-01-18 NOTE — PROGRESS NOTE ADULT - ASSESSMENT
73 year old male PMH BPH s/p REZUM, FRANCOIS, COPD, DM2, HTN, CAD, S/p TAVR, renal transplant (2013) presents due to worsening urinary retention and mild hematuria. Patient recently had REZUM procedure for his BPH performed on 12/27/23 by Dr. Mcfarland. Admission afterwards for prostatitis, treated with Zosyn and then discharged with Levofloxacin. Was also noted to have pulmonary nodules and course c/b by COVID19 s/p 3 days of Remdesivir.     UA (1/14/24) 6 WBC  RVP (1/15) +COVID19      #cystitis vs prostatis  completing five days of IV Zosyn  afebrile ,stable exam  being discharge home with thurston in place with outpt Urology follow up  could follow up in the ID office in 1-2 weeks    #Pulmonary Nodules  Cryptococcal Serum Ag (12/31/23) Negative  Serum Fungitell (12/31/23) Negative  Serum Aspergillus Galactomannan Ag (12/31/23) Negative  Histoplasma Serum Antigen (12/31/23) Negative  Coccidioides Ab (12/31/23) Negative  Schistosoma IgG (12/31/23) Negative   --would follow up in a few weeks for repeat CT of lungs to look for resolution of nodules and if persistent will need Pulmonary evaluation    #COVID19  Doubt active viral illness, likely persistent positive PCR from previous admission  --COVID19 isolation per infection control protocol completed for discharge home    Discussed with NP    Ross Constantino MD  Can be called via Teams  After 5pm/weekends [phone]

## 2024-01-18 NOTE — PROGRESS NOTE ADULT - SUBJECTIVE AND OBJECTIVE BOX
HPI:    Patient is a 73y old  Male who presents with a chief complaint of Urinary retention (18 Jan 2024 13:40).  Endocrinology consulted for diabetes management.   Home diabetes medications: Medtronic insulin pump  Inpatient diabetes medications: insulin pump    Most recent HbA1C: A1C with Estimated Average Glucose Result: 5.3 % (12-30-23 @ 07:17)  INTERVAL HPI/OVERNIGHT EVENTS: he reports that he's doing well.  Pump site changed 1/17/24; due for change1/20/24.      Currently denies polydipsia, polyuria , visual changes, numbness in feet.    atorvastatin   20 milliGRAM(s) Oral (01-17-24 @ 22:19)   20 milliGRAM(s) Oral (01-16-24 @ 22:07)    Review of systems:   CONSTITUTIONAL:  Feels well, good appetite  CARDIOVASCULAR:  Negative for chest pain or palpitations  RESPIRATORY:  Negative for cough, or SOB   GASTROINTESTINAL:  Negative for nausea, vomiting, or abdominal pain  GENITOURINARY:  Negative frequency, urgency or dysuria     CAPILLARY BLOOD GLUCOSE    POCT Blood Glucose.: 119 mg/dL (18 Jan 2024 13:07)  POCT Blood Glucose.: 120 mg/dL (18 Jan 2024 08:30)  POCT Blood Glucose.: 146 mg/dL (17 Jan 2024 22:03)  POCT Blood Glucose.: 140 mg/dL (17 Jan 2024 17:16)    MEDICATIONS  (STANDING):  aspirin enteric coated 81 milliGRAM(s) Oral daily  atorvastatin 20 milliGRAM(s) Oral at bedtime  chlorhexidine 2% Cloths 1 Application(s) Topical daily  dextrose 5%. 1000 milliLiter(s) (100 mL/Hr) IV Continuous <Continuous>  dextrose 5%. 1000 milliLiter(s) (50 mL/Hr) IV Continuous <Continuous>  dextrose 50% Injectable 12.5 Gram(s) IV Push once  dextrose 50% Injectable 25 Gram(s) IV Push once  dextrose 50% Injectable 25 Gram(s) IV Push once  enalapril 10 milliGRAM(s) Oral daily  gabapentin 100 milliGRAM(s) Oral at bedtime  glucagon  Injectable 1 milliGRAM(s) IntraMuscular once  heparin   Injectable 5000 Unit(s) SubCutaneous every 12 hours  insulin aspart (NovoLOG) Pump 1 Each SubCutaneous Continuous Pump  labetalol 200 milliGRAM(s) Oral three times a day  montelukast 10 milliGRAM(s) Oral daily  mycophenolate mofetil 1000 milliGRAM(s) Oral two times a day  NIFEdipine XL 90 milliGRAM(s) Oral daily  tacrolimus 1.5 milliGRAM(s) Oral at bedtime  tacrolimus 1 milliGRAM(s) Oral daily  tamsulosin 0.4 milliGRAM(s) Oral at bedtime  torsemide 10 milliGRAM(s) Oral daily    MEDICATIONS  (PRN):  dextrose Oral Gel 15 Gram(s) Oral once PRN Blood Glucose LESS THAN 70 milliGRAM(s)/deciliter      PHYSICAL EXAM  Vital Signs Last 24 Hrs  T(C): 36.4 (18 Jan 2024 13:26), Max: 36.8 (17 Jan 2024 21:11)  T(F): 97.5 (18 Jan 2024 13:26), Max: 98.3 (17 Jan 2024 21:11)  HR: 67 (18 Jan 2024 13:26) (67 - 72)  BP: 123/62 (18 Jan 2024 13:26) (123/62 - 148/68)  BP(mean): --  RR: 18 (18 Jan 2024 13:26) (18 - 18)  SpO2: 93% (18 Jan 2024 13:26) (93% - 97%)    Parameters below as of 18 Jan 2024 13:26  Patient On (Oxygen Delivery Method): room air  GENERAL: laying in bed in NAD  RESPIRATORY: nonlabored breathing, no accessory muscle use  Extremities: Warm, no edema in all 4 exts   NEURO: A&O X3    LABS:                        14.0   8.39  )-----------( 213      ( 18 Jan 2024 07:15 )             43.2     01-18    144  |  106  |  15  ----------------------------<  108<H>  3.8   |  27  |  0.57    Ca    9.9      18 Jan 2024 07:13

## 2024-01-18 NOTE — PROGRESS NOTE ADULT - SUBJECTIVE AND OBJECTIVE BOX
[facility]   [phone]  RENAL FOLLOW UP NOTE  --------------------------------------------------------------------------------  Chief Complaint:    24 hour events/subjective:    patient seen and examined.   denies sob    PAST HISTORY  --------------------------------------------------------------------------------  No significant changes to PMH, PSH, FHx, SHx, unless otherwise noted    ALLERGIES & MEDICATIONS  --------------------------------------------------------------------------------  Allergies    No Known Allergies    Intolerances      Standing Inpatient Medications  aspirin enteric coated 81 milliGRAM(s) Oral daily  atorvastatin 20 milliGRAM(s) Oral at bedtime  chlorhexidine 2% Cloths 1 Application(s) Topical daily  dextrose 5%. 1000 milliLiter(s) IV Continuous <Continuous>  dextrose 5%. 1000 milliLiter(s) IV Continuous <Continuous>  dextrose 50% Injectable 12.5 Gram(s) IV Push once  dextrose 50% Injectable 25 Gram(s) IV Push once  dextrose 50% Injectable 25 Gram(s) IV Push once  enalapril 10 milliGRAM(s) Oral daily  gabapentin 100 milliGRAM(s) Oral at bedtime  glucagon  Injectable 1 milliGRAM(s) IntraMuscular once  heparin   Injectable 5000 Unit(s) SubCutaneous every 12 hours  insulin aspart (NovoLOG) Pump 1 Each SubCutaneous Continuous Pump  labetalol 200 milliGRAM(s) Oral three times a day  montelukast 10 milliGRAM(s) Oral daily  mycophenolate mofetil 1000 milliGRAM(s) Oral two times a day  NIFEdipine XL 90 milliGRAM(s) Oral daily  piperacillin/tazobactam IVPB.. 3.375 Gram(s) IV Intermittent every 8 hours  tacrolimus 1.5 milliGRAM(s) Oral at bedtime  tacrolimus 1 milliGRAM(s) Oral daily  tamsulosin 0.4 milliGRAM(s) Oral at bedtime  torsemide 10 milliGRAM(s) Oral daily    PRN Inpatient Medications  dextrose Oral Gel 15 Gram(s) Oral once PRN      REVIEW OF SYSTEMS  --------------------------------------------------------------------------------    Gen: denies fevers/chills,  CVS: denies chest pain/palpitations  Resp: denies SOB/Cough  GI: Denies N/V/Abd pain  : Denies dysuria    VITALS/PHYSICAL EXAM  --------------------------------------------------------------------------------  T(C): 36.4 (01-18-24 @ 13:26), Max: 36.8 (01-17-24 @ 21:11)  HR: 67 (01-18-24 @ 13:26) (67 - 72)  BP: 123/62 (01-18-24 @ 13:26) (123/62 - 148/68)  RR: 18 (01-18-24 @ 13:26) (18 - 18)  SpO2: 93% (01-18-24 @ 13:26) (93% - 97%)  Wt(kg): --        01-17-24 @ 07:01  -  01-18-24 @ 07:00  --------------------------------------------------------  IN: 680 mL / OUT: 1550 mL / NET: -870 mL      Physical Exam:  	  	Gen: Non toxic comfortable appearing   	Pulm: decrease bs  no rales or ronchi or wheezing  	CV: RRR, S1S2; no rub  	Abd: +BS, soft, nontender/nondistended + renal allograft site RLQ non tender and no renal artery bruit   	: No suprapubic tenderness + thurston   	UE: Warm, no cyanosis  no clubbing,  no edema  	LE: Warm, no cyanosis  no clubbing, no edema  	Neuro: alert and oriented. speech coherent     LABS/STUDIES  --------------------------------------------------------------------------------              14.0   8.39  >-----------<  213      [01-18-24 @ 07:15]              43.2     144  |  106  |  15  ----------------------------<  108      [01-18-24 @ 07:13]  3.8   |  27  |  0.57        Ca     9.9     [01-18-24 @ 07:13]            Creatinine Trend:  SCr 0.57 [01-18 @ 07:13]  SCr 0.62 [01-17 @ 07:40]  SCr 0.56 [01-16 @ 07:50]  SCr 0.59 [01-15 @ 07:55]  SCr 0.53 [01-14 @ 20:30]    Tacrolimus (), Serum: 6.3 ng/mL (01-17 @ 07:38)  Tacrolimus (), Serum: 3.2 ng/mL (01-15 @ 07:55)      Urine Creatinine 77      [01-15-24 @ 21:42]  Urine Protein 82      [01-15-24 @ 21:42]

## 2024-01-18 NOTE — PROGRESS NOTE ADULT - ASSESSMENT
73M w/Hx of BPH s/p REZUM, FRANCOIS, COPD, DM2, HTN, CAD, S/p TAVR, renal transplant (2013) presents due to worsening urinary retention and mild hematuria. Patient recently had REZUM procedure for his BPH performed on 12/27/23 by Dr. Mcfarland. Had thurston from procedure removed on 1/12/24 but still had difficulty urinating since. Difficulty urinating acutely worsened the last 3 days.  noticed some mild bleeding with urination as well. Thurston cathter placed in ED. Pt COVID+ since 12/29/23. Reports his only related symptom is nasal congestion now. ED note reports that he claimed to have a fever but  pt denies  exam he denies ever being febrile. Patient denies fever, chills, chest pain, SOB, headache, dizziness, abd pain, nausea, vomiting, constipation.       1- renal transplant  2- HTN   3- lung lesion   4- recent covid 19 with still ag +   5- urinary retention   6- ?? fevers   7- proteinuria       creatinine in range  cellcept 1g bid  tacrolimus 1 mg am and 1.5 mg pm   tacro level in range   abx per ID   lung nodules have been concerning. pulm follow up outpt d.w pt and his wife they are aware   enalapril 10 mg daily   labetalol 200 mg tid  nifedipine 90 mg daily  hypokalemia, k in range   chf hx resume torsemide 10 mg daily   urinary retention ---> thurston cath to gravity. to f/u with urology outpatient.   dic planning per primary team.

## 2024-01-18 NOTE — DISCHARGE NOTE NURSING/CASE MANAGEMENT/SOCIAL WORK - NSDCVIVACCINE_GEN_ALL_CORE_FT
pneumococcal polysaccharide PPV23; 31-Aug-2022 16:38; Angela Silver (BELINDA); [employer] [id number] (Exp. Date: 07-Aug-2023); IntraMuscular; Dorsogluteal Right.; 0.5 milliLiter(s); VIS (VIS Published: 06-Oct-2009, VIS Presented: 31-Aug-2022);

## 2024-01-18 NOTE — PROGRESS NOTE ADULT - ASSESSMENT
72M        h/o  FRANCOIS on CPAP, COPD w/ 50-pack-year smoking history, DM2 on insulin pump,         HTN/  HLD,  CAD/ stents,        status post stent, TAVR/  colonoscopy 2022, polyp       h/o kidney transplant in 2013 on immunosuppresion/   covid  12/2023            admitted  with urinary  retention,  and  thurston  was placed  in  er/  s/p  prostate surg  ,s/p   Rezum    surg  on  12/27, dr cobian         uro  eval/.  called.    crt  0/5              Flomax   started      DM,  has  insulin pump,             house   endo  . has   Medtronic  insulin pump at home and Ozempic     c/c  diastolic   chf.             torsemide    HTN /HLD           on  procardia,  labetolol.  enalapril/  meds pe r card     CAD, stents             on asa/ plavix/ ,lipitor/ known to  card         s/p  TAVR, 12/2021       CKD,    s/p   R kidney transplant  2013,   Dr Hart[facility] /  on cellcept/  tacro       h/o   ILD,  restrictive/obstructive lung disease,  and   FRANCOIS / cpap                  ILD/  known to  house pulm       pt has  been covid  +. since 12/ 29,   and  +  status  now  on arrival, is reflective of  his prior  covid      per  ID., on iv  zosyn      CT  a/p ,  no prostatitis    start   d/c  , when  cleraed  by ID   d/c with  thurston            pt  no longer  f/p  with transplant team in  [facility]/ dr edward olmstead  following /   defer transplant  meds to  renal       on dvt ppx                 < from: CT Chest No Cont (10.10.23 @ 18:49) >  IMPRESSION:                      1. Newly developing nodular areas of consolidation bilaterally as well as   reticular opacities and abbreviated differential includes metastatic   disease with possible lymphangitic component. Atypical infectious and/or   inflammatory etiologies cannot be excluded. Bronchoscopic correlation and   histological sampling may be in order.  2. Mild increase in now moderate medi

## 2024-01-18 NOTE — PROGRESS NOTE ADULT - PROVIDER SPECIALTY LIST ADULT
Cardiology
Cardiology
Internal Medicine
Internal Medicine
Nephrology
Transplant ID
Endocrinology
Internal Medicine
Nephrology
Cardiology
Endocrinology
Transplant ID
Internal Medicine
Nephrology

## 2024-01-21 LAB
CULTURE RESULTS: SIGNIFICANT CHANGE UP
CULTURE RESULTS: SIGNIFICANT CHANGE UP
SPECIMEN SOURCE: SIGNIFICANT CHANGE UP
SPECIMEN SOURCE: SIGNIFICANT CHANGE UP

## 2024-01-22 NOTE — CHART NOTE - NSCHARTNOTEFT_GEN_A_CORE
As per discussion with Infectious Disease Dr. Constantino, patient does not need anymore antibiotics, and is cleared for discharge from an ID perspective.  Requested by Dr. Esteves to facilitate patient discharge.  Medication reconciliation reviewed, revised, and resolved with Dr. Esteves, who has medically cleared patient for discharge with follow up as advised.  Please refer to Hospital Course in discharge paperwork, completed by Dr. Esteves, for details of patient's hospital stay.
Pt who developed urinary retention s/p REZUM procedure 12/27/23 for BPH.  He had a thurston catheter after the procedure that was removed 1/2/24.  He came into the ER for progressive difficulty voiding and had a 20f thurston catheter placed by us for urinary retention .  D/w Dr Mcnair - Patient should be discharged with the thurston catheter to leg bag and to follow up with his urologist Dr Mcfarland for trial of void.
Left (1) message(s) for patient in regards to follow up care with callback information.

## 2024-01-25 ENCOUNTER — APPOINTMENT (OUTPATIENT)
Dept: PULMONOLOGY | Facility: CLINIC | Age: 74
End: 2024-01-25
Payer: MEDICARE

## 2024-01-25 VITALS
TEMPERATURE: 97.2 F | BODY MASS INDEX: 30.54 KG/M2 | OXYGEN SATURATION: 99 % | DIASTOLIC BLOOD PRESSURE: 64 MMHG | WEIGHT: 195 LBS | SYSTOLIC BLOOD PRESSURE: 134 MMHG | HEART RATE: 75 BPM

## 2024-01-25 PROCEDURE — 99214 OFFICE O/P EST MOD 30 MIN: CPT

## 2024-01-31 ENCOUNTER — APPOINTMENT (OUTPATIENT)
Dept: INFECTIOUS DISEASE | Facility: CLINIC | Age: 74
End: 2024-01-31

## 2024-02-04 NOTE — DISCUSSION/SUMMARY
[FreeTextEntry1] : 73-year-old male with history of FRANCOIS, OAD and abnormal chest CT at baseline.  Continue compliance with CPAP use was stressed.  He is to continue use of his inhaled meds for OAD.  Nebulized albuterol will be ordered.  Follow-up chest CT as planned in the near future.  He is to follow-up with his PMD as before.

## 2024-02-04 NOTE — PHYSICAL EXAM
[No Acute Distress] : no acute distress [Normal Oropharynx] : normal oropharynx [Normal Appearance] : normal appearance [No Neck Mass] : no neck mass [Normal Rate/Rhythm] : normal rate/rhythm [Murmur ___ / 6] : murmur [unfilled] / 6 [Normal S1, S2] : normal s1, s2 [No Resp Distress] : no resp distress [Rales] : rales [No Abnormalities] : no abnormalities [Benign] : benign [Normal Gait] : normal gait [No Clubbing] : no clubbing [No Cyanosis] : no cyanosis [No Edema] : no edema [FROM] : FROM [Normal Color/ Pigmentation] : normal color/ pigmentation [No Focal Deficits] : no focal deficits [Oriented x3] : oriented x3 [Normal Affect] : normal affect [TextBox_105] : Left upper extremity HD site

## 2024-02-04 NOTE — HISTORY OF PRESENT ILLNESS
[TextBox_4] : 73-year-old male with history of FRANCOIS ,OAD and abnormal chest CT, presents for follow-up.  The patient is compliant with CPAP use via nasal mask.  He denies sleep-related complaints.  He complains of sputum production without shortness of breath, chest pain, fever, night sweats or weight loss.  He continues to use Breo and montelukast daily with occasional albuterol MDI.  He states that he has better response to nebulized bronchodilators. [YearQuit] : 2023 [TextBox_29] : Denies snoring, daytime somnolence, apneic episodes, AM headaches

## 2024-02-04 NOTE — REVIEW OF SYSTEMS
[Fatigue] : no fatigue [Cough] : cough [Chest Tightness] : no chest tightness [Sputum] : sputum [Dyspnea] : no dyspnea [SOB on Exertion] : no sob on exertion [Diabetes] : diabetes [Negative] : Psychiatric

## 2024-02-16 NOTE — ED PROVIDER NOTE - EYES NEGATIVE STATEMENT, MLM
Message was handled by another coworkers and referral was faxed for patient.    no discharge, no irritation, no pain, no redness, and no visual changes.

## 2024-02-28 ENCOUNTER — APPOINTMENT (OUTPATIENT)
Dept: INFECTIOUS DISEASE | Facility: CLINIC | Age: 74
End: 2024-02-28
Payer: MEDICARE

## 2024-02-28 VITALS
OXYGEN SATURATION: 95 % | BODY MASS INDEX: 30.92 KG/M2 | HEART RATE: 76 BPM | SYSTOLIC BLOOD PRESSURE: 132 MMHG | RESPIRATION RATE: 14 BRPM | HEIGHT: 67 IN | WEIGHT: 197 LBS | DIASTOLIC BLOOD PRESSURE: 70 MMHG | TEMPERATURE: 98.3 F

## 2024-02-28 DIAGNOSIS — Z94.0 KIDNEY TRANSPLANT STATUS: ICD-10-CM

## 2024-02-28 PROCEDURE — 99214 OFFICE O/P EST MOD 30 MIN: CPT

## 2024-02-29 LAB
ALBUMIN SERPL ELPH-MCNC: 4.5 G/DL
ALP BLD-CCNC: 91 U/L
ALT SERPL-CCNC: 8 U/L
ANION GAP SERPL CALC-SCNC: 11 MMOL/L
APPEARANCE: ABNORMAL
AST SERPL-CCNC: 16 U/L
BACTERIA: NEGATIVE /HPF
BILIRUB SERPL-MCNC: 0.8 MG/DL
BILIRUBIN URINE: NEGATIVE
BLOOD URINE: NEGATIVE
BUN SERPL-MCNC: 10 MG/DL
CALCIUM OXALATE CRYSTALS: PRESENT
CALCIUM SERPL-MCNC: 9.8 MG/DL
CAST: 2 /LPF
CHLORIDE SERPL-SCNC: 109 MMOL/L
CMV DNA SPEC QL NAA+PROBE: NOT DETECTED IU/ML
CMVPCR LOG: NOT DETECTED LOG10IU/ML
CO2 SERPL-SCNC: 25 MMOL/L
COLOR: NORMAL
CREAT SERPL-MCNC: 0.61 MG/DL
EGFR: 101 ML/MIN/1.73M2
EPITHELIAL CELLS: 0 /HPF
GLUCOSE QUALITATIVE U: NEGATIVE MG/DL
GLUCOSE SERPL-MCNC: 115 MG/DL
HCT VFR BLD CALC: 44 %
HGB BLD-MCNC: 14.3 G/DL
KETONES URINE: NEGATIVE MG/DL
LEUKOCYTE ESTERASE URINE: ABNORMAL
MCHC RBC-ENTMCNC: 30.4 PG
MCHC RBC-ENTMCNC: 32.5 GM/DL
MCV RBC AUTO: 93.6 FL
MICROSCOPIC-UA: NORMAL
NITRITE URINE: NEGATIVE
PH URINE: 5.5
PLATELET # BLD AUTO: 211 K/UL
POTASSIUM SERPL-SCNC: 4.8 MMOL/L
PROT SERPL-MCNC: 7.6 G/DL
PROTEIN URINE: 100 MG/DL
RBC # BLD: 4.7 M/UL
RBC # FLD: 14 %
RED BLOOD CELLS URINE: 5 /HPF
REVIEW: NORMAL
SODIUM SERPL-SCNC: 145 MMOL/L
SPECIFIC GRAVITY URINE: 1.02
UROBILINOGEN URINE: 1 MG/DL
WBC # FLD AUTO: 9.02 K/UL
WHITE BLOOD CELLS URINE: 136 /HPF

## 2024-02-29 NOTE — ASSESSMENT
[FreeTextEntry1] : 73 year old male PMH BPH s/p REZUM, FRANCOIS, COPD, DM2, HTN, CAD, S/p TAVR, renal transplant (2013) who presents to ID office for follow up.   s/p REZUM procedure for his BPH on 12/27/23 by Dr. Mcfarland.  Admitted 12/29/23 - 12/31/23 for possible prostatitis  Treated with Zosyn while admitted and discharged on Levofloxacin to complete total 4 week course.  Pruitt was removed on 1/2/24.  Admitted 1/14/24 - 1/18/24 for fever and urinary retention.  Urine culture was negative on presentation.  Completed a 5 day course of Zosyn while admitted and discharged off of antibiotics.  At this point patient notes continued symptoms of urinary hesitancy I have a lower suspicion for persistence/recurrence of infection (more likely persistent symptoms due to underlying BPH) but will check UA and Urine Culture today Will check CBC, CMP, CMV PCR

## 2024-02-29 NOTE — HISTORY OF PRESENT ILLNESS
[FreeTextEntry1] : 73 year old male PMH BPH s/p REZUM, FRANCOIS, COPD, DM2, HTN, CAD, S/p TAVR, renal transplant (2013) who presents to ID office for follow up.  Patient underwent REZUM procedure for his BPH performed on 12/27/23 by Dr. Mcfarland. Patient wad admitted 12/29/23 - 12/31/23 for possible prostatitis and thus patient was treated with Zosyn while admitted and discharged on Levofloxacin to complete total 4 week course. Pruitt was removed on 1/2/24. Patient was admitted 1/14/24 - 1/18/24 for fever and urinary retention. Urine culture was negative on presentation. Patient completed a 5 day course of Zosyn while admitted and discharged off of antibiotics.  Patient denies any dysuria or bladder pain. He does note urinary hesitancy and nocturia. Denies chills or fevers.

## 2024-02-29 NOTE — PHYSICAL EXAM
[General Appearance - Alert] : alert [General Appearance - In No Acute Distress] : in no acute distress [Sclera] : the sclera and conjunctiva were normal [Outer Ear] : the ears and nose were normal in appearance [Neck Appearance] : the appearance of the neck was normal [Exaggerated Use Of Accessory Muscles For Inspiration] : no accessory muscle use [Auscultation Breath Sounds / Voice Sounds] : lungs were clear to auscultation bilaterally [Heart Rate And Rhythm] : heart rate was normal and rhythm regular [Heart Sounds] : normal S1 and S2 [Heart Sounds Gallop] : no gallops [Murmurs] : no murmurs [Heart Sounds Pericardial Friction Rub] : no pericardial rub [Edema] : there was no peripheral edema [Bowel Sounds] : normal bowel sounds [Abdomen Tenderness] : non-tender [Abdomen Soft] : soft [Abdomen Mass (___ Cm)] : no abdominal mass palpated [No Palpable Adenopathy] : no palpable adenopathy [Musculoskeletal - Swelling] : no joint swelling [Nail Clubbing] : no clubbing  or cyanosis of the fingernails [Motor Tone] : muscle strength and tone were normal [Skin Color & Pigmentation] : normal skin color and pigmentation [] : no rash [Skin Lesions] : no skin lesions [Affect] : the affect was normal

## 2024-02-29 NOTE — REVIEW OF SYSTEMS
[Penile Discharge] : no penile discharge [Dysuria] : no dysuria [Nocturia] : nocturia [Hesitancy] : urinary hesitancy [Genital Lesion] : no genital lesions [Testicular Pain] : no testicular pain [Negative] : Heme/Lymph

## 2024-03-07 LAB — BACTERIA UR CULT: NORMAL

## 2024-03-07 NOTE — ASU DISCHARGE PLAN (ADULT/PEDIATRIC) - NSDISCH_COLONOSCOPY_ENDO_ALL_CORE_FT
Called patient to push back medical oncology appointment with Dr. Garcia as she is getting Elan Bronch on 3/12/24. PET CT scheduled for 3/18/24. Verbalized understanding. Expressed appreciation of phone call.   
If a colonoscopy was performed you might notice a few drops of blood on your underwear or you might see blood on the toilet paper after you use the bathroom. This is caused by irritation to the bowel during the procedure and is not a problem. However if you have any more heavy bleeding (more than 2 tablespoons of bright red blood) contact your doctor right away.

## 2024-04-11 ENCOUNTER — APPOINTMENT (OUTPATIENT)
Dept: PULMONOLOGY | Facility: CLINIC | Age: 74
End: 2024-04-11
Payer: MEDICARE

## 2024-04-11 VITALS
SYSTOLIC BLOOD PRESSURE: 140 MMHG | DIASTOLIC BLOOD PRESSURE: 78 MMHG | TEMPERATURE: 96.9 F | BODY MASS INDEX: 29.6 KG/M2 | WEIGHT: 189 LBS | OXYGEN SATURATION: 97 % | HEART RATE: 70 BPM

## 2024-04-11 PROCEDURE — 99214 OFFICE O/P EST MOD 30 MIN: CPT

## 2024-04-12 NOTE — DISCUSSION/SUMMARY
[FreeTextEntry1] : 73-year-old male with history of FRANCOIS and OAD and chronic lung changes on CT, at baseline.  Continue compliance with CPAP use was stressed.  He is to use albuterol on as-needed basis alone for now.  Treatment adjustment will depend on symptomatic needs.  Follow-up chest CT will be performed later this month.  He is to follow-up with his PMD as before.

## 2024-04-12 NOTE — HISTORY OF PRESENT ILLNESS
[Former] : former [>= 20 pack years] : >= 20 pack years [TextBox_4] : 73-year-old male with history of FRANCOIS and OAD presents for follow-up.  Patient is compliant with CPAP use via nasal mask.  He denies sleep-related complaints.  He complains of occasional productive cough without chest pain, shortness of breath, night sweats or weight loss.  He has not used any inhaled meds since last visit. [YearQuit] : 2023 [TextBox_29] : Denies snoring, daytime somnolence, apneic episodes, AM headaches

## 2024-04-29 ENCOUNTER — OUTPATIENT (OUTPATIENT)
Dept: OUTPATIENT SERVICES | Facility: HOSPITAL | Age: 74
LOS: 1 days | End: 2024-04-29
Payer: MEDICARE

## 2024-04-29 ENCOUNTER — APPOINTMENT (OUTPATIENT)
Dept: CT IMAGING | Facility: CLINIC | Age: 74
End: 2024-04-29
Payer: MEDICARE

## 2024-04-29 DIAGNOSIS — Z96.41 PRESENCE OF INSULIN PUMP (EXTERNAL) (INTERNAL): Chronic | ICD-10-CM

## 2024-04-29 DIAGNOSIS — Z95.2 PRESENCE OF PROSTHETIC HEART VALVE: Chronic | ICD-10-CM

## 2024-04-29 DIAGNOSIS — R93.89 ABNORMAL FINDINGS ON DIAGNOSTIC IMAGING OF OTHER SPECIFIED BODY STRUCTURES: ICD-10-CM

## 2024-04-29 DIAGNOSIS — Z98.890 OTHER SPECIFIED POSTPROCEDURAL STATES: Chronic | ICD-10-CM

## 2024-04-29 DIAGNOSIS — Z94.0 KIDNEY TRANSPLANT STATUS: Chronic | ICD-10-CM

## 2024-04-29 DIAGNOSIS — I77.0 ARTERIOVENOUS FISTULA, ACQUIRED: Chronic | ICD-10-CM

## 2024-04-29 PROCEDURE — 71250 CT THORAX DX C-: CPT | Mod: 26

## 2024-04-29 PROCEDURE — 71250 CT THORAX DX C-: CPT

## 2024-05-23 ENCOUNTER — APPOINTMENT (OUTPATIENT)
Dept: PULMONOLOGY | Facility: CLINIC | Age: 74
End: 2024-05-23
Payer: MEDICARE

## 2024-05-23 VITALS
TEMPERATURE: 96.2 F | OXYGEN SATURATION: 97 % | BODY MASS INDEX: 29.76 KG/M2 | HEART RATE: 80 BPM | DIASTOLIC BLOOD PRESSURE: 80 MMHG | WEIGHT: 190 LBS | SYSTOLIC BLOOD PRESSURE: 150 MMHG

## 2024-05-23 PROCEDURE — 99214 OFFICE O/P EST MOD 30 MIN: CPT | Mod: 25

## 2024-05-23 PROCEDURE — 94060 EVALUATION OF WHEEZING: CPT

## 2024-05-23 PROCEDURE — 94727 GAS DIL/WSHOT DETER LNG VOL: CPT

## 2024-05-23 PROCEDURE — 94729 DIFFUSING CAPACITY: CPT

## 2024-05-25 NOTE — HISTORY OF PRESENT ILLNESS
[Former] : former [>= 20 pack years] : >= 20 pack years [TextBox_4] : 73-year-old male with history of FRANCOIS and OAD presents for follow-up.  Patient is "okay" ,compliant with nocturnal PAP via nasal mask.  He has not used albuterol since last year.  Presently he denies fever, chills, chest pain, hemoptysis, night sweats or weight loss.  Follow-up chest CT was recently performed. [YearQuit] : 2023 [TextBox_29] : Denies snoring, daytime somnolence, apneic episodes, AM headaches

## 2024-05-25 NOTE — DISCUSSION/SUMMARY
[FreeTextEntry1] : 73-year-old male with history of OAD at baseline and FRANCOIS comfortable with PAP use.  I reviewed the PFT results with the patient.  Albuterol MDI use as needed alone was recommended.  I also reviewed the latest chest CT images online and discussed the findings with the patient.  Given the lack of change in the bilateral nodules, which appear to be inflammatory, a repeat study will be performed in 6 months.  Continued use of CPAP as before was recommended.  He is to follow-up with his PMD as before.
2 = A lot of assistance

## 2024-05-25 NOTE — REVIEW OF SYSTEMS
[Diabetes] : diabetes [Negative] : Psychiatric [Fatigue] : no fatigue [Cough] : no cough [Chest Tightness] : no chest tightness [Sputum] : no sputum [Dyspnea] : no dyspnea [SOB on Exertion] : no sob on exertion

## 2024-10-10 ENCOUNTER — NON-APPOINTMENT (OUTPATIENT)
Age: 74
End: 2024-10-10

## 2024-10-10 ENCOUNTER — APPOINTMENT (OUTPATIENT)
Dept: PULMONOLOGY | Facility: CLINIC | Age: 74
End: 2024-10-10
Payer: MEDICARE

## 2024-10-10 VITALS
SYSTOLIC BLOOD PRESSURE: 124 MMHG | DIASTOLIC BLOOD PRESSURE: 66 MMHG | OXYGEN SATURATION: 97 % | BODY MASS INDEX: 30.07 KG/M2 | HEART RATE: 70 BPM | WEIGHT: 192 LBS | TEMPERATURE: 97.9 F

## 2024-10-10 PROCEDURE — 99214 OFFICE O/P EST MOD 30 MIN: CPT

## 2024-10-10 RX ORDER — FLUTICASONE FUROATE AND VILANTEROL TRIFENATATE 100; 25 UG/1; UG/1
100-25 POWDER RESPIRATORY (INHALATION)
Qty: 3 | Refills: 3 | Status: ACTIVE | COMMUNITY
Start: 2024-10-10 | End: 1900-01-01

## 2024-10-10 RX ORDER — ALBUTEROL SULFATE 90 UG/1
108 (90 BASE) INHALANT RESPIRATORY (INHALATION)
Qty: 3 | Refills: 3 | Status: ACTIVE | COMMUNITY
Start: 2024-10-10 | End: 1900-01-01

## 2024-11-11 ENCOUNTER — EMERGENCY (EMERGENCY)
Facility: HOSPITAL | Age: 74
LOS: 1 days | Discharge: HOME | End: 2024-11-11
Attending: STUDENT IN AN ORGANIZED HEALTH CARE EDUCATION/TRAINING PROGRAM
Payer: MEDICARE

## 2024-11-11 VITALS
SYSTOLIC BLOOD PRESSURE: 160 MMHG | RESPIRATION RATE: 16 BRPM | DIASTOLIC BLOOD PRESSURE: 78 MMHG | OXYGEN SATURATION: 99 % | HEART RATE: 72 BPM | TEMPERATURE: 98 F

## 2024-11-11 VITALS
DIASTOLIC BLOOD PRESSURE: 72 MMHG | HEART RATE: 71 BPM | RESPIRATION RATE: 20 BRPM | TEMPERATURE: 98 F | OXYGEN SATURATION: 96 % | WEIGHT: 171.96 LBS | SYSTOLIC BLOOD PRESSURE: 164 MMHG

## 2024-11-11 DIAGNOSIS — Z98.890 OTHER SPECIFIED POSTPROCEDURAL STATES: Chronic | ICD-10-CM

## 2024-11-11 DIAGNOSIS — Z94.0 KIDNEY TRANSPLANT STATUS: Chronic | ICD-10-CM

## 2024-11-11 DIAGNOSIS — I77.0 ARTERIOVENOUS FISTULA, ACQUIRED: Chronic | ICD-10-CM

## 2024-11-11 DIAGNOSIS — Z96.41 PRESENCE OF INSULIN PUMP (EXTERNAL) (INTERNAL): Chronic | ICD-10-CM

## 2024-11-11 DIAGNOSIS — Z95.2 PRESENCE OF PROSTHETIC HEART VALVE: Chronic | ICD-10-CM

## 2024-11-11 LAB
ALBUMIN SERPL ELPH-MCNC: 4 G/DL — SIGNIFICANT CHANGE UP (ref 3.3–5)
ALP SERPL-CCNC: 98 U/L — SIGNIFICANT CHANGE UP (ref 40–120)
ALT FLD-CCNC: 8 U/L — LOW (ref 10–45)
ANION GAP SERPL CALC-SCNC: 13 MMOL/L — SIGNIFICANT CHANGE UP (ref 5–17)
AST SERPL-CCNC: 12 U/L — SIGNIFICANT CHANGE UP (ref 10–40)
BASOPHILS # BLD AUTO: 0.04 K/UL — SIGNIFICANT CHANGE UP (ref 0–0.2)
BASOPHILS NFR BLD AUTO: 0.4 % — SIGNIFICANT CHANGE UP (ref 0–2)
BILIRUB SERPL-MCNC: 0.8 MG/DL — SIGNIFICANT CHANGE UP (ref 0.2–1.2)
BUN SERPL-MCNC: 11 MG/DL — SIGNIFICANT CHANGE UP (ref 7–23)
CALCIUM SERPL-MCNC: 9.8 MG/DL — SIGNIFICANT CHANGE UP (ref 8.4–10.5)
CHLORIDE SERPL-SCNC: 104 MMOL/L — SIGNIFICANT CHANGE UP (ref 96–108)
CO2 SERPL-SCNC: 22 MMOL/L — SIGNIFICANT CHANGE UP (ref 22–31)
CREAT SERPL-MCNC: 0.49 MG/DL — LOW (ref 0.5–1.3)
CRP SERPL-MCNC: 23 MG/L — HIGH (ref 0–4)
EGFR: 108 ML/MIN/1.73M2 — SIGNIFICANT CHANGE UP
EOSINOPHIL # BLD AUTO: 0.11 K/UL — SIGNIFICANT CHANGE UP (ref 0–0.5)
EOSINOPHIL NFR BLD AUTO: 1.2 % — SIGNIFICANT CHANGE UP (ref 0–6)
GLUCOSE SERPL-MCNC: 119 MG/DL — HIGH (ref 70–99)
HCT VFR BLD CALC: 43.3 % — SIGNIFICANT CHANGE UP (ref 39–50)
HGB BLD-MCNC: 14.1 G/DL — SIGNIFICANT CHANGE UP (ref 13–17)
IMM GRANULOCYTES NFR BLD AUTO: 0.5 % — SIGNIFICANT CHANGE UP (ref 0–0.9)
LYMPHOCYTES # BLD AUTO: 1.42 K/UL — SIGNIFICANT CHANGE UP (ref 1–3.3)
LYMPHOCYTES # BLD AUTO: 14.9 % — SIGNIFICANT CHANGE UP (ref 13–44)
MCHC RBC-ENTMCNC: 30.8 PG — SIGNIFICANT CHANGE UP (ref 27–34)
MCHC RBC-ENTMCNC: 32.6 G/DL — SIGNIFICANT CHANGE UP (ref 32–36)
MCV RBC AUTO: 94.5 FL — SIGNIFICANT CHANGE UP (ref 80–100)
MONOCYTES # BLD AUTO: 1.2 K/UL — HIGH (ref 0–0.9)
MONOCYTES NFR BLD AUTO: 12.6 % — SIGNIFICANT CHANGE UP (ref 2–14)
NEUTROPHILS # BLD AUTO: 6.71 K/UL — SIGNIFICANT CHANGE UP (ref 1.8–7.4)
NEUTROPHILS NFR BLD AUTO: 70.4 % — SIGNIFICANT CHANGE UP (ref 43–77)
NRBC # BLD: 0 /100 WBCS — SIGNIFICANT CHANGE UP (ref 0–0)
PLATELET # BLD AUTO: 199 K/UL — SIGNIFICANT CHANGE UP (ref 150–400)
POTASSIUM SERPL-MCNC: 4.3 MMOL/L — SIGNIFICANT CHANGE UP (ref 3.5–5.3)
POTASSIUM SERPL-SCNC: 4.3 MMOL/L — SIGNIFICANT CHANGE UP (ref 3.5–5.3)
PROT SERPL-MCNC: 7.6 G/DL — SIGNIFICANT CHANGE UP (ref 6–8.3)
RBC # BLD: 4.58 M/UL — SIGNIFICANT CHANGE UP (ref 4.2–5.8)
RBC # FLD: 13.9 % — SIGNIFICANT CHANGE UP (ref 10.3–14.5)
SODIUM SERPL-SCNC: 139 MMOL/L — SIGNIFICANT CHANGE UP (ref 135–145)
WBC # BLD: 9.53 K/UL — SIGNIFICANT CHANGE UP (ref 3.8–10.5)
WBC # FLD AUTO: 9.53 K/UL — SIGNIFICANT CHANGE UP (ref 3.8–10.5)

## 2024-11-11 PROCEDURE — 73610 X-RAY EXAM OF ANKLE: CPT

## 2024-11-11 PROCEDURE — 85652 RBC SED RATE AUTOMATED: CPT

## 2024-11-11 PROCEDURE — 73610 X-RAY EXAM OF ANKLE: CPT | Mod: 26,LT

## 2024-11-11 PROCEDURE — 99284 EMERGENCY DEPT VISIT MOD MDM: CPT | Mod: 25

## 2024-11-11 PROCEDURE — 80053 COMPREHEN METABOLIC PANEL: CPT

## 2024-11-11 PROCEDURE — 96374 THER/PROPH/DIAG INJ IV PUSH: CPT

## 2024-11-11 PROCEDURE — 85025 COMPLETE CBC W/AUTO DIFF WBC: CPT

## 2024-11-11 PROCEDURE — 86140 C-REACTIVE PROTEIN: CPT

## 2024-11-11 PROCEDURE — 99284 EMERGENCY DEPT VISIT MOD MDM: CPT

## 2024-11-11 RX ORDER — ACETAMINOPHEN 500 MG
1000 TABLET ORAL ONCE
Refills: 0 | Status: COMPLETED | OUTPATIENT
Start: 2024-11-11 | End: 2024-11-11

## 2024-11-11 RX ADMIN — Medication 400 MILLIGRAM(S): at 13:07

## 2024-11-11 RX ADMIN — Medication 50 MILLIGRAM(S): at 14:54

## 2024-11-11 NOTE — ED PROVIDER NOTE - NSICDXPASTMEDICALHX_GEN_ALL_CORE_FT
PAST MEDICAL HISTORY:  CAD (coronary artery disease) 1 stent    DM (diabetes mellitus), type 2     Gout     H/O kidney transplant 2013    HTN (Hypertension), Benign     Hyperlipidemia     FRANCOIS on CPAP     Smoking

## 2024-11-11 NOTE — ED ADULT NURSE NOTE - OBJECTIVE STATEMENT
72 y/o male with PMH of  ESRD s/p renal transplant 2013, COPD, diabetes, CAD s/p stents, TAVR, gout on allopurinol arrives to the ER by ambulance from home complaining of ankle pain. Pt reports  developing ankle pain, swelling, sensation of warm on the ankle for the last two days, worsening today to the point that he can not ambulate, usually ambulatory with a cane. Pt denies any trauma numbness, tingling on the extremities.  Pt denies SOB, chest pain, dizziness, N/V/D, urinary symptoms, fevers, chills.  On assessment pt is well appearing, A&Ox4, speaking coherently, airway is patent, breathing spontaneously and unlabored. Skin is dry, warm.  Full ROM in all extremities. edema of left ankle, tender to touch. Safety and comfort measures provided to keep patient safe. Bed placed in lowest position and side rails raised.

## 2024-11-11 NOTE — ED PROVIDER NOTE - PATIENT PORTAL LINK FT
You can access the FollowMyHealth Patient Portal offered by [facility] by registering at the following website: [email or web address]. By joining [facility]’s FollowMyHealth portal, you will also be able to view your health information using other applications (apps) compatible with our system.

## 2024-11-11 NOTE — ED PROVIDER NOTE - NSFOLLOWUPCLINICS_GEN_ALL_ED_FT
[facility] Rheumatology  Rheumatology  [address] [address]  Phone: [phone]  Fax:

## 2024-11-11 NOTE — ED PROVIDER NOTE - NSFOLLOWUPINSTRUCTIONS_ED_ALL_ED_FT
Gout  A foot with gout, showing uric acid crystals in a joint, along with an image that shows swelling on the outside of the foot.  Gout is a condition that causes painful swelling of the joints. Gout is a type of inflammation of the joints (arthritis). This condition is caused by having too much uric acid in the body. Uric acid is a chemical that forms when the body breaks down substances called purines. Purines are important for building body proteins.    When the body has too much uric acid, sharp crystals can form and build up inside the joints. This causes pain and swelling. Gout attacks can happen quickly and may be very painful (acute gout). Over time, the attacks can affect more joints and become more frequent (chronic gout). Gout can also cause uric acid to build up under the skin and inside the kidneys.    What are the causes?  This condition is caused by too much uric acid in your blood. This can happen because:  Your kidneys do not remove enough uric acid from your blood. This is the most common cause.  Your body makes too much uric acid. This can happen with some cancers and cancer treatments. It can also occur if your body is breaking down too many red blood cells (hemolytic anemia).  You eat too many foods that are high in purines. These foods include organ meats and some seafood. Alcohol, especially beer, is also high in purines.  A gout attack may be triggered by trauma or stress.    What increases the risk?  The following factors may make you more likely to develop this condition:  Having a family history of gout.  Being male and middle-aged.  Being female and having gone through menopause.  Taking certain medicines, including aspirin, cyclosporine, diuretics, levodopa, and niacin.  Having an organ transplant.  Having certain conditions, such as:  Being obese.  Lead poisoning.  Kidney disease.  A skin condition called psoriasis.  Other factors include:  Losing weight too quickly.  Being dehydrated.  Frequently drinking alcohol, especially beer.  Frequently drinking beverages that are sweetened with a type of sugar called fructose.  What are the signs or symptoms?  A foot and ankle with swelling and shiny, purple skin caused by gout.   An attack of acute gout happens quickly. It usually occurs in just one joint. The most common place is the big toe. Attacks often start at night. Other joints that may be affected include joints of the feet, ankle, knee, fingers, wrist, or elbow. Symptoms of this condition may include:  Severe pain.  Warmth.  Swelling.  Stiffness.  Tenderness. The affected joint may be very painful to touch.  Shiny, red, or purple skin.  Chills and fever.  Chronic gout may cause symptoms more frequently. More joints may be involved. You may also have white or yellow lumps (tophi) on your hands or feet or in other areas near your joints.    How is this diagnosed?  This condition is diagnosed based on your symptoms, your medical history, and a physical exam. You may have tests, such as:  Blood tests to measure uric acid levels.  Removal of joint fluid with a thin needle (aspiration) to look for uric acid crystals.  X-rays to look for joint damage.  How is this treated?  Treatment for this condition has two phases: treating an acute attack and preventing future attacks. Acute gout treatment may include medicines to reduce pain and swelling, including:  NSAIDs, such as ibuprofen.  Steroids. These are strong anti-inflammatory medicines that can be taken by mouth (orally) or injected into a joint.  Colchicine. This medicine relieves pain and swelling when it is taken soon after an attack. It can be given by mouth or through an IV.  Preventive treatment may include:  Daily use of smaller doses of NSAIDs or colchicine.  Use of a medicine that reduces uric acid levels in your blood, such as allopurinol.  Changes to your diet. You may need to see a dietitian about what to eat and drink to prevent gout.  Follow these instructions at home:  During a gout attack    Bag of ice on a towel on the skin.   If directed, put ice on the affected area. To do this:  Put ice in a plastic bag.  Place a towel between your skin and the bag.  Leave the ice on for 20 minutes, 2–3 times a day.  Remove the ice if your skin turns bright red. This is very important. If you cannot feel pain, heat, or cold, you have a greater risk of damage to the area.  Raise (elevate) the affected joint above the level of your heart as often as possible.  Rest the joint as much as possible. If the affected joint is in your leg, you may be given crutches to use.  Follow instructions from your health care provider about eating or drinking restrictions.  Avoiding future gout attacks    Follow a low-purine diet as told by your dietitian or health care provider. Avoid foods and drinks that are high in purines, including liver, kidney, anchovies, asparagus, herring, mushrooms, mussels, and beer.  Maintain a healthy weight or lose weight if you are overweight. If you want to lose weight, talk with your health care provider. Do not lose weight too quickly.  Start or maintain an exercise program as told by your health care provider.  Eating and drinking    Avoid drinking beverages that contain fructose.  Drink enough fluids to keep your urine pale yellow.  If you drink alcohol:  Limit how much you have to:  0–1 drink a day for women who are not pregnant.  0–2 drinks a day for men.  Know how much alcohol is in a drink. In the [place] one drink equals one 12 oz bottle of beer (355 mL), one 5 oz glass of wine (148 mL), or one 1½ oz glass of hard liquor (44 mL).  General instructions    Take over-the-counter and prescription medicines only as told by your health care provider.  Ask your health care provider if the medicine prescribed to you requires you to avoid driving or using machinery.  Return to your normal activities as told by your health care provider. Ask your health care provider what activities are safe for you.  Keep all follow-up visits. This is important.  Where to find more information  National Institutes of Health: www.niams.nih.gov  Contact a health care provider if you have:  Another gout attack.  Continuing symptoms of a gout attack after 10 days of treatment.  Side effects from your medicines.  Chills or a fever.  Burning pain when you urinate.  Pain in your lower back or abdomen.  Get help right away if you:  Have severe or uncontrolled pain.  Cannot urinate.  Summary  Gout is painful swelling of the joints caused by having too much uric acid in the body.  The most common site for gout to occur is in the big toe, but it can affect other joints in the body.  Medicines and dietary changes can help to prevent and treat gout attacks.  This information is not intended to replace advice given to you by your health care provider. Make sure you discuss any questions you have with your health care provider.

## 2024-11-11 NOTE — ED ADULT TRIAGE NOTE - CHIEF COMPLAINT QUOTE
L ankle pain/swelling x 2 days, unable to bear weight due to pain   denies fall/trauma  hx kidney transplant 10 years ago

## 2024-11-11 NOTE — ED ADULT NURSE NOTE - NSFALLUNIVINTERV_ED_ALL_ED
Bed/Stretcher in lowest position, wheels locked, appropriate side rails in place/Call bell, personal items and telephone in reach/Instruct patient to call for assistance before getting out of bed/chair/stretcher/Non-slip footwear applied when patient is off stretcher/[place] to call system/Physically safe environment - no spills, clutter or unnecessary equipment/Purposeful proactive rounding/Room/bathroom lighting operational, light cord in reach

## 2024-11-11 NOTE — ED PROVIDER NOTE - PROGRESS NOTE DETAILS
Attending Zora Sanchez MD: Patient able to range foot without complications  Advised prednisone for anti-inflammatory properties   Will provide rapid Rheumatology follow up

## 2024-11-11 NOTE — ED PROVIDER NOTE - CLINICAL SUMMARY MEDICAL DECISION MAKING FREE TEXT BOX
Attending Zora Sanchez MD: 74 yo M with PMHx of ESRD s/p renal transplant 2013, COPD, diabetes, CAD s/p stents, TAVR, gout on allopurinol presents with atraumatic left ankle pain. He reports onset was two days ago initially beginning with swelling of the medial aspect of his left foot followed by ankle swelling. He reports prior presentation of gout in the left elbow usually. He reports inability to ambulate 2/2 to pain that is not responsive to tramadol. He denies fever at home. He reports he believes the ankle is mildly warm compared to prior.     PE: well appearing, nontoxic, no respiratory distress.  Neuro nonfocal.  Skin intact. Mild edema of left ankle, tender to touch. No overlying erythema. Pain with passive and active motion. No overlying abrasions or lacerations. Psych normal mood.    MDM: Differential diagnosis includes but is not limited to gout, psuedogout, septic arthritis, bursitis   Will assess with labs and XR   Ordered for ofirmev     Refer to progress notes for continued care

## 2024-11-21 ENCOUNTER — APPOINTMENT (OUTPATIENT)
Dept: PULMONOLOGY | Facility: CLINIC | Age: 74
End: 2024-11-21

## 2025-01-09 ENCOUNTER — APPOINTMENT (OUTPATIENT)
Dept: PULMONOLOGY | Facility: CLINIC | Age: 75
End: 2025-01-09
Payer: MEDICARE

## 2025-01-09 ENCOUNTER — NON-APPOINTMENT (OUTPATIENT)
Age: 75
End: 2025-01-09

## 2025-01-09 VITALS
SYSTOLIC BLOOD PRESSURE: 130 MMHG | WEIGHT: 189 LBS | HEART RATE: 67 BPM | HEIGHT: 67 IN | DIASTOLIC BLOOD PRESSURE: 60 MMHG | BODY MASS INDEX: 29.66 KG/M2 | OXYGEN SATURATION: 98 % | TEMPERATURE: 97.3 F

## 2025-01-09 PROBLEM — M10.9 GOUT, UNSPECIFIED: Chronic | Status: ACTIVE | Noted: 2024-11-11

## 2025-01-09 PROCEDURE — 99214 OFFICE O/P EST MOD 30 MIN: CPT

## 2025-01-27 NOTE — ED ADULT NURSE NOTE - HAVE YOU HAD A FIRST COVID-19 BOOSTER?
History and Physical    Primary Care Provider:  No primary care provider on file.    ================================================================  Impression:  Seizures due to severe hyponatremia  Hypokalemia, hypomagnesemia, hypophsphatemia  Acute encephalopathy due to 1) metabolic and 2) postictal state     Plan:  BMP q4h, agressive K, Phos and Mg repletion   If not waking up appropriately - cEEG  Obtain urine Na, Osm, Cr  Target Na 115-117 by 0900 on 1/27  Obtain TSH  Lovenox nightly    ================================================================  Chief Complaint:  seizures    History of present illness:  Jagruti Dewey is a 59 year old male who presented today from [facility] with seizure like activity. His medical history significant for schizophrenia and psychogenic polydipsia. His outpatient medications include levothyroxine, trazodone, trixyphen and clozapine. In the ED received 300ml of 3% saline and DDAVP due to Na 109.     Past medical history:      No past medical history on file.   No past surgical history on file.  No medications prior to admission.     ALLERGIES:  No Known Allergies   Social History     Tobacco Use    Smoking status: Not on file    Smokeless tobacco: Not on file   Substance Use Topics    Alcohol use: Not on file      No family history on file.     ================================================================  Visit Vitals  BP (!) 177/122   Pulse 97   Temp (!) 100.5 °F (38.1 °C) (Axillary)   Resp (!) 26   Ht 5' 8\" (1.727 m)   Wt 83.4 kg (183 lb 13.8 oz)   SpO2 98%   BMI 27.96 kg/m²         Physical Exam  Vitals and nursing note reviewed.   HENT:      Head: Normocephalic.      Mouth/Throat:      Mouth: Mucous membranes are moist.   Cardiovascular:      Rate and Rhythm: Normal rate and regular rhythm.   Pulmonary:      Effort: Pulmonary effort is normal.      Breath sounds: Normal breath sounds.   Abdominal:      General: Abdomen is flat.      Palpations: Abdomen is  soft.   Skin:     General: Skin is warm.      Capillary Refill: Capillary refill takes less than 2 seconds.   Neurological:      Comments: Moving to tactile stimuli, no verbal response          Lab Results   Component Value Date/Time    SODIUM 117 (LL) 01/26/2025 05:53 PM    CHLORIDE 81 (L) 01/26/2025 05:53 PM    BUN 6 01/26/2025 05:53 PM    CALCIUM 7.9 (L) 01/26/2025 05:53 PM    GLUCOSE 87 01/26/2025 05:53 PM    RBC 3.96 (L) 01/26/2025 09:04 AM     01/26/2025 09:04 AM    INR 1.2 01/26/2025 09:04 AM    PTT 30 01/26/2025 09:04 AM    CO2 24 01/26/2025 05:53 PM        Results for orders placed or performed during the hospital encounter of 01/26/25 (from the past 48 hour(s))   CT HEAD WO CONTRAST    Impression    IMPRESSION: No acute intracranial findings. No hemorrhage or mass effect.    Electronically Signed by: Korey Aaron MD  Signed on: 1/26/2025 10:00 AM  Created on Workstation ID: [id number]  Signed on Workstation ID: [id number]   XR CHEST AP OR PA    Impression    IMPRESSION:    No acute cardiopulmonary findings.          I, Attending Radiologist Korey Aaron MD, have reviewed the images and  report and concur with these findings interpreted by Resident Radiologist,  Keaton Khoury MD.    Electronically Signed by: Korey Aaron MD  Signed on: 1/26/2025 10:11 AM  Created on Workstation ID: [id number]  Signed on Workstation ID: [id number]       No active infections    49 minutes were spent evaluating managing and providing care for this patient, exclusive of procedures. Critical Care was necessary to treat or prevent imminent or life-threatening deterioration of the following conditions: severe hyponatremia, seizures  Critical Care was time spent personally by me on the following activities: developing the management plan, arranging an urgent treatment with staff, discussions with consultants, evaluation of patient's response to treatment, reviewing of laboratory studies, obtaining history from patient,  re-evaluation of patient's condition, review of radiographic studies, examination of patient, ordering and performing treatments and interventions and chart review.    Casey Chan MD, MSc  [facility]             Yes Patent

## 2025-03-17 NOTE — DISCHARGE NOTE PROVIDER - NSRESEARCHGRANT_MLMHIDDEN_GEN_A_CORE
AUDIE Torres, am scribing for and in the presence of Dr. Kd Haines. 03/20/25 1:49 PM  Macey Torres RN    I, Keke Haines MD, personally performed the services prescribed in this documentation as scribed by Ms. Macey Torres RN in my presence and it is both accurate and complete.       Keke Haines MD  Cardiac Electrophysiology  [facility]  
yes

## 2025-05-23 ENCOUNTER — EMERGENCY (EMERGENCY)
Facility: HOSPITAL | Age: 75
LOS: 1 days | End: 2025-05-23
Attending: EMERGENCY MEDICINE
Payer: MEDICARE

## 2025-05-23 VITALS
WEIGHT: 179.9 LBS | DIASTOLIC BLOOD PRESSURE: 68 MMHG | SYSTOLIC BLOOD PRESSURE: 167 MMHG | OXYGEN SATURATION: 97 % | TEMPERATURE: 98 F | HEART RATE: 74 BPM | RESPIRATION RATE: 18 BRPM | HEIGHT: 70 IN

## 2025-05-23 VITALS
HEART RATE: 72 BPM | DIASTOLIC BLOOD PRESSURE: 76 MMHG | SYSTOLIC BLOOD PRESSURE: 161 MMHG | TEMPERATURE: 98 F | RESPIRATION RATE: 16 BRPM | OXYGEN SATURATION: 95 %

## 2025-05-23 DIAGNOSIS — I77.0 ARTERIOVENOUS FISTULA, ACQUIRED: Chronic | ICD-10-CM

## 2025-05-23 DIAGNOSIS — Z98.890 OTHER SPECIFIED POSTPROCEDURAL STATES: Chronic | ICD-10-CM

## 2025-05-23 DIAGNOSIS — Z94.0 KIDNEY TRANSPLANT STATUS: Chronic | ICD-10-CM

## 2025-05-23 DIAGNOSIS — Z96.41 PRESENCE OF INSULIN PUMP (EXTERNAL) (INTERNAL): Chronic | ICD-10-CM

## 2025-05-23 DIAGNOSIS — Z95.2 PRESENCE OF PROSTHETIC HEART VALVE: Chronic | ICD-10-CM

## 2025-05-23 LAB
ALBUMIN SERPL ELPH-MCNC: 3.8 G/DL — SIGNIFICANT CHANGE UP (ref 3.3–5)
ALP SERPL-CCNC: 89 U/L — SIGNIFICANT CHANGE UP (ref 40–120)
ALT FLD-CCNC: 11 U/L — SIGNIFICANT CHANGE UP (ref 10–45)
ANION GAP SERPL CALC-SCNC: 12 MMOL/L — SIGNIFICANT CHANGE UP (ref 5–17)
AST SERPL-CCNC: 17 U/L — SIGNIFICANT CHANGE UP (ref 10–40)
BILIRUB SERPL-MCNC: 1 MG/DL — SIGNIFICANT CHANGE UP (ref 0.2–1.2)
BUN SERPL-MCNC: 9 MG/DL — SIGNIFICANT CHANGE UP (ref 7–23)
CALCIUM SERPL-MCNC: 9.4 MG/DL — SIGNIFICANT CHANGE UP (ref 8.4–10.5)
CHLORIDE SERPL-SCNC: 104 MMOL/L — SIGNIFICANT CHANGE UP (ref 96–108)
CO2 SERPL-SCNC: 19 MMOL/L — LOW (ref 22–31)
CREAT SERPL-MCNC: 0.44 MG/DL — LOW (ref 0.5–1.3)
EGFR: 111 ML/MIN/1.73M2 — SIGNIFICANT CHANGE UP
EGFR: 111 ML/MIN/1.73M2 — SIGNIFICANT CHANGE UP
GLUCOSE SERPL-MCNC: 142 MG/DL — HIGH (ref 70–99)
HCT VFR BLD CALC: 47.4 % — SIGNIFICANT CHANGE UP (ref 39–50)
HGB BLD-MCNC: 16 G/DL — SIGNIFICANT CHANGE UP (ref 13–17)
MCHC RBC-ENTMCNC: 32.6 PG — SIGNIFICANT CHANGE UP (ref 27–34)
MCHC RBC-ENTMCNC: 33.8 G/DL — SIGNIFICANT CHANGE UP (ref 32–36)
MCV RBC AUTO: 96.5 FL — SIGNIFICANT CHANGE UP (ref 80–100)
NRBC BLD AUTO-RTO: 0 /100 WBCS — SIGNIFICANT CHANGE UP (ref 0–0)
PLATELET # BLD AUTO: 178 K/UL — SIGNIFICANT CHANGE UP (ref 150–400)
POTASSIUM SERPL-MCNC: 4.3 MMOL/L — SIGNIFICANT CHANGE UP (ref 3.5–5.3)
POTASSIUM SERPL-SCNC: 4.3 MMOL/L — SIGNIFICANT CHANGE UP (ref 3.5–5.3)
PROT SERPL-MCNC: 7.2 G/DL — SIGNIFICANT CHANGE UP (ref 6–8.3)
RBC # BLD: 4.91 M/UL — SIGNIFICANT CHANGE UP (ref 4.2–5.8)
RBC # FLD: 14.1 % — SIGNIFICANT CHANGE UP (ref 10.3–14.5)
SODIUM SERPL-SCNC: 135 MMOL/L — SIGNIFICANT CHANGE UP (ref 135–145)
WBC # BLD: 10.44 K/UL — SIGNIFICANT CHANGE UP (ref 3.8–10.5)
WBC # FLD AUTO: 10.44 K/UL — SIGNIFICANT CHANGE UP (ref 3.8–10.5)

## 2025-05-23 PROCEDURE — 99284 EMERGENCY DEPT VISIT MOD MDM: CPT

## 2025-05-23 PROCEDURE — 73070 X-RAY EXAM OF ELBOW: CPT | Mod: 26,RT

## 2025-05-23 PROCEDURE — 99053 MED SERV 10PM-8AM 24 HR FAC: CPT

## 2025-05-23 PROCEDURE — 73070 X-RAY EXAM OF ELBOW: CPT

## 2025-05-23 PROCEDURE — 85027 COMPLETE CBC AUTOMATED: CPT

## 2025-05-23 PROCEDURE — 80053 COMPREHEN METABOLIC PANEL: CPT

## 2025-05-23 PROCEDURE — 99283 EMERGENCY DEPT VISIT LOW MDM: CPT | Mod: 25

## 2025-05-23 PROCEDURE — 36415 COLL VENOUS BLD VENIPUNCTURE: CPT

## 2025-05-23 RX ORDER — ACETAMINOPHEN 500 MG/5ML
975 LIQUID (ML) ORAL ONCE
Refills: 0 | Status: COMPLETED | OUTPATIENT
Start: 2025-05-23 | End: 2025-05-23

## 2025-05-23 RX ORDER — PREDNISONE 20 MG/1
50 TABLET ORAL ONCE
Refills: 0 | Status: COMPLETED | OUTPATIENT
Start: 2025-05-23 | End: 2025-05-23

## 2025-05-23 RX ORDER — PREDNISONE 20 MG/1
1 TABLET ORAL
Qty: 4 | Refills: 0
Start: 2025-05-23 | End: 2025-05-26

## 2025-05-23 RX ADMIN — PREDNISONE 50 MILLIGRAM(S): 20 TABLET ORAL at 08:24

## 2025-05-23 RX ADMIN — Medication 975 MILLIGRAM(S): at 08:24

## 2025-05-23 NOTE — ED PROVIDER NOTE - PHYSICAL EXAMINATION
Last visit 6/27/2022    Future visit 6/29/2023      Last Filled:6/28/2022  Pharmacy refill request for  Cranberry 450 MG Tab 30 tablet 5 6/28/2022     Sig - Route: Take 450 mg by mouth daily. - Oral    Sent to pharmacy as: Cranberry 450 MG Oral Tablet    Class: Eprescribe    E-Prescribing Status: Receipt confirmed by pharmacy (6/28/2022  1:40 PM CDT        
CONSTITUTIONAL: awake; in no acute distress.   SKIN: no warmth or erythema to R elbow, no rash.   HEAD: Normocephalic; atraumatic.  EYES: no conjunctival injection. no scleral icterus  ENT: No nasal discharge; airway clear.  ABD: soft ntnd, no guarding, no distention, no rigidity.   EXT:  No cyanosis or edema. LUE fistula. R elbow with intact Passive ROM fully though with pain, no point tenderness, good R radial pulse, no extremity edema. Good sensation RUE distally.  NEURO: Alert, oriented, grossly unremarkable  PSYCH: Cooperative, appropriate.

## 2025-05-23 NOTE — ED PROVIDER NOTE - NSFOLLOWUPINSTRUCTIONS_ED_ALL_ED_FT
You were seen in the ED for right elbow pain    Your evaluation including blood tests, x ray showed no findings requiring further evaluation or treatment in the hospital at this time.    Please follow up with your PCP as discussed within 1 week. Discuss your symptoms, medications, and results in this packet.    You may take Tylenol up to 1000mg every 6 hours as needed for pain. Take your prescribed colchicine as you have been directed, 0.6mg daily for joint pain. Take the steroid Prednisone one tablet once per day, you were given a dose in the emergency department.    Seek immediate medical attention if you experience new or worsening symptoms, including fever with worsening pain, new redness and swelling and worsening pain of the joint with inability to move it.

## 2025-05-23 NOTE — ED PROVIDER NOTE - PROGRESS NOTE DETAILS
Donell Miller MD PGY3: labs nonactionable, xr no acute fracture. pt feels improved, good rom without pain. Discussed with patient all results including any incidentals. Discussed discharge, follow up, return precautions, medications. Patient verbalizes understanding and is amenable at this time. Answered patient questions.

## 2025-05-23 NOTE — ED PROVIDER NOTE - CLINICAL SUMMARY MEDICAL DECISION MAKING FREE TEXT BOX
74-year-old male past medical history ESRD status post renal transplant 2013 on tacro, CellCept, COPD, diabetes, CAD status post stents, gout on allopurinol presenting for right elbow pain. With initial vital signs significant for afebrile.  Presenting with atraumatic right elbow pain without overlying infectious findings and without neurovascular compromise.  Otherwise well-appearing without systemic symptoms.  Differential includes but not limited to acute gout flare versus muscle strain, to evaluate x-ray, labs to evaluate electrolytes and renal function, give meds and reassess. 74-year-old male past medical history ESRD status post renal transplant 2013 on tacro, CellCept, COPD, diabetes, CAD status post stents, gout on allopurinol presenting for right elbow pain. With initial vital signs significant for afebrile.  Presenting with atraumatic right elbow pain without overlying infectious findings and without neurovascular compromise.  Otherwise well-appearing without systemic symptoms.  Differential includes but not limited to acute gout flare versus muscle strain, to evaluate x-ray, labs to evaluate electrolytes and renal function, give meds and reassess.    Tania: Patient is a 74-year-old with end-stage renal disease also diabetes and a kidney transplant and coronary artery disease.  Patient presents with right elbow pain and right forearm pain.  There is no cellulitis over the area it has good pulses the elbow itself moves well but hurts the patient well as it moves.  Patient does have a history of gout.  Does not look infectious.  Arterial blood clots seem unlikely.  Gout seems the most likely cause.  Would x-ray looking for fractures or bone damage as well.  Will get labs looking to see if there needs to be concern for hemolysis or more concern for infection.

## 2025-05-23 NOTE — ED PROVIDER NOTE - OBJECTIVE STATEMENT
74-year-old male past medical history ESRD status post renal transplant 2013 on tacro, CellCept, COPD, diabetes, CAD status post stents, gout on allopurinol presenting for right elbow pain.  Patient for the past 2 days has been experiencing atraumatic right elbow pain located to the medial elbow, without associated rashes or fevers, worse with range of motion at his elbow and right forearm.  No pain in other joints, has previously had gout flares in this elbow as well as other joints, has taken 1 day of colchicine and tramadol for his pain with only mild relief.  No change in urination no urinary symptoms, abdominal pain, other joint pain, swelling or redness to the area, difficulty breathing or chest pain.

## 2025-05-23 NOTE — ED PROVIDER NOTE - PATIENT PORTAL LINK FT
You can access the FollowMyHealth Patient Portal offered by [facility] by registering at the following website: [email or web address]. By joining [facility]’s FollowMyHealth portal, you will also be able to view your health information using other applications (apps) compatible with our system.

## 2025-05-23 NOTE — ED PROVIDER NOTE - ATTENDING CONTRIBUTION TO CARE
I performed a history and physical exam of the patient and discussed their management with the resident and /or advanced care provider. I reviewed the resident and /or ACP's note and agree with the documented findings and plan of care. My medical decision making and observations are found above.  Lungs clear, abd soft, lt arm shunt with thrill, rt elbow warm, pain to elbow motion, no cellulitis, nl pulses on both sides of wrist.

## 2025-05-23 NOTE — ED ADULT NURSE NOTE - OBJECTIVE STATEMENT
[Alert] : alert [Healthy Appearance] : healthy appearance [No Acute Distress] : no acute distress 74y M A&O x3 BIBEMS from home, wife bedside. Presenting to the ER with R arm pain for the past 2 days which has worsened. Pmxh CHF, Renal failure, Kidney transplant, DM.Shunt in Left arm with pink band in place. Pt endorsing having limited ROM in R arm, as well some numbness in the right hand. Pain has now in R forearm radiating upwards towards elbow. At this time pt stating he it unable to even hold a pen at this time. No recent trauma to arm or pt in general. Denies c/p, sob, n/v/d, fevers, chills, cough, HA, congestion.

## 2025-06-03 NOTE — H&P PST ADULT - SKIN
[de-identified] : Exam of the right knee reveals range of motion is satisfactory from 5 to 115 degrees..  The incision is well-healed.   No evidence of any drainage is noted.  There is no warmth or erythema.  No evidence of any cellulitis noted.  There is no ecchymosis.   There is no effusion.  The knee is stable to varus and valgus stress.  There is a negative posterior drawer.  Extensor mechanism is intact.  Patellofemoral tracking is satisfactory.  Normal puluses and sensation distally.    No edema or lymphadenopathy noted.  Strength and sensation are intact distally. [de-identified] : AP, lateral, and merchant views of the right knee were obtained.  The patient is status post total knee replacement.  The components are well fixed with good alignment. No evidence of loosening or periprosthetic fracture is noted. warm and dry/color normal

## 2025-06-29 NOTE — DISCHARGE NOTE NURSING/CASE MANAGEMENT/SOCIAL WORK - NSFLUVACAGEDISCH_IMM_ALL_CORE
Adult
on the discharge service for the patient. I have reviewed and made amendments to the documentation where necessary.

## 2025-07-03 ENCOUNTER — APPOINTMENT (OUTPATIENT)
Dept: PULMONOLOGY | Facility: CLINIC | Age: 75
End: 2025-07-03
Payer: MEDICARE

## 2025-07-03 VITALS
HEART RATE: 84 BPM | TEMPERATURE: 97.9 F | OXYGEN SATURATION: 97 % | DIASTOLIC BLOOD PRESSURE: 80 MMHG | BODY MASS INDEX: 29.76 KG/M2 | WEIGHT: 190 LBS | SYSTOLIC BLOOD PRESSURE: 180 MMHG

## 2025-07-03 PROCEDURE — 99214 OFFICE O/P EST MOD 30 MIN: CPT

## 2025-07-07 RX ORDER — MONTELUKAST 10 MG/1
10 TABLET, FILM COATED ORAL
Qty: 90 | Refills: 3 | Status: ACTIVE | COMMUNITY
Start: 2025-07-07 | End: 1900-01-01

## 2025-08-14 ENCOUNTER — OUTPATIENT (OUTPATIENT)
Dept: OUTPATIENT SERVICES | Facility: HOSPITAL | Age: 75
LOS: 1 days | End: 2025-08-14
Payer: MEDICARE

## 2025-08-14 ENCOUNTER — APPOINTMENT (OUTPATIENT)
Dept: NUCLEAR MEDICINE | Facility: IMAGING CENTER | Age: 75
End: 2025-08-14
Payer: MEDICARE

## 2025-08-14 DIAGNOSIS — Z95.2 PRESENCE OF PROSTHETIC HEART VALVE: Chronic | ICD-10-CM

## 2025-08-14 DIAGNOSIS — Z98.890 OTHER SPECIFIED POSTPROCEDURAL STATES: Chronic | ICD-10-CM

## 2025-08-14 DIAGNOSIS — I77.0 ARTERIOVENOUS FISTULA, ACQUIRED: Chronic | ICD-10-CM

## 2025-08-14 DIAGNOSIS — Z00.8 ENCOUNTER FOR OTHER GENERAL EXAMINATION: ICD-10-CM

## 2025-08-14 DIAGNOSIS — Z94.0 KIDNEY TRANSPLANT STATUS: Chronic | ICD-10-CM

## 2025-08-14 DIAGNOSIS — Z96.41 PRESENCE OF INSULIN PUMP (EXTERNAL) (INTERNAL): Chronic | ICD-10-CM

## 2025-08-14 DIAGNOSIS — R93.89 ABNORMAL FINDINGS ON DIAGNOSTIC IMAGING OF OTHER SPECIFIED BODY STRUCTURES: ICD-10-CM

## 2025-08-14 PROCEDURE — 78815 PET IMAGE W/CT SKULL-THIGH: CPT | Mod: 26,PI

## 2025-08-14 PROCEDURE — 78815 PET IMAGE W/CT SKULL-THIGH: CPT

## 2025-08-14 PROCEDURE — A9552: CPT

## 2025-09-12 ENCOUNTER — APPOINTMENT (OUTPATIENT)
Dept: PULMONOLOGY | Facility: CLINIC | Age: 75
End: 2025-09-12
Payer: MEDICARE

## 2025-09-12 VITALS
HEART RATE: 90 BPM | SYSTOLIC BLOOD PRESSURE: 160 MMHG | BODY MASS INDEX: 29.76 KG/M2 | TEMPERATURE: 97.6 F | WEIGHT: 190 LBS | OXYGEN SATURATION: 97 % | DIASTOLIC BLOOD PRESSURE: 82 MMHG

## 2025-09-12 PROCEDURE — 99213 OFFICE O/P EST LOW 20 MIN: CPT

## (undated) DEVICE — TUBING SUCTION 20FT

## (undated) DEVICE — SENSOR O2 FINGER ADULT

## (undated) DEVICE — SOL INJ NS 0.9% 500ML 2 PORT

## (undated) DEVICE — BIOPSY FORCEP RADIAL JAW 4 STANDARD WITH NEEDLE

## (undated) DEVICE — SNARE CAPTIVATOR COLD RND STIFF 10X2.4X2.8MM 240CM

## (undated) DEVICE — PACK IV START WITH CHG

## (undated) DEVICE — TUBING IV SET GRAVITY 3Y 100" MACRO

## (undated) DEVICE — CATH IV SAFE BC 20G X 1.16" (PINK)

## (undated) DEVICE — POLY TRAP ETRAP

## (undated) DEVICE — TUBING SUCTION CONN 6FT STERILE

## (undated) DEVICE — BRUSH COLONOSCOPY CYTOLOGY

## (undated) DEVICE — FORCEP RADIAL JAW 4 JUMBO 2.8MM 3.2MM 240CM ORANGE DISP

## (undated) DEVICE — SUCTION YANKAUER NO CONTROL VENT

## (undated) DEVICE — CATH IV SAFE BC 22G X 1" (BLUE)

## (undated) DEVICE — RETRIEVER ROTH NET 360D 230X5X3CM

## (undated) DEVICE — SYR LUER LOK 50CC

## (undated) DEVICE — FOLEY HOLDER STATLOCK 2 WAY ADULT

## (undated) DEVICE — IRRIGATOR BIO SHIELD

## (undated) DEVICE — CLAMP BX HOT RAD JAW 3

## (undated) DEVICE — ELCTR GROUNDING PAD ADULT COVIDIEN

## (undated) DEVICE — SNARE OVAL LOOP MICOR